# Patient Record
Sex: FEMALE | Race: BLACK OR AFRICAN AMERICAN | NOT HISPANIC OR LATINO | Employment: OTHER | URBAN - METROPOLITAN AREA
[De-identification: names, ages, dates, MRNs, and addresses within clinical notes are randomized per-mention and may not be internally consistent; named-entity substitution may affect disease eponyms.]

---

## 2017-01-03 ENCOUNTER — DOCUMENTATION ONLY (OUTPATIENT)
Dept: REHABILITATION | Facility: HOSPITAL | Age: 82
End: 2017-01-03

## 2017-01-03 ENCOUNTER — TELEPHONE (OUTPATIENT)
Dept: OTOLARYNGOLOGY | Facility: CLINIC | Age: 82
End: 2017-01-03

## 2017-01-03 NOTE — PROGRESS NOTES
Occupational Therapy Discharge Summary    Patient: Peri Johansen  MRN: [id number]    Patient is a referred to Occupational Therapy by  with a diagnosis of CTR and a referral for Eval and Treat.  Patient received initial evaluation on  10/26/16 and returned for 9  treatment sessions. Patient had 2 missed visits.     Patient's treatment included:  - Therapeutic exercise/activities  - Modalities for pain management  - ROM and strengthening      Patient's therapy goals  were met. Pt was not formally reassessed , but was working toward established goals.    Patient is D/C from outpatient occupational therapy secondary to achieving all goals.

## 2017-01-03 NOTE — TELEPHONE ENCOUNTER
----- Message from Juliet Jaquez sent at 1/2/2017  1:05 PM CST -----  Contact: self  Patient states experiencing hearing loss and roaring noise in ear   Patient states need appointment for Friday in after 11:30am this week if possible.    Please call pt at [phone]

## 2017-01-06 ENCOUNTER — TELEPHONE (OUTPATIENT)
Dept: INTERNAL MEDICINE | Facility: CLINIC | Age: 82
End: 2017-01-06

## 2017-01-09 RX ORDER — ZOLPIDEM TARTRATE 10 MG/1
TABLET ORAL
Qty: 30 TABLET | Refills: 0 | Status: SHIPPED | OUTPATIENT
Start: 2017-01-09 | End: 2017-01-23 | Stop reason: SDUPTHER

## 2017-01-09 RX ORDER — ZOLPIDEM TARTRATE 10 MG/1
TABLET ORAL
Qty: 30 TABLET | Refills: 0 | OUTPATIENT
Start: 2017-01-09

## 2017-01-23 ENCOUNTER — OFFICE VISIT (OUTPATIENT)
Dept: INTERNAL MEDICINE | Facility: CLINIC | Age: 82
End: 2017-01-23
Payer: MEDICARE

## 2017-01-23 VITALS
HEIGHT: 59 IN | SYSTOLIC BLOOD PRESSURE: 125 MMHG | DIASTOLIC BLOOD PRESSURE: 80 MMHG | WEIGHT: 134.5 LBS | BODY MASS INDEX: 27.12 KG/M2

## 2017-01-23 DIAGNOSIS — E78.2 MIXED HYPERLIPIDEMIA: ICD-10-CM

## 2017-01-23 DIAGNOSIS — F51.01 PRIMARY INSOMNIA: ICD-10-CM

## 2017-01-23 DIAGNOSIS — D17.1 LIPOMA OF BACK: ICD-10-CM

## 2017-01-23 DIAGNOSIS — G89.29 OTHER CHRONIC PAIN: ICD-10-CM

## 2017-01-23 DIAGNOSIS — K21.9 GASTROESOPHAGEAL REFLUX DISEASE WITHOUT ESOPHAGITIS: ICD-10-CM

## 2017-01-23 DIAGNOSIS — I70.0 AORTIC ATHEROSCLEROSIS: ICD-10-CM

## 2017-01-23 DIAGNOSIS — N28.1 RENAL CYST: ICD-10-CM

## 2017-01-23 DIAGNOSIS — M47.819 SPONDYLOSIS WITHOUT MYELOPATHY: ICD-10-CM

## 2017-01-23 DIAGNOSIS — M54.41 CHRONIC BILATERAL LOW BACK PAIN WITH RIGHT-SIDED SCIATICA: Primary | ICD-10-CM

## 2017-01-23 DIAGNOSIS — J01.00 SUBACUTE MAXILLARY SINUSITIS: ICD-10-CM

## 2017-01-23 DIAGNOSIS — R60.0 LOCALIZED EDEMA: ICD-10-CM

## 2017-01-23 DIAGNOSIS — G89.29 CHRONIC BILATERAL LOW BACK PAIN WITH RIGHT-SIDED SCIATICA: Primary | ICD-10-CM

## 2017-01-23 DIAGNOSIS — C83.35 DIFFUSE LARGE B-CELL LYMPHOMA OF LYMPH NODES OF LOWER EXTREMITY: ICD-10-CM

## 2017-01-23 DIAGNOSIS — K57.30 DIVERTICULOSIS OF LARGE INTESTINE WITHOUT HEMORRHAGE: ICD-10-CM

## 2017-01-23 LAB
CREAT UR-MCNC: 104 MG/DL
MICROALBUMIN UR DL<=1MG/L-MCNC: 12 UG/ML
MICROALBUMIN/CREATININE RATIO: 11.5 UG/MG

## 2017-01-23 PROCEDURE — 99214 OFFICE O/P EST MOD 30 MIN: CPT | Mod: S$PBB,,, | Performed by: INTERNAL MEDICINE

## 2017-01-23 PROCEDURE — 99214 OFFICE O/P EST MOD 30 MIN: CPT | Mod: PBBFAC | Performed by: INTERNAL MEDICINE

## 2017-01-23 PROCEDURE — 99999 PR PBB SHADOW E&M-EST. PATIENT-LVL IV: CPT | Mod: PBBFAC,,, | Performed by: INTERNAL MEDICINE

## 2017-01-23 PROCEDURE — 82570 ASSAY OF URINE CREATININE: CPT

## 2017-01-23 RX ORDER — AMOXICILLIN AND CLAVULANATE POTASSIUM 875; 125 MG/1; MG/1
1 TABLET, FILM COATED ORAL 2 TIMES DAILY
Qty: 14 TABLET | Refills: 0 | Status: SHIPPED | OUTPATIENT
Start: 2017-01-23 | End: 2017-01-30

## 2017-01-23 RX ORDER — TRAMADOL HYDROCHLORIDE 50 MG/1
TABLET ORAL
Qty: 60 TABLET | Refills: 0 | Status: SHIPPED | OUTPATIENT
Start: 2017-01-23 | End: 2017-09-14

## 2017-01-23 RX ORDER — TRAMADOL HYDROCHLORIDE 50 MG/1
50 TABLET ORAL 2 TIMES DAILY PRN
Qty: 60 TABLET | Refills: 0 | Status: SHIPPED | OUTPATIENT
Start: 2017-03-23 | End: 2017-09-14 | Stop reason: SDUPTHER

## 2017-01-23 RX ORDER — TRAMADOL HYDROCHLORIDE 50 MG/1
50 TABLET ORAL 2 TIMES DAILY PRN
Qty: 60 TABLET | Refills: 0 | Status: SHIPPED | OUTPATIENT
Start: 2017-02-23 | End: 2017-07-03 | Stop reason: SDUPTHER

## 2017-01-23 RX ORDER — ESOMEPRAZOLE MAGNESIUM 40 MG/1
40 CAPSULE, DELAYED RELEASE ORAL DAILY
Qty: 90 CAPSULE | Refills: 1 | Status: SHIPPED | OUTPATIENT
Start: 2017-01-23 | End: 2017-09-12 | Stop reason: SDUPTHER

## 2017-01-23 RX ORDER — TRAMADOL HYDROCHLORIDE 50 MG/1
50 TABLET ORAL 2 TIMES DAILY PRN
Qty: 60 TABLET | Refills: 0 | Status: SHIPPED | OUTPATIENT
Start: 2017-04-23 | End: 2017-09-14 | Stop reason: SDUPTHER

## 2017-01-23 RX ORDER — NAPROXEN SODIUM 220 MG/1
81 TABLET, FILM COATED ORAL DAILY
Start: 2017-01-23 | End: 2020-06-29 | Stop reason: SDUPTHER

## 2017-01-23 NOTE — PROGRESS NOTES
Subjective:       Patient ID: Peri Johansen is a 82 y.o. female.    Chief Complaint: Follow-up    HPI Comments: Follow up multiple medical issues    Declines flu vaccine.    L ear congestion and URI sx for several weeks.  No fever, chills or sweats.  No shortness of breath.  No hearing loss.      Due for labs for diabetes.      Due to see Oncology this year.  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   She underwent surgery with joanna placement, followed by CVP chemotherapy concurrent with radiation for 3 cycles. Then she received adjuvant weekly Rituxan x 4. All treatment was completed by December 2007.    Some chronic pain as a result of that and takes tramadol daily.  Pattern of use has been stable.    BP stable.  Due for lipids.    Has a right renal cyst, reviewed.  Diverticulosis with no symptoms.  Aortic atherosclerosis.    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     Vitamin D deficiency disease     Mixed hyperlipidemia     Renal cyst: R side see ultrasound 6/16     Uncontrolled type 2 diabetes mellitus without complication, without long-term current use of insulin     Stromal cell tumor     Lipoma of back     Thyroid nodule: 2014     Gastric nodule: 2013-m per GI no need for further follow up     Spondylosis without myelopathy     Acquired spondylolisthesis     Low vitamin B12 level     Gastroesophageal reflux disease without esophagitis     Osteoporosis: 2014 tx not indicated, repeat in 2018     Colon polyp: hyperplastic 2015- repeat 2020     Primary insomnia     Aortic atherosclerosis: see CT scan 3/15     Diverticulosis of large intestine without hemorrhage: see CT scan 3/15     Thoracic and lumbosacral neuritis     Chronic bilateral low back pain with right-sided sciatica     Left carpal tunnel syndrome     Left wrist pain     Localized edema          Review of Systems   Constitutional: Positive for fatigue.  Negative for chills and fever.   HENT: Positive for congestion, ear pain and postnasal drip.    Eyes: Negative.    Respiratory: Positive for cough. Negative for chest tightness, shortness of breath and wheezing.    Cardiovascular: Negative.    Gastrointestinal: Negative.  Negative for abdominal pain.   Genitourinary: Negative for difficulty urinating and hematuria.   Musculoskeletal: Positive for arthralgias and back pain.        Lipoma on back; does not want surgery   Skin: Negative for rash.   Neurological: Negative for weakness and numbness.   Psychiatric/Behavioral: Positive for sleep disturbance.       Objective:      Physical Exam   Constitutional: She is oriented to person, place, and time. She appears well-developed and well-nourished.   HENT:   Head: Normocephalic and atraumatic.   Right Ear: External ear normal.   Left Ear: External ear normal.   Mouth/Throat: Oropharynx is clear and moist.   Eyes: Conjunctivae are normal.   Neck: Normal range of motion. Neck supple. No thyromegaly present.   Cardiovascular: Normal rate, regular rhythm and normal heart sounds.    Pulmonary/Chest: No respiratory distress. She has no wheezes. She has no rales.   Abdominal: Soft. Bowel sounds are normal.   Musculoskeletal: She exhibits no edema.   Lipoma upper back   Lymphadenopathy:     She has no cervical adenopathy.   Neurological: She is alert and oriented to person, place, and time.   Skin: Skin is warm and dry. No rash noted. No erythema.       Assessment:       1. Chronic bilateral low back pain with right-sided sciatica    2. Diverticulosis of large intestine without hemorrhage: see CT scan 3/15    3. Renal cyst: R side see ultrasound 6/16    4. Aortic atherosclerosis: see CT scan 3/15    5. Gastroesophageal reflux disease without esophagitis    6. Mixed hyperlipidemia    7. Primary insomnia    8. Uncontrolled type 2 diabetes mellitus without complication, without long-term current use of insulin    9. Small B-cell  lymphoma of lymph nodes of lower extremity    10. Spondylosis without myelopathy    11. Other chronic pain    12. Subacute maxillary sinusitis    13. Localized edema    14. Lipoma of back    15. Diffuse large B-cell lymphoma of lymph nodes of lower extremity        Plan:         Chronic bilateral low back pain with right-sided sciatica: Stable without alarm symptoms    Diverticulosis of large intestine without hemorrhage: see CT scan 3/15: No current issues    Renal cyst: R side see ultrasound 6/16; We'll monitor    Aortic atherosclerosis: see CT scan 3/15  -     aspirin 81 MG Chew; Take 1 tablet (81 mg total) by mouth once daily.    Gastroesophageal reflux disease without esophagitis; No alarm symptoms.  Continue Nexium.  Per GI, no need for follow-up    Mixed hyperlipidemia  -     Comprehensive metabolic panel; Future; Expected date: 1/23/17  -     Lipid panel; Future; Expected date: 1/23/17    Primary insomnia    Uncontrolled type 2 diabetes mellitus without complication, without long-term current use of insulin  -     Hemoglobin A1c; Future; Expected date: 1/23/17  -     Microalbumin/creatinine urine ratio    Spondylosis without myelopathy  -     tramadol (ULTRAM) 50 mg tablet; Take 1 tablet (50 mg total) by mouth 2 (two) times daily as needed.  Dispense: 60 tablet; Refill: 0  -     tramadol (ULTRAM) 50 mg tablet; Take 1 tablet (50 mg total) by mouth 2 (two) times daily as needed.  Dispense: 60 tablet; Refill: 0  -     tramadol (ULTRAM) 50 mg tablet; Take 1 tablet (50 mg total) by mouth 2 (two) times daily as needed.  Dispense: 60 tablet; Refill: 0    Other chronic pain  -     tramadol (ULTRAM) 50 mg tablet; Take 1 tablet (50 mg total) by mouth 2 (two) times daily as needed.  Dispense: 60 tablet; Refill: 0  -     tramadol (ULTRAM) 50 mg tablet; Take 1 tablet (50 mg total) by mouth 2 (two) times daily as needed.  Dispense: 60 tablet; Refill: 0    Subacute maxillary sinusitis  -     amoxicillin-clavulanate  875-125mg (AUGMENTIN) 875-125 mg per tablet; Take 1 tablet by mouth 2 (two) times daily.  Dispense: 14 tablet; Refill: 0    Localized edema; No syncope, chest pain, pressure, tightness or shortness of breath    Lipoma of back; Declines assessment    Diffuse large B-cell lymphoma of lymph nodes of lower extremity: Oncology follow-up    Other orders  -     tramadol (ULTRAM) 50 mg tablet; TAKE 1 TABLET(50 MG) BY MOUTH TWICE DAILY  Dispense: 60 tablet; Refill: 0  -     NEXIUM 40 mg capsule; Take 1 capsule (40 mg total) by mouth once daily.  Dispense: 90 capsule; Refill: 1    Rest, fluids, acetaminophen, mucinex and follow up poor results.    I will review all studies and determine further tx depending on findings

## 2017-01-23 NOTE — MR AVS SNAPSHOT
[facility]  [address]  [facility] [address]  Phone: [phone]  Fax: [phone]                  Peri Johansen   2017 2:00 PM   Office Visit    Description:  Female : 1934   Provider:  Annika Garland MD   Department:  [facility]           Reason for Visit     Follow-up           Diagnoses this Visit        Comments    Chronic bilateral low back pain with right-sided sciatica    -  Primary     Diverticulosis of large intestine without hemorrhage         Renal cyst         Aortic atherosclerosis         Gastroesophageal reflux disease without esophagitis         Mixed hyperlipidemia         Primary insomnia         Uncontrolled type 2 diabetes mellitus without complication, without long-term current use of insulin         Spondylosis without myelopathy         Other chronic pain         Subacute maxillary sinusitis         Localized edema         Lipoma of back         Diffuse large B-cell lymphoma of lymph nodes of lower extremity                To Do List           Future Appointments        Provider Department Dept Phone    2017 2:50 PM LAB, APPOINTMENT [place] [facility] [phone]      Goals (5 Years of Data)     None       These Medications        Disp Refills Start End    aspirin 81 MG Chew   2017     Take 1 tablet (81 mg total) by mouth once daily. - Oral    Pharmacy: [facility] [address] AT [facility] Ph #: [phone]       tramadol (ULTRAM) 50 mg tablet 60 tablet 0 2017     TAKE 1 TABLET(50 MG) BY MOUTH TWICE DAILY    Pharmacy: [facility] [address] [place] [address] AT [facility] Ph #: [phone]       tramadol (ULTRAM) 50 mg tablet 60 tablet 0 2017     Take 1 tablet (50 mg total) by mouth 2 (two) times daily as needed. - Oral    Pharmacy: [facility] [address]  [address] AT [facility] Ph #: [phone]       tramadol (ULTRAM) 50 mg tablet 60 tablet 0 3/23/2017     Take 1 tablet (50 mg total) by mouth 2 (two) times daily as needed. - Oral    Pharmacy: [address] AT [facility] Ph #: [phone]       tramadol (ULTRAM) 50 mg tablet 60 tablet 0 4/23/2017     Take 1 tablet (50 mg total) by mouth 2 (two) times daily as needed. - Oral    Pharmacy: [address] AT [facility] Ph #: [phone]       NEXIUM 40 mg capsule 90 capsule 1 1/23/2017     Take 1 capsule (40 mg total) by mouth once daily. - Oral    Pharmacy: [facility] [address] AT [facility] Ph #: [phone]       amoxicillin-clavulanate 875-125mg (AUGMENTIN) 875-125 mg per tablet 14 tablet 0 1/23/2017 1/30/2017    Take 1 tablet by mouth 2 (two) times daily. - Oral    Pharmacy: [address] AT [facility] Ph #: [phone]         [facility]     [facility] - 24/7 Assistance  Registered nurses in the [facility] provide clinical advisement, health education, appointment booking, and other advisory services.  Call for this free service at [phone].             Medications           Message regarding Medications     Verify the changes and/or additions to your medication regime listed below are the same as discussed with your clinician today.  If any of these changes or additions are incorrect, please notify your healthcare provider.        START taking these NEW medications        Refills    aspirin 81 MG Chew     Sig: Take 1 tablet (81 mg total) by mouth once daily.    Class: No Print    Route: Oral    amoxicillin-clavulanate 875-125mg (AUGMENTIN) 875-125 mg per tablet 0    Sig: Take 1 tablet  by mouth 2 (two) times daily.    Class: Normal    Route: Oral      CHANGE how you are taking these medications     Start Taking Instead of    NEXIUM 40 mg capsule NEXIUM 40 mg capsule    Dosage:  Take 1 capsule (40 mg total) by mouth once daily. Dosage:  TAKE ONE CAPSULE BY MOUTH EVERY DAY    Reason for Change:  Reorder       STOP taking these medications     hydrocodone-acetaminophen 5-325mg (NORCO) 5-325 mg per tablet Take 1 tablet by mouth every 8 (eight) hours as needed for Pain (do not take with tramadol. take with food. One time only precription. No Refills).           Verify that the below list of medications is an accurate representation of the medications you are currently taking.  If none reported, the list may be blank. If incorrect, please contact your healthcare provider. Carry this list with you in case of emergency.           Current Medications     ammonium lactate 12 % Crea Apply 1 application topically once daily.    atorvastatin (LIPITOR) 40 MG tablet Take 1 tablet (40 mg total) by mouth once daily.    calcium 500 mg Tab Take 1 tablet by mouth Twice daily.    dextran 70-hypromellose (ARTIFICIAL TEARS) ophthalmic solution Inject 1 drop into the eye 4 (four) times daily as needed.    diclofenac sodium (VOLTAREN) 1 % Gel Apply 2 g topically 4 (four) times daily.    gabapentin (NEURONTIN) 300 MG capsule Take 1 capsule (300 mg total) by mouth every evening.    glimepiride (AMARYL) 2 MG tablet TAKE 1 TABLET BY MOUTH BEFORE BREAKFAST.    lidocaine-prilocaine (EMLA) cream     metformin (GLUCOPHAGE) 1000 MG tablet Take 1 tablet (1,000 mg total) by mouth 2 (two) times daily with meals.    NEXIUM 40 mg capsule Take 1 capsule (40 mg total) by mouth once daily.    ONE TOUCH DELICA LANCETS 30 gauge Misc     ONETOUCH ULTRA TEST Strp TEST TWICE DAILY    tramadol (ULTRAM) 50 mg tablet TAKE 1 TABLET(50 MG) BY MOUTH TWICE DAILY    tramadol (ULTRAM) 50 mg tablet Starting on Feb 23, 2017. Take 1 tablet (50 mg total)  "by mouth 2 (two) times daily as needed.    tramadol (ULTRAM) 50 mg tablet Starting on Mar 23, 2017. Take 1 tablet (50 mg total) by mouth 2 (two) times daily as needed.    tramadol (ULTRAM) 50 mg tablet Starting on Apr 23, 2017. Take 1 tablet (50 mg total) by mouth 2 (two) times daily as needed.    VITAMIN D2 50,000 unit capsule TAKE ONE CAPSULE BY MOUTH EVERY SEVEN DAYS    zolpidem (AMBIEN) 10 mg Tab TAKE 1 TABLET BY MOUTH EVERY NIGHT AT BEDTIME AS NEEDED FOR INSOMNIA DO NOT EXCEED MORE THAN 3 TO 4 TIMES A WEEK    amoxicillin-clavulanate 875-125mg (AUGMENTIN) 875-125 mg per tablet Take 1 tablet by mouth 2 (two) times daily.    aspirin 81 MG Chew Take 1 tablet (81 mg total) by mouth once daily.    econazole nitrate 1 % cream Apply topically 2 (two) times daily.           Clinical Reference Information           Vital Signs - Last Recorded  Most recent update: 1/23/2017  2:14 PM by Abena Lynn MA    BP Ht Wt BMI       125/80 (BP Location: Left arm, Patient Position: Sitting, BP Method: Manual) 4' 11" (1.499 m) 61 kg (134 lb 7.7 oz) 27.16 kg/m2       Blood Pressure          Most Recent Value    BP  125/80      Allergies as of 1/23/2017     Latex, Natural Rubber      Immunizations Administered on Date of Encounter - 1/23/2017     None      Orders Placed During Today's Visit      Normal Orders This Visit    Ambulatory consult to Oncology     Microalbumin/creatinine urine ratio     Future Labs/Procedures Expected by Expires    Comprehensive metabolic panel  1/23/2017 1/23/2018    Hemoglobin A1c  1/23/2017 1/23/2018    Lipid panel  1/23/2017 1/23/2018      [facility] Sign-Up     Activating your [facility] account is as easy as 1-2-3!     1) Visit [email or web address], select Sign Up Now, enter this activation code and your date of birth, then select Next.  [email or web address]  Expires: 3/9/2017  2:50 PM      2) Create a username and password to use when you visit [facility] in the future and select a security question in case you " lose your password and select Next.    3) Enter your e-mail address and click Sign Up!    Additional Information  If you have questions, please e-mail [email or web address] or call [phone] to talk to our [facility] staff. Remember, [facility] is NOT to be used for urgent needs. For medical emergencies, dial 911.

## 2017-02-02 ENCOUNTER — TELEPHONE (OUTPATIENT)
Dept: INTERNAL MEDICINE | Facility: CLINIC | Age: 82
End: 2017-02-02

## 2017-02-02 NOTE — TELEPHONE ENCOUNTER
----- Message from Ian Nic sent at 2/2/2017  8:48 AM CST -----  Contact: Self/[phone] home  Patient is calling to request medication for a yeast infection. She states she is experiencing irritation, frequency and itchiness. She would like the anti-biotics sent to the [facility] [id number]. Please call and advise.    Thank you!

## 2017-02-10 RX ORDER — ZOLPIDEM TARTRATE 5 MG/1
5 TABLET ORAL NIGHTLY PRN
Qty: 20 TABLET | Refills: 0 | Status: SHIPPED | OUTPATIENT
Start: 2017-02-10 | End: 2017-03-17 | Stop reason: SDUPTHER

## 2017-02-12 RX ORDER — ZOLPIDEM TARTRATE 10 MG/1
TABLET ORAL
Qty: 30 TABLET | Refills: 0 | OUTPATIENT
Start: 2017-02-12

## 2017-02-16 RX ORDER — ZOLPIDEM TARTRATE 10 MG/1
TABLET ORAL
Qty: 30 TABLET | Refills: 0 | OUTPATIENT
Start: 2017-02-16

## 2017-02-16 NOTE — TELEPHONE ENCOUNTER
----- Message from Marti Aguilera sent at 2/16/2017 11:50 AM CST -----  Contact: Pt  Pt stated she misplaced her rx and wants to know if the rx, zolpidem (AMBIEN) 5 MG Tab be called into [facility] at [phone].          Please call pt at [phone].        Thanks!

## 2017-03-08 ENCOUNTER — TELEPHONE (OUTPATIENT)
Dept: ADMINISTRATIVE | Facility: OTHER | Age: 82
End: 2017-03-08

## 2017-03-08 NOTE — TELEPHONE ENCOUNTER
----- Message from Soraida Lemos sent at 3/8/2017 11:59 AM CST -----  Contact: self  Pt states she will going out of town the end of March, pt would like to be seen before leaving, pt is not due to see  until May 2017.  Contact number [phone]

## 2017-03-16 ENCOUNTER — HOSPITAL ENCOUNTER (OUTPATIENT)
Dept: RADIOLOGY | Facility: HOSPITAL | Age: 82
Discharge: HOME | End: 2017-03-16
Attending: INTERNAL MEDICINE
Payer: MEDICARE

## 2017-03-16 ENCOUNTER — OFFICE VISIT (OUTPATIENT)
Dept: HEMATOLOGY/ONCOLOGY | Facility: CLINIC | Age: 82
End: 2017-03-16
Payer: MEDICARE

## 2017-03-16 VITALS
HEIGHT: 59 IN | BODY MASS INDEX: 27.2 KG/M2 | TEMPERATURE: 99 F | HEART RATE: 98 BPM | WEIGHT: 134.94 LBS | SYSTOLIC BLOOD PRESSURE: 90 MMHG | DIASTOLIC BLOOD PRESSURE: 49 MMHG

## 2017-03-16 DIAGNOSIS — Z85.72 HISTORY OF LYMPHOMA: ICD-10-CM

## 2017-03-16 DIAGNOSIS — R07.81 RIB PAIN ON LEFT SIDE: ICD-10-CM

## 2017-03-16 PROCEDURE — 71020 XR CHEST PA AND LATERAL: CPT | Mod: TC

## 2017-03-16 PROCEDURE — 99213 OFFICE O/P EST LOW 20 MIN: CPT | Mod: S$PBB,,, | Performed by: PHYSICIAN ASSISTANT

## 2017-03-16 PROCEDURE — 99999 PR PBB SHADOW E&M-EST. PATIENT-LVL III: CPT | Mod: PBBFAC,,, | Performed by: PHYSICIAN ASSISTANT

## 2017-03-16 PROCEDURE — 71020 XR CHEST PA AND LATERAL: CPT | Mod: 26,,, | Performed by: RADIOLOGY

## 2017-03-16 NOTE — PROGRESS NOTES
Subjective:       Patient ID: Peri Johansen is a 82 y.o. female.    Chief Complaint: No chief complaint on file.    HPI Comments: This is a 82-year-old female, who we treated in this clinic for diffuse large B cell lymphoma of the left tibia diagnosed in April 2007, stage I.   She underwent surgery with joanna placement, followed by CVP chemotherapy concurrent with radiation for 3 cycles. Then she received adjuvant weekly Rituxan x 4. All treatment was completed by December 2007.     Reports feeling pretty good. Weight fairly stable.  No fevers, chills, or infections.  Still with occasional night sweats, worse two weeks ago but in the last week those have resolved.  Notes some persistent right sided chest wall pain just below the breast x several months, tender to palpation.  Also notes some stiffness at the base of her neck, L>R for the past day.         Review of Systems   Constitutional: Negative for activity change, appetite change, chills, diaphoresis, fatigue, fever and unexpected weight change.        Rare night sweats reported   HENT: Negative for congestion, dental problem, hearing loss, mouth sores, postnasal drip, rhinorrhea, sneezing and trouble swallowing.    Eyes: Negative for redness and visual disturbance.   Respiratory: Negative for cough, chest tightness, shortness of breath and wheezing.    Cardiovascular: Negative for chest pain, palpitations and leg swelling.   Gastrointestinal: Negative for abdominal distention, abdominal pain, blood in stool, constipation, diarrhea, nausea and vomiting.   Genitourinary: Negative for difficulty urinating, dysuria and hematuria.   Musculoskeletal: Negative for back pain, gait problem, joint swelling, myalgias, neck pain and neck stiffness. Arthralgias: chronic leg.   Skin: Negative for color change, pallor, rash and wound.        Patient feels back lipoma has increased in size, does not desire surgical management   Neurological: Negative for dizziness, weakness,  light-headedness and headaches.   Hematological: Negative for adenopathy. Does not bruise/bleed easily.   Psychiatric/Behavioral: Negative for dysphoric mood. The patient is not nervous/anxious.        Objective:      Physical Exam   Constitutional: She is oriented to person, place, and time. She appears well-developed and well-nourished. No distress.   Presents alone   HENT:   Head: Normocephalic and atraumatic.   Nose: Nose normal.   Mouth/Throat: Oropharynx is clear and moist. No oropharyngeal exudate.   Eyes: EOM are normal. Pupils are equal, round, and reactive to light. No scleral icterus.   Neck: Normal range of motion. Neck supple. No thyromegaly present.   Cardiovascular: Normal rate, regular rhythm, normal heart sounds and intact distal pulses.  Exam reveals no gallop and no friction rub.    No murmur heard.  Pulmonary/Chest: Effort normal and breath sounds normal. No respiratory distress. She has no wheezes. She has no rales. She exhibits no tenderness.   Abdominal: Soft. Bowel sounds are normal. She exhibits no distension and no mass. There is no tenderness. There is no rebound and no guarding.   No hepatosplenomegaly     Musculoskeletal: Normal range of motion. She exhibits no edema or tenderness.   Tenderness to palpation just inferior to left breast at left anterior rib cage, no mass or defect  No spinal or paraspinal tenderness to palpation     Lymphadenopathy:        Head (right side): No submental, no submandibular, no preauricular, no posterior auricular and no occipital adenopathy present.        Head (left side): No submental, no submandibular, no preauricular, no posterior auricular and no occipital adenopathy present.     She has no cervical adenopathy.     She has no axillary adenopathy.        Right: No inguinal and no supraclavicular adenopathy present.        Left: No inguinal and no supraclavicular adenopathy present.   Neurological: She is alert and oriented to person, place, and time.  No cranial nerve deficit. She exhibits normal muscle tone. Coordination normal.   Skin: Skin is warm and dry. No rash noted. No erythema. No pallor.   Lipoma back   Psychiatric: She has a normal mood and affect. Her behavior is normal. Judgment and thought content normal.   Nursing note and vitals reviewed.      Assessment:       1. History of lymphoma    2. Rib pain on left side        Plan:       1)Clinically EVELIO and labs within patient's baseline. She continues to have nigh sweats but no acute change over the last year. Return in one year and patient knows to call in interim if any issues.  2)CXR today    *cxr without acute changes or abnormality

## 2017-03-17 RX ORDER — ZOLPIDEM TARTRATE 5 MG/1
5 TABLET ORAL NIGHTLY PRN
Qty: 16 TABLET | Refills: 0 | Status: SHIPPED | OUTPATIENT
Start: 2017-03-17 | End: 2017-04-17 | Stop reason: SDUPTHER

## 2017-03-19 PROBLEM — Z85.72 HISTORY OF LYMPHOMA: Status: ACTIVE | Noted: 2017-03-19

## 2017-03-27 ENCOUNTER — OFFICE VISIT (OUTPATIENT)
Dept: INTERNAL MEDICINE | Facility: CLINIC | Age: 82
End: 2017-03-27
Payer: MEDICARE

## 2017-03-27 VITALS — DIASTOLIC BLOOD PRESSURE: 65 MMHG | SYSTOLIC BLOOD PRESSURE: 120 MMHG | WEIGHT: 134.5 LBS | BODY MASS INDEX: 27.16 KG/M2

## 2017-03-27 DIAGNOSIS — H61.21 CERUMEN DEBRIS ON TYMPANIC MEMBRANE, RIGHT: ICD-10-CM

## 2017-03-27 DIAGNOSIS — M47.819 SPONDYLOSIS WITHOUT MYELOPATHY: ICD-10-CM

## 2017-03-27 DIAGNOSIS — N28.1 RENAL CYST: ICD-10-CM

## 2017-03-27 DIAGNOSIS — H91.92 HEARING LOSS OF LEFT EAR, UNSPECIFIED HEARING LOSS TYPE: Primary | ICD-10-CM

## 2017-03-27 DIAGNOSIS — M54.17 THORACIC AND LUMBOSACRAL NEURITIS: ICD-10-CM

## 2017-03-27 DIAGNOSIS — M79.605 LEFT LEG PAIN: ICD-10-CM

## 2017-03-27 DIAGNOSIS — M54.14 THORACIC AND LUMBOSACRAL NEURITIS: ICD-10-CM

## 2017-03-27 DIAGNOSIS — C83.35 DIFFUSE LARGE B-CELL LYMPHOMA OF LYMPH NODES OF LOWER EXTREMITY: ICD-10-CM

## 2017-03-27 DIAGNOSIS — M43.10 ACQUIRED SPONDYLOLISTHESIS: ICD-10-CM

## 2017-03-27 DIAGNOSIS — N64.4 BREAST PAIN: ICD-10-CM

## 2017-03-27 PROBLEM — Z85.72 HISTORY OF LYMPHOMA: Status: RESOLVED | Noted: 2017-03-19 | Resolved: 2017-03-27

## 2017-03-27 PROCEDURE — 99999 PR PBB SHADOW E&M-EST. PATIENT-LVL III: CPT | Mod: PBBFAC,,, | Performed by: INTERNAL MEDICINE

## 2017-03-27 PROCEDURE — 99213 OFFICE O/P EST LOW 20 MIN: CPT | Mod: PBBFAC | Performed by: INTERNAL MEDICINE

## 2017-03-27 PROCEDURE — 99214 OFFICE O/P EST MOD 30 MIN: CPT | Mod: S$PBB,,, | Performed by: INTERNAL MEDICINE

## 2017-03-27 RX ORDER — FLUTICASONE PROPIONATE 50 MCG
2 SPRAY, SUSPENSION (ML) NASAL DAILY
Qty: 16 G | Refills: 12 | Status: SHIPPED | OUTPATIENT
Start: 2017-03-27 | End: 2017-04-26

## 2017-03-27 RX ORDER — DICLOFENAC SODIUM 10 MG/G
2 GEL TOPICAL 4 TIMES DAILY
Qty: 5 TUBE | Refills: 2 | Status: SHIPPED | OUTPATIENT
Start: 2017-03-27 | End: 2019-07-05

## 2017-03-27 NOTE — PATIENT INSTRUCTIONS
Earwax Removal    The ear canal makes earwax from the canals lining. The ears make wax to lubricate and protect the ear canal. The ear canal is the tube that connects the middle ear to the outside of the ear. The wax protects the ear from bacteria, infection, and damage from water or trauma.  The wax that forms in the canal naturally moves toward the outside of the ear and falls out. In some cases, the ear may make too much wax. If the wax causes problems or keeps the healthcare provider from seeing into the ear, the extra wax may be removed.  Too much wax can affect your hearing. It can cause itching. In rare cases, it can be painful. Earwax should not be removed unless it is causing a problem. You should not stick objects into your ear to remove wax unless told to do so by your healthcare provider.  Healthcare providers can remove earwax safely. It is important to stay still during the procedure to avoid damage to the ear canal. But removing earwax generally doesnt hurt. You will not usually need anesthesia or pain medicine when the provider removes the earwax.  A number of conditions lead to earwax buildup. These include some skin problems, a narrow ear canal, or ears that make too much earwax. Using cotton swabs in the canal pushes earwax deeper into the ear and contributes to the buildup of earwax.  Home care  · The healthcare provider may recommend mineral oil or an over-the-counter eardrop to use at home to soften the earwax. Use these products only if the provider recommends them. Use these products only if the provider recommends them. Carefully follow the instructions given.  · Dont use mineral oil or OTC eardrops if you might have an ear infection or a ruptured eardrum. Tell your healthcare provider right away if you have diabetes or an immune disorder.  · Dont use cotton swabs in your ears. Cotton swabs may push wax deeper into the ear canal or damage the eardrum. Use cotton gauze or a wet  washcloth  to gently remove wax on the outside of the ear and around the opening to the ear canal.  · Don't use any probing device or object such as cotton-tipped swabs or lilo pins to clean the inside of your ears.  · Dont use ear candles to clean your ears. Candling can be dangerous. It can burn the ear canal. It can also make the condition worse instead of better.  · Dont use cold water to rinse the ear. This will make you dizzy. If your provider tells you to rinse your ear, use only warm water or follow his or her instructions.  · Check the ear for signs of infection or irritation listed below under When to seek medical advice.  Steps for using eardrops  1. Warm the medicine bottle by rubbing it between your hands for a few minutes.  2. Lie down on your side, with the affected ear up.  3. Place the recommended number of drops in the ear. Wet a cotton ball with the medicine. Gently put the cotton ball into the ear opening.  Follow-up care  Follow up with your healthcare provider, or as directed.  When to seek medical advice  Call the provider right away if you have:  · Ear pain that gets worse  · Fever of 100.4F°F (38°C) or higher, or as directed by your healthcare provider  · Worsening wax buildup  · Severe pain, dizziness, or nausea  · Bleeding from the ear  · Hearing problems  · Signs of irritation from the eardrops, such as burning, stinging, or swelling and tenderness  · Foul-smelling fluid draining from the ear  · Swelling, redness, or tenderness of the outer ear  · Headache, neck pain, or stiff neck  Date Last Reviewed: 3/22/2015  © 0323-0404 [employer]. [address] [place] [address], [place] [address]. All rights reserved. This information is not intended as a substitute for professional medical care. Always follow your healthcare professional's instructions.        Earwax (Treated)    Everyone produces earwax from the lining of the ear canal. It lubricates and protects the ear. The wax that  forms in the canal slowly moves toward the outside of the ear and falls out. Sometimes wax can build up in the ear canal. This can cause a blockage and loss of hearing. A buildup of earwax was removed from your ear today.  Home care  If you have a tendency to build up wax in the ear canal, you should clear the wax at home regularly, before it causes discomfort. This should be about once every six months.  · Unless a medicine was prescribed, you may use an over-the-counter product made for clearing earwax. These contain carbamide peroxide and are available over-the-counter in a kit with a small bulb syringe.  · Lie down with the blocked ear facing upward. Apply one dropper full of medicine and wait a few minutes. Grasp the outer ear and wiggle it to help the solution enter the canal.  · Lean over a sink or basin with the blocked ear turned downward. Use a rubber bulb syringe filled with warm (not hot or cold) water to rinse the ear several times. Use gentle pressure only. You may need to repeat the irrigation several times before the wax flows out.  · If you are having trouble draining all the water out of your ear canal, put a few drops of rubbing alcohol into the ear canal. This will help remove the remaining water.  Don'ts  · Dont use cold water to rinse the ear. This will make you dizzy.  · Dont do this procedure if you have an ear infection. Symptoms include ear pain, fever, or fluid draining from the ear.  · Dont do this procedure if you have a punctured eardrum.  · Dont use cotton swabs, matches, hairpins, keys, or other objects to clean the ear canal. This can cause infection of the ear canal or rupture of the eardrum. Because of their size and shape, cotton swabs can push the earwax deeper into the ear canal instead of removing it.  Follow-up care  Follow up with your healthcare provider, or as advised.  When to seek medical advice  Call your healthcare provider right away if any of these  occur:  · Worsening ear pain  · Fever of 100.4°F (38°C) or higher, or as directed by your healthcare provider  · Hearing does not return to normal after three days of treatment  · Fluid drainage or bleeding from the ear canal  · Swelling, redness, or tenderness of the outer ear  · Headache, neck pain, or stiff neck  Date Last Reviewed: 3/22/2015  © 2931-4239 [employer]. [address] [place] [address], [place] [address]. All rights reserved. This information is not intended as a substitute for professional medical care. Always follow your healthcare professional's instructions.        Impacted Earwax  Impacted earwax is a buildup of the natural wax in the ear (cerumen). Impacted earwax is very common. It can cause symptoms such as hearing loss. It can also stop a doctor doing an exam of your ear.  Understanding earwax  Tiny glands in your ear make substances that combine with dead skin cells to form earwax. Earwax helps protect your ear canal from water, dirt, infection, and injury. Over time, earwax travels from the inner part of your ear canal to the entrance of the canal. Then it falls away naturally. But in some cases, it cant travel to the entrance of the canal. This may be because of a health condition or objects put in the ear. With age, earwax tends to become harder and less fluid. Older adults are more likely to have problems with earwax buildup.  What causes impacted earwax?  Earwax can build up because of many health conditions. Some cause a physical blockage. Others cause too much earwax to be made. Health conditions that can cause earwax buildup include:  · Bony blockage in the ear (osteoma or exostoses)  · Infections, such as swimmers ear (external otitis)  · Skin disease, such as eczema  · Autoimmune diseases, such as lupus  · A narrowed ear canal from birth, chronic inflammation, or injury  · Too much earwax because of injury  · Too much earwax because of  water in the ear canal  Objects  repeatedly placed in the ear can also cause impacted earwax. For example, putting cotton swabs in the ear may push the wax deeper into the ear. Over time, this may cause blockage. Hearing aids, swimming plugs, and swim molds can cause the same problem when used again and again.  In some cases, the cause of impacted earwax is not known.  Symptoms of impacted earwax  Excess earwax usually does not cause any symptoms, unless there is a large amount of buildup. Then it may cause symptoms such as:  · Hearing loss  · Earache  · Sense of ear fullness  · Itching in the ear  · Dizziness  · Ringing in the ears  · Cough  Treatment for impacted earwax  If you dont have symptoms, you may not need treatment. Often the earwax goes away on its own with time. If you have symptoms, you may have 1 or more treatments such as:  · Ear drops. These help to soften the earwax. This helps it leave the ear over time.  · Rinsing (irrigation) of the ear canal with water. This is done in a doctors office.  · Removal of the earwax with small tools. This is also done in a doctors office.  In rare cases, some treatments for earwax removal may cause complications such as:  · Swimmers ear (otitis external)  · Earache  · Short-term hearing loss  · Dizziness  · Water trapped in the ear canal  · Hole in the eardrum  · Ringing in the ears  · Bleeding from the ear  Talk with your health care provider about which risks apply most to you.  Dont use these at home  Health care providers do not advise use of ear candles or ear vacuum kits. These methods are not shown to work.   Preventing impacted earwax  You may not be able to prevent impacted earwax if you have a health condition that causes it, such as eczema. In other cases, you may be able to prevent earwax buildup by:  · Using ear drops once a week  · Having routine cleaning of the ear about every 6 months  · Not using cotton swabs in the ear  When to call the health care provider  Call your health  care provider right away if you have severe symptoms after earwax removal. These may include bleeding or severe ear pain.   Date Last Reviewed: 3/19/2015  © 2150-8922 [employer]. [address] [place] [address], [place] [address]. All rights reserved. This information is not intended as a substitute for professional medical care. Always follow your healthcare professional's instructions.    CERUMINEX or DEBROX

## 2017-03-27 NOTE — MR AVS SNAPSHOT
[facility]  [address]  [facility] [address]  Phone: [phone]  Fax: [phone]                  Peri Johansen   3/27/2017 4:30 PM   Office Visit    Description:  Female : 1934   Provider:  Annika Garland MD   Department:  [facility]           Reason for Visit     Otalgia     Headache           Diagnoses this Visit        Comments    Hearing loss of left ear, unspecified hearing loss type    -  Primary     Cerumen debris on tympanic membrane, right         Spondylosis without myelopathy         Acquired spondylolisthesis         Thoracic and lumbosacral neuritis         Left leg pain         Renal cyst         Breast pain         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 - Each Nare    Pharmacy: [facility] [address] [place] [address] [facility] Ph #: [phone]       diclofenac sodium (VOLTAREN) 1 % Gel 5 Tube 2 3/27/2017     Apply 2 g topically 4 (four) times daily. - Topical    Pharmacy: [facility] [employer] [place] [address] [facility] Ph #: [phone]         [facility]     [facility] Care [facility] -  Assistance  Registered nurses in the [facility] provide clinical advisement, health education, appointment booking, and other advisory services.  Call for this free service at [phone].             Medications           Message regarding Medications     Verify the changes and/or additions to your medication regime listed below are the same as discussed with your clinician today.  If  any of these changes or additions are incorrect, please notify your healthcare provider.        START taking these NEW medications        Refills    fluticasone (FLONASE) 50 mcg/actuation nasal spray 12    Si sprays by Each Nare route once daily.    Class: Print    Route: Each Nare           Verify that the below list of medications is an accurate representation of the medications you are currently taking.  If none reported, the list may be blank. If incorrect, please contact your healthcare provider. Carry this list with you in case of emergency.           Current Medications     ammonium lactate 12 % Crea Apply 1 application topically once daily.    aspirin 81 MG Chew Take 1 tablet (81 mg total) by mouth once daily.    atorvastatin (LIPITOR) 40 MG tablet Take 1 tablet (40 mg total) by mouth once daily.    calcium 500 mg Tab Take 1 tablet by mouth Twice daily.    dextran 70-hypromellose (ARTIFICIAL TEARS) ophthalmic solution Inject 1 drop into the eye 4 (four) times daily as needed.    diclofenac sodium (VOLTAREN) 1 % Gel Apply 2 g topically 4 (four) times daily.    gabapentin (NEURONTIN) 300 MG capsule Take 1 capsule (300 mg total) by mouth every evening.    glimepiride (AMARYL) 2 MG tablet TAKE 1 TABLET BY MOUTH BEFORE BREAKFAST.    lidocaine-prilocaine (EMLA) cream     metformin (GLUCOPHAGE) 1000 MG tablet Take 1 tablet (1,000 mg total) by mouth 2 (two) times daily with meals.    NEXIUM 40 mg capsule Take 1 capsule (40 mg total) by mouth once daily.    ONE TOUCH DELICA LANCETS 30 gauge Misc     ONETOUCH ULTRA TEST Strp TEST TWICE DAILY    tramadol (ULTRAM) 50 mg tablet TAKE 1 TABLET(50 MG) BY MOUTH TWICE DAILY    tramadol (ULTRAM) 50 mg tablet Take 1 tablet (50 mg total) by mouth 2 (two) times daily as needed.    tramadol (ULTRAM) 50 mg tablet Take 1 tablet (50 mg total) by mouth 2 (two) times daily as needed.    tramadol (ULTRAM) 50 mg tablet Starting on 2017. Take 1 tablet (50 mg total) by mouth 2  (two) times daily as needed.    VITAMIN D2 50,000 unit capsule TAKE ONE CAPSULE BY MOUTH EVERY SEVEN DAYS    zolpidem (AMBIEN) 5 MG Tab Take 1 tablet (5 mg total) by mouth nightly as needed (3-4 x weekly.  16 pills is  month's supply).    econazole nitrate 1 % cream Apply topically 2 (two) times daily.    fluticasone (FLONASE) 50 mcg/actuation nasal spray 2 sprays by Each Nare route once daily.           Clinical Reference Information           Your Vitals Were     BP Weight BMI          120/65 61 kg (134 lb 7.7 oz) 27.16 kg/m2        Blood Pressure          Most Recent Value    BP  120/65      Allergies as of 3/27/2017     Latex, Natural Rubber      Immunizations Administered on Date of Encounter - 3/27/2017     None      Orders Placed During Today's Visit      Normal Orders This Visit    Ambulatory consult to Audiology     Ambulatory Referral to Breast Surgery       [facility] Sign-Up     Activating your [facility] account is as easy as 1-2-3!     1) Visit [email or web address], select Sign Up Now, enter this activation code and your date of birth, then select Next.  [email or web address]  Expires: 5/11/2017  5:12 PM      2) Create a username and password to use when you visit [facility] in the future and select a security question in case you lose your password and select Next.    3) Enter your e-mail address and click Sign Up!    Additional Information  If you have questions, please e-mail [email or web address] or call [phone] to talk to our [facility] staff. Remember, [facility] is NOT to be used for urgent needs. For medical emergencies, dial 911.         Instructions      Earwax Removal    The ear canal makes earwax from the canals lining. The ears make wax to lubricate and protect the ear canal. The ear canal is the tube that connects the middle ear to the outside of the ear. The wax protects the ear from bacteria, infection, and damage from water or trauma.  The wax that forms in the canal naturally moves toward the  outside of the ear and falls out. In some cases, the ear may make too much wax. If the wax causes problems or keeps the healthcare provider from seeing into the ear, the extra wax may be removed.  Too much wax can affect your hearing. It can cause itching. In rare cases, it can be painful. Earwax should not be removed unless it is causing a problem. You should not stick objects into your ear to remove wax unless told to do so by your healthcare provider.  Healthcare providers can remove earwax safely. It is important to stay still during the procedure to avoid damage to the ear canal. But removing earwax generally doesnt hurt. You will not usually need anesthesia or pain medicine when the provider removes the earwax.  A number of conditions lead to earwax buildup. These include some skin problems, a narrow ear canal, or ears that make too much earwax. Using cotton swabs in the canal pushes earwax deeper into the ear and contributes to the buildup of earwax.  Home care  · The healthcare provider may recommend mineral oil or an over-the-counter eardrop to use at home to soften the earwax. Use these products only if the provider recommends them. Use these products only if the provider recommends them. Carefully follow the instructions given.  · Dont use mineral oil or OTC eardrops if you might have an ear infection or a ruptured eardrum. Tell your healthcare provider right away if you have diabetes or an immune disorder.  · Dont use cotton swabs in your ears. Cotton swabs may push wax deeper into the ear canal or damage the eardrum. Use cotton gauze or a wet washcloth  to gently remove wax on the outside of the ear and around the opening to the ear canal.  · Don't use any probing device or object such as cotton-tipped swabs or lilo pins to clean the inside of your ears.  · Dont use ear candles to clean your ears. Candling can be dangerous. It can burn the ear canal. It can also make the condition worse instead of  better.  · Dont use cold water to rinse the ear. This will make you dizzy. If your provider tells you to rinse your ear, use only warm water or follow his or her instructions.  · Check the ear for signs of infection or irritation listed below under When to seek medical advice.  Steps for using eardrops  1. Warm the medicine bottle by rubbing it between your hands for a few minutes.  2. Lie down on your side, with the affected ear up.  3. Place the recommended number of drops in the ear. Wet a cotton ball with the medicine. Gently put the cotton ball into the ear opening.  Follow-up care  Follow up with your healthcare provider, or as directed.  When to seek medical advice  Call the provider right away if you have:  · Ear pain that gets worse  · Fever of 100.4F°F (38°C) or higher, or as directed by your healthcare provider  · Worsening wax buildup  · Severe pain, dizziness, or nausea  · Bleeding from the ear  · Hearing problems  · Signs of irritation from the eardrops, such as burning, stinging, or swelling and tenderness  · Foul-smelling fluid draining from the ear  · Swelling, redness, or tenderness of the outer ear  · Headache, neck pain, or stiff neck  Date Last Reviewed: 3/22/2015  © 8857-4916 [employer]. [address] [place] [address], [place] [address]. All rights reserved. This information is not intended as a substitute for professional medical care. Always follow your healthcare professional's instructions.        Earwax (Treated)    Everyone produces earwax from the lining of the ear canal. It lubricates and protects the ear. The wax that forms in the canal slowly moves toward the outside of the ear and falls out. Sometimes wax can build up in the ear canal. This can cause a blockage and loss of hearing. A buildup of earwax was removed from your ear today.  Home care  If you have a tendency to build up wax in the ear canal, you should clear the wax at home regularly, before it causes discomfort.  This should be about once every six months.  · Unless a medicine was prescribed, you may use an over-the-counter product made for clearing earwax. These contain carbamide peroxide and are available over-the-counter in a kit with a small bulb syringe.  · Lie down with the blocked ear facing upward. Apply one dropper full of medicine and wait a few minutes. Grasp the outer ear and wiggle it to help the solution enter the canal.  · Lean over a sink or basin with the blocked ear turned downward. Use a rubber bulb syringe filled with warm (not hot or cold) water to rinse the ear several times. Use gentle pressure only. You may need to repeat the irrigation several times before the wax flows out.  · If you are having trouble draining all the water out of your ear canal, put a few drops of rubbing alcohol into the ear canal. This will help remove the remaining water.  Don'ts  · Dont use cold water to rinse the ear. This will make you dizzy.  · Dont do this procedure if you have an ear infection. Symptoms include ear pain, fever, or fluid draining from the ear.  · Dont do this procedure if you have a punctured eardrum.  · Dont use cotton swabs, matches, hairpins, keys, or other objects to clean the ear canal. This can cause infection of the ear canal or rupture of the eardrum. Because of their size and shape, cotton swabs can push the earwax deeper into the ear canal instead of removing it.  Follow-up care  Follow up with your healthcare provider, or as advised.  When to seek medical advice  Call your healthcare provider right away if any of these occur:  · Worsening ear pain  · Fever of 100.4°F (38°C) or higher, or as directed by your healthcare provider  · Hearing does not return to normal after three days of treatment  · Fluid drainage or bleeding from the ear canal  · Swelling, redness, or tenderness of the outer ear  · Headache, neck pain, or stiff neck  Date Last Reviewed: 3/22/2015  © 4290-1705 [employer]. [address] [place] [address], [place] [address]. All rights reserved. This information is not intended as a substitute for professional medical care. Always follow your healthcare professional's instructions.        Impacted Earwax  Impacted earwax is a buildup of the natural wax in the ear (cerumen). Impacted earwax is very common. It can cause symptoms such as hearing loss. It can also stop a doctor doing an exam of your ear.  Understanding earwax  Tiny glands in your ear make substances that combine with dead skin cells to form earwax. Earwax helps protect your ear canal from water, dirt, infection, and injury. Over time, earwax travels from the inner part of your ear canal to the entrance of the canal. Then it falls away naturally. But in some cases, it cant travel to the entrance of the canal. This may be because of a health condition or objects put in the ear. With age, earwax tends to become harder and less fluid. Older adults are more likely to have problems with earwax buildup.  What causes impacted earwax?  Earwax can build up because of many health conditions. Some cause a physical blockage. Others cause too much earwax to be made. Health conditions that can cause earwax buildup include:  · Bony blockage in the ear (osteoma or exostoses)  · Infections, such as swimmers ear (external otitis)  · Skin disease, such as eczema  · Autoimmune diseases, such as lupus  · A narrowed ear canal from birth, chronic inflammation, or injury  · Too much earwax because of injury  · Too much earwax because of  water in the ear canal  Objects repeatedly placed in the ear can also cause impacted earwax. For example, putting cotton swabs in the ear may push the wax deeper into the ear. Over time, this may cause blockage. Hearing aids, swimming plugs, and swim molds can cause the same problem when used again and again.  In some cases, the cause of impacted earwax is not known.  Symptoms of impacted earwax  Excess  earwax usually does not cause any symptoms, unless there is a large amount of buildup. Then it may cause symptoms such as:  · Hearing loss  · Earache  · Sense of ear fullness  · Itching in the ear  · Dizziness  · Ringing in the ears  · Cough  Treatment for impacted earwax  If you dont have symptoms, you may not need treatment. Often the earwax goes away on its own with time. If you have symptoms, you may have 1 or more treatments such as:  · Ear drops. These help to soften the earwax. This helps it leave the ear over time.  · Rinsing (irrigation) of the ear canal with water. This is done in a doctors office.  · Removal of the earwax with small tools. This is also done in a doctors office.  In rare cases, some treatments for earwax removal may cause complications such as:  · Swimmers ear (otitis external)  · Earache  · Short-term hearing loss  · Dizziness  · Water trapped in the ear canal  · Hole in the eardrum  · Ringing in the ears  · Bleeding from the ear  Talk with your health care provider about which risks apply most to you.  Dont use these at home  Health care providers do not advise use of ear candles or ear vacuum kits. These methods are not shown to work.   Preventing impacted earwax  You may not be able to prevent impacted earwax if you have a health condition that causes it, such as eczema. In other cases, you may be able to prevent earwax buildup by:  · Using ear drops once a week  · Having routine cleaning of the ear about every 6 months  · Not using cotton swabs in the ear  When to call the health care provider  Call your health care provider right away if you have severe symptoms after earwax removal. These may include bleeding or severe ear pain.   Date Last Reviewed: 3/19/2015  © 7447-1387 [employer]. [address] [place] [address], [place] [address]. All rights reserved. This information is not intended as a substitute for professional medical care. Always follow your healthcare  professional's instructions.    [employer] or [employer]             Language Assistance Services     ATTENTION: Language assistance services are available, free of charge. Please call [phone].      ATENCIÓN: Si habla yosi, tiene a grigsby disposición servicios gratuitos de asistencia lingüística. Llame al [phone].     CHÚ Ý: N?u b?n nói Ti?ng Vi?t, có các d?ch v? h? tr? ngôn ng? mi?n phí dành cho b?n. G?i s? [phone].         [facility] complies with applicable Federal civil rights laws and does not discriminate on the basis of race, color, national origin, age, disability, or sex.

## 2017-03-27 NOTE — PROGRESS NOTES
Subjective:       Patient ID: Peri Johansen is a 82 y.o. female.    Chief Complaint: Otalgia and Headache    HPI Comments: Multiple issues    L ear hearing loss since January.  Can't hear.  Was told her insurance won't pay for a hearing aid?  Last seen in Audiology and ENT around 2014    Apparently had a phone call scheduling her in ENT in January but she did not go to appointment.      Breast pain x months- recall normal breast exam and mammogram.    She has recently seen her oncologist and is stable with regard to all of those issues.    Diabetes is also stable at present.    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     Vitamin D deficiency disease     Mixed hyperlipidemia     Renal cyst: R side see ultrasound 6/16     Uncontrolled type 2 diabetes mellitus without complication, without long-term current use of insulin     Stromal cell tumor     Lipoma of back     Thyroid nodule: 2014     Gastric nodule: 2013-m per GI no need for further follow up     Spondylosis without myelopathy     Acquired spondylolisthesis     Low vitamin B12 level     Gastroesophageal reflux disease without esophagitis     Osteoporosis: 2014 tx not indicated, repeat in 2018     Colon polyp: hyperplastic 2015- repeat 2020     Primary insomnia     Aortic atherosclerosis: see CT scan 3/15     Diverticulosis of large intestine without hemorrhage: see CT scan 3/15     Thoracic and lumbosacral neuritis     Chronic bilateral low back pain with right-sided sciatica     Left carpal tunnel syndrome     Localized edema     Hearing loss of left ear          Otalgia    Associated symptoms include hearing loss. Pertinent negatives include no coughing or headaches.   Headache    Associated symptoms include hearing loss. Pertinent negatives include no coughing, ear pain or fever.     Review of Systems   Constitutional: Negative for chills, fatigue and fever.   HENT: Positive for hearing loss and  postnasal drip. Negative for congestion and ear pain.    Eyes: Negative.  Negative for visual disturbance.   Respiratory: Negative for cough, chest tightness, shortness of breath and wheezing.    Cardiovascular: Negative.    Gastrointestinal: Negative.    Endocrine:        Breast pain   Neurological: Negative for headaches.       Objective:      Physical Exam   Constitutional: She is oriented to person, place, and time. She appears well-developed and well-nourished.   HENT:   Head: Normocephalic and atraumatic.   Right Ear: External ear normal.   Left Ear: External ear normal.   Mouth/Throat: Oropharynx is clear and moist.   Slight amount of wax R TM, L TM clear   Eyes: Conjunctivae are normal.   Neck: Normal range of motion. Neck supple. No thyromegaly present.   Cardiovascular: Normal rate, regular rhythm and normal heart sounds.    Pulmonary/Chest: No respiratory distress. She has no wheezes. She has no rales.   Abdominal: Soft. Bowel sounds are normal.   Musculoskeletal: She exhibits no edema.   Lymphadenopathy:     She has no cervical adenopathy.   Neurological: She is alert and oriented to person, place, and time.   Skin: Skin is warm and dry. No rash noted. No erythema.       Assessment:       1. Hearing loss of left ear, unspecified hearing loss type    2. Cerumen debris on tympanic membrane, right    3. Spondylosis without myelopathy    4. Acquired spondylolisthesis    5. Thoracic and lumbosacral neuritis    6. Left leg pain    7. Renal cyst: R side see ultrasound 6/16    8. Breast pain    9. 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  Try OTC Cerumenex or deep proximal.  No symptoms on the right.    Spondylosis without myelopathy; Stable    Acquired spondylolisthesis:  Stable    Thoracic and lumbosacral neuritis: Stable    Left leg pain  -     diclofenac sodium (VOLTAREN) 1 % Gel; Apply 2 g topically 4 (four) times daily.  Dispense: 5 Tube; Refill: 2    Renal cyst: R side see ultrasound 6/16: Reviewed, she had questions.  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  Dispense: 16 g; Refill: 12    Keep follow-up as previously scheduled.    Patient counseled for over 50% of her 25 minute appt, all questions answered,  chart reviewed and care coordinated.

## 2017-04-07 ENCOUNTER — TELEPHONE (OUTPATIENT)
Dept: INTERNAL MEDICINE | Facility: CLINIC | Age: 82
End: 2017-04-07

## 2017-04-07 RX ORDER — INSULIN PUMP SYRINGE, 3 ML
EACH MISCELLANEOUS
Qty: 1 EACH | Refills: 0 | Status: SHIPPED | OUTPATIENT
Start: 2017-04-07 | End: 2019-04-08 | Stop reason: SDUPTHER

## 2017-04-07 NOTE — TELEPHONE ENCOUNTER
----- Message from Guillermo Farrell sent at 4/7/2017  9:35 AM CDT -----  Contact: Self [phone]  Type: Orders Request    What orders/ testing are being requested? ONETOUCH GLUCOSE MONITOR    Is there a future appointment scheduled for the patient with PCP? No    Comments:Pt stated that she have not tested in two days, advice    Thanks

## 2017-04-09 RX ORDER — BLOOD SUGAR DIAGNOSTIC
STRIP MISCELLANEOUS
Qty: 200 STRIP | Refills: 0 | Status: SHIPPED | OUTPATIENT
Start: 2017-04-09 | End: 2017-05-02 | Stop reason: SDUPTHER

## 2017-04-10 ENCOUNTER — OFFICE VISIT (OUTPATIENT)
Dept: SURGERY | Facility: CLINIC | Age: 82
End: 2017-04-10
Payer: MEDICARE

## 2017-04-10 VITALS
TEMPERATURE: 98 F | SYSTOLIC BLOOD PRESSURE: 116 MMHG | HEART RATE: 95 BPM | WEIGHT: 132 LBS | HEIGHT: 59 IN | DIASTOLIC BLOOD PRESSURE: 65 MMHG | BODY MASS INDEX: 26.61 KG/M2

## 2017-04-10 DIAGNOSIS — N64.4 BREAST PAIN: Primary | ICD-10-CM

## 2017-04-10 PROCEDURE — 99213 OFFICE O/P EST LOW 20 MIN: CPT | Mod: S$PBB,,, | Performed by: NURSE PRACTITIONER

## 2017-04-10 PROCEDURE — 99215 OFFICE O/P EST HI 40 MIN: CPT | Mod: PBBFAC,PO | Performed by: NURSE PRACTITIONER

## 2017-04-10 PROCEDURE — 99999 PR PBB SHADOW E&M-EST. PATIENT-LVL V: CPT | Mod: PBBFAC,,, | Performed by: NURSE PRACTITIONER

## 2017-04-10 RX ORDER — AMOXICILLIN 500 MG/1
TABLET, FILM COATED ORAL
Refills: 0 | COMMUNITY
Start: 2017-03-29 | End: 2017-09-14

## 2017-04-10 NOTE — LETTER
April 10, 2017      Annika Garland MD  [address]  [facility] [address]           [facility]  [address]  [facility] [address]  Phone: [phone]          Patient: Peri Johansen   MR Number: [id number]   YOB: 1934   Date of Visit: 4/10/2017       Dear Dr. Annika Garland:    Thank you for referring Peri Johansen to me for evaluation. Attached you will find relevant portions of my assessment and plan of care.    If you have questions, please do not hesitate to call me. I look forward to following Peri Johansen along with you.    Sincerely,    Jen Yeung, SHANNONP    Enclosure  CC:  No Recipients    If you would like to receive this communication electronically, please contact [email or web address] or [phone] to request more information on [facility] access.    For providers and/or their staff who would like to refer a patient to [facility], please contact us through our one-stop-shop provider referral line, [facility], at [phone].    If you feel you have received this communication in error or would no longer like to receive these types of communications, please e-mail [email or web address]

## 2017-04-10 NOTE — PROGRESS NOTES
"Subjective:      Patient ID: Peri Johansen is a 82 y.o. female.    Chief Complaint: Consult (Left Breast Pain)      HPI: (PF, EPF - 1-3) (Detailed, Comp, - 4). New patient presents with c/o bilateral breast pain, intermittent, onset "few months ago", no aggravating factors, also reports pain left shoulder. Denies breast mass, nipple discharge, recalled breast trauma    Last mmg 2017 with no abnormality     Menarche at 11   first at 17  Partial hysterectomy in her late 30's, denies HRT use    Review of Systems  Objective:   Physical Exam   Pulmonary/Chest: She exhibits tenderness. She exhibits no mass, no laceration, no edema, no deformity, no swelling and no retraction. Right breast exhibits tenderness. Right breast exhibits no inverted nipple, no mass, no nipple discharge and no skin change. Left breast exhibits tenderness. Left breast exhibits no inverted nipple, no mass, no nipple discharge and no skin change. Breasts are symmetrical. There is no breast swelling.   She reports mild tenderness in several areas of both breast, UOQ and along inframmary fold bilaterally. No breast mass or thickening. No upper extremity lymphedema. Breathing non-labored    Lymphadenopathy:     She has no cervical adenopathy.     She has no axillary adenopathy.        Right: No supraclavicular adenopathy present.        Left: No supraclavicular adenopathy present.     Assessment:       1. Breast pain        Plan:       Bilateral breast tenderness in multiple areas, not suspicious for breast cancer. No mass noted on exam. No axillary adenopathy. Reassurance provided, this could be musculoskeletal or glandular in origin. She can f/u with Dr Garland and return to me for any increase in focal breast pain or other breast concerns/mass/nipple discharge       "

## 2017-04-17 ENCOUNTER — OFFICE VISIT (OUTPATIENT)
Dept: PODIATRY | Facility: CLINIC | Age: 82
End: 2017-04-17
Payer: MEDICARE

## 2017-04-17 VITALS
BODY MASS INDEX: 26.61 KG/M2 | SYSTOLIC BLOOD PRESSURE: 137 MMHG | DIASTOLIC BLOOD PRESSURE: 74 MMHG | WEIGHT: 132 LBS | HEIGHT: 59 IN | HEART RATE: 97 BPM

## 2017-04-17 DIAGNOSIS — L84 CORNS AND CALLOSITIES: ICD-10-CM

## 2017-04-17 DIAGNOSIS — B35.1 ONYCHOMYCOSIS DUE TO DERMATOPHYTE: Primary | ICD-10-CM

## 2017-04-17 PROCEDURE — 99999 PR PBB SHADOW E&M-EST. PATIENT-LVL IV: CPT | Mod: PBBFAC,,, | Performed by: PODIATRIST

## 2017-04-17 PROCEDURE — 99499 UNLISTED E&M SERVICE: CPT | Mod: CSM,,, | Performed by: PODIATRIST

## 2017-04-17 PROCEDURE — 17999 UNLISTD PX SKN MUC MEMB SUBQ: CPT | Mod: CSM,,, | Performed by: PODIATRIST

## 2017-04-17 PROCEDURE — 99214 OFFICE O/P EST MOD 30 MIN: CPT | Mod: PBBFAC | Performed by: PODIATRIST

## 2017-04-17 RX ORDER — ZOLPIDEM TARTRATE 5 MG/1
TABLET ORAL
Qty: 16 TABLET | Refills: 0 | Status: SHIPPED | OUTPATIENT
Start: 2017-04-17 | End: 2017-05-12 | Stop reason: SDUPTHER

## 2017-04-17 NOTE — PROGRESS NOTES
"Vitals:    04/17/17 1410   BP: 137/74   Pulse: 97   Weight: 59.9 kg (132 lb)   Height: 4' 11" (1.499 m)       Onychomycosis due to dermatophyte    Corns and callosities        Patient presents to the clinic for non-covered routine foot care. Patient is a walk-in and requesting toenail and callus trimming today.   Pedal pulses are palpable. No known risk factors requiring routine foot care. Nails are elongated and thickened on feet.  bilateral medial pinch calluse Nails and calluses were reduced and trimmed bilaterally. Patient tolerated well.     "

## 2017-05-02 NOTE — TELEPHONE ENCOUNTER
----- Message from Valentina Garay MA sent at 5/2/2017  8:19 AM CDT -----  Contact: self - [phone]  Type: Rx    Name of medication(s): ONETOUCH ULTRA TEST Strp    Is this a refill? New rx? Refill     Who prescribed medication?    [facility] Phone[facility] & [facility] [address] [place] [address] AT [facility]    Comments: patient is out of this medication. Please call. Thanks!

## 2017-05-03 ENCOUNTER — TELEPHONE (OUTPATIENT)
Dept: INTERNAL MEDICINE | Facility: CLINIC | Age: 82
End: 2017-05-03

## 2017-05-03 NOTE — TELEPHONE ENCOUNTER
----- Message from Ernesto Vuong sent at 5/3/2017 12:38 PM CDT -----  Contact: self/ [phone] home  Pt is calling to inform the office that Dr. Garland has to fill out the medicare part B form faxed over from the pharmacy for the pt's test strips.  She is completely out of strips and would like the form sent in today, if possible.  Please call and advise.    Thank you

## 2017-05-05 ENCOUNTER — TELEPHONE (OUTPATIENT)
Dept: INTERNAL MEDICINE | Facility: CLINIC | Age: 82
End: 2017-05-05

## 2017-05-05 RX ORDER — ZOLPIDEM TARTRATE 10 MG/1
TABLET ORAL
Qty: 30 TABLET | Refills: 0 | OUTPATIENT
Start: 2017-05-05

## 2017-05-05 NOTE — TELEPHONE ENCOUNTER
Please let her know that the medication was sent in on April 17.  That would be a one-month supply.  She will be due for refills after May 16.  Thank you

## 2017-05-05 NOTE — TELEPHONE ENCOUNTER
----- Message from Maryam Nguyen sent at 5/5/2017  2:37 PM CDT -----  Contact: Self/ [phone]   Type: Rx    Name of medication(s):  zolpidem (AMBIEN) 5 MG Tab    Is this a refill? New rx? Refill     Who prescribed medication? Dr. Garland     [facility] [address] [place] [address] AT [facility]    Comments: pt is calling to have a refill on the medication above. Please call and advise     Thank you

## 2017-05-08 NOTE — TELEPHONE ENCOUNTER
Spoke to pt and notified she was early for her refill pt voiced understanding  And she will call back next week

## 2017-05-12 RX ORDER — ZOLPIDEM TARTRATE 5 MG/1
TABLET ORAL
Qty: 16 TABLET | Refills: 0 | Status: SHIPPED | OUTPATIENT
Start: 2017-05-12 | End: 2017-06-19 | Stop reason: SDUPTHER

## 2017-05-12 RX ORDER — ZOLPIDEM TARTRATE 5 MG/1
TABLET ORAL
Qty: 16 TABLET | Refills: 0 | Status: CANCELLED | OUTPATIENT
Start: 2017-05-12

## 2017-05-12 NOTE — TELEPHONE ENCOUNTER
----- Message from Francisco Mosher sent at 5/12/2017  2:40 PM CDT -----  Contact: Self/ [phone]     Type: Rx     Name of medication(s): zolpidem (AMBIEN) 5 MG Tab     Is this a refill? New rx? Refill      Who prescribed medication? Dr. Garland      [facility] [address] [place] [address] AT [facility]     Comments: pt is calling to have a refill on the medication above. Please call and advise      Thank you

## 2017-05-17 ENCOUNTER — INITIAL CONSULT (OUTPATIENT)
Dept: OTOLARYNGOLOGY | Facility: CLINIC | Age: 82
End: 2017-05-17
Payer: MEDICARE

## 2017-05-17 ENCOUNTER — CLINICAL SUPPORT (OUTPATIENT)
Dept: AUDIOLOGY | Facility: CLINIC | Age: 82
End: 2017-05-17
Payer: MEDICARE

## 2017-05-17 VITALS
DIASTOLIC BLOOD PRESSURE: 62 MMHG | TEMPERATURE: 98 F | SYSTOLIC BLOOD PRESSURE: 127 MMHG | HEART RATE: 92 BPM | WEIGHT: 135.81 LBS | BODY MASS INDEX: 27.38 KG/M2 | HEIGHT: 59 IN

## 2017-05-17 DIAGNOSIS — H90.3 SENSORINEURAL HEARING LOSS, BILATERAL: Primary | ICD-10-CM

## 2017-05-17 DIAGNOSIS — H69.91 CHRONIC EUSTACHIAN TUBE DYSFUNCTION, RIGHT: ICD-10-CM

## 2017-05-17 DIAGNOSIS — Z97.2 WEARS DENTURES: ICD-10-CM

## 2017-05-17 DIAGNOSIS — Z86.69 HISTORY OF HEARING LOSS: ICD-10-CM

## 2017-05-17 DIAGNOSIS — H61.23 IMPACTED CERUMEN, BILATERAL: Primary | ICD-10-CM

## 2017-05-17 PROCEDURE — 99203 OFFICE O/P NEW LOW 30 MIN: CPT | Mod: 25,S$PBB,, | Performed by: OTOLARYNGOLOGY

## 2017-05-17 PROCEDURE — 99999 PR PBB SHADOW E&M-EST. PATIENT-LVL III: CPT | Mod: PBBFAC,,, | Performed by: OTOLARYNGOLOGY

## 2017-05-17 PROCEDURE — 69210 REMOVE IMPACTED EAR WAX UNI: CPT | Mod: 50,PBBFAC | Performed by: OTOLARYNGOLOGY

## 2017-05-17 PROCEDURE — 99999 PR PBB SHADOW E&M-EST. PATIENT-LVL I: CPT | Mod: PBBFAC,,,

## 2017-05-17 PROCEDURE — 99213 OFFICE O/P EST LOW 20 MIN: CPT | Mod: PBBFAC,27 | Performed by: OTOLARYNGOLOGY

## 2017-05-17 PROCEDURE — 69210 REMOVE IMPACTED EAR WAX UNI: CPT | Mod: S$PBB,,, | Performed by: OTOLARYNGOLOGY

## 2017-05-17 NOTE — PATIENT INSTRUCTIONS
Skin/wax removed from both eacs  Audiometry reviewed: multi-frequency bilateral HL; negative AD pressure  Pt. is a candidate for hearing amplification for one or both ears  Copy of audiogram/CHARLENE Spangler's card/Rx to obtain hearing aid(s) provided  Politzerization performed; gentle middle ear insufflation encouraged  E.T. Literature provided  Monitor hearing yearly

## 2017-05-17 NOTE — MR AVS SNAPSHOT
[facility] - Otorhinolaryngology  [address]  [facility] [address]  Phone: [phone]  Fax: [phone]                  Peri Johansen   2017 2:15 PM   Initial consult    Description:  Female : 1934   Provider:  Jose Miller III, MD   Department:  [facility] - Otorhinolaryngology           Diagnoses this Visit        Comments    Impacted cerumen, bilateral    -  Primary     History of hearing loss         Chronic eustachian tube dysfunction, right         Wears dentures                To Do List           Future Appointments        Provider Department Dept Phone    2017 1:30 PM Tova Morales DPM [facility] [phone]      Goals (5 Years of Data)     None      [facility]     [facility] On Call Nurse Care Line -  Assistance  Unless otherwise directed by your provider, please contact [facility], our nurse care line that is available for  assistance.     Registered nurses in the [facility] Center provide: appointment scheduling, clinical advisement, health education, and other advisory services.  Call: [phone] (toll free)               Medications           Message regarding Medications     Verify the changes and/or additions to your medication regime listed below are the same as discussed with your clinician today.  If any of these changes or additions are incorrect, please notify your healthcare provider.             Verify that the below list of medications is an accurate representation of the medications you are currently taking.  If none reported, the list may be blank. If incorrect, please contact your healthcare provider. Carry this list with you in case of emergency.           Current Medications     ammonium lactate 12 % Crea Apply 1 application topically once daily.    amoxicillin (AMOXIL) 500 MG Tab TK 2 TS PO Q 12 H FOR 7 DAYS    aspirin 81 MG Chew Take 1 tablet (81 mg total) by mouth once daily.    atorvastatin (LIPITOR) 40 MG  "tablet Take 1 tablet (40 mg total) by mouth once daily.    blood sugar diagnostic (ONETOUCH ULTRA TEST) Strp TEST BLOOD TWICE DAILY    blood-glucose meter kit Use as instructed, test once daily    calcium 500 mg Tab Take 1 tablet by mouth Twice daily.    dextran 70-hypromellose (ARTIFICIAL TEARS) ophthalmic solution Inject 1 drop into the eye 4 (four) times daily as needed.    diclofenac sodium (VOLTAREN) 1 % Gel Apply 2 g topically 4 (four) times daily.    econazole nitrate 1 % cream Apply topically 2 (two) times daily.    gabapentin (NEURONTIN) 300 MG capsule Take 1 capsule (300 mg total) by mouth every evening.    glimepiride (AMARYL) 2 MG tablet TAKE 1 TABLET BY MOUTH BEFORE BREAKFAST.    lidocaine-prilocaine (EMLA) cream     metformin (GLUCOPHAGE) 1000 MG tablet Take 1 tablet (1,000 mg total) by mouth 2 (two) times daily with meals.    NEXIUM 40 mg capsule Take 1 capsule (40 mg total) by mouth once daily.    ONE TOUCH DELICA LANCETS 30 gauge Misc     tramadol (ULTRAM) 50 mg tablet TAKE 1 TABLET(50 MG) BY MOUTH TWICE DAILY    tramadol (ULTRAM) 50 mg tablet Take 1 tablet (50 mg total) by mouth 2 (two) times daily as needed.    tramadol (ULTRAM) 50 mg tablet Take 1 tablet (50 mg total) by mouth 2 (two) times daily as needed.    tramadol (ULTRAM) 50 mg tablet Take 1 tablet (50 mg total) by mouth 2 (two) times daily as needed.    VITAMIN D2 50,000 unit capsule TAKE ONE CAPSULE BY MOUTH EVERY SEVEN DAYS    zolpidem (AMBIEN) 5 MG Tab TAKE ONE TABLET BY MOUTH NIGHTLY IF NEEDED( 3- 4 PER WEEK)           Clinical Reference Information           Your Vitals Were     BP Pulse Temp    127/62 (BP Location: Left arm, Patient Position: Sitting, BP Method: Automatic) 92 98.1 °F (36.7 °C) (Tympanic)    Height Weight BMI    4' 11" (1.499 m) 61.6 kg (135 lb 12.9 oz) 27.43 kg/m2      Blood Pressure          Most Recent Value    BP  127/62      Allergies as of 5/17/2017     Latex, Natural Rubber      Immunizations Administered on " Date of Encounter - 5/17/2017     None      [facility] Sign-Up     Activating your [facility] account is as easy as 1-2-3!     1) Visit [email or web address], select Sign Up Now, enter this activation code and your date of birth, then select Next.  [email or web address]  Expires: 7/1/2017  5:35 PM      2) Create a username and password to use when you visit [facility] in the future and select a security question in case you lose your password and select Next.    3) Enter your e-mail address and click Sign Up!    Additional Information  If you have questions, please e-mail [email or web address] or call [phone] to talk to our [facility] staff. Remember, [facility] is NOT to be used for urgent needs. For medical emergencies, dial 911.         Instructions    Skin/wax removed from both eacs  Audiometry reviewed: multi-frequency bilateral HL; negative AD pressure  Pt. is a candidate for hearing amplification for one or both ears  Copy of audiogram/CHARLENE Crys's card/Rx to obtain hearing aid(s) provided  Politzerization performed; gentle middle ear insufflation encouraged  Monitor hearing yearly           Language Assistance Services     ATTENTION: Language assistance services are available, free of charge. Please call [phone].      ATENCIÓN: Si habla español, tiene a grigsby disposición servicios gratuitos de asistencia lingüística. Llame al [phone].     CHÚ Ý: N?u b?n nói Ti?ng Vi?t, có các d?ch v? h? tr? ngôn ng? mi?n phí dành cho b?n. G?i s? [phone].         [facility] complies with applicable Federal civil rights laws and does not discriminate on the basis of race, color, national origin, age, disability, or sex.

## 2017-05-17 NOTE — PROGRESS NOTES
Subjective:       Patient ID: Peri Johansen is a 82 y.o. female.    Chief Complaint: No chief complaint on file.    HPI: Ms. 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  She underwent surgery with joanna placement followed by CVP chemotherapy concurrent with radiation for 3 cycles.  She had received adjuvant weekly Rituxan ×4.  All treatment was completed December 2007.    She is clinically EVELIO per exam by JUAN Abbasi/CALIN Breen dated 3/16/17  She is here for evaluation of hearing loss.  I last examined her 2/2014 for left ear discomfort and hearting loss.  Audiometry at that time indicated a right ear 30 dB SRT score and negative right ear pressure tympanometry for the right ear.  The left ear SRT score measured 30 dB measured 1/22/14.  There was evidence for a right serous otitis media condition which cleared between January and February at that time.  The patient was encouraged to consider hearing amplification at that time.  She has never been fitted for hearing aids.    She is asking for some help with financing or discounted of the devices if indicated .    PMH: high cholesterol  Family hx:  Review of Systems   Ears: Positive for hearing loss.    Constitutional: Positive for fever, chills and night sweats.    Gastrointestinal:  Positive for acid reflux.   Other:  Positive for arthritis. Negative for rash.   ALL; latex/rubber    Her PMH includes lipoma( back) elevated blood pressure, diabetes, anemia, diverticulosis, osteopenia, hyperlipidemia, arthralgias, GERD, dysphagia, renal cyst, mediastinal lymph node per CT      The patient has completed an audiometric study performed by the [facility] audiology service.  The study is duplicated below and the results are reviewed with the patient in detail.  Objective:            /62 P 92 T 98.1   Gen: Alert and oriented AAF in no acute distress  Both ears were examined  under the microscope and the micro-procedure room.  Skin/ wax impactions are carefully extracted from each ear canal with use of microforceps.    Right eardrum is clear and the right middle ear space appears aerated.  There is no evidence of a serous otitis media condition.  Left middle ear space is well aerated.  Physical Exam   Constitutional: She is oriented to person, place, and time. She appears well-developed and well-nourished.   HENT:   Head: Normocephalic.   Right Ear: Tympanic membrane and external ear normal. No drainage. No foreign bodies. No mastoid tenderness. Tympanic membrane is not perforated. No decreased hearing is noted.   Left Ear: Tympanic membrane and external ear normal. No drainage. No foreign bodies. No mastoid tenderness. Tympanic membrane is not perforated. No decreased hearing is noted.   Ears:    Nose: Nose normal. No nasal deformity, septal deviation or nasal septal hematoma. No epistaxis. Right sinus exhibits no maxillary sinus tenderness and no frontal sinus tenderness. Left sinus exhibits no maxillary sinus tenderness and no frontal sinus tenderness.   Mouth/Throat: Uvula is midline, oropharynx is clear and moist and mucous membranes are normal. No oral lesions. No trismus in the jaw. No uvula swelling. No oropharyngeal exudate or tonsillar abscesses.       Neck: Neck supple. No tracheal deviation present. No thyromegaly present.   Pulmonary/Chest: Effort normal. No stridor.   Lymphadenopathy:     She has no cervical adenopathy.   Neurological: She is alert and oriented to person, place, and time.   Skin: No rash noted.       Assessment:       1. Impacted cerumen, bilateral    2. History of hearing loss    3. Chronic eustachian tube dysfunction, right    4. Wears dentures     5.    Hx AD serous otitis media; no evidence of KIM today   Plan:     Skin/wax removed from both eacs  Audiometry reviewed: multi-frequency bilateral HL; negative AD pressure  Pt. is a candidate for hearing  amplification for one or both ears  Copy of audiogram/CHARLENE Spangler's card/Rx to obtain hearing aid(s) provided  Politzerization performed; gentle middle ear insufflation encouraged; E.T literature provided  Monitor hearing yearly

## 2017-05-17 NOTE — LETTER
May 18, 2017      Annika Garland MD  [address]  [facility] [address]           [facility]  [address]  [facility] [address]  Phone: [phone]  Fax: [phone]          Patient: Peri Johansen   MR Number: [id number]   YOB: 1934   Date of Visit: 5/17/2017       Dear Dr. Annika Garland:    Thank you for referring Peri Johansen to me for evaluation. Attached you will find relevant portions of my assessment and plan of care.    If you have questions, please do not hesitate to call me. I look forward to following Peri Johansen along with you.    Sincerely,    Jose Miller III, MD    Enclosure  CC:  No Recipients    If you would like to receive this communication electronically, please contact [email or web address] or [phone] to request more information on [employer] Link access.    For providers and/or their staff who would like to refer a patient to [facility], please contact us through our one-stop-shop provider referral line, [facility], at [phone].    If you feel you have received this communication in error or would no longer like to receive these types of communications, please e-mail [email or web address]

## 2017-06-01 RX ORDER — TRAMADOL HYDROCHLORIDE 50 MG/1
TABLET ORAL
Qty: 60 TABLET | Refills: 0 | Status: SHIPPED | OUTPATIENT
Start: 2017-06-01 | End: 2017-09-14 | Stop reason: SDUPTHER

## 2017-06-19 RX ORDER — ZOLPIDEM TARTRATE 5 MG/1
TABLET ORAL
Qty: 16 TABLET | Refills: 0 | Status: SHIPPED | OUTPATIENT
Start: 2017-06-19 | End: 2017-07-18 | Stop reason: SDUPTHER

## 2017-07-03 DIAGNOSIS — M47.819 SPONDYLOSIS WITHOUT MYELOPATHY: ICD-10-CM

## 2017-07-03 DIAGNOSIS — G89.29 OTHER CHRONIC PAIN: ICD-10-CM

## 2017-07-03 RX ORDER — TRAMADOL HYDROCHLORIDE 50 MG/1
50 TABLET ORAL 2 TIMES DAILY PRN
Qty: 60 TABLET | Refills: 0 | Status: SHIPPED | OUTPATIENT
Start: 2017-07-03 | End: 2017-08-22 | Stop reason: SDUPTHER

## 2017-07-03 NOTE — TELEPHONE ENCOUNTER
----- Message from Melanie Calabrese sent at 7/3/2017  3:35 PM CDT -----  Contact: Self/[phone]  Prescription Request:     Name of medication: tramadol (ULTRAM) 50 mg tablet    Reason for request: Refill    [address] [facility] [id number] [address] [facility]    Please notify patient when RX has been sent.    Thank You

## 2017-07-17 ENCOUNTER — TELEPHONE (OUTPATIENT)
Dept: INTERNAL MEDICINE | Facility: CLINIC | Age: 82
End: 2017-07-17

## 2017-07-17 DIAGNOSIS — Z87.898 HISTORY OF ABNORMAL MAMMOGRAM: ICD-10-CM

## 2017-07-17 DIAGNOSIS — N64.89 OTHER SPECIFIED DISORDERS OF BREAST: ICD-10-CM

## 2017-07-17 DIAGNOSIS — Z12.31 ENCOUNTER FOR SCREENING MAMMOGRAM FOR HIGH-RISK PATIENT: Primary | ICD-10-CM

## 2017-07-17 NOTE — TELEPHONE ENCOUNTER
----- Message from Francisco Mosher sent at 7/17/2017  3:42 PM CDT -----  Contact: self [phone]  Type: Orders Request    What orders/ testing are being requested? Mammogram     Is there a future appointment scheduled for the patient with PCP? No      When?    Comments: please advise, Thanks  !

## 2017-07-18 RX ORDER — ZOLPIDEM TARTRATE 5 MG/1
5 TABLET ORAL NIGHTLY PRN
Qty: 12 TABLET | Refills: 0 | Status: SHIPPED | OUTPATIENT
Start: 2017-07-18 | End: 2017-08-14 | Stop reason: SDUPTHER

## 2017-07-18 NOTE — TELEPHONE ENCOUNTER
Labs and Mammo schedule for 8/16/17 and EP 9/14/17 (u)  Pt will wait for the rest when she comes for her apt

## 2017-07-18 NOTE — TELEPHONE ENCOUNTER
OK mammo orders signed thanks    SHE NEEDS AN EYE EXAM AND LABS ORDERS IN (SHE CAN DO EITHER DIABETIC EYE PHOTO OR OPTOMETRY )  Thanks    I need to see her also in next 1-2 motnhs for additional med refills    Please let me know when scheduled    Also due for shingles vaccine and Tdap can get at pharmacy

## 2017-07-21 ENCOUNTER — TELEPHONE (OUTPATIENT)
Dept: SURGERY | Facility: CLINIC | Age: 82
End: 2017-07-21

## 2017-07-21 NOTE — TELEPHONE ENCOUNTER
----- Message from Kelly Bailey sent at 7/21/2017  1:11 PM CDT -----  [phone]//pt states that she needs to speak with nurse in ref to her next step since her last visit//please call//thank you

## 2017-07-21 NOTE — TELEPHONE ENCOUNTER
Returned the patient's call regarding the message below.  The patient is scheduled to be seen on Friday 7/28/17 for breast exam with Jen Yueng NP regarding breast & axillary pain.  The patient voiced understanding of the appointment date and time.  Reminder letter mailed to the patient.

## 2017-07-27 NOTE — PROGRESS NOTES
"Subjective:      Patient ID: Peri Johansen is a 83 y.o. female.    Chief Complaint: No chief complaint on file.      HPI: (PF, EPF - 1-3) (Detailed, Comp, - 4) refer to previous clinic note, 4-, presented at that time with bilateral benign appearing mastodynia , she continues to report intermittent soreness , she states "sometimes its worse but sometimes it does not hurt at all". Denies breast mass, skin changes, nipple discharge     Last mmg 2017 with no abnormality      Menarche at 11   first at 17  Partial hysterectomy in her late 30's, denies HRT use    Review of Systems  Objective:   Physical Exam   Pulmonary/Chest: She exhibits no mass, no tenderness, no laceration, no edema, no deformity, no swelling and no retraction. Right breast exhibits tenderness. Right breast exhibits no inverted nipple, no mass, no nipple discharge and no skin change. Left breast exhibits tenderness. Left breast exhibits no mass, no nipple discharge and no skin change. Breasts are symmetrical. There is no breast swelling.   Lymphadenopathy:     She has no cervical adenopathy.     She has no axillary adenopathy.        Right: No supraclavicular adenopathy present.        Left: No supraclavicular adenopathy present.     Assessment:       1. Breast pain        Plan:       Intermittent breast tenderness not suspicious for breast cancer, no mass appreciated on exam, discussed avoidance of caffeine   She will return for mmg another day secondary to her schedule  If no abnormality she can return prn any future breast changes or increase focal breast pain     "

## 2017-07-28 ENCOUNTER — OFFICE VISIT (OUTPATIENT)
Dept: SURGERY | Facility: CLINIC | Age: 82
End: 2017-07-28
Payer: MEDICARE

## 2017-07-28 VITALS
WEIGHT: 132 LBS | TEMPERATURE: 98 F | BODY MASS INDEX: 26.61 KG/M2 | HEART RATE: 77 BPM | DIASTOLIC BLOOD PRESSURE: 66 MMHG | SYSTOLIC BLOOD PRESSURE: 142 MMHG | HEIGHT: 59 IN

## 2017-07-28 DIAGNOSIS — N64.4 BREAST PAIN: Primary | ICD-10-CM

## 2017-07-28 PROCEDURE — 99215 OFFICE O/P EST HI 40 MIN: CPT | Mod: PBBFAC,PO | Performed by: NURSE PRACTITIONER

## 2017-07-28 PROCEDURE — 99999 PR PBB SHADOW E&M-EST. PATIENT-LVL V: CPT | Mod: PBBFAC,,, | Performed by: NURSE PRACTITIONER

## 2017-07-28 PROCEDURE — 1159F MED LIST DOCD IN RCRD: CPT | Mod: ,,, | Performed by: NURSE PRACTITIONER

## 2017-07-28 PROCEDURE — 99212 OFFICE O/P EST SF 10 MIN: CPT | Mod: S$PBB,,, | Performed by: NURSE PRACTITIONER

## 2017-07-28 PROCEDURE — 1125F AMNT PAIN NOTED PAIN PRSNT: CPT | Mod: ,,, | Performed by: NURSE PRACTITIONER

## 2017-08-14 RX ORDER — ZOLPIDEM TARTRATE 5 MG/1
5 TABLET ORAL NIGHTLY PRN
Qty: 12 TABLET | Refills: 0 | Status: SHIPPED | OUTPATIENT
Start: 2017-08-14 | End: 2017-09-03 | Stop reason: SDUPTHER

## 2017-08-14 RX ORDER — ZOLPIDEM TARTRATE 5 MG/1
TABLET ORAL
Qty: 12 TABLET | Refills: 0 | Status: CANCELLED | OUTPATIENT
Start: 2017-08-14

## 2017-08-14 NOTE — TELEPHONE ENCOUNTER
----- Message from Debbi Matamoros sent at 8/14/2017  4:35 PM CDT -----  Contact: self/[phone]  Patient called in regards needing to a courtesy call once prescription is ready. Please call and advise.       Thank you!!!

## 2017-08-15 NOTE — TELEPHONE ENCOUNTER
Called pt to advised that rx was sent in. Pt informed that she does not need this medication and requested that this is cancelled with the pharmacy. Left voicemail message cancelling rx for Ambien with pharmacy.

## 2017-08-16 ENCOUNTER — HOSPITAL ENCOUNTER (OUTPATIENT)
Dept: RADIOLOGY | Facility: HOSPITAL | Age: 82
Discharge: HOME | End: 2017-08-16
Attending: INTERNAL MEDICINE
Payer: MEDICARE

## 2017-08-16 VITALS — BODY MASS INDEX: 26.61 KG/M2 | HEIGHT: 59 IN | WEIGHT: 132 LBS

## 2017-08-16 DIAGNOSIS — N64.4 BREAST PAIN: ICD-10-CM

## 2017-08-16 PROCEDURE — 77066 DX MAMMO INCL CAD BI: CPT | Mod: 26,,, | Performed by: RADIOLOGY

## 2017-08-16 PROCEDURE — 77062 BREAST TOMOSYNTHESIS BI: CPT | Mod: 26,,, | Performed by: RADIOLOGY

## 2017-08-16 PROCEDURE — 77062 BREAST TOMOSYNTHESIS BI: CPT | Mod: TC

## 2017-08-22 DIAGNOSIS — M47.819 SPONDYLOSIS WITHOUT MYELOPATHY: ICD-10-CM

## 2017-08-22 DIAGNOSIS — G89.29 OTHER CHRONIC PAIN: ICD-10-CM

## 2017-08-23 RX ORDER — TRAMADOL HYDROCHLORIDE 50 MG/1
TABLET ORAL
Qty: 60 TABLET | Refills: 0 | Status: SHIPPED | OUTPATIENT
Start: 2017-08-23 | End: 2017-11-08 | Stop reason: SDUPTHER

## 2017-09-03 RX ORDER — ZOLPIDEM TARTRATE 5 MG/1
TABLET ORAL
Qty: 12 TABLET | Refills: 0 | Status: SHIPPED | OUTPATIENT
Start: 2017-09-03 | End: 2017-09-05 | Stop reason: SDUPTHER

## 2017-09-05 RX ORDER — ZOLPIDEM TARTRATE 5 MG/1
TABLET ORAL
Qty: 12 TABLET | Refills: 0 | Status: SHIPPED | OUTPATIENT
Start: 2017-09-05 | End: 2017-09-14

## 2017-09-12 RX ORDER — ESOMEPRAZOLE MAGNESIUM 40 MG/1
CAPSULE, DELAYED RELEASE ORAL
Qty: 90 CAPSULE | Refills: 0 | Status: SHIPPED | OUTPATIENT
Start: 2017-09-12 | End: 2017-09-14 | Stop reason: SDUPTHER

## 2017-09-14 ENCOUNTER — IMMUNIZATION (OUTPATIENT)
Dept: INTERNAL MEDICINE | Facility: CLINIC | Age: 82
End: 2017-09-14
Payer: MEDICARE

## 2017-09-14 ENCOUNTER — OFFICE VISIT (OUTPATIENT)
Dept: INTERNAL MEDICINE | Facility: CLINIC | Age: 82
End: 2017-09-14
Payer: MEDICARE

## 2017-09-14 VITALS
DIASTOLIC BLOOD PRESSURE: 65 MMHG | WEIGHT: 136.69 LBS | BODY MASS INDEX: 27.61 KG/M2 | SYSTOLIC BLOOD PRESSURE: 125 MMHG

## 2017-09-14 DIAGNOSIS — M47.819 SPONDYLOSIS WITHOUT MYELOPATHY: ICD-10-CM

## 2017-09-14 DIAGNOSIS — K21.9 GASTROESOPHAGEAL REFLUX DISEASE WITHOUT ESOPHAGITIS: ICD-10-CM

## 2017-09-14 DIAGNOSIS — E04.1 THYROID NODULE: ICD-10-CM

## 2017-09-14 DIAGNOSIS — R60.9 EDEMA, UNSPECIFIED TYPE: ICD-10-CM

## 2017-09-14 DIAGNOSIS — E55.9 VITAMIN D DEFICIENCY DISEASE: ICD-10-CM

## 2017-09-14 DIAGNOSIS — C83.35 DIFFUSE LARGE B-CELL LYMPHOMA OF LYMPH NODES OF LOWER EXTREMITY: Primary | ICD-10-CM

## 2017-09-14 DIAGNOSIS — G89.29 OTHER CHRONIC PAIN: ICD-10-CM

## 2017-09-14 DIAGNOSIS — E78.2 MIXED HYPERLIPIDEMIA: ICD-10-CM

## 2017-09-14 DIAGNOSIS — R79.89 LOW VITAMIN B12 LEVEL: ICD-10-CM

## 2017-09-14 PROCEDURE — G0008 ADMIN INFLUENZA VIRUS VAC: HCPCS | Mod: PBBFAC

## 2017-09-14 PROCEDURE — 99999 PR PBB SHADOW E&M-EST. PATIENT-LVL V: CPT | Mod: PBBFAC,,, | Performed by: INTERNAL MEDICINE

## 2017-09-14 PROCEDURE — 3078F DIAST BP <80 MM HG: CPT | Mod: ,,, | Performed by: INTERNAL MEDICINE

## 2017-09-14 PROCEDURE — 99211 OFF/OP EST MAY X REQ PHY/QHP: CPT | Mod: PBBFAC

## 2017-09-14 PROCEDURE — 99215 OFFICE O/P EST HI 40 MIN: CPT | Mod: PBBFAC,27,25 | Performed by: INTERNAL MEDICINE

## 2017-09-14 PROCEDURE — 99214 OFFICE O/P EST MOD 30 MIN: CPT | Mod: S$PBB,,, | Performed by: INTERNAL MEDICINE

## 2017-09-14 PROCEDURE — 3074F SYST BP LT 130 MM HG: CPT | Mod: ,,, | Performed by: INTERNAL MEDICINE

## 2017-09-14 PROCEDURE — 96372 THER/PROPH/DIAG INJ SC/IM: CPT | Mod: PBBFAC

## 2017-09-14 PROCEDURE — 1159F MED LIST DOCD IN RCRD: CPT | Mod: ,,, | Performed by: INTERNAL MEDICINE

## 2017-09-14 PROCEDURE — 99999 PR PBB SHADOW E&M-EST. PATIENT-LVL I: CPT | Mod: PBBFAC,,,

## 2017-09-14 PROCEDURE — 1126F AMNT PAIN NOTED NONE PRSNT: CPT | Mod: ,,, | Performed by: INTERNAL MEDICINE

## 2017-09-14 RX ORDER — TRAMADOL HYDROCHLORIDE 50 MG/1
50 TABLET ORAL 2 TIMES DAILY
Qty: 60 TABLET | Refills: 0 | Status: SHIPPED | OUTPATIENT
Start: 2017-11-14 | End: 2018-03-06 | Stop reason: SDUPTHER

## 2017-09-14 RX ORDER — CYANOCOBALAMIN 1000 UG/ML
1000 INJECTION, SOLUTION INTRAMUSCULAR; SUBCUTANEOUS
Status: COMPLETED | OUTPATIENT
Start: 2017-09-14 | End: 2017-09-14

## 2017-09-14 RX ORDER — TRAMADOL HYDROCHLORIDE 50 MG/1
50 TABLET ORAL 2 TIMES DAILY PRN
Qty: 60 TABLET | Refills: 0 | Status: SHIPPED | OUTPATIENT
Start: 2017-10-14 | End: 2017-11-08 | Stop reason: SDUPTHER

## 2017-09-14 RX ORDER — ESOMEPRAZOLE MAGNESIUM 40 MG/1
CAPSULE, DELAYED RELEASE ORAL
Qty: 90 CAPSULE | Refills: 0 | Status: SHIPPED | OUTPATIENT
Start: 2017-09-14 | End: 2018-05-24 | Stop reason: SDUPTHER

## 2017-09-14 RX ORDER — TRAMADOL HYDROCHLORIDE 50 MG/1
50 TABLET ORAL 2 TIMES DAILY PRN
Qty: 60 TABLET | Refills: 0 | Status: SHIPPED | OUTPATIENT
Start: 2017-09-14 | End: 2017-11-08 | Stop reason: SDUPTHER

## 2017-09-14 RX ADMIN — CYANOCOBALAMIN 1000 MCG: 1000 INJECTION, SOLUTION INTRAMUSCULAR at 12:09

## 2017-09-15 ENCOUNTER — TELEPHONE (OUTPATIENT)
Dept: INTERNAL MEDICINE | Facility: CLINIC | Age: 82
End: 2017-09-15

## 2017-09-15 ENCOUNTER — OUTPATIENT CASE MANAGEMENT (OUTPATIENT)
Dept: ADMINISTRATIVE | Facility: OTHER | Age: 82
End: 2017-09-15

## 2017-09-15 NOTE — TELEPHONE ENCOUNTER
----- Message from Fabienne Kellogg sent at 9/15/2017 12:52 PM CDT -----  Thank you for the referral.  Patient has been assigned to BUBBA Borjas  for low risk screening for Outpatient Case Management.      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

## 2017-09-15 NOTE — PROGRESS NOTES
Subjective:       Patient ID: Peri Johansen is a 83 y.o. female.    Chief Complaint: Follow-up    Follow up    Here for follow-up of multiple medical issues.    Continues to struggle with leg pain.  Pain medication seems to work pretty well for her.  She follows closely in oncology.  Was last seen in March 2017.    Had a breast assessment in July due to some ongoing breast discomfort which appears to be stable.    Has had ongoing audiology and ENT follow-up as well.    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     Vitamin D deficiency disease     Mixed hyperlipidemia     Renal cyst: R side see ultrasound 6/16     Uncontrolled type 2 diabetes mellitus without complication, without long-term current use of insulin     Stromal cell tumor     Lipoma of back     Thyroid nodule: 2014     Gastric nodule: 2013-m per GI no need for further follow up     Spondylosis without myelopathy     Low vitamin B12 level     Gastroesophageal reflux disease without esophagitis     Osteoporosis: 2014 tx not indicated, repeat in 2018     Colon polyp: hyperplastic 2015- repeat 2020     Primary insomnia     Aortic atherosclerosis: see CT scan 3/15     Diverticulosis of large intestine without hemorrhage: see CT scan 3/15     Thoracic and lumbosacral neuritis     Chronic bilateral low back pain with right-sided sciatica     Left carpal tunnel syndrome     Localized edema     Hearing loss of left ear        Review of Systems   Constitutional: Negative for fatigue and fever.   HENT: Positive for hearing loss.         L sided hearing loss   Respiratory: Negative for cough and shortness of breath.    Cardiovascular: Negative for chest pain and leg swelling.        She feels legs are swollen  No PND or orthopnea   Gastrointestinal: Negative for abdominal pain.   Genitourinary: Negative for difficulty urinating and hematuria.   Musculoskeletal: Positive for arthralgias and back pain.   Skin:  Negative for rash.   Neurological: Negative for weakness and numbness.       Objective:      Physical Exam   Constitutional: She is oriented to person, place, and time. She appears well-developed and well-nourished.   HENT:   Head: Normocephalic and atraumatic.   Right Ear: External ear normal.   Left Ear: External ear normal.   Nose: Nose normal.   Mouth/Throat: Oropharynx is clear and moist. No oropharyngeal exudate.   Eyes: Conjunctivae and EOM are normal. Pupils are equal, round, and reactive to light. No scleral icterus.   Neck: Normal range of motion. Neck supple. No JVD present. Thyromegaly present.   Cardiovascular: Normal rate, regular rhythm, normal heart sounds and intact distal pulses.  Exam reveals no gallop.    No murmur heard.  Pulses:       Dorsalis pedis pulses are 2+ on the right side, and 2+ on the left side.        Posterior tibial pulses are 2+ on the right side, and 2+ on the left side.   Trace if any edema  No cords or Homans sign   Pulmonary/Chest: Effort normal and breath sounds normal. No respiratory distress. She has no wheezes.   Abdominal: Soft. Bowel sounds are normal. She exhibits no distension and no mass. There is no tenderness. There is no rebound and no guarding.   Musculoskeletal: Normal range of motion. She exhibits no edema or tenderness.        Right foot: There is normal range of motion and no deformity.        Left foot: There is normal range of motion and no deformity.   Feet:   Right Foot:   Protective Sensation: 6 sites tested. 6 sites sensed.   Skin Integrity: Negative for ulcer, blister, skin breakdown or erythema.   Left Foot:   Protective Sensation: 6 sites tested. 6 sites sensed.   Skin Integrity: Negative for ulcer, blister, skin breakdown or erythema.   Lymphadenopathy:     She has no cervical adenopathy.   Neurological: She is alert and oriented to person, place, and time. She displays normal reflexes. No cranial nerve deficit. Coordination normal.   Skin: Skin is  warm. No rash noted. No erythema.   Psychiatric: She has a normal mood and affect. Her behavior is normal. Judgment and thought content normal.   Nursing note and vitals reviewed.      Assessment:       1. 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 Uncontrolled type 2 diabetes mellitus without complication, without long-term current use of insulin    3. Gastroesophageal reflux disease without esophagitis    4. Spondylosis without myelopathy    5. Other chronic pain    6. Edema, unspecified type    7. Vitamin D deficiency disease    8. Low vitamin B12 level    9. Thyroid nodule: 2014    10. 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  -     Ambulatory referral to Outpatient Case Management    Uncontrolled type 2 diabetes mellitus without complication, without long-term current use of insulin: Eye exam recommended; continue regimen.  -     CBC auto differential; Future; Expected date: 01/12/2018  -     Comprehensive metabolic panel; Future; Expected date: 01/12/2018  -     Hemoglobin A1c; Future; Expected date: 01/12/2018  -     Ambulatory referral to Outpatient Case Management    Gastroesophageal reflux disease without esophagitis: Stable on regimen    Spondylosis without myelopathy  -     tramadol (ULTRAM) 50 mg tablet; Take 1 tablet (50 mg total) by mouth 2 (two) times daily as needed.  Dispense: 60 tablet; Refill: 0  -     tramadol (ULTRAM) 50 mg tablet; Take 1 tablet (50 mg total) by mouth 2 (two) times daily as needed.  Dispense: 60 tablet; Refill: 0    Other chronic pain  -     tramadol (ULTRAM) 50 mg tablet; Take 1 tablet (50 mg total) by mouth 2 (two) times daily as needed.  Dispense: 60 tablet; Refill: 0    Edema, unspecified type  -     VAS US Venous Legs Bilateral; Future    Vitamin D deficiency disease  -     Vitamin D; Future; Expected date: 01/12/2018    Low vitamin B12 level  -     Vitamin  B12; Future; Expected date: 09/14/2017    Thyroid nodule: 2014: thyroid u/s  -     TSH; Future; Expected date: 09/14/2017    Mixed hyperlipidemia: Continue current medical regimen  -     Lipid panel; Future; Expected date: 01/12/2018    Other orders  -     NEXIUM 40 mg capsule; TAKE 1 CAPSULE(40 MG) BY MOUTH EVERY DAY  Dispense: 90 capsule; Refill: 0  -     tramadol (ULTRAM) 50 mg tablet; Take 1 tablet (50 mg total) by mouth 2 (two) times daily.  Dispense: 60 tablet; Refill: 0  -     cyanocobalamin injection 1,000 mcg; Inject 1 mL (1,000 mcg total) into the muscle one time.    Flu vaccine today    EP 5 months with labs    I will review all studies and determine further tx depending on findings

## 2017-09-15 NOTE — TELEPHONE ENCOUNTER
She needs a thyroid ultrasound as well as her vascular ultrasound.  She also needs a diabetic eye exam and I have ordered both optometry and diabetic eye photo, whichever she prefers.  Thank you

## 2017-09-15 NOTE — PROGRESS NOTES
Thank you for the referral.  Patient has been assigned to BUBBA Borjas  for low risk screening for Outpatient Case Management.     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

## 2017-09-18 ENCOUNTER — OUTPATIENT CASE MANAGEMENT (OUTPATIENT)
Dept: ADMINISTRATIVE | Facility: OTHER | Age: 82
End: 2017-09-18

## 2017-09-18 NOTE — PROGRESS NOTES
This csw received a referral on the above patient.   Reason for referral:Diffuse large b cell  Name of the community resource that was provided:[facility], [facility]  Resource given to: Patient via US Mail and Telephone     This CSW completed assessment  with patient. Patient reports she lives with daughter. Patient reports she is able to complete all ADL's. Patient reports she receives commodities for additional food supplement. Patient denied needing assistance with shelter, food, medication or medical. Patient reports she had LTC with Medicaid, however didn't meet criteria for program anymore. Patient report she never received  letter from Medicaid indicating why she no longer have service. Patient reports she is in need of this service ,and someone to assist her with obtaining it. CSW conference call  Medicaid, Patient and CSW in. Medicaid advised patient a letter was sent out to her home indicating why services were cancel ed. Patient ask rep to resend letter.. Medicaid advised patient she would have to reapply for services. Medicaid rep advised patient there is no waiting list she just has to reapply. Medicaid rep advised CSW and patient she will have someone to call patient  and complete application. Patient reports she does not know how she will get to appointments since she does not have a PCA anymore. CSW completed an RTA application over the phone with patient. CSW mailed completed RTA application to patient to sign and date. Patient will return back to CSW once signed.

## 2017-09-18 NOTE — TELEPHONE ENCOUNTER
Spoke to pt and advised. Scheduled all appt for 9/25/17    Vas lab 2p  Thyroid u/s 2:45  eyecam 3:30

## 2017-09-25 ENCOUNTER — HOSPITAL ENCOUNTER (OUTPATIENT)
Dept: VASCULAR SURGERY | Facility: CLINIC | Age: 82
Discharge: HOME | End: 2017-09-25
Attending: INTERNAL MEDICINE
Payer: MEDICARE

## 2017-09-25 ENCOUNTER — PROCEDURE VISIT (OUTPATIENT)
Dept: OPTOMETRY | Facility: CLINIC | Age: 82
End: 2017-09-25
Attending: INTERNAL MEDICINE
Payer: MEDICARE

## 2017-09-25 ENCOUNTER — HOSPITAL ENCOUNTER (OUTPATIENT)
Dept: RADIOLOGY | Facility: HOSPITAL | Age: 82
Discharge: HOME | End: 2017-09-25
Attending: INTERNAL MEDICINE
Payer: MEDICARE

## 2017-09-25 ENCOUNTER — TELEPHONE (OUTPATIENT)
Dept: INTERNAL MEDICINE | Facility: CLINIC | Age: 82
End: 2017-09-25

## 2017-09-25 DIAGNOSIS — E04.1 THYROID NODULE: ICD-10-CM

## 2017-09-25 DIAGNOSIS — R60.9 EDEMA, UNSPECIFIED TYPE: ICD-10-CM

## 2017-09-25 DIAGNOSIS — I87.2 VENOUS REFLUX: Primary | ICD-10-CM

## 2017-09-25 PROCEDURE — 93970 EXTREMITY STUDY: CPT | Mod: PBBFAC | Performed by: SURGERY

## 2017-09-25 PROCEDURE — 76536 US EXAM OF HEAD AND NECK: CPT | Mod: 26,GC,, | Performed by: RADIOLOGY

## 2017-09-25 PROCEDURE — 92250 FUNDUS PHOTOGRAPHY W/I&R: CPT | Mod: 50,PBBFAC

## 2017-09-25 PROCEDURE — 76536 US EXAM OF HEAD AND NECK: CPT | Mod: TC

## 2017-09-25 PROCEDURE — 92250 FUNDUS PHOTOGRAPHY W/I&R: CPT | Mod: 26,S$PBB,, | Performed by: OPHTHALMOLOGY

## 2017-09-25 PROCEDURE — 93970 EXTREMITY STUDY: CPT | Mod: 26,S$PBB,, | Performed by: SURGERY

## 2017-09-25 RX ORDER — ZOLPIDEM TARTRATE 5 MG/1
TABLET ORAL
Refills: 0 | COMMUNITY
Start: 2017-09-09 | End: 2017-11-08

## 2017-09-25 NOTE — PROGRESS NOTES
HPI     Diabetic Eye Exam    Additional comments: photos           Comments   83 y.o. y/o here for screening for Diabetic Renopathy with non-dilated   fundus photos per Annika Garland MD    Small pupils        Last edited by Sonia Bowen MA on 9/25/2017  4:15 PM. (History)            Assessment /Plan     For exam results, see Encounter Report.    Uncontrolled type 2 diabetes mellitus without complication, without long-term current use of insulin  -     Diabetic Eye Screening Photo

## 2017-09-25 NOTE — TELEPHONE ENCOUNTER
Please let her know that she has no blood clot of either leg however she does have some swelling which could be from varicose veins.  If she would like to be seen by one of the vascular specialists, we can arrange for her to have an appointment- referral is in.  Thank you

## 2017-09-26 ENCOUNTER — TELEPHONE (OUTPATIENT)
Dept: OPHTHALMOLOGY | Facility: CLINIC | Age: 82
End: 2017-09-26

## 2017-09-26 ENCOUNTER — TELEPHONE (OUTPATIENT)
Dept: INTERNAL MEDICINE | Facility: CLINIC | Age: 82
End: 2017-09-26

## 2017-09-26 DIAGNOSIS — E04.1 THYROID NODULE: Primary | ICD-10-CM

## 2017-09-26 NOTE — TELEPHONE ENCOUNTER
----- Message from Shirley Washington sent at 9/26/2017  9:47 AM CDT -----      ----- Message -----  From: Pj Trevino MD  Sent: 9/26/2017   9:22 AM  To: Yolis Will

## 2017-09-26 NOTE — TELEPHONE ENCOUNTER
Please call her to let her know that she has a thyroid nodule which needs to be biopsied.    This is new.    Please arrange for her to be seen in endocrinology, I have placed a referral please let me know when scheduled.  Thank you

## 2017-09-27 ENCOUNTER — TELEPHONE (OUTPATIENT)
Dept: INTERNAL MEDICINE | Facility: CLINIC | Age: 82
End: 2017-09-27

## 2017-09-27 ENCOUNTER — TELEPHONE (OUTPATIENT)
Dept: OPHTHALMOLOGY | Facility: CLINIC | Age: 82
End: 2017-09-27

## 2017-09-27 ENCOUNTER — OUTPATIENT CASE MANAGEMENT (OUTPATIENT)
Dept: ADMINISTRATIVE | Facility: OTHER | Age: 82
End: 2017-09-27

## 2017-09-27 NOTE — TELEPHONE ENCOUNTER
----- Message from BUBBA Flores sent at 9/27/2017  2:16 PM CDT -----  This CSW received a referral on the above patient. This CSW provided patient with the following resource: [facility], [facility]. The resource can be located within [facility] under the Care, Transit category.    Thank you for the referral,    BUBBA Borjas

## 2017-09-27 NOTE — PROGRESS NOTES
Follow Up  Reason for referral: LTC and RTA Application     CSW contacted patient no answer. CSW left a message for patient daughter Glenda to give this CSW a return call.

## 2017-09-27 NOTE — TELEPHONE ENCOUNTER
Left message for pt to return call. Scheduled endo appt for 10/3/17 need to make sure this is ok with pt.

## 2017-10-03 NOTE — TELEPHONE ENCOUNTER
Unfortunately, she did not make her appointment for endocrine today.  Can you try to please call her to reschedule this for the thyroid nodule? thank you

## 2017-10-04 ENCOUNTER — TELEPHONE (OUTPATIENT)
Dept: VASCULAR SURGERY | Facility: CLINIC | Age: 82
End: 2017-10-04

## 2017-10-05 ENCOUNTER — TELEPHONE (OUTPATIENT)
Dept: ENDOCRINOLOGY | Facility: CLINIC | Age: 82
End: 2017-10-05

## 2017-10-05 NOTE — TELEPHONE ENCOUNTER
----- Message from Eloisa Watkins sent at 10/5/2017 12:12 PM CDT -----  Contact: Pt can be reached at [phone]  Pt is calling to speak with the nurse concerning scheduling her appt.        Thank you!

## 2017-10-05 NOTE — TELEPHONE ENCOUNTER
Called and spoke with patient regarding appointment.  Patient explains that, she could not come today for her appointment because she did not have a ride.  I rescheduled her for next Wednesday at 1 pm.  Patient explains that, she will have a ride for next week's appointment.

## 2017-10-05 NOTE — TELEPHONE ENCOUNTER
Attempted to call patient regarding 11 am appointment scheduled for today.  No answer, left voice mail message.

## 2017-10-10 NOTE — PROGRESS NOTES
Ms. Johansen is a 83 y.o. F with history of  Dm2, thyroid nodules, osteopenia, hx diffuse large B cell lymphoma, Hl, here for initial visit for thyroid nodule.    Reviewed her labs - TSH's intermittently suppressed, but more consistently suppressed since 2013, Ft4 wnl - however no recent TFTs since 2015.  Degree of TSH suppression recently ranging from 0.264 to 0.350.      US done in Sept 2017 shows multiple nodules, personally reviewed, largest is a 2.2cm R inf nodule mixed cystic nodule, there is also a 1.6cm R mid nodule although appears mostly cystic, there is a 1.7cm mid L sided nodule mixed cystic and a 1.3cm L upper nodule, solid with some peripheral vascularity.  No family hx of thyroid disease, no radiation to neck region (but had radiation to leg).  In 2014 she had a Dxa showing normal BMD at L spine and hip, femoral neck BMD showed T -1.8  (no significant change c/w 2011).    No palpitations, no tremors, her weight fluctuates, no hair or skin changes, no diarrhea or constipation.      Pt relays her lymphoma is in remission post surgery, chemo and radiation.     Additionally most recent A1c 7.3 in Aug 2017, relays most FS in low 100s fasting, no hypoglycemia. On glimepiride 2mg daily and metformin 1g BID.       Component      Latest Ref Rng & Units 8/16/2017 1/23/2017 6/3/2016 11/6/2015   Hemoglobin A1C      4.0 - 5.6 % 7.3 (H) 7.2 (H) 7.5 (H) 7.6 (H)     Component      Latest Ref Rng & Units 11/6/2015 3/28/2015 11/5/2014 8/7/2014   TSH      0.400 - 4.000 uIU/mL 0.700 0.281 (L) 0.264 (L) 0.350 (L)   T4 Total      4.5 - 11.5 ug/dl       Free T4      0.71 - 1.51 ng/dL  1.03 1.06 1.14     Component      Latest Ref Rng & Units 5/21/2014 2/11/2014 11/11/2013 2/9/2012   TSH      0.400 - 4.000 uIU/mL 0.466 0.298 (L) 0.264 (L) 0.482   T4 Total      4.5 - 11.5 ug/dl       Free T4      0.71 - 1.51 ng/dL  1.14 1.15      Component      Latest Ref Rng & Units 5/26/2011 9/21/2010 6/3/2010 4/18/2009   TSH      0.400 -  4.000 uIU/mL 0.370 (L) 0.689 0.720 0.929   T4 Total      4.5 - 11.5 ug/dl  7.7 7.8    Free T4      0.71 - 1.51 ng/dL 1.36        Component      Latest Ref Rng & Units 5/9/2008 4/30/2007   TSH      0.400 - 4.000 uIU/mL 1.4 1.2   T4 Total      4.5 - 11.5 ug/dl     Free T4      0.71 - 1.51 ng/dL       Past Medical History:   Diagnosis Date    Aortic atherosclerosis 1/7/2016    Arthritis     Colon polyp: hyperplastic 2015- repeat 2020 8/6/2015    Diabetes mellitus, type II 8/16/2012    Diverticulosis     Gastric nodule 8/7/2014    GERD (gastroesophageal reflux disease) 8/16/2012    Goiter 8/16/2012    Hyperlipidemia 8/16/2012    Hypertension     Joint pain     Leg pain 8/16/2012    Lymphoma 8/16/2012    Osteopenia 8/16/2012    Osteoporosis     Renal cyst 3/28/2013    Rickets, vitamin D deficiency 8/16/2012    Thyroid nodule 2/24/2014    Vitamin D deficiency disease 8/16/2012        reports that she has never smoked. She has never used smokeless tobacco. She reports that she does not drink alcohol or use drugs.    Family History   Problem Relation Age of Onset    Hypertension Mother     No Known Problems Father     No Known Problems Sister     Breast cancer Maternal Aunt     No Known Problems Maternal Uncle     No Known Problems Paternal Aunt     No Known Problems Paternal Uncle     No Known Problems Maternal Grandmother     No Known Problems Maternal Grandfather     No Known Problems Paternal Grandmother     No Known Problems Paternal Grandfather     No Known Problems Brother     Diabetes Neg Hx     Glaucoma Neg Hx     Amblyopia Neg Hx     Blindness Neg Hx     Cancer Neg Hx     Cataracts Neg Hx     Macular degeneration Neg Hx     Retinal detachment Neg Hx     Strabismus Neg Hx     Stroke Neg Hx     Thyroid disease Neg Hx     Ovarian cancer Neg Hx     Liver disease Neg Hx     Liver cancer Neg Hx     Cirrhosis Neg Hx     Colon polyps Neg Hx     Colon cancer Neg Hx      Melanoma Neg Hx        Past Surgical History:   Procedure Laterality Date    CARPAL TUNNEL RELEASE      CATARACT EXTRACTION W/  INTRAOCULAR LENS IMPLANT Bilateral     HYSTERECTOMY      lymphoma surgery      L tibia       Patient's Medications   New Prescriptions    No medications on file   Previous Medications    AMMONIUM LACTATE 12 % CREA    Apply 1 application topically once daily.    ASPIRIN 81 MG CHEW    Take 1 tablet (81 mg total) by mouth once daily.    ATORVASTATIN (LIPITOR) 40 MG TABLET    Take 1 tablet (40 mg total) by mouth once daily.    BLOOD SUGAR DIAGNOSTIC (ONETOUCH ULTRA TEST) STRP    TEST BLOOD TWICE DAILY    BLOOD-GLUCOSE METER KIT    Use as instructed, test once daily    CALCIUM 500 MG TAB    Take 1 tablet by mouth Twice daily.    DEXTRAN 70-HYPROMELLOSE (ARTIFICIAL TEARS) OPHTHALMIC SOLUTION    Inject 1 drop into the eye 4 (four) times daily as needed.    DICLOFENAC SODIUM (VOLTAREN) 1 % GEL    Apply 2 g topically 4 (four) times daily.    ECONAZOLE NITRATE 1 % CREAM    Apply topically 2 (two) times daily.    GABAPENTIN (NEURONTIN) 300 MG CAPSULE    Take 1 capsule (300 mg total) by mouth every evening.    GLIMEPIRIDE (AMARYL) 2 MG TABLET    TAKE 1 TABLET BY MOUTH BEFORE BREAKFAST.    LIDOCAINE-PRILOCAINE (EMLA) CREAM        METFORMIN (GLUCOPHAGE) 1000 MG TABLET    Take 1 tablet (1,000 mg total) by mouth 2 (two) times daily with meals.    NEXIUM 40 MG CAPSULE    TAKE 1 CAPSULE(40 MG) BY MOUTH EVERY DAY    ONE TOUCH DELICA LANCETS 30 GAUGE MISC        TRAMADOL (ULTRAM) 50 MG TABLET    TAKE 1 TABLET(50 MG) BY MOUTH TWICE DAILY AS NEEDED    TRAMADOL (ULTRAM) 50 MG TABLET    Take 1 tablet (50 mg total) by mouth 2 (two) times daily as needed.    TRAMADOL (ULTRAM) 50 MG TABLET    Take 1 tablet (50 mg total) by mouth 2 (two) times daily as needed.    TRAMADOL (ULTRAM) 50 MG TABLET    Take 1 tablet (50 mg total) by mouth 2 (two) times daily.    VITAMIN D2 50,000 UNIT CAPSULE    TAKE ONE CAPSULE BY  "MOUTH EVERY SEVEN DAYS    ZOLPIDEM (AMBIEN) 5 MG TAB    MELA 1 T PO 3 NIGHTS A WEEK  PRN FOR SLEEP. NO MORE THAN 3 TIMES PER WEEK.   Modified Medications    No medications on file   Discontinued Medications    No medications on file         Review of Systems:  General: no fevers  Eyes: no vision changes  ENT: no sore throat  Lung: no sob  CV: no palpitations  GI: no nausea   : no dysuria   Endo: no heat interolance  Heme: no easy bruising   MSK: no joint swelling        /76   Pulse 103   Ht 4' 11" (1.499 m)   Wt 61 kg (134 lb 7.7 oz)   BMI 27.16 kg/m²     Physical Exam:  General: normal body habitus, not in acute distress   Eyes: anicteric, no proptosis   ENT: no facial lesions, no oral lesions   Neck: thyroid 22g, no LAD  Lung; ctabl, no wheezing or stridor   GI: normal bowel sounds, nondistended   CV: RRR, no rubs or murmurs   Skin: exposed skin without bruising or bleeding   Ext: no peripheral edema or erythema  Neuro: AOx3, moving all extremities, normal gait     Labs:    Chemistry        Component Value Date/Time     08/16/2017 0905    K 4.3 08/16/2017 0905     08/16/2017 0905    CO2 26 08/16/2017 0905    BUN 12 08/16/2017 0905    CREATININE 0.7 08/16/2017 0905     (H) 08/16/2017 0905        Component Value Date/Time    CALCIUM 9.3 08/16/2017 0905    ALKPHOS 73 08/16/2017 0905    AST 19 08/16/2017 0905    ALT 19 08/16/2017 0905    BILITOT 1.0 08/16/2017 0905    ESTGFRAFRICA >60 08/16/2017 0905    EGFRNONAA >60 08/16/2017 0905          Lab Results   Component Value Date    WBC 4.43 03/16/2017    HGB 13.6 03/16/2017    HCT 40.6 03/16/2017    MCV 84 03/16/2017     03/16/2017        Lab Results   Component Value Date    HDL 48 01/23/2017    HDL 47 06/03/2016    HDL 51 11/06/2015     Lab Results   Component Value Date    LDLCALC 82.8 01/23/2017    LDLCALC 146.0 06/03/2016    LDLCALC 157.0 11/06/2015     Lab Results   Component Value Date    TRIG 101 01/23/2017    TRIG 160 (H) " 06/03/2016    TRIG 235 (H) 11/06/2015     Lab Results   Component Value Date    CHOLHDL 31.8 01/23/2017    CHOLHDL 20.9 06/03/2016    CHOLHDL 20.0 11/06/2015       Hemoglobin A1C   Date Value Ref Range Status   08/16/2017 7.3 (H) 4.0 - 5.6 % Final     Comment:     According to ADA guidelines, hemoglobin A1c <7.0% represents  optimal control in non-pregnant diabetic patients. Different  metrics may apply to specific patient populations.   Standards of Medical Care in Diabetes-2016.  For the purpose of screening for the presence of diabetes:  <5.7%     Consistent with the absence of diabetes  5.7-6.4%  Consistent with increasing risk for diabetes   (prediabetes)  >or=6.5%  Consistent with diabetes  Currently, no consensus exists for use of hemoglobin A1c  for diagnosis of diabetes for children.  This Hemoglobin A1c assay has significant interference with fetal   hemoglobin   (HbF). The results are invalid for patients with abnormal amounts of   HbF,   including those with known Hereditary Persistence   of Fetal Hemoglobin. Heterozygous hemoglobin variants (HbAS, HbAC,   HbAD, HbAE, HbA2) do not significantly interfere with this assay;   however, presence of multiple variants in a sample may impact the %   interference.     01/23/2017 7.2 (H) 4.5 - 6.2 % Final     Comment:     According to ADA guidelines, hemoglobin A1C <7.0% represents  optimal control in non-pregnant diabetic patients.  Different  metrics may apply to specific populations.   Standards of Medical Care in Diabetes - 2016.  For the purpose of screening for the presence of diabetes:  <5.7%     Consistent with the absence of diabetes  5.7-6.4%  Consistent with increasing risk for diabetes   (prediabetes)  >or=6.5%  Consistent with diabetes  Currently no consensus exists for use of hemoglobin A1C  for diagnosis of diabetes for children.     06/03/2016 7.5 (H) 4.5 - 6.2 % Final       Lab Results   Component Value Date    TSH 0.700 11/06/2015    [id number] 7.7  09/21/2010       Assessment and Plan:  Ms. Johansen is a 83 y.o. F with history of  Dm2, thyroid nodules, osteopenia, hx diffuse large B cell lymphoma, Hl, here for initial visit for thyroid nodule.    1. Subclinical hyperthyroidism / thyroid nodules: mild degree of subclinical hyperthyroidism in the past, pt asymptomatic and euthyroid on exam; recommend TFTs, TSI, TPO, thyroid uptake; depending on results, will consider which nodules qualify for biopsy    2. Dm2: continue current regimen, A1c acceptable for age    3. Osteopenia: will address at next visit, may benefit from repeat DXA now given subclinical hyperthyroid history    RTC 1 month    Lizzy Hall M.D.  Endocrinology

## 2017-10-11 ENCOUNTER — OFFICE VISIT (OUTPATIENT)
Dept: ENDOCRINOLOGY | Facility: CLINIC | Age: 82
End: 2017-10-11
Payer: MEDICARE

## 2017-10-11 VITALS
SYSTOLIC BLOOD PRESSURE: 133 MMHG | BODY MASS INDEX: 27.12 KG/M2 | DIASTOLIC BLOOD PRESSURE: 76 MMHG | HEIGHT: 59 IN | HEART RATE: 103 BPM | WEIGHT: 134.5 LBS

## 2017-10-11 DIAGNOSIS — M85.80 OSTEOPENIA, UNSPECIFIED LOCATION: ICD-10-CM

## 2017-10-11 DIAGNOSIS — E04.2 MULTIPLE THYROID NODULES: ICD-10-CM

## 2017-10-11 DIAGNOSIS — E11.9 TYPE 2 DIABETES MELLITUS WITHOUT COMPLICATION, WITHOUT LONG-TERM CURRENT USE OF INSULIN: ICD-10-CM

## 2017-10-11 DIAGNOSIS — E05.90 SUBCLINICAL HYPERTHYROIDISM: Primary | ICD-10-CM

## 2017-10-11 PROCEDURE — 99999 PR PBB SHADOW E&M-EST. PATIENT-LVL V: CPT | Mod: PBBFAC,,, | Performed by: INTERNAL MEDICINE

## 2017-10-11 PROCEDURE — 99204 OFFICE O/P NEW MOD 45 MIN: CPT | Mod: S$PBB,,, | Performed by: INTERNAL MEDICINE

## 2017-10-11 PROCEDURE — 99215 OFFICE O/P EST HI 40 MIN: CPT | Mod: PBBFAC | Performed by: INTERNAL MEDICINE

## 2017-10-11 RX ORDER — GABAPENTIN 300 MG/1
CAPSULE ORAL
Qty: 90 CAPSULE | Refills: 0 | Status: SHIPPED | OUTPATIENT
Start: 2017-10-11 | End: 2018-04-21 | Stop reason: SDUPTHER

## 2017-10-11 NOTE — LETTER
October 11, 2017      Annika Garland MD  [address]  [facility] [address]           [facility] - Endo/Diab/Metab  [address]  [facility] [address]  Phone: [phone]  Fax: [phone]          Patient: Peri Johansen   MR Number: [id number]   YOB: 1934   Date of Visit: 10/11/2017       Dear Dr. Annika Garland:    Thank you for referring Peri Johansen to me for evaluation. Attached you will find relevant portions of my assessment and plan of care.    If you have questions, please do not hesitate to call me. I look forward to following Peri Johansen along with you.    Sincerely,    Lizzy Hall MD    Enclosure  CC:  No Recipients    If you would like to receive this communication electronically, please contact [email or web address] or [phone] to request more information on [facility] Link access.    For providers and/or their staff who would like to refer a patient to [facility], please contact us through our one-stop-shop provider referral line, [facility], at [phone].    If you feel you have received this communication in error or would no longer like to receive these types of communications, please e-mail [email or web address]

## 2017-10-17 ENCOUNTER — TELEPHONE (OUTPATIENT)
Dept: RADIOLOGY | Facility: HOSPITAL | Age: 82
End: 2017-10-17

## 2017-10-24 ENCOUNTER — TELEPHONE (OUTPATIENT)
Dept: RADIOLOGY | Facility: HOSPITAL | Age: 82
End: 2017-10-24

## 2017-10-25 ENCOUNTER — HOSPITAL ENCOUNTER (OUTPATIENT)
Dept: RADIOLOGY | Facility: HOSPITAL | Age: 82
Discharge: HOME | End: 2017-10-25
Attending: INTERNAL MEDICINE
Payer: MEDICARE

## 2017-10-25 DIAGNOSIS — E05.90 SUBCLINICAL HYPERTHYROIDISM: ICD-10-CM

## 2017-10-25 PROCEDURE — 78014 THYROID IMAGING W/BLOOD FLOW: CPT | Mod: 26,,, | Performed by: RADIOLOGY

## 2017-10-26 ENCOUNTER — HOSPITAL ENCOUNTER (OUTPATIENT)
Dept: RADIOLOGY | Facility: HOSPITAL | Age: 82
Discharge: HOME | End: 2017-10-26
Attending: INTERNAL MEDICINE
Payer: MEDICARE

## 2017-10-26 PROCEDURE — A9512 TC99M PERTECHNETATE: HCPCS

## 2017-10-26 PROCEDURE — 78014 THYROID IMAGING W/BLOOD FLOW: CPT | Mod: TC

## 2017-11-06 ENCOUNTER — OFFICE VISIT (OUTPATIENT)
Dept: PODIATRY | Facility: CLINIC | Age: 82
End: 2017-11-06
Payer: MEDICARE

## 2017-11-06 VITALS
HEART RATE: 92 BPM | BODY MASS INDEX: 27.52 KG/M2 | SYSTOLIC BLOOD PRESSURE: 127 MMHG | DIASTOLIC BLOOD PRESSURE: 73 MMHG | HEIGHT: 59 IN | WEIGHT: 136.5 LBS

## 2017-11-06 DIAGNOSIS — B35.1 DERMATOPHYTOSIS OF NAIL: Primary | ICD-10-CM

## 2017-11-06 PROCEDURE — 17999 UNLISTD PX SKN MUC MEMB SUBQ: CPT | Mod: CSM,,, | Performed by: PODIATRIST

## 2017-11-06 PROCEDURE — 99499 UNLISTED E&M SERVICE: CPT | Mod: S$PBB,,, | Performed by: PODIATRIST

## 2017-11-06 PROCEDURE — 99999 PR PBB SHADOW E&M-EST. PATIENT-LVL III: CPT | Mod: PBBFAC,,, | Performed by: PODIATRIST

## 2017-11-06 PROCEDURE — 99213 OFFICE O/P EST LOW 20 MIN: CPT | Mod: PBBFAC | Performed by: PODIATRIST

## 2017-11-06 NOTE — PROGRESS NOTES
"Vitals:    11/06/17 1341   BP: 127/73   Pulse: 92   Weight: 61.9 kg (136 lb 8 oz)   Height: 4' 11" (1.499 m)       Dermatophytosis of nail        Patient presents to the clinic for non-covered routine foot care. Patient is a walk-in and requesting toenail filing with dremel today   Pedal pulses are palpable. No known risk factors requiring routine foot care. Nails are  thickened on feet.  Nails  were reduced and trimmed bilaterally. Patient tolerated well.     "

## 2017-11-07 NOTE — PROGRESS NOTES
Ms. Johansen is a 83 y.o. F with history of  Dm2, thyroid nodules, osteopenia, subclinical hypothyroidism, osteopenia, hx diffuse large B cell lymphoma, Hl, here for followup.    Regarding thyroid hx: US done in Sept 2017 shows multiple nodules, personally reviewed, largest is a 2.2cm R inf nodule mixed cystic nodule, there is also a 1.6cm R mid nodule although appears mostly cystic, there is a 1.7cm mid L sided nodule mixed cystic and a 1.3cm L upper nodule, solid with some peripheral vascularity.  No family hx of thyroid disease, no radiation to neck region (but had radiation to leg). I reviewed she also had intermittent subclinical hyperthyroidism in the past, but most recently in Oct 2017 levels had normalized again and thyroid uptake at that time showed normal uptake but areas of hyper/hypofunctioning nodules suggestive of MNG (possibly hot nodules possibly in R mid and L lower lobe).  Her TSI is weekly positive.     In 2014 she had a DXA showing normal BMD at L spine and hip, femoral neck BMD showed T -1.8  (no significant change c/w 2011).    No palpitations, no tremors, her weight fluctuates, no hair or skin changes, no diarrhea or constipation.  She has some continued R leg pain from where they did surgery and radiation for her lymphoma (now in remission).    Regarding her Dm: most recent A1c 7.3 in Aug 2017, relays most FS in low 100s fasting, no hypoglycemia. On glimepiride 2mg daily and metformin 1g BID. No glucometer.       Component      Latest Ref Rng & Units 8/16/2017 1/23/2017 6/3/2016 11/6/2015   Hemoglobin A1C      4.0 - 5.6 % 7.3 (H) 7.2 (H) 7.5 (H) 7.6 (H)     Component      Latest Ref Rng & Units 11/6/2015 3/28/2015 11/5/2014 8/7/2014   TSH      0.400 - 4.000 uIU/mL 0.700 0.281 (L) 0.264 (L) 0.350 (L)   T4 Total      4.5 - 11.5 ug/dl       Free T4      0.71 - 1.51 ng/dL  1.03 1.06 1.14     Component      Latest Ref Rng & Units 5/21/2014 2/11/2014 11/11/2013 2/9/2012   TSH      0.400 - 4.000  uIU/mL 0.466 0.298 (L) 0.264 (L) 0.482   T4 Total      4.5 - 11.5 ug/dl       Free T4      0.71 - 1.51 ng/dL  1.14 1.15      Component      Latest Ref Rng & Units 5/26/2011 9/21/2010 6/3/2010 4/18/2009   TSH      0.400 - 4.000 uIU/mL 0.370 (L) 0.689 0.720 0.929   T4 Total      4.5 - 11.5 ug/dl  7.7 7.8    Free T4      0.71 - 1.51 ng/dL 1.36        Component      Latest Ref Rng & Units 5/9/2008 4/30/2007   TSH      0.400 - 4.000 uIU/mL 1.4 1.2   T4 Total      4.5 - 11.5 ug/dl     Free T4      0.71 - 1.51 ng/dL       Past Medical History:   Diagnosis Date    Aortic atherosclerosis 1/7/2016    Arthritis     Colon polyp: hyperplastic 2015- repeat 2020 8/6/2015    Diabetes mellitus, type II 8/16/2012    Diverticulosis     Gastric nodule 8/7/2014    GERD (gastroesophageal reflux disease) 8/16/2012    Goiter 8/16/2012    Hyperlipidemia 8/16/2012    Hypertension     Joint pain     Leg pain 8/16/2012    Lymphoma 8/16/2012    Osteopenia 8/16/2012    Osteoporosis     Renal cyst 3/28/2013    Rickets, vitamin D deficiency 8/16/2012    Thyroid nodule 2/24/2014    Vitamin D deficiency disease 8/16/2012        reports that she has never smoked. She has never used smokeless tobacco. She reports that she does not drink alcohol or use drugs.    Family History   Problem Relation Age of Onset    Hypertension Mother     No Known Problems Father     No Known Problems Sister     Breast cancer Maternal Aunt     No Known Problems Maternal Uncle     No Known Problems Paternal Aunt     No Known Problems Paternal Uncle     No Known Problems Maternal Grandmother     No Known Problems Maternal Grandfather     No Known Problems Paternal Grandmother     No Known Problems Paternal Grandfather     No Known Problems Brother     Diabetes Neg Hx     Glaucoma Neg Hx     Amblyopia Neg Hx     Blindness Neg Hx     Cancer Neg Hx     Cataracts Neg Hx     Macular degeneration Neg Hx     Retinal detachment Neg Hx      Strabismus Neg Hx     Stroke Neg Hx     Thyroid disease Neg Hx     Ovarian cancer Neg Hx     Liver disease Neg Hx     Liver cancer Neg Hx     Cirrhosis Neg Hx     Colon polyps Neg Hx     Colon cancer Neg Hx     Melanoma Neg Hx        Past Surgical History:   Procedure Laterality Date    CARPAL TUNNEL RELEASE      CATARACT EXTRACTION W/  INTRAOCULAR LENS IMPLANT Bilateral     HYSTERECTOMY      lymphoma surgery      L tibia       Patient's Medications   New Prescriptions    No medications on file   Previous Medications    AMMONIUM LACTATE 12 % CREA    Apply 1 application topically once daily.    ASPIRIN 81 MG CHEW    Take 1 tablet (81 mg total) by mouth once daily.    ATORVASTATIN (LIPITOR) 40 MG TABLET    Take 1 tablet (40 mg total) by mouth once daily.    BLOOD SUGAR DIAGNOSTIC (ONETOUCH ULTRA TEST) STRP    TEST BLOOD TWICE DAILY    BLOOD-GLUCOSE METER KIT    Use as instructed, test once daily    CALCIUM 500 MG TAB    Take 1 tablet by mouth Twice daily.    DICLOFENAC SODIUM (VOLTAREN) 1 % GEL    Apply 2 g topically 4 (four) times daily.    GABAPENTIN (NEURONTIN) 300 MG CAPSULE    TAKE ONE CAPSULE BY MOUTH EVERY EVENING    LIDOCAINE-PRILOCAINE (EMLA) CREAM    1 g as needed.     NEXIUM 40 MG CAPSULE    TAKE 1 CAPSULE(40 MG) BY MOUTH EVERY DAY    ONE TOUCH DELICA LANCETS 30 GAUGE MISC        TRAMADOL (ULTRAM) 50 MG TABLET    Take 1 tablet (50 mg total) by mouth 2 (two) times daily.   Modified Medications    Modified Medication Previous Medication    DEXTRAN 70-HYPROMELLOSE (ARTIFICIAL TEARS,[id number],) OPHTHALMIC SOLUTION dextran 70-hypromellose (ARTIFICIAL TEARS) ophthalmic solution       Place 1 drop into both eyes 4 (four) times daily as needed.    Inject 1 drop into the eye 4 (four) times daily as needed.    GLIMEPIRIDE (AMARYL) 2 MG TABLET glimepiride (AMARYL) 2 MG tablet       TAKE 1 TABLET BY MOUTH BEFORE BREAKFAST.    TAKE 1 TABLET BY MOUTH BEFORE BREAKFAST.    METFORMIN (GLUCOPHAGE) 1000 MG  "TABLET metformin (GLUCOPHAGE) 1000 MG tablet       Take 1 tablet (1,000 mg total) by mouth 2 (two) times daily with meals.    Take 1 tablet (1,000 mg total) by mouth 2 (two) times daily with meals.   Discontinued Medications    ECONAZOLE NITRATE 1 % CREAM    Apply topically 2 (two) times daily.    TRAMADOL (ULTRAM) 50 MG TABLET    TAKE 1 TABLET(50 MG) BY MOUTH TWICE DAILY AS NEEDED    TRAMADOL (ULTRAM) 50 MG TABLET    Take 1 tablet (50 mg total) by mouth 2 (two) times daily as needed.    TRAMADOL (ULTRAM) 50 MG TABLET    Take 1 tablet (50 mg total) by mouth 2 (two) times daily as needed.    VITAMIN D2 50,000 UNIT CAPSULE    TAKE ONE CAPSULE BY MOUTH EVERY SEVEN DAYS    ZOLPIDEM (AMBIEN) 5 MG TAB    MELA 1 T PO 3 NIGHTS A WEEK  PRN FOR SLEEP. NO MORE THAN 3 TIMES PER WEEK.         Review of Systems:  General: no fevers  Eyes: no vision changes  ENT: no sore throat  Lung: no sob  CV: no palpitations  GI: no nausea   : no dysuria   Endo: no heat interolance  Heme: no easy bruising   MSK: no joint swelling        /72 (BP Location: Left arm, Patient Position: Sitting, BP Method: Large (Manual))   Pulse 98   Ht 4' 11" (1.499 m)   Wt 61.6 kg (135 lb 12.9 oz)   BMI 27.43 kg/m²     Physical Exam:  General: normal body habitus, not in acute distress   Eyes: anicteric, no proptosis   ENT: no facial lesions, no oral lesions   Skin: exposed skin without bruising or bleeding   Ext: no peripheral edema or erythema  Neuro: AOx3, moving all extremities, slow gait    Labs:    Chemistry        Component Value Date/Time     08/16/2017 0905    K 4.3 08/16/2017 0905     08/16/2017 0905    CO2 26 08/16/2017 0905    BUN 12 08/16/2017 0905    CREATININE 0.7 08/16/2017 0905     (H) 08/16/2017 0905        Component Value Date/Time    CALCIUM 9.3 08/16/2017 0905    ALKPHOS 73 08/16/2017 0905    AST 19 08/16/2017 0905    ALT 19 08/16/2017 0905    BILITOT 1.0 08/16/2017 0905    ESTGFRAFRICA >60 08/16/2017 0905    " EGFRNONAA >60 08/16/2017 0905          Lab Results   Component Value Date    WBC 4.43 03/16/2017    HGB 13.6 03/16/2017    HCT 40.6 03/16/2017    MCV 84 03/16/2017     03/16/2017        Lab Results   Component Value Date    HDL 48 01/23/2017    HDL 47 06/03/2016    HDL 51 11/06/2015     Lab Results   Component Value Date    LDLCALC 82.8 01/23/2017    LDLCALC 146.0 06/03/2016    LDLCALC 157.0 11/06/2015     Lab Results   Component Value Date    TRIG 101 01/23/2017    TRIG 160 (H) 06/03/2016    TRIG 235 (H) 11/06/2015     Lab Results   Component Value Date    CHOLHDL 31.8 01/23/2017    CHOLHDL 20.9 06/03/2016    CHOLHDL 20.0 11/06/2015       Hemoglobin A1C   Date Value Ref Range Status   08/16/2017 7.3 (H) 4.0 - 5.6 % Final     Comment:     According to ADA guidelines, hemoglobin A1c <7.0% represents  optimal control in non-pregnant diabetic patients. Different  metrics may apply to specific patient populations.   Standards of Medical Care in Diabetes-2016.  For the purpose of screening for the presence of diabetes:  <5.7%     Consistent with the absence of diabetes  5.7-6.4%  Consistent with increasing risk for diabetes   (prediabetes)  >or=6.5%  Consistent with diabetes  Currently, no consensus exists for use of hemoglobin A1c  for diagnosis of diabetes for children.  This Hemoglobin A1c assay has significant interference with fetal   hemoglobin   (HbF). The results are invalid for patients with abnormal amounts of   HbF,   including those with known Hereditary Persistence   of Fetal Hemoglobin. Heterozygous hemoglobin variants (HbAS, HbAC,   HbAD, HbAE, HbA2) do not significantly interfere with this assay;   however, presence of multiple variants in a sample may impact the %   interference.     01/23/2017 7.2 (H) 4.5 - 6.2 % Final     Comment:     According to ADA guidelines, hemoglobin A1C <7.0% represents  optimal control in non-pregnant diabetic patients.  Different  metrics may apply to specific  "populations.   Standards of Medical Care in Diabetes - 2016.  For the purpose of screening for the presence of diabetes:  <5.7%     Consistent with the absence of diabetes  5.7-6.4%  Consistent with increasing risk for diabetes   (prediabetes)  >or=6.5%  Consistent with diabetes  Currently no consensus exists for use of hemoglobin A1C  for diagnosis of diabetes for children.     06/03/2016 7.5 (H) 4.5 - 6.2 % Final       Lab Results   Component Value Date    TSH 0.676 10/11/2017    R0ABGJS 7.7 09/21/2010       Assessment and Plan:  Ms. Johansen with history of  Dm2, thyroid nodules, subclinical hyperthyroidism, osteopenia, hx diffuse large B cell lymphoma, Hl, here for followup.     1. 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 thyroid nodules: recommend bx of 2 dominant nodules (1 mid R and 1 mid L); pt has some evidence of "hot" and "cold" nodules on uptake but as overall uptake is normal and pt w multiple nodules in both lobes, I think it would be hard to localize "hot" nodules to ultrasound, thus would still recommend biopsy.  She is agreeable     3. Dm2: continue current regimen, A1c acceptable for age, repeat A1c at next visit and asked pt to bring glucometer    4. Osteopenia: consider DXA in the future, vitD w next visit    RTC 3 month    Lizzy Hall M.D.  Endocrinology  "

## 2017-11-08 ENCOUNTER — OFFICE VISIT (OUTPATIENT)
Dept: ENDOCRINOLOGY | Facility: CLINIC | Age: 82
End: 2017-11-08
Payer: MEDICARE

## 2017-11-08 VITALS
SYSTOLIC BLOOD PRESSURE: 126 MMHG | BODY MASS INDEX: 27.38 KG/M2 | HEART RATE: 98 BPM | HEIGHT: 59 IN | DIASTOLIC BLOOD PRESSURE: 72 MMHG | WEIGHT: 135.81 LBS

## 2017-11-08 DIAGNOSIS — E11.65 UNCONTROLLED TYPE 2 DIABETES MELLITUS WITH HYPERGLYCEMIA, WITHOUT LONG-TERM CURRENT USE OF INSULIN: ICD-10-CM

## 2017-11-08 DIAGNOSIS — E55.9 VITAMIN D DEFICIENCY: ICD-10-CM

## 2017-11-08 DIAGNOSIS — E05.90 SUBCLINICAL HYPERTHYROIDISM: ICD-10-CM

## 2017-11-08 DIAGNOSIS — E04.2 MULTIPLE THYROID NODULES: Primary | ICD-10-CM

## 2017-11-08 PROCEDURE — 99214 OFFICE O/P EST MOD 30 MIN: CPT | Mod: PBBFAC | Performed by: INTERNAL MEDICINE

## 2017-11-08 PROCEDURE — 99999 PR PBB SHADOW E&M-EST. PATIENT-LVL IV: CPT | Mod: PBBFAC,,, | Performed by: INTERNAL MEDICINE

## 2017-11-08 PROCEDURE — 99214 OFFICE O/P EST MOD 30 MIN: CPT | Mod: S$PBB,,, | Performed by: INTERNAL MEDICINE

## 2017-11-08 RX ORDER — METFORMIN HYDROCHLORIDE 1000 MG/1
1000 TABLET ORAL 2 TIMES DAILY WITH MEALS
Qty: 180 TABLET | Refills: 3 | Status: SHIPPED | OUTPATIENT
Start: 2017-11-08 | End: 2018-10-03 | Stop reason: SDUPTHER

## 2017-11-08 RX ORDER — GLIMEPIRIDE 2 MG/1
TABLET ORAL
Qty: 90 TABLET | Refills: 3 | Status: SHIPPED | OUTPATIENT
Start: 2017-11-08 | End: 2018-10-03 | Stop reason: SDUPTHER

## 2017-11-09 ENCOUNTER — TELEPHONE (OUTPATIENT)
Dept: INTERNAL MEDICINE | Facility: CLINIC | Age: 82
End: 2017-11-09

## 2017-11-09 NOTE — TELEPHONE ENCOUNTER
[place] Department of health Level of Care Eligibility Tool completed    Please copy and give to me, OK to let her know it is done, and mail, thanks

## 2017-11-10 ENCOUNTER — OUTPATIENT CASE MANAGEMENT (OUTPATIENT)
Dept: ADMINISTRATIVE | Facility: OTHER | Age: 82
End: 2017-11-10

## 2017-11-10 RX ORDER — ZOLPIDEM TARTRATE 5 MG/1
TABLET ORAL
Qty: 12 TABLET | Refills: 0 | Status: SHIPPED | OUTPATIENT
Start: 2017-11-10 | End: 2017-12-11 | Stop reason: SDUPTHER

## 2017-11-10 NOTE — PROGRESS NOTES
This CSW received a call from patient. Patient reports she is in the process of mailing RTA application to this CSW for completion.

## 2017-11-22 ENCOUNTER — OUTPATIENT CASE MANAGEMENT (OUTPATIENT)
Dept: ADMINISTRATIVE | Facility: OTHER | Age: 82
End: 2017-11-22

## 2017-11-22 ENCOUNTER — TELEPHONE (OUTPATIENT)
Dept: INTERNAL MEDICINE | Facility: CLINIC | Age: 82
End: 2017-11-22

## 2017-11-22 NOTE — TELEPHONE ENCOUNTER
----- Message from BUBBA Flores sent at 11/22/2017 11:28 AM CST -----  This CSW is assisting patient with completing RTA lift application. Please provide an e-mail address that I may use to send second part of application be filled out by PCP.      Thank you,    BUBBA Borjas

## 2017-11-22 NOTE — PROGRESS NOTES
ZEW received RTA application from patient  Via mail. ZEW sent an in basket message to patient PCP DR Blaine Garland to provide an e-mail address to send second part of application to be filled out by Physician.   BUBBA received an e-mail from Dr. Garland staff to fax professional verification to [phone]. ZEW faxed document

## 2017-12-01 ENCOUNTER — TELEPHONE (OUTPATIENT)
Dept: INTERNAL MEDICINE | Facility: CLINIC | Age: 82
End: 2017-12-01

## 2017-12-07 ENCOUNTER — OUTPATIENT CASE MANAGEMENT (OUTPATIENT)
Dept: ADMINISTRATIVE | Facility: OTHER | Age: 82
End: 2017-12-07

## 2017-12-07 NOTE — PROGRESS NOTES
CSW received completed professional verification RTA form from Dr. Garland. CSW mailed completed application to RTA for consideration .

## 2017-12-11 RX ORDER — ZOLPIDEM TARTRATE 5 MG/1
TABLET ORAL
Qty: 12 TABLET | Refills: 0 | Status: SHIPPED | OUTPATIENT
Start: 2017-12-11 | End: 2018-01-10 | Stop reason: SDUPTHER

## 2017-12-28 ENCOUNTER — HOSPITAL ENCOUNTER (OUTPATIENT)
Dept: ENDOCRINOLOGY | Facility: CLINIC | Age: 82
Discharge: HOME | End: 2017-12-28
Attending: INTERNAL MEDICINE
Payer: MEDICARE

## 2017-12-28 DIAGNOSIS — E04.2 MULTIPLE THYROID NODULES: ICD-10-CM

## 2017-12-28 DIAGNOSIS — E04.1 THYROID NODULE: Primary | ICD-10-CM

## 2017-12-28 PROCEDURE — 10022 US FINE NEEDLE ASPIRATION THYROID MULTI SITE (XPD): CPT | Mod: 59,,, | Performed by: INTERNAL MEDICINE

## 2017-12-28 PROCEDURE — 88173 CYTOPATH EVAL FNA REPORT: CPT | Performed by: PATHOLOGY

## 2017-12-28 PROCEDURE — 76942 ECHO GUIDE FOR BIOPSY: CPT | Mod: ,,, | Performed by: INTERNAL MEDICINE

## 2017-12-29 ENCOUNTER — TELEPHONE (OUTPATIENT)
Dept: ENDOCRINOLOGY | Facility: HOSPITAL | Age: 82
End: 2017-12-29

## 2017-12-29 NOTE — TELEPHONE ENCOUNTER
Left VM that thyroid L and R dominant nodules FNA results were benign. Asked pt to call back if questions.      Lizzy Hall MD

## 2018-01-02 ENCOUNTER — TELEPHONE (OUTPATIENT)
Dept: ENDOCRINOLOGY | Facility: CLINIC | Age: 83
End: 2018-01-02

## 2018-01-02 DIAGNOSIS — E11.65 TYPE 2 DIABETES MELLITUS WITH HYPERGLYCEMIA, UNSPECIFIED LONG TERM INSULIN USE STATUS: ICD-10-CM

## 2018-01-02 DIAGNOSIS — E04.1 THYROID NODULE: Primary | ICD-10-CM

## 2018-01-02 DIAGNOSIS — M81.0 OSTEOPOROSIS WITHOUT PATHOLOGICAL FRACTURE: ICD-10-CM

## 2018-01-02 NOTE — TELEPHONE ENCOUNTER
External lab orders are pended please sign and when they print you will need to sign them again and then I will fax them.

## 2018-01-02 NOTE — TELEPHONE ENCOUNTER
----- Message from Martha Santana sent at 1/2/2018  8:13 AM CST -----  Contact: Pt Granddaughter  Kate  [phone]  Gu  /  Calling to get labs orders faxed over to [facility]  [phone]  Fax number  [phone], due to [place] in [place]  , call  back to verify  [phone]

## 2018-01-03 ENCOUNTER — TELEPHONE (OUTPATIENT)
Dept: INTERNAL MEDICINE | Facility: CLINIC | Age: 83
End: 2018-01-03

## 2018-01-03 NOTE — TELEPHONE ENCOUNTER
----- Message from Claritza Car sent at 1/3/2018  9:41 AM CST -----  Contact: Mrs. Isabella Lemos from [facility]  Mrs Lemos says she never received lab orders for pt    Asked if they can be faxed over to [phone]    Isabella Lemos can be reached at [phone]    Thanks

## 2018-01-11 RX ORDER — ZOLPIDEM TARTRATE 5 MG/1
TABLET ORAL
Qty: 12 TABLET | Refills: 0 | Status: SHIPPED | OUTPATIENT
Start: 2018-01-11 | End: 2018-02-12

## 2018-01-19 ENCOUNTER — TELEPHONE (OUTPATIENT)
Dept: ENDOCRINOLOGY | Facility: CLINIC | Age: 83
End: 2018-01-19

## 2018-01-19 NOTE — TELEPHONE ENCOUNTER
"Called number provided.   not able to assist on what the call was about.  I left a message for "Esperanza" to please return call with more specific request.   "

## 2018-01-19 NOTE — TELEPHONE ENCOUNTER
Attempted to return call to Kailee with Dr. Garland.  Unable to reach.  Left message with Debbi .  Unsure of specific request.

## 2018-01-19 NOTE — TELEPHONE ENCOUNTER
----- Message from Queenie Yolis sent at 1/17/2018 12:13 PM CST -----  Contact: [facility] lab/Isabella Lemos/Tel:[phone] desk  Pt. Came to the lab 2 wks ago.   Ref. Blood test pt. Had on 1/4/2018.  Pls call . (caller refused to comment further.

## 2018-02-12 ENCOUNTER — OFFICE VISIT (OUTPATIENT)
Dept: ENDOCRINOLOGY | Facility: CLINIC | Age: 83
End: 2018-02-12
Payer: MEDICARE

## 2018-02-12 ENCOUNTER — TELEPHONE (OUTPATIENT)
Dept: ENDOCRINOLOGY | Facility: CLINIC | Age: 83
End: 2018-02-12

## 2018-02-12 VITALS
WEIGHT: 137.38 LBS | HEIGHT: 59 IN | BODY MASS INDEX: 27.69 KG/M2 | DIASTOLIC BLOOD PRESSURE: 58 MMHG | HEART RATE: 69 BPM | SYSTOLIC BLOOD PRESSURE: 116 MMHG

## 2018-02-12 DIAGNOSIS — E05.90 SUBCLINICAL HYPERTHYROIDISM: ICD-10-CM

## 2018-02-12 DIAGNOSIS — E11.8 CONTROLLED DIABETES MELLITUS TYPE 2 WITH COMPLICATIONS, UNSPECIFIED LONG TERM INSULIN USE STATUS: ICD-10-CM

## 2018-02-12 DIAGNOSIS — E04.2 MULTIPLE THYROID NODULES: Primary | ICD-10-CM

## 2018-02-12 PROCEDURE — 99214 OFFICE O/P EST MOD 30 MIN: CPT | Mod: S$PBB,,, | Performed by: INTERNAL MEDICINE

## 2018-02-12 PROCEDURE — 1126F AMNT PAIN NOTED NONE PRSNT: CPT | Mod: ,,, | Performed by: INTERNAL MEDICINE

## 2018-02-12 PROCEDURE — 99214 OFFICE O/P EST MOD 30 MIN: CPT | Mod: PBBFAC | Performed by: INTERNAL MEDICINE

## 2018-02-12 PROCEDURE — 99999 PR PBB SHADOW E&M-EST. PATIENT-LVL IV: CPT | Mod: PBBFAC,,, | Performed by: INTERNAL MEDICINE

## 2018-02-12 PROCEDURE — 1159F MED LIST DOCD IN RCRD: CPT | Mod: ,,, | Performed by: INTERNAL MEDICINE

## 2018-02-12 RX ORDER — ZOLPIDEM TARTRATE 5 MG/1
TABLET ORAL
Qty: 12 TABLET | Refills: 0 | Status: SHIPPED | OUTPATIENT
Start: 2018-02-12 | End: 2018-03-11 | Stop reason: SDUPTHER

## 2018-02-12 NOTE — PROGRESS NOTES
Ms. Johanesn is a 83 y.o. F with history of  Dm2, thyroid nodules, osteopenia, subclinical hypothyroidism, osteopenia, hx diffuse large B cell lymphoma, Hl, here for followup.    Regarding thyroid hx: US done in Sept 2017 shows multiple nodules, personally reviewed, largest is a 2.2cm R inf nodule mixed cystic nodule, there is also a 1.6cm R mid nodule although appears mostly cystic, there is a 1.7cm mid L sided nodule mixed cystic and a 1.3cm L upper nodule, solid with some peripheral vascularity.  No family hx of thyroid disease, no radiation to neck region (but had radiation to leg). I reviewed she also had intermittent subclinical hyperthyroidism in the past, but most recently in Oct 2017 levels had normalized again and thyroid uptake at that time showed normal uptake but areas of hyper/hypofunctioning nodules suggestive of MNG (possibly hot nodules possibly in R mid and L lower lobe).  Her TSI is weekly positive. In Dec 2017 FNA of thyroid L and R dominant nodules  were benign.    In 2014 she had a DXA showing normal BMD at L spine and hip, femoral neck BMD showed T -1.8  (no significant change c/w 2011).    No palpitations, no tremors, her weight fluctuates, no hair or skin changes, no diarrhea or constipation.  She has some continued R leg pain from where they did surgery and radiation for her lymphoma (now in remission).      She is having some mild intermittent chest tightness over last 2-3 weeks but not getting worse, no current chest pain, seems only to happen at the top of stairs.  She is seeing her PCP soon.  She did recent Dm and thyroid labs for me, but I am still waiting to get faxed the results.     Regarding her Dm: most recent A1c 7.3 in Aug 2017, relays most FS in low 100s fasting, no hypoglycemia. On glimepiride 2mg daily and metformin 1g BID. No glucometer.         Component      Latest Ref Rng & Units 8/16/2017 1/23/2017 6/3/2016 11/6/2015   Hemoglobin A1C      4.0 - 5.6 % 7.3 (H) 7.2 (H) 7.5  (H) 7.6 (H)     Component      Latest Ref Rng & Units 11/6/2015 3/28/2015 11/5/2014 8/7/2014   TSH      0.400 - 4.000 uIU/mL 0.700 0.281 (L) 0.264 (L) 0.350 (L)   T4 Total      4.5 - 11.5 ug/dl       Free T4      0.71 - 1.51 ng/dL  1.03 1.06 1.14     Component      Latest Ref Rng & Units 5/21/2014 2/11/2014 11/11/2013 2/9/2012   TSH      0.400 - 4.000 uIU/mL 0.466 0.298 (L) 0.264 (L) 0.482   T4 Total      4.5 - 11.5 ug/dl       Free T4      0.71 - 1.51 ng/dL  1.14 1.15      Component      Latest Ref Rng & Units 5/26/2011 9/21/2010 6/3/2010 4/18/2009   TSH      0.400 - 4.000 uIU/mL 0.370 (L) 0.689 0.720 0.929   T4 Total      4.5 - 11.5 ug/dl  7.7 7.8    Free T4      0.71 - 1.51 ng/dL 1.36        Component      Latest Ref Rng & Units 5/9/2008 4/30/2007   TSH      0.400 - 4.000 uIU/mL 1.4 1.2   T4 Total      4.5 - 11.5 ug/dl     Free T4      0.71 - 1.51 ng/dL       Past Medical History:   Diagnosis Date    Aortic atherosclerosis 1/7/2016    Arthritis     Colon polyp: hyperplastic 2015- repeat 2020 8/6/2015    Diabetes mellitus, type II 8/16/2012    Diverticulosis     Gastric nodule 8/7/2014    GERD (gastroesophageal reflux disease) 8/16/2012    Goiter 8/16/2012    Hyperlipidemia 8/16/2012    Hypertension     Joint pain     Leg pain 8/16/2012    Lymphoma 8/16/2012    Osteopenia 8/16/2012    Osteoporosis     Renal cyst 3/28/2013    Rickets, vitamin D deficiency 8/16/2012    Thyroid nodule 2/24/2014    Vitamin D deficiency disease 8/16/2012        reports that she has never smoked. She has never used smokeless tobacco. She reports that she does not drink alcohol or use drugs.    Family History   Problem Relation Age of Onset    Hypertension Mother     No Known Problems Father     No Known Problems Sister     Breast cancer Maternal Aunt     No Known Problems Maternal Uncle     No Known Problems Paternal Aunt     No Known Problems Paternal Uncle     No Known Problems Maternal Grandmother     No  Known Problems Maternal Grandfather     No Known Problems Paternal Grandmother     No Known Problems Paternal Grandfather     No Known Problems Brother     Diabetes Neg Hx     Glaucoma Neg Hx     Amblyopia Neg Hx     Blindness Neg Hx     Cancer Neg Hx     Cataracts Neg Hx     Macular degeneration Neg Hx     Retinal detachment Neg Hx     Strabismus Neg Hx     Stroke Neg Hx     Thyroid disease Neg Hx     Ovarian cancer Neg Hx     Liver disease Neg Hx     Liver cancer Neg Hx     Cirrhosis Neg Hx     Colon polyps Neg Hx     Colon cancer Neg Hx     Melanoma Neg Hx        Past Surgical History:   Procedure Laterality Date    CARPAL TUNNEL RELEASE      CATARACT EXTRACTION W/  INTRAOCULAR LENS IMPLANT Bilateral     HYSTERECTOMY      lymphoma surgery      L tibia       Patient's Medications   New Prescriptions    No medications on file   Previous Medications    AMMONIUM LACTATE 12 % CREA    Apply 1 application topically once daily.    ASPIRIN 81 MG CHEW    Take 1 tablet (81 mg total) by mouth once daily.    ATORVASTATIN (LIPITOR) 40 MG TABLET    Take 1 tablet (40 mg total) by mouth once daily.    BLOOD SUGAR DIAGNOSTIC (ONETOUCH ULTRA TEST) STRP    TEST BLOOD TWICE DAILY    BLOOD-GLUCOSE METER KIT    Use as instructed, test once daily    CALCIUM 500 MG TAB    Take 1 tablet by mouth Twice daily.    DEXTRAN 70-HYPROMELLOSE (ARTIFICIAL TEARS,[id number],) OPHTHALMIC SOLUTION    Place 1 drop into both eyes 4 (four) times daily as needed.    DICLOFENAC SODIUM (VOLTAREN) 1 % GEL    Apply 2 g topically 4 (four) times daily.    GABAPENTIN (NEURONTIN) 300 MG CAPSULE    TAKE ONE CAPSULE BY MOUTH EVERY EVENING    GLIMEPIRIDE (AMARYL) 2 MG TABLET    TAKE 1 TABLET BY MOUTH BEFORE BREAKFAST.    METFORMIN (GLUCOPHAGE) 1000 MG TABLET    Take 1 tablet (1,000 mg total) by mouth 2 (two) times daily with meals.    NEXIUM 40 MG CAPSULE    TAKE 1 CAPSULE(40 MG) BY MOUTH EVERY DAY    ONE TOUCH DELICA LANCETS 30 GAUGE MISC  "       TRAMADOL (ULTRAM) 50 MG TABLET    Take 1 tablet (50 mg total) by mouth 2 (two) times daily.   Modified Medications    No medications on file   Discontinued Medications    LIDOCAINE-PRILOCAINE (EMLA) CREAM    1 g as needed.     ZOLPIDEM (AMBIEN) 5 MG TAB    TAKE 1 TABLET BY MOUTH EVERY NIGHT AT BEDTIME AS NEEDED. DO NOT EXCEED 3 TABLETS EVERY WEEK         Review of Systems:  General: no fevers  Eyes: no vision changes  ENT: no sore throat  Lung: no sob  CV: no palpitations  GI: no nausea   : no dysuria   Endo: no heat interolance  Heme: no easy bruising   MSK: no joint swelling        BP (!) 116/58   Pulse 69   Ht 4' 11" (1.499 m)   Wt 62.3 kg (137 lb 5.6 oz)   BMI 27.74 kg/m²     Physical Exam:  General: normal body habitus, not in acute distress   Eyes: anicteric, no proptosis   ENT: no facial lesions, no oral lesions  Neck: no masses, no LAD, thyroid 16g  Lung: ctabl, no resp distress  Cv: RRR, no r/m/g  Psych: normal affect, some difficulty in comprehension   Skin: exposed skin without bruising or bleeding   Ext: no peripheral edema or erythema  Neuro: AOx3, moving all extremities, slow gait    Labs:    Chemistry        Component Value Date/Time     08/16/2017 0905    K 4.3 08/16/2017 0905     08/16/2017 0905    CO2 26 08/16/2017 0905    BUN 12 08/16/2017 0905    CREATININE 0.7 08/16/2017 0905     (H) 08/16/2017 0905        Component Value Date/Time    CALCIUM 9.3 08/16/2017 0905    ALKPHOS 73 08/16/2017 0905    AST 19 08/16/2017 0905    ALT 19 08/16/2017 0905    BILITOT 1.0 08/16/2017 0905    ESTGFRAFRICA >60 08/16/2017 0905    EGFRNONAA >60 08/16/2017 0905          Lab Results   Component Value Date    WBC 4.43 03/16/2017    HGB 13.6 03/16/2017    HCT 40.6 03/16/2017    MCV 84 03/16/2017     03/16/2017        Lab Results   Component Value Date    HDL 48 01/23/2017    HDL 47 06/03/2016    HDL 51 11/06/2015     Lab Results   Component Value Date    LDLCALC 82.8 01/23/2017    " LDLCALC 146.0 06/03/2016    LDLCALC 157.0 11/06/2015     Lab Results   Component Value Date    TRIG 101 01/23/2017    TRIG 160 (H) 06/03/2016    TRIG 235 (H) 11/06/2015     Lab Results   Component Value Date    CHOLHDL 31.8 01/23/2017    CHOLHDL 20.9 06/03/2016    CHOLHDL 20.0 11/06/2015       Hemoglobin A1C   Date Value Ref Range Status   08/16/2017 7.3 (H) 4.0 - 5.6 % Final     Comment:     According to ADA guidelines, hemoglobin A1c <7.0% represents  optimal control in non-pregnant diabetic patients. Different  metrics may apply to specific patient populations.   Standards of Medical Care in Diabetes-2016.  For the purpose of screening for the presence of diabetes:  <5.7%     Consistent with the absence of diabetes  5.7-6.4%  Consistent with increasing risk for diabetes   (prediabetes)  >or=6.5%  Consistent with diabetes  Currently, no consensus exists for use of hemoglobin A1c  for diagnosis of diabetes for children.  This Hemoglobin A1c assay has significant interference with fetal   hemoglobin   (HbF). The results are invalid for patients with abnormal amounts of   HbF,   including those with known Hereditary Persistence   of Fetal Hemoglobin. Heterozygous hemoglobin variants (HbAS, HbAC,   HbAD, HbAE, HbA2) do not significantly interfere with this assay;   however, presence of multiple variants in a sample may impact the %   interference.     01/23/2017 7.2 (H) 4.5 - 6.2 % Final     Comment:     According to ADA guidelines, hemoglobin A1C <7.0% represents  optimal control in non-pregnant diabetic patients.  Different  metrics may apply to specific populations.   Standards of Medical Care in Diabetes - 2016.  For the purpose of screening for the presence of diabetes:  <5.7%     Consistent with the absence of diabetes  5.7-6.4%  Consistent with increasing risk for diabetes   (prediabetes)  >or=6.5%  Consistent with diabetes  Currently no consensus exists for use of hemoglobin A1C  for diagnosis of diabetes for  children.     06/03/2016 7.5 (H) 4.5 - 6.2 % Final       Lab Results   Component Value Date    TSH 0.676 10/11/2017    D6AMGMW 7.7 09/21/2010       Assessment and Plan:  Ms. Johansen with history of  Dm2, thyroid nodules, subclinical hyperthyroidism, osteopenia, hx diffuse large B cell lymphoma, Hl, here for followup.     1. 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 thyroid nodules: s/p benign Dec 2017 FNA of L and R dominant nodules. Repeat US in early 2019.     3. Dm2: continue current regimen, A1c acceptable for age, will followup most recent A1c    4. Osteopenia: consider DXA in the future, will followup vitD level    5. Chest discomfort: unclear if cardiac origin, currently mild and exertional, advised to followup w PCP, but if it gets worse then to seek emergent help    Will have labs faxed    RTC 1 year

## 2018-02-12 NOTE — TELEPHONE ENCOUNTER
Talked to the labs told they our faxed number  and they stated will faxed over lab results       ----- Message from Lizzy Hall MD sent at 2/12/2018  8:08 AM CST -----  Regarding: obtaining records for pt today (Mon 2.12.18)  Hi - this pt got labs done in the last 2-3 months at [facility]  [phone]  (I ordered them) - could we get them faxed over?  She has an appt w me later this morning.    Thanks so much

## 2018-02-15 ENCOUNTER — HOSPITAL ENCOUNTER (OUTPATIENT)
Dept: RADIOLOGY | Facility: HOSPITAL | Age: 83
Discharge: HOME | End: 2018-02-15
Attending: INTERNAL MEDICINE
Payer: MEDICARE

## 2018-02-15 ENCOUNTER — OFFICE VISIT (OUTPATIENT)
Dept: INTERNAL MEDICINE | Facility: CLINIC | Age: 83
End: 2018-02-15
Payer: MEDICARE

## 2018-02-15 VITALS — WEIGHT: 134.5 LBS | SYSTOLIC BLOOD PRESSURE: 120 MMHG | DIASTOLIC BLOOD PRESSURE: 75 MMHG | BODY MASS INDEX: 27.16 KG/M2

## 2018-02-15 DIAGNOSIS — R07.89 ATYPICAL CHEST PAIN: Primary | ICD-10-CM

## 2018-02-15 DIAGNOSIS — E55.9 VITAMIN D DEFICIENCY DISEASE: ICD-10-CM

## 2018-02-15 DIAGNOSIS — R79.89 LOW VITAMIN B12 LEVEL: ICD-10-CM

## 2018-02-15 DIAGNOSIS — C83.35 DIFFUSE LARGE B-CELL LYMPHOMA OF LYMPH NODES OF LOWER EXTREMITY: ICD-10-CM

## 2018-02-15 DIAGNOSIS — I70.0 AORTIC ATHEROSCLEROSIS: ICD-10-CM

## 2018-02-15 DIAGNOSIS — M79.10 MUSCLE PAIN: ICD-10-CM

## 2018-02-15 DIAGNOSIS — E78.2 MIXED HYPERLIPIDEMIA: ICD-10-CM

## 2018-02-15 DIAGNOSIS — R35.0 FREQUENT URINATION: ICD-10-CM

## 2018-02-15 DIAGNOSIS — N28.1 RENAL CYST: ICD-10-CM

## 2018-02-15 DIAGNOSIS — M47.812 CERVICAL SPINE ARTHRITIS: ICD-10-CM

## 2018-02-15 DIAGNOSIS — M81.0 AGE-RELATED OSTEOPOROSIS WITHOUT CURRENT PATHOLOGICAL FRACTURE: ICD-10-CM

## 2018-02-15 LAB
BACTERIA #/AREA URNS AUTO: ABNORMAL /HPF
BILIRUB UR QL STRIP: NEGATIVE
CLARITY UR REFRACT.AUTO: ABNORMAL
COLOR UR AUTO: YELLOW
GLUCOSE UR QL STRIP: ABNORMAL
HGB UR QL STRIP: NEGATIVE
HYALINE CASTS UR QL AUTO: 2 /LPF
KETONES UR QL STRIP: NEGATIVE
LEUKOCYTE ESTERASE UR QL STRIP: ABNORMAL
MICROSCOPIC COMMENT: ABNORMAL
NITRITE UR QL STRIP: NEGATIVE
PH UR STRIP: 5 [PH] (ref 5–8)
PROT UR QL STRIP: NEGATIVE
RBC #/AREA URNS AUTO: 2 /HPF (ref 0–4)
SP GR UR STRIP: 1.02 (ref 1–1.03)
SQUAMOUS #/AREA URNS AUTO: 6 /HPF
URN SPEC COLLECT METH UR: ABNORMAL
UROBILINOGEN UR STRIP-ACNC: NEGATIVE EU/DL
WBC #/AREA URNS AUTO: 36 /HPF (ref 0–5)

## 2018-02-15 PROCEDURE — 1126F AMNT PAIN NOTED NONE PRSNT: CPT | Mod: ,,, | Performed by: INTERNAL MEDICINE

## 2018-02-15 PROCEDURE — 71046 X-RAY EXAM CHEST 2 VIEWS: CPT | Mod: 26,,, | Performed by: RADIOLOGY

## 2018-02-15 PROCEDURE — 1159F MED LIST DOCD IN RCRD: CPT | Mod: ,,, | Performed by: INTERNAL MEDICINE

## 2018-02-15 PROCEDURE — 81001 URINALYSIS AUTO W/SCOPE: CPT

## 2018-02-15 PROCEDURE — 72040 X-RAY EXAM NECK SPINE 2-3 VW: CPT | Mod: TC

## 2018-02-15 PROCEDURE — 99214 OFFICE O/P EST MOD 30 MIN: CPT | Mod: S$PBB,,, | Performed by: INTERNAL MEDICINE

## 2018-02-15 PROCEDURE — 99215 OFFICE O/P EST HI 40 MIN: CPT | Mod: PBBFAC,25 | Performed by: INTERNAL MEDICINE

## 2018-02-15 PROCEDURE — 93005 ELECTROCARDIOGRAM TRACING: CPT | Mod: PBBFAC | Performed by: INTERNAL MEDICINE

## 2018-02-15 PROCEDURE — 72040 X-RAY EXAM NECK SPINE 2-3 VW: CPT | Mod: 26,,, | Performed by: RADIOLOGY

## 2018-02-15 PROCEDURE — 87086 URINE CULTURE/COLONY COUNT: CPT

## 2018-02-15 PROCEDURE — 71100 X-RAY EXAM RIBS UNI 2 VIEWS: CPT | Mod: TC

## 2018-02-15 PROCEDURE — 99999 PR PBB SHADOW E&M-EST. PATIENT-LVL V: CPT | Mod: PBBFAC,,, | Performed by: INTERNAL MEDICINE

## 2018-02-15 PROCEDURE — 71100 X-RAY EXAM RIBS UNI 2 VIEWS: CPT | Mod: 26,,, | Performed by: RADIOLOGY

## 2018-02-15 PROCEDURE — 71046 X-RAY EXAM CHEST 2 VIEWS: CPT | Mod: TC

## 2018-02-15 PROCEDURE — 93010 ELECTROCARDIOGRAM REPORT: CPT | Mod: ,,, | Performed by: INTERNAL MEDICINE

## 2018-02-15 NOTE — PROGRESS NOTES
Subjective:       Patient ID: Peri Johansen is a 83 y.o. female.    Chief Complaint: Follow-up and Headache    Follow up multiple issues     Continues to struggle with pain.   She says she has pain pretty much all over.  She has cervical spine discomfort as well as right arm pain, left arm pain, breast pain, chest pain, lower abdominal pain, frequent urination, and leg pain.  It is difficult for her to tell me what symptom is the most predominant.  She does not have fever, chills, sweats, morning stiffness or weight loss.      She has been seen in the breast clinic and had breast assessment and a diagnostic mammogram 6 months ago and no abnormality was identified.      She denies depression.  Sleep is chronically impaired.  Pain medication seems to work pretty well for her.      She follows annually in Oncology.  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   She underwent surgery with joanna placement, followed by CVP chemotherapy concurrent with radiation for 3 cycles. Then she received adjuvant weekly Rituxan x 4. All treatment was completed by December 2007.    She is following closely in endocrinology with Dr. Hall for thyroid issues.    She is up-to-date with podiatry and eye exams.  However, has not had A1c her lipids done recently.    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     Vitamin D deficiency disease     Mixed hyperlipidemia     Renal cyst: R side see ultrasound 6/16     Uncontrolled type 2 diabetes mellitus without complication, without long-term current use of insulin     Stromal cell tumor     Lipoma of back     Thyroid nodules: several see u/s 2017 needs FNA 9/17     Gastric nodule: 2013-m per GI no need for further follow up     Spondylosis without myelopathy     Low vitamin B12 level     Gastroesophageal reflux disease without esophagitis     Age-related osteoporosis  without current pathological fracture     Colon polyp: hyperplastic 2015- repeat 2020     Primary insomnia     Aortic atherosclerosis: see CT scan 3/15     Diverticulosis of large intestine without hemorrhage: see CT scan 3/15     Thoracic and lumbosacral neuritis     Chronic bilateral low back pain with right-sided sciatica     Left carpal tunnel syndrome     Hearing loss of left ear               Headache    Associated symptoms include back pain. Pertinent negatives include no abdominal pain, coughing, dizziness, fever, hearing loss, numbness or sinus pressure.     Review of Systems   Constitutional: Negative for activity change, appetite change, chills, fatigue and fever.   HENT: Negative for congestion, hearing loss and sinus pressure.    Eyes: Negative for visual disturbance.   Respiratory: Negative for apnea, cough and shortness of breath.    Cardiovascular: Negative for chest pain and leg swelling.        Chest wall pain, not exertional   Gastrointestinal: Negative for abdominal distention, abdominal pain, constipation and diarrhea.   Genitourinary: Negative for difficulty urinating, dysuria, frequency, hematuria and vaginal bleeding.   Musculoskeletal: Positive for arthralgias and back pain. Negative for gait problem, joint swelling and myalgias.   Skin: Negative for rash.   Neurological: Positive for headaches. Negative for dizziness, light-headedness and numbness.   Hematological: Negative for adenopathy. Does not bruise/bleed easily.   Psychiatric/Behavioral: Negative for confusion, hallucinations, sleep disturbance and suicidal ideas.       Objective:      Physical Exam   Constitutional: She is oriented to person, place, and time. She appears well-developed and well-nourished.   HENT:   Head: Normocephalic and atraumatic.   Right Ear: External ear normal.   Left Ear: External ear normal.   Mouth/Throat: Oropharynx is clear and moist.   Eyes: Conjunctivae are normal.   Neck: Normal range of motion. Neck  supple. Thyromegaly present.   Cardiovascular: Normal rate, regular rhythm and normal heart sounds.    Pulmonary/Chest: No respiratory distress. She has no wheezes. She has no rales.   BREASTS: No masses, nipple d/c or LN   Abdominal: Soft. Bowel sounds are normal.   Musculoskeletal: She exhibits no edema.   Lymphadenopathy:     She has no cervical adenopathy.   Neurological: She is alert and oriented to person, place, and time.   Skin: Skin is warm and dry. No rash noted. No erythema.       Assessment:       1. Atypical chest pain    2. Aortic atherosclerosis: see CT scan 3/15    3. Mixed hyperlipidemia    4. Renal cyst: R side see ultrasound 6/16    5. Diffuse large B-cell lymphoma of lymph nodes of lower extremity    6. Low vitamin B12 level    7. Uncontrolled type 2 diabetes mellitus without complication, without long-term current use of insulin    8. Vitamin D deficiency disease    9. Age-related osteoporosis without current pathological fracture    10. Cervical spine arthritis    11. Muscle pain    12. Frequent urination        Plan:         Atypical chest pain  -     EKG 12-lead: NSR, no ischemia  -     Exercise stress echo; Future    Aortic atherosclerosis: see CT scan 3/15  -     Comprehensive metabolic panel; Future; Expected date: 02/15/2018    Mixed hyperlipidemia  -     Lipid panel; Future; Expected date: 02/15/2018    Renal cyst: R side see ultrasound 6/16: will reassess although no specific flank pain    Diffuse large B-cell lymphoma of lymph nodes of lower extremity  -     X-Ray Chest PA And Lateral; Future; Expected date: 02/15/2018  -     X-Ray Ribs 2 View Right  -     Ambulatory consult to Oncology    Low vitamin B12 level  -     CBC auto differential; Future; Expected date: 02/15/2018  -     Vitamin B12; Future; Expected date: 02/15/2018    Uncontrolled type 2 diabetes mellitus without complication, without long-term current use of insulin  -     Hemoglobin A1c; Future; Expected date:  02/15/2018    Vitamin D deficiency disease  -     Vitamin D; Future; Expected date: 02/15/2018    Age-related osteoporosis without current pathological fracture    Cervical spine arthritis  -     X-Ray Cervical Spine AP And Lateral; Future; Expected date: 02/15/2018  -     LUIGI; Future; Expected date: 02/15/2018  -     Cyclic citrul peptide antibody, IgG; Future; Expected date: 02/15/2018  -     C-reactive protein; Future; Expected date: 02/15/2018  -     Rheumatoid factor; Future; Expected date: 02/15/2018  -     Sedimentation rate, manual; Future; Expected date: 02/15/2018    Muscle pain  -     Magnesium; Future; Expected date: 02/15/2018  -     CK; Future; Expected date: 02/15/2018    Frequent urination  -     Urinalysis  -     Urine culture    I will review all studies and determine further tx depending on findings    Continue current medical regimen

## 2018-02-17 LAB — BACTERIA UR CULT: NO GROWTH

## 2018-02-19 ENCOUNTER — TELEPHONE (OUTPATIENT)
Dept: INTERNAL MEDICINE | Facility: CLINIC | Age: 83
End: 2018-02-19

## 2018-02-19 DIAGNOSIS — R70.0 ELEVATED SED RATE: Primary | ICD-10-CM

## 2018-02-19 DIAGNOSIS — E78.2 MIXED HYPERLIPIDEMIA: ICD-10-CM

## 2018-02-19 DIAGNOSIS — C85.90 LYMPHOMA, UNSPECIFIED BODY REGION, UNSPECIFIED LYMPHOMA TYPE: Primary | ICD-10-CM

## 2018-02-20 NOTE — TELEPHONE ENCOUNTER
Please let her know labs OK other than cholesterol- is she definitely taking 40 Lipitor?    Also her sed rate is high and this is not specific but can be seen with arthritis-like conditions such as polymyalgia    This would be treated with steroids, but that can be risky too    Xrays OK    She needs to schedule the kidney ultrasound    Since she is diabetic I'd like her to be seen in rheumatology first- please assist with appt and let me know, thanks

## 2018-02-26 ENCOUNTER — TELEPHONE (OUTPATIENT)
Dept: ENDOCRINOLOGY | Facility: CLINIC | Age: 83
End: 2018-02-26

## 2018-02-26 DIAGNOSIS — E05.90 HYPERTHYROIDISM: Primary | ICD-10-CM

## 2018-02-26 NOTE — TELEPHONE ENCOUNTER
I spoke to her briefly, but she could not tell me the dose of her Lipitor because she was in the car    Please call her back to clarify the dose of Lipitor    She is willing to do the kidney ultrasound as well as the rheumatology appointment, please assist with those.  Thank you

## 2018-02-26 NOTE — TELEPHONE ENCOUNTER
Tried both home and cell numbers.  Left VM on both numbers.    Received results from [facility] from Jan 4th 2018    , TG 91, total 223, vitD 26  Free T4 1.32 (0.76-1.46)  TSH 0.057 (0.36-3.74)  Free T3 5.12 (2.3-4.2)    I am unclear if pt recently had contrast imaging etc, may require antithyroid medications.    Asked pt on both numbers to call back.    Lizzy Hall MD

## 2018-02-27 ENCOUNTER — TELEPHONE (OUTPATIENT)
Dept: ENDOCRINOLOGY | Facility: CLINIC | Age: 83
End: 2018-02-27

## 2018-02-27 RX ORDER — METHIMAZOLE 5 MG/1
TABLET ORAL
Qty: 15 TABLET | Refills: 5 | Status: SHIPPED | OUTPATIENT
Start: 2018-02-27 | End: 2019-03-18 | Stop reason: SDUPTHER

## 2018-02-27 NOTE — TELEPHONE ENCOUNTER
Discussed w Micaela granddaughter - would recommend starting methimazole 2.5mg daily - repeat labs in April 17th 2018. Discussed small inc risk of agranulocytosis (and to seek help immediately if high fever/chills, acute illness) and liver dysfunction.      **Could we also schedule pt to come back on 4/17/18 at 3pm?  Pt is already aware of the appt.     **Could we schedule labs to be done at the same time on 4/17/18 as her labs (already ordered by heme onc)?    Thank you.

## 2018-02-27 NOTE — TELEPHONE ENCOUNTER
----- Message from Queenie Yolis sent at 2/27/2018  9:39 AM CST -----  Contact: Peri  tel:  [phone] cell   Pls call ref. The thyroid test results.    Pls call her Grand-Daughter to discuss this, since she is not understanding medical terms. As per pt. .    /    Grand Daughter  :  Micaela Paul  Tel: [phone] .

## 2018-02-28 DIAGNOSIS — E78.5 HYPERLIPIDEMIA: ICD-10-CM

## 2018-02-28 RX ORDER — ATORVASTATIN CALCIUM 40 MG/1
40 TABLET, FILM COATED ORAL DAILY
Qty: 90 TABLET | Refills: 3 | Status: SHIPPED | OUTPATIENT
Start: 2018-02-28 | End: 2018-03-07

## 2018-03-02 NOTE — TELEPHONE ENCOUNTER
See other notes     Please call her back to clarify the dose of Lipitor     She is willing to do the kidney ultrasound as well as the rheumatology appointment, please assist with those.  Thank you

## 2018-03-06 RX ORDER — TRAMADOL HYDROCHLORIDE 50 MG/1
50 TABLET ORAL 2 TIMES DAILY
Qty: 60 TABLET | Refills: 0 | Status: SHIPPED | OUTPATIENT
Start: 2018-03-06 | End: 2018-03-07 | Stop reason: SDUPTHER

## 2018-03-07 RX ORDER — TRAMADOL HYDROCHLORIDE 50 MG/1
50 TABLET ORAL 2 TIMES DAILY
Qty: 60 TABLET | Refills: 0 | Status: SHIPPED | OUTPATIENT
Start: 2018-03-07 | End: 2018-06-20 | Stop reason: SDUPTHER

## 2018-03-07 RX ORDER — ATORVASTATIN CALCIUM 80 MG/1
80 TABLET, FILM COATED ORAL DAILY
Qty: 90 TABLET | Refills: 3 | Status: SHIPPED | OUTPATIENT
Start: 2018-03-07 | End: 2018-03-08 | Stop reason: SDUPTHER

## 2018-03-07 NOTE — TELEPHONE ENCOUNTER
Pt's niece Blanquita called office to advise that pt has NOT been taking Lipitor 40mg daily. She stated that pt was out of town for 2 months and was w/o this medication.    Blanquita advised that pt has started back on medication and just recently received refill from pharmacy.

## 2018-03-07 NOTE — TELEPHONE ENCOUNTER
Spoke to pt--- She is taking Lipitor 40mg daily. Scheduled u/s for 3/14/18 at 11am and she will see Rheumatology at 1:40p on the same day.     Pt would like to know if 4/11 appt is still needed since she came in February.

## 2018-03-08 RX ORDER — ATORVASTATIN CALCIUM 80 MG/1
40 TABLET, FILM COATED ORAL DAILY
Qty: 45 TABLET | Refills: 3
Start: 2018-03-08 | End: 2018-10-03 | Stop reason: SDUPTHER

## 2018-03-08 NOTE — TELEPHONE ENCOUNTER
OK sorry for the confusion, then let's have her take 1/2 of the 80 mg so it will be a total of 40 mg after all thanks

## 2018-03-12 ENCOUNTER — TELEPHONE (OUTPATIENT)
Dept: INTERNAL MEDICINE | Facility: CLINIC | Age: 83
End: 2018-03-12

## 2018-03-12 RX ORDER — ZOLPIDEM TARTRATE 5 MG/1
TABLET ORAL
Qty: 12 TABLET | Refills: 0 | Status: SHIPPED | OUTPATIENT
Start: 2018-03-12 | End: 2018-03-14

## 2018-03-12 NOTE — TELEPHONE ENCOUNTER
----- Message from Adilia Justin sent at 3/12/2018 10:25 AM CDT -----  Contact: Patient [phone]  Patient is requesting a call concerning her medication.    Please call and advise.    Thank You

## 2018-03-14 ENCOUNTER — INITIAL CONSULT (OUTPATIENT)
Dept: RHEUMATOLOGY | Facility: CLINIC | Age: 83
End: 2018-03-14
Payer: MEDICARE

## 2018-03-14 ENCOUNTER — PATIENT MESSAGE (OUTPATIENT)
Dept: INTERNAL MEDICINE | Facility: CLINIC | Age: 83
End: 2018-03-14

## 2018-03-14 ENCOUNTER — HOSPITAL ENCOUNTER (OUTPATIENT)
Dept: RADIOLOGY | Facility: HOSPITAL | Age: 83
Discharge: HOME | End: 2018-03-14
Attending: INTERNAL MEDICINE
Payer: MEDICARE

## 2018-03-14 VITALS
SYSTOLIC BLOOD PRESSURE: 120 MMHG | DIASTOLIC BLOOD PRESSURE: 65 MMHG | WEIGHT: 134 LBS | BODY MASS INDEX: 27.06 KG/M2 | HEART RATE: 84 BPM

## 2018-03-14 DIAGNOSIS — N28.1 RENAL CYST: ICD-10-CM

## 2018-03-14 DIAGNOSIS — M15.9 PRIMARY OSTEOARTHRITIS INVOLVING MULTIPLE JOINTS: ICD-10-CM

## 2018-03-14 DIAGNOSIS — R70.0 ELEVATED SED RATE: Primary | ICD-10-CM

## 2018-03-14 PROCEDURE — 99205 OFFICE O/P NEW HI 60 MIN: CPT | Mod: S$PBB,,, | Performed by: INTERNAL MEDICINE

## 2018-03-14 PROCEDURE — 99999 PR PBB SHADOW E&M-EST. PATIENT-LVL III: CPT | Mod: PBBFAC,,, | Performed by: INTERNAL MEDICINE

## 2018-03-14 PROCEDURE — 76770 US EXAM ABDO BACK WALL COMP: CPT | Mod: 26,,, | Performed by: RADIOLOGY

## 2018-03-14 PROCEDURE — 76770 US EXAM ABDO BACK WALL COMP: CPT | Mod: TC

## 2018-03-14 PROCEDURE — 99213 OFFICE O/P EST LOW 20 MIN: CPT | Mod: PBBFAC,25 | Performed by: INTERNAL MEDICINE

## 2018-03-14 NOTE — LETTER
March 16, 2018      Annika Garland MD  [address]  [place] [address]           [facility] - Rheumatology  [address]  [facility] [address]  Phone: [phone]  Fax: [phone]          Patient: Peri Johansen   MR Number: [id number]   YOB: 1934   Date of Visit: 3/14/2018       Dear Dr. Annika Garland:    Thank you for referring Peri Johansen to me for evaluation. Attached you will find relevant portions of my assessment and plan of care.    If you have questions, please do not hesitate to call me. I look forward to following Peri Johansen along with you.    Sincerely,    Mendez Brewer MD    Enclosure  CC:  No Recipients    If you would like to receive this communication electronically, please contact [email or web address] or [phone] to request more information on [employer] Link access.    For providers and/or their staff who would like to refer a patient to [facility], please contact us through our one-stop-shop provider referral line, [facility], at [phone].    If you feel you have received this communication in error or would no longer like to receive these types of communications, please e-mail [email or web address]

## 2018-03-15 ENCOUNTER — HOSPITAL ENCOUNTER (OUTPATIENT)
Dept: RADIOLOGY | Facility: HOSPITAL | Age: 83
Discharge: HOME | End: 2018-03-15
Attending: INTERNAL MEDICINE
Payer: MEDICARE

## 2018-03-15 DIAGNOSIS — M15.9 PRIMARY OSTEOARTHRITIS INVOLVING MULTIPLE JOINTS: ICD-10-CM

## 2018-03-15 PROCEDURE — 72170 X-RAY EXAM OF PELVIS: CPT | Mod: TC

## 2018-03-15 PROCEDURE — 73590 X-RAY EXAM OF LOWER LEG: CPT | Mod: TC,LT

## 2018-03-15 PROCEDURE — 73590 X-RAY EXAM OF LOWER LEG: CPT | Mod: 26,LT,, | Performed by: RADIOLOGY

## 2018-03-15 PROCEDURE — 72170 X-RAY EXAM OF PELVIS: CPT | Mod: 26,,, | Performed by: RADIOLOGY

## 2018-03-16 NOTE — PROGRESS NOTES
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  She was treated with surgery and chemotherapy.  Her disease has been in remission.  She complains of some diffuse aching since that time.  She has been on gabapentin off and on.  She is also been taking Aleve.    She comes in at this time because of increased aching in her left leg.  This is been going on for several weeks.  It is been especially bad in the groin.  It is worse with change of position.  It does wake her up at night.  She has no similar pain on the right leg.  She denies any pain in the lower back.  She has had no swelling in the groin but does have some swelling in her feet.  She has had some pain in her neck and hands.  She had no history of antecedent trauma or increased activity.    She was placed on tramadol which has been helping.  Aleve has helped.  Topical medications give her some relief.    She has had no unexplained fevers.  She complains of occipital headaches.  She has had no rash, conjunctivitis, oral ulcers, dry eye or mouth, Raynaud's phenomena, pleurisy, vaginal discharge or ulcers, chronic or bloody diarrhea.  She does have some paresthesias in her hands.    Systems review:  Gen.: Weight has been stable  GI: No abdominal pain or peptic ulcer disease.  No liver problems.  : No kidney or bladder problems    Physical examination:  Skin: She has a lipoma on the back  ENT: Adequate tears and saliva  Chest: Clear to auscultation and percussion  Cardiac: No murmurs, gallops, rubs  Abdomen: No organomegaly or masses.  No tenderness to palpation  Extremities: No pedal edema  Musculoskeletal: She has decreased range of motion of the cervical spine but no pain on range of motion.  She has pain on range of motion the shoulders with slight decreased range of motion.  She is tender across the supraspinatus area.  Elbows are unremarkable.  She has decreased range of  motion of the right wrist.  She has flexion deformities of the hands, more on the right than on the left.  She has no synovitis in the hands.  She has no tenderness to palpation.  She has decreased range of motion of the lumbar spine.  She has some pain on range of motion.  She has no trigger area to palpation.  She has a Trendelenburg gait on the left side.  She has decreased range of motion of the left hip with pain on range of motion.  Right hip is unremarkable.  Knees have no effusion and have good range of motion.  She is tender in the left lower leg.  Ankles and feet are unremarkable.    Assessment:  1.  Left hip pain.  From examination I expect to find evidence of osteoarthritis.  2.  Diffuse pain syndrome compatible with fibromyalgia    Plans: Laboratory studies and x-rays are obtained.  I am making no change in her medications.  I did not set her up her regular return appointment, this depends on the studies.

## 2018-03-21 ENCOUNTER — PATIENT MESSAGE (OUTPATIENT)
Dept: INTERNAL MEDICINE | Facility: CLINIC | Age: 83
End: 2018-03-21

## 2018-03-21 NOTE — TELEPHONE ENCOUNTER
----- Message from Maria Victoria Renner sent at 3/21/2018  9:37 AM CDT -----  Contact: self/[phone]  Pt called in regards to the dr has to do a prior authorization for her strips and she is out.      Please advise

## 2018-03-22 ENCOUNTER — TELEPHONE (OUTPATIENT)
Dept: INTERNAL MEDICINE | Facility: CLINIC | Age: 83
End: 2018-03-22

## 2018-03-22 NOTE — TELEPHONE ENCOUNTER
----- Message from Melanie Calabrese sent at 3/22/2018 10:39 AM CDT -----  Contact: Patient [phone]  Prior Authorization Needed    Medication: blood sugar diagnostic (ONETOUCH ULTRA TEST) [facility]    Pharmacy Info: [facility] [id number] - [facility] [address] AT [facility]    Please advise. Patient states that she needs her strips asap.    Thank You

## 2018-03-23 ENCOUNTER — TELEPHONE (OUTPATIENT)
Dept: INTERNAL MEDICINE | Facility: CLINIC | Age: 83
End: 2018-03-23

## 2018-03-23 NOTE — TELEPHONE ENCOUNTER
----- Message from Blanquita De La O sent at 3/23/2018 10:16 AM CDT -----  Contact: Self/[phone]  Diabetic or Medical Supplies.  What supplies are needed: strips  What is the brand name of the supplies: one touch  Is this a refill or new prescription:    Who prescribed the supplies:    Pharmacy or company name, phone # and location:  [facility] [address] [place] [address] AT [facility] [phone] (Phone)  [phone] (Fax)      Comments:

## 2018-04-10 ENCOUNTER — OFFICE VISIT (OUTPATIENT)
Dept: URGENT CARE | Facility: CLINIC | Age: 83
End: 2018-04-10
Payer: MEDICARE

## 2018-04-10 VITALS
BODY MASS INDEX: 27.01 KG/M2 | HEART RATE: 98 BPM | DIASTOLIC BLOOD PRESSURE: 73 MMHG | RESPIRATION RATE: 16 BRPM | HEIGHT: 59 IN | TEMPERATURE: 98 F | SYSTOLIC BLOOD PRESSURE: 142 MMHG | WEIGHT: 134 LBS | OXYGEN SATURATION: 95 %

## 2018-04-10 DIAGNOSIS — M62.838 MUSCLE SPASMS OF NECK: ICD-10-CM

## 2018-04-10 DIAGNOSIS — M54.12 CERVICAL RADICULOPATHY: ICD-10-CM

## 2018-04-10 DIAGNOSIS — S16.1XXA NECK STRAIN, INITIAL ENCOUNTER: Primary | ICD-10-CM

## 2018-04-10 PROCEDURE — 96372 THER/PROPH/DIAG INJ SC/IM: CPT | Mod: S$GLB,,, | Performed by: EMERGENCY MEDICINE

## 2018-04-10 PROCEDURE — 99214 OFFICE O/P EST MOD 30 MIN: CPT | Mod: 25,S$GLB,, | Performed by: NURSE PRACTITIONER

## 2018-04-10 RX ORDER — ZOLPIDEM TARTRATE 5 MG/1
TABLET ORAL
Qty: 12 TABLET | Refills: 0 | OUTPATIENT
Start: 2018-04-10

## 2018-04-10 RX ORDER — TIZANIDINE 2 MG/1
TABLET ORAL
Qty: 270 TABLET | Refills: 0 | OUTPATIENT
Start: 2018-04-10

## 2018-04-10 RX ORDER — TIZANIDINE 2 MG/1
2 TABLET ORAL EVERY 8 HOURS PRN
Qty: 30 TABLET | Refills: 0 | Status: SHIPPED | OUTPATIENT
Start: 2018-04-10 | End: 2018-04-20

## 2018-04-10 RX ORDER — KETOROLAC TROMETHAMINE 30 MG/ML
30 INJECTION, SOLUTION INTRAMUSCULAR; INTRAVENOUS
Status: COMPLETED | OUTPATIENT
Start: 2018-04-10 | End: 2018-04-10

## 2018-04-10 RX ADMIN — KETOROLAC TROMETHAMINE 30 MG: 30 INJECTION, SOLUTION INTRAMUSCULAR; INTRAVENOUS at 03:04

## 2018-04-10 NOTE — PATIENT INSTRUCTIONS
Use Voltaren gel that you have at home.   Take Ultram for severe pain.   No steroids due to T2DM AND osteoporosis.   Zanaflex for muscle spasms. May cause sedation.     Please drink plenty of fluids.  Please get plenty of rest.  Please return here or go to the Emergency Department for any concerns or worsening of condition.  If you were prescribed a narcotic medication, do not drive or operate heavy equipment or machinery while taking these medications.  If you were not prescribed an anti-inflammatory medication, and if you do not have any history of stomach/intestinal ulcers, or kidney disease, or are not taking a blood thinner such as Coumadin, Plavix, Pradaxa, Eloquis, or Xaralta for example, it is OK to take over the counter Ibuprofen or Advil or Motrin or Aleve as directed.  Do not take these medications on an empty stomach.  If you lose control of your bowel and/or bladder, please go to the nearest Emergency Department immediately.  If you lose sensation in between your legs by your genitalia and/or rectum, please go to the nearest Emergency Department immediately.  If you lose control or sensation of any extremity, please go to the nearest Emergency Department immediately.  Please follow up with your primary care doctor or specialist as needed.    If you  smoke, please stop smoking.      Neck Pain    There are several possible causes of neck pain when there is no injury:  · You can get a minor ligament sprain or muscle strain from a sudden minor neck movement. Sleeping with your neck in an awkward position can also cause this.  · Some people respond to emotional stress by tensing the muscles of their neck, shoulders, and upper back. Chronic spasm in these muscles can cause neck pain and sometimes headaches.  · Gradual wear and tear of the joints in the spine can cause degenerative arthritis. This can be a source of occasional or chronic neck pain.  · The spinal disks may bulge and put pressure on a nearby  spinal nerve. This can happen as a natural result of aging or repeated small injuries to the neck. The spinal disks are the cushions between each spinal bone. This causes tingling, pain, or numbness that spreads from the neck to the shoulder, arm, or hand on one side.  Acute neck pain usually gets better in 1 to 2 weeks. Neck pain related to disk disease, arthritis in the spinal joints, or spinal stenosis can become chronic and last for months or years. Spinal stenosis is narrowing of the spinal canal.  X-rays are usually not ordered for the initial evaluation of neck pain. However, X-rays may be done if you had a forceful physical injury, such as a car accident or fall. If pain continues and doesnt respond to medical treatment, X-rays and other tests may be done at a later time.  Home care  · Rest and relax the muscles. Use a comfortable pillow that supports the head. It should also help keep the spine in a neutral position. The position of the head should not be tilted forward or backward. A rolled up towel may help for a custom fit.  · Some people find relief with heat. Heat can be applied with either a warm shower or bath or a moist towel heated in the microwave and massage. Others prefer cold packs. You can make an ice pack by filling a plastic bag that seals at the top with ice cubes or crushed ice and then wrapping it with a thin towel. Try both and use the method that feels best for 15 to 20 minutes, several times a day.  · Whether using ice or heat, be careful that you do not injure your skin. Never put ice directly on the skin. Always wrap the ice in a towel or other type of cloth.This is very important, especially in people with poor skin sensations.   · Try to reduce your stress level. Emotional stress can lead to neck muscle tension and get in the way of or delay the healing process.  · You may use over-the-counter pain medicine to control pain, unless another medicine was prescribed. If you have  chronic liver or kidney disease or ever had a stomach ulcer or GI bleeding, talk with your healthcare provider before using these medicines.  Follow-up care  Follow up with your healthcare provider if your symptoms do not show signs of improvement after one week. Physical therapy or further tests may be needed.  If X-rays, CT scans, or MRI scans were taken, you will be told of any new findings that may affect your care.  Call 911  Call 911 if you have:  · Sudden weakness or numbness in one or both arms  · Neck swelling, difficulty or painful swallowing  · Difficulty breathing  · Chest pain  When to seek medical advice  Call your healthcare provider right away if any of these occur:  · Pain becomes worse or spreads into one or both arm  · Increasing headache  · Fever of 100.4°F (38°C) or above lasting for 24 to 48 hours  Date Last Reviewed: 7/1/2016  © 7245-3243 [employer]. [address] [place] [address]. All rights reserved. This information is not intended as a substitute for professional medical care. Always follow your healthcare professional's instructions.        Neck Spasm     A spasm of the neck muscles can happen after a sudden awkward neck movement. Sleeping with your neck in a crooked position can also cause spasm. Some people respond to emotional stress by tensing the muscles of their neck, shoulders, and upper back. If neck spasm lasts long enough, it can cause headache.  The treatment described below will usually help the pain to go away in 5 to 7 days. Pain that continues may need further evaluation or other types of treatment such as physical therapy.  Home care  · Rest and relax the muscles. Use a comfortable pillow that supports the head and keeps the spine in a neutral position. The position of the head should not be tilted forward or backward. A rolled up towel may help for a custom fit.  · Some people find relief with heat. Heat can be applied with either a warm shower  or bath or a moist towel heated in the microwave and massage. Others prefer cold packs. You can make an ice pack by filling a plastic bag that seals at the top with ice cubes or crushed ice and then wrapping it with a thin towel. Try both and use the method that feels best for 15 to 20 minutes, several times a day.  · Whether using ice or heat, be careful that you do not injure your skin. Never put ice directly on the skin. Always wrap the ice in a towel or other type of cloth. This is very important, especially in people with poor skin sensations.  · Try to reduce your stress level. Emotional stress can lead to neck muscle tension and get in the way of or delay the healing process.  · You may use over-the-counter pain medicine to control pain, unless another medicine was prescribed.If you have chronic liver or kidney disease or ever had a stomach ulcer or GI bleeding, talk with your healthcare provider before using these medicines.  Follow-up care  Follow up with your healthcare provider if your symptoms do not show signs of improvement after one week. Physical therapy or further tests may be needed.  If X-rays, CT scans, or MRI scans were taken, you will be told of any new findings that may affect your care.  Call 911  Call 911 if you have:  · Sudden weakness or numbness in one or both arms  · Neck swelling, difficulty or painful swallowing  · Difficulty breathing  · Chest pain  When to seek medical advice  Call your healthcare provider right away if any of these occur:  · Pain becomes worse or spreads into one or both arms  · Increasing headache with nausea or vomiting  · Fever of 100.4°F (38°C) or above lasting for 24 to 48 hours  Date Last Reviewed: 11/21/2015  © 9444-2649 [employer]. [address] [place] [address], [place] [address]. All rights reserved. This information is not intended as a substitute for professional medical care. Always follow your healthcare professional's instructions.

## 2018-04-10 NOTE — PROGRESS NOTES
"Subjective:       Patient ID: Peri Johansen is a 83 y.o. female.    Vitals:    04/10/18 1428   BP: (!) 142/73   Pulse: 98   Resp: 16   Temp: 97.8 °F (36.6 °C)   SpO2: 95%   Weight: 60.8 kg (134 lb)   Height: 4' 11" (1.499 m)       Chief Complaint: Neck Pain and Shoulder Pain    Pt states left sided neck and shoulder pain x 2-3 days radiating down left arm that occurred after she slept wrong in the bed. Denies numbness or tingling. Pt states no known injury. + tenderness to touch. Pain with movement. Decreased ROM due to pain.   Denies CP or SOB. Denies fever or chills.   Reviewed her recent renal panel from 2/2018 shows creatinine of 0.7.     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  She was treated with surgery and chemotherapy.  Her disease has been in remission.  She was seen recently by Rheumatology for an elevated Sed rate. She is on tramadol for leg pain.       Neck Pain    This is a new problem. The current episode started in the past 7 days. The problem occurs constantly. The problem has been unchanged. The pain is associated with an unknown factor. The pain is present in the left side. The pain is at a severity of 9/10. The symptoms are aggravated by twisting. Pertinent negatives include no chest pain, fever or headaches. Treatments tried: tramadol, aleve. The treatment provided no relief.   Shoulder Pain    Associated symptoms include stiffness. Pertinent negatives include no fever or headaches.     Review of Systems   Constitution: Negative for chills and fever.   HENT: Negative for sore throat.    Eyes: Negative for blurred vision.   Cardiovascular: Negative for chest pain.   Respiratory: Negative for shortness of breath.    Skin: Negative for rash.   Musculoskeletal: Positive for neck pain and stiffness. Negative for back pain and joint pain.   Gastrointestinal: Negative for abdominal pain, diarrhea, nausea and vomiting.   Neurological: Negative for " headaches.   Psychiatric/Behavioral: The patient is not nervous/anxious.        Objective:      Physical Exam   Constitutional: She appears well-developed and well-nourished.  Non-toxic appearance. She does not have a sickly appearance. She does not appear ill. No distress.   HENT:   Head: Normocephalic and atraumatic.   Right Ear: External ear normal.   Left Ear: External ear normal.   Mouth/Throat: Oropharynx is clear and moist.   Eyes: Conjunctivae and EOM are normal. Pupils are equal, round, and reactive to light.   Neck: Trachea normal. Neck supple. Muscular tenderness present. No spinous process tenderness present. No neck rigidity. Decreased range of motion present. No edema and no erythema present.       Musculoskeletal:        Cervical back: She exhibits tenderness, pain and spasm. She exhibits no bony tenderness, no swelling, no edema, no deformity and no laceration.        Back:    Skin: She is not diaphoretic.       Assessment:       1. Neck strain, initial encounter    2. Cervical radiculopathy    3. Muscle spasms of neck        Plan:       Peri was seen today for neck pain and shoulder pain.    Diagnoses and all orders for this visit:    Neck strain, initial encounter  -     ketorolac injection 30 mg; Inject 1 mL (30 mg total) into the muscle one time.    Cervical radiculopathy  -     tiZANidine (ZANAFLEX) 2 MG tablet; Take 1 tablet (2 mg total) by mouth every 8 (eight) hours as needed (spasms).    Muscle spasms of neck  -     tiZANidine (ZANAFLEX) 2 MG tablet; Take 1 tablet (2 mg total) by mouth every 8 (eight) hours as needed (spasms).             Discussed that this MS pain as it is reproducible to touch and movement. No rash. No bony tenderness.   Discussed follow up if pain changes in any characteristics- moves, intensity, or severity or if she develops CP, SOB, fever, chills- go to the ER.   Deferred steroids- DM and osteoporosis.   Encouraged to follow up with rheumatologist if s/s continue.        Patient Instructions   Use Voltaren gel that you have at home.   Take Ultram for severe pain.   No steroids due to T2DM AND osteoporosis.   Zanaflex for muscle spasms. May cause sedation.     Please drink plenty of fluids.  Please get plenty of rest.  Please return here or go to the Emergency Department for any concerns or worsening of condition.  If you were prescribed a narcotic medication, do not drive or operate heavy equipment or machinery while taking these medications.  If you were not prescribed an anti-inflammatory medication, and if you do not have any history of stomach/intestinal ulcers, or kidney disease, or are not taking a blood thinner such as Coumadin, Plavix, Pradaxa, Eloquis, or Xaralta for example, it is OK to take over the counter Ibuprofen or Advil or Motrin or Aleve as directed.  Do not take these medications on an empty stomach.  If you lose control of your bowel and/or bladder, please go to the nearest Emergency Department immediately.  If you lose sensation in between your legs by your genitalia and/or rectum, please go to the nearest Emergency Department immediately.  If you lose control or sensation of any extremity, please go to the nearest Emergency Department immediately.  Please follow up with your primary care doctor or specialist as needed.    If you  smoke, please stop smoking.      Neck Pain    There are several possible causes of neck pain when there is no injury:  · You can get a minor ligament sprain or muscle strain from a sudden minor neck movement. Sleeping with your neck in an awkward position can also cause this.  · Some people respond to emotional stress by tensing the muscles of their neck, shoulders, and upper back. Chronic spasm in these muscles can cause neck pain and sometimes headaches.  · Gradual wear and tear of the joints in the spine can cause degenerative arthritis. This can be a source of occasional or chronic neck pain.  · The spinal disks may bulge and  put pressure on a nearby spinal nerve. This can happen as a natural result of aging or repeated small injuries to the neck. The spinal disks are the cushions between each spinal bone. This causes tingling, pain, or numbness that spreads from the neck to the shoulder, arm, or hand on one side.  Acute neck pain usually gets better in 1 to 2 weeks. Neck pain related to disk disease, arthritis in the spinal joints, or spinal stenosis can become chronic and last for months or years. Spinal stenosis is narrowing of the spinal canal.  X-rays are usually not ordered for the initial evaluation of neck pain. However, X-rays may be done if you had a forceful physical injury, such as a car accident or fall. If pain continues and doesnt respond to medical treatment, X-rays and other tests may be done at a later time.  Home care  · Rest and relax the muscles. Use a comfortable pillow that supports the head. It should also help keep the spine in a neutral position. The position of the head should not be tilted forward or backward. A rolled up towel may help for a custom fit.  · Some people find relief with heat. Heat can be applied with either a warm shower or bath or a moist towel heated in the microwave and massage. Others prefer cold packs. You can make an ice pack by filling a plastic bag that seals at the top with ice cubes or crushed ice and then wrapping it with a thin towel. Try both and use the method that feels best for 15 to 20 minutes, several times a day.  · Whether using ice or heat, be careful that you do not injure your skin. Never put ice directly on the skin. Always wrap the ice in a towel or other type of cloth.This is very important, especially in people with poor skin sensations.   · Try to reduce your stress level. Emotional stress can lead to neck muscle tension and get in the way of or delay the healing process.  · You may use over-the-counter pain medicine to control pain, unless another medicine was  prescribed. If you have chronic liver or kidney disease or ever had a stomach ulcer or GI bleeding, talk with your healthcare provider before using these medicines.  Follow-up care  Follow up with your healthcare provider if your symptoms do not show signs of improvement after one week. Physical therapy or further tests may be needed.  If X-rays, CT scans, or MRI scans were taken, you will be told of any new findings that may affect your care.  Call 911  Call 911 if you have:  · Sudden weakness or numbness in one or both arms  · Neck swelling, difficulty or painful swallowing  · Difficulty breathing  · Chest pain  When to seek medical advice  Call your healthcare provider right away if any of these occur:  · Pain becomes worse or spreads into one or both arm  · Increasing headache  · Fever of 100.4°F (38°C) or above lasting for 24 to 48 hours  Date Last Reviewed: 7/1/2016  © 9405-5644 [employer]. [address] [place] [address]. All rights reserved. This information is not intended as a substitute for professional medical care. Always follow your healthcare professional's instructions.        Neck Spasm     A spasm of the neck muscles can happen after a sudden awkward neck movement. Sleeping with your neck in a crooked position can also cause spasm. Some people respond to emotional stress by tensing the muscles of their neck, shoulders, and upper back. If neck spasm lasts long enough, it can cause headache.  The treatment described below will usually help the pain to go away in 5 to 7 days. Pain that continues may need further evaluation or other types of treatment such as physical therapy.  Home care  · Rest and relax the muscles. Use a comfortable pillow that supports the head and keeps the spine in a neutral position. The position of the head should not be tilted forward or backward. A rolled up towel may help for a custom fit.  · Some people find relief with heat. Heat can be applied  with either a warm shower or bath or a moist towel heated in the microwave and massage. Others prefer cold packs. You can make an ice pack by filling a plastic bag that seals at the top with ice cubes or crushed ice and then wrapping it with a thin towel. Try both and use the method that feels best for 15 to 20 minutes, several times a day.  · Whether using ice or heat, be careful that you do not injure your skin. Never put ice directly on the skin. Always wrap the ice in a towel or other type of cloth. This is very important, especially in people with poor skin sensations.  · Try to reduce your stress level. Emotional stress can lead to neck muscle tension and get in the way of or delay the healing process.  · You may use over-the-counter pain medicine to control pain, unless another medicine was prescribed.If you have chronic liver or kidney disease or ever had a stomach ulcer or GI bleeding, talk with your healthcare provider before using these medicines.  Follow-up care  Follow up with your healthcare provider if your symptoms do not show signs of improvement after one week. Physical therapy or further tests may be needed.  If X-rays, CT scans, or MRI scans were taken, you will be told of any new findings that may affect your care.  Call 911  Call 911 if you have:  · Sudden weakness or numbness in one or both arms  · Neck swelling, difficulty or painful swallowing  · Difficulty breathing  · Chest pain  When to seek medical advice  Call your healthcare provider right away if any of these occur:  · Pain becomes worse or spreads into one or both arms  · Increasing headache with nausea or vomiting  · Fever of 100.4°F (38°C) or above lasting for 24 to 48 hours  Date Last Reviewed: 11/21/2015  © 6238-2911 [employer]. [address] [place] [address], [place] [address]. All rights reserved. This information is not intended as a substitute for professional medical care. Always follow your healthcare professional's  instructions.

## 2018-04-11 ENCOUNTER — TELEPHONE (OUTPATIENT)
Dept: INTERNAL MEDICINE | Facility: CLINIC | Age: 83
End: 2018-04-11

## 2018-04-11 DIAGNOSIS — M25.552 PAIN OF BOTH HIP JOINTS: ICD-10-CM

## 2018-04-11 DIAGNOSIS — E78.2 MIXED HYPERLIPIDEMIA: ICD-10-CM

## 2018-04-11 DIAGNOSIS — M25.551 PAIN OF BOTH HIP JOINTS: ICD-10-CM

## 2018-04-11 RX ORDER — ZOLPIDEM TARTRATE 5 MG/1
TABLET ORAL
Qty: 12 TABLET | Refills: 0 | Status: SHIPPED | OUTPATIENT
Start: 2018-04-11 | End: 2018-05-10 | Stop reason: SDUPTHER

## 2018-04-11 RX ORDER — ZOLPIDEM TARTRATE 5 MG/1
5 TABLET ORAL NIGHTLY
Qty: 30 TABLET | Refills: 0 | Status: CANCELLED | OUTPATIENT
Start: 2018-04-11

## 2018-04-11 RX ORDER — ZOLPIDEM TARTRATE 5 MG/1
TABLET ORAL
Qty: 12 TABLET | Refills: 0 | OUTPATIENT
Start: 2018-04-11

## 2018-04-11 RX ORDER — ZOLPIDEM TARTRATE 5 MG/1
TABLET ORAL
COMMUNITY
Start: 2018-03-12 | End: 2018-04-17 | Stop reason: SDUPTHER

## 2018-04-11 NOTE — TELEPHONE ENCOUNTER
----- Message from Mendez Breewr MD sent at 4/11/2018  1:42 PM CDT -----  Her ESR has been elevated since 2008.  I do not think it has anything to do with her aches and pains.  She does have severe osteoarthritis in the hips.  She may need a referral to orthopedics for possible hip replacement surgery.  I would otherwise just treat her symptomatically.  I did not find any evidence to suggest an underlying connective tissue disease.  ----- Message -----  From: Annika Garland MD  Sent: 4/11/2018   1:33 PM  To: Mendez Brewer MD    Hi Dr Brewer:    She has an elevated ESR- long standing.  I know it is not specific.  You saw her in March.  Any recommendations for her care?  Thanks    Annika Garland

## 2018-04-11 NOTE — TELEPHONE ENCOUNTER
----- Message from Adilia Justin sent at 4/11/2018 12:56 PM CDT -----  Contact: Patient [phone]  Patient is requesting a call about her medication.     Please call and advise.    Thank You

## 2018-04-11 NOTE — TELEPHONE ENCOUNTER
Spoke to pt granddaughter and she would like a call to discuss pt care , they not able to come to apt today please advise

## 2018-04-11 NOTE — TELEPHONE ENCOUNTER
----- Message from Sanya Gomez sent at 4/11/2018  3:39 PM CDT -----  Contact: Patient [phone]  Patient calling to check to see if her Rx Zolpidem 5mg  was sent to     Pharmacy: [email or web address] [address] [place] [address] [address] AT [facility] [employer] [place] [employer] [phone] (Phone)  [phone] (Fax    Please call and advise  Thank you

## 2018-04-11 NOTE — TELEPHONE ENCOUNTER
----- Message from Blanquita De La O sent at 4/11/2018  9:25 AM CDT -----  Contact: Blanquita/Mick/[phone]  The patient granddaughter would like to know if the patient should keep the appointment she has today at 10pm, since she was seen on 02/15. Please advise.

## 2018-04-12 NOTE — TELEPHONE ENCOUNTER
----- Message from Cande Patel sent at 4/12/2018 11:18 AM CDT -----  Contact: Self [phone]  Patient is requesting a call back to find out if she needs to come in to  the following:    zolpidem (AMBIEN) 5 MG Tab 12 tablet 0 4/11/2018  No     The pharmacy stated that the prescription was denied and it was going to be sent by other means so she wants to know what that means.    Patient may be reached at [phone].    Thank you.  RENUKA

## 2018-04-12 NOTE — TELEPHONE ENCOUNTER
She cancelled her appt with me today    I see she has a lot of appointments coming up    The rheumatologist Dr Brewer did not think she needed specific medication for her joint pain and recommended Ortho for her hip- can schedule if she likes (referral is in)    Needs repeat labs for lipids in May orders in    Also needs to schedule stress test orders in    I can see her if she likes but she cancelled the appt so not sure if she wants to come back in May/June after labs?    thanks

## 2018-04-17 ENCOUNTER — OFFICE VISIT (OUTPATIENT)
Dept: ENDOCRINOLOGY | Facility: CLINIC | Age: 83
End: 2018-04-17
Payer: MEDICARE

## 2018-04-17 ENCOUNTER — LAB VISIT (OUTPATIENT)
Dept: LAB | Facility: HOSPITAL | Age: 83
End: 2018-04-17
Payer: MEDICARE

## 2018-04-17 ENCOUNTER — OFFICE VISIT (OUTPATIENT)
Dept: HEMATOLOGY/ONCOLOGY | Facility: CLINIC | Age: 83
End: 2018-04-17
Payer: MEDICARE

## 2018-04-17 VITALS
TEMPERATURE: 98 F | WEIGHT: 131.81 LBS | BODY MASS INDEX: 26.57 KG/M2 | HEIGHT: 59 IN | DIASTOLIC BLOOD PRESSURE: 67 MMHG | OXYGEN SATURATION: 97 % | RESPIRATION RATE: 16 BRPM | SYSTOLIC BLOOD PRESSURE: 145 MMHG | HEART RATE: 97 BPM

## 2018-04-17 VITALS
BODY MASS INDEX: 26.98 KG/M2 | HEIGHT: 59 IN | HEART RATE: 72 BPM | DIASTOLIC BLOOD PRESSURE: 60 MMHG | WEIGHT: 133.81 LBS | SYSTOLIC BLOOD PRESSURE: 132 MMHG

## 2018-04-17 DIAGNOSIS — M85.88 OSTEOPENIA OF OTHER SITE: ICD-10-CM

## 2018-04-17 DIAGNOSIS — E05.90 HYPERTHYROIDISM: ICD-10-CM

## 2018-04-17 DIAGNOSIS — M85.80 OSTEOPENIA, UNSPECIFIED LOCATION: Primary | ICD-10-CM

## 2018-04-17 DIAGNOSIS — M79.605 LEFT LEG PAIN: ICD-10-CM

## 2018-04-17 DIAGNOSIS — E11.65 TYPE 2 DIABETES MELLITUS WITH HYPERGLYCEMIA, UNSPECIFIED LONG TERM INSULIN USE STATUS: ICD-10-CM

## 2018-04-17 DIAGNOSIS — C85.90 LYMPHOMA, UNSPECIFIED BODY REGION, UNSPECIFIED LYMPHOMA TYPE: ICD-10-CM

## 2018-04-17 DIAGNOSIS — C83.35 DIFFUSE LARGE B-CELL LYMPHOMA OF LYMPH NODES OF LOWER EXTREMITY: ICD-10-CM

## 2018-04-17 LAB
ALBUMIN SERPL BCP-MCNC: 3.4 G/DL
ALP SERPL-CCNC: 77 U/L
ALT SERPL W/O P-5'-P-CCNC: 12 U/L
ANION GAP SERPL CALC-SCNC: 9 MMOL/L
AST SERPL-CCNC: 18 U/L
BILIRUB SERPL-MCNC: 1.3 MG/DL
BUN SERPL-MCNC: 13 MG/DL
CALCIUM SERPL-MCNC: 10.3 MG/DL
CHLORIDE SERPL-SCNC: 104 MMOL/L
CO2 SERPL-SCNC: 27 MMOL/L
CREAT SERPL-MCNC: 0.6 MG/DL
ERYTHROCYTE [DISTWIDTH] IN BLOOD BY AUTOMATED COUNT: 12.8 %
EST. GFR  (AFRICAN AMERICAN): >60 ML/MIN/1.73 M^2
EST. GFR  (NON AFRICAN AMERICAN): >60 ML/MIN/1.73 M^2
GLUCOSE SERPL-MCNC: 77 MG/DL
HCT VFR BLD AUTO: 42.8 %
HGB BLD-MCNC: 13.7 G/DL
IMM GRANULOCYTES # BLD AUTO: 0.01 K/UL
LDH SERPL L TO P-CCNC: 207 U/L
MCH RBC QN AUTO: 27.6 PG
MCHC RBC AUTO-ENTMCNC: 32 G/DL
MCV RBC AUTO: 86 FL
NEUTROPHILS # BLD AUTO: 1.8 K/UL
PLATELET # BLD AUTO: 238 K/UL
PMV BLD AUTO: 11.7 FL
POTASSIUM SERPL-SCNC: 4.1 MMOL/L
PROT SERPL-MCNC: 7.8 G/DL
RBC # BLD AUTO: 4.97 M/UL
SODIUM SERPL-SCNC: 140 MMOL/L
T3FREE SERPL-MCNC: 3.2 PG/ML
T4 FREE SERPL-MCNC: 1.04 NG/DL
TSH SERPL DL<=0.005 MIU/L-ACNC: 1.13 UIU/ML
WBC # BLD AUTO: 4.11 K/UL

## 2018-04-17 PROCEDURE — 80053 COMPREHEN METABOLIC PANEL: CPT

## 2018-04-17 PROCEDURE — 99999 PR PBB SHADOW E&M-EST. PATIENT-LVL V: CPT | Mod: PBBFAC,,, | Performed by: INTERNAL MEDICINE

## 2018-04-17 PROCEDURE — 36415 COLL VENOUS BLD VENIPUNCTURE: CPT

## 2018-04-17 PROCEDURE — 84481 FREE ASSAY (FT-3): CPT

## 2018-04-17 PROCEDURE — 99999 PR PBB SHADOW E&M-EST. PATIENT-LVL V: CPT | Mod: PBBFAC,,, | Performed by: PHYSICIAN ASSISTANT

## 2018-04-17 PROCEDURE — 85027 COMPLETE CBC AUTOMATED: CPT

## 2018-04-17 PROCEDURE — 84439 ASSAY OF FREE THYROXINE: CPT

## 2018-04-17 PROCEDURE — 83615 LACTATE (LD) (LDH) ENZYME: CPT

## 2018-04-17 PROCEDURE — 99213 OFFICE O/P EST LOW 20 MIN: CPT | Mod: S$PBB,,, | Performed by: PHYSICIAN ASSISTANT

## 2018-04-17 PROCEDURE — 99215 OFFICE O/P EST HI 40 MIN: CPT | Mod: PBBFAC,27 | Performed by: INTERNAL MEDICINE

## 2018-04-17 PROCEDURE — 84443 ASSAY THYROID STIM HORMONE: CPT

## 2018-04-17 PROCEDURE — 99214 OFFICE O/P EST MOD 30 MIN: CPT | Mod: S$PBB,,, | Performed by: INTERNAL MEDICINE

## 2018-04-17 PROCEDURE — 99215 OFFICE O/P EST HI 40 MIN: CPT | Mod: PBBFAC | Performed by: PHYSICIAN ASSISTANT

## 2018-04-17 NOTE — PROGRESS NOTES
Ms. Johansen is a 83 y.o. F with history of  Dm2, thyroid nodules, osteopenia, subclinical hypothyroidism, osteopenia, hx diffuse large B cell lymphoma, Hl, here for followup.    Regarding thyroid hx: US done in Sept 2017 shows multiple nodules, personally reviewed, largest is a 2.2cm R inf nodule mixed cystic nodule, there is also a 1.6cm R mid nodule although appears mostly cystic, there is a 1.7cm mid L sided nodule mixed cystic and a 1.3cm L upper nodule, solid with some peripheral vascularity.  No family hx of thyroid disease, no radiation to neck region (but had radiation to leg). I reviewed she also had intermittent subclinical hyperthyroidism in the past, but most recently in Oct 2017 levels had normalized again and thyroid uptake at that time showed normal uptake but areas of hyper/hypofunctioning nodules suggestive of MNG (possibly hot nodules possibly in R mid and L lower lobe).  Her TSI is weekly positive. In Dec 2017 FNA of thyroid L and R dominant nodules  were benign.  Received results from [facility] from Jan 4th 2018  , TG 91, total 223, vitD 26  Free T4 1.32 (0.76-1.46)  TSH 0.057 (0.36-3.74)  Free T3 5.12 (2.3-4.2)  After this I added 2.5mg methimazole daily.  Repeat labs today is normalized.   Component      Latest Ref Rng & Units 4/17/2018   TSH      0.400 - 4.000 uIU/mL 1.126   T3, Free      2.3 - 4.2 pg/mL 3.2   Free T4      0.71 - 1.51 ng/dL 1.04       In 2014 she had a DXA showing normal BMD at L spine and hip, femoral neck BMD showed T -1.8  (no significant change c/w 2011).    Regarding her Dm: most recent A1c 7.3 in Aug 2017, relays most FS in low 100s fasting, no hypoglycemia. On glimepiride 2mg daily and metformin 1g BID. No glucometer.   FS fasting low 100s at home, did not bring log, but check FS 3 times daily.  Her glc on cmp today was in 70s - check in clinic was 60s - gave pt juice and glc increase to >100mg/dL.  Pt stated she took glimepiride and metformin without eating  today.   A1c last slightly inc to 8.0 - pt felt it was due to some increased dietary indiscretion which she recently resolved.       Component      Latest Ref Rng & Units 8/16/2017 1/23/2017 6/3/2016 11/6/2015   Hemoglobin A1C      4.0 - 5.6 % 7.3 (H) 7.2 (H) 7.5 (H) 7.6 (H)     Component      Latest Ref Rng & Units 11/6/2015 3/28/2015 11/5/2014 8/7/2014   TSH      0.400 - 4.000 uIU/mL 0.700 0.281 (L) 0.264 (L) 0.350 (L)   T4 Total      4.5 - 11.5 ug/dl       Free T4      0.71 - 1.51 ng/dL  1.03 1.06 1.14     Component      Latest Ref Rng & Units 5/21/2014 2/11/2014 11/11/2013 2/9/2012   TSH      0.400 - 4.000 uIU/mL 0.466 0.298 (L) 0.264 (L) 0.482   T4 Total      4.5 - 11.5 ug/dl       Free T4      0.71 - 1.51 ng/dL  1.14 1.15      Component      Latest Ref Rng & Units 5/26/2011 9/21/2010 6/3/2010 4/18/2009   TSH      0.400 - 4.000 uIU/mL 0.370 (L) 0.689 0.720 0.929   T4 Total      4.5 - 11.5 ug/dl  7.7 7.8    Free T4      0.71 - 1.51 ng/dL 1.36        Component      Latest Ref Rng & Units 5/9/2008 4/30/2007   TSH      0.400 - 4.000 uIU/mL 1.4 1.2   T4 Total      4.5 - 11.5 ug/dl     Free T4      0.71 - 1.51 ng/dL       Past Medical History:   Diagnosis Date    Aortic atherosclerosis 1/7/2016    Arthritis     Colon polyp: hyperplastic 2015- repeat 2020 8/6/2015    Diabetes mellitus, type II 8/16/2012    Diverticulosis     Gastric nodule 8/7/2014    GERD (gastroesophageal reflux disease) 8/16/2012    Goiter 8/16/2012    Hyperlipidemia 8/16/2012    Hypertension     Joint pain     Leg pain 8/16/2012    Lymphoma 8/16/2012    Osteopenia 8/16/2012    Osteoporosis     Renal cyst 3/28/2013    Rickets, vitamin D deficiency 8/16/2012    Thyroid nodule 2/24/2014    Vitamin D deficiency disease 8/16/2012        reports that she has never smoked. She has never used smokeless tobacco. She reports that she does not drink alcohol or use drugs.    Family History   Problem Relation Age of Onset    Hypertension  Mother     No Known Problems Father     No Known Problems Sister     Breast cancer Maternal Aunt     No Known Problems Maternal Uncle     No Known Problems Paternal Aunt     No Known Problems Paternal Uncle     No Known Problems Maternal Grandmother     No Known Problems Maternal Grandfather     No Known Problems Paternal Grandmother     No Known Problems Paternal Grandfather     No Known Problems Brother     Diabetes Neg Hx     Glaucoma Neg Hx     Amblyopia Neg Hx     Blindness Neg Hx     Cancer Neg Hx     Cataracts Neg Hx     Macular degeneration Neg Hx     Retinal detachment Neg Hx     Strabismus Neg Hx     Stroke Neg Hx     Thyroid disease Neg Hx     Ovarian cancer Neg Hx     Liver disease Neg Hx     Liver cancer Neg Hx     Cirrhosis Neg Hx     Colon polyps Neg Hx     Colon cancer Neg Hx     Melanoma Neg Hx        Past Surgical History:   Procedure Laterality Date    CARPAL TUNNEL RELEASE      CATARACT EXTRACTION W/  INTRAOCULAR LENS IMPLANT Bilateral     HYSTERECTOMY      lymphoma surgery      L tibia       Patient's Medications   New Prescriptions    No medications on file   Previous Medications    AMMONIUM LACTATE 12 % CREA    Apply 1 application topically once daily.    ASPIRIN 81 MG CHEW    Take 1 tablet (81 mg total) by mouth once daily.    ATORVASTATIN (LIPITOR) 80 MG TABLET    Take 0.5 tablets (40 mg total) by mouth once daily.    BLOOD SUGAR DIAGNOSTIC (ONETOUCH ULTRA TEST) STRP    TEST BLOOD TREE TIMES  DAILY    BLOOD-GLUCOSE METER KIT    Use as instructed, test once daily    CALCIUM 500 MG TAB    Take 1 tablet by mouth Twice daily.    DEXTRAN 70-HYPROMELLOSE (ARTIFICIAL TEARS,[id number],) OPHTHALMIC SOLUTION    Place 1 drop into both eyes 4 (four) times daily as needed.    DICLOFENAC SODIUM (VOLTAREN) 1 % GEL    Apply 2 g topically 4 (four) times daily.    GABAPENTIN (NEURONTIN) 300 MG CAPSULE    TAKE ONE CAPSULE BY MOUTH EVERY EVENING    GLIMEPIRIDE (AMARYL) 2 MG  "TABLET    TAKE 1 TABLET BY MOUTH BEFORE BREAKFAST.    METFORMIN (GLUCOPHAGE) 1000 MG TABLET    Take 1 tablet (1,000 mg total) by mouth 2 (two) times daily with meals.    METHIMAZOLE (TAPAZOLE) 5 MG TAB    1/2 tablet (2.5mg) daily    NEXIUM 40 MG CAPSULE    TAKE 1 CAPSULE(40 MG) BY MOUTH EVERY DAY    ONE TOUCH DELICA LANCETS 30 GAUGE MISC        TIZANIDINE (ZANAFLEX) 2 MG TABLET    Take 1 tablet (2 mg total) by mouth every 8 (eight) hours as needed (spasms).    TRAMADOL (ULTRAM) 50 MG TABLET    Take 1 tablet (50 mg total) by mouth 2 (two) times daily.    ZOLPIDEM (AMBIEN) 5 MG TAB    TAKE 1 TABLET BY MOUTH EVERY NIGHT AT BEDTIME AS NEEDED. DO NOT EXCEED 3 TABLETS EVERY WEEK   Modified Medications    No medications on file   Discontinued Medications    ZOLPIDEM (AMBIEN) 5 MG TAB             Review of Systems:  General: no fevers  Eyes: no vision changes  ENT: no sore throat  Lung: no sob  CV: no palpitations  GI: no nausea   : no dysuria   Endo: no heat interolance  Heme: no easy bruising   MSK: no joint swelling        /60   Pulse 72   Ht 4' 11" (1.499 m)   Wt 60.7 kg (133 lb 13.1 oz)   BMI 27.03 kg/m²     Physical Exam:  General: normal body habitus, not in acute distress   Eyes: anicteric, no proptosis   ENT: no facial lesions, no oral lesions  Neck: no masses, no LAD, thyroid 16g  Lung: ctabl, no resp distress  Cv: RRR, no r/m/g  Psych: normal affect, some difficulty in comprehension   Skin: exposed skin without bruising or bleeding   Ext: no peripheral edema or erythema  Neuro: AOx3, moving all extremities, slow gait    Labs:    Chemistry        Component Value Date/Time     04/17/2018 1326    K 4.1 04/17/2018 1326     04/17/2018 1326    CO2 27 04/17/2018 1326    BUN 13 04/17/2018 1326    CREATININE 0.6 04/17/2018 1326    GLU 77 04/17/2018 1326        Component Value Date/Time    CALCIUM 10.3 04/17/2018 1326    ALKPHOS 77 04/17/2018 1326    AST 18 04/17/2018 1326    ALT 12 04/17/2018 1326 "    BILITOT 1.3 (H) 04/17/2018 1326    ESTGFRAFRICA >60.0 04/17/2018 1326    EGFRNONAA >60.0 04/17/2018 1326          Lab Results   Component Value Date    WBC 4.11 04/17/2018    HGB 13.7 04/17/2018    HCT 42.8 04/17/2018    MCV 86 04/17/2018     04/17/2018        Lab Results   Component Value Date    HDL 48 02/15/2018    HDL 48 01/23/2017    HDL 47 06/03/2016     Lab Results   Component Value Date    LDLCALC 158.8 02/15/2018    LDLCALC 82.8 01/23/2017    LDLCALC 146.0 06/03/2016     Lab Results   Component Value Date    TRIG 216 (H) 02/15/2018    TRIG 101 01/23/2017    TRIG 160 (H) 06/03/2016     Lab Results   Component Value Date    CHOLHDL 19.2 (L) 02/15/2018    CHOLHDL 31.8 01/23/2017    CHOLHDL 20.9 06/03/2016       Hemoglobin A1C   Date Value Ref Range Status   02/15/2018 8.0 (H) 4.0 - 5.6 % Final     Comment:     According to ADA guidelines, hemoglobin A1c <7.0% represents  optimal control in non-pregnant diabetic patients. Different  metrics may apply to specific patient populations.   Standards of Medical Care in Diabetes-2016.  For the purpose of screening for the presence of diabetes:  <5.7%     Consistent with the absence of diabetes  5.7-6.4%  Consistent with increasing risk for diabetes   (prediabetes)  >or=6.5%  Consistent with diabetes  Currently, no consensus exists for use of hemoglobin A1c  for diagnosis of diabetes for children.  This Hemoglobin A1c assay has significant interference with fetal   hemoglobin   (HbF). The results are invalid for patients with abnormal amounts of   HbF,   including those with known Hereditary Persistence   of Fetal Hemoglobin. Heterozygous hemoglobin variants (HbAS, HbAC,   HbAD, HbAE, HbA2) do not significantly interfere with this assay;   however, presence of multiple variants in a sample may impact the %   interference.     08/16/2017 7.3 (H) 4.0 - 5.6 % Final     Comment:     According to ADA guidelines, hemoglobin A1c <7.0% represents  optimal control in  non-pregnant diabetic patients. Different  metrics may apply to specific patient populations.   Standards of Medical Care in Diabetes-2016.  For the purpose of screening for the presence of diabetes:  <5.7%     Consistent with the absence of diabetes  5.7-6.4%  Consistent with increasing risk for diabetes   (prediabetes)  >or=6.5%  Consistent with diabetes  Currently, no consensus exists for use of hemoglobin A1c  for diagnosis of diabetes for children.  This Hemoglobin A1c assay has significant interference with fetal   hemoglobin   (HbF). The results are invalid for patients with abnormal amounts of   HbF,   including those with known Hereditary Persistence   of Fetal Hemoglobin. Heterozygous hemoglobin variants (HbAS, HbAC,   HbAD, HbAE, HbA2) do not significantly interfere with this assay;   however, presence of multiple variants in a sample may impact the %   interference.     01/23/2017 7.2 (H) 4.5 - 6.2 % Final     Comment:     According to ADA guidelines, hemoglobin A1C <7.0% represents  optimal control in non-pregnant diabetic patients.  Different  metrics may apply to specific populations.   Standards of Medical Care in Diabetes - 2016.  For the purpose of screening for the presence of diabetes:  <5.7%     Consistent with the absence of diabetes  5.7-6.4%  Consistent with increasing risk for diabetes   (prediabetes)  >or=6.5%  Consistent with diabetes  Currently no consensus exists for use of hemoglobin A1C  for diagnosis of diabetes for children.         Lab Results   Component Value Date    TSH 1.126 04/17/2018    F4THOZX 7.7 09/21/2010       Assessment and Plan:  Ms. Johansen with history of  Dm2, thyroid nodules, subclinical hyperthyroidism, osteopenia, hx diffuse large B cell lymphoma, Hl, here for followup.     1. 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 thyroid  nodules: s/p benign Dec 2017 FNA of L and R dominant nodules. Repeat US in early 2019.     3. Dm2: continue metformin and glimepiride, A1c recently higher, but pt has since practiced dietary modification; lower FS in clinic today due to not eating - asked pt to hold glimepiride if she eats less or skips meals in the future - I also offered to switch to DPP4 inhibitors for less hypoglycemic potential however pt declined.     4. Osteopenia: consider DXA at next visit, vitD w next set of labs    RTC 6 months

## 2018-04-17 NOTE — PATIENT INSTRUCTIONS
Bring machine to next visit    Always eat if you are taking glimepiride; if you skip meals or eat less, hold glimepiride    Check sugars 2-3 times daily and send me the glucose log

## 2018-04-17 NOTE — Clinical Note
kp or me in one year with cbc/cmp/ldh US of left lower extremity sometime early next week (per patient request on timing)

## 2018-04-17 NOTE — PROGRESS NOTES
Subjective:       Patient ID: Peri Johansen is a 83 y.o. female.    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   She underwent surgery with joanna placement, followed by CVP chemotherapy concurrent with radiation for 3 cycles. Then she received adjuvant weekly Rituxan x 4. All treatment was completed by December 2007.    Up to date on mammogram from 8/2017.     Reports feeling pretty good. Weight fairly stable.  No fevers, chills, or infections.  Still with rare sweats.  She has had some left lower extremity dicomfort the past few weeks, specifically some swelling and tenderness behind her left knee.  This tends to get better as the day goes on and she gets moving.          Review of Systems   Constitutional: Negative for activity change, appetite change, chills, diaphoresis, fatigue, fever and unexpected weight change.        Rare night sweats reported   HENT: Negative for congestion, dental problem, hearing loss, mouth sores, postnasal drip, rhinorrhea, sneezing and trouble swallowing.    Eyes: Negative for visual disturbance.   Respiratory: Negative for cough, chest tightness and shortness of breath.    Cardiovascular: Negative for chest pain, palpitations and leg swelling.   Gastrointestinal: Negative for abdominal distention, abdominal pain, blood in stool, constipation, diarrhea, nausea and vomiting.   Genitourinary: Negative for difficulty urinating, dysuria and hematuria.   Musculoskeletal: Positive for arthralgias (chronic leg). Negative for back pain, gait problem, joint swelling, myalgias, neck pain and neck stiffness.   Skin: Negative for color change, pallor, rash and wound.        Patient feels back lipoma has increased in size, does not desire surgical management   Neurological: Negative for dizziness, weakness, light-headedness and headaches.   Hematological: Negative for  adenopathy. Does not bruise/bleed easily.   Psychiatric/Behavioral: Negative for dysphoric mood. The patient is not nervous/anxious.        Objective:      Physical Exam   Constitutional: She is oriented to person, place, and time. She appears well-developed and well-nourished. No distress.   Presents alone   HENT:   Head: Normocephalic and atraumatic.   Nose: Nose normal.   Mouth/Throat: Oropharynx is clear and moist. No oropharyngeal exudate.   Eyes: EOM are normal. Pupils are equal, round, and reactive to light. No scleral icterus.   Neck: Normal range of motion. Neck supple. No thyromegaly present.   Cardiovascular: Normal rate, regular rhythm, normal heart sounds and intact distal pulses.  Exam reveals no gallop and no friction rub.    No murmur heard.  Pulmonary/Chest: Effort normal and breath sounds normal. No respiratory distress. She has no wheezes. She has no rales. She exhibits no tenderness.   Abdominal: Soft. Bowel sounds are normal. She exhibits no distension and no mass. There is no tenderness. There is no rebound and no guarding.   No hepatosplenomegaly     Musculoskeletal: Normal range of motion. She exhibits no edema or tenderness.     No spinal or paraspinal tenderness to palpation   lipoma at mid back    Left lower extremity with some mild edema and tenderness to palpation at popliteal fossa     Lymphadenopathy:        Head (right side): No submental, no submandibular, no preauricular, no posterior auricular and no occipital adenopathy present.        Head (left side): No submental, no submandibular, no preauricular, no posterior auricular and no occipital adenopathy present.     She has no cervical adenopathy.     She has no axillary adenopathy.        Right: No inguinal and no supraclavicular adenopathy present.        Left: No inguinal and no supraclavicular adenopathy present.   Neurological: She is alert and oriented to person, place, and time. No cranial nerve deficit. She exhibits normal  muscle tone. Coordination normal.   Skin: Skin is warm and dry. No rash noted. No erythema. No pallor.   Lipoma back   Psychiatric: She has a normal mood and affect. Her behavior is normal. Judgment and thought content normal.   Nursing note and vitals reviewed.      Assessment:       1. 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 Left leg pain        Plan:       1)clinically doing well without evidence of disease. Return to clinic in one year with cbc/cmp/ldh. Knows to call in interim if any issues.  2)US of left leg for evaluation of edema and tenderness at popliteal fossa    Distress Screening Results: Psychosocial Distress screening score of Distress Score: 8 noted and reviewed. No intervention indicated.

## 2018-04-22 RX ORDER — GABAPENTIN 300 MG/1
CAPSULE ORAL
Qty: 90 CAPSULE | Refills: 0 | Status: SHIPPED | OUTPATIENT
Start: 2018-04-22 | End: 2018-08-29 | Stop reason: SDUPTHER

## 2018-04-25 ENCOUNTER — HOSPITAL ENCOUNTER (OUTPATIENT)
Dept: RADIOLOGY | Facility: HOSPITAL | Age: 83
Discharge: HOME | End: 2018-04-25
Attending: PHYSICIAN ASSISTANT
Payer: MEDICARE

## 2018-04-25 DIAGNOSIS — M79.605 LEFT LEG PAIN: ICD-10-CM

## 2018-04-25 PROCEDURE — 93971 EXTREMITY STUDY: CPT | Mod: TC

## 2018-04-25 PROCEDURE — 93971 EXTREMITY STUDY: CPT | Mod: 26,,, | Performed by: RADIOLOGY

## 2018-05-10 RX ORDER — ZOLPIDEM TARTRATE 5 MG/1
TABLET ORAL
Qty: 12 TABLET | Refills: 0 | Status: SHIPPED | OUTPATIENT
Start: 2018-05-10 | End: 2018-06-10 | Stop reason: SDUPTHER

## 2018-05-10 NOTE — TELEPHONE ENCOUNTER
----- Message from Rachelle Joe sent at 5/10/2018  2:26 PM CDT -----  Contact: Pt   Pt was calling to get a refill on zolpidem (AMBIEN) 5 MG Tab.    Thank You

## 2018-05-24 RX ORDER — OMEPRAZOLE 40 MG/1
CAPSULE, DELAYED RELEASE ORAL
Qty: 90 CAPSULE | Refills: 1 | Status: SHIPPED | OUTPATIENT
Start: 2018-05-24 | End: 2020-06-29 | Stop reason: SDUPTHER

## 2018-05-24 RX ORDER — OMEPRAZOLE 40 MG/1
40 CAPSULE, DELAYED RELEASE ORAL DAILY PRN
Qty: 30 CAPSULE | Refills: 1 | Status: SHIPPED | OUTPATIENT
Start: 2018-05-24 | End: 2018-05-24 | Stop reason: SDUPTHER

## 2018-05-24 NOTE — TELEPHONE ENCOUNTER
----- Message from Melanie Calabrese sent at 5/24/2018 12:35 PM CDT -----  Contact: [facility] [phone]  Prescription Alternative Needed:     The pharmacy needs alternative on the following RX:    NEXIUM 40 mg capsule    Reason: Rx not covered. Preferred alternative Omeprazole, Esomepramag    Pharmacy: [facility] [address] [place] [address] [address] [facility] AT [facility]    Please advise.    Thank You

## 2018-05-31 ENCOUNTER — PATIENT MESSAGE (OUTPATIENT)
Dept: OPTOMETRY | Facility: CLINIC | Age: 83
End: 2018-05-31

## 2018-06-05 ENCOUNTER — TELEPHONE (OUTPATIENT)
Dept: INTERNAL MEDICINE | Facility: CLINIC | Age: 83
End: 2018-06-05

## 2018-06-05 NOTE — TELEPHONE ENCOUNTER
----- Message from Melanie Calabrese sent at 6/5/2018  7:58 AM CDT -----  Contact: Jonnathan [phone]  Prior Authorization Needed    Medication: NEXIUM 40 mg capsule    Pharmacy Info: [id number] - [facility] [address] AT [facility] does not cover this medication. Please call plan at [phone] to initiate prior authorization or call/fax pharmacy to change medication. Patient ID#[id number]    Please notify pharmacy when prior authorization has been approved.    Thank You

## 2018-06-11 RX ORDER — ZOLPIDEM TARTRATE 5 MG/1
TABLET ORAL
Qty: 12 TABLET | Refills: 0 | Status: SHIPPED | OUTPATIENT
Start: 2018-06-11 | End: 2018-10-11

## 2018-06-20 RX ORDER — TRAMADOL HYDROCHLORIDE 50 MG/1
TABLET ORAL
Qty: 60 TABLET | Refills: 0 | Status: SHIPPED | OUTPATIENT
Start: 2018-06-20 | End: 2018-10-03 | Stop reason: SDUPTHER

## 2018-07-09 RX ORDER — ZOLPIDEM TARTRATE 5 MG/1
TABLET ORAL
Qty: 12 TABLET | Refills: 0 | Status: SHIPPED | OUTPATIENT
Start: 2018-07-09 | End: 2018-08-09 | Stop reason: SDUPTHER

## 2018-07-11 ENCOUNTER — PATIENT MESSAGE (OUTPATIENT)
Dept: OPTOMETRY | Facility: CLINIC | Age: 83
End: 2018-07-11

## 2018-08-09 ENCOUNTER — TELEPHONE (OUTPATIENT)
Dept: INTERNAL MEDICINE | Facility: CLINIC | Age: 83
End: 2018-08-09

## 2018-08-09 RX ORDER — ZOLPIDEM TARTRATE 5 MG/1
TABLET ORAL
Qty: 12 TABLET | Refills: 1 | Status: SHIPPED | OUTPATIENT
Start: 2018-08-09 | End: 2018-08-16 | Stop reason: SDUPTHER

## 2018-08-10 ENCOUNTER — TELEPHONE (OUTPATIENT)
Dept: INTERNAL MEDICINE | Facility: CLINIC | Age: 83
End: 2018-08-10

## 2018-08-10 NOTE — TELEPHONE ENCOUNTER
----- Message from Rohan Miller sent at 8/10/2018  8:48 AM CDT -----  Contact: hwtd/[phone]  Pt is requesting a call back in regards to medication zolpidem (AMBIEN) 5 MG Tab. She states that she has some questions. Please advise.        Thanks

## 2018-08-16 ENCOUNTER — OFFICE VISIT (OUTPATIENT)
Dept: OPTOMETRY | Facility: CLINIC | Age: 83
End: 2018-08-16
Payer: MEDICARE

## 2018-08-16 DIAGNOSIS — H02.88A MEIBOMIAN GLAND DYSFUNCTION (MGD), BILATERAL, BOTH UPPER AND LOWER LIDS: ICD-10-CM

## 2018-08-16 DIAGNOSIS — H10.13 ALLERGIC CONJUNCTIVITIS, BILATERAL: Primary | ICD-10-CM

## 2018-08-16 DIAGNOSIS — H02.88B MEIBOMIAN GLAND DYSFUNCTION (MGD), BILATERAL, BOTH UPPER AND LOWER LIDS: ICD-10-CM

## 2018-08-16 PROCEDURE — 99999 PR PBB SHADOW E&M-EST. PATIENT-LVL III: CPT | Mod: PBBFAC,,, | Performed by: OPTOMETRIST

## 2018-08-16 PROCEDURE — 99213 OFFICE O/P EST LOW 20 MIN: CPT | Mod: PBBFAC | Performed by: OPTOMETRIST

## 2018-08-16 PROCEDURE — 92014 COMPRE OPH EXAM EST PT 1/>: CPT | Mod: S$PBB,,, | Performed by: OPTOMETRIST

## 2018-08-16 RX ORDER — ATORVASTATIN CALCIUM 40 MG/1
TABLET, FILM COATED ORAL
Refills: 2 | COMMUNITY
Start: 2018-06-20 | End: 2018-10-03 | Stop reason: SDUPTHER

## 2018-08-16 NOTE — PATIENT INSTRUCTIONS
For occasional ocular allergies (itchy eyes) either  Alaway over the counter drops can be used as needed up to twice a daily.

## 2018-08-16 NOTE — PROGRESS NOTES
HPI     Systane Ultra BID OU     Patient complaints of red,itching and tearing in the morning time. In the   morning OU are hard to open. Pt denies any pain.     Last edited by Ozzie Fair MA on 8/16/2018  1:33 PM. (History)            Assessment /Plan     For exam results, see Encounter Report.    Allergic conjunctivitis, bilateral  -Alaway BID    Meibomian gland dysfunction (MGD), bilateral, both upper and lower lids  -Nightly lid scrubs using Sterilid foam or Occusoft foam. Systane balance as needed.      RTC DFE annual

## 2018-08-27 ENCOUNTER — OFFICE VISIT (OUTPATIENT)
Dept: PODIATRY | Facility: CLINIC | Age: 83
End: 2018-08-27
Payer: MEDICARE

## 2018-08-27 VITALS
BODY MASS INDEX: 26.81 KG/M2 | DIASTOLIC BLOOD PRESSURE: 71 MMHG | HEART RATE: 95 BPM | HEIGHT: 59 IN | WEIGHT: 133 LBS | SYSTOLIC BLOOD PRESSURE: 117 MMHG

## 2018-08-27 DIAGNOSIS — B35.1 ONYCHOMYCOSIS DUE TO DERMATOPHYTE: ICD-10-CM

## 2018-08-27 DIAGNOSIS — R20.0 NUMBNESS OF TOES: ICD-10-CM

## 2018-08-27 DIAGNOSIS — M79.674 PAIN DUE TO ONYCHOMYCOSIS OF TOENAILS OF BOTH FEET: ICD-10-CM

## 2018-08-27 DIAGNOSIS — E11.49 TYPE II DIABETES MELLITUS WITH NEUROLOGICAL MANIFESTATIONS: Primary | ICD-10-CM

## 2018-08-27 DIAGNOSIS — R60.0 BILATERAL LOWER EXTREMITY EDEMA: ICD-10-CM

## 2018-08-27 DIAGNOSIS — B35.1 PAIN DUE TO ONYCHOMYCOSIS OF TOENAILS OF BOTH FEET: ICD-10-CM

## 2018-08-27 DIAGNOSIS — M79.675 PAIN DUE TO ONYCHOMYCOSIS OF TOENAILS OF BOTH FEET: ICD-10-CM

## 2018-08-27 PROCEDURE — 11721 DEBRIDE NAIL 6 OR MORE: CPT | Mod: Q9,PBBFAC | Performed by: PODIATRIST

## 2018-08-27 PROCEDURE — 99213 OFFICE O/P EST LOW 20 MIN: CPT | Mod: PBBFAC,25 | Performed by: PODIATRIST

## 2018-08-27 PROCEDURE — 99499 UNLISTED E&M SERVICE: CPT | Mod: S$PBB,,, | Performed by: PODIATRIST

## 2018-08-27 PROCEDURE — 99999 PR PBB SHADOW E&M-EST. PATIENT-LVL III: CPT | Mod: PBBFAC,,, | Performed by: PODIATRIST

## 2018-08-27 PROCEDURE — 11721 DEBRIDE NAIL 6 OR MORE: CPT | Mod: Q9,S$PBB,, | Performed by: PODIATRIST

## 2018-08-27 NOTE — PROGRESS NOTES
Subjective:      Patient ID: Peri Johansen is a 84 y.o. female.    Chief Complaint: Nail Care (nail pain need trim) and Nail Problem    Peri is a 84 y.o. female who presents to the clinic for evaluation and treatment of high risk feet. ePri has a past medical history of Aortic atherosclerosis (1/7/2016), Arthritis, Colon polyp: hyperplastic 2015- repeat 2020 (8/6/2015), Diabetes mellitus, type II (8/16/2012), Diverticulosis, Gastric nodule (8/7/2014), GERD (gastroesophageal reflux disease) (8/16/2012), Goiter (8/16/2012), Hyperlipidemia (8/16/2012), Hypertension, Joint pain, Leg pain (8/16/2012), Lymphoma (8/16/2012), Osteopenia (8/16/2012), Osteoporosis, Renal cyst (3/28/2013), Rickets, vitamin D deficiency (8/16/2012), Thyroid nodule (2/24/2014), and Vitamin D deficiency disease (8/16/2012). The patient's chief complaint is long, thick toenails. Toenails starting to hurt. Has not had them trimmed in over 8 months. Toes are usually numb due to neuropathy but they are hurting currently due to long nails, especially in shoes. No other pedal complaints today.  This patient has documented high risk feet requiring routine maintenance secondary to diabetes mellitis and those secondary complications of diabetes, as mentioned..    PCP: Annika Garland MD    Date Last Seen by PCP:   Chief Complaint   Patient presents with    Nail Care     nail pain need trim    Nail Problem         Current shoe gear:    Casual shoes          Hemoglobin A1C   Date Value Ref Range Status   02/15/2018 8.0 (H) 4.0 - 5.6 % Final     Comment:     According to ADA guidelines, hemoglobin A1c <7.0% represents  optimal control in non-pregnant diabetic patients. Different  metrics may apply to specific patient populations.   Standards of Medical Care in Diabetes-2016.  For the purpose of screening for the presence of diabetes:  <5.7%     Consistent with the absence of diabetes  5.7-6.4%  Consistent with increasing risk for diabetes    (prediabetes)  >or=6.5%  Consistent with diabetes  Currently, no consensus exists for use of hemoglobin A1c  for diagnosis of diabetes for children.  This Hemoglobin A1c assay has significant interference with fetal   hemoglobin   (HbF). The results are invalid for patients with abnormal amounts of   HbF,   including those with known Hereditary Persistence   of Fetal Hemoglobin. Heterozygous hemoglobin variants (HbAS, HbAC,   HbAD, HbAE, HbA2) do not significantly interfere with this assay;   however, presence of multiple variants in a sample may impact the %   interference.     08/16/2017 7.3 (H) 4.0 - 5.6 % Final     Comment:     According to ADA guidelines, hemoglobin A1c <7.0% represents  optimal control in non-pregnant diabetic patients. Different  metrics may apply to specific patient populations.   Standards of Medical Care in Diabetes-2016.  For the purpose of screening for the presence of diabetes:  <5.7%     Consistent with the absence of diabetes  5.7-6.4%  Consistent with increasing risk for diabetes   (prediabetes)  >or=6.5%  Consistent with diabetes  Currently, no consensus exists for use of hemoglobin A1c  for diagnosis of diabetes for children.  This Hemoglobin A1c assay has significant interference with fetal   hemoglobin   (HbF). The results are invalid for patients with abnormal amounts of   HbF,   including those with known Hereditary Persistence   of Fetal Hemoglobin. Heterozygous hemoglobin variants (HbAS, HbAC,   HbAD, HbAE, HbA2) do not significantly interfere with this assay;   however, presence of multiple variants in a sample may impact the %   interference.     01/23/2017 7.2 (H) 4.5 - 6.2 % Final     Comment:     According to ADA guidelines, hemoglobin A1C <7.0% represents  optimal control in non-pregnant diabetic patients.  Different  metrics may apply to specific populations.   Standards of Medical Care in Diabetes - 2016.  For the purpose of screening for the presence of  "diabetes:  <5.7%     Consistent with the absence of diabetes  5.7-6.4%  Consistent with increasing risk for diabetes   (prediabetes)  >or=6.5%  Consistent with diabetes  Currently no consensus exists for use of hemoglobin A1C  for diagnosis of diabetes for children.       Vitals:    08/27/18 1155   BP: 117/71   Pulse: 95   Weight: 60.3 kg (133 lb)   Height: 4' 11" (1.499 m)   PainSc:   8     Chief Complaint   Patient presents with    Nail Care     nail pain need trim    Nail Problem     Past Medical History:   Diagnosis Date    Aortic atherosclerosis 1/7/2016    Arthritis     Colon polyp: hyperplastic 2015- repeat 2020 8/6/2015    Diabetes mellitus, type II 8/16/2012    Diverticulosis     Gastric nodule 8/7/2014    GERD (gastroesophageal reflux disease) 8/16/2012    Goiter 8/16/2012    Hyperlipidemia 8/16/2012    Hypertension     Joint pain     Leg pain 8/16/2012    Lymphoma 8/16/2012    Osteopenia 8/16/2012    Osteoporosis     Renal cyst 3/28/2013    Rickets, vitamin D deficiency 8/16/2012    Thyroid nodule 2/24/2014    Vitamin D deficiency disease 8/16/2012       Past Surgical History:   Procedure Laterality Date    CARPAL TUNNEL RELEASE      CATARACT EXTRACTION W/  INTRAOCULAR LENS IMPLANT Bilateral     HYSTERECTOMY      lymphoma surgery      L tibia       Family History   Problem Relation Age of Onset    Hypertension Mother     No Known Problems Father     No Known Problems Sister     Breast cancer Maternal Aunt     No Known Problems Maternal Uncle     No Known Problems Paternal Aunt     No Known Problems Paternal Uncle     No Known Problems Maternal Grandmother     No Known Problems Maternal Grandfather     No Known Problems Paternal Grandmother     No Known Problems Paternal Grandfather     No Known Problems Brother     Diabetes Neg Hx     Glaucoma Neg Hx     Amblyopia Neg Hx     Blindness Neg Hx     Cancer Neg Hx     Cataracts Neg Hx     Macular degeneration Neg Hx  "    Retinal detachment Neg Hx     Strabismus Neg Hx     Stroke Neg Hx     Thyroid disease Neg Hx     Ovarian cancer Neg Hx     Liver disease Neg Hx     Liver cancer Neg Hx     Cirrhosis Neg Hx     Colon polyps Neg Hx     Colon cancer Neg Hx     Melanoma Neg Hx        Social History     Socioeconomic History    Marital status: Single     Spouse name: None    Number of children: None    Years of education: None    Highest education level: None   Social Needs    Financial resource strain: None    Food insecurity - worry: None    Food insecurity - inability: None    Transportation needs - medical: None    Transportation needs - non-medical: None   Occupational History    None   Tobacco Use    Smoking status: Never Smoker    Smokeless tobacco: Never Used   Substance and Sexual Activity    Alcohol use: No     Alcohol/week: 0.0 oz    Drug use: No    Sexual activity: Not Currently   Other Topics Concern    Are you pregnant or think you may be? No    Breast-feeding No   Social History Narrative    None       Current Outpatient Medications   Medication Sig Dispense Refill    ammonium lactate 12 % Crea Apply 1 application topically once daily. 280 g 3    aspirin 81 MG Chew Take 1 tablet (81 mg total) by mouth once daily.      atorvastatin (LIPITOR) 40 MG tablet TK 1 T PO ONCE D  2    atorvastatin (LIPITOR) 80 MG tablet Take 0.5 tablets (40 mg total) by mouth once daily. 45 tablet 3    blood sugar diagnostic (ONETOUCH ULTRA TEST) Strp TEST BLOOD TREE TIMES  DAILY 200 strip 11    blood-glucose meter kit Use as instructed, test once daily 1 each 0    calcium 500 mg Tab Take 1 tablet by mouth Twice daily.      dextran 70-hypromellose (ARTIFICIAL TEARS,[id number],) ophthalmic solution Place 1 drop into both eyes 4 (four) times daily as needed. 1 Bottle 5    diclofenac sodium (VOLTAREN) 1 % Gel Apply 2 g topically 4 (four) times daily. 5 Tube 2    glimepiride (AMARYL) 2 MG tablet TAKE 1 TABLET  BY MOUTH BEFORE BREAKFAST. 90 tablet 3    metFORMIN (GLUCOPHAGE) 1000 MG tablet Take 1 tablet (1,000 mg total) by mouth 2 (two) times daily with meals. 180 tablet 3    methIMAzole (TAPAZOLE) 5 MG Tab 1/2 tablet (2.5mg) daily 15 tablet 5    omeprazole (PRILOSEC) 40 MG capsule TAKE 1 CAPSULE(40 MG) BY MOUTH DAILY AS NEEDED 90 capsule 1    ONE TOUCH DELICA LANCETS 30 gauge Misc   11    traMADol (ULTRAM) 50 mg tablet TAKE 1 TABLET(50 MG) BY MOUTH TWICE DAILY 60 tablet 0    zolpidem (AMBIEN) 5 MG Tab TAKE 1 TABLET BY MOUTH EVERY NIGHT AT BEDTIME AS NEEDED. DO NOT EXCEED 3 TABLETS EVERY WEEK 12 tablet 0    gabapentin (NEURONTIN) 300 MG capsule TAKE ONE CAPSULE BY MOUTH EVERY EVENING 90 capsule 0     No current facility-administered medications for this visit.        Review of patient's allergies indicates:   Allergen Reactions    Latex, natural rubber Itching         Review of Systems   Constitution: Negative for chills and fever.   Cardiovascular: Positive for leg swelling. Negative for chest pain and claudication.   Respiratory: Negative for cough and shortness of breath.    Skin: Positive for dry skin and nail changes.   Musculoskeletal: Positive for arthritis, joint pain and myalgias.        Toenail pain   Gastrointestinal: Negative for nausea and vomiting.   Neurological: Positive for numbness. Negative for paresthesias.   Psychiatric/Behavioral: Negative for altered mental status.           Objective:      Physical Exam   Constitutional: She is oriented to person, place, and time. She appears well-developed and well-nourished.   HENT:   Head: Normocephalic.   Cardiovascular: Intact distal pulses.   Pulses:       Dorsalis pedis pulses are 1+ on the right side, and 1+ on the left side.        Posterior tibial pulses are 1+ on the right side, and 1+ on the left side.   CRT < 3 sec to tips of toes. Trace edema noted to b/l LE. No vericosities noted to b/l LEs.      Pulmonary/Chest: No respiratory distress.    Musculoskeletal:   Gastrocnemius equinus noted to b/l ankles with decreased DF noted on exam. MMT 5/5 in DF/PF/Inv/Ev resistance with no reproduction of pain in any direction. Passive range of motion of ankle and pedal joints is painless. Adequate pedal joint ROM.     Toenail pain with palpation both feet.    Neurological: She is alert and oriented to person, place, and time. She has normal strength. A sensory deficit is present.   Light touch, proprioception, and sharp/dull sensation are all intact bilaterally. Protective threshold with the [place]ienstein monofilament is intact bilaterally.     Skin: Skin is warm, dry and intact. No bruising, no ecchymosis and no lesion noted. No erythema.   No open lesions, lacerations or wounds noted. Nails are thickened, elongated, discolored yellow/brown with subungual debris and brittleness to R 1-5 and L 1-5. Interdigital spaces clean, dry and intact b/l. No erythema noted to b/l foot. Skin texture thin, atrophic, dry. Pedal hair diminished. Toes cool to touch b/l. Mild hyperpigmentation to skin of both ankles consistent with hemosiderin deposits.      Psychiatric: She has a normal mood and affect. Her behavior is normal. Judgment and thought content normal.   Vitals reviewed.            Assessment:       Encounter Diagnoses   Name Primary?    Type II diabetes mellitus with neurological manifestations Yes    Bilateral lower extremity edema     Numbness of toes     Onychomycosis due to dermatophyte     Pain due to onychomycosis of toenails of both feet          Plan:       Peri was seen today for nail care and nail problem.    Diagnoses and all orders for this visit:    Type II diabetes mellitus with neurological manifestations    Bilateral lower extremity edema    Numbness of toes    Onychomycosis due to dermatophyte    Pain due to onychomycosis of toenails of both feet      I counseled the patient on her conditions, their implications and medical management.    -  Shoe inspection. Diabetic Foot Education. Patient reminded of the importance of good nutrition and blood sugar control to help prevent podiatric complications of diabetes. Patient instructed on proper foot hygeine. We discussed wearing proper shoe gear, daily foot inspections, never walking without protective shoe gear, caution putting sharp instruments to feet     - Discussed DM foot care:  Wear comfortable, proper fitting shoes. Wash feet daily. Dry well. After drying, apply moisturizer to feet (no lotion to webspaces). Inspect feet daily for skin breaks, blisters, swelling, or redness. Wear cotton socks (preferably white)  Change socks every day. Do NOT walk barefoot. Do NOT use heating pads or warm/hot water soaks     With patient's permission, nails were aggressively reduced and debrided 1,2,3,4, 5 R and 1,2, 3,4,5 L and filed to their soft tissue attachment mechanically and with electric , removing all offending nail and debris. Patient tolerated this well and no blood was drawn. Patient reports relief following the procedure.     Long discussion with patient regarding appropriate, supportive and comfortable shoes. Recommended [employer] style shoe brands with adequate arch supports to alleviate abnormal pressure and improve stability of foot while walking. Avoid flat shoes and barefoot walking as these will exacerbate or worsen symptoms.     RTC 4-6 months, sooner PRN

## 2018-08-30 RX ORDER — GABAPENTIN 300 MG/1
CAPSULE ORAL
Qty: 90 CAPSULE | Refills: 0 | Status: SHIPPED | OUTPATIENT
Start: 2018-08-30 | End: 2019-04-15 | Stop reason: SDUPTHER

## 2018-09-09 RX ORDER — TRAMADOL HYDROCHLORIDE 50 MG/1
TABLET ORAL
Qty: 60 TABLET | Refills: 0 | Status: SHIPPED | OUTPATIENT
Start: 2018-09-09 | End: 2018-10-03 | Stop reason: SDUPTHER

## 2018-09-11 ENCOUNTER — TELEPHONE (OUTPATIENT)
Dept: INTERNAL MEDICINE | Facility: CLINIC | Age: 83
End: 2018-09-11

## 2018-09-11 ENCOUNTER — OFFICE VISIT (OUTPATIENT)
Dept: INTERNAL MEDICINE | Facility: CLINIC | Age: 83
End: 2018-09-11
Payer: MEDICARE

## 2018-09-11 VITALS
DIASTOLIC BLOOD PRESSURE: 78 MMHG | WEIGHT: 129.19 LBS | BODY MASS INDEX: 26.04 KG/M2 | SYSTOLIC BLOOD PRESSURE: 116 MMHG | OXYGEN SATURATION: 99 % | HEIGHT: 59 IN | HEART RATE: 92 BPM

## 2018-09-11 DIAGNOSIS — N39.0 URINARY TRACT INFECTION WITHOUT HEMATURIA, SITE UNSPECIFIED: Primary | ICD-10-CM

## 2018-09-11 DIAGNOSIS — R30.0 DYSURIA: ICD-10-CM

## 2018-09-11 DIAGNOSIS — R35.0 URINARY FREQUENCY: ICD-10-CM

## 2018-09-11 DIAGNOSIS — R10.84 GENERALIZED ABDOMINAL PAIN: ICD-10-CM

## 2018-09-11 DIAGNOSIS — H92.02 LEFT EAR PAIN: ICD-10-CM

## 2018-09-11 DIAGNOSIS — R09.82 PND (POST-NASAL DRIP): ICD-10-CM

## 2018-09-11 LAB
BILIRUB SERPL-MCNC: NORMAL MG/DL
BILIRUB UR QL STRIP: NEGATIVE
BLOOD URINE, POC: NORMAL
CLARITY UR REFRACT.AUTO: ABNORMAL
COLOR UR AUTO: YELLOW
COLOR, POC UA: NORMAL
GLUCOSE UR QL STRIP: NEGATIVE
GLUCOSE UR QL STRIP: NORMAL
HGB UR QL STRIP: NEGATIVE
KETONES UR QL STRIP: NEGATIVE
KETONES UR QL STRIP: NORMAL
LEUKOCYTE ESTERASE UR QL STRIP: ABNORMAL
LEUKOCYTE ESTERASE URINE, POC: NORMAL
MICROSCOPIC COMMENT: ABNORMAL
NITRITE UR QL STRIP: NEGATIVE
NITRITE, POC UA: NORMAL
PH UR STRIP: 5 [PH] (ref 5–8)
PH, POC UA: 5
PROT UR QL STRIP: NEGATIVE
PROTEIN, POC: NORMAL
RBC #/AREA URNS AUTO: 13 /HPF (ref 0–4)
SP GR UR STRIP: 1.02 (ref 1–1.03)
SPECIFIC GRAVITY, POC UA: 1.01
SQUAMOUS #/AREA URNS AUTO: 4 /HPF
URN SPEC COLLECT METH UR: ABNORMAL
UROBILINOGEN UR STRIP-ACNC: NEGATIVE EU/DL
UROBILINOGEN, POC UA: NORMAL
WBC #/AREA URNS AUTO: 9 /HPF (ref 0–5)

## 2018-09-11 PROCEDURE — 99999 PR PBB SHADOW E&M-EST. PATIENT-LVL V: CPT | Mod: PBBFAC,,, | Performed by: NURSE PRACTITIONER

## 2018-09-11 PROCEDURE — 99215 OFFICE O/P EST HI 40 MIN: CPT | Mod: PBBFAC | Performed by: NURSE PRACTITIONER

## 2018-09-11 PROCEDURE — 81002 URINALYSIS NONAUTO W/O SCOPE: CPT | Mod: PBBFAC | Performed by: NURSE PRACTITIONER

## 2018-09-11 PROCEDURE — 81001 URINALYSIS AUTO W/SCOPE: CPT

## 2018-09-11 PROCEDURE — 87086 URINE CULTURE/COLONY COUNT: CPT

## 2018-09-11 PROCEDURE — 99213 OFFICE O/P EST LOW 20 MIN: CPT | Mod: S$PBB,,, | Performed by: NURSE PRACTITIONER

## 2018-09-11 RX ORDER — SULFAMETHOXAZOLE AND TRIMETHOPRIM 800; 160 MG/1; MG/1
1 TABLET ORAL 2 TIMES DAILY
Qty: 10 TABLET | Refills: 0 | Status: SHIPPED | OUTPATIENT
Start: 2018-09-11 | End: 2018-10-03

## 2018-09-11 RX ORDER — FLUTICASONE PROPIONATE 50 MCG
1 SPRAY, SUSPENSION (ML) NASAL DAILY
Qty: 16 G | Refills: 0 | Status: SHIPPED | OUTPATIENT
Start: 2018-09-11 | End: 2019-08-29 | Stop reason: ALTCHOICE

## 2018-09-11 NOTE — PROGRESS NOTES
"INTERNAL MEDICINE URGENT CARE NOTE    CHIEF COMPLAINT     Chief Complaint   Patient presents with    Nocturia     x1 week     Headache    Abdominal Cramping       HPI     Peri Johansen is a 84 y.o. female with CTS (left), hearing loss left ear, HLD, aortic atherosclerosis, renal cyst, diffuse large B-cell lymphoma, DM, thyroid nodules, GERD, diverticulosis, OP, sciatica and insomnia who presents for an urgent visit today.    She is an established pt of Dr Garland.     Here with c/o urinary frequency x 1 week. With abd cramping to the left lower quad. Mild burning with urination. +urinary frequency. +flank pain. NO fever or chills. No blood in urine. No nausea or vomiting. Describes abd pain as "little sticking pain". Normal BM.     Also with headache behind the left ear.   +nasal congestion   +PND + TTP   No sore throat   No fever or chills.       Past Medical History:  Past Medical History:   Diagnosis Date    Aortic atherosclerosis 1/7/2016    Arthritis     Colon polyp: hyperplastic 2015- repeat 2020 8/6/2015    Diabetes mellitus, type II 8/16/2012    Diverticulosis     Gastric nodule 8/7/2014    GERD (gastroesophageal reflux disease) 8/16/2012    Goiter 8/16/2012    Hyperlipidemia 8/16/2012    Hypertension     Joint pain     Leg pain 8/16/2012    Lymphoma 8/16/2012    Osteopenia 8/16/2012    Osteoporosis     Renal cyst 3/28/2013    Rickets, vitamin D deficiency 8/16/2012    Thyroid nodule 2/24/2014    Vitamin D deficiency disease 8/16/2012       Home Medications:  Prior to Admission medications    Medication Sig Start Date End Date Taking? Authorizing Provider   ammonium lactate 12 % Crea Apply 1 application topically once daily. 3/18/14  Yes Mechelle Oconnor DPM   aspirin 81 MG Chew Take 1 tablet (81 mg total) by mouth once daily. 1/23/17  Yes Annika Garland MD   atorvastatin (LIPITOR) 80 MG tablet Take 0.5 tablets (40 mg total) by mouth once daily. 3/8/18 3/8/19 Yes Annika MO" MD Dada   blood sugar diagnostic (ONETOUCH ULTRA TEST) Strp TEST BLOOD TREE TIMES  DAILY 3/23/18  Yes Annika Garland MD   blood-glucose meter kit Use as instructed, test once daily 4/7/17  Yes Annika Garland MD   calcium 500 mg Tab Take 1 tablet by mouth Twice daily.   Yes Historical Provider, MD   dextran 70-hypromellose (ARTIFICIAL TEARS,[id number],) ophthalmic solution Place 1 drop into both eyes 4 (four) times daily as needed. 11/8/17  Yes Lizzy Hall MD   diclofenac sodium (VOLTAREN) 1 % Gel Apply 2 g topically 4 (four) times daily. 3/27/17  Yes Annika Garland MD   gabapentin (NEURONTIN) 300 MG capsule TAKE ONE CAPSULE BY MOUTH EVERY EVENING 8/30/18  Yes Annika Garland MD   glimepiride (AMARYL) 2 MG tablet TAKE 1 TABLET BY MOUTH BEFORE BREAKFAST. 11/8/17  Yes Lizzy Hall MD   metFORMIN (GLUCOPHAGE) 1000 MG tablet Take 1 tablet (1,000 mg total) by mouth 2 (two) times daily with meals. 11/8/17  Yes Lizzy Hall MD   methIMAzole (TAPAZOLE) 5 MG Tab 1/2 tablet (2.5mg) daily 2/27/18  Yes Lizzy Hall MD   omeprazole (PRILOSEC) 40 MG capsule TAKE 1 CAPSULE(40 MG) BY MOUTH DAILY AS NEEDED 5/24/18  Yes Annika Garland MD   ONE TOUCH DELICA LANCETS 30 gauge Misc  6/10/14  Yes Historical Provider, MD   traMADol (ULTRAM) 50 mg tablet TAKE 1 TABLET(50 MG) BY MOUTH TWICE DAILY 6/20/18  Yes Annika Garland MD   traMADol (ULTRAM) 50 mg tablet TAKE 1 TABLET(50 MG) BY MOUTH TWICE DAILY 9/9/18  Yes Annika Garland MD   zolpidem (AMBIEN) 5 MG Tab TAKE 1 TABLET BY MOUTH EVERY NIGHT AT BEDTIME AS NEEDED. DO NOT EXCEED 3 TABLETS EVERY WEEK 6/11/18  Yes Annika Garland MD   atorvastatin (LIPITOR) 40 MG tablet TK 1 T PO ONCE D 6/20/18   Historical Provider, MD       Review of Systems:  Review of Systems   Constitutional: Negative for chills, fatigue, fever and unexpected weight change.   HENT: Positive for ear pain (left ) and postnasal drip. Negative for congestion, hearing loss, rhinorrhea, sinus pressure, sinus pain, sneezing  "and sore throat.    Eyes: Negative for pain, redness and visual disturbance.   Respiratory: Negative for cough and shortness of breath.    Cardiovascular: Negative for chest pain and palpitations.   Gastrointestinal: Positive for abdominal pain. Negative for abdominal distention, constipation, diarrhea, nausea and vomiting.   Endocrine: Negative for polydipsia, polyphagia and polyuria.   Genitourinary: Positive for dysuria, frequency and urgency. Negative for pelvic pain and vaginal discharge.   Musculoskeletal: Negative for arthralgias, gait problem and myalgias.   Skin: Negative for color change and rash.   Allergic/Immunologic: Negative for environmental allergies and immunocompromised state.   Neurological: Negative for dizziness, weakness, light-headedness and headaches.   Hematological: Negative for adenopathy. Does not bruise/bleed easily.   Psychiatric/Behavioral: Negative for confusion and sleep disturbance. The patient is not nervous/anxious.        Health Maintainence:   Immunizations:  Health Maintenance       Date Due Completion Date    TETANUS VACCINE 05/27/1952 ---    Zoster Vaccine 05/27/1994 ---    Urine Microalbumin 05/29/2018 5/29/2017    Influenza Vaccine 08/01/2018 9/14/2017    Override on 1/23/2017: Declined    Override on 12/15/2015: Done    Override on 3/10/2015: Not Clinically Appropriate    Override on 2/24/2014: Done    Override on 10/9/2012: Done    Hemoglobin A1c 08/15/2018 2/15/2018    Foot Exam 09/14/2018 9/14/2017    DEXA SCAN 11/05/2018 11/5/2014    Override on 6/17/2011: Done    Lipid Panel 02/15/2019 2/15/2018    Eye Exam 08/16/2019 8/16/2018    Override on 8/6/2015: Done           PHYSICAL EXAM     /78 (BP Location: Left arm, Patient Position: Sitting, BP Method: Large (Manual))   Pulse 92   Ht 4' 11" (1.499 m)   Wt 58.6 kg (129 lb 3 oz)   SpO2 99%   BMI 26.09 kg/m²     Physical Exam   Constitutional: She is oriented to person, place, and time. She appears " well-developed and well-nourished.   HENT:   Head: Normocephalic.   Right Ear: Tympanic membrane and external ear normal. No mastoid tenderness.   Left Ear: Tympanic membrane and external ear normal. No mastoid tenderness.   Nose: Mucosal edema and rhinorrhea present. Right sinus exhibits no maxillary sinus tenderness and no frontal sinus tenderness. Left sinus exhibits no maxillary sinus tenderness and no frontal sinus tenderness.   Mouth/Throat: Oropharynx is clear and moist and mucous membranes are normal. No oropharyngeal exudate.   +PND - cobblestoning    Eyes: Pupils are equal, round, and reactive to light.   Neck: Neck supple. No JVD present. No tracheal deviation present. No thyromegaly present.   Cardiovascular: Normal rate, regular rhythm, normal heart sounds and intact distal pulses. Exam reveals no gallop and no friction rub.   No murmur heard.  Pulmonary/Chest: Effort normal and breath sounds normal. No respiratory distress. She has no wheezes. She has no rales.   Abdominal: Soft. Bowel sounds are normal. She exhibits no distension. There is no hepatosplenomegaly. There is generalized tenderness. There is no rigidity, no rebound, no guarding, no CVA tenderness, no tenderness at McBurney's point and negative Harp's sign.   Musculoskeletal: Normal range of motion. She exhibits no edema or tenderness.   Lymphadenopathy:        Head (right side): No submental, no submandibular, no tonsillar, no preauricular, no posterior auricular and no occipital adenopathy present.        Head (left side): No submental, no submandibular, no tonsillar, no preauricular, no posterior auricular and no occipital adenopathy present.     She has no cervical adenopathy.   Neurological: She is alert and oriented to person, place, and time.   Skin: Skin is warm and dry. No rash noted.   Psychiatric: She has a normal mood and affect. Her behavior is normal.   Vitals reviewed.      LABS     Lab Results   Component Value Date     HGBA1C 8.0 (H) 02/15/2018     CMP  Sodium   Date Value Ref Range Status   04/17/2018 140 136 - 145 mmol/L Final     Potassium   Date Value Ref Range Status   04/17/2018 4.1 3.5 - 5.1 mmol/L Final     Chloride   Date Value Ref Range Status   04/17/2018 104 95 - 110 mmol/L Final     CO2   Date Value Ref Range Status   04/17/2018 27 23 - 29 mmol/L Final     Glucose   Date Value Ref Range Status   04/17/2018 77 70 - 110 mg/dL Final     BUN, Bld   Date Value Ref Range Status   04/17/2018 13 8 - 23 mg/dL Final     Creatinine   Date Value Ref Range Status   04/17/2018 0.6 0.5 - 1.4 mg/dL Final     Calcium   Date Value Ref Range Status   04/17/2018 10.3 8.7 - 10.5 mg/dL Final     Total Protein   Date Value Ref Range Status   04/17/2018 7.8 6.0 - 8.4 g/dL Final     Albumin   Date Value Ref Range Status   04/17/2018 3.4 (L) 3.5 - 5.2 g/dL Final     Total Bilirubin   Date Value Ref Range Status   04/17/2018 1.3 (H) 0.1 - 1.0 mg/dL Final     Comment:     For infants and newborns, interpretation of results should be based  on gestational age, weight and in agreement with clinical  observations.  Premature Infant recommended reference ranges:  Up to 24 hours.............<8.0 mg/dL  Up to 48 hours............<12.0 mg/dL  3-5 days..................<15.0 mg/dL  6-29 days.................<15.0 mg/dL       Alkaline Phosphatase   Date Value Ref Range Status   04/17/2018 77 55 - 135 U/L Final     AST   Date Value Ref Range Status   04/17/2018 18 10 - 40 U/L Final     ALT   Date Value Ref Range Status   04/17/2018 12 10 - 44 U/L Final     Anion Gap   Date Value Ref Range Status   04/17/2018 9 8 - 16 mmol/L Final     eGFR if    Date Value Ref Range Status   04/17/2018 >60.0 >60 mL/min/1.73 m^2 Final     eGFR if non    Date Value Ref Range Status   04/17/2018 >60.0 >60 mL/min/1.73 m^2 Final     Comment:     Calculation used to obtain the estimated glomerular filtration  rate (eGFR) is the CKD-EPI equation.         Lab Results   Component Value Date    WBC 4.11 04/17/2018    HGB 13.7 04/17/2018    HCT 42.8 04/17/2018    MCV 86 04/17/2018     04/17/2018     Lab Results   Component Value Date    CHOL 250 (H) 02/15/2018    CHOL 151 01/23/2017    CHOL 225 (H) 06/03/2016     Lab Results   Component Value Date    HDL 48 02/15/2018    HDL 48 01/23/2017    HDL 47 06/03/2016     Lab Results   Component Value Date    LDLCALC 158.8 02/15/2018    LDLCALC 82.8 01/23/2017    LDLCALC 146.0 06/03/2016     Lab Results   Component Value Date    TRIG 216 (H) 02/15/2018    TRIG 101 01/23/2017    TRIG 160 (H) 06/03/2016     Lab Results   Component Value Date    CHOLHDL 19.2 (L) 02/15/2018    CHOLHDL 31.8 01/23/2017    CHOLHDL 20.9 06/03/2016     Lab Results   Component Value Date    TSH 1.126 04/17/2018    [id number] 7.7 09/21/2010       ASSESSMENT/PLAN     Peri Johansen is a 84 y.o. female with  Past Medical History:   Diagnosis Date    Aortic atherosclerosis 1/7/2016    Arthritis     Colon polyp: hyperplastic 2015- repeat 2020 8/6/2015    Diabetes mellitus, type II 8/16/2012    Diverticulosis     Gastric nodule 8/7/2014    GERD (gastroesophageal reflux disease) 8/16/2012    Goiter 8/16/2012    Hyperlipidemia 8/16/2012    Hypertension     Joint pain     Leg pain 8/16/2012    Lymphoma 8/16/2012    Osteopenia 8/16/2012    Osteoporosis     Renal cyst 3/28/2013    Rickets, vitamin D deficiency 8/16/2012    Thyroid nodule 2/24/2014    Vitamin D deficiency disease 8/16/2012     Urinary tract infection without hematuria, site unspecified- will start bactrim (Pt over 65 yoa). Will send urine culture and sensitivity. Increase fluids.   -     Urine culture  -     Cancel: Urinalysis; Future; Expected date: 09/11/2018  -     sulfamethoxazole-trimethoprim 800-160mg (BACTRIM DS) 800-160 mg Tab; Take 1 tablet by mouth 2 (two) times daily.  Dispense: 10 tablet; Refill: 0  -     Urinalysis    Urinary frequency  -     POCT urine  dipstick without microscope  -     Urine culture  -     Cancel: Urinalysis; Future; Expected date: 09/11/2018  -     Urinalysis    Dysuria  -     Urine culture  -     Cancel: Urinalysis; Future; Expected date: 09/11/2018  -     Urinalysis    Generalized abdominal pain- likely referred from UTI     PND (post-nasal drip)- Will start flonase 2 squirts BID.   -     fluticasone (FLONASE) 50 mcg/actuation nasal spray; 1 spray (50 mcg total) by Each Nare route once daily.  Dispense: 16 g; Refill: 0    Left ear pain-  Like referred from PND. Will start Flonase   -     fluticasone (FLONASE) 50 mcg/actuation nasal spray; 1 spray (50 mcg total) by Each Nare route once daily.  Dispense: 16 g; Refill: 0    Pt requesting B12 injection, last B12 level 2/18 was elevated. Advised pt to discuss with Dr Garland at follow up next week.       Follow up with PCP next week as scheduled for routine follow up     Patient education provided from [facility]. Patient was counseled on when and how to seek emergent care.       Lyn LEAVITT, APRN, FNP-c   Department of Internal Medicine - [facility]  11:34 AM

## 2018-09-11 NOTE — TELEPHONE ENCOUNTER
----- Message from Melanie Calabrese sent at 9/11/2018  7:04 AM CDT -----  Contact: Patient [phone]  Patient is experiencing diarrhea and headaches and would like a phone call.    Please call and advise.    Thank You

## 2018-09-12 LAB
BACTERIA UR CULT: NORMAL
BACTERIA UR CULT: NORMAL

## 2018-09-18 DIAGNOSIS — Z12.31 VISIT FOR SCREENING MAMMOGRAM: Primary | ICD-10-CM

## 2018-09-18 DIAGNOSIS — N39.0 URINARY TRACT INFECTION WITHOUT HEMATURIA, SITE UNSPECIFIED: ICD-10-CM

## 2018-09-18 RX ORDER — SULFAMETHOXAZOLE AND TRIMETHOPRIM 800; 160 MG/1; MG/1
TABLET ORAL
Qty: 10 TABLET | Refills: 0 | OUTPATIENT
Start: 2018-09-18

## 2018-09-19 NOTE — TELEPHONE ENCOUNTER
----- Message from Debbi Matamoros sent at 9/19/2018  3:31 PM CDT -----  Contact: self/[phone]  What orders is pt asking for?: Mammogram    Is there a future appointment with the provider?: no    When?:    Comments?: patient would like to have mammogram tomorrow,  after she have her appointment with PCP. Thank you

## 2018-09-20 ENCOUNTER — TELEPHONE (OUTPATIENT)
Dept: INTERNAL MEDICINE | Facility: CLINIC | Age: 83
End: 2018-09-20

## 2018-09-20 NOTE — TELEPHONE ENCOUNTER
----- Message from Debbi Matamoros sent at 9/20/2018  2:06 PM CDT -----  Contact: self/[phone]  Patient called in regards needing to scheduled her appointment to another day early this month. Patient does not have transportation.  (early appointment that I was able to find is for 10/29/18). Please call and advise. Thank you

## 2018-10-03 ENCOUNTER — OFFICE VISIT (OUTPATIENT)
Dept: INTERNAL MEDICINE | Facility: CLINIC | Age: 83
End: 2018-10-03
Payer: MEDICARE

## 2018-10-03 ENCOUNTER — IMMUNIZATION (OUTPATIENT)
Dept: INTERNAL MEDICINE | Facility: CLINIC | Age: 83
End: 2018-10-03
Payer: MEDICARE

## 2018-10-03 VITALS
DIASTOLIC BLOOD PRESSURE: 80 MMHG | WEIGHT: 125.69 LBS | BODY MASS INDEX: 25.34 KG/M2 | SYSTOLIC BLOOD PRESSURE: 125 MMHG | HEIGHT: 59 IN

## 2018-10-03 DIAGNOSIS — D53.9 NUTRITIONAL ANEMIA: ICD-10-CM

## 2018-10-03 DIAGNOSIS — G44.89 CHRONIC MIXED HEADACHE SYNDROME: ICD-10-CM

## 2018-10-03 DIAGNOSIS — E11.65 UNCONTROLLED TYPE 2 DIABETES MELLITUS WITH HYPERGLYCEMIA, WITHOUT LONG-TERM CURRENT USE OF INSULIN: ICD-10-CM

## 2018-10-03 DIAGNOSIS — G44.209 TENSION-TYPE HEADACHE, NOT INTRACTABLE, UNSPECIFIED CHRONICITY PATTERN: ICD-10-CM

## 2018-10-03 DIAGNOSIS — E55.9 VITAMIN D DEFICIENCY DISEASE: ICD-10-CM

## 2018-10-03 DIAGNOSIS — E04.1 THYROID NODULE: ICD-10-CM

## 2018-10-03 DIAGNOSIS — C83.35 DIFFUSE LARGE B-CELL LYMPHOMA OF LYMPH NODES OF LOWER EXTREMITY: Primary | ICD-10-CM

## 2018-10-03 LAB
ALBUMIN/CREAT UR: 8 UG/MG
CREAT UR-MCNC: 175 MG/DL
MICROALBUMIN UR DL<=1MG/L-MCNC: 14 UG/ML

## 2018-10-03 PROCEDURE — 99999 PR PBB SHADOW E&M-EST. PATIENT-LVL V: CPT | Mod: PBBFAC,,, | Performed by: INTERNAL MEDICINE

## 2018-10-03 PROCEDURE — 82043 UR ALBUMIN QUANTITATIVE: CPT

## 2018-10-03 PROCEDURE — 90662 IIV NO PRSV INCREASED AG IM: CPT | Mod: PBBFAC

## 2018-10-03 PROCEDURE — 99214 OFFICE O/P EST MOD 30 MIN: CPT | Mod: S$PBB,,, | Performed by: INTERNAL MEDICINE

## 2018-10-03 PROCEDURE — 99999 PR PBB SHADOW E&M-EST. PATIENT-LVL I: CPT | Mod: PBBFAC,,,

## 2018-10-03 PROCEDURE — 99215 OFFICE O/P EST HI 40 MIN: CPT | Mod: PBBFAC,27,25 | Performed by: INTERNAL MEDICINE

## 2018-10-03 PROCEDURE — 99211 OFF/OP EST MAY X REQ PHY/QHP: CPT | Mod: PBBFAC

## 2018-10-03 RX ORDER — GLIMEPIRIDE 2 MG/1
TABLET ORAL
Qty: 90 TABLET | Refills: 3 | Status: SHIPPED | OUTPATIENT
Start: 2018-10-03 | End: 2019-08-15

## 2018-10-03 RX ORDER — METFORMIN HYDROCHLORIDE 1000 MG/1
1000 TABLET ORAL 2 TIMES DAILY WITH MEALS
Qty: 180 TABLET | Refills: 3 | Status: SHIPPED | OUTPATIENT
Start: 2018-10-03 | End: 2019-07-05

## 2018-10-03 RX ORDER — ATORVASTATIN CALCIUM 40 MG/1
TABLET, FILM COATED ORAL
Qty: 90 TABLET | Refills: 2 | Status: SHIPPED | OUTPATIENT
Start: 2018-10-03 | End: 2019-11-30 | Stop reason: SDUPTHER

## 2018-10-03 RX ORDER — TRAMADOL HYDROCHLORIDE 50 MG/1
TABLET ORAL
Qty: 60 TABLET | Refills: 0 | Status: SHIPPED | OUTPATIENT
Start: 2018-11-03 | End: 2019-04-15

## 2018-10-03 RX ORDER — TRAMADOL HYDROCHLORIDE 50 MG/1
TABLET ORAL
Qty: 60 TABLET | Refills: 0 | Status: SHIPPED | OUTPATIENT
Start: 2018-10-03 | End: 2019-04-15

## 2018-10-03 NOTE — PROGRESS NOTES
Subjective:       Patient ID: Peri Johansen is a 84 y.o. female.    Chief Complaint: Follow-up    Multiple issues    Recent UTI tx with antibiotics a couple of weeks ago.  That resolved.    Some slight R sided neck pain and also goes up to head.   No headaches or blurred vision.  No fever, chills, sweats or weight changes.     She denies depression.  Sleep is chronically impaired.  Pain medication seems to work pretty well for her.       She follows annually in Oncology.  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   She underwent surgery with joanna placement, followed by CVP chemotherapy concurrent with radiation for 3 cycles. Then she received adjuvant weekly Rituxan x 4. All treatment was completed by December 2007.  One year follow up recommended.     She is following closely in endocrinology with Dr. Hall for thyroid issues.  Also due for DEXA scan.     She is up-to-date with podiatry and eye exams.  However, has not had A1c or lipids done recently.    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     Vitamin D deficiency disease     Mixed hyperlipidemia     Renal cyst: R side see ultrasound 6/16     Uncontrolled type 2 diabetes mellitus without complication, without long-term current use of insulin     Stromal cell tumor     Lipoma of back     Thyroid nodules: several see u/s 2017 needs FNA 9/17     Gastric nodule: 2013-m per GI no need for further follow up     Spondylosis without myelopathy     Low vitamin B12 level     Gastroesophageal reflux disease without esophagitis     Age-related osteoporosis without current pathological fracture     Colon polyp: hyperplastic 2015- repeat 2020     Primary insomnia     Aortic atherosclerosis: see CT scan 3/15     Diverticulosis of large intestine without hemorrhage: see CT scan 3/15     Thoracic and lumbosacral neuritis     Chronic bilateral  low back pain with right-sided sciatica     Left carpal tunnel syndrome     Hearing loss of left ear        Review of Systems   Constitutional: Negative for activity change, appetite change, chills, fatigue and fever.   HENT: Negative for congestion, hearing loss, sinus pressure and sore throat.    Eyes: Negative for visual disturbance.   Respiratory: Negative for apnea, cough, shortness of breath and wheezing.    Cardiovascular: Negative for chest pain, palpitations and leg swelling.   Gastrointestinal: Negative for abdominal distention, abdominal pain, constipation, diarrhea, nausea and vomiting.   Genitourinary: Negative for difficulty urinating, dysuria, frequency, hematuria and vaginal bleeding.   Musculoskeletal: Positive for back pain and neck pain. Negative for gait problem, joint swelling and myalgias.   Skin: Negative for rash.   Neurological: Positive for headaches. Negative for dizziness, weakness, light-headedness and numbness.   Hematological: Negative for adenopathy. Does not bruise/bleed easily.   Psychiatric/Behavioral: Negative for confusion, hallucinations, sleep disturbance and suicidal ideas.       Objective:      Physical Exam   Constitutional: She is oriented to person, place, and time. She appears well-developed and well-nourished.   HENT:   Head: Normocephalic and atraumatic.   Right Ear: External ear normal.   Left Ear: External ear normal.   Nose: Nose normal.   Mouth/Throat: Oropharynx is clear and moist. No oropharyngeal exudate.   Eyes: Conjunctivae and EOM are normal. No scleral icterus.   Neck: Normal range of motion. Neck supple. No JVD present. Thyromegaly present.   Cardiovascular: Normal rate, regular rhythm, normal heart sounds and intact distal pulses. Exam reveals no gallop.   No murmur heard.  Pulmonary/Chest: Effort normal and breath sounds normal. No respiratory distress. She has no wheezes.   Abdominal: Soft. Bowel sounds are normal. She exhibits no distension and no mass.  There is no tenderness. There is no rebound and no guarding.   Musculoskeletal: Normal range of motion. She exhibits no edema or tenderness.   Lymphadenopathy:     She has no cervical adenopathy.   Neurological: She is alert and oriented to person, place, and time. She displays normal reflexes. No cranial nerve deficit. Coordination normal.   Skin: Skin is warm. No rash noted. No erythema.   Psychiatric: She has a normal mood and affect. Her behavior is normal. Judgment and thought content normal.   Nursing note and vitals reviewed.      Assessment:       1. 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 Uncontrolled type 2 diabetes mellitus with hyperglycemia, without long-term current use of insulin    3. Uncontrolled type 2 diabetes mellitus without complication, without long-term current use of insulin    4. Vitamin D deficiency disease    5. Thyroid nodules:  several see u/s 2017 FNA 2017 benign   6. Nutritional anemia    7. Tension-type headache, not intractable, unspecified chronicity pattern    8. Chronic mixed headache syndrome        Plan:         Diffuse large B-cell lymphoma of lymph nodes of lower extremity    Uncontrolled type 2 diabetes mellitus with hyperglycemia, without long-term current use of insulin  -     metFORMIN (GLUCOPHAGE) 1000 MG tablet; Take 1 tablet (1,000 mg total) by mouth 2 (two) times daily with meals.  Dispense: 180 tablet; Refill: 3  -     Microalbumin/creatinine urine ratio    Uncontrolled type 2 diabetes mellitus without complication, without long-term current use of insulin    Vitamin D deficiency disease    Thyroid nodules: several see u/s 2017 FNA 2017 benign: keep ENDO follow up    Nutritional anemia  -     Vitamin B12; Future    Tension-type headache, not intractable, unspecified chronicity pattern  -     Sedimentation rate; Future    Chronic mixed headache syndrome  -     CT Head Without Contrast; Future    Other orders  -     atorvastatin (LIPITOR)  40 MG tablet; TK 1 T PO ONCE D  Dispense: 90 tablet; Refill: 2  -     glimepiride (AMARYL) 2 MG tablet; TAKE 1 TABLET BY MOUTH BEFORE BREAKFAST.  Dispense: 90 tablet; Refill: 3  -     traMADol (ULTRAM) 50 mg tablet; TAKE 1 TABLET(50 MG) BY MOUTH TWICE DAILY  Dispense: 60 tablet; Refill: 0  -     traMADol (ULTRAM) 50 mg tablet; TAKE 1 TABLET(50 MG) BY MOUTH TWICE DAILY  Dispense: 60 tablet; Refill: 0    I will review all studies and determine further tx depending on findings  Flu shot today  Other immunization recommendations reviewed

## 2018-10-03 NOTE — PATIENT INSTRUCTIONS
"  Self-Care for Headaches  Most headaches aren't serious and can be relieved with self-care. But some headaches may be a sign of another health problem like eye trouble or high blood pressure. To find the best treatment, learn what kind of headaches you get. For tension headaches, self-care will usually help. To treat migraines, ask your healthcare provider for advice. It is also possible to get both tension and migraine headaches. Self-care involves relieving the pain and avoiding headache triggers if you can.    Ways to reduce pain and tension  Try these steps:  · Apply a cold compress or ice pack to the pain site.  · Drink fluids. If nausea makes it hard to drink, try sucking on ice.  · Rest. Protect yourself from bright light and loud noises.  · Calm your emotions by imagining a peaceful scene.  · Massage tight neck, shoulder, and head muscles.  · To relax muscles, soak in a hot bath or use a hot shower.  Use medicines  Aspirin or aspirin substitutes, such as ibuprofen and acetaminophen, can relieve headache. Remember: Never give aspirin to anyone 18 years old or younger because of the risk of developing Reye syndrome. Use pain medicines only when necessary.  Track your headaches  Keeping a headache diary can help you and your healthcare provider identify what's causing your headaches:  · Note when each headache happens.  · Identify your activities and the foods you've eaten 6 to 8 hours before the headache began.  · Look for any trends or "triggers."  Signs of tension headache  Any of the following can be signs:  · Dull pain or feeling of pressure in a tight band around your head  · Pain in your neck or shoulders  · Headache without a definite beginning or end  · Headache after an activity such as driving or working on a computer  Signs of migraine  Any of the following can be signs:  · Throbbing pain on one or both sides of your head  · Nausea or vomiting  · Extreme sensitivity to light, sound, and " "smells  · Bright spots, flashes, or other visual changes  · Pain or nausea so severe that you can't continue your daily activities  Call your healthcare provider   If you have any of the following symptoms, contact your healthcare provider:  · A headache that lingers after a recent injury or bump to the head.  · A fever with a stiff neck or pain when you bend your head toward your chest.  · A headache along with slurred speech, changes in your vision, or numbness or weakness in your arms or legs.  · A headache for longer than 3 days.  · Frequent headaches, especially in the morning.  · Headaches with seizures   · Seek immediate medical attention if you have a headache that you would call "the worst headache you have ever had."   Date Last Reviewed: 10/4/2015  © 7413-8958 [employer]. [address] [place] [address], [place] [address]. All rights reserved. This information is not intended as a substitute for professional medical care. Always follow your healthcare professional's instructions.        "

## 2018-10-05 RX ORDER — ZOLPIDEM TARTRATE 5 MG/1
TABLET ORAL
Qty: 12 TABLET | Refills: 0 | Status: SHIPPED | OUTPATIENT
Start: 2018-10-05 | End: 2018-11-15 | Stop reason: SDUPTHER

## 2018-10-09 ENCOUNTER — TELEPHONE (OUTPATIENT)
Dept: INTERNAL MEDICINE | Facility: CLINIC | Age: 83
End: 2018-10-09

## 2018-10-09 ENCOUNTER — HOSPITAL ENCOUNTER (OUTPATIENT)
Dept: RADIOLOGY | Facility: HOSPITAL | Age: 83
Discharge: HOME | End: 2018-10-09
Attending: INTERNAL MEDICINE
Payer: MEDICARE

## 2018-10-09 DIAGNOSIS — G44.89 CHRONIC MIXED HEADACHE SYNDROME: ICD-10-CM

## 2018-10-09 DIAGNOSIS — J32.9 SINUSITIS, UNSPECIFIED CHRONICITY, UNSPECIFIED LOCATION: Primary | ICD-10-CM

## 2018-10-09 PROCEDURE — 70450 CT HEAD/BRAIN W/O DYE: CPT | Mod: TC

## 2018-10-09 PROCEDURE — 70450 CT HEAD/BRAIN W/O DYE: CPT | Mod: 26,,, | Performed by: RADIOLOGY

## 2018-10-09 RX ORDER — CIPROFLOXACIN 0.5 MG/.25ML
SOLUTION/ DROPS AURICULAR (OTIC)
Qty: 2 VIAL | Refills: 0 | Status: SHIPPED | OUTPATIENT
Start: 2018-10-09 | End: 2018-10-12

## 2018-10-09 RX ORDER — AMOXICILLIN AND CLAVULANATE POTASSIUM 875; 125 MG/1; MG/1
1 TABLET, FILM COATED ORAL 2 TIMES DAILY
Qty: 20 TABLET | Refills: 0 | Status: SHIPPED | OUTPATIENT
Start: 2018-10-09 | End: 2018-10-11

## 2018-10-09 NOTE — TELEPHONE ENCOUNTER
Please let her know that her head CT shows that she has what looks like a sinus infection on the right side and she needs ear drops as well as oral antibiotics.    I have sent both into her pharmacy.  I would also like for her to be seen in ENT, referral is in, please assist with appointment.  Sinus CT prior- orders in thanks

## 2018-10-09 NOTE — TELEPHONE ENCOUNTER
Spoke with pt's granddaughter Blanquita, notified. She verbalized understanding. Appts made, mailed.

## 2018-10-10 ENCOUNTER — TELEPHONE (OUTPATIENT)
Dept: INTERNAL MEDICINE | Facility: CLINIC | Age: 83
End: 2018-10-10

## 2018-10-10 NOTE — TELEPHONE ENCOUNTER
----- Message from Debbi Matamoros sent at 10/10/2018 11:54 AM CDT -----  Contact: self/[phone]  Patient called in regards needing to talk with Dr Garland medical assistant about the rx that she was prescribed is too expensive, if Dr can call in something else, lower price. Please call and advise. Thank you

## 2018-10-10 NOTE — TELEPHONE ENCOUNTER
Pt stated that Augenting is really expensive she was calling to see if another antibiotic can be send to her pharmacy   Angelito inman

## 2018-10-11 RX ORDER — AMOXICILLIN 500 MG/1
500 TABLET, FILM COATED ORAL EVERY 8 HOURS
Qty: 30 TABLET | Refills: 0 | Status: SHIPPED | OUTPATIENT
Start: 2018-10-11 | End: 2019-04-15

## 2018-10-12 ENCOUNTER — TELEPHONE (OUTPATIENT)
Dept: INTERNAL MEDICINE | Facility: CLINIC | Age: 83
End: 2018-10-12

## 2018-10-12 NOTE — TELEPHONE ENCOUNTER
Done. Spoke with pt and she just wanted to know the price of meds. Pt informed to call her pharmacy

## 2018-10-12 NOTE — TELEPHONE ENCOUNTER
Okay, I sent another antibiotic in.  If for some reason that when is also too expensive, please start with the pharmacy about a more cost effective solution.  Thank you

## 2018-10-12 NOTE — TELEPHONE ENCOUNTER
----- Message from Leda Smith sent at 10/12/2018 12:16 PM CDT -----  Contact: Patient [phone]  The patient stated that she was talking about the ciprofloxacin HCl 0.2 % otic solution being too expensive not the antibiotic.    She requested a less expensive rx for the ear drops.    Pharmacy: [facility] [address] [place] [address] [address] AT [facility] [phone] (Phone) [phone] (Fax)    Comments: She also said that you sent it to the wrong pharmacy. Send it to this one in this message

## 2018-10-12 NOTE — TELEPHONE ENCOUNTER
----- Message from Francisco Mosher sent at 10/12/2018  1:46 PM CDT -----  Contact: [phone]  Patient  requesting a call from the office , stated she have a question to ask . Please call and advise, Thanks

## 2018-10-15 ENCOUNTER — TELEPHONE (OUTPATIENT)
Dept: INTERNAL MEDICINE | Facility: CLINIC | Age: 83
End: 2018-10-15

## 2018-10-15 NOTE — TELEPHONE ENCOUNTER
Pt is requesting another type of Ear Drops because it it very costly with her insurance or something OTC

## 2018-10-15 NOTE — TELEPHONE ENCOUNTER
Spoke with pharmacist at [facility], Cortisporin ear drops are covered by pt's insurance, per dr Garland, ok to fill it.

## 2018-10-15 NOTE — TELEPHONE ENCOUNTER
This is the 2nd call, can you please check with the pharmacy what would be the most cost effective equivalent to  Either    Cipro ear drops   Or   Cortisporin ear drops?  thanks

## 2018-10-15 NOTE — TELEPHONE ENCOUNTER
----- Message from Niki Sanchez sent at 10/15/2018 10:21 AM CDT -----  Contact: [phone]  Patient is checking the status of requested a less expensive rx for the ear drops.  Please send to :     Pharmacy: [facility] [address] [place] [address] [address] AT [facility] [phone] (Phone) [phone]     Please advise, thank you

## 2018-10-16 ENCOUNTER — HOSPITAL ENCOUNTER (OUTPATIENT)
Dept: RADIOLOGY | Facility: CLINIC | Age: 83
Discharge: HOME | End: 2018-10-16
Attending: INTERNAL MEDICINE
Payer: MEDICARE

## 2018-10-16 DIAGNOSIS — M85.80 OSTEOPENIA, UNSPECIFIED LOCATION: ICD-10-CM

## 2018-10-16 DIAGNOSIS — M85.88 OSTEOPENIA OF OTHER SITE: ICD-10-CM

## 2018-10-16 PROCEDURE — 77080 DXA BONE DENSITY AXIAL: CPT | Mod: TC

## 2018-10-16 PROCEDURE — 77080 DXA BONE DENSITY AXIAL: CPT | Mod: 26,,, | Performed by: INTERNAL MEDICINE

## 2018-10-23 ENCOUNTER — TELEPHONE (OUTPATIENT)
Dept: INTERNAL MEDICINE | Facility: CLINIC | Age: 83
End: 2018-10-23

## 2018-10-23 NOTE — TELEPHONE ENCOUNTER
Please call pharmacy to see if we need to send a different type of testing supply (glucometer, strips, lancets) if that one is not covered?  thanks

## 2018-10-23 NOTE — TELEPHONE ENCOUNTER
----- Message from Rohan Miller sent at 10/23/2018  9:15 AM CDT -----  Contact: self/[phone]  Pt is calling to speak with someone in the office in regards to getting a call back about blood sugar diagnostic (ONETOUCH ULTRA TEST) Strp. Pt states that she needs test strips sent into the pharmacy. She states that her insurance will not pay for this so she needs to see what else she can do to test her sugar because her sugars are high then back to normal and then high again. Please advise.          Thanks

## 2018-10-24 ENCOUNTER — TELEPHONE (OUTPATIENT)
Dept: INTERNAL MEDICINE | Facility: CLINIC | Age: 83
End: 2018-10-24

## 2018-10-24 ENCOUNTER — OFFICE VISIT (OUTPATIENT)
Dept: OTOLARYNGOLOGY | Facility: CLINIC | Age: 83
End: 2018-10-24
Payer: MEDICARE

## 2018-10-24 VITALS — WEIGHT: 131.38 LBS | BODY MASS INDEX: 26.54 KG/M2

## 2018-10-24 DIAGNOSIS — H69.90 DYSFUNCTION OF EUSTACHIAN TUBE, UNSPECIFIED LATERALITY: Primary | ICD-10-CM

## 2018-10-24 DIAGNOSIS — R93.89 ABNORMAL FINDING ON CT SCAN: ICD-10-CM

## 2018-10-24 PROCEDURE — 99999 PR PBB SHADOW E&M-EST. PATIENT-LVL III: CPT | Mod: PBBFAC,,, | Performed by: OTOLARYNGOLOGY

## 2018-10-24 PROCEDURE — 99213 OFFICE O/P EST LOW 20 MIN: CPT | Mod: PBBFAC | Performed by: OTOLARYNGOLOGY

## 2018-10-24 PROCEDURE — 99214 OFFICE O/P EST MOD 30 MIN: CPT | Mod: S$PBB,,, | Performed by: OTOLARYNGOLOGY

## 2018-10-24 RX ORDER — AMOXICILLIN AND CLAVULANATE POTASSIUM 875; 125 MG/1; MG/1
1 TABLET, FILM COATED ORAL 2 TIMES DAILY
COMMUNITY
End: 2019-04-15

## 2018-10-24 RX ORDER — NEOMYCIN SULFATE, POLYMYXIN B SULFATE AND HYDROCORTISONE 10; 3.5; 1 MG/ML; MG/ML; [USP'U]/ML
3.5 SUSPENSION/ DROPS AURICULAR (OTIC)
Refills: 1 | COMMUNITY
Start: 2018-10-15 | End: 2019-08-21

## 2018-10-24 NOTE — LETTER
October 30, 2018      Annika Garland MD  [address]  [facility] [address]           [facility]  [address]  [facility] [address]  Phone: [phone]  Fax: [phone]          Patient: Peri Johansen   MR Number: [id number]   YOB: 1934   Date of Visit: 10/24/2018       Dear Dr. Annika Garland:    Thank you for referring Peri Johansen to me for evaluation. Attached you will find relevant portions of my assessment and plan of care.    If you have questions, please do not hesitate to call me. I look forward to following Peri Johansen along with you.    Sincerely,    Veronica Almonte MD    Enclosure  CC:  No Recipients    If you would like to receive this communication electronically, please contact [email or web address] or [phone] to request more information on [employer] Link access.    For providers and/or their staff who would like to refer a patient to [facility], please contact us through our one-stop-shop provider referral line, [facility], at [phone].    If you feel you have received this communication in error or would no longer like to receive these types of communications, please e-mail [email or web address]

## 2018-10-24 NOTE — TELEPHONE ENCOUNTER
----- Message from Maria Victoria Interiano sent at 10/24/2018 10:51 AM CDT -----  Contact: patient [phone]  Pt uses one touch test strips and pharmacy told her that she needs a new rx because her insurance won't pay for them. Please call in a new rx.    [email or web address] [address] [place] [address] [address] AT [facility] [phone]

## 2018-10-24 NOTE — TELEPHONE ENCOUNTER
Spoke to the pharmacy and they stated that it a form from Medicare that needs to sign in orders for them to keep covering her test strips , I try calling pt to notified but didn't answer   [facility] will fax the form

## 2018-10-25 ENCOUNTER — TELEPHONE (OUTPATIENT)
Dept: INTERNAL MEDICINE | Facility: CLINIC | Age: 83
End: 2018-10-25

## 2018-10-25 NOTE — TELEPHONE ENCOUNTER
----- Message from Niki Sanchez sent at 10/25/2018  9:29 AM CDT -----  Contact: [phone]  Type: Returning a call    Who left a message?  Abena    When did the practice call? yesterday    Comments:  Please advise, thank you

## 2018-10-25 NOTE — TELEPHONE ENCOUNTER
Medicare form received filled out and faxed back   Also spoke to pt and Notified, pt voiced understanding

## 2018-10-26 ENCOUNTER — TELEPHONE (OUTPATIENT)
Dept: INTERNAL MEDICINE | Facility: CLINIC | Age: 83
End: 2018-10-26

## 2018-10-26 NOTE — TELEPHONE ENCOUNTER
----- Message from Leda Smith sent at 10/26/2018 10:02 AM CDT -----  Contact: Jonnathan/ Diana [phone]  Medicaid is sending another CMN form because you didn't to fill in questions 3 and 8.     Thank you

## 2018-10-30 ENCOUNTER — HOSPITAL ENCOUNTER (OUTPATIENT)
Dept: RADIOLOGY | Facility: HOSPITAL | Age: 83
Discharge: HOME | End: 2018-10-30
Attending: NURSE PRACTITIONER
Payer: MEDICARE

## 2018-10-30 ENCOUNTER — PATIENT MESSAGE (OUTPATIENT)
Dept: INTERNAL MEDICINE | Facility: CLINIC | Age: 83
End: 2018-10-30

## 2018-10-30 ENCOUNTER — HOSPITAL ENCOUNTER (OUTPATIENT)
Dept: RADIOLOGY | Facility: HOSPITAL | Age: 83
Discharge: HOME | End: 2018-10-30
Attending: INTERNAL MEDICINE
Payer: MEDICARE

## 2018-10-30 DIAGNOSIS — Z12.31 VISIT FOR SCREENING MAMMOGRAM: ICD-10-CM

## 2018-10-30 DIAGNOSIS — J32.9 SINUSITIS, UNSPECIFIED CHRONICITY, UNSPECIFIED LOCATION: ICD-10-CM

## 2018-10-30 PROCEDURE — 77063 BREAST TOMOSYNTHESIS BI: CPT | Mod: TC

## 2018-10-30 PROCEDURE — 77063 BREAST TOMOSYNTHESIS BI: CPT | Mod: 26,,, | Performed by: RADIOLOGY

## 2018-10-30 PROCEDURE — 70486 CT MAXILLOFACIAL W/O DYE: CPT | Mod: 26,,, | Performed by: RADIOLOGY

## 2018-10-30 PROCEDURE — 77067 SCR MAMMO BI INCL CAD: CPT | Mod: 26,,, | Performed by: RADIOLOGY

## 2018-10-30 PROCEDURE — 70486 CT MAXILLOFACIAL W/O DYE: CPT | Mod: TC

## 2018-10-30 NOTE — PROGRESS NOTES
Chief Complaint   Patient presents with    consult/ sinusits         84 y.o. female presents with history of headaches. She recently had a head CT which did not demonstrate sinus disease, but did have some mild right mastoid fluid. She has been previously seen for eustachian tube dysfunction. She was started on Augmentin and Ciprodex drops. No current ear or nasal pain. No facial pressure. Denies fevers.      Review of Systems     Constitutional: Negative for fatigue and unexpected weight change.   HENT: per HPI.  Eyes: Negative for visual disturbance.   Respiratory: Negative for shortness of breath, hemoptysis  Cardiovascular: Negative for chest pain and palpitations.   Genitourinary: Negative for dysuria and difficulty urinating.   Musculoskeletal: Negative for decreased ROM, back pain.   Skin: Negative for rash, sunburn, itching.   Neurological: Negative for dizziness and seizures.   Hematological: Negative for adenopathy. Does not bruise/bleed easily.   Psychiatric/Behavioral: Negative for agitation. The patient is not nervous/anxious.   Endocrine: Negative for rapid weight loss/weight gain, heat/cold intolerance.     Past Medical History:   Diagnosis Date    Aortic atherosclerosis 1/7/2016    Arthritis     Colon polyp: hyperplastic 2015- repeat 2020 8/6/2015    Diabetes mellitus, type II 8/16/2012    Diverticulosis     Gastric nodule 8/7/2014    GERD (gastroesophageal reflux disease) 8/16/2012    Goiter 8/16/2012    Hyperlipidemia 8/16/2012    Hypertension     Joint pain     Leg pain 8/16/2012    Lymphoma 8/16/2012    Osteopenia 8/16/2012    Osteoporosis     Renal cyst 3/28/2013    Rickets, vitamin D deficiency 8/16/2012    Thyroid nodule 2/24/2014    Vitamin D deficiency disease 8/16/2012       Past Surgical History:   Procedure Laterality Date    CARPAL TUNNEL RELEASE      CATARACT EXTRACTION W/  INTRAOCULAR LENS IMPLANT Bilateral     COLONOSCOPY N/A 6/29/2015    Performed by Osmin ABBOTT  MD Cl at [facility] (4TH FLR)    COLONOSCOPY N/A 6/12/2015    Performed by Miko Santana MD at [facility] (4TH FLR)    EGD (ESOPHAGOGASTRODUODENOSCOPY) N/A 4/19/2013    Performed by Jose Miguel Rodriguez MD at [facility] END[facility] (4TH FLR)    HYSTERECTOMY      lymphoma surgery      L tibia    RELEASE-CARPAL TUNNEL left Left 9/7/2016    Performed by Annika Kolb MD at [facility] OR 1ST FLR    ULTRASOUND, UPPER GI TRACT, ENDOSCOPIC N/A 7/9/2013    Performed by Hong Huerta MD at [facility] (2ND FLR)       family history includes Breast cancer in her maternal aunt; Hypertension in her mother; No Known Problems in her brother, father, maternal grandfather, maternal grandmother, maternal uncle, paternal aunt, paternal grandfather, paternal grandmother, paternal uncle, and sister.    Pt  reports that  has never smoked. she has never used smokeless tobacco. She reports that she does not drink alcohol or use drugs.    Review of patient's allergies indicates:   Allergen Reactions    Latex, natural rubber Itching        Physical Exam    There were no vitals filed for this visit.  Body mass index is 26.54 kg/m².    Physical Exam   Constitutional: She is oriented to person, place, and time. She appears well-developed and well-nourished. No distress.   HENT:   Head: Normocephalic and atraumatic.   Right Ear: Tympanic membrane, external ear and ear canal normal. Tympanic membrane mobility is normal. No middle ear effusion. Decreased hearing is noted.   Left Ear: Tympanic membrane, external ear and ear canal normal. Tympanic membrane mobility is normal.  No middle ear effusion. Decreased hearing is noted.   Nose: Nose normal.   Mouth/Throat: Oropharynx is clear and moist.   Eyes: Conjunctivae, EOM and lids are normal. Pupils are equal, round, and reactive to light. Right eye exhibits no discharge. Left eye exhibits no discharge.   Neck: Trachea normal, normal range of motion and phonation normal. Neck supple. No tracheal  tenderness present. No tracheal deviation, no edema and no erythema present. No thyroid mass and no thyromegaly present.   Cardiovascular: Normal heart sounds.   Pulmonary/Chest: Breath sounds normal. No stridor.   Abdominal: Soft.   Lymphadenopathy:     She has no cervical adenopathy.   Neurological: She is alert and oriented to person, place, and time.   Skin: Skin is warm and dry. No rash noted. She is not diaphoretic. No erythema. No pallor.   Psychiatric: She has a normal mood and affect.   Nursing note and vitals reviewed.    Studies reviewed:  CT Head 10/9/18:  No sinus disease, mild right mastoid fluid, no MOLLY    Assessment     1. Dysfunction of Eustachian tube, unspecified laterality    2. Abnormal finding on CT scan          Plan  In summary, Ms. Johansen is an 84 year old female with incidental finding of right mastoid fluid. No abnormality on exam today. No evidence of infectious process. Reassurance given. RTC prn.

## 2018-10-31 ENCOUNTER — TELEPHONE (OUTPATIENT)
Dept: INTERNAL MEDICINE | Facility: CLINIC | Age: 83
End: 2018-10-31

## 2018-10-31 DIAGNOSIS — Z87.898 HISTORY OF ABNORMAL MAMMOGRAM: Primary | ICD-10-CM

## 2018-10-31 DIAGNOSIS — R92.2 INCONCLUSIVE MAMMOGRAM: ICD-10-CM

## 2018-10-31 NOTE — TELEPHONE ENCOUNTER
----- Message from Lyn Almazan-LUCAS Hernandez sent at 10/31/2018  9:46 AM CDT -----  Hi Dr. Garland,   Please see the results of Ms Johansen' Mammogram. I do not recall ordering this and cannot find documentation in Epic.   -Lyn

## 2018-10-31 NOTE — TELEPHONE ENCOUNTER
Please call her.  She needs a diagnostic mammogram and ultrasound of the right breast.  Orders in.  Thank you

## 2018-11-02 ENCOUNTER — HOSPITAL ENCOUNTER (OUTPATIENT)
Dept: RADIOLOGY | Facility: HOSPITAL | Age: 83
Discharge: HOME | End: 2018-11-02
Attending: INTERNAL MEDICINE
Payer: MEDICARE

## 2018-11-02 ENCOUNTER — PATIENT MESSAGE (OUTPATIENT)
Dept: INTERNAL MEDICINE | Facility: CLINIC | Age: 83
End: 2018-11-02

## 2018-11-02 DIAGNOSIS — R92.2 INCONCLUSIVE MAMMOGRAM: ICD-10-CM

## 2018-11-02 DIAGNOSIS — Z87.898 HISTORY OF ABNORMAL MAMMOGRAM: ICD-10-CM

## 2018-11-02 PROCEDURE — 77061 BREAST TOMOSYNTHESIS UNI: CPT | Mod: 26,,, | Performed by: RADIOLOGY

## 2018-11-02 PROCEDURE — 77065 DX MAMMO INCL CAD UNI: CPT | Mod: TC,PO

## 2018-11-02 PROCEDURE — 77065 DX MAMMO INCL CAD UNI: CPT | Mod: 26,,, | Performed by: RADIOLOGY

## 2018-11-02 PROCEDURE — 77061 BREAST TOMOSYNTHESIS UNI: CPT | Mod: TC,PO

## 2018-11-16 RX ORDER — ZOLPIDEM TARTRATE 5 MG/1
TABLET ORAL
Qty: 12 TABLET | Refills: 0 | Status: SHIPPED | OUTPATIENT
Start: 2018-11-16 | End: 2018-12-10 | Stop reason: SDUPTHER

## 2018-11-16 RX ORDER — ZOLPIDEM TARTRATE 5 MG/1
TABLET ORAL
Qty: 12 TABLET | Refills: 0 | OUTPATIENT
Start: 2018-11-16

## 2018-12-10 RX ORDER — ZOLPIDEM TARTRATE 5 MG/1
TABLET ORAL
Qty: 12 TABLET | Refills: 0 | Status: SHIPPED | OUTPATIENT
Start: 2018-12-10 | End: 2019-01-29 | Stop reason: SDUPTHER

## 2019-01-14 RX ORDER — TRAMADOL HYDROCHLORIDE 50 MG/1
TABLET ORAL
Qty: 60 TABLET | Refills: 0 | Status: SHIPPED | OUTPATIENT
Start: 2019-01-14 | End: 2019-04-15

## 2019-01-29 RX ORDER — ZOLPIDEM TARTRATE 5 MG/1
TABLET ORAL
Qty: 12 TABLET | Refills: 0 | Status: SHIPPED | OUTPATIENT
Start: 2019-01-29 | End: 2019-02-26 | Stop reason: SDUPTHER

## 2019-01-31 ENCOUNTER — EXTERNAL CHRONIC CARE MANAGEMENT (OUTPATIENT)
Dept: PRIMARY CARE CLINIC | Facility: CLINIC | Age: 84
End: 2019-01-31
Payer: MEDICARE

## 2019-01-31 PROCEDURE — 99490 CHRNC CARE MGMT STAFF 1ST 20: CPT | Mod: S$PBB,,, | Performed by: INTERNAL MEDICINE

## 2019-01-31 PROCEDURE — 99490 PR CHRONIC CARE MGMT, 1ST 20 MIN: ICD-10-PCS | Mod: S$PBB,,, | Performed by: INTERNAL MEDICINE

## 2019-01-31 PROCEDURE — 99490 CHRNC CARE MGMT STAFF 1ST 20: CPT | Mod: PBBFAC | Performed by: INTERNAL MEDICINE

## 2019-02-20 RX ORDER — ZOLPIDEM TARTRATE 5 MG/1
TABLET ORAL
Qty: 12 TABLET | Refills: 0 | OUTPATIENT
Start: 2019-02-20

## 2019-02-26 RX ORDER — ZOLPIDEM TARTRATE 5 MG/1
TABLET ORAL
Qty: 12 TABLET | Refills: 0 | Status: SHIPPED | OUTPATIENT
Start: 2019-02-26 | End: 2019-03-28 | Stop reason: SDUPTHER

## 2019-02-26 RX ORDER — ZOLPIDEM TARTRATE 5 MG/1
TABLET ORAL
Qty: 12 TABLET | Refills: 0 | OUTPATIENT
Start: 2019-02-26

## 2019-02-26 NOTE — TELEPHONE ENCOUNTER
----- Message from Karine Paredes sent at 2/26/2019 10:02 AM CST -----  Contact: Pt Mobile [phone]  or Home [phone]  RX request - refill or new RX.  Is this a refill or new RX:  Refill  RX name and strength: zolpidem (AMBIEN) 5 MG Tab  Directions:   Is this a 30 day or 90 day RX:    Local pharmacy or mail order pharmacy:  [facility] [address] [place] [address] [address] AT [address]  Pharmacy name and phone #[facility] Phone# [phone],Fax# [phone]  Comments:  Patient said she usually get fifteen or sixteen tablets.

## 2019-03-15 ENCOUNTER — PES CALL (OUTPATIENT)
Dept: ADMINISTRATIVE | Facility: CLINIC | Age: 84
End: 2019-03-15

## 2019-03-17 ENCOUNTER — TELEPHONE (OUTPATIENT)
Dept: INTERNAL MEDICINE | Facility: CLINIC | Age: 84
End: 2019-03-17

## 2019-03-18 RX ORDER — METHIMAZOLE 5 MG/1
TABLET ORAL
Qty: 15 TABLET | Refills: 5 | Status: SHIPPED | OUTPATIENT
Start: 2019-03-18 | End: 2019-12-05 | Stop reason: SDUPTHER

## 2019-03-18 RX ORDER — TRAMADOL HYDROCHLORIDE 50 MG/1
50 TABLET ORAL EVERY 12 HOURS PRN
Qty: 60 TABLET | Refills: 0 | Status: SHIPPED | OUTPATIENT
Start: 2019-03-18 | End: 2019-08-28 | Stop reason: SDUPTHER

## 2019-03-18 NOTE — TELEPHONE ENCOUNTER
reviewed    Please contact her to schedule an office visit with me in April for any additional medication refills.  Thank you

## 2019-03-28 RX ORDER — ZOLPIDEM TARTRATE 5 MG/1
TABLET ORAL
Qty: 12 TABLET | Refills: 0 | Status: SHIPPED | OUTPATIENT
Start: 2019-03-28 | End: 2019-04-15

## 2019-03-31 ENCOUNTER — EXTERNAL CHRONIC CARE MANAGEMENT (OUTPATIENT)
Dept: PRIMARY CARE CLINIC | Facility: CLINIC | Age: 84
End: 2019-03-31
Payer: MEDICARE

## 2019-03-31 PROCEDURE — 99490 PR CHRONIC CARE MGMT, 1ST 20 MIN: ICD-10-PCS | Mod: S$GLB,,, | Performed by: INTERNAL MEDICINE

## 2019-03-31 PROCEDURE — 99490 CHRNC CARE MGMT STAFF 1ST 20: CPT | Mod: S$GLB,,, | Performed by: INTERNAL MEDICINE

## 2019-04-08 RX ORDER — CALCIUM CITRATE/VITAMIN D3 200MG-6.25
TABLET ORAL
Qty: 150 STRIP | Refills: 12 | Status: SHIPPED | OUTPATIENT
Start: 2019-04-08 | End: 2020-08-04 | Stop reason: SDUPTHER

## 2019-04-08 RX ORDER — INSULIN PUMP SYRINGE, 3 ML
EACH MISCELLANEOUS
Qty: 1 EACH | Refills: 0 | Status: SHIPPED | OUTPATIENT
Start: 2019-04-08 | End: 2020-01-13

## 2019-04-08 NOTE — TELEPHONE ENCOUNTER
----- Message from Melanie Calabrese sent at 4/8/2019  9:25 AM CDT -----  Contact: Jon[facility]athan [phone]  Prior Authorization Needed    Medication: blood sugar diagnostic (ONETOUCH ULTRA TEST) [facility]    Pharmacy Info: [id number] [facility] [place] [address] [address] AT [facility] [address]    Plan does not cover this medication. Please call plan at [phone] to initiate prior authorization or call/fax pharmacy to change medication. Patient ID#[id number]    Note chart when prior authorization has been submitted.    Please notify pharmacy when prior authorization has been approved.    Thank You

## 2019-04-15 ENCOUNTER — TELEPHONE (OUTPATIENT)
Dept: INTERNAL MEDICINE | Facility: CLINIC | Age: 84
End: 2019-04-15

## 2019-04-15 ENCOUNTER — LAB VISIT (OUTPATIENT)
Dept: LAB | Facility: HOSPITAL | Age: 84
End: 2019-04-15
Attending: INTERNAL MEDICINE
Payer: MEDICARE

## 2019-04-15 ENCOUNTER — OFFICE VISIT (OUTPATIENT)
Dept: INTERNAL MEDICINE | Facility: CLINIC | Age: 84
End: 2019-04-15
Payer: MEDICARE

## 2019-04-15 ENCOUNTER — IMMUNIZATION (OUTPATIENT)
Dept: PHARMACY | Facility: CLINIC | Age: 84
End: 2019-04-15
Payer: MEDICARE

## 2019-04-15 VITALS
WEIGHT: 130.06 LBS | DIASTOLIC BLOOD PRESSURE: 65 MMHG | HEIGHT: 59 IN | BODY MASS INDEX: 26.22 KG/M2 | SYSTOLIC BLOOD PRESSURE: 120 MMHG

## 2019-04-15 DIAGNOSIS — E04.1 THYROID NODULE: ICD-10-CM

## 2019-04-15 DIAGNOSIS — K63.5 POLYP OF COLON, UNSPECIFIED PART OF COLON, UNSPECIFIED TYPE: ICD-10-CM

## 2019-04-15 DIAGNOSIS — D53.9 NUTRITIONAL ANEMIA: ICD-10-CM

## 2019-04-15 DIAGNOSIS — E55.9 VITAMIN D DEFICIENCY DISEASE: ICD-10-CM

## 2019-04-15 DIAGNOSIS — M85.80 OSTEOPENIA, UNSPECIFIED LOCATION: ICD-10-CM

## 2019-04-15 DIAGNOSIS — R35.0 FREQUENT URINATION: ICD-10-CM

## 2019-04-15 DIAGNOSIS — M79.606 PAIN OF LOWER EXTREMITY, UNSPECIFIED LATERALITY: ICD-10-CM

## 2019-04-15 DIAGNOSIS — C83.35 DIFFUSE LARGE B-CELL LYMPHOMA OF LYMPH NODES OF LOWER EXTREMITY: Primary | ICD-10-CM

## 2019-04-15 DIAGNOSIS — M47.812 CERVICAL SPINE ARTHRITIS: ICD-10-CM

## 2019-04-15 DIAGNOSIS — M54.41 CHRONIC BILATERAL LOW BACK PAIN WITH RIGHT-SIDED SCIATICA: ICD-10-CM

## 2019-04-15 DIAGNOSIS — R79.89 LOW VITAMIN B12 LEVEL: ICD-10-CM

## 2019-04-15 DIAGNOSIS — I70.0 AORTIC ATHEROSCLEROSIS: ICD-10-CM

## 2019-04-15 DIAGNOSIS — N28.1 RENAL CYST: ICD-10-CM

## 2019-04-15 DIAGNOSIS — G89.29 CHRONIC BILATERAL LOW BACK PAIN WITH RIGHT-SIDED SCIATICA: ICD-10-CM

## 2019-04-15 DIAGNOSIS — K31.89 GASTRIC NODULE: ICD-10-CM

## 2019-04-15 DIAGNOSIS — E78.2 MIXED HYPERLIPIDEMIA: ICD-10-CM

## 2019-04-15 DIAGNOSIS — E05.90 HYPERTHYROIDISM: ICD-10-CM

## 2019-04-15 LAB
25(OH)D3+25(OH)D2 SERPL-MCNC: 33 NG/ML (ref 30–96)
ALBUMIN SERPL BCP-MCNC: 3.2 G/DL (ref 3.5–5.2)
ALP SERPL-CCNC: 98 U/L (ref 55–135)
ALT SERPL W/O P-5'-P-CCNC: 49 U/L (ref 10–44)
ANION GAP SERPL CALC-SCNC: 8 MMOL/L (ref 8–16)
AST SERPL-CCNC: 39 U/L (ref 10–40)
BACTERIA #/AREA URNS AUTO: ABNORMAL /HPF
BASOPHILS # BLD AUTO: 0.01 K/UL (ref 0–0.2)
BASOPHILS NFR BLD: 0.2 % (ref 0–1.9)
BILIRUB SERPL-MCNC: 0.9 MG/DL (ref 0.1–1)
BILIRUB UR QL STRIP: NEGATIVE
BUN SERPL-MCNC: 10 MG/DL (ref 8–23)
CALCIUM SERPL-MCNC: 9.5 MG/DL (ref 8.7–10.5)
CHLORIDE SERPL-SCNC: 104 MMOL/L (ref 95–110)
CHOLEST SERPL-MCNC: 170 MG/DL (ref 120–199)
CHOLEST/HDLC SERPL: 3.4 {RATIO} (ref 2–5)
CLARITY UR REFRACT.AUTO: CLEAR
CO2 SERPL-SCNC: 29 MMOL/L (ref 23–29)
COLOR UR AUTO: YELLOW
CREAT SERPL-MCNC: 0.7 MG/DL (ref 0.5–1.4)
DIFFERENTIAL METHOD: ABNORMAL
EOSINOPHIL # BLD AUTO: 0.1 K/UL (ref 0–0.5)
EOSINOPHIL NFR BLD: 2 % (ref 0–8)
ERYTHROCYTE [DISTWIDTH] IN BLOOD BY AUTOMATED COUNT: 13.6 % (ref 11.5–14.5)
EST. GFR  (AFRICAN AMERICAN): >60 ML/MIN/1.73 M^2
EST. GFR  (NON AFRICAN AMERICAN): >60 ML/MIN/1.73 M^2
ESTIMATED AVG GLUCOSE: 160 MG/DL (ref 68–131)
GLUCOSE SERPL-MCNC: 94 MG/DL (ref 70–110)
GLUCOSE UR QL STRIP: NEGATIVE
HBA1C MFR BLD HPLC: 7.2 % (ref 4–5.6)
HCT VFR BLD AUTO: 40.5 % (ref 37–48.5)
HDLC SERPL-MCNC: 50 MG/DL (ref 40–75)
HDLC SERPL: 29.4 % (ref 20–50)
HGB BLD-MCNC: 12.9 G/DL (ref 12–16)
HGB UR QL STRIP: ABNORMAL
KETONES UR QL STRIP: NEGATIVE
LDLC SERPL CALC-MCNC: 104.2 MG/DL (ref 63–159)
LEUKOCYTE ESTERASE UR QL STRIP: ABNORMAL
LYMPHOCYTES # BLD AUTO: 2.4 K/UL (ref 1–4.8)
LYMPHOCYTES NFR BLD: 46.2 % (ref 18–48)
MCH RBC QN AUTO: 27.8 PG (ref 27–31)
MCHC RBC AUTO-ENTMCNC: 31.9 G/DL (ref 32–36)
MCV RBC AUTO: 87 FL (ref 82–98)
MICROSCOPIC COMMENT: ABNORMAL
MONOCYTES # BLD AUTO: 0.6 K/UL (ref 0.3–1)
MONOCYTES NFR BLD: 11.5 % (ref 4–15)
NEUTROPHILS # BLD AUTO: 2.1 K/UL (ref 1.8–7.7)
NEUTROPHILS NFR BLD: 40.1 % (ref 38–73)
NITRITE UR QL STRIP: NEGATIVE
NONHDLC SERPL-MCNC: 120 MG/DL
PH UR STRIP: 5 [PH] (ref 5–8)
PLATELET # BLD AUTO: 314 K/UL (ref 150–350)
PMV BLD AUTO: 10.8 FL (ref 9.2–12.9)
POTASSIUM SERPL-SCNC: 3.8 MMOL/L (ref 3.5–5.1)
PROT SERPL-MCNC: 7.5 G/DL (ref 6–8.4)
PROT UR QL STRIP: NEGATIVE
RBC # BLD AUTO: 4.64 M/UL (ref 4–5.4)
RBC #/AREA URNS AUTO: 3 /HPF (ref 0–4)
SODIUM SERPL-SCNC: 141 MMOL/L (ref 136–145)
SP GR UR STRIP: 1.01 (ref 1–1.03)
SQUAMOUS #/AREA URNS AUTO: 2 /HPF
TRIGL SERPL-MCNC: 79 MG/DL (ref 30–150)
TSH SERPL DL<=0.005 MIU/L-ACNC: 0.45 UIU/ML (ref 0.4–4)
URN SPEC COLLECT METH UR: ABNORMAL
VIT B12 SERPL-MCNC: 1292 PG/ML (ref 210–950)
WBC # BLD AUTO: 5.11 K/UL (ref 3.9–12.7)
WBC #/AREA URNS AUTO: 8 /HPF (ref 0–5)

## 2019-04-15 PROCEDURE — 99214 OFFICE O/P EST MOD 30 MIN: CPT | Mod: S$GLB,,, | Performed by: INTERNAL MEDICINE

## 2019-04-15 PROCEDURE — 80053 COMPREHEN METABOLIC PANEL: CPT

## 2019-04-15 PROCEDURE — 99214 PR OFFICE/OUTPT VISIT, EST, LEVL IV, 30-39 MIN: ICD-10-PCS | Mod: S$GLB,,, | Performed by: INTERNAL MEDICINE

## 2019-04-15 PROCEDURE — 84443 ASSAY THYROID STIM HORMONE: CPT

## 2019-04-15 PROCEDURE — 82607 VITAMIN B-12: CPT

## 2019-04-15 PROCEDURE — 99999 PR PBB SHADOW E&M-EST. PATIENT-LVL V: ICD-10-PCS | Mod: PBBFAC,,, | Performed by: INTERNAL MEDICINE

## 2019-04-15 PROCEDURE — 80061 LIPID PANEL: CPT

## 2019-04-15 PROCEDURE — 87086 URINE CULTURE/COLONY COUNT: CPT

## 2019-04-15 PROCEDURE — 99999 PR PBB SHADOW E&M-EST. PATIENT-LVL V: CPT | Mod: PBBFAC,,, | Performed by: INTERNAL MEDICINE

## 2019-04-15 PROCEDURE — 82306 VITAMIN D 25 HYDROXY: CPT

## 2019-04-15 PROCEDURE — 36415 COLL VENOUS BLD VENIPUNCTURE: CPT

## 2019-04-15 PROCEDURE — 99215 OFFICE O/P EST HI 40 MIN: CPT | Mod: PBBFAC | Performed by: INTERNAL MEDICINE

## 2019-04-15 PROCEDURE — 83036 HEMOGLOBIN GLYCOSYLATED A1C: CPT

## 2019-04-15 PROCEDURE — 85025 COMPLETE CBC W/AUTO DIFF WBC: CPT

## 2019-04-15 PROCEDURE — 81001 URINALYSIS AUTO W/SCOPE: CPT

## 2019-04-15 RX ORDER — GABAPENTIN 300 MG/1
300 CAPSULE ORAL 2 TIMES DAILY
Qty: 180 CAPSULE | Refills: 1 | Status: SHIPPED | OUTPATIENT
Start: 2019-04-15 | End: 2020-06-29 | Stop reason: SDUPTHER

## 2019-04-15 RX ORDER — CALCIUM CARB/VITAMIN D3/VIT K1 500-500-40
TABLET,CHEWABLE ORAL
Refills: 0 | COMMUNITY
Start: 2019-04-08

## 2019-04-15 NOTE — TELEPHONE ENCOUNTER
----- Message from Dolores Conklin sent at 4/15/2019 12:09 PM CDT -----  There is nothing avaiable in ENDO.  Could not schedule

## 2019-04-15 NOTE — PROGRESS NOTES
Subjective:       Patient ID: Peri Johansen is a 84 y.o. female.    Chief Complaint:  Multiple issues       She denies depression.  Sleep is chronically impaired.  Pain medication seems to work pretty well for her, although harder to get medication from her insurance.  She feels that the Ambien is not working so we discussed that were going to have her stop it.     She follows annually in Oncology.  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   She underwent surgery with joanna placement, followed by CVP chemotherapy concurrent with radiation for 3 cycles. Then she received adjuvant weekly Rituxan x 4. All treatment was completed by December 2007.  One year follow up recommended.  This has been scheduled.     She was following closely in endocrinology with Dr. Hall for thyroid issues, but this provider has left [facility] and she has no Endocrine follow-up.  She is due for labs today which will be obtained.  Bone density exam last year was acceptable.  She is due for a thyroid ultrasound.      She is up-to-date with podiatry and eye exams.  However, has not had A1c or lipids done recently.    Renal ultrasound last year was stable/acceptable.    Mammogram was recently acceptable.    Atypical headaches which have abated.      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    Vitamin D deficiency disease    Mixed hyperlipidemia    Renal cyst: R side see ultrasound 6/16; stable 2018    Uncontrolled type 2 diabetes mellitus without complication, without long-term current use of insulin    Stromal cell tumor    Lipoma of back    Thyroid nodules: several see u/s 2017 FNA 2017 benign    Gastric nodule: 2013-m per GI no need for further follow up    Spondylosis without myelopathy    Low vitamin B12 level    Gastroesophageal reflux disease without esophagitis    Colon polyp:  hyperplastic 2015- repeat 2020    Primary insomnia    Aortic atherosclerosis: see CT scan 3/15    Diverticulosis of large intestine without hemorrhage: see CT scan 3/15    Thoracic and lumbosacral neuritis    Chronic bilateral low back pain with right-sided sciatica    Left carpal tunnel syndrome    Hearing loss of left ear    Hyperthyroidism    Cervical spine arthritis           HPI  Review of Systems   Constitutional: Negative for fatigue and fever.   Eyes: Negative for visual disturbance.   Respiratory: Negative for cough and shortness of breath.    Cardiovascular: Negative for chest pain and leg swelling.   Gastrointestinal: Negative for abdominal pain.   Endocrine: Negative for polydipsia, polyphagia and polyuria.   Genitourinary: Positive for frequency. Negative for difficulty urinating and hematuria.        No dysuria.  Some occasional frequency.  No obvious hematuria or flank pain   Musculoskeletal: Positive for back pain.        Ongoing back pain/arthralgias, unchanged times years   Skin: Negative for rash.   Neurological: Negative for weakness and numbness.   Psychiatric/Behavioral: Negative for dysphoric mood.        Occasional sleep issues, not severe       Objective:      Physical Exam   Constitutional: She is oriented to person, place, and time. She appears well-developed and well-nourished.   HENT:   Head: Normocephalic and atraumatic.   Eyes: Conjunctivae and EOM are normal.   Neck: Normal range of motion. Neck supple.   Cardiovascular: Normal rate and regular rhythm.   No murmur heard.  Pulmonary/Chest: Effort normal and breath sounds normal. No respiratory distress. She has no wheezes.   Abdominal: Soft. She exhibits no distension.   Musculoskeletal:   No CVA pain.   Strength UE and LE wnl.  No pain over spine   Neurological: She is oriented to person, place, and time. She displays normal reflexes. No cranial nerve deficit.   Skin: Skin is warm and dry.   Psychiatric: She has a normal mood  and affect. Her behavior is normal.       Assessment:       1. 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 Thyroid nodules: several see u/s 2017 FNA 2017 benign    3. Uncontrolled type 2 diabetes mellitus without complication, without long-term current use of insulin    4. Gastric nodule: 2013-m per GI no need for further follow up    5. Age-related osteoporosis without current pathological fracture    6. Chronic bilateral low back pain with right-sided sciatica    7. Aortic atherosclerosis: see CT scan 3/15    8. Renal cyst: R side see ultrasound 6/16; stable 2018    9. Low vitamin B12 level    10. Vitamin D deficiency disease    11. Nutritional anemia    12. Mixed hyperlipidemia    13. Hyperthyroidism    14. Cervical spine arthritis    15. Pain of lower extremity, unspecified laterality    16. Polyp of colon, unspecified part of colon, unspecified type    17. Osteopenia, unspecified location: see DEXA 2018    18. 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; keep Oncology follow-up  -     Ambulatory referral to Pain Clinic  -     Protein electrophoresis, serum; Future    Thyroid nodules: several see u/s 2017 FNA 2017 benign  -     TSH; Future  -     US Soft Tissue Head Neck Thyroid; Future    Uncontrolled type 2 diabetes mellitus without complication, without long-term current use of insulin:  Hydration, diet and exercise reviewed  -     CBC auto differential; Future  -     Comprehensive metabolic panel; Future  -     Hemoglobin A1c; Future    Gastric nodule: 2013-m per GI no need for further follow up:  No alarm symptoms    Chronic bilateral low back pain with right-sided sciatica:  Reviewed.  No change or alarm symptoms.  She declines physical therapy.  She declines physical medicine and rehab follow-up.  Conservative treatment discussed, she will continue her current regimen.   Follow-up poor results    Aortic atherosclerosis: see CT scan 3/15:  Continue regimen    Renal cyst: R side see ultrasound 6/16; stable 2018    Low vitamin B12 level:  Labs and review    Vitamin D deficiency disease; labs and review  -     Vitamin D; Future    Nutritional anemia  -     Vitamin B12; Future    Mixed hyperlipidemia  -     Lipid panel; Future    Hyperthyroidism  -     Ambulatory referral to Endocrinology    Cervical spine arthritis  -     Ambulatory Consult to Back [facility] Spine Clinic    Pain of lower extremity, unspecified laterality  -     Ambulatory referral to Pain Clinic    Polyp of colon, unspecified part of colon, unspecified type:  Evaluated 2015, no alarm symptoms    Osteopenia, unspecified location: see DEXA 2018:  Fall prevention reviewed, exercise.  Anticipate recheck in 2 years    Frequent urination  -     Urinalysis  -     Urine culture    Other orders  -     gabapentin (NEURONTIN) 300 MG capsule; Take 1 capsule (300 mg total) by mouth 2 (two) times daily.  -     Urinalysis Microscopic    I will review all studies and determine further tx depending on findings  Discontinue Ambien since it is not working   reviewed regarding other meds  Shingles vaccine recommended, she will consider

## 2019-04-15 NOTE — PATIENT INSTRUCTIONS
General Neck and Back Pain    Both neck and back pain are usually caused by injury to the muscles or ligaments of the spine. Sometimes the disks that separate each bone of the spine may cause pain by pressing on a nearby nerve. Back and neck pain may appear after a sudden twisting or bending force (such as in a car accident), or sometimes after a simple awkward movement. In either case, muscle spasm is often present and adds to the pain.  Acute neck and back pain usually gets better in 1 to 2 weeks. Pain related to disk disease, arthritis in the spinal joints or spinal stenosis (narrowing of the spinal canal) can become chronic and last for months or years.  Back and neck pain are common problems. Most people feel better in 1 or 2 weeks, and most of the rest in 1 to 2 months. Most people can remain active.  People experience and describe pain differently.  · Pain can be sharp, stabbing, shooting, aching, cramping, or burning  · Movement, standing, bending, lifting, sitting, or walking may worsen the pain  · Pain can be localized to one spot or area, or it can be more generalized  · Pain can spread or radiate upwards, downwards, to the front, or go down your arms  · Muscle spasm may occur.  Most of the time mechanical problems with the muscles or spine cause the pain. it is usually caused by an injury, whether known or not, to the muscles or ligaments. While illnesses can cause back pain, it is usually not caused by a serious illness. Pain is usually related to physical activity, whether sports, exercise, work, or normal activity. Sometimes it can occur without an identifiable cause. This can happen simply by stretching or moving wrong, without noting pain at the time. Other causes include:  · Overexertion, lifting, pushing, pulling incorrectly or too aggressively.  · Sudden twisting, bending or stretching from an accident (car or fall), or accidental movement.  · Poor posture  · Poor conditioning, lack of regular  exercise  · Spinal disc disease or arthritis  · Stress  · Pregnancy, or illness like appendicitis, bladder or kidney infection, pelvic infections   Home care  · For neck pain: Use a comfortable pillow that supports the head and keeps the spine in a neutral position. The position of the head should not be tilted forward or backward.  · When in bed, try to find a position of comfort. A firm mattress is best. Try lying flat on your back with pillows under your knees. You can also try lying on your side with your knees bent up towards your chest and a pillow between your knees.  · At first, do not try to stretch out the sore spots. If there is a strain, it is not like the good soreness you get after exercising without an injury. In this case, stretching may make it worse.  · Avoid prolonged sitting, long car rides or travel. This puts more stress on the lower back than standing or walking.  · During the first 24 to 72 hours after an injury, apply an ice pack to the painful area for 20 minutes and then remove it for 20 minutes over a period of 60 to 90 minutes or several times a day.   · You can alternate ice and heat therapies. Talk with your healthcare provider about the best treatment for your back or neck pain. As a safety precaution, do not use a heating pad at bedtime. Sleeping with a heating pad can lead to skin burns or tissue damage.  · Therapeutic massage can help relax the back and neck muscles without stretching them.  · Be aware of safe lifting methods and do not lift anything over 15 pounds until all the pain is gone.  Medications  Talk to your healthcare provider before using medicine, especially if you have other medical problems or are taking other medicines.  · You may use over-the-counter medicine to control pain, unless another pain medicine was prescribed. If you have chronic conditions like diabetes, liver or kidney disease, stomach ulcers,  gastrointestinal bleeding, or are taking blood thinner  medicines.  · Be careful if you are given pain medicines, narcotics, or medicine for muscle spasm. They can cause drowsiness, and can affect your coordination, reflexes, and judgment. Do not drive or operate heavy machinery.  Follow-up care  Follow up with your healthcare provider, or as advised. Physical therapy or further tests may be needed.  If X-rays were taken, you will be notified of any new findings that may affect your care.  Call 911  Seek emergency medical care if any of the following occur:  · Trouble breathing  · Confusion  · Very drowsy or trouble awakening  · Fainting or loss of consciousness  · Rapid or very slow heart rate  · Loss of bowel or bladder control  When to seek medical advice  Call your healthcare provider right away if any of these occur:  · Pain becomes worse or spreads into your arms or legs  · Weakness, numbness or pain in one or both arms or legs  · Numbness in the groin area  · Difficulty walking  · Fever of 100.4ºF (38ºC) or higher, or as directed by your healthcare provider  Date Last Reviewed: 7/1/2016 © 2000-2017 [employer]. [address] [place] [address]. All rights reserved. This information is not intended as a substitute for professional medical care. Always follow your healthcare professional's instructions.        Understanding Neck Problems       If you suffer from neck pain, youre not alone. Many people have neck pain at some point in their lives. Problems such as poor posture, injury, and wear and tear can lead to neck pain. Your healthcare provider will work with you to find the treatment thats best for your neck.  Types of neck problems    The following problems can cause pain or injury in your neck:  · Strains and sprains: Strains (stretched or torn muscles) and sprains (stretched or torn ligaments) can cause neck pain. Strains and sprains can occur during an accident, or when you overuse your neck through repetitive motion. They can also  cause your muscles and ligaments to become inflamed (swollen and painful).  · Whiplash and other injuries: Whiplash can result when an impact throws your head, forcing your neck too far forward, then too far backward. When combined, the two motions can cause a painful injury to different parts of your neck, such as muscles, ligaments, or joints. The most common cause of whiplash is a car accident. But it can also happen during a fall or sports injury.  · Weakened disks: A simple action, such as a sneeze or a cough, can cause one of your disks to bulge or rupture (herniate). A herniated disk can put pressure on your nerve and cause pain. Over time, disks can also thin out (degenerate). Flattened disks dont cushion vertebrae well and can cause vertebrae to rub together. Also, there is less space for the nerves. This can pinch nerves and cause pain.  · Weakened joints: Aging and injury can cause joints to slowly degenerate. Thinned joints can also cause vertebrae to rub together. This can cause abnormal growths of bone (bone spurs) to form on vertebrae. Bone spurs put pressure on nerves, causing pain.  Common symptoms  If you have a neck problem, you may have one or more of the following symptoms:  · Muscle tension and spasm: You may not be able to move your neck, arms, or shoulders comfortably if you have muscle tension or stiffness in your neck. If your symptoms arent relieved, you may experience muscle spasms, or knots of contracted tissue (trigger points) in areas of your neck and shoulders.  · Aches and pains: Dull aches in your head or neck, sharp pains, and swelling of the soft tissue of your neck and shoulders are common symptoms. If theres pressure on the nerves in your neck, you may feel pain in your arms or hands.  · Numbness or weakness: If you injure the nerves in your neck, you may have numbness, tingling, or weakness in your shoulders, arms, or hands. These symptoms arise when disks or bone spurs  press on the nerves in your neck. Severe disease can also affect your legs.  Date Last Reviewed: 8/23/2015  © 1610-6002 [employer]. [address] [place] [address], [place] [address]. All rights reserved. This information is not intended as a substitute for professional medical care. Always follow your healthcare professional's instructions.        Neck Problems: Relieving Your Symptoms  The first goal of treatment is to relieve your symptoms. Your healthcare provider may recommend self-care treatments. These include resting, applying ice and heat, taking medicine, and doing exercises. Your healthcare provider may also recommend that you see a physical therapist who can teach you ways to care for and strengthen your neck.     Heat relaxes sore muscles and helps relieve spasms.   Self-care treatments  Pain can end quickly or last awhile. Either way, youll want relief as soon as possible. Your healthcare provider can tell you which treatments to do at home to help relieve your pain.  · Lying down for a short time takes pressure from the head off the neck.  · Ice and heat can help reduce pain. To bring down swelling, rest an ice pack wrapped in a thin towel on your neck for 10 to 15 minutes. To relax sore muscles, apply a warm, wet towel to the area. Or you can take a warm bath or shower.  · Over-the-counter medicines, such as ibuprofen, naproxen, and aspirin, can help reduce pain and swelling. Acetaminophen can help relieve pain. Use these only as directed.  · Exercises can relax muscles and ease stiffness. To prepare, drape a warm, wet towel around your neck and shoulders for 5 minutes. Remove the towel. Then do any exercises recommended to you by your healthcare provider.  Physical therapy  If self-care treatments arent helping relieve neck pain, your healthcare provider may suggest physical therapy. Physical therapy is done by a specialist trained to treat injuries. Your physical therapist (PT) will teach  you how to strengthen muscles, improve the spines alignment, and help you move properly. Treatment methods used in physical therapy may include:  · Heat. A special heating pad called a neck pack may be applied to your neck.  · Exercises. Your PT will teach you exercises to help strengthen your neck and improve its range of motion.  · Joint mobilization. The PT gently moves your vertebrae to help restore motion in your neck joints and reduce neck pain.  · Soft tissue mobilization. The PT massages and stretches the muscles in your neck and shoulders.  · Electrical stimulation. Electrical impulses are sent into your neck. This helps reduce soreness and inflammation.  · Education in body mechanics. The PT shows you ways to position and move your body that protect the neck.  Other treatments  If physical therapy doesnt relieve your neck pain, your healthcare provider may suggest other treatments. For example, medicines or injections can help relieve pain and swelling. In some cases, surgery may be needed to treat neck problems.  Date Last Reviewed: 8/23/2015  © 1385-9607 [employer]. [address] [place] [address]. All rights reserved. This information is not intended as a substitute for professional medical care. Always follow your healthcare professional's instructions.        Protecting Your Neck: Posture and Body Mechanics  Protecting your neck from injuries and pain involves practicing good posture and body mechanics. This may mean correcting bad habits you have related to the way you hold and move your body. The tips below can help you improve your posture and body mechanics.  What is posture and why does it matter?     Using good posture while at your workstation can help prevent pain or injury.     Posture is the way you hold your body. For many of us, this means hunching over, thrusting the chin forward, and slouching the shoulders. But this kind of poor posture keeps muscles from  properly supporting the neck and puts stress on muscles, disks, ligaments, and joints in your neck. As a result, injury and pain can occur.  How is your posture?  Use a full-length mirror to check your posture. To begin, stand normally. Then slowly back up against a wall. Is there space between your head and the wall? Do you slouch your shoulders? Is your chin pointing up or down? All these can cause neck pain and injury.  Improving your posture  Follow these steps to improve your posture:  · Pull your shoulders back.  · Think of the ears, shoulders, and hips as a series of dots. Now, adjust your body to connect the dots in a straight line.  · Keep your chin level.  What are body mechanics and why do they matter?  The way you move and position your body during daily activities is called body mechanics. Good body mechanics help protect the neck. This means learning the right ways to stand, sit, and even sleep. So do whats best for your neck and practice good body mechanics.  Standing   To protect your neck while standing:  · Carry objects close to your body.  · Keep your ears and shoulders in a line while standing or walking.  · To lower yourself, bend at the knees with a straight back. Do this instead of looking down and reaching for objects.  · Work at eye level. Dont reach above your head or tilt your head back.  Sitting   To protect your neck while sitting:  · Set up your workstation so your monitor is at eye level. Also, use a document whatley when viewing papers or books.  · Keep your knees at or slightly below the level of your hips.  · Sit up straight, with feet flat on the floor. If your feet dont touch the floor, use a footrest.  · Avoid sitting or driving for long periods. Take frequent breaks.  Sleeping   To protect your neck while sleeping:  · Sleep on your back with a pillow under your knees or on your side with a pillow between bent knees. This helps align the spine.  · Avoid using pillows that are  too high or too low. Instead, use a neck roll or pillow under your neck while you sleep to keep the neck straight.  · Sleep on a mattress that supports you.  Date Last Reviewed: 8/23/2015  © 5943-5632 [employer]. [address] [place] [address], [place] [address]. All rights reserved. This information is not intended as a substitute for professional medical care. Always follow your healthcare professional's instructions.

## 2019-04-16 LAB
BACTERIA UR CULT: NORMAL
BACTERIA UR CULT: NORMAL

## 2019-04-18 ENCOUNTER — OFFICE VISIT (OUTPATIENT)
Dept: HEMATOLOGY/ONCOLOGY | Facility: CLINIC | Age: 84
End: 2019-04-18
Payer: MEDICARE

## 2019-04-18 ENCOUNTER — PATIENT MESSAGE (OUTPATIENT)
Dept: INTERNAL MEDICINE | Facility: CLINIC | Age: 84
End: 2019-04-18

## 2019-04-18 VITALS
SYSTOLIC BLOOD PRESSURE: 143 MMHG | TEMPERATURE: 98 F | HEART RATE: 98 BPM | HEIGHT: 59 IN | OXYGEN SATURATION: 95 % | BODY MASS INDEX: 26.4 KG/M2 | WEIGHT: 130.94 LBS | DIASTOLIC BLOOD PRESSURE: 65 MMHG | RESPIRATION RATE: 16 BRPM

## 2019-04-18 DIAGNOSIS — C83.35 DIFFUSE LARGE B-CELL LYMPHOMA OF LYMPH NODES OF LOWER EXTREMITY: Primary | ICD-10-CM

## 2019-04-18 PROCEDURE — 99215 OFFICE O/P EST HI 40 MIN: CPT | Mod: PBBFAC | Performed by: PHYSICIAN ASSISTANT

## 2019-04-18 PROCEDURE — 99213 OFFICE O/P EST LOW 20 MIN: CPT | Mod: HCNC,S$GLB,, | Performed by: PHYSICIAN ASSISTANT

## 2019-04-18 PROCEDURE — 99213 PR OFFICE/OUTPT VISIT, EST, LEVL III, 20-29 MIN: ICD-10-PCS | Mod: HCNC,S$GLB,, | Performed by: PHYSICIAN ASSISTANT

## 2019-04-18 PROCEDURE — 99999 PR PBB SHADOW E&M-EST. PATIENT-LVL V: CPT | Mod: PBBFAC,HCNC,, | Performed by: PHYSICIAN ASSISTANT

## 2019-04-18 PROCEDURE — 99999 PR PBB SHADOW E&M-EST. PATIENT-LVL V: ICD-10-PCS | Mod: PBBFAC,HCNC,, | Performed by: PHYSICIAN ASSISTANT

## 2019-04-18 NOTE — PROGRESS NOTES
Subjective:       Patient ID: Peri Johansen is a 84 y.o. female.    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   She underwent surgery with joanna placement, followed by CVP chemotherapy concurrent with radiation for 3 cycles. Then she received adjuvant weekly Rituxan x 4. All treatment was completed by December 2007.    Up to date on mammogram from 11/2018.     Reports feeling pretty good. Weight fairly stable.  No fevers, chills, or infections.  Still with occasional night sweats.  She notes some intermittent LUQ abdominal pain. She also has constipation, not taking anything OTC for relief.       Review of Systems   Constitutional: Negative for activity change, appetite change, chills, diaphoresis, fatigue, fever and unexpected weight change.        Rare night sweats reported   HENT: Negative for congestion, dental problem, hearing loss, mouth sores, postnasal drip, rhinorrhea, sneezing and trouble swallowing.    Eyes: Negative for visual disturbance.   Respiratory: Negative for cough, chest tightness and shortness of breath.    Cardiovascular: Negative for chest pain, palpitations and leg swelling.   Gastrointestinal: Negative for abdominal distention, abdominal pain, blood in stool, constipation, diarrhea, nausea and vomiting.        Abdominal discomfort, see HPI  +constipation     Genitourinary: Negative for difficulty urinating, dysuria and hematuria.   Musculoskeletal: Positive for arthralgias (chronic leg). Negative for back pain, gait problem, joint swelling, myalgias, neck pain and neck stiffness.   Skin: Negative for color change, pallor, rash and wound.        Patient feels back lipoma has increased in size, does not desire surgical management   Neurological: Negative for dizziness, weakness, light-headedness and headaches.    Hematological: Negative for adenopathy. Does not bruise/bleed easily.   Psychiatric/Behavioral: Negative for dysphoric mood. The patient is not nervous/anxious.        Objective:      Physical Exam   Constitutional: She is oriented to person, place, and time. She appears well-developed and well-nourished. No distress.   Presents with daughter and grandaughter     HENT:   Head: Normocephalic and atraumatic.   Nose: Nose normal.   Mouth/Throat: Oropharynx is clear and moist. No oropharyngeal exudate.   Eyes: Pupils are equal, round, and reactive to light. EOM are normal. No scleral icterus.   Neck: Normal range of motion. Neck supple. No thyromegaly present.   Cardiovascular: Normal rate, regular rhythm, normal heart sounds and intact distal pulses. Exam reveals no gallop and no friction rub.   No murmur heard.  Pulmonary/Chest: Effort normal and breath sounds normal. No respiratory distress. She has no wheezes. She has no rales. She exhibits no tenderness.   Lungs clear     Abdominal: Soft. Bowel sounds are normal. She exhibits no distension and no mass. There is no tenderness. There is no rebound and no guarding.   No hepatosplenomegaly  No reproducible tenderness at abdomen   Musculoskeletal: Normal range of motion. She exhibits no edema or tenderness.     No spinal or paraspinal tenderness to palpation   lipoma at mid back    Left lower extremity with some mild edema and tenderness to palpation at popliteal fossa     Lymphadenopathy:        Head (right side): No submental, no submandibular, no preauricular, no posterior auricular and no occipital adenopathy present.        Head (left side): No submental, no submandibular, no preauricular, no posterior auricular and no occipital adenopathy present.     She has no cervical adenopathy.     She has no axillary adenopathy.        Right: No inguinal and no supraclavicular adenopathy present.        Left: No inguinal and no supraclavicular adenopathy present.    Neurological: She is alert and oriented to person, place, and time. No cranial nerve deficit. She exhibits normal muscle tone. Coordination normal.   Skin: Skin is warm and dry. No rash noted. No erythema. No pallor.   Lipoma back   Psychiatric: She has a normal mood and affect. Her behavior is normal. Judgment and thought content normal.   Nursing note and vitals reviewed.      Assessment:       1. 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 Return to clinic in one year with cbc/cmp/ldh. Knows to call in interim if any issues.       Distress Screening Results: Psychosocial Distress screening score of Distress Score: 0 noted and reviewed. No intervention indicated.

## 2019-04-22 RX ORDER — ZOLPIDEM TARTRATE 5 MG/1
TABLET ORAL
Qty: 12 TABLET | Refills: 0 | OUTPATIENT
Start: 2019-04-22

## 2019-04-22 NOTE — TELEPHONE ENCOUNTER
----- Message from Radha Trivedi sent at 4/22/2019 11:03 AM CDT -----  Contact: [phone]  Patient is calling for an RX refill or new RX.  Is this a refill or new RX:  Refill   RX name and strength: zolpidem (AMBIEN) 5 mg Tab    Local pharmacy or mail order pharmacy:  [facility] [place] [address]   [phone] (Phone)  [phone] (Fax    Comments:  Please advise, thanks

## 2019-04-29 ENCOUNTER — TELEPHONE (OUTPATIENT)
Dept: SPINE | Facility: CLINIC | Age: 84
End: 2019-04-29

## 2019-04-29 DIAGNOSIS — M54.2 CERVICALGIA: Primary | ICD-10-CM

## 2019-04-30 ENCOUNTER — OFFICE VISIT (OUTPATIENT)
Dept: PAIN MEDICINE | Facility: CLINIC | Age: 84
End: 2019-04-30
Payer: MEDICARE

## 2019-04-30 ENCOUNTER — EXTERNAL CHRONIC CARE MANAGEMENT (OUTPATIENT)
Dept: PRIMARY CARE CLINIC | Facility: CLINIC | Age: 84
End: 2019-04-30
Payer: MEDICARE

## 2019-04-30 VITALS
HEART RATE: 85 BPM | WEIGHT: 125.69 LBS | DIASTOLIC BLOOD PRESSURE: 60 MMHG | TEMPERATURE: 98 F | RESPIRATION RATE: 18 BRPM | SYSTOLIC BLOOD PRESSURE: 110 MMHG | BODY MASS INDEX: 25.34 KG/M2 | HEIGHT: 59 IN

## 2019-04-30 DIAGNOSIS — M47.819 SPONDYLOSIS WITHOUT MYELOPATHY: ICD-10-CM

## 2019-04-30 DIAGNOSIS — M51.36 LUMBAR DEGENERATIVE DISC DISEASE: Primary | ICD-10-CM

## 2019-04-30 DIAGNOSIS — M46.1 SACROILIITIS: ICD-10-CM

## 2019-04-30 PROCEDURE — 99490 PR CHRONIC CARE MGMT, 1ST 20 MIN: ICD-10-PCS | Mod: S$GLB,,, | Performed by: INTERNAL MEDICINE

## 2019-04-30 PROCEDURE — 99204 OFFICE O/P NEW MOD 45 MIN: CPT | Mod: HCNC,S$GLB,, | Performed by: ANESTHESIOLOGY

## 2019-04-30 PROCEDURE — 99204 PR OFFICE/OUTPT VISIT, NEW, LEVL IV, 45-59 MIN: ICD-10-PCS | Mod: HCNC,S$GLB,, | Performed by: ANESTHESIOLOGY

## 2019-04-30 PROCEDURE — 99999 PR PBB SHADOW E&M-EST. PATIENT-LVL V: ICD-10-PCS | Mod: PBBFAC,HCNC,, | Performed by: ANESTHESIOLOGY

## 2019-04-30 PROCEDURE — 99490 CHRNC CARE MGMT STAFF 1ST 20: CPT | Mod: S$GLB,,, | Performed by: INTERNAL MEDICINE

## 2019-04-30 PROCEDURE — 99215 OFFICE O/P EST HI 40 MIN: CPT | Mod: PBBFAC | Performed by: ANESTHESIOLOGY

## 2019-04-30 PROCEDURE — 99999 PR PBB SHADOW E&M-EST. PATIENT-LVL V: CPT | Mod: PBBFAC,HCNC,, | Performed by: ANESTHESIOLOGY

## 2019-04-30 RX ORDER — DICLOFENAC SODIUM 10 MG/G
2 GEL TOPICAL 4 TIMES DAILY
Qty: 1 TUBE | Refills: 2 | Status: SHIPPED | OUTPATIENT
Start: 2019-04-30 | End: 2020-03-19 | Stop reason: SDUPTHER

## 2019-04-30 NOTE — LETTER
May 1, 2019      Annika Garland MD  [address]  [facility] [address]           [facility] [place] [address] [facility] [address]  [address]  [place] [address]  Phone: [phone]  Fax: [phone]          Patient: Peri Johansen   MR Number: [id number]   YOB: 1934   Date of Visit: 4/30/2019       Dear Dr. Annika Garland:    Thank you for referring Peri Johansen to me for evaluation. Attached you will find relevant portions of my assessment and plan of care.    If you have questions, please do not hesitate to call me. I look forward to following Peri Johansen along with you.    Sincerely,    Julian Posada MD    Enclosure  CC:  No Recipients    If you would like to receive this communication electronically, please contact [email or web address] or [phone] to request more information on [facility] access.    For providers and/or their staff who would like to refer a patient to [facility], please contact us through our one-stop-shop provider referral line, [facility] [place], at [phone].    If you feel you have received this communication in error or would no longer like to receive these types of communications, please e-mail [email or web address]

## 2019-04-30 NOTE — PROGRESS NOTES
"Chronic Pain - New Consult    Referring Physician: Annika Garland MD    Chief Complaint:   Chief Complaint   Patient presents with    Low-back Pain        SUBJECTIVE:    *Patient is a very difficult historian and declines to answer most questions initially including if she is having pain on parts of her exam. At times will answer briefly when asked repeatedly but often declines to answer questions despite several attempts to elicit a response.     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 The back pain started 1 year ago symptoms are sometimes worse than other times. She is unsure if groin pain started at the same time as back pain or not and does not know if they occur at the same time. Pain is not constant but is daily. The low back pain travels down the left lateral leg and into the foot. The pain is described as throbbing and is rated as 9/10. The pain is rated with a score of  9/10 on the BEST day and a score of 10/10 on the WORST day.  Symptoms interfere with daily activity. Cannot answer what makes pain worse. The pain is mitigated by walking and tramadol. She is getting Tramadol from Dr. Garland. She takes Tramadol 50mg daily. She has never tried any other medication. She has never tried injections to her back.  She reports spending 2 hours per day reclining. The patient reports 6-7 hours of uninterrupted sleep per night.     Patient reports she has been losing weight (6lbs) over the past few weeks. Appetite is "alright". Denies fever or chills. Has chronic night sweats.     Patient denies urinary incontinence, bowel incontinence, significant motor weakness and loss of sensations. Denies weakness or clumsiness.     Physical Therapy/Home Exercise: yes, but it didn't help    Pain Disability Index Review:  Last 3 PDI Scores 4/30/2019   Pain Disability " Index (PDI) 53       Pain Medications:    - Opioids: Ultram (Tramadol HCL)     report:  Not applicable    Pain Procedures:  none    Imagin14 Xray Lumbar Spine  Narrative     Lumbar spine lateral with flexion-extension.    Findings: There is grade 1 anterolisthesis of L4 relation to L5.  The vertebral body heights are satisfactorily maintained.  There is mild degenerative endplate change with facet hypertrophy throughout the lumbar spine along with loss of disk space   height at L5-S1.  There is no fracture, dislocation, or bony erosion.  There is no instability on flexion-extension.  There is calcification of the aorta.  Five views.      Impression      As above.     14 Xray Left Tibia  Narrative     Internal fixation can be seen in the tibia.  Alignment appears to be excellent.  The bones are mildly demineralized .  calcaneal spurs are noted and degenerative changes are seen at the knee.      Impression      See above         Past Medical History:   Diagnosis Date    Aortic atherosclerosis 2016    Arthritis     Colon polyp: hyperplastic - repeat 2020 2015    Diabetes mellitus, type II 2012    Diverticulosis     Gastric nodule 2014    GERD (gastroesophageal reflux disease) 2012    Goiter 2012    Hyperlipidemia 2012    Hypertension     Joint pain     Leg pain 2012    Lymphoma 2012    Osteopenia 2012    Osteoporosis     Renal cyst 3/28/2013    Rickets, vitamin D deficiency 2012    Thyroid nodule 2014    Vitamin D deficiency disease 2012     Past Surgical History:   Procedure Laterality Date    CARPAL TUNNEL RELEASE      CATARACT EXTRACTION W/  INTRAOCULAR LENS IMPLANT Bilateral     COLONOSCOPY N/A 2015    [facility]min [facility]wood [facility] ([facility] F[facility])    COLONOSCOPY N/A 2015    Per[facility] by Miko Santana [facility] at [facility] (4TH FLR)    EGD (ESOPHAGOGASTRODUODENOSCOPY) N/A 2013     Performed by Jose Miguel Rodriguez [facility] at [facility] (4TH FLR)    HYSTERECTOMY      partial    lymphoma surgery      L tibia    RELEASE-CARPAL TUNNEL left Left 9/7/2016    Performed by Annika Kolb MD at [facility] OR 1ST FLR    ULTRASOUND, UPPER GI TRACT, ENDOSCOPIC N/A 7/9/2013    Performed by Hong Huerta MD at [facility] ENDO (2ND FLR)     Social History     Socioeconomic History    Marital status: Single     Spouse name: Not on file    Number of children: Not on file    Years of education: Not on file    Highest education level: Not on file   Occupational History    Not on file   Social Needs    Financial resource strain: Not on file    Food insecurity:     Worry: Not on file     Inability: Not on file    Transportation needs:     Medical: Not on file     Non-medical: Not on file   Tobacco Use    Smoking status: Never Smoker    Smokeless tobacco: Never Used   Substance and Sexual Activity    Alcohol use: No     Alcohol/week: 0.0 oz    Drug use: No    Sexual activity: Not Currently   Lifestyle    Physical activity:     Days per week: Not on file     Minutes per session: Not on file    Stress: Not on file   Relationships    Social connections:     Talks on phone: Not on file     Gets together: Not on file     Attends Restorationism service: Not on file     Active member of club or organization: Not on file     Attends meetings of clubs or organizations: Not on file     Relationship status: Not on file   Other Topics Concern    Are you pregnant or think you may be? No    Breast-feeding No   Social History Narrative    Not on file     Family History   Problem Relation Age of Onset    Hypertension Mother     Hypertension Father     Heart disease Father     Breast cancer Maternal Aunt     No Known Problems Maternal Uncle     No Known Problems Paternal Aunt     No Known Problems Paternal Uncle     No Known Problems Maternal Grandmother     No Known Problems Maternal Grandfather     No Known  Problems Paternal Grandmother     No Known Problems Paternal Grandfather     Cancer Brother         colon    No Known Problems Daughter     No Known Problems Son     No Known Problems Son     No Known Problems Daughter     No Known Problems Daughter     Diabetes Neg Hx     Glaucoma Neg Hx     Amblyopia Neg Hx     Blindness Neg Hx     Cataracts Neg Hx     Macular degeneration Neg Hx     Retinal detachment Neg Hx     Strabismus Neg Hx     Stroke Neg Hx     Thyroid disease Neg Hx     Ovarian cancer Neg Hx     Liver disease Neg Hx     Liver cancer Neg Hx     Cirrhosis Neg Hx     Colon polyps Neg Hx     Colon cancer Neg Hx     Melanoma Neg Hx        Review of patient's allergies indicates:   Allergen Reactions    Latex, natural rubber Itching       Current Outpatient Medications   Medication Sig    aspirin 81 MG Chew Take 1 tablet (81 mg total) by mouth once daily.    atorvastatin (LIPITOR) 40 MG tablet TK 1 T PO ONCE D    blood-glucose meter kit Please dispense meter, lancets, and testing strips covered by insurance    calcium 500 mg Tab Take 1 tablet by mouth Twice daily.    dextran 70-hypromellose (ARTIFICIAL TEARS,[id number],) ophthalmic solution Place 1 drop into both eyes 4 (four) times daily as needed.    diphth,pertus,acell,,tetanus (BOOSTRIX TDAP) 2.5-8-5 Lf-mcg-Lf/0.5mL Syrg injection Inject 0.5 mLs into the muscle.    fluticasone (FLONASE) 50 mcg/actuation nasal spray 1 spray (50 mcg total) by Each Nare route once daily.    gabapentin (NEURONTIN) 300 MG capsule Take 1 capsule (300 mg total) by mouth 2 (two) times daily.    metFORMIN (GLUCOPHAGE) 1000 MG tablet Take 1 tablet (1,000 mg total) by mouth 2 (two) times daily with meals.    methIMAzole (TAPAZOLE) 5 MG Tab 1/2 tablet (2.5mg) daily    MICRO THIN LANCETS 33 gauge Misc USE AS DIRECTED    neomycin-polymyxin-hydrocortisone (CORTISPORIN) 3.5-10,000-1 mg/mL-unit/mL-% otic suspension Place 3.5 drops into both ears daily  "2 hours after breakfast.    ONE TOUCH DELICA LANCETS 30 gauge Misc     ONETOUCH ULTRA BLUE TEST STRIP Strp TEST BLOOD TWICE DAILY    traMADol (ULTRAM) 50 mg tablet Take 1 tablet (50 mg total) by mouth every 12 (twelve) hours as needed for Pain.    TRUE METRIX GLUCOSE TEST STRIP Strp USE AS DIRECTED    ammonium lactate 12 % Crea Apply 1 application topically once daily.    diclofenac sodium (VOLTAREN) 1 % Gel Apply 2 g topically 4 (four) times daily.    diclofenac sodium (VOLTAREN) 1 % Gel Apply 2 g topically 4 (four) times daily.    glimepiride (AMARYL) 2 MG tablet TAKE 1 TABLET BY MOUTH BEFORE BREAKFAST.    omeprazole (PRILOSEC) 40 MG capsule TAKE 1 CAPSULE(40 MG) BY MOUTH DAILY AS NEEDED     No current facility-administered medications for this visit.        REVIEW OF SYSTEMS:  *Patient declines to answer most ROS questions but does endorse constipation and left foot/ankle swelling as exam proceeds.     OBJECTIVE:    /60   Pulse 85   Temp 97.9 °F (36.6 °C) (Oral)   Resp 18   Ht 4' 11" (1.499 m)   Wt 57 kg (125 lb 11.2 oz)   BMI 25.39 kg/m²     PHYSICAL EXAMINATION:    General appearance: Well appearing, in no acute distress, alert and oriented x3.  Psych:  Mood and affect negative and frustrated  Skin: Skin color, texture, turgor normal, no rashes or lesions, in both upper and lower body. Large lipoma right of midline/thoracic spine  Cor: RRR  Pulm: CTA  GI:  Soft and non-tender.  Back: TTP b/l SIJs. No TTP b/l GTBs. Decreased ROM of left knee 2/2 to pain. Pain to light touch along distal LLE.   Extremities: Good capillary refill.  Musculoskeletal: Right lower extremity strength is normal. Left hip flexor strength is normal. Patient refuses to give effort distally 2/2 to pain.  No atrophy or tone abnormalities are noted.  Neuro: Bilateral lower extremity coordination and muscle stretch reflexes are physiologic and symmetric.  Plantar response are downgoing. No loss of sensation is noted. No " clonus.  Gait: Slow/antalgic     ASSESSMENT: 84 y.o. year old female with low back pain, consistent with:    1. Lumbar degenerative disc disease  Ambulatory consult to Physical Therapy    MRI Lumbar Spine Without Contrast    diclofenac sodium (VOLTAREN) 1 % Gel   2. Spondylosis without myelopathy     3. Sacroiliitis         PLAN:   --MRI lumbar spine ordered for patient with history of cancer, night sweats, and back+LLE radicular type pain  -Will send patient to PT. Counseled her on the importance of staying active.   -Rx for Voltaren gel to be applied to painful area of back and LLE.   -Will plan for bilateral SIJ injections under fluoroscopy. Patient informed and consented.   - RTC 2 weeks after procedure.       The above plan and management options were discussed at length with patient. Patient is in agreement with the above and verbalized understanding. It will be communicated with the referring physician via electronic record, fax, or mail.    Palak Bradford MD  PGY-2 [facility] PM[facility]    I have reviewed and concur with the resident's history, physical, assessment, and plan.  I have personally interviewed and examined the patient at bedside.  See below addendum for my evaluation and additional findings.  84-year-old female with chronic lower back pain consistent with bilateral sacroiliac joint dysfunction.  On exam she has some interventional spine and tenderness to palpation over bilateral sacroiliac joints.  We will schedule her for bilateral SI joint injection under fluoroscopy. Will refer to physical therapy in order lumbar spine MRI to further evaluate the etiology her lower back pain. Also ordered Voltaren gel to apply over the painful area as needed.    Julian Posada MD

## 2019-05-01 ENCOUNTER — TELEPHONE (OUTPATIENT)
Dept: PAIN MEDICINE | Facility: CLINIC | Age: 84
End: 2019-05-01

## 2019-05-01 PROBLEM — M46.1 SACROILIITIS: Status: ACTIVE | Noted: 2019-05-01

## 2019-05-01 PROBLEM — M53.3 SACROILIAC JOINT DYSFUNCTION OF BOTH SIDES: Status: ACTIVE | Noted: 2019-05-01

## 2019-05-01 NOTE — TELEPHONE ENCOUNTER
----- Message from Allyn Trejo sent at 5/1/2019  3:12 PM CDT -----  Left message with female asking pt. To call me to schedule procedure with Dr. Posada.

## 2019-05-06 RX ORDER — ZOLPIDEM TARTRATE 5 MG/1
TABLET ORAL
Qty: 12 TABLET | Refills: 0 | Status: SHIPPED | OUTPATIENT
Start: 2019-05-06 | End: 2019-06-07 | Stop reason: SDUPTHER

## 2019-05-08 ENCOUNTER — TELEPHONE (OUTPATIENT)
Dept: INTERNAL MEDICINE | Facility: CLINIC | Age: 84
End: 2019-05-08

## 2019-05-08 NOTE — TELEPHONE ENCOUNTER
"----- Message from Sri Terry sent at 5/8/2019  9:49 AM CDT -----  Prior Authorization Needed    Rx:     To submit the PA:    1: Go to " [email or web address] " and click "Enter a Key"    2. Enter the patient's last name and date of birth and the key.      KEY: [id number]    3. Complete the forms and click "send to Plan"    Note chart when prior authorization has been submitted.    Please notify pharmacy when prior authorization has been approved.    Thank You    "

## 2019-05-10 ENCOUNTER — TELEPHONE (OUTPATIENT)
Dept: INTERNAL MEDICINE | Facility: CLINIC | Age: 84
End: 2019-05-10

## 2019-05-10 NOTE — TELEPHONE ENCOUNTER
Spoke to pt----pt c/o burning and urinary frequency x 2 days. There are no available appts for tomorrow. Advised pt to go to urgent care near her home. Pt agreed.

## 2019-05-10 NOTE — TELEPHONE ENCOUNTER
----- Message from Sanya Gomez sent at 5/10/2019  3:56 PM CDT -----  Contact: Patient [phone]  Urgent     Patient would like to get medical advice.  Symptoms (please be specific):  Burning when urinating     Pharmacy name and phone #:  [id number] - [facility] [address] [facility] AT [facility] [phone] (Phone)  [phone] (Fax)     Comments: patient stating constantly going to bathroom and when does burns when  Urinating, would like a  Rx sent to local pharmacy.    Please call an advise  Thank you

## 2019-05-15 ENCOUNTER — TELEPHONE (OUTPATIENT)
Dept: PAIN MEDICINE | Facility: CLINIC | Age: 84
End: 2019-05-15

## 2019-05-15 NOTE — TELEPHONE ENCOUNTER
----- Message from Allyn Trejo sent at 5/15/2019  3:21 PM CDT -----  Pt. Will call to schedule procedure with Dr. Posada when she find a  , paperwork on file.

## 2019-05-20 ENCOUNTER — HOSPITAL ENCOUNTER (OUTPATIENT)
Dept: RADIOLOGY | Facility: HOSPITAL | Age: 84
Discharge: HOME | End: 2019-05-20
Attending: INTERNAL MEDICINE
Payer: MEDICARE

## 2019-05-20 ENCOUNTER — HOSPITAL ENCOUNTER (OUTPATIENT)
Dept: RADIOLOGY | Facility: HOSPITAL | Age: 84
Discharge: HOME | End: 2019-05-20
Attending: PHYSICIAN ASSISTANT
Payer: MEDICARE

## 2019-05-20 DIAGNOSIS — M54.2 CERVICALGIA: ICD-10-CM

## 2019-05-20 DIAGNOSIS — E04.1 THYROID NODULE: ICD-10-CM

## 2019-05-20 PROCEDURE — 72050 X-RAY EXAM NECK SPINE 4/5VWS: CPT | Mod: TC,HCNC

## 2019-05-20 PROCEDURE — 76536 US EXAM OF HEAD AND NECK: CPT | Mod: 26,HCNC,, | Performed by: INTERNAL MEDICINE

## 2019-05-20 PROCEDURE — 76536 US EXAM OF HEAD AND NECK: CPT | Mod: TC,HCNC

## 2019-05-20 PROCEDURE — 72050 XR CERVICAL SPINE AP LAT WITH FLEX EXTEN: ICD-10-PCS | Mod: 26,HCNC,, | Performed by: RADIOLOGY

## 2019-05-20 PROCEDURE — 72050 X-RAY EXAM NECK SPINE 4/5VWS: CPT | Mod: 26,HCNC,, | Performed by: RADIOLOGY

## 2019-05-20 PROCEDURE — 76536 US SOFT TISSUE HEAD NECK THYROID: ICD-10-PCS | Mod: 26,HCNC,, | Performed by: INTERNAL MEDICINE

## 2019-05-24 ENCOUNTER — PATIENT MESSAGE (OUTPATIENT)
Dept: INTERNAL MEDICINE | Facility: CLINIC | Age: 84
End: 2019-05-24

## 2019-05-31 ENCOUNTER — EXTERNAL CHRONIC CARE MANAGEMENT (OUTPATIENT)
Dept: PRIMARY CARE CLINIC | Facility: CLINIC | Age: 84
End: 2019-05-31
Payer: MEDICARE

## 2019-05-31 PROCEDURE — 99490 PR CHRONIC CARE MGMT, 1ST 20 MIN: ICD-10-PCS | Mod: S$GLB,,, | Performed by: INTERNAL MEDICINE

## 2019-05-31 PROCEDURE — 99490 CHRNC CARE MGMT STAFF 1ST 20: CPT | Mod: S$GLB,,, | Performed by: INTERNAL MEDICINE

## 2019-06-07 RX ORDER — ZOLPIDEM TARTRATE 5 MG/1
TABLET ORAL
Qty: 12 TABLET | Refills: 0 | Status: SHIPPED | OUTPATIENT
Start: 2019-06-07 | End: 2019-06-08

## 2019-06-07 NOTE — TELEPHONE ENCOUNTER
----- Message from Mechelle Pacheco sent at 6/7/2019  4:22 PM CDT -----  Contact: Self [phone]  Pt is calling to follow up on refill request for zolpidem (AMBIEN) 5 MG Tab

## 2019-06-08 RX ORDER — ZOLPIDEM TARTRATE 5 MG/1
TABLET ORAL
Qty: 12 TABLET | Refills: 0 | OUTPATIENT
Start: 2019-06-08

## 2019-06-08 NOTE — TELEPHONE ENCOUNTER
Spoke with pt, she states that she doesn't take Ambien, she would like the Rx removed from her chart.

## 2019-06-10 ENCOUNTER — TELEPHONE (OUTPATIENT)
Dept: INTERNAL MEDICINE | Facility: CLINIC | Age: 84
End: 2019-06-10

## 2019-06-10 NOTE — TELEPHONE ENCOUNTER
----- Message from Sri Terry sent at 6/10/2019  9:15 AM CDT -----  Pt is requesting a refill on the following medication: Zolpidem 5 mg tablets     Pharmacy: [facility] [address] [place] [id number]      Thank you

## 2019-06-10 NOTE — TELEPHONE ENCOUNTER
"Please call her back- this is very confusing.    Davy just spoke to her last week and the note said:    "Spoke with pt, she states that she doesn't take Ambien, she would like the Rx removed from her chart."  "

## 2019-06-12 ENCOUNTER — HOSPITAL ENCOUNTER (OUTPATIENT)
Facility: HOSPITAL | Age: 84
Discharge: HOME | End: 2019-06-14
Attending: EMERGENCY MEDICINE | Admitting: EMERGENCY MEDICINE
Payer: MEDICARE

## 2019-06-12 ENCOUNTER — OFFICE VISIT (OUTPATIENT)
Dept: URGENT CARE | Facility: CLINIC | Age: 84
End: 2019-06-12
Payer: MEDICARE

## 2019-06-12 VITALS
RESPIRATION RATE: 16 BRPM | BODY MASS INDEX: 25.2 KG/M2 | TEMPERATURE: 99 F | HEIGHT: 59 IN | SYSTOLIC BLOOD PRESSURE: 132 MMHG | HEART RATE: 108 BPM | WEIGHT: 125 LBS | DIASTOLIC BLOOD PRESSURE: 77 MMHG | OXYGEN SATURATION: 94 %

## 2019-06-12 DIAGNOSIS — R07.9 CHEST PAIN, UNSPECIFIED TYPE: Primary | ICD-10-CM

## 2019-06-12 DIAGNOSIS — R07.9 CHEST PAIN: ICD-10-CM

## 2019-06-12 DIAGNOSIS — I50.9 ACUTE CONGESTIVE HEART FAILURE, UNSPECIFIED HEART FAILURE TYPE: Primary | ICD-10-CM

## 2019-06-12 DIAGNOSIS — I50.9 CHF (CONGESTIVE HEART FAILURE): ICD-10-CM

## 2019-06-12 PROBLEM — R74.8 ABNORMAL LIVER ENZYMES: Status: ACTIVE | Noted: 2019-06-12

## 2019-06-12 PROBLEM — R79.89 ELEVATED TROPONIN: Status: ACTIVE | Noted: 2019-06-12

## 2019-06-12 PROBLEM — E11.65 TYPE 2 DIABETES MELLITUS WITH HYPERGLYCEMIA, WITHOUT LONG-TERM CURRENT USE OF INSULIN: Status: ACTIVE | Noted: 2019-06-12

## 2019-06-12 LAB
ALBUMIN SERPL BCP-MCNC: 3.1 G/DL (ref 3.5–5.2)
ALP SERPL-CCNC: 99 U/L (ref 55–135)
ALT SERPL W/O P-5'-P-CCNC: 96 U/L (ref 10–44)
ANION GAP SERPL CALC-SCNC: 10 MMOL/L (ref 8–16)
AST SERPL-CCNC: 101 U/L (ref 10–40)
BASOPHILS # BLD AUTO: 0.03 K/UL (ref 0–0.2)
BASOPHILS NFR BLD: 0.5 % (ref 0–1.9)
BILIRUB SERPL-MCNC: 1 MG/DL (ref 0.1–1)
BNP SERPL-MCNC: 298 PG/ML (ref 0–99)
BUN SERPL-MCNC: 13 MG/DL (ref 8–23)
CALCIUM SERPL-MCNC: 9.6 MG/DL (ref 8.7–10.5)
CHLORIDE SERPL-SCNC: 108 MMOL/L (ref 95–110)
CO2 SERPL-SCNC: 23 MMOL/L (ref 23–29)
CREAT SERPL-MCNC: 0.7 MG/DL (ref 0.5–1.4)
DIFFERENTIAL METHOD: NORMAL
EOSINOPHIL # BLD AUTO: 0.1 K/UL (ref 0–0.5)
EOSINOPHIL NFR BLD: 1.3 % (ref 0–8)
ERYTHROCYTE [DISTWIDTH] IN BLOOD BY AUTOMATED COUNT: 14 % (ref 11.5–14.5)
EST. GFR  (AFRICAN AMERICAN): >60 ML/MIN/1.73 M^2
EST. GFR  (NON AFRICAN AMERICAN): >60 ML/MIN/1.73 M^2
GLUCOSE SERPL-MCNC: 116 MG/DL (ref 70–110)
HCT VFR BLD AUTO: 40.5 % (ref 37–48.5)
HGB BLD-MCNC: 13.7 G/DL (ref 12–16)
IMM GRANULOCYTES # BLD AUTO: 0.01 K/UL (ref 0–0.04)
IMM GRANULOCYTES NFR BLD AUTO: 0.2 % (ref 0–0.5)
INR PPP: 1.1 (ref 0.8–1.2)
LYMPHOCYTES # BLD AUTO: 2.1 K/UL (ref 1–4.8)
LYMPHOCYTES NFR BLD: 33.7 % (ref 18–48)
MCH RBC QN AUTO: 29 PG (ref 27–31)
MCHC RBC AUTO-ENTMCNC: 33.8 G/DL (ref 32–36)
MCV RBC AUTO: 86 FL (ref 82–98)
MONOCYTES # BLD AUTO: 0.6 K/UL (ref 0.3–1)
MONOCYTES NFR BLD: 10.1 % (ref 4–15)
NEUTROPHILS # BLD AUTO: 3.3 K/UL (ref 1.8–7.7)
NEUTROPHILS NFR BLD: 54.2 % (ref 38–73)
NRBC BLD-RTO: 0 /100 WBC
PLATELET # BLD AUTO: 226 K/UL (ref 150–350)
PMV BLD AUTO: 11.3 FL (ref 9.2–12.9)
POCT GLUCOSE: 115 MG/DL (ref 70–110)
POTASSIUM SERPL-SCNC: 4 MMOL/L (ref 3.5–5.1)
PROT SERPL-MCNC: 7.4 G/DL (ref 6–8.4)
PROTHROMBIN TIME: 10.9 SEC (ref 9–12.5)
RBC # BLD AUTO: 4.72 M/UL (ref 4–5.4)
SODIUM SERPL-SCNC: 141 MMOL/L (ref 136–145)
TROPONIN I SERPL DL<=0.01 NG/ML-MCNC: 0.07 NG/ML (ref 0–0.03)
WBC # BLD AUTO: 6.11 K/UL (ref 3.9–12.7)

## 2019-06-12 PROCEDURE — G0378 HOSPITAL OBSERVATION PER HR: HCPCS

## 2019-06-12 PROCEDURE — 93010 EKG 12-LEAD: ICD-10-PCS | Mod: ,,, | Performed by: INTERNAL MEDICINE

## 2019-06-12 PROCEDURE — 99291 CRITICAL CARE FIRST HOUR: CPT | Mod: 25

## 2019-06-12 PROCEDURE — 80053 COMPREHEN METABOLIC PANEL: CPT

## 2019-06-12 PROCEDURE — 85610 PROTHROMBIN TIME: CPT

## 2019-06-12 PROCEDURE — 93010 ELECTROCARDIOGRAM REPORT: CPT | Mod: 76,S$GLB,, | Performed by: INTERNAL MEDICINE

## 2019-06-12 PROCEDURE — 99220 PR INITIAL OBSERVATION CARE,LEVL III: CPT | Mod: ,,, | Performed by: PHYSICIAN ASSISTANT

## 2019-06-12 PROCEDURE — 93005 ELECTROCARDIOGRAM TRACING: CPT

## 2019-06-12 PROCEDURE — 85025 COMPLETE CBC W/AUTO DIFF WBC: CPT

## 2019-06-12 PROCEDURE — 63600175 PHARM REV CODE 636 W HCPCS: Performed by: PHYSICIAN ASSISTANT

## 2019-06-12 PROCEDURE — 99291 PR CRITICAL CARE, E/M 30-74 MINUTES: ICD-10-PCS | Mod: HCNC,,, | Performed by: EMERGENCY MEDICINE

## 2019-06-12 PROCEDURE — 96374 THER/PROPH/DIAG INJ IV PUSH: CPT

## 2019-06-12 PROCEDURE — 83880 ASSAY OF NATRIURETIC PEPTIDE: CPT

## 2019-06-12 PROCEDURE — 99220 PR INITIAL OBSERVATION CARE,LEVL III: ICD-10-PCS | Mod: ,,, | Performed by: PHYSICIAN ASSISTANT

## 2019-06-12 PROCEDURE — 99215 OFFICE O/P EST HI 40 MIN: CPT | Mod: S$GLB,,, | Performed by: EMERGENCY MEDICINE

## 2019-06-12 PROCEDURE — 99291 CRITICAL CARE FIRST HOUR: CPT | Mod: HCNC,,, | Performed by: EMERGENCY MEDICINE

## 2019-06-12 PROCEDURE — 63600175 PHARM REV CODE 636 W HCPCS: Performed by: EMERGENCY MEDICINE

## 2019-06-12 PROCEDURE — 93010 ELECTROCARDIOGRAM REPORT: CPT | Mod: ,,, | Performed by: INTERNAL MEDICINE

## 2019-06-12 PROCEDURE — 25000003 PHARM REV CODE 250: Performed by: PHYSICIAN ASSISTANT

## 2019-06-12 PROCEDURE — 93010 EKG 12-LEAD: ICD-10-PCS | Mod: 76,S$GLB,, | Performed by: INTERNAL MEDICINE

## 2019-06-12 PROCEDURE — 84484 ASSAY OF TROPONIN QUANT: CPT

## 2019-06-12 PROCEDURE — 84484 ASSAY OF TROPONIN QUANT: CPT | Mod: 91

## 2019-06-12 PROCEDURE — 99215 PR OFFICE/OUTPT VISIT, EST, LEVL V, 40-54 MIN: ICD-10-PCS | Mod: S$GLB,,, | Performed by: EMERGENCY MEDICINE

## 2019-06-12 PROCEDURE — 25000003 PHARM REV CODE 250: Performed by: EMERGENCY MEDICINE

## 2019-06-12 RX ORDER — ACETAMINOPHEN 325 MG/1
650 TABLET ORAL EVERY 4 HOURS PRN
Status: DISCONTINUED | OUTPATIENT
Start: 2019-06-12 | End: 2019-06-14 | Stop reason: HOSPADM

## 2019-06-12 RX ORDER — ATORVASTATIN CALCIUM 20 MG/1
40 TABLET, FILM COATED ORAL DAILY
Status: DISCONTINUED | OUTPATIENT
Start: 2019-06-13 | End: 2019-06-14 | Stop reason: HOSPADM

## 2019-06-12 RX ORDER — NAPROXEN SODIUM 220 MG/1
81 TABLET, FILM COATED ORAL DAILY
Status: DISCONTINUED | OUTPATIENT
Start: 2019-06-13 | End: 2019-06-14 | Stop reason: HOSPADM

## 2019-06-12 RX ORDER — GLUCAGON 1 MG
1 KIT INJECTION
Status: DISCONTINUED | OUTPATIENT
Start: 2019-06-12 | End: 2019-06-14 | Stop reason: HOSPADM

## 2019-06-12 RX ORDER — GABAPENTIN 300 MG/1
300 CAPSULE ORAL 2 TIMES DAILY
Status: DISCONTINUED | OUTPATIENT
Start: 2019-06-12 | End: 2019-06-14 | Stop reason: HOSPADM

## 2019-06-12 RX ORDER — ASPIRIN 325 MG
325 TABLET ORAL
Status: COMPLETED | OUTPATIENT
Start: 2019-06-12 | End: 2019-06-12

## 2019-06-12 RX ORDER — RAMELTEON 8 MG/1
8 TABLET ORAL NIGHTLY PRN
Status: DISCONTINUED | OUTPATIENT
Start: 2019-06-12 | End: 2019-06-14 | Stop reason: HOSPADM

## 2019-06-12 RX ORDER — FUROSEMIDE 10 MG/ML
40 INJECTION INTRAMUSCULAR; INTRAVENOUS 2 TIMES DAILY
Status: DISCONTINUED | OUTPATIENT
Start: 2019-06-13 | End: 2019-06-14

## 2019-06-12 RX ORDER — ONDANSETRON 8 MG/1
8 TABLET, ORALLY DISINTEGRATING ORAL EVERY 8 HOURS PRN
Status: DISCONTINUED | OUTPATIENT
Start: 2019-06-12 | End: 2019-06-14 | Stop reason: HOSPADM

## 2019-06-12 RX ORDER — ASPIRIN 325 MG
325 TABLET ORAL
Status: DISCONTINUED | OUTPATIENT
Start: 2019-06-12 | End: 2019-06-12 | Stop reason: HOSPADM

## 2019-06-12 RX ORDER — FUROSEMIDE 10 MG/ML
40 INJECTION INTRAMUSCULAR; INTRAVENOUS
Status: COMPLETED | OUTPATIENT
Start: 2019-06-12 | End: 2019-06-12

## 2019-06-12 RX ORDER — SODIUM CHLORIDE 0.9 % (FLUSH) 0.9 %
5 SYRINGE (ML) INJECTION
Status: DISCONTINUED | OUTPATIENT
Start: 2019-06-12 | End: 2019-06-14 | Stop reason: HOSPADM

## 2019-06-12 RX ORDER — PANTOPRAZOLE SODIUM 40 MG/1
40 TABLET, DELAYED RELEASE ORAL DAILY
Status: DISCONTINUED | OUTPATIENT
Start: 2019-06-13 | End: 2019-06-14 | Stop reason: HOSPADM

## 2019-06-12 RX ORDER — ACETAMINOPHEN 500 MG
1000 TABLET ORAL EVERY 8 HOURS PRN
Status: DISCONTINUED | OUTPATIENT
Start: 2019-06-12 | End: 2019-06-14 | Stop reason: HOSPADM

## 2019-06-12 RX ORDER — SODIUM CHLORIDE 0.9 % (FLUSH) 0.9 %
10 SYRINGE (ML) INJECTION
Status: CANCELLED | OUTPATIENT
Start: 2019-06-12

## 2019-06-12 RX ORDER — IPRATROPIUM BROMIDE AND ALBUTEROL SULFATE 2.5; .5 MG/3ML; MG/3ML
3 SOLUTION RESPIRATORY (INHALATION) EVERY 4 HOURS PRN
Status: DISCONTINUED | OUTPATIENT
Start: 2019-06-12 | End: 2019-06-14 | Stop reason: HOSPADM

## 2019-06-12 RX ORDER — IBUPROFEN 200 MG
24 TABLET ORAL
Status: DISCONTINUED | OUTPATIENT
Start: 2019-06-12 | End: 2019-06-14 | Stop reason: HOSPADM

## 2019-06-12 RX ORDER — INSULIN ASPART 100 [IU]/ML
0-5 INJECTION, SOLUTION INTRAVENOUS; SUBCUTANEOUS
Status: DISCONTINUED | OUTPATIENT
Start: 2019-06-12 | End: 2019-06-14 | Stop reason: HOSPADM

## 2019-06-12 RX ORDER — POLYETHYLENE GLYCOL 3350 17 G/17G
17 POWDER, FOR SOLUTION ORAL DAILY
Status: DISCONTINUED | OUTPATIENT
Start: 2019-06-13 | End: 2019-06-14 | Stop reason: HOSPADM

## 2019-06-12 RX ORDER — BISACODYL 10 MG
10 SUPPOSITORY, RECTAL RECTAL DAILY PRN
Status: DISCONTINUED | OUTPATIENT
Start: 2019-06-12 | End: 2019-06-14 | Stop reason: HOSPADM

## 2019-06-12 RX ORDER — ENOXAPARIN SODIUM 100 MG/ML
40 INJECTION SUBCUTANEOUS EVERY 24 HOURS
Status: DISCONTINUED | OUTPATIENT
Start: 2019-06-12 | End: 2019-06-14 | Stop reason: HOSPADM

## 2019-06-12 RX ORDER — IBUPROFEN 200 MG
16 TABLET ORAL
Status: DISCONTINUED | OUTPATIENT
Start: 2019-06-12 | End: 2019-06-14 | Stop reason: HOSPADM

## 2019-06-12 RX ADMIN — ENOXAPARIN SODIUM 40 MG: 100 INJECTION SUBCUTANEOUS at 10:06

## 2019-06-12 RX ADMIN — FUROSEMIDE 40 MG: 10 INJECTION, SOLUTION INTRAMUSCULAR; INTRAVENOUS at 04:06

## 2019-06-12 RX ADMIN — ASPIRIN 325 MG ORAL TABLET 325 MG: 325 PILL ORAL at 04:06

## 2019-06-12 RX ADMIN — Medication 325 MG: at 12:06

## 2019-06-12 RX ADMIN — GABAPENTIN 300 MG: 300 CAPSULE ORAL at 10:06

## 2019-06-12 NOTE — PATIENT INSTRUCTIONS
Go to ER NOW       Uncertain Causes of Chest Pain    Chest pain can happen for a number of reasons. Sometimes the cause can't be determined. If your condition does not seem serious, and your pain does not appear to be coming from your heart, your healthcare provider may recommend watching it closely. Sometimes the signs of a serious problem take more time to appear. Many problems not related to your heart can cause chest pain.These include:  · Musculoskeletal. Costochondritis, an inflammation of the tissues around the ribs that can occur from trauma or overuse injuries  · Respiratory. Pneumonia, pneumothorax, or pneumonitis (inflammation of the lining of the chest and lungs)  · Gastrointestinal. Esophageal reflux, heartburn, or gallbladder disease  · Anxiety and panic disorders  · Nerve compression and neuritis  · Miscellaneous problems such as aortic aneurysm or pulmonary embolism (a blood clot in the lungs)  Home care  After your visit, follow these recommendations:  · Rest today and avoid strenuous activity.  · Take any prescribed medicine as directed.  · Be aware of any recurrent chest pain and notice any changes  Follow-up care  Follow up with your healthcare provider if you do not start to feel better within 24 hours, or as advised.  Call 911  Call 911 if any of these occur:  · A change in the type of pain: if it feels different, becomes more severe, lasts longer, or begins to spread into your shoulder, arm, neck, jaw or back  · Shortness of breath or increased pain with breathing  · Weakness, dizziness, or fainting  · Rapid heart beat  · Crushing sensation in your chest  When to seek medical advice  Call your healthcare provider right away if any of the following occur:  · Cough with dark colored sputum (phlegm) or blood  · Fever of 100.4ºF (38ºC) or higher, or as directed by your healthcare provider  · Swelling, pain or redness in one leg  · Shortness of breath  Date Last Reviewed: 12/30/2015  © 9119-2623  [employer]. [address] [place] [address], [place] [address]. All rights reserved. This information is not intended as a substitute for professional medical care. Always follow your healthcare professional's instructions.

## 2019-06-12 NOTE — PROGRESS NOTES
"Subjective:       Patient ID: Peri Johansen is a 85 y.o. female.    Vitals:    06/12/19 1211   BP: 132/77   Pulse: 108   Resp: 16   Temp: 98.6 °F (37 °C)   SpO2: (!) 94%   Weight: 56.7 kg (125 lb)   Height: 4' 11" (1.499 m)       Chief Complaint: Shortness of Breath    Pt states S.O.B., with chest tightness and pain since yesterday. Pt states her "words are blurring". Pt states her glucose was 289 this am.    Shortness of Breath   This is a new problem. The current episode started yesterday. The problem occurs constantly. The problem has been gradually worsening. Associated symptoms include chest pain and leg swelling. Pertinent negatives include no abdominal pain, fever, headaches, rash, sore throat or vomiting. Nothing aggravates the symptoms. She has tried nothing for the symptoms.     Review of Systems   Constitution: Positive for malaise/fatigue. Negative for chills and fever.   HENT: Negative for sore throat.    Eyes: Negative for blurred vision.   Cardiovascular: Positive for chest pain, dyspnea on exertion and leg swelling.   Respiratory: Positive for shortness of breath.    Skin: Negative for rash.   Musculoskeletal: Negative for back pain and joint pain.        Lower extremity edema   Gastrointestinal: Negative for abdominal pain, diarrhea, nausea and vomiting.   Neurological: Positive for weakness. Negative for headaches.   Psychiatric/Behavioral: The patient is not nervous/anxious.    All other systems reviewed and are negative.      Objective:      Physical Exam   Constitutional: She is oriented to person, place, and time. She appears well-developed and well-nourished. She is cooperative.  Non-toxic appearance. She does not appear ill. No distress.   HENT:   Head: Normocephalic and atraumatic.   Right Ear: Hearing, tympanic membrane, external ear and ear canal normal.   Left Ear: Hearing, tympanic membrane, external ear and ear canal normal.   Nose: Nose normal. No mucosal edema, rhinorrhea or nasal " deformity. No epistaxis. Right sinus exhibits no maxillary sinus tenderness and no frontal sinus tenderness. Left sinus exhibits no maxillary sinus tenderness and no frontal sinus tenderness.   Mouth/Throat: Uvula is midline, oropharynx is clear and moist and mucous membranes are normal. No trismus in the jaw. Normal dentition. No uvula swelling. No posterior oropharyngeal erythema.   Eyes: Conjunctivae and lids are normal. Right eye exhibits no discharge. Left eye exhibits no discharge. No scleral icterus.   Sclera clear bilat   Neck: Trachea normal, normal range of motion, full passive range of motion without pain and phonation normal. Neck supple.   Cardiovascular: Regular rhythm, normal heart sounds, intact distal pulses and normal pulses. Tachycardia present.   Pulmonary/Chest: Effort normal. No respiratory distress. She has rales in the right lower field.   Abdominal: Soft. Normal appearance and bowel sounds are normal. She exhibits no distension, no pulsatile midline mass and no mass. There is no tenderness.   Musculoskeletal: Normal range of motion. She exhibits no edema or deformity.   Neurological: She is alert and oriented to person, place, and time. She exhibits normal muscle tone. Coordination normal.   Skin: Skin is warm, dry and intact. She is not diaphoretic. No pallor.   Psychiatric: She has a normal mood and affect. Her speech is normal and behavior is normal. Judgment and thought content normal. Cognition and memory are normal.   Nursing note and vitals reviewed.      Assessment:       1. Chest pain, unspecified type        Plan:       Peri was seen today for shortness of breath.    Diagnoses and all orders for this visit:    Chest pain, unspecified type  -     EKG 12-lead  -     Refer to Emergency Dept.    Other orders  -     aspirin tablet 325 mg        EKG interpretation:  Sinus tachycardia heart rate is 107 beats per minute there is T-wave flattening in leads 1 and aVL to 3 in AVF there is  poor R-wave progression there is low-voltage present time is 12:30 p.m.    Medical decision making:  This 85-year-old female who presented to clinic today with greater than 12 hr of chest tightness shortness of breath generalized weakness.  Patient has cardiovascular risk factors of age hypertension diabetes high cholesterol.  Patient found to be tachycardic on examination today.  EKG demonstrates T-wave flattening in poor R-wave progression.  No evidence of acute ST segment elevation myocardial infarction.  Patient was given aspirin by mouth and at this time was recommended she go immediately to the hospital for further cardiac evaluation.  Patient declines ambulance transport at this time.  Patient will be taken to [facility] Emergency room for further evaluation treatment.        Patient Instructions   Go to ER NOW       Uncertain Causes of Chest Pain    Chest pain can happen for a number of reasons. Sometimes the cause can't be determined. If your condition does not seem serious, and your pain does not appear to be coming from your heart, your healthcare provider may recommend watching it closely. Sometimes the signs of a serious problem take more time to appear. Many problems not related to your heart can cause chest pain.These include:  · Musculoskeletal. Costochondritis, an inflammation of the tissues around the ribs that can occur from trauma or overuse injuries  · Respiratory. Pneumonia, pneumothorax, or pneumonitis (inflammation of the lining of the chest and lungs)  · Gastrointestinal. Esophageal reflux, heartburn, or gallbladder disease  · Anxiety and panic disorders  · Nerve compression and neuritis  · Miscellaneous problems such as aortic aneurysm or pulmonary embolism (a blood clot in the lungs)  Home care  After your visit, follow these recommendations:  · Rest today and avoid strenuous activity.  · Take any prescribed medicine as directed.  · Be aware of any recurrent chest pain and notice any  changes  Follow-up care  Follow up with your healthcare provider if you do not start to feel better within 24 hours, or as advised.  Call 911  Call 911 if any of these occur:  · A change in the type of pain: if it feels different, becomes more severe, lasts longer, or begins to spread into your shoulder, arm, neck, jaw or back  · Shortness of breath or increased pain with breathing  · Weakness, dizziness, or fainting  · Rapid heart beat  · Crushing sensation in your chest  When to seek medical advice  Call your healthcare provider right away if any of the following occur:  · Cough with dark colored sputum (phlegm) or blood  · Fever of 100.4ºF (38ºC) or higher, or as directed by your healthcare provider  · Swelling, pain or redness in one leg  · Shortness of breath  Date Last Reviewed: 12/30/2015  © 0060-9803 [employer]. [address] [place] [address], [place] [address]. All rights reserved. This information is not intended as a substitute for professional medical care. Always follow your healthcare professional's instructions.

## 2019-06-12 NOTE — ED PROVIDER NOTES
Encounter Date: 6/12/2019    SCRIBE #1 NOTE: I, Mahad Cartwright, am scribing for, and in the presence of,  Kailee Simeon MD. I have scribed the following portions of the note - Other sections scribed: HPI, ROS, PE.       History     Chief Complaint   Patient presents with    Chest Pain     Pt presents from the [facility] with c/o chest pain and SOB that began yesterday. Pt also reports increased swelling in the lower extremities.      86 y/o F with history including diverticulosis, NIDDM, lymphoma, HTN, HLD, and aortic atherosclerosis presents upon advisement from [facility] for evaluation of multiple complaints: SOB and associated CP for 2x days, bilateral leg swelling for approximately 3x days, and fluid build up in lungs. She describes her chest pain as a constant pressure. Patient reports dyspnea exacerbated by minimal-moderate exertion. Patient denies any other symptoms.     The history is provided by the patient and medical records.     Review of patient's allergies indicates:   Allergen Reactions    Latex, natural rubber Itching     Past Medical History:   Diagnosis Date    Aortic atherosclerosis 1/7/2016    Arthritis     Colon polyp: hyperplastic 2015- repeat 2020 8/6/2015    Diabetes mellitus, type II 8/16/2012    Diverticulosis     Gastric nodule 8/7/2014    GERD (gastroesophageal reflux disease) 8/16/2012    Goiter 8/16/2012    Hyperlipidemia 8/16/2012    Hypertension     Joint pain     Leg pain 8/16/2012    Lymphoma 8/16/2012    Osteopenia 8/16/2012    Osteoporosis     Renal cyst 3/28/2013    Rickets, vitamin D deficiency 8/16/2012    Thyroid nodule 2/24/2014    Vitamin D deficiency disease 8/16/2012     Past Surgical History:   Procedure Laterality Date    CARPAL TUNNEL RELEASE      CATARACT EXTRACTION W/  INTRAOCULAR LENS IMPLANT Bilateral     COLONOSCOPY N/A 6/29/2015    Performed by Osmin Smallwood [facility] at [facility] ([facility])    COLONOSCOPY N/A 6/12/2015     Performed by Miko Santana MD at [facility] (4TH [facility])    EGD (ESOPHAGOGASTRODUODENOSCOPY) N/A 4/19/2013    Performed by Jose Miguel Rodriguez MD at [facility] (4TH [facility])    HYSTERECTOMY      partial    lymphoma surgery      L tibia    RELEASE-CARPAL TUNNEL left Left 9/7/2016    Performed by Annika Kolb MD at [facility]    ULTRASOUND, UPPER GI TRACT, ENDOSCOPIC N/A 7/9/2013    Performed by Hong Huerta MD at [facility] (2ND [facility])     Family History   Problem Relation Age of Onset    Hypertension Mother     Hypertension Father     Heart disease Father     Breast cancer Maternal Aunt     No Known Problems Maternal Uncle     No Known Problems Paternal Aunt     No Known Problems Paternal Uncle     No Known Problems Maternal Grandmother     No Known Problems Maternal Grandfather     No Known Problems Paternal Grandmother     No Known Problems Paternal Grandfather     Cancer Brother         colon    No Known Problems Daughter     No Known Problems Son     No Known Problems Son     No Known Problems Daughter     No Known Problems Daughter     Diabetes Neg Hx     Glaucoma Neg Hx     Amblyopia Neg Hx     Blindness Neg Hx     Cataracts Neg Hx     Macular degeneration Neg Hx     Retinal detachment Neg Hx     Strabismus Neg Hx     Stroke Neg Hx     Thyroid disease Neg Hx     Ovarian cancer Neg Hx     Liver disease Neg Hx     Liver cancer Neg Hx     Cirrhosis Neg Hx     Colon polyps Neg Hx     Colon cancer Neg Hx     Melanoma Neg Hx      Social History     Tobacco Use    Smoking status: Never Smoker    Smokeless tobacco: Never Used   Substance Use Topics    Alcohol use: No     Alcohol/week: 0.0 oz    Drug use: No     Review of Systems   Constitutional: Negative for chills and fever.   HENT: Negative for congestion.    Eyes: Negative for visual disturbance.   Respiratory: Positive for shortness of breath.    Cardiovascular: Positive for chest pain and leg swelling.    Gastrointestinal: Negative for nausea.   Genitourinary: Negative for difficulty urinating.   Musculoskeletal: Negative for myalgias.   Neurological: Negative for dizziness and headaches.   Psychiatric/Behavioral: Negative for confusion and decreased concentration.   All other systems reviewed and are negative.      Physical Exam     Initial Vitals [06/12/19 1339]   BP Pulse Resp Temp SpO2   (!) 140/73 106 16 98.8 °F (37.1 °C) 96 %      MAP       --         Physical Exam    Nursing note and vitals reviewed.  Constitutional: She appears well-developed and well-nourished. No distress.   Patient is comfortable.   HENT:   Head: Normocephalic and atraumatic.   Mouth/Throat: Oropharynx is clear and moist.   Eyes: EOM are normal. Pupils are equal, round, and reactive to light.   Neck: Normal range of motion. Neck supple.   Cardiovascular: Regular rhythm and normal heart sounds.   Tachycardiac.    Pulmonary/Chest: No respiratory distress.   Crackles bilaterally. Bottom 3rd lung field.    Abdominal: Soft. Bowel sounds are normal. There is no tenderness.   Musculoskeletal: Normal range of motion. She exhibits edema (1-2+ edema bilateral lower extremities.).   Neurological: She is alert and oriented to person, place, and time.   Skin: Skin is warm and dry.   Psychiatric: She has a normal mood and affect. Her behavior is normal.         ED Course   Procedures  Labs Reviewed   COMPREHENSIVE METABOLIC PANEL - Abnormal; Notable for the following components:       Result Value    Glucose 116 (*)     Albumin 3.1 (*)      (*)     ALT 96 (*)     All other components within normal limits   TROPONIN I - Abnormal; Notable for the following components:    Troponin I 0.075 (*)     All other components within normal limits   B-TYPE NATRIURETIC PEPTIDE - Abnormal; Notable for the following components:     (*)     All other components within normal limits   POCT GLUCOSE - Abnormal; Notable for the following components:    POCT  Glucose 115 (*)     All other components within normal limits   CBC W/ AUTO DIFFERENTIAL   PROTIME-INR   POCT GLUCOSE, HAND-HELD DEVICE        ECG Results          EKG 12-lead (In process)  Result time 06/13/19 08:24:07    In process by Interface, Lab In [facility] (06/13/19 08:24:07)                 Narrative:    Test Reason : R07.9,    Vent. Rate : 104 BPM     Atrial Rate : 104 BPM     P-R Int : 140 ms          QRS Dur : 070 ms      QT Int : 362 ms       P-R-T Axes : 061 022 060 degrees     QTc Int : 476 ms    Sinus tachycardia  Possible Left atrial enlargement  Low voltage QRS  Septal infarct ,age undetermined  Abnormal ECG  When compared with ECG of 12-JUN-2019 12:24,  No significant change was found    Referred By: [email or web address]   SELF           Confirmed By:                             Imaging Results          US Abdomen Limited (Final result)  Result time 06/12/19 23:37:25    Final result by Elio Romo MD (06/12/19 23:37:25)                 Impression:      No acute sonographic abnormality to explain this patient's elevated LFTs.    Right pleural effusion.    Electronically signed by resident: Jean Mustafa  Date:    06/12/2019  Time:    23:15    Electronically signed by: Elio Romo MD  Date:    06/12/2019  Time:    23:37             Narrative:    EXAMINATION:  US ABDOMEN LIMITED    CLINICAL HISTORY:  elevated LFTs.    TECHNIQUE:  Limited ultrasound of the right upper quadrant of the abdomen including pancreas, liver, gallbladder, common bile duct was performed.    COMPARISON:  Ultrasound abdomen complete 06/24/2016.    FINDINGS:  Liver: Normal in size, measuring 11.9 cm. Homogeneous echotexture. No focal hepatic lesions.    Gallbladder: No calculi, wall thickening, or pericholecystic fluid.  No sonographic Harp's sign.    Biliary system: The common duct is not dilated, measuring 3 mm.  No intrahepatic ductal dilatation.    Spleen: Normal in size with a homogeneous echotexture, measuring 8.0 x 2.8  cm.    Pancreas: The visualized portions of pancreas appear normal.    Miscellaneous: No ascites.  Right pleural effusion.                               US Lower Extremity Veins Bilateral (Final result)  Result time 06/12/19 23:35:31    Final result by Elio Romo MD (06/12/19 23:35:31)                 Impression:      No evidence of deep venous thrombosis in either lower extremity.    Electronically signed by resident: Jean Mustafa  Date:    06/12/2019  Time:    23:14    Electronically signed by: Elio Romo MD  Date:    06/12/2019  Time:    23:35             Narrative:    EXAMINATION:  US LOWER EXTREMITY VEINS BILATERAL    CLINICAL HISTORY:  DVT r/o    TECHNIQUE:  Duplex and color flow Doppler and dynamic compression was performed of the bilateral lower extremity veins was performed.    COMPARISON:  Ultrasound lower extremity veins left 04/25/2018.    FINDINGS:  Right thigh veins: The common femoral, femoral, popliteal, upper greater saphenous, and deep femoral veins are patent and free of thrombus. The veins are normally compressible and have normal phasic flow and augmentation response.    Right calf veins: The visualized calf veins are patent.    Left thigh veins: The common femoral, femoral, popliteal, upper greater saphenous, and deep femoral veins are patent and free of thrombus. The veins are normally compressible and have normal phasic flow and augmentation response.    Left calf veins: The visualized calf veins are patent.    Miscellaneous: Mild subcutaneous edema.                               X-Ray Chest AP Portable (Final result)  Result time 06/12/19 15:42:01    Final result by Marino Boyle MD (06/12/19 15:42:01)                 Impression:      Bilateral parahilar consolidation suggestive of mild pulmonary edema with layering bilateral pleural effusions, greater on the right than on the left.      Electronically signed by: Marino Boyle MD  Date:    06/12/2019  Time:    15:42              Narrative:    EXAMINATION:  XR CHEST AP PORTABLE    CLINICAL HISTORY:  CHF;    TECHNIQUE:  Single frontal view of the chest was performed.    COMPARISON:  02/15/2018.    FINDINGS:  The heart does not appear enlarged.  Superior mediastinal structures are unremarkable.  There is pulmonary vascular congestion present with mild increased density in a parahilar distribution suggestive of mild pulmonary edema.  A small to moderate layering right-sided pleural effusion is suspected and a small layering left-sided pleural effusion is also suspected.  There is no evidence for pneumothorax.  Bony structures are grossly intact.                                 Medical Decision Making:   History:   Old Medical Records: I decided to obtain old medical records.  Initial Assessment:   84 yo f, h/o HTN, DM, lymphoma, here with CP, SOB, MONSON x 1 day, constant.  Seen in UC and referred here    EKG without ST changes    O2 sat 92% RA  Lungs with significant crackles 1/3 up B  Differential Diagnosis:   Strongly suspect new onset CHF/pulmonary edema  Lower suspicion for pneumonia, PE, bronchitis  Clinical Tests:   Lab Tests: Ordered and Reviewed  Radiological Study: Ordered and Reviewed  Medical Tests: Ordered and Reviewed  ED Management:  Labs  Lasix 40 mg IV  Anticipate admission    4:57 PM  CXR shows severe pulmonary edema, BNP and troponin elevated  Will admit to medicine            Scribe Attestation:   Scribe #1: I performed the above scribed service and the documentation accurately describes the services I performed. I attest to the accuracy of the note.    Attending Attestation:         Attending Critical Care:   Critical Care Times:   ==============================================================  · Total Critical Care Time - exclusive of procedural time: 35 minutes.  ==============================================================  Critical care was necessary to treat or prevent imminent or life-threatening deterioration of  the following conditions: congestive heart failure.   Critical care was time spent personally by me on the following activities: obtaining history from patient or relative, examination of patient, review of x-rays / CT sent with the patient, review of old charts, ordering lab, x-rays, and/or EKG, development of treatment plan with patient or relative, ordering and performing treatments and interventions, evaluation of patient's response to treatment, discussion with consultants and re-evaluation of patient's conition.     Physician Attestation for Scribe:  Physician Attestation Statement for Scribe #1: I, Kailee Simeon MD, reviewed documentation, as scribed by Mahad Cartwright in my presence, and it is both accurate and complete.         I, Dr. Kailee Simeon, personally performed the services described in this documentation. All medical record entries made by the scribe were at my direction and in my presence.  I have reviewed the chart and agree that the record reflects my personal performance and is accurate and complete. Kailee Simeon MD.  4:59 PM 06/13/2019           Clinical Impression:       ICD-10-CM ICD-9-CM   1. Acute congestive heart failure, unspecified heart failure type I50.9 428.0   2. Chest pain R07.9 786.50   3. CHF (congestive heart failure) I50.9 428.0         Disposition:   Disposition: Admitted  Condition: Stable                        Kailee Simeon MD  06/13/19 [id number]

## 2019-06-12 NOTE — ED TRIAGE NOTES
Pt sent from the clinic for chest pain, SOB that started yesterday  and leg/ankle swelling.    LOC: The patient is awake, alert, and oriented to place, time, situation. Affect is appropriate.  Speech is appropriate and clear.     APPEARANCE: Patient resting comfortably in no acute distress.  Patient is clean and well groomed.    SKIN: The skin is warm and dry; color consistent with ethnicity.  Patient has normal skin turgor and moist mucus membranes.  Skin intact; no breakdown or bruising noted.     MUSCULOSKELETAL: Patient moving upper and lower extremities without difficulty.  Denies weakness.     RESPIRATORY: Airway is open and patent. Respirations spontaneous, even, easy, and non-labored.  Does have some SOB and on 2L NC.   No accessory muscle use noted. Denies cough.     CARDIAC:  Normal rhythm and rate noted.  No peripheral edema noted. Complains of chest pain     ABDOMEN: Soft and non tender to palpation.  No distention noted.     NEUROLOGIC: Eyes open spontaneously.  Behavior appropriate to situation.  Follows commands; facial expression symmetrical.  Purposeful motor response noted; normal sensation in all extremities.

## 2019-06-13 LAB
ALBUMIN SERPL BCP-MCNC: 2.7 G/DL (ref 3.5–5.2)
ALP SERPL-CCNC: 84 U/L (ref 55–135)
ALT SERPL W/O P-5'-P-CCNC: 71 U/L (ref 10–44)
ANION GAP SERPL CALC-SCNC: 10 MMOL/L (ref 8–16)
AST SERPL-CCNC: 49 U/L (ref 10–40)
AV INDEX (PROSTH): 0.71
AV MEAN GRADIENT: 3.28 MMHG
AV PEAK GRADIENT: 5.38 MMHG
AV VALVE AREA: 2.4 CM2
AV VELOCITY RATIO: 0.77
BASOPHILS # BLD AUTO: 0.03 K/UL (ref 0–0.2)
BASOPHILS NFR BLD: 0.5 % (ref 0–1.9)
BILIRUB DIRECT SERPL-MCNC: 0.6 MG/DL (ref 0.1–0.3)
BILIRUB SERPL-MCNC: 1.2 MG/DL (ref 0.1–1)
BSA FOR ECHO PROCEDURE: 1.57 M2
BUN SERPL-MCNC: 13 MG/DL (ref 8–23)
CALCIUM SERPL-MCNC: 9 MG/DL (ref 8.7–10.5)
CHLORIDE SERPL-SCNC: 107 MMOL/L (ref 95–110)
CHOLEST SERPL-MCNC: 143 MG/DL (ref 120–199)
CHOLEST/HDLC SERPL: 3 {RATIO} (ref 2–5)
CO2 SERPL-SCNC: 25 MMOL/L (ref 23–29)
CREAT SERPL-MCNC: 0.7 MG/DL (ref 0.5–1.4)
CV ECHO LV RWT: 0.43 CM
DIFFERENTIAL METHOD: NORMAL
DOP CALC AO PEAK VEL: 1.16 M/S
DOP CALC AO VTI: 20.71 CM
DOP CALC LVOT AREA: 3.36 CM2
DOP CALC LVOT DIAMETER: 2.07 CM
DOP CALC LVOT PEAK VEL: 0.89 M/S
DOP CALC LVOT STROKE VOLUME: 49.78 CM3
DOP CALCLVOT PEAK VEL VTI: 14.8 CM
E WAVE DECELERATION TIME: 191.35 MSEC
E/A RATIO: 1.44
E/E' RATIO: 13.8
ECHO LV POSTERIOR WALL: 0.83 CM (ref 0.6–1.1)
EOSINOPHIL # BLD AUTO: 0.1 K/UL (ref 0–0.5)
EOSINOPHIL NFR BLD: 1.7 % (ref 0–8)
ERYTHROCYTE [DISTWIDTH] IN BLOOD BY AUTOMATED COUNT: 13.9 % (ref 11.5–14.5)
EST. GFR  (AFRICAN AMERICAN): >60 ML/MIN/1.73 M^2
EST. GFR  (NON AFRICAN AMERICAN): >60 ML/MIN/1.73 M^2
ESTIMATED AVG GLUCOSE: 146 MG/DL (ref 68–131)
FRACTIONAL SHORTENING: 28 % (ref 28–44)
GLUCOSE SERPL-MCNC: 196 MG/DL (ref 70–110)
HBA1C MFR BLD HPLC: 6.7 % (ref 4–5.6)
HCT VFR BLD AUTO: 37.5 % (ref 37–48.5)
HDLC SERPL-MCNC: 47 MG/DL (ref 40–75)
HDLC SERPL: 32.9 % (ref 20–50)
HGB BLD-MCNC: 12.2 G/DL (ref 12–16)
IMM GRANULOCYTES # BLD AUTO: 0.01 K/UL (ref 0–0.04)
IMM GRANULOCYTES NFR BLD AUTO: 0.2 % (ref 0–0.5)
INTERVENTRICULAR SEPTUM: 0.83 CM (ref 0.6–1.1)
LA MAJOR: 4.93 CM
LA MINOR: 4.71 CM
LA WIDTH: 3.25 CM
LDLC SERPL CALC-MCNC: 83.2 MG/DL (ref 63–159)
LEFT ATRIUM SIZE: 2.6 CM
LEFT ATRIUM VOLUME INDEX: 22.5 ML/M2
LEFT ATRIUM VOLUME: 34.6 CM3
LEFT INTERNAL DIMENSION IN SYSTOLE: 2.77 CM (ref 2.1–4)
LEFT VENTRICLE DIASTOLIC VOLUME INDEX: 41.02 ML/M2
LEFT VENTRICLE DIASTOLIC VOLUME: 62.98 ML
LEFT VENTRICLE MASS INDEX: 59.6 G/M2
LEFT VENTRICLE SYSTOLIC VOLUME INDEX: 18.7 ML/M2
LEFT VENTRICLE SYSTOLIC VOLUME: 28.79 ML
LEFT VENTRICULAR INTERNAL DIMENSION IN DIASTOLE: 3.83 CM (ref 3.5–6)
LEFT VENTRICULAR MASS: 91.53 G
LV LATERAL E/E' RATIO: 11.5
LV SEPTAL E/E' RATIO: 17.25
LYMPHOCYTES # BLD AUTO: 1.7 K/UL (ref 1–4.8)
LYMPHOCYTES NFR BLD: 27.8 % (ref 18–48)
MAGNESIUM SERPL-MCNC: 1.7 MG/DL (ref 1.6–2.6)
MCH RBC QN AUTO: 28.2 PG (ref 27–31)
MCHC RBC AUTO-ENTMCNC: 32.5 G/DL (ref 32–36)
MCV RBC AUTO: 87 FL (ref 82–98)
MONOCYTES # BLD AUTO: 0.7 K/UL (ref 0.3–1)
MONOCYTES NFR BLD: 11.4 % (ref 4–15)
MV PEAK A VEL: 0.48 M/S
MV PEAK E VEL: 0.69 M/S
NEUTROPHILS # BLD AUTO: 3.5 K/UL (ref 1.8–7.7)
NEUTROPHILS NFR BLD: 58.4 % (ref 38–73)
NONHDLC SERPL-MCNC: 96 MG/DL
NRBC BLD-RTO: 0 /100 WBC
PHOSPHATE SERPL-MCNC: 3.4 MG/DL (ref 2.7–4.5)
PISA TR MAX VEL: 2.72 M/S
PLATELET # BLD AUTO: 225 K/UL (ref 150–350)
PMV BLD AUTO: 12.1 FL (ref 9.2–12.9)
POCT GLUCOSE: 107 MG/DL (ref 70–110)
POCT GLUCOSE: 112 MG/DL (ref 70–110)
POCT GLUCOSE: 117 MG/DL (ref 70–110)
POCT GLUCOSE: 175 MG/DL (ref 70–110)
POCT GLUCOSE: 275 MG/DL (ref 70–110)
POTASSIUM SERPL-SCNC: 3.5 MMOL/L (ref 3.5–5.1)
PROT SERPL-MCNC: 6.4 G/DL (ref 6–8.4)
RA MAJOR: 4.25 CM
RA PRESSURE: 3 MMHG
RA WIDTH: 2.67 CM
RBC # BLD AUTO: 4.33 M/UL (ref 4–5.4)
RIGHT VENTRICULAR END-DIASTOLIC DIMENSION: 2.72 CM
SINUS: 2.61 CM
SODIUM SERPL-SCNC: 142 MMOL/L (ref 136–145)
TDI LATERAL: 0.06
TDI SEPTAL: 0.04
TDI: 0.05
TR MAX PG: 29.59 MMHG
TRICUSPID ANNULAR PLANE SYSTOLIC EXCURSION: 1.86 CM
TRIGL SERPL-MCNC: 64 MG/DL (ref 30–150)
TROPONIN I SERPL DL<=0.01 NG/ML-MCNC: 0.05 NG/ML (ref 0–0.03)
TROPONIN I SERPL DL<=0.01 NG/ML-MCNC: 0.07 NG/ML (ref 0–0.03)
TROPONIN I SERPL DL<=0.01 NG/ML-MCNC: 0.08 NG/ML (ref 0–0.03)
TSH SERPL DL<=0.005 MIU/L-ACNC: 1.01 UIU/ML (ref 0.4–4)
TV REST PULMONARY ARTERY PRESSURE: 33 MMHG
WBC # BLD AUTO: 5.98 K/UL (ref 3.9–12.7)

## 2019-06-13 PROCEDURE — 80076 HEPATIC FUNCTION PANEL: CPT

## 2019-06-13 PROCEDURE — 84443 ASSAY THYROID STIM HORMONE: CPT

## 2019-06-13 PROCEDURE — 83036 HEMOGLOBIN GLYCOSYLATED A1C: CPT

## 2019-06-13 PROCEDURE — 99226 PR SUBSEQUENT OBSERVATION CARE,LEVEL III: ICD-10-PCS | Mod: ,,, | Performed by: PHYSICIAN ASSISTANT

## 2019-06-13 PROCEDURE — 80061 LIPID PANEL: CPT

## 2019-06-13 PROCEDURE — 80048 BASIC METABOLIC PNL TOTAL CA: CPT

## 2019-06-13 PROCEDURE — 84484 ASSAY OF TROPONIN QUANT: CPT

## 2019-06-13 PROCEDURE — 63600175 PHARM REV CODE 636 W HCPCS: Performed by: PHYSICIAN ASSISTANT

## 2019-06-13 PROCEDURE — 84100 ASSAY OF PHOSPHORUS: CPT

## 2019-06-13 PROCEDURE — G0378 HOSPITAL OBSERVATION PER HR: HCPCS

## 2019-06-13 PROCEDURE — 25000003 PHARM REV CODE 250: Performed by: PHYSICIAN ASSISTANT

## 2019-06-13 PROCEDURE — 36415 COLL VENOUS BLD VENIPUNCTURE: CPT

## 2019-06-13 PROCEDURE — 84484 ASSAY OF TROPONIN QUANT: CPT | Mod: 91

## 2019-06-13 PROCEDURE — 85025 COMPLETE CBC W/AUTO DIFF WBC: CPT

## 2019-06-13 PROCEDURE — 99226 PR SUBSEQUENT OBSERVATION CARE,LEVEL III: CPT | Mod: ,,, | Performed by: PHYSICIAN ASSISTANT

## 2019-06-13 PROCEDURE — 83735 ASSAY OF MAGNESIUM: CPT

## 2019-06-13 RX ORDER — METHIMAZOLE 5 MG/1
2.5 TABLET ORAL DAILY
Status: DISCONTINUED | OUTPATIENT
Start: 2019-06-14 | End: 2019-06-13

## 2019-06-13 RX ADMIN — PANTOPRAZOLE SODIUM 40 MG: 40 TABLET, DELAYED RELEASE ORAL at 08:06

## 2019-06-13 RX ADMIN — FUROSEMIDE 40 MG: 10 INJECTION, SOLUTION INTRAMUSCULAR; INTRAVENOUS at 08:06

## 2019-06-13 RX ADMIN — ASPIRIN 81 MG CHEWABLE TABLET 81 MG: 81 TABLET CHEWABLE at 09:06

## 2019-06-13 RX ADMIN — POLYETHYLENE GLYCOL 3350 17 G: 17 POWDER, FOR SOLUTION ORAL at 08:06

## 2019-06-13 RX ADMIN — INSULIN ASPART 1 UNITS: 100 INJECTION, SOLUTION INTRAVENOUS; SUBCUTANEOUS at 09:06

## 2019-06-13 RX ADMIN — GABAPENTIN 300 MG: 300 CAPSULE ORAL at 08:06

## 2019-06-13 RX ADMIN — FUROSEMIDE 40 MG: 10 INJECTION, SOLUTION INTRAMUSCULAR; INTRAVENOUS at 06:06

## 2019-06-13 RX ADMIN — ENOXAPARIN SODIUM 40 MG: 100 INJECTION SUBCUTANEOUS at 06:06

## 2019-06-13 RX ADMIN — ATORVASTATIN CALCIUM 40 MG: 20 TABLET, FILM COATED ORAL at 08:06

## 2019-06-13 NOTE — HPI
Peri Johansen is a 85F with HTN, HLD, T2DM, h/o lymphoma, L carpal tunnel, who presents from  clinic for evaluation of non-radiating substernal chest tightness and dyspnea with minimal exertion. The patient is a poor historian and her history changes slightly while she tells it. She reports chest pain/tightness and heart burn symptoms that started earlier in the week with associated LE edema (L>R). She slept poorly the night before admission 2/2 orthnopnea and decided to present to clinic for evaluation. She denies any changes to her CP with eating, breathing, movement, or exertion. She denies chest trauma. She reports some palipations that also occur with her MONSON. She denies HA, lightheadness, falls, syncope, NVD, diaphoresis, fever, chills, dysuria, abdominal pain. She does report dry cough. She denies cardiac history. She lives with her daughter and watches her sugar intake but not her salt. Her chest tightness has resolved on admission.    ED: no leukocytosis, afebrile, SCr 0.7, Tn 0.075, , CXR w/ edema, EKG non-ischemic, LE US negative, RUQ US negative but showing R pleural effusion

## 2019-06-13 NOTE — ASSESSMENT & PLAN NOTE
- Patient decompensated on admit, presentation clinically consistent symptoms with SOB, LE edema, CXR with pulmonary edema and effusion, elevated BNP  - , troponin 0.075>0.071>0.083>0.049  - Patient on NC on admit with pulse oximetry of 98-99%   - At baseline patient on RA --- wean oxygen as tolerated    - Echo 6/13/19, EF 45-50%, (+)DD, PAP 33mmHg  - not on BB, ACE/ARB, or diuretic   - hospital diuresis: IV lasix 40mg BID  - hold on starting BB until euvolemic  - lower ext doppler negative for DVT  - monitor response with daily weights, strict I/Os, oxygen requirements    Intake/Output Summary (Last 24 hours) at 6/13/2019 1550  Last data filed at 6/13/2019 0450  Gross per 24 hour   Intake --   Output 1500 ml   Net -1500 ml

## 2019-06-13 NOTE — ASSESSMENT & PLAN NOTE
- denies abdominal pain  - possibly congestive hepatopathy from CHF  - RUQ US ordered  - hepatitis panel ordered  - cancer history noted  - trend daily

## 2019-06-13 NOTE — PLAN OF CARE
06/13/19 1359   Post-Acute Status   Post-Acute Authorization Home Health/Hospice   Home Health/Hospice Status Referrals Sent

## 2019-06-13 NOTE — PLAN OF CARE
06/13/19 1406   Discharge Assessment   Assessment Type Discharge Planning Assessment   Confirmed/corrected address and phone number on facesheet? Yes   Assessment information obtained from? Patient   Communicated expected length of stay with patient/caregiver yes   Prior to hospitilization cognitive status: Alert/Oriented   Prior to hospitalization functional status: Assistive Equipment;Independent   Current cognitive status: Alert/Oriented   Current Functional Status: Needs Assistance;Assistive Equipment   Facility Arrived From: Home   Lives With child(raine), adult   Able to Return to Prior Arrangements yes   Is patient able to care for self after discharge? Yes   Who are your caregiver(s) and their phone number(s)?   (Glenda Majano (Daughter) [phone])   Patient's perception of discharge disposition home health   Readmission Within the Last 30 Days no previous admission in last 30 days   Patient currently being followed by outpatient case management? Yes   If yes, name of outpatient case management following: [facility] outpatient case management   Patient currently receives any other outside agency services? No   Equipment Currently Used at Home cane, straight   Do you have any problems affording any of your prescribed medications? No   Is the patient taking medications as prescribed? yes   Does the patient have transportation home? Yes   Transportation Anticipated family or friend will provide   Does the patient receive services at the [facility]? No   Discharge Plan A Home with family;Home Health   Discharge Plan B Home with family   DME Needed Upon Discharge  none   Patient/Family in Agreement with Plan yes   Does the patient have transportation to healthcare appointments? Yes

## 2019-06-13 NOTE — HOSPITAL COURSE
Peri Johansen was admitted to hospital medicine for acute on chronic congestive heart failure, likely a new diagnosis. TTE consistent with diastolic heart failure with an EF of 45-50%. Diuresis started on admission. BB therapy initiated prior to discharge, patient tolerated well. Cardiology referral placed.     Differ ACE therapy to PCP/cards due to low-normal blood pressure.

## 2019-06-13 NOTE — ASSESSMENT & PLAN NOTE
- hold home metformin and amaryl  - start low dose SSI for now  - can add detemir and meal time insulin with glucose trend  Lab Results   Component Value Date    HGBA1C 7.2 (H) 04/15/2019

## 2019-06-13 NOTE — SUBJECTIVE & OBJECTIVE
Interval History: Pt seen resting in bed, reports improvement of her lower extremity swelling.     Review of Systems   Constitutional: Negative for activity change, appetite change, chills, diaphoresis, fatigue and fever.   Respiratory: Positive for shortness of breath. Negative for cough, chest tightness and wheezing.    Cardiovascular: Positive for chest pain. Negative for palpitations and leg swelling.   Gastrointestinal: Negative for abdominal distention, abdominal pain, constipation, nausea and vomiting.   Genitourinary: Negative for difficulty urinating, dysuria and enuresis.   Musculoskeletal: Negative for arthralgias, back pain and gait problem.   Skin: Negative for rash and wound.   Neurological: Negative for dizziness, tremors, syncope, weakness, light-headedness and headaches.   Psychiatric/Behavioral: Negative for agitation and confusion.     Objective:     Vital Signs (Most Recent):  Temp: 98.2 °F (36.8 °C) (06/13/19 1120)  Pulse: 96 (06/13/19 1526)  Resp: 16 (06/13/19 1120)  BP: (!) 122/56 (06/13/19 1136)  SpO2: 99 % (06/13/19 1120) Vital Signs (24h Range):  Temp:  [97.6 °F (36.4 °C)-98.2 °F (36.8 °C)] 98.2 °F (36.8 °C)  Pulse:  [] 96  Resp:  [14-18] 16  SpO2:  [94 %-100 %] 99 %  BP: (118-149)/(55-87) 122/56     Weight: 59 kg (130 lb)  Body mass index is 26.26 kg/m².    Intake/Output Summary (Last 24 hours) at 6/13/2019 1545  Last data filed at 6/13/2019 0450  Gross per 24 hour   Intake --   Output 1500 ml   Net -1500 ml      Physical Exam   Constitutional: She is oriented to person, place, and time. She appears well-developed and well-nourished. No distress.   HENT:   Head: Normocephalic and atraumatic.   Eyes: EOM are normal. No scleral icterus.   Neck: Normal range of motion. Neck supple. JVD present.   Cardiovascular: Normal rate and regular rhythm.   No murmur heard.  Reproducible chest pain   Pulmonary/Chest: Effort normal. No stridor. No respiratory distress. She has no wheezes. She has  rales (at bases).   Abdominal: Soft. Bowel sounds are normal. She exhibits no distension.   Musculoskeletal: Normal range of motion. She exhibits edema (2+ BLE edema).   Neurological: She is alert and oriented to person, place, and time. No cranial nerve deficit.   Skin: Skin is warm and dry. She is not diaphoretic.   Psychiatric: She has a normal mood and affect. Her behavior is normal. Judgment and thought content normal.   Nursing note and vitals reviewed.      Significant Labs: All pertinent labs within the past 24 hours have been reviewed.    Significant Imaging: I have reviewed all pertinent imaging results/findings within the past 24 hours.

## 2019-06-13 NOTE — SUBJECTIVE & OBJECTIVE
Past Medical History:   Diagnosis Date    Aortic atherosclerosis 1/7/2016    Arthritis     Colon polyp: hyperplastic 2015- repeat 2020 8/6/2015    Diabetes mellitus, type II 8/16/2012    Diverticulosis     Gastric nodule 8/7/2014    GERD (gastroesophageal reflux disease) 8/16/2012    Goiter 8/16/2012    Hyperlipidemia 8/16/2012    Hypertension     Joint pain     Leg pain 8/16/2012    Lymphoma 8/16/2012    Osteopenia 8/16/2012    Osteoporosis     Renal cyst 3/28/2013    Rickets, vitamin D deficiency 8/16/2012    Thyroid nodule 2/24/2014    Vitamin D deficiency disease 8/16/2012       Past Surgical History:   Procedure Laterality Date    CARPAL TUNNEL RELEASE      CATARACT EXTRACTION W/  INTRAOCULAR LENS IMPLANT Bilateral     COLONOSCOPY N/A 6/29/2015    Performed by Osmin Smallwood MD at [facility] (4TH FLR)    COLONOSCOPY N/A 6/12/2015    Performed by Miko Santana MD at [facility] (4TH FLR)    EGD (ESOPHAGOGASTRODUODENOSCOPY) N/A 4/19/2013    Performed by Jose Miguel Rodriguez MD at [facility] (4TH FLR)    HYSTERECTOMY      partial    lymphoma surgery      L tibia    RELEASE-CARPAL TUNNEL left Left 9/7/2016    Performed by [facility] at [facility] OR [facility]ST FLR    ULTRASOUND, UPPER GI TRACT, ENDOSCOPIC N/A 7/9/2013    Performed by Hong Huerta MD at [facility] (2ND FLR)       Review of patient's allergies indicates:   Allergen Reactions    Latex, natural rubber Itching       No current facility-administered medications on file prior to encounter.      Current Outpatient Medications on File Prior to Encounter   Medication Sig    ammonium lactate 12 % Crea Apply 1 application topically once daily.    aspirin 81 MG Chew Take 1 tablet (81 mg total) by mouth once daily.    atorvastatin (LIPITOR) 40 MG tablet TK 1 T PO ONCE D    blood-glucose meter kit Please dispense meter, lancets, and testing strips covered by insurance    calcium 500 mg Tab Take 1 tablet by mouth Twice daily.     dextran 70-hypromellose (ARTIFICIAL TEARS,[id number],) ophthalmic solution Place 1 drop into both eyes 4 (four) times daily as needed.    diclofenac sodium (VOLTAREN) 1 % Gel Apply 2 g topically 4 (four) times daily.    diclofenac sodium (VOLTAREN) 1 % Gel Apply 2 g topically 4 (four) times daily.    diphth,pertus,acell,,tetanus (BOOSTRIX TDAP) 2.5-8-5 Lf-mcg-Lf/0.5mL Syrg injection Inject 0.5 mLs into the muscle.    fluticasone (FLONASE) 50 mcg/actuation nasal spray 1 spray (50 mcg total) by Each Nare route once daily.    gabapentin (NEURONTIN) 300 MG capsule Take 1 capsule (300 mg total) by mouth 2 (two) times daily.    glimepiride (AMARYL) 2 MG tablet TAKE 1 TABLET BY MOUTH BEFORE BREAKFAST.    metFORMIN (GLUCOPHAGE) 1000 MG tablet Take 1 tablet (1,000 mg total) by mouth 2 (two) times daily with meals.    methIMAzole (TAPAZOLE) 5 MG Tab 1/2 tablet (2.5mg) daily    MICRO THIN LANCETS 33 gauge Misc USE AS DIRECTED    neomycin-polymyxin-hydrocortisone (CORTISPORIN) 3.5-10,000-1 mg/mL-unit/mL-% otic suspension Place 3.5 drops into both ears daily 2 hours after breakfast.    omeprazole (PRILOSEC) 40 MG capsule TAKE 1 CAPSULE(40 MG) BY MOUTH DAILY AS NEEDED    ONE TOUCH DELICA LANCETS 30 gauge Misc     ONETOUCH ULTRA BLUE TEST STRIP Strp TEST BLOOD TWICE DAILY    traMADol (ULTRAM) 50 mg tablet Take 1 tablet (50 mg total) by mouth every 12 (twelve) hours as needed for Pain.    TRUE METRIX GLUCOSE TEST STRIP Strp USE AS DIRECTED     Family History     Problem Relation (Age of Onset)    Breast cancer Maternal Aunt    Cancer Brother    Heart disease Father    Hypertension Mother, Father    No Known Problems Maternal Uncle, Paternal Aunt, Paternal Uncle, Maternal Grandmother, Maternal Grandfather, Paternal Grandmother, Paternal Grandfather, Daughter, Son, Son, Daughter, Daughter        Tobacco Use    Smoking status: Never Smoker    Smokeless tobacco: Never Used   Substance and Sexual Activity     Alcohol use: No     Alcohol/week: 0.0 oz    Drug use: No    Sexual activity: Not Currently     Review of Systems   Constitutional: Negative for activity change, appetite change, chills, diaphoresis, fatigue and fever.   Respiratory: Positive for cough, chest tightness and shortness of breath. Negative for wheezing.    Cardiovascular: Positive for chest pain, palpitations and leg swelling.   Gastrointestinal: Negative for abdominal distention, abdominal pain, constipation, nausea and vomiting.   Genitourinary: Negative for difficulty urinating, dysuria and enuresis.   Musculoskeletal: Negative for arthralgias, back pain and gait problem.   Skin: Negative for rash and wound.   Neurological: Negative for dizziness, tremors, syncope, weakness, light-headedness and headaches.   Psychiatric/Behavioral: Negative for agitation and confusion.     Objective:     Vital Signs (Most Recent):  Temp: 97.7 °F (36.5 °C) (06/12/19 2352)  Pulse: 94 (06/12/19 2352)  Resp: 16 (06/12/19 2352)  BP: 139/87 (06/12/19 2352)  SpO2: 95 % (06/12/19 2352) Vital Signs (24h Range):  Temp:  [97.7 °F (36.5 °C)-98.8 °F (37.1 °C)] 97.7 °F (36.5 °C)  Pulse:  [] 94  Resp:  [16] 16  SpO2:  [94 %-100 %] 95 %  BP: (128-149)/(55-87) 139/87     Weight: 56.7 kg (125 lb)  Body mass index is 25.25 kg/m².    Physical Exam   Constitutional: She is oriented to person, place, and time. She appears well-developed and well-nourished. No distress.   HENT:   Head: Normocephalic and atraumatic.   Eyes: EOM are normal. No scleral icterus.   Neck: Normal range of motion. Neck supple. JVD present.   Cardiovascular: Normal rate and regular rhythm.   No murmur heard.  Pulmonary/Chest: Effort normal. No stridor. No respiratory distress. She has no wheezes. She has rales (at bases).   Abdominal: Soft. Bowel sounds are normal. She exhibits no distension.   Musculoskeletal: Normal range of motion. She exhibits edema (2+ BLE edema).   Neurological: She is alert and  oriented to person, place, and time. No cranial nerve deficit.   Skin: Skin is warm and dry. She is not diaphoretic.   Psychiatric: She has a normal mood and affect. Her behavior is normal. Judgment and thought content normal.   Nursing note and vitals reviewed.        CRANIAL NERVES     CN III, IV, VI   Extraocular motions are normal.        Significant Labs:   BMP:   Recent Labs   Lab 06/12/19  1539   *      K 4.0      CO2 23   BUN 13   CREATININE 0.7   CALCIUM 9.6     CBC:   Recent Labs   Lab 06/12/19  1539   WBC 6.11   HGB 13.7   HCT 40.5        CMP:   Recent Labs   Lab 06/12/19  1539      K 4.0      CO2 23   *   BUN 13   CREATININE 0.7   CALCIUM 9.6   PROT 7.4   ALBUMIN 3.1*   BILITOT 1.0   ALKPHOS 99   *   ALT 96*   ANIONGAP 10   EGFRNONAA >60.0     Cardiac Markers:   Recent Labs   Lab 06/12/19  1539   *     TSH:   Recent Labs   Lab 04/15/19  1216   TSH 0.446     Urine Culture: No results for input(s): LABURIN in the last 48 hours.  Urine Studies: No results for input(s): COLORU, APPEARANCEUA, PHUR, SPECGRAV, PROTEINUA, GLUCUA, KETONESU, BILIRUBINUA, OCCULTUA, NITRITE, UROBILINOGEN, LEUKOCYTESUR, RBCUA, WBCUA, BACTERIA, SQUAMEPITHEL, HYALINECASTS in the last 48 hours.    Invalid input(s): WRIGHTJANICE    Significant Imaging: I have reviewed all pertinent imaging results/findings within the past 24 hours.

## 2019-06-13 NOTE — NURSING
Notified JUAN Chun of patient's increased troponin from 0.071 to 0.083 via secure chat. No new orders given at this time.

## 2019-06-13 NOTE — ASSESSMENT & PLAN NOTE
- clinically consistent symptoms with SOB, LE edema, CXR with pulmonary edema and effusion, elevated BNP  - no echo on file, however. Echo ordered  - not on BB, ACE/ARB, or diuretic  - hold on starting BB until euvolemic  - diuresis with lasix 40 mg IVP BID, lasix naive  - self-reported admission weight 125 lbs, standing weight pending  - check daily weights, I/Os, low salt diet, fluid restriction   - maintain on tele  - check LE US for DVT r/o

## 2019-06-13 NOTE — ASSESSMENT & PLAN NOTE
- check TSH  - on methimazole 2.5 mg daily  - dosing is not available on formulary, closest is 5 mg  - hold on giving 5 mg dose until TSH evaluated

## 2019-06-13 NOTE — PLAN OF CARE
Problem: Adult Inpatient Plan of Care  Goal: Plan of Care Review  Outcome: Ongoing (interventions implemented as appropriate)  I have reviewed the plan of care with the patient, and I have answered all questions to the best of my ability and within my scope of practice. Patient has been AAOx4 since her arrival on the unit. She also has not complained of any chest pain since her arrival. Vital signs have been within normal limits. Patient has also been educated on her risk for falls. I have asked that she not get up without assistance and thus far she has complied with this. At the time of this writing, no fall with injury has occurred. Bed down in lowest position, call light within reach, side rails up x2. Bed alarm on, room near nurse's desk.

## 2019-06-13 NOTE — H&P
[facility] Hospital Medicine  History & Physical    Patient Name: Peri Johansen  MRN: [id number]  Admission Date: 6/12/2019  Attending Physician: Angie Reyes MD   Primary Care Provider: Annika Garland MD    [facility] Medicine Team: [facility] HOSP MED E Dwight Pacheco PA-C     Patient information was obtained from patient, past medical records and ER records.     Subjective:     Principal Problem:Acute congestive heart failure    Chief Complaint:   Chief Complaint   Patient presents with    Chest Pain     Pt presents from the [facility] with c/o chest pain and SOB that began yesterday. Pt also reports increased swelling in the lower extremities.         HPI: Peri Johansen is a 85F with HTN, HLD, T2DM, h/o lymphoma, L carpal tunnel, who presents from  clinic for evaluation of non-radiating substernal chest tightness and dyspnea with minimal exertion. The patient is a poor historian and her history changes slightly while she tells it. She reports chest pain/tightness and heart burn symptoms that started earlier in the week with associated LE edema (L>R). She slept poorly the night before admission 2/2 orthnopnea and decided to present to clinic for evaluation. She denies any changes to her CP with eating, breathing, movement, or exertion. She denies chest trauma. She reports some palipations that also occur with her MONSON. She denies HA, lightheadness, falls, syncope, NVD, diaphoresis, fever, chills, dysuria, abdominal pain. She does report dry cough. She denies cardiac history. She lives with her daughter and watches her sugar intake but not her salt. Her chest tightness has resolved on admission.    ED: no leukocytosis, afebrile, SCr 0.7, Tn 0.075, , CXR w/ edema, EKG non-ischemic, LE US negative, RUQ US negative but showing R pleural effusion    Past Medical History:   Diagnosis Date    Aortic atherosclerosis 1/7/2016    Arthritis     Colon polyp: hyperplastic 2015- repeat  2020 8/6/2015    Diabetes mellitus, type II 8/16/2012    Diverticulosis     Gastric nodule 8/7/2014    GERD (gastroesophageal reflux disease) 8/16/2012    Goiter 8/16/2012    Hyperlipidemia 8/16/2012    Hypertension     Joint pain     Leg pain 8/16/2012    Lymphoma 8/16/2012    Osteopenia 8/16/2012    Osteoporosis     Renal cyst 3/28/2013    Rickets, vitamin D deficiency 8/16/2012    Thyroid nodule 2/24/2014    Vitamin D deficiency disease 8/16/2012       Past Surgical History:   Procedure Laterality Date    CARPAL TUNNEL RELEASE      CATARACT EXTRACTION W/  INTRAOCULAR LENS IMPLANT Bilateral     COLONOSCOPY N/A 6/29/2015    Performed by Osmin Smallwood MD at [facility] (4TH FLR)    COLONOSCOPY N/A 6/12/2015    Performed by Miko Santana MD at [facility] (4TH FLR)    EGD (ESOPHAGOGASTRODUODENOSCOPY) N/A 4/19/2013    Performed by Jose Miguel Rodriguez MD at [facility] (4TH FLR)    HYSTERECTOMY      partial    lymphoma surgery      L tibia    RELEASE-CARPAL TUNNEL left Left 9/7/2016    Performed by Annika Kolb MD at [facility] OR 1ST FLR    ULTRASOUND, UPPER GI TRACT, ENDOSCOPIC N/A 7/9/2013    Performed by Hong Huerta MD at [facility] (2ND FLR)       Review of patient's allergies indicates:   Allergen Reactions    Latex, natural rubber Itching       No current facility-administered medications on file prior to encounter.      Current Outpatient Medications on File Prior to Encounter   Medication Sig    ammonium lactate 12 % Crea Apply 1 application topically once daily.    aspirin 81 MG Chew Take 1 tablet (81 mg total) by mouth once daily.    atorvastatin (LIPITOR) 40 MG tablet TK 1 T PO ONCE D    blood-glucose meter kit Please dispense meter, lancets, and testing strips covered by insurance    calcium 500 mg Tab Take 1 tablet by mouth Twice daily.    dextran 70-hypromellose (ARTIFICIAL TEARS,[id number],) ophthalmic solution Place 1 drop into both eyes 4 (four) times daily as  needed.    diclofenac sodium (VOLTAREN) 1 % Gel Apply 2 g topically 4 (four) times daily.    diclofenac sodium (VOLTAREN) 1 % Gel Apply 2 g topically 4 (four) times daily.    diphth,pertus,acell,,tetanus (BOOSTRIX TDAP) 2.5-8-5 Lf-mcg-Lf/0.5mL Syrg injection Inject 0.5 mLs into the muscle.    fluticasone (FLONASE) 50 mcg/actuation nasal spray 1 spray (50 mcg total) by Each Nare route once daily.    gabapentin (NEURONTIN) 300 MG capsule Take 1 capsule (300 mg total) by mouth 2 (two) times daily.    glimepiride (AMARYL) 2 MG tablet TAKE 1 TABLET BY MOUTH BEFORE BREAKFAST.    metFORMIN (GLUCOPHAGE) 1000 MG tablet Take 1 tablet (1,000 mg total) by mouth 2 (two) times daily with meals.    methIMAzole (TAPAZOLE) 5 MG Tab 1/2 tablet (2.5mg) daily    MICRO THIN LANCETS [facility] USE AS DIRECTED    neomycin-polymyxin-hydrocortisone (CORTISPORIN) 3.5-10,000-1 mg/mL-unit/mL-% otic suspension Place 3.5 drops into both ears daily 2 hours after breakfast.    omeprazole (PRILOSEC) 40 MG capsule TAKE 1 CAPSULE(40 MG) BY MOUTH DAILY AS NEEDED    ONE TOUCH DELICA LANCETS 30 gauge Misc     ONETOUCH ULTRA BLUE TEST STRIP Strp TEST BLOOD TWICE DAILY    traMADol (ULTRAM) 50 mg tablet Take 1 tablet (50 mg total) by mouth every 12 (twelve) hours as needed for Pain.    TRUE METRIX GLUCOSE TEST STRIP Strp USE AS DIRECTED     Family History     Problem Relation (Age of Onset)    Breast cancer Maternal Aunt    Cancer Brother    Heart disease Father    Hypertension Mother, Father    No Known Problems Maternal Uncle, Paternal Aunt, Paternal Uncle, Maternal Grandmother, Maternal Grandfather, Paternal Grandmother, Paternal Grandfather, Daughter, Son, Son, Daughter, Daughter        Tobacco Use    Smoking status: Never Smoker    Smokeless tobacco: Never Used   Substance and Sexual Activity    Alcohol use: No     Alcohol/week: 0.0 oz    Drug use: No    Sexual activity: Not Currently     Review of Systems   Constitutional:  Negative for activity change, appetite change, chills, diaphoresis, fatigue and fever.   Respiratory: Positive for cough, chest tightness and shortness of breath. Negative for wheezing.    Cardiovascular: Positive for chest pain, palpitations and leg swelling.   Gastrointestinal: Negative for abdominal distention, abdominal pain, constipation, nausea and vomiting.   Genitourinary: Negative for difficulty urinating, dysuria and enuresis.   Musculoskeletal: Negative for arthralgias, back pain and gait problem.   Skin: Negative for rash and wound.   Neurological: Negative for dizziness, tremors, syncope, weakness, light-headedness and headaches.   Psychiatric/Behavioral: Negative for agitation and confusion.     Objective:     Vital Signs (Most Recent):  Temp: 97.7 °F (36.5 °C) (06/12/19 2352)  Pulse: 94 (06/12/19 2352)  Resp: 16 (06/12/19 2352)  BP: 139/87 (06/12/19 2352)  SpO2: 95 % (06/12/19 2352) Vital Signs (24h Range):  Temp:  [97.7 °F (36.5 °C)-98.8 °F (37.1 °C)] 97.7 °F (36.5 °C)  Pulse:  [] 94  Resp:  [16] 16  SpO2:  [94 %-100 %] 95 %  BP: (128-149)/(55-87) 139/87     Weight: 56.7 kg (125 lb)  Body mass index is 25.25 kg/m².    Physical Exam   Constitutional: She is oriented to person, place, and time. She appears well-developed and well-nourished. No distress.   HENT:   Head: Normocephalic and atraumatic.   Eyes: EOM are normal. No scleral icterus.   Neck: Normal range of motion. Neck supple. JVD present.   Cardiovascular: Normal rate and regular rhythm.   No murmur heard.  Pulmonary/Chest: Effort normal. No stridor. No respiratory distress. She has no wheezes. She has rales (at bases).   Abdominal: Soft. Bowel sounds are normal. She exhibits no distension.   Musculoskeletal: Normal range of motion. She exhibits edema (2+ BLE edema).   Neurological: She is alert and oriented to person, place, and time. No cranial nerve deficit.   Skin: Skin is warm and dry. She is not diaphoretic.   Psychiatric: She has  a normal mood and affect. Her behavior is normal. Judgment and thought content normal.   Nursing note and vitals reviewed.        CRANIAL NERVES     CN III, IV, VI   Extraocular motions are normal.        Significant Labs:   BMP:   Recent Labs   Lab 06/12/19  1539   *      K 4.0      CO2 23   BUN 13   CREATININE 0.7   CALCIUM 9.6     CBC:   Recent Labs   Lab 06/12/19  1539   WBC 6.11   HGB 13.7   HCT 40.5        CMP:   Recent Labs   Lab 06/12/19  1539      K 4.0      CO2 23   *   BUN 13   CREATININE 0.7   CALCIUM 9.6   PROT 7.4   ALBUMIN 3.1*   BILITOT 1.0   ALKPHOS 99   *   ALT 96*   ANIONGAP 10   EGFRNONAA >60.0     Cardiac Markers:   Recent Labs   Lab 06/12/19  1539   *     TSH:   Recent Labs   Lab 04/15/19  1216   TSH 0.446     Urine Culture: No results for input(s): LABURIN in the last 48 hours.  Urine Studies: No results for input(s): COLORU, APPEARANCEUA, PHUR, SPECGRAV, PROTEINUA, GLUCUA, KETONESU, BILIRUBINUA, OCCULTUA, NITRITE, UROBILINOGEN, LEUKOCYTESUR, RBCUA, WBCUA, BACTERIA, SQUAMEPITHEL, HYALINECASTS in the last 48 hours.    Invalid input(s): WRIGHTSUR    Significant Imaging: I have reviewed all pertinent imaging results/findings within the past 24 hours.    Assessment/Plan:     * Acute congestive heart failure  - clinically consistent symptoms with SOB, LE edema, CXR with pulmonary edema and effusion, elevated BNP  - no echo on file, however. Echo ordered  - not on BB, ACE/ARB, or diuretic  - hold on starting BB until euvolemic  - diuresis with lasix 40 mg IVP BID, lasix naive  - self-reported admission weight 125 lbs, standing weight pending  - check daily weights, I/Os, low salt diet, fluid restriction   - maintain on tele  - check LE US for DVT r/o    Elevated troponin  - non-exertional chest tightness reported on admission, now resolved  - no acute ST/T wave changes on EKG when compared to prior  - likely 2/2 volume overload  - echo to  evaluate WMA  - trend to ensure flat or decreased    Abnormal liver enzymes  - denies abdominal pain  - possibly congestive hepatopathy from CHF  - RUQ US ordered  - hepatitis panel ordered  - cancer history noted  - trend daily    Type 2 diabetes mellitus with hyperglycemia, without long-term current use of insulin  - hold home metformin and amaryl  - start low dose SSI for now  - can add detemir and meal time insulin with glucose trend  Lab Results   Component Value Date    HGBA1C 7.2 (H) 04/15/2019       Hyperthyroidism  - check TSH  - on methimazole 2.5 mg daily  - dosing is not available on formulary, closest is 5 mg  - hold on giving 5 mg dose until TSH evaluated    Gastroesophageal reflux disease without esophagitis  - PPI    Mixed hyperlipidemia  - continue statin, ASA    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

## 2019-06-13 NOTE — ASSESSMENT & PLAN NOTE
Down-trending, chest pain reproducible on exam   - non-exertional chest tightness reported on admission, now resolved  - no acute ST/T wave changes on EKG when compared to prior  - likely 2/2 volume overload

## 2019-06-13 NOTE — ASSESSMENT & PLAN NOTE
- non-exertional chest tightness reported on admission, now resolved  - no acute ST/T wave changes on EKG when compared to prior  - likely 2/2 volume overload  - echo to evaluate WMA  - trend to ensure flat or decreased

## 2019-06-13 NOTE — PROGRESS NOTES
[facility] Hospital Medicine  Progress Note    Patient Name: Peri Johansen  MRN: [id number]  Patient Class: OP- Observation   Admission Date: 6/12/2019  Length of Stay: 0 days  Attending Physician: Angie Reyes MD  Primary Care Provider: Annika Garland MD    Hospital Medicine Team: [facility] Cydney Moran PA-C    Subjective:     Principal Problem:Acute congestive heart failure    Overview/Hospital Course:Peri Johansen was admitted to hospital medicine for acute on chronic congestive heart failure, likely a new diagnosis. TTE consistent with diastolic heart failure with an EF of 45-50%. Diuresis started on admission.     Interval History: Pt seen resting in bed, reports improvement of her lower extremity swelling.     Review of Systems   Constitutional: Negative for activity change, appetite change, chills, diaphoresis, fatigue and fever.   Respiratory: Positive for shortness of breath. Negative for cough, chest tightness and wheezing.    Cardiovascular: Positive for chest pain. Negative for palpitations and leg swelling.   Gastrointestinal: Negative for abdominal distention, abdominal pain, constipation, nausea and vomiting.   Genitourinary: Negative for difficulty urinating, dysuria and enuresis.   Musculoskeletal: Negative for arthralgias, back pain and gait problem.   Skin: Negative for rash and wound.   Neurological: Negative for dizziness, tremors, syncope, weakness, light-headedness and headaches.   Psychiatric/Behavioral: Negative for agitation and confusion.     Objective:     Vital Signs (Most Recent):  Temp: 98.2 °F (36.8 °C) (06/13/19 1120)  Pulse: 96 (06/13/19 1526)  Resp: 16 (06/13/19 1120)  BP: (!) 122/56 (06/13/19 1136)  SpO2: 99 % (06/13/19 1120) Vital Signs (24h Range):  Temp:  [97.6 °F (36.4 °C)-98.2 °F (36.8 °C)] 98.2 °F (36.8 °C)  Pulse:  [] 96  Resp:  [14-18] 16  SpO2:  [94 %-100 %] 99 %  BP: (118-149)/(55-87) 122/56     Weight: 59 kg (130 lb)  Body  mass index is 26.26 kg/m².    Intake/Output Summary (Last 24 hours) at 6/13/2019 1545  Last data filed at 6/13/2019 0450  Gross per 24 hour   Intake --   Output 1500 ml   Net -1500 ml      Physical Exam   Constitutional: She is oriented to person, place, and time. She appears well-developed and well-nourished. No distress.   HENT:   Head: Normocephalic and atraumatic.   Eyes: EOM are normal. No scleral icterus.   Neck: Normal range of motion. Neck supple. JVD present.   Cardiovascular: Normal rate and regular rhythm.   No murmur heard.  Reproducible chest pain   Pulmonary/Chest: Effort normal. No stridor. No respiratory distress. She has no wheezes. She has rales (at bases).   Abdominal: Soft. Bowel sounds are normal. She exhibits no distension.   Musculoskeletal: Normal range of motion. She exhibits edema (2+ BLE edema).   Neurological: She is alert and oriented to person, place, and time. No cranial nerve deficit.   Skin: Skin is warm and dry. She is not diaphoretic.   Psychiatric: She has a normal mood and affect. Her behavior is normal. Judgment and thought content normal.   Nursing note and vitals reviewed.      Significant Labs: All pertinent labs within the past 24 hours have been reviewed.    Significant Imaging: I have reviewed all pertinent imaging results/findings within the past 24 hours.        Assessment/Plan:      * Acute congestive heart failure  - Patient decompensated on admit, presentation clinically consistent symptoms with SOB, LE edema, CXR with pulmonary edema and effusion, elevated BNP  - , troponin 0.075>0.071>0.083>0.049  - Patient on NC on admit with pulse oximetry of 98-99%   - At baseline patient on RA --- wean oxygen as tolerated    - Echo 6/13/19, EF 45-50%, (+)DD, PAP 33mmHg  - not on BB, ACE/ARB, or diuretic   - hospital diuresis: IV lasix 40mg BID  - hold on starting BB until euvolemic  - lower ext doppler negative for DVT  - monitor response with daily weights, strict I/Os,  oxygen requirements    Intake/Output Summary (Last 24 hours) at 6/13/2019 1550  Last data filed at 6/13/2019 0450  Gross per 24 hour   Intake --   Output 1500 ml   Net -1500 ml       Elevated troponin  Down-trending, chest pain reproducible on exam   - non-exertional chest tightness reported on admission, now resolved  - no acute ST/T wave changes on EKG when compared to prior  - likely 2/2 volume overload    Abnormal liver enzymes  - denies abdominal pain  - possibly congestive hepatopathy from CHF  - RUQ US ordered  - hepatitis panel ordered  - cancer history noted  - trend daily    Type 2 diabetes mellitus with hyperglycemia, without long-term current use of insulin  - hold home metformin and amaryl  - start low dose SSI for now  - can add detemir and meal time insulin with glucose trend  Lab Results   Component Value Date    HGBA1C 7.2 (H) 04/15/2019       Hyperthyroidism  - TSH wnl  - on methimazole 2.5 mg daily  - dosing is not available on formulary, closest is 5 mg    Gastroesophageal reflux disease without esophagitis  - PPI    Mixed hyperlipidemia  - continue statin, ASA    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

## 2019-06-14 VITALS
HEART RATE: 91 BPM | DIASTOLIC BLOOD PRESSURE: 68 MMHG | RESPIRATION RATE: 15 BRPM | WEIGHT: 127 LBS | HEIGHT: 59 IN | SYSTOLIC BLOOD PRESSURE: 119 MMHG | TEMPERATURE: 98 F | BODY MASS INDEX: 25.6 KG/M2 | OXYGEN SATURATION: 96 %

## 2019-06-14 LAB
ANION GAP SERPL CALC-SCNC: 10 MMOL/L (ref 8–16)
BASOPHILS # BLD AUTO: 0.02 K/UL (ref 0–0.2)
BASOPHILS NFR BLD: 0.4 % (ref 0–1.9)
BUN SERPL-MCNC: 14 MG/DL (ref 8–23)
CALCIUM SERPL-MCNC: 9.2 MG/DL (ref 8.7–10.5)
CHLORIDE SERPL-SCNC: 101 MMOL/L (ref 95–110)
CO2 SERPL-SCNC: 30 MMOL/L (ref 23–29)
CREAT SERPL-MCNC: 0.6 MG/DL (ref 0.5–1.4)
DIFFERENTIAL METHOD: NORMAL
EOSINOPHIL # BLD AUTO: 0.2 K/UL (ref 0–0.5)
EOSINOPHIL NFR BLD: 3 % (ref 0–8)
ERYTHROCYTE [DISTWIDTH] IN BLOOD BY AUTOMATED COUNT: 13.9 % (ref 11.5–14.5)
EST. GFR  (AFRICAN AMERICAN): >60 ML/MIN/1.73 M^2
EST. GFR  (NON AFRICAN AMERICAN): >60 ML/MIN/1.73 M^2
GLUCOSE SERPL-MCNC: 110 MG/DL (ref 70–110)
HCT VFR BLD AUTO: 41.3 % (ref 37–48.5)
HGB BLD-MCNC: 13.7 G/DL (ref 12–16)
IMM GRANULOCYTES # BLD AUTO: 0.01 K/UL (ref 0–0.04)
IMM GRANULOCYTES NFR BLD AUTO: 0.2 % (ref 0–0.5)
LYMPHOCYTES # BLD AUTO: 1.7 K/UL (ref 1–4.8)
LYMPHOCYTES NFR BLD: 34 % (ref 18–48)
MAGNESIUM SERPL-MCNC: 1.6 MG/DL (ref 1.6–2.6)
MCH RBC QN AUTO: 28.7 PG (ref 27–31)
MCHC RBC AUTO-ENTMCNC: 33.2 G/DL (ref 32–36)
MCV RBC AUTO: 86 FL (ref 82–98)
MONOCYTES # BLD AUTO: 0.7 K/UL (ref 0.3–1)
MONOCYTES NFR BLD: 14.7 % (ref 4–15)
NEUTROPHILS # BLD AUTO: 2.4 K/UL (ref 1.8–7.7)
NEUTROPHILS NFR BLD: 47.7 % (ref 38–73)
NRBC BLD-RTO: 0 /100 WBC
PHOSPHATE SERPL-MCNC: 3.7 MG/DL (ref 2.7–4.5)
PLATELET # BLD AUTO: 218 K/UL (ref 150–350)
PMV BLD AUTO: 12.2 FL (ref 9.2–12.9)
POCT GLUCOSE: 120 MG/DL (ref 70–110)
POCT GLUCOSE: 139 MG/DL (ref 70–110)
POCT GLUCOSE: 159 MG/DL (ref 70–110)
POTASSIUM SERPL-SCNC: 3.6 MMOL/L (ref 3.5–5.1)
RBC # BLD AUTO: 4.78 M/UL (ref 4–5.4)
SODIUM SERPL-SCNC: 141 MMOL/L (ref 136–145)
WBC # BLD AUTO: 5.03 K/UL (ref 3.9–12.7)

## 2019-06-14 PROCEDURE — 83735 ASSAY OF MAGNESIUM: CPT

## 2019-06-14 PROCEDURE — 85025 COMPLETE CBC W/AUTO DIFF WBC: CPT

## 2019-06-14 PROCEDURE — G0378 HOSPITAL OBSERVATION PER HR: HCPCS

## 2019-06-14 PROCEDURE — 99217 PR OBSERVATION CARE DISCHARGE: ICD-10-PCS | Mod: ,,, | Performed by: PHYSICIAN ASSISTANT

## 2019-06-14 PROCEDURE — 84100 ASSAY OF PHOSPHORUS: CPT

## 2019-06-14 PROCEDURE — 80048 BASIC METABOLIC PNL TOTAL CA: CPT

## 2019-06-14 PROCEDURE — 94761 N-INVAS EAR/PLS OXIMETRY MLT: CPT

## 2019-06-14 PROCEDURE — 27000221 HC OXYGEN, UP TO 24 HOURS

## 2019-06-14 PROCEDURE — 25000003 PHARM REV CODE 250: Performed by: PHYSICIAN ASSISTANT

## 2019-06-14 PROCEDURE — 36415 COLL VENOUS BLD VENIPUNCTURE: CPT

## 2019-06-14 PROCEDURE — 99217 PR OBSERVATION CARE DISCHARGE: CPT | Mod: ,,, | Performed by: PHYSICIAN ASSISTANT

## 2019-06-14 RX ORDER — METOPROLOL SUCCINATE 25 MG/1
25 TABLET, EXTENDED RELEASE ORAL DAILY
Status: DISCONTINUED | OUTPATIENT
Start: 2019-06-14 | End: 2019-06-14 | Stop reason: HOSPADM

## 2019-06-14 RX ORDER — FUROSEMIDE 20 MG/1
20 TABLET ORAL 2 TIMES DAILY
Qty: 60 TABLET | Refills: 11 | Status: SHIPPED | OUTPATIENT
Start: 2019-06-14 | End: 2019-06-14 | Stop reason: SDUPTHER

## 2019-06-14 RX ORDER — METOPROLOL SUCCINATE 25 MG/1
25 TABLET, EXTENDED RELEASE ORAL DAILY
Qty: 30 TABLET | Refills: 11 | Status: SHIPPED | OUTPATIENT
Start: 2019-06-14 | End: 2021-02-03 | Stop reason: SDUPTHER

## 2019-06-14 RX ORDER — POTASSIUM CHLORIDE 750 MG/1
10 TABLET, EXTENDED RELEASE ORAL ONCE
Qty: 1 TABLET | Refills: 0 | Status: SHIPPED | OUTPATIENT
Start: 2019-06-14 | End: 2019-06-14 | Stop reason: SDUPTHER

## 2019-06-14 RX ORDER — FUROSEMIDE 20 MG/1
20 TABLET ORAL DAILY
Qty: 30 TABLET | Refills: 11 | Status: SHIPPED | OUTPATIENT
Start: 2019-06-14 | End: 2021-02-03 | Stop reason: SDUPTHER

## 2019-06-14 RX ORDER — POTASSIUM CHLORIDE 750 MG/1
10 TABLET, EXTENDED RELEASE ORAL DAILY
Qty: 30 TABLET | Refills: 11 | Status: SHIPPED | OUTPATIENT
Start: 2019-06-14 | End: 2021-04-06 | Stop reason: SDUPTHER

## 2019-06-14 RX ADMIN — PANTOPRAZOLE SODIUM 40 MG: 40 TABLET, DELAYED RELEASE ORAL at 08:06

## 2019-06-14 RX ADMIN — ATORVASTATIN CALCIUM 40 MG: 20 TABLET, FILM COATED ORAL at 08:06

## 2019-06-14 RX ADMIN — ASPIRIN 81 MG CHEWABLE TABLET 81 MG: 81 TABLET CHEWABLE at 08:06

## 2019-06-14 RX ADMIN — GABAPENTIN 300 MG: 300 CAPSULE ORAL at 08:06

## 2019-06-14 RX ADMIN — POLYETHYLENE GLYCOL 3350 17 G: 17 POWDER, FOR SOLUTION ORAL at 08:06

## 2019-06-14 RX ADMIN — METOPROLOL SUCCINATE 25 MG: 25 TABLET, EXTENDED RELEASE ORAL at 02:06

## 2019-06-14 NOTE — NURSING
Patient up in chair eating lunch Blood sugars am 120 mg/ and lunch 15 mg/dl requires no coverage. Pt patient c/o being lightheaded and ringing in the ears specifically the left ear for a couple of months. Stated has been [place] lately and wanted hearing aid. Can't hear the TV and people talking to her very well. Has a good appetite , had a large BM and is voiding well no c/o pain. Continuing to assess.

## 2019-06-14 NOTE — PLAN OF CARE
06/14/19 0932   Post-Acute Status   Post-Acute Authorization Home Health/Hospice   Home Health/Hospice Status Referrals Sent

## 2019-06-14 NOTE — PLAN OF CARE
[facility]    HOME HEALTH ORDERS  FACE TO FACE ENCOUNTER    Patient Name: Peri Johansen  YOB: 1934    PCP: Annika Garland MD   PCP Address: [address] / [place] [address]  PCP Phone Number: [phone]  PCP Fax: [phone]    Encounter Date: 06/14/2019    Admit to Home Health    Diagnoses:  Active Hospital Problems    Diagnosis  POA    *Acute congestive heart failure [I50.9]  Yes    Type 2 diabetes mellitus with hyperglycemia, without long-term current use of insulin [E11.65]  Yes    Abnormal liver enzymes [R74.8]  Yes    Elevated troponin [R74.8]  Yes    Hyperthyroidism [E05.90]  Yes    Cervical spine arthritis [M47.812]  Yes    Gastroesophageal reflux disease without esophagitis [K21.9]  Yes    Mixed hyperlipidemia [E78.2]  Yes    Diffuse large B-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       Future Appointments   Date Time Provider Department Center   7/9/2019 10:40 AM Pool Dumont, OD [facility]           I have seen and examined this patient face to face today. My clinical findings that support the need for the home health skilled services and home bound status are the following:  Weakness/numbness causing balance and gait disturbance due to Heart Failure and Weakness/Debility making it taxing to leave home.    Allergies:  Review of patient's allergies indicates:   Allergen Reactions    Latex, natural rubber Itching       Diet: cardiac diet    Activities: activity as tolerated    Nursing:   SN to complete comprehensive assessment including routine vital signs. Instruct on disease process and s/s of complications to report to MD. Review/verify medication list sent home with the patient at time of discharge  and instruct patient/caregiver as needed. Frequency may be adjusted depending on start of care  date.    Notify MD if SBP > 160 or < 90; DBP > 90 or < 50; HR > 120 or < 50; Temp > 101      CONSULTS:    Physical Therapy to evaluate and treat. Evaluate for home safety and equipment needs; Establish/upgrade home exercise program. Perform / instruct on therapeutic exercises, gait training, transfer training, and Range of Motion.  Occupational Therapy to evaluate and treat. Evaluate home environment for safety and equipment needs. Perform/Instruct on transfers, ADL training, ROM, and therapeutic exercises.   to evaluate for community resources/long-range planning.  Aide to provide assistance with personal care, ADLs, and vital signs.    Heart Failure Home Health Instructions:     SN to instruct on the following:    Instruct on the definition of CHF.   Instruct on the signs/sympoms of CHF to be reported.   Instruct on and monitor daily weights.   Instruct on factors that cause exacerbation.   Instruct on action, dose, schedule, and side effects of medications.   Instruct on diet as prescribed.   Instruct on activity allowed.   Instruct on life-style modifications for life long management of CHF   SN to assess compliance with daily weights, diet, medications, fluid retention,    safety precautions, activities permitted and life-style modifications.   Additional 1-2 SN visits per week as needed for signs and symptoms     of CHF exacerbation.    For Weight Gain > 2-3 lbs in 1 day or 4-6 lbs over 1 week:       Increase furosemide 20 MG tablet (oral diuretic) dose to furosemide 40 MG tablet for 5 days temporarily     Increase potassium chloride 10 MEQ (oral potassium supplement) to potassium chloride 20 MEQ for 5 days temporarily     Obtain BMP lab test in 3 days      If weight does not decrease by 3 lbs after 5 days of increased diuretic usage:   Notify PCP      WOUND CARE ORDERS  n/a      Medications: Review discharge medications with patient and family and provide education.      Current Discharge  Medication List      START taking these medications    Details   metoprolol succinate (TOPROL-XL) 25 MG 24 hr tablet Take 1 tablet (25 mg total) by mouth once daily.  Qty: 30 tablet, Refills: 11         CONTINUE these medications which have NOT CHANGED    Details   ammonium lactate 12 % Crea Apply 1 application topically once daily.  Qty: 280 g, Refills: 3    Associated Diagnoses: Type II or unspecified type diabetes mellitus with neurological manifestations, not stated as uncontrolled(250.60)      aspirin 81 MG Chew Take 1 tablet (81 mg total) by mouth once daily.    Associated Diagnoses: Aortic atherosclerosis      atorvastatin (LIPITOR) 40 MG tablet TK 1 T PO ONCE D  Qty: 90 tablet, Refills: 2      blood-glucose meter kit Please dispense meter, lancets, and testing strips covered by insurance  Qty: 1 each, Refills: 0      calcium 500 mg Tab Take 1 tablet by mouth Twice daily.      dextran 70-hypromellose (ARTIFICIAL TEARS,[id number],) ophthalmic solution Place 1 drop into both eyes 4 (four) times daily as needed.  Qty: 1 Bottle, Refills: 5      !! diclofenac sodium (VOLTAREN) 1 % Gel Apply 2 g topically 4 (four) times daily.  Qty: 5 Tube, Refills: 2    Associated Diagnoses: Left leg pain      !! diclofenac sodium (VOLTAREN) 1 % Gel Apply 2 g topically 4 (four) times daily.  Qty: 1 Tube, Refills: 2    Associated Diagnoses: Lumbar degenerative disc disease      diphth,pertus,acell,,tetanus (BOOSTRIX TDAP) 2.5-8-5 Lf-mcg-Lf/0.5mL Syrg injection Inject 0.5 mLs into the muscle.  Qty: 0.5 mL, Refills: 0      fluticasone (FLONASE) 50 mcg/actuation nasal spray 1 spray (50 mcg total) by Each Nare route once daily.  Qty: 16 g, Refills: 0    Associated Diagnoses: PND (post-nasal drip); Left ear pain      gabapentin (NEURONTIN) 300 MG capsule Take 1 capsule (300 mg total) by mouth 2 (two) times daily.  Qty: 180 capsule, Refills: 1      glimepiride (AMARYL) 2 MG tablet TAKE 1 TABLET BY MOUTH BEFORE BREAKFAST.  Qty: 90  tablet, Refills: 3    Comments: **Patient requests 90 days supply**      metFORMIN (GLUCOPHAGE) 1000 MG tablet Take 1 tablet (1,000 mg total) by mouth 2 (two) times daily with meals.  Qty: 180 tablet, Refills: 3    Associated Diagnoses: Uncontrolled type 2 diabetes mellitus with hyperglycemia, without long-term current use of insulin      methIMAzole (TAPAZOLE) 5 MG Tab 1/2 tablet (2.5mg) daily  Qty: 15 tablet, Refills: 5      !! MICRO THIN LANCETS 33 gauge Misc USE AS DIRECTED  Refills: 0      neomycin-polymyxin-hydrocortisone (CORTISPORIN) 3.5-10,000-1 mg/mL-unit/mL-% otic suspension Place 3.5 drops into both ears daily 2 hours after breakfast.  Refills: 1      omeprazole (PRILOSEC) 40 MG capsule TAKE 1 CAPSULE(40 MG) BY MOUTH DAILY AS NEEDED  Qty: 90 capsule, Refills: 1    Comments: **Patient requests 90 days supply**      !! ONE TOUCH DELICA LANCETS 30 gauge Misc Refills: 11      !! ONETOUCH ULTRA BLUE TEST STRIP Strp TEST BLOOD TWICE DAILY  Qty: 200 strip, Refills: 0      traMADol (ULTRAM) 50 mg tablet Take 1 tablet (50 mg total) by mouth every 12 (twelve) hours as needed for Pain.  Qty: 60 tablet, Refills: 0      !! TRUE METRIX GLUCOSE TEST STRIP Strp USE AS DIRECTED  Qty: 150 strip, Refills: 12    Comments: **Patient requests 90 days supply**       !! - Potential duplicate medications found. Please discuss with provider.          I certify that this patient is confined to her home and needs intermittent skilled nursing care, physical therapy and occupational therapy.

## 2019-06-14 NOTE — PROGRESS NOTES
Pt notified of discharge. She states that her granddaughter will pick her up after work, which she says is around 7 pm. Up in chair currently. NAD noted.

## 2019-06-14 NOTE — NURSING
Home Oxygen Evaluation    Date Performed: 2019     1) Patient's Home O2 Sat on room air, while at rest: 97%        If O2 sats on room air at rest are 88% or below, patient qualifies. No additional testing needed. Document N/A in steps 2 and 3. If 89% or above, complete steps 2.      2) Patient's O2 Sat on room air while exercisin%        If O2 sats on room air while exercising remain 89% or above patient does not qualify, no further testing needed Document N/A in step 3. If O2 sats on room air while exercising are 88% or below, continue to step 3.      3) Patient's O2 Sat while exercising on O2:  N/A         (Must show improvement from #2 for patients to qualify)    If O2 sats improve on oxygen, patient qualifies for portable oxygen. If not, the patient does not qualify.

## 2019-06-14 NOTE — NURSING
Pt discharged IV HL D'cd discharge instructions given. Pt accompanied by relative verbalized understanding. Telemetry taken off. Awaiting transportation.

## 2019-06-15 NOTE — ASSESSMENT & PLAN NOTE
Asymptomatic prior to discharge   - Patient decompensated on admit, presentation clinically consistent symptoms with SOB, LE edema, CXR with pulmonary edema and effusion, elevated BNP  - , troponin 0.075>0.071>0.083>0.049  - At baseline patient on RA at discharge -- 6 min test good   - Echo 6/13/19, EF 45-50%, (+)DD, PAP 33mmHg  - started BB & diuretic   - hospital diuresis: IV lasix 40mg BID  - differ ACE/ARB to cards in setting of normo-low blood pressure   - lower ext doppler negative for DVT  - monitor response with daily weights, strict I/Os, oxygen requirements

## 2019-06-15 NOTE — DISCHARGE SUMMARY
[facility] Hospital Medicine  Discharge Summary      Patient Name: Peri Johansen  MRN: [id number]  Admission Date: 6/12/2019  Hospital Length of Stay: 0 days  Discharge Date and Time: 6/14/2019  7:12 PM  Attending Physician: JERMAINE DAVALOS  Discharging Provider: Cydney Moran PA-C  Primary Care Provider: Annika Garland MD  [facility] Medicine Team: [facility] HOSP MED E Cydney Moran PA-C    HPI:   Peri Johansen is a 85F with HTN, HLD, T2DM, h/o lymphoma, L carpal tunnel, who presents from  clinic for evaluation of non-radiating substernal chest tightness and dyspnea with minimal exertion. The patient is a poor historian and her history changes slightly while she tells it. She reports chest pain/tightness and heart burn symptoms that started earlier in the week with associated LE edema (L>R). She slept poorly the night before admission 2/2 orthnopnea and decided to present to clinic for evaluation. She denies any changes to her CP with eating, breathing, movement, or exertion. She denies chest trauma. She reports some palipations that also occur with her MONSON. She denies HA, lightheadness, falls, syncope, NVD, diaphoresis, fever, chills, dysuria, abdominal pain. She does report dry cough. She denies cardiac history. She lives with her daughter and watches her sugar intake but not her salt. Her chest tightness has resolved on admission.    ED: no leukocytosis, afebrile, SCr 0.7, Tn 0.075, , CXR w/ edema, EKG non-ischemic, LE US negative, RUQ US negative but showing R pleural effusion    * No surgery found *      Hospital Course:   Peri Johansen was admitted to hospital medicine for acute on chronic congestive heart failure, likely a new diagnosis. TTE consistent with diastolic heart failure with an EF of 45-50%. Diuresis started on admission. BB therapy initiated prior to discharge, patient tolerated well. Cardiology referral placed.     Differ ACE therapy to PCP/cards due to  low-normal blood pressure.     Consults:     * Acute congestive heart failure  Asymptomatic prior to discharge   - Patient decompensated on admit, presentation clinically consistent symptoms with SOB, LE edema, CXR with pulmonary edema and effusion, elevated BNP  - , troponin 0.075>0.071>0.083>0.049  - At baseline patient on RA at discharge -- 6 min test good   - Echo 6/13/19, EF 45-50%, (+)DD, PAP 33mmHg  - started BB & diuretic   - hospital diuresis: IV lasix 40mg BID  - differ ACE/ARB to cards in setting of normo-low blood pressure   - lower ext doppler negative for DVT  - monitor response with daily weights, strict I/Os, oxygen requirements    Elevated troponin  Down-trending, chest pain reproducible on exam   - non-exertional chest tightness reported on admission, now resolved  - no acute ST/T wave changes on EKG when compared to prior  - likely 2/2 volume overload      Final Active Diagnoses:    Diagnosis Date Noted POA    PRINCIPAL PROBLEM:  Acute congestive heart failure [I50.9] 06/12/2019 Yes    Type 2 diabetes mellitus with hyperglycemia, without long-term current use of insulin [E11.65] 06/12/2019 Yes    Abnormal liver enzymes [R74.8] 06/12/2019 Yes    Elevated troponin [R74.8] 06/12/2019 Yes    Hyperthyroidism [E05.90] 04/15/2019 Yes    Cervical spine arthritis [M47.812] 04/15/2019 Yes    Gastroesophageal reflux disease without esophagitis [K21.9] 08/06/2015 Yes    Mixed hyperlipidemia [E78.2] 08/16/2012 Yes    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    Specialties:  Family Medicine, Sports Medicine  Why:  Appt scheduled for 6/28/19 940AM. No available appointment with regular PCP  Contact information:  [address]  [facility] [address] [phone]                  Patient Instructions:      Ambulatory Referral to Cardiology   Referral Priority: Routine Referral Type: Consultation   Referral Reason: Specialty Services Required   Requested Specialty: Cardiology   Number of Visits Requested: 1     Diet Cardiac     Diet diabetic     Activity as tolerated       Significant Diagnostic Studies: Labs: All labs within the past 24 hours have been reviewed    Pending Diagnostic Studies:     None         Medications:  Reconciled Home Medications:      Medication List      START taking these medications    furosemide 20 MG tablet  Commonly known as:  LASIX  Take 1 tablet (20 mg total) by mouth once daily.     metoprolol succinate 25 MG 24 hr tablet  Commonly known as:  TOPROL-XL  Take 1 tablet (25 mg total) by mouth once daily.     potassium chloride 10 MEQ Tbsr  Commonly known as:  KLOR-CON  Take 1 tablet (10 mEq total) by mouth once daily.        CONTINUE taking these medications    ammonium lactate 12 % Crea  Apply 1 application topically once daily.     aspirin 81 MG Chew  Take 1 tablet (81 mg total) by mouth once daily.     atorvastatin 40 MG tablet  Commonly known as:  LIPITOR  TK 1 T PO ONCE D     blood-glucose meter kit  Please dispense meter, lancets, and testing strips covered by insurance     BOOSTRIX TDAP 2.5-8-5 Lf-mcg-Lf/0.5mL Syrg injection  Generic drug:  diphth,pertus(acell),tetanus  Inject 0.5 mLs into the muscle.     calcium 500 mg Tab  Take 1 tablet by mouth Twice daily.     dextran 70-hypromellose ophthalmic solution  Commonly known as:  ARTIFICIAL TEARS([id number])  Place 1 drop into both eyes 4 (four) times daily as needed.     * diclofenac sodium 1 % Gel  Commonly known as:  VOLTAREN  Apply 2 g topically 4 (four) times daily.     * diclofenac sodium 1 % Gel  Commonly known as:  VOLTAREN  Apply 2 g topically 4 (four) times daily.     fluticasone propionate 50 mcg/actuation nasal spray  Commonly known as:  FLONASE  1 spray (50 mcg total) by Each Nare route  once daily.     gabapentin 300 MG capsule  Commonly known as:  NEURONTIN  Take 1 capsule (300 mg total) by mouth 2 (two) times daily.     glimepiride 2 MG tablet  Commonly known as:  AMARYL  TAKE 1 TABLET BY MOUTH BEFORE BREAKFAST.     metFORMIN 1000 MG tablet  Commonly known as:  GLUCOPHAGE  Take 1 tablet (1,000 mg total) by mouth 2 (two) times daily with meals.     methIMAzole 5 MG Tab  Commonly known as:  TAPAZOLE  1/2 tablet (2.5mg) daily     neomycin-polymyxin-hydrocortisone 3.5-10,000-1 mg/mL-unit/mL-% otic suspension  Commonly known as:  CORTISPORIN  Place 3.5 drops into both ears daily 2 hours after breakfast.     omeprazole 40 MG capsule  Commonly known as:  PRILOSEC  TAKE 1 CAPSULE(40 MG) BY MOUTH DAILY AS NEEDED     * ONETOUCH DELICA LANCETS 30 gauge Misc  Generic drug:  lancets     * MICRO THIN LANCETS 33 gauge Misc  Generic drug:  lancets  USE AS DIRECTED     traMADol 50 mg tablet  Commonly known as:  ULTRAM  Take 1 tablet (50 mg total) by mouth every 12 (twelve) hours as needed for Pain.     * TRUE METRIX GLUCOSE TEST STRIP Strp  Generic drug:  blood sugar diagnostic  USE AS DIRECTED     * ONETOUCH ULTRA BLUE TEST STRIP Strp  Generic drug:  blood sugar diagnostic  TEST BLOOD TWICE DAILY         * This list has 6 medication(s) that are the same as other medications prescribed for you. Read the directions carefully, and ask your doctor or other care provider to review them with you.                Indwelling Lines/Drains at time of discharge:   Lines/Drains/Airways          None          Time spent on the discharge of patient: 30 minutes  Patient was seen and examined on the date of discharge and determined to be suitable for discharge.         Cydney Moran PA-C  Department of Hospital Medicine  [facility]

## 2019-06-17 NOTE — PLAN OF CARE
Patient discharged home to care of family and [facility] on 6/14/19 06/17/19 1143   Final Note   Assessment Type Final Discharge Note   Anticipated Discharge Disposition Home-Health   Hospital Follow Up  Appt(s) scheduled? Yes   Right Care Referral Info   Post Acute Recommendation Home-care   Referral Type Home Health   Facility Name [facility]

## 2019-06-23 ENCOUNTER — TELEPHONE (OUTPATIENT)
Dept: INTERNAL MEDICINE | Facility: CLINIC | Age: 84
End: 2019-06-23

## 2019-06-23 DIAGNOSIS — I50.9 ACUTE CONGESTIVE HEART FAILURE, UNSPECIFIED HEART FAILURE TYPE: Primary | ICD-10-CM

## 2019-06-23 NOTE — TELEPHONE ENCOUNTER
Please schedule with me for hospital follow up some time in next 1-2 weeks  (She is scheduled with Dr Moseley)    She also needs an appt in Cardiology for follow up CHF  Referral is in thanks

## 2019-06-24 ENCOUNTER — TELEPHONE (OUTPATIENT)
Dept: INTERNAL MEDICINE | Facility: CLINIC | Age: 84
End: 2019-06-24

## 2019-06-24 NOTE — TELEPHONE ENCOUNTER
----- Message from Aliyah Grimaldo sent at 6/24/2019 11:43 AM CDT -----  Contact: Patient [phone]  Patient is returning a phone call.  Who left a message for the patient: BRUCE Kraft  Does patient know what this is regarding:    Comments:       Patient is unable to retreive voicemail  Please call and advise  Thank you

## 2019-06-26 ENCOUNTER — TELEPHONE (OUTPATIENT)
Dept: ADMINISTRATIVE | Facility: OTHER | Age: 84
End: 2019-06-26

## 2019-06-27 ENCOUNTER — TELEPHONE (OUTPATIENT)
Dept: INTERNAL MEDICINE | Facility: CLINIC | Age: 84
End: 2019-06-27

## 2019-06-27 NOTE — TELEPHONE ENCOUNTER
She has a 9 am appointment with you but you do have an open slot at 10:45. Your 10:30 am appointment that she is requesting is booked. Is it okay for her to change to the 10:45am slot?

## 2019-06-27 NOTE — TELEPHONE ENCOUNTER
----- Message from Jen Verde sent at 6/27/2019  8:09 AM CDT -----  Contact: pt [phone]  Pt calling requesting to change appt time to 10:30 on July 5th.  Please advise

## 2019-07-01 ENCOUNTER — PATIENT OUTREACH (OUTPATIENT)
Dept: ADMINISTRATIVE | Facility: HOSPITAL | Age: 84
End: 2019-07-01

## 2019-07-01 NOTE — PROGRESS NOTES
Chart review completed. Immunizations reconciled, HM modifiers updated, HM duplicate entries deleted, care team updated, old orders deleted. Pt due for foot exam, note placed on schedule for PCP.

## 2019-07-05 ENCOUNTER — TELEPHONE (OUTPATIENT)
Dept: ENDOCRINOLOGY | Facility: CLINIC | Age: 84
End: 2019-07-05

## 2019-07-05 ENCOUNTER — TELEPHONE (OUTPATIENT)
Dept: INTERNAL MEDICINE | Facility: CLINIC | Age: 84
End: 2019-07-05

## 2019-07-05 ENCOUNTER — OFFICE VISIT (OUTPATIENT)
Dept: INTERNAL MEDICINE | Facility: CLINIC | Age: 84
End: 2019-07-05
Attending: FAMILY MEDICINE
Payer: MEDICARE

## 2019-07-05 VITALS
HEIGHT: 59 IN | DIASTOLIC BLOOD PRESSURE: 50 MMHG | BODY MASS INDEX: 25.55 KG/M2 | HEART RATE: 75 BPM | OXYGEN SATURATION: 99 % | SYSTOLIC BLOOD PRESSURE: 112 MMHG | WEIGHT: 126.75 LBS

## 2019-07-05 DIAGNOSIS — E78.2 MIXED HYPERLIPIDEMIA: ICD-10-CM

## 2019-07-05 DIAGNOSIS — I50.9 ACUTE CONGESTIVE HEART FAILURE, UNSPECIFIED HEART FAILURE TYPE: Primary | ICD-10-CM

## 2019-07-05 DIAGNOSIS — H91.90 HEARING LOSS, UNSPECIFIED HEARING LOSS TYPE, UNSPECIFIED LATERALITY: ICD-10-CM

## 2019-07-05 DIAGNOSIS — R35.0 FREQUENT URINATION: ICD-10-CM

## 2019-07-05 DIAGNOSIS — E04.1 THYROID NODULE: ICD-10-CM

## 2019-07-05 DIAGNOSIS — N28.1 RENAL CYST: ICD-10-CM

## 2019-07-05 DIAGNOSIS — E11.65 TYPE 2 DIABETES MELLITUS WITH HYPERGLYCEMIA, WITHOUT LONG-TERM CURRENT USE OF INSULIN: ICD-10-CM

## 2019-07-05 DIAGNOSIS — C83.35 DIFFUSE LARGE B-CELL LYMPHOMA OF LYMPH NODES OF LOWER EXTREMITY: ICD-10-CM

## 2019-07-05 DIAGNOSIS — I70.0 AORTIC ATHEROSCLEROSIS: ICD-10-CM

## 2019-07-05 PROBLEM — R79.89 ELEVATED TROPONIN: Status: RESOLVED | Noted: 2019-06-12 | Resolved: 2019-07-05

## 2019-07-05 PROBLEM — R74.8 ABNORMAL LIVER ENZYMES: Status: RESOLVED | Noted: 2019-06-12 | Resolved: 2019-07-05

## 2019-07-05 LAB
BACTERIA #/AREA URNS AUTO: ABNORMAL /HPF
BILIRUB UR QL STRIP: NEGATIVE
CLARITY UR REFRACT.AUTO: CLEAR
COLOR UR AUTO: YELLOW
GLUCOSE UR QL STRIP: NEGATIVE
HGB UR QL STRIP: ABNORMAL
HYALINE CASTS UR QL AUTO: 1 /LPF
KETONES UR QL STRIP: NEGATIVE
LEUKOCYTE ESTERASE UR QL STRIP: ABNORMAL
MICROSCOPIC COMMENT: ABNORMAL
NITRITE UR QL STRIP: NEGATIVE
PH UR STRIP: 5 [PH] (ref 5–8)
PROT UR QL STRIP: NEGATIVE
RBC #/AREA URNS AUTO: 2 /HPF (ref 0–4)
SP GR UR STRIP: 1.02 (ref 1–1.03)
SQUAMOUS #/AREA URNS AUTO: 2 /HPF
URN SPEC COLLECT METH UR: ABNORMAL
WBC #/AREA URNS AUTO: 9 /HPF (ref 0–5)

## 2019-07-05 PROCEDURE — 3074F PR MOST RECENT SYSTOLIC BLOOD PRESSURE < 130 MM HG: ICD-10-PCS | Mod: HCNC,CPTII,S$GLB, | Performed by: INTERNAL MEDICINE

## 2019-07-05 PROCEDURE — 81001 URINALYSIS AUTO W/SCOPE: CPT | Mod: HCNC

## 2019-07-05 PROCEDURE — 99215 OFFICE O/P EST HI 40 MIN: CPT | Mod: HCNC,S$GLB,, | Performed by: INTERNAL MEDICINE

## 2019-07-05 PROCEDURE — 99999 PR PBB SHADOW E&M-EST. PATIENT-LVL V: ICD-10-PCS | Mod: PBBFAC,HCNC,, | Performed by: INTERNAL MEDICINE

## 2019-07-05 PROCEDURE — 99215 PR OFFICE/OUTPT VISIT, EST, LEVL V, 40-54 MIN: ICD-10-PCS | Mod: HCNC,S$GLB,, | Performed by: INTERNAL MEDICINE

## 2019-07-05 PROCEDURE — 3078F DIAST BP <80 MM HG: CPT | Mod: HCNC,CPTII,S$GLB, | Performed by: INTERNAL MEDICINE

## 2019-07-05 PROCEDURE — 3078F PR MOST RECENT DIASTOLIC BLOOD PRESSURE < 80 MM HG: ICD-10-PCS | Mod: HCNC,CPTII,S$GLB, | Performed by: INTERNAL MEDICINE

## 2019-07-05 PROCEDURE — 3074F SYST BP LT 130 MM HG: CPT | Mod: HCNC,CPTII,S$GLB, | Performed by: INTERNAL MEDICINE

## 2019-07-05 PROCEDURE — 1101F PT FALLS ASSESS-DOCD LE1/YR: CPT | Mod: HCNC,CPTII,S$GLB, | Performed by: INTERNAL MEDICINE

## 2019-07-05 PROCEDURE — 87086 URINE CULTURE/COLONY COUNT: CPT | Mod: HCNC

## 2019-07-05 PROCEDURE — 1101F PR PT FALLS ASSESS DOC 0-1 FALLS W/OUT INJ PAST YR: ICD-10-PCS | Mod: HCNC,CPTII,S$GLB, | Performed by: INTERNAL MEDICINE

## 2019-07-05 PROCEDURE — 99999 PR PBB SHADOW E&M-EST. PATIENT-LVL V: CPT | Mod: PBBFAC,HCNC,, | Performed by: INTERNAL MEDICINE

## 2019-07-05 NOTE — TELEPHONE ENCOUNTER
Can you please call   [facility] in [facility]?  I had ordered home health today but apparently she is already getting it.  Can you qualify exactly what she is getting?  She has a new diagnosis of CHF.  Thank you   Previous Messages

## 2019-07-05 NOTE — PROGRESS NOTES
Transitional Care Note  Subjective:       Patient ID: Peri Johansen is a 85 y.o. female.  Chief Complaint: Follow-up    Family and/or Caretaker present at visit?  Yes.  Granddaughter Blanquita  Diagnostic tests reviewed/disposition: I have reviewed all completed as well as pending diagnostic tests at the time of discharge.  Disease/illness education: yes  Home health/community services discussion/referrals: Patient has home health established at home- she does not remember the name of the home health agency..   Establishment or re-establishment of referral orders for community resources: No other necessary community resources.   She also has case management involved.  Discussion with other health care providers: No discussion with other health care providers necessary.     Follow up from hospital stay, admitted with CHF.    Echocardiogram showed diminished EF between 45 and 50%.  No cardiology follow-up has been scheduled despite recommendations and my orders..    She follows annually in Oncology.  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   She underwent surgery with joanna placement, followed by CVP chemotherapy concurrent with radiation for 3 cycles. Then she received adjuvant weekly Rituxan x 4. All treatment was completed by December 2007.  One year follow up recommended.       She was following closely in endocrinology with Dr. Hall for thyroid issues, but this provider has left [facility] and she has no Endocrine follow-up.  Bone density exam last year was acceptable.  She had a thyroid ultrasound which was largely unchanged.  It is unclear whether further follow-up is necessary.      Recent labs acceptable.      Renal ultrasound last year was stable/acceptable.  Abdominal ultrasound was also acceptable in May of 2019, but kidneys were not visualized her commented on.     Mammogram was recently acceptable.     Atypical  headaches which have abated.      Diabetes doing well.  No syncope, chest pain, pressure, tightness or shortness of breath.    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    Vitamin D deficiency disease    Mixed hyperlipidemia    Renal cyst: R side see ultrasound 6/16; stable 2018    Stromal cell tumor    Lipoma of back    Thyroid nodules: several see u/s 2017 FNA 2017 benign, stable 2019    Gastric nodule: 2013-m per GI no need for further follow up    Spondylosis without myelopathy    Low vitamin B12 level    Gastroesophageal reflux disease without esophagitis    Colon polyp: hyperplastic 2015- repeat 2020    Primary insomnia    Aortic atherosclerosis: see CT scan 3/15    Diverticulosis of large intestine without hemorrhage: see CT scan 3/15    Thoracic and lumbosacral neuritis    Chronic bilateral low back pain with right-sided sciatica    Left carpal tunnel syndrome    Hearing loss of left ear    Hyperthyroidism    Cervical spine arthritis    Sacroiliac joint dysfunction of both sides    Acute congestive heart failure    Type 2 diabetes mellitus with hyperglycemia, without long-term current use of insulin     HPI  Review of Systems   Constitutional: Negative for activity change, appetite change, chills, fatigue and fever.   HENT: Positive for hearing loss. Negative for congestion, sinus pressure and sore throat.         Bilateral hearing loss   Eyes: Negative for visual disturbance.   Respiratory: Negative for apnea, cough, shortness of breath and wheezing.         Denies PND or orthopnea  Denies edema    She lies on a pillow mainly for orthopedic symptom relief   Cardiovascular: Negative for chest pain, palpitations and leg swelling.   Gastrointestinal: Negative for abdominal distention, abdominal pain, constipation, diarrhea, nausea and vomiting.   Genitourinary: Negative for dysuria, frequency, hematuria and vaginal  bleeding.        Slight frequent urination with some burning.  No fever, flank pain or hematuria   Musculoskeletal: Positive for arthralgias. Negative for gait problem, joint swelling and myalgias.        Chronic arthralgias   Skin: Negative for rash.   Neurological: Negative for dizziness, weakness, light-headedness and headaches.   Hematological: Negative for adenopathy. Does not bruise/bleed easily.   Psychiatric/Behavioral: Negative for confusion, hallucinations, sleep disturbance and suicidal ideas.       Objective:      Physical Exam   Constitutional: She is oriented to person, place, and time. She appears well-developed and well-nourished.   HENT:   Head: Normocephalic and atraumatic.   Right Ear: External ear normal.   Left Ear: External ear normal.   Nose: Nose normal.   Mouth/Throat: Oropharynx is clear and moist. No oropharyngeal exudate.   Eyes: Conjunctivae and EOM are normal. No scleral icterus.   Neck: Normal range of motion. Neck supple. No JVD present. Thyromegaly present.   Cardiovascular: Normal rate, regular rhythm, normal heart sounds and intact distal pulses. Exam reveals no gallop.   No murmur heard.  Pulses:       Dorsalis pedis pulses are 2+ on the right side, and 2+ on the left side.        Posterior tibial pulses are 2+ on the right side, and 2+ on the left side.   Pulmonary/Chest: Effort normal and breath sounds normal. No respiratory distress. She has no wheezes.   Abdominal: Soft. Bowel sounds are normal. She exhibits no distension and no mass. There is no tenderness. There is no rebound and no guarding.   Musculoskeletal: Normal range of motion. She exhibits no edema or tenderness.        Right foot: There is normal range of motion and no deformity.        Left foot: There is normal range of motion and no deformity.   Feet:   Right Foot:   Protective Sensation: 6 sites tested. 6 sites sensed.   Skin Integrity: Negative for ulcer, blister, skin breakdown, erythema or warmth.   Left  Foot:   Protective Sensation: 6 sites tested. 6 sites sensed.   Skin Integrity: Negative for ulcer, blister, skin breakdown, erythema or warmth.   Lymphadenopathy:     She has no cervical adenopathy.   Neurological: She is alert and oriented to person, place, and time. She displays normal reflexes. No cranial nerve deficit. Coordination normal.   Skin: Skin is warm. No rash noted. No erythema.   Psychiatric: She has a normal mood and affect. Her behavior is normal. Judgment and thought content normal.   Nursing note and vitals reviewed.      Assessment:       1. Acute congestive heart failure, unspecified heart failure type    2. Aortic atherosclerosis: see CT scan 3/15    3. Mixed hyperlipidemia    4. 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 Thyroid nodules: several see u/s 2017 FNA 2017 benign, stable 2019    6. Type 2 diabetes mellitus with hyperglycemia, without long-term current use of insulin    7. Frequent urination    8. Hearing loss, unspecified hearing loss type, unspecified laterality    9. Renal cyst: R side see ultrasound 6/16; stable 2018        Plan:         Peri was seen today for follow-up.    Diagnoses and all orders for this visit:    Acute congestive heart failure, unspecified heart failure type  -     Ambulatory referral to Cardiology  -     Ambulatory referral to Outpatient Case Management  -     Ambulatory referral to Home Health    Aortic atherosclerosis: see CT scan 3/15; currently without symptoms, keep Cardiology follow-up.  Continue regimen    Mixed hyperlipidemia:  Continue regimen    Diffuse large B-cell lymphoma of lymph nodes of lower extremity:  Keep Oncology follow-up  -     Ambulatory referral to Outpatient Case Management  -     Ambulatory referral to Home Health    Thyroid nodules: several see u/s 2017 FNA 2017 benign, stable 2019  -     Ambulatory referral to Endocrinology    Type 2 diabetes mellitus with hyperglycemia, without long-term current  use of insulin  -     Hemoglobin A1c; Future  -     Renal function panel; Future  -     Ambulatory referral to Endocrinology    Frequent urination  -     Urinalysis  -     Urine culture    Hearing loss, unspecified hearing loss type, unspecified laterality  -     Ambulatory consult to Audiology    Renal cyst: R side see ultrasound 6/16; stable 2018  -     US Retroperitoneal Complete (Kidney and; Future    I will review all studies and determine further tx depending on findings  CHF diet and alarm symptoms reviewed.

## 2019-07-05 NOTE — TELEPHONE ENCOUNTER
Okay, that the weirdest thing.  The intake person from [facility] told me that they can't send anybody to see her because she is already getting home health from somebody else.  Please call [facility] to clarify what they know.  It is not urgent.  [facility] can start next week.  The order was placed today.    If they can figure out who the outside home health team is, we can appeal to them.

## 2019-07-05 NOTE — TELEPHONE ENCOUNTER
Chencho Garland MD             Hi ,     Someone from you office spoke with my supervisor regarding this patient and we will be out to visit on 7/9 tuesday.    Previous Messages      ----- Message -----   From: Annika Garland MD   Sent: 7/5/2019   4:15 PM   To: Chencho Mosher   Subject: RE: Home Health                                   Chencho  We were told she is with [facility] in [facility].  However, when we called them, they said they had never heard of her and have never gone out to see her.     Who is taking care of her currently in terms of home health?  She does not seem to know and does not seem to think that she is getting any home health currently.     Thanks     Annika Garland MD, [facility]   ----- Message -----   From: Davy Bowen MA   Sent: 7/5/2019   4:06 PM   To: Annika Garland MD   Subject: FW: Home Health                                       ----- Message -----   From: Chencho Mosher   Sent: 7/5/2019  11:28 AM   To: Annika Garland MD, [facility] Staff   Subject: Home Health                                       Patient: Peri Johansen     Thank you for your referral to [facility]. We are unable to accept this patient.They are already with another Home Health Agency.     Chencho Mosher,    [phone]

## 2019-07-05 NOTE — TELEPHONE ENCOUNTER
----- Message from Denisse Seymour sent at 7/5/2019 11:34 AM CDT -----  Contact: pt  Former pt of Dr Hall    -   Dr Garland  Would like for pt to be seen    Diabetes,  Hyperthyroid,   And  Osteopenic    Please call pt with a work in    Thanks

## 2019-07-05 NOTE — TELEPHONE ENCOUNTER
Ngoc stated that hospital discharge note stated that but it does not appear that this was started. She stated that they can start on Tuesday.

## 2019-07-05 NOTE — PROGRESS NOTES
Subjective:       Patient ID: Peri Johansen is a 85 y.o. female.    Chief Complaint: Follow-up    HPI   Review of Systems    Objective:      Physical Exam    Assessment:       No diagnosis found.    Plan:

## 2019-07-07 ENCOUNTER — PATIENT MESSAGE (OUTPATIENT)
Dept: INTERNAL MEDICINE | Facility: CLINIC | Age: 84
End: 2019-07-07

## 2019-07-07 LAB — BACTERIA UR CULT: NORMAL

## 2019-07-09 ENCOUNTER — TELEPHONE (OUTPATIENT)
Dept: INTERNAL MEDICINE | Facility: CLINIC | Age: 84
End: 2019-07-09

## 2019-07-09 ENCOUNTER — HOSPITAL ENCOUNTER (OUTPATIENT)
Dept: RADIOLOGY | Facility: HOSPITAL | Age: 84
Discharge: HOME | End: 2019-07-09
Attending: INTERNAL MEDICINE
Payer: MEDICARE

## 2019-07-09 ENCOUNTER — OUTPATIENT CASE MANAGEMENT (OUTPATIENT)
Dept: ADMINISTRATIVE | Facility: OTHER | Age: 84
End: 2019-07-09

## 2019-07-09 ENCOUNTER — TELEPHONE (OUTPATIENT)
Dept: OPTOMETRY | Facility: CLINIC | Age: 84
End: 2019-07-09

## 2019-07-09 ENCOUNTER — OFFICE VISIT (OUTPATIENT)
Dept: OTOLARYNGOLOGY | Facility: CLINIC | Age: 84
End: 2019-07-09
Payer: MEDICARE

## 2019-07-09 ENCOUNTER — CLINICAL SUPPORT (OUTPATIENT)
Dept: AUDIOLOGY | Facility: CLINIC | Age: 84
End: 2019-07-09
Payer: MEDICARE

## 2019-07-09 VITALS
HEART RATE: 82 BPM | SYSTOLIC BLOOD PRESSURE: 121 MMHG | WEIGHT: 125.69 LBS | BODY MASS INDEX: 25.38 KG/M2 | DIASTOLIC BLOOD PRESSURE: 70 MMHG

## 2019-07-09 DIAGNOSIS — H90.3 SENSORINEURAL HEARING LOSS, BILATERAL: Primary | ICD-10-CM

## 2019-07-09 DIAGNOSIS — N28.1 RENAL CYST: ICD-10-CM

## 2019-07-09 DIAGNOSIS — H93.8X3 EAR FULLNESS, BILATERAL: Primary | ICD-10-CM

## 2019-07-09 PROCEDURE — 92557 PR COMPREHENSIVE HEARING TEST: ICD-10-PCS | Mod: HCNC,S$GLB,, | Performed by: AUDIOLOGIST-HEARING AID FITTER

## 2019-07-09 PROCEDURE — 92557 COMPREHENSIVE HEARING TEST: CPT | Mod: HCNC,S$GLB,, | Performed by: AUDIOLOGIST-HEARING AID FITTER

## 2019-07-09 PROCEDURE — 3074F SYST BP LT 130 MM HG: CPT | Mod: HCNC,CPTII,S$GLB, | Performed by: OTOLARYNGOLOGY

## 2019-07-09 PROCEDURE — 92567 PR TYMPA2METRY: ICD-10-PCS | Mod: HCNC,S$GLB,, | Performed by: AUDIOLOGIST-HEARING AID FITTER

## 2019-07-09 PROCEDURE — 69210 PR REMOVAL IMPACTED CERUMEN REQUIRING INSTRUMENTATION, UNILATERAL: ICD-10-PCS | Mod: HCNC,S$GLB,, | Performed by: OTOLARYNGOLOGY

## 2019-07-09 PROCEDURE — 1101F PR PT FALLS ASSESS DOC 0-1 FALLS W/OUT INJ PAST YR: ICD-10-PCS | Mod: HCNC,CPTII,S$GLB, | Performed by: OTOLARYNGOLOGY

## 2019-07-09 PROCEDURE — 3078F DIAST BP <80 MM HG: CPT | Mod: HCNC,CPTII,S$GLB, | Performed by: OTOLARYNGOLOGY

## 2019-07-09 PROCEDURE — 99999 PR PBB SHADOW E&M-EST. PATIENT-LVL I: ICD-10-PCS | Mod: PBBFAC,HCNC,,

## 2019-07-09 PROCEDURE — 76770 US EXAM ABDO BACK WALL COMP: CPT | Mod: TC,HCNC

## 2019-07-09 PROCEDURE — 69210 REMOVE IMPACTED EAR WAX UNI: CPT | Mod: HCNC,S$GLB,, | Performed by: OTOLARYNGOLOGY

## 2019-07-09 PROCEDURE — 99999 PR PBB SHADOW E&M-EST. PATIENT-LVL I: CPT | Mod: PBBFAC,HCNC,,

## 2019-07-09 PROCEDURE — 92567 TYMPANOMETRY: CPT | Mod: HCNC,S$GLB,, | Performed by: AUDIOLOGIST-HEARING AID FITTER

## 2019-07-09 PROCEDURE — 1101F PT FALLS ASSESS-DOCD LE1/YR: CPT | Mod: HCNC,CPTII,S$GLB, | Performed by: OTOLARYNGOLOGY

## 2019-07-09 PROCEDURE — 76770 US EXAM ABDO BACK WALL COMP: CPT | Mod: 26,HCNC,, | Performed by: RADIOLOGY

## 2019-07-09 PROCEDURE — 99999 PR PBB SHADOW E&M-EST. PATIENT-LVL IV: CPT | Mod: PBBFAC,HCNC,, | Performed by: OTOLARYNGOLOGY

## 2019-07-09 PROCEDURE — 3078F PR MOST RECENT DIASTOLIC BLOOD PRESSURE < 80 MM HG: ICD-10-PCS | Mod: HCNC,CPTII,S$GLB, | Performed by: OTOLARYNGOLOGY

## 2019-07-09 PROCEDURE — 3074F PR MOST RECENT SYSTOLIC BLOOD PRESSURE < 130 MM HG: ICD-10-PCS | Mod: HCNC,CPTII,S$GLB, | Performed by: OTOLARYNGOLOGY

## 2019-07-09 PROCEDURE — 99999 PR PBB SHADOW E&M-EST. PATIENT-LVL IV: ICD-10-PCS | Mod: PBBFAC,HCNC,, | Performed by: OTOLARYNGOLOGY

## 2019-07-09 PROCEDURE — 99213 OFFICE O/P EST LOW 20 MIN: CPT | Mod: 25,HCNC,S$GLB, | Performed by: OTOLARYNGOLOGY

## 2019-07-09 PROCEDURE — 99213 PR OFFICE/OUTPT VISIT, EST, LEVL III, 20-29 MIN: ICD-10-PCS | Mod: 25,HCNC,S$GLB, | Performed by: OTOLARYNGOLOGY

## 2019-07-09 PROCEDURE — 76770 US RETROPERITONEAL COMPLETE: ICD-10-PCS | Mod: 26,HCNC,, | Performed by: RADIOLOGY

## 2019-07-09 NOTE — LETTER
July 10, 2019    Peri Johansen  [address] [id number]             [facility] [address] [place] [address] Dear:Peri Johansen    I am writing from the Outpatient Complex Care Management Department at [facility].  I received a referral from Dr. Garland to contact you or your caregiver regarding any needs you may have. I have attempted to contact you or your cargeiver by phone two times unsuccessfully.  Please contact the Outpatient Complex Care Management Department at [phone] if you would like to discuss your needs.      Sincerely,         Alison Kemp LMSW

## 2019-07-09 NOTE — TELEPHONE ENCOUNTER
Ultrasound is acceptable    She is due for cardiology assessment for heart failure, the referral was placed a couple of weeks ago.  Please assist with appointment and let me know when scheduled.  Thank you

## 2019-07-09 NOTE — PROGRESS NOTES
Subjective:       Patient ID: Peri Johansen is a 85 y.o. female.    Chief Complaint: Ear Fullness    Ear Fullness    There is pain in both ears. This is a recurrent problem. The current episode started 1 to 4 weeks ago. The problem occurs constantly. The problem has been waxing and waning. There has been no fever. The patient is experiencing no pain. Associated symptoms include hearing loss. Pertinent negatives include no coughing, diarrhea, ear discharge, headaches, neck pain, rash, rhinorrhea or vomiting. Associated symptoms comments: Presbycusis. She has tried nothing for the symptoms. Her past medical history is significant for hearing loss.     Review of Systems   Constitutional: Negative for activity change, appetite change and fever.   HENT: Positive for hearing loss. Negative for congestion, ear discharge, ear pain, nosebleeds, postnasal drip, rhinorrhea and sneezing.    Eyes: Negative for redness and visual disturbance.   Respiratory: Negative for apnea, cough, shortness of breath and wheezing.    Cardiovascular: Negative for chest pain and palpitations.   Gastrointestinal: Negative for diarrhea and vomiting.   Genitourinary: Negative for difficulty urinating and frequency.   Musculoskeletal: Negative for arthralgias, back pain, gait problem and neck pain.   Skin: Negative for color change and rash.   Neurological: Negative for dizziness, speech difficulty, weakness and headaches.   Hematological: Negative for adenopathy. Does not bruise/bleed easily.   Psychiatric/Behavioral: Negative for agitation and behavioral problems.       Objective:        Constitutional:   Vital signs are normal. She appears well-developed and well-nourished. She is active. Normal speech.      Head:  Normocephalic and atraumatic. Salivary glands normal.  Facial strength is normal.      Nose:  Nose normal including turbinates, nasal mucosa, sinuses and nasal septum.     Mouth/Throat  Oropharynx clear and moist without lesions or  asymmetry and normal uvula midline.     Neck:  Neck normal without thyromegaly masses, asymmetry, normal tracheal structure, crepitus, and tenderness, thyroid normal, trachea normal, phonation normal and full range of motion with neck supple.     Psychiatric:   She has a normal mood and affect. Her speech is normal and behavior is normal.     Neurological:   She has neurological normal, alert and oriented.     Skin:   No abrasions, lacerations, lesions, or rashes.         PROCEDURE NOTE:  Ceruminosis is noted in both EACs.  Wax was removed by manual debridement and suctioning utilizing the assistance of binocular microscopy revealing EACs and TMs WNL. The patient tolerated this procedure without difficulty. The subjective decrease noted in hearing pre-cleaning was resolved post-cleaning.       Assessment:       1. Ear fullness, bilateral        Plan:       1. Hearing conservation strongly recommended.  2. Trial of amplification bilaterally also recommended after patient has updated audiogram.  3. Re-check of hearing in 18-24 months or sooner if subjective change noted.  4. F/U with PCP as per schedule.

## 2019-07-09 NOTE — PROGRESS NOTES
Peri Johansen was seen in the clinic today for a hearing evaluation. Ms. Johansen reported bilateral hearing loss.      Otoscopy was unremarkable. Audiological testing revealed a moderate to severe sensorineural hearing loss in both ears. A speech reception threshold was obtained at 45 dBHL, bilaterally. Speech recognition was 96% in the right ear and 92% in the left ear.    Tympanometry revealed a shallow Type A tympanogram with slight negative pressure in the right ear and a normal Type A tympanogram in the left ear.    Recommendations:  1. Otologic evaluation  2. Annual hearing evaluation  3. Hearing aid consult

## 2019-07-09 NOTE — TELEPHONE ENCOUNTER
Pt called back to inquire about [place] benefits. Pt has recently been seen somewhere else. No need to come in . ----- Message from Idalia Wesley sent at 7/9/2019 11:00 AM CDT -----  Contact: Zoltan (granddaughter)   Needs Advice    Reason for call: Pt granddaughter needed to speak with someone in the office.         Communication Preference: [phone]     Additional Information:

## 2019-07-10 ENCOUNTER — TELEPHONE (OUTPATIENT)
Dept: INTERNAL MEDICINE | Facility: CLINIC | Age: 84
End: 2019-07-10

## 2019-07-10 NOTE — PROGRESS NOTES
This LMSW attempted to reach patient/caregiver to provide resource and left msg requesting a return call.  Letter with contact information was sent via Patient Portal to patient/caregiver.  Referral source notified.

## 2019-07-10 NOTE — TELEPHONE ENCOUNTER
----- Message from Alison Kemp LMSW sent at 7/10/2019  2:40 PM CDT -----  This SW received a referral on the above patient. I have attempted to contact the patient or caregiver by phone two times unsuccessfully and mailed a letter with our contact information.    Thank you for the referral,    Alison Kemp LMSW

## 2019-07-15 ENCOUNTER — TELEPHONE (OUTPATIENT)
Dept: INTERNAL MEDICINE | Facility: CLINIC | Age: 84
End: 2019-07-15

## 2019-07-15 NOTE — TELEPHONE ENCOUNTER
----- Message from Karine Paredes sent at 7/15/2019  4:28 PM CDT -----  Contact: Pt self Home [phone] or Mobile [phone]  Patient is returning a phone call.  Who left a message for the patient: Annika Garland MD   Internal Medicine     Does patient know what this is regarding:  A message from Annika Garland MD   Internal Medicine

## 2019-07-15 NOTE — TELEPHONE ENCOUNTER
Per PCP request notified pt ...Ultrasound is acceptable     She is due for cardiology assessment for heart failure, the referral was placed a couple of weeks ago.  Please assist with appointment and let me know when scheduled.  Thank you    Scheduled appt for 7/18 @ 1pm and mailed an appt reminder. Pt told me to call her grand daughter and lm for her as well. Done as pt requested.

## 2019-07-16 RX ORDER — ZOLPIDEM TARTRATE 5 MG/1
TABLET ORAL
Qty: 12 TABLET | Refills: 0 | Status: SHIPPED | OUTPATIENT
Start: 2019-07-16 | End: 2019-08-21

## 2019-07-19 ENCOUNTER — TELEPHONE (OUTPATIENT)
Dept: INTERNAL MEDICINE | Facility: CLINIC | Age: 84
End: 2019-07-19

## 2019-07-19 NOTE — TELEPHONE ENCOUNTER
"----- Message from Mya Simpson MA sent at 7/19/2019  9:59 AM CDT -----  Prior Authorization Needed    Rx: does not state name of medication    To submit the PA:    1: Go to " [email or web address] " and click "Enter a Key"    2. Enter the patient's last name and date of birth and the key.      KEY: [id number]    3. Complete the forms and click "send to Plan"    Note chart when prior authorization has been submitted.    Please notify pharmacy when prior authorization has been approved.    Thank You    "

## 2019-08-13 NOTE — PROGRESS NOTES
Subjective:      Patient ID: Peri Johansen is a 85 y.o. female.    Chief Complaint:  Osteopenia    History of Present Illness  Peri Johansen is here for follow up of T2DM, thyroid nodule, hyperthyroidism, Vit D def, and osteopenia.  Previously seen by Dr. Hall.  Last seen 18.  This is their first visit with me.      With regards to diabetes:    Diagnosed: ~2000s  Known complications:  DKA -  RN -  PN -  Nephropathy -    Current regimen:  MFM 1000mg BID  Glimepiride 2mg in the AM (not taking with food)    Does not miss any meds.     Other medications tried:  None    Glucose Monitor:   2 times a day testing  Log reviewed: yes oral recall  Fastin-100  Midday before lunch: 114    Diet/Exercise:  Eats 3 meals a day.   Snacks : rarely  Drinks : water   Exercise - tries to stay active     Hypoglycemia:  None   Knows how to correct with 15 grams of carbs- juice, coke, or a peppermint.      Education - last visit: None  Eye Exam: 18  Podiatry: 18    Diabetes Management Status  Statin: Taking  ACE/ARB: Not taking  Screening or Prevention Patient's value Goal Complete/Controlled?   HgA1C Testing and Control   Lab Results   Component Value Date    HGBA1C 6.7 (H) 2019      Annually/Less than 8% Yes   Lipid profile : 2019 Annually Yes   LDL control Lab Results   Component Value Date    LDLCALC 83.2 2019    Annually/Less than 100 mg/dl  Yes   Nephropathy screening Lab Results   Component Value Date    LABMICR 14.0 10/03/2018     Lab Results   Component Value Date    PROTEINUA Negative 2019    Annually Yes   Blood pressure BP Readings from Last 1 Encounters:   08/15/19 114/62    Less than 140/90 Yes   Dilated retinal exam : 2018 Annually Yes   Foot exam   : 2019 Annually Yes     With regards to thyroid nodule:    Thyroid US: 19  The thyroid is normal in size.  Right lobe of the thyroid measures 4.7 x 1.5 x 2.2 cm.  Left lobe of the thyroid measures 5.0 x 12.1 x 2.6 cm.   Normal thyroid parenchyma.  Multinodular thyroid gland with grossly stable appearance of the previously identified nodules.  Three nodules identified in the right lobe of thyroid gland, the largest nodule in the midpole is solid, isoechoic and measures 2.3 x 1.3 x 1.6 cm (benign).  This has been previously biopsied.  Two additional subcentimeter nodules in the right thyroid lobe do not meet criteria for fine-needle aspiration or follow-up.  Three grossly stable nodules in the left lobe of thyroid gland.  The solid, hypoechoic nodule at the midpole appears similar to prior study of 2017, however meets TI-RADS 4 criteria for fine-needle aspiration.  It measures 1.8 x 1.4 x 1.4 cm, unchanged allowing for difference in measurement technique.  The mixed cystic and solid nodule at the lower pole measures 1.2 x 1.5 x 1.3 cm (benign), similar to the prior study and has been previously biopsied.  There is a single solid, isoechoic nodule at the isthmus measuring 1.0 x 0.5 x 1.2 cm which does not meet criteria for follow-up or fine-needle aspiration.  Cervical lymph nodes demonstrate normal morphology and size.  IMPRESSION: Multinodular thyroid gland, overall stable compared to September 2017.  While the solid, hypoechoic nodule in the left midpole meets ACR TI-RADS criteria for fine-needle aspiration, it has been stable.  Given the patient's age and overall nodule stability, clinical consideration will determine the need for FNA or continued surveillance.    FNA: 12/28/17  1. Thyroid, right, fine-needle aspiration:  The [place] System for Reporting Thyroid Cytopathology category: II, Benign.  Scantly cellular specimen with abundant colloid, macrophages, and few benign follicular epithelial cells consistent  with benign colloid nodule.  2. Thyroid, left, fine-needle aspiration:  The [place] System for Reporting Thyroid Cytopathology category: II, Benign.  Scantly cellular specimen with abundant colloid, macrophages, and  few benign follicular epithelial cells consistent with benign colloid nodule.    Signs or Symptoms:   Difficulty breathing: -  Difficulty swallowing: -  Voice Changes: -  FH of thyroid cancer: -  Personal history of radiation treatment or exposure: -     With regards to hyperthyroidism:    NM Thyroid Uptake and Scan: 10/26/17  Findings: Patient was administered 5.2 mCi of technetium 99m labeled pertechnetate intravenously and 0.249 mCi of I-123 orally. There are cold nodules. There could be a hyperfunctioning nodule on the right. Uptake is 20.1%. Recommend treatment dose is 32.9 mCi of iodine-131 orally.  Ultrasound correlation recommended for nodules.     Ref. Range 6/13/2019 02:45   TSH Latest Ref Range: 0.400 - 4.000 uIU/mL 1.014     Current symptoms:   - Palpations  + Weight loss  - Diarrhea  - Hair loss  - Brittle nails  - Skin changes  - Tremor   - Anxiety    Current medication:  MMI 2.5mg daily (started 2018)     With regards to Vitamin D Deficiency:  Vit D, 25-Hydroxy   Date Value Ref Range Status   04/15/2019 33 30 - 96 ng/mL Final     Comment:     Vitamin D deficiency.........<10 ng/mL                              Vitamin D insufficiency......10-29 ng/mL       Vitamin D sufficiency........> or equal to 30 ng/mL  Vitamin D toxicity............>100 ng/mL       Current Meds: ergo 50,000 weekly     With regards to osteopenia:    BMD 10/16/18  LOW BONE MASS WITH SIGNIFICANT DECREASES OF 11.1% IN THE HIP AND 2.2% IN THE LUMBAR BMD RESPECTIVELY COMPARED WITH THE PRIOR STUDY.  THE ESTIMATED 10 YEAR PROBABILITY OF HIP FRACTURE IS 1.8% AND OF A MAJOR OSTEOPOROTIC FRACTURE 6.7% RESPECTIVELY USING FRAX.  THE TBS T-SCORE L1-L4 IS -0.6.  RECOMMENDATIONS of [facility]:  1.  Calcium 1200 mg daily and vitamin D 800 units daily, adequate exercise.  2.  Repeat BMD in 2 years    Denies recent falls or fractures.     Review of Systems   Constitutional: Positive for unexpected weight  "change (weight loss). Negative for fatigue.   Eyes: Negative for visual disturbance.   Respiratory: Negative for shortness of breath.    Cardiovascular: Negative for chest pain.   Gastrointestinal: Negative for abdominal pain.   Musculoskeletal: Positive for gait problem (cane). Negative for arthralgias.   Skin: Negative for wound.   Neurological: Negative for headaches.   Hematological: Does not bruise/bleed easily.   Psychiatric/Behavioral: Negative for sleep disturbance.     Objective:   Physical Exam   Neck: Thyromegaly present.   Cardiovascular: Normal rate.   No edema present   Pulmonary/Chest: Effort normal.   Abdominal: Soft.   Vitals reviewed.    Appropriate footwear, foot exam deferred, done 7/5/19.    Visit Vitals  /62 (BP Location: Right arm, Patient Position: Sitting, BP Method: Medium (Manual))   Pulse 72   Resp 16   Ht 4' 11" (1.499 m)   Wt 57.9 kg (127 lb 10.3 oz)   BMI 25.78 kg/m²     Body mass index is 25.78 kg/m².    Lab Review:   Lab Results   Component Value Date    HGBA1C 6.7 (H) 06/13/2019     Lab Results   Component Value Date    CHOL 143 06/13/2019    HDL 47 06/13/2019    LDLCALC 83.2 06/13/2019    TRIG 64 06/13/2019    CHOLHDL 32.9 06/13/2019     Lab Results   Component Value Date     06/14/2019    K 3.6 06/14/2019     06/14/2019    CO2 30 (H) 06/14/2019     06/14/2019    BUN 14 06/14/2019    CREATININE 0.6 06/14/2019    CALCIUM 9.2 06/14/2019    PROT 6.4 06/13/2019    ALBUMIN 2.7 (L) 06/13/2019    BILITOT 1.2 (H) 06/13/2019    ALKPHOS 84 06/13/2019    AST 49 (H) 06/13/2019    ALT 71 (H) 06/13/2019    ANIONGAP 10 06/14/2019    ESTGFRAFRICA >60.0 06/14/2019    EGFRNONAA >60.0 06/14/2019    TSH 1.014 06/13/2019     Vit D, 25-Hydroxy   Date Value Ref Range Status   04/15/2019 33 30 - 96 ng/mL Final     Comment:     Vitamin D deficiency.........<10 ng/mL                              Vitamin D insufficiency......10-29 ng/mL       Vitamin D sufficiency........> or equal " to 30 ng/mL  Vitamin D toxicity............>100 ng/mL       Assessment and Plan     1. Type 2 diabetes mellitus with hyperglycemia, without long-term current use of insulin  Hemoglobin A1c    Comprehensive metabolic panel   2. Thyroid nodules: several see u/s 2017 FNA 2017 benign, stable 2019  TSH   3. Hyperthyroidism  TSH    Thyroid stimulating immunoglobulin   4. Vitamin D deficiency disease  Vitamin D   5. 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 Acute congestive heart failure, unspecified heart failure type     7. Mixed hyperlipidemia       Type 2 diabetes mellitus with hyperglycemia, without long-term current use of insulin  -- Labs prior  -- Medication Changes:   STOP:   Glimepiride (patient not taking with food, A1c is 6.7%. Concerned with hypoglycemic episodes).  CONTINUE:  MFM 1000mg BID  Instructed patient to call me if she begins to have consistent glucose >180 after discontinuing the Amaryl.   -- Reviewed goals of therapy are to get the best control we can without hypoglycemia.  -- Advised frequent self blood glucose monitoring.  Patient encouraged to document glucose results and bring them to every clinic visit.  -- Hypoglycemia precautions discussed. Instructed on precautions before driving.    -- Call for Bg repeatedly < 90 or > 180.   -- Close adherence to lifestyle changes recommended.   -- Periodic follow ups for eye evaluations, foot care and dental care suggested.    Thyroid nodules: several see u/s 2017 FNA 2017 benign, stable 2019  -- Denies compressive symptoms  -- Thyroid US 5/20/19 stable.  Recommended FNA of left lobe 1.8 x 1.4 x 1.4 cm nodule.  Due to stability in size and patient not wanting repeat biopsy, will continue to monitor with US in 1 year.  -- FNA 12/2017 of left and right lobe nodules benign.    Hyperthyroidism  -- TFTs unremarkable  -- Labs prior  -- CONTINUE: MMI 2.5mg daily    Vitamin D deficiency disease  -- Stable  -- Labs prior    Diffuse  large B-cell lymphoma of lymph nodes of lower extremity  -- Continue following with hem/onc.    Acute congestive heart failure  -- Following with PCP; per chart review cardiology referral placed.     Mixed hyperlipidemia  -- Controlled.  -- On statin per ADA recommendations.    Follow up in about 6 months (around 2/15/2020).

## 2019-08-15 ENCOUNTER — OFFICE VISIT (OUTPATIENT)
Dept: ENDOCRINOLOGY | Facility: CLINIC | Age: 84
End: 2019-08-15
Payer: MEDICARE

## 2019-08-15 VITALS
BODY MASS INDEX: 25.73 KG/M2 | RESPIRATION RATE: 16 BRPM | HEART RATE: 72 BPM | DIASTOLIC BLOOD PRESSURE: 62 MMHG | SYSTOLIC BLOOD PRESSURE: 114 MMHG | WEIGHT: 127.63 LBS | HEIGHT: 59 IN

## 2019-08-15 DIAGNOSIS — E05.90 HYPERTHYROIDISM: ICD-10-CM

## 2019-08-15 DIAGNOSIS — E04.1 THYROID NODULE: ICD-10-CM

## 2019-08-15 DIAGNOSIS — E55.9 VITAMIN D DEFICIENCY DISEASE: ICD-10-CM

## 2019-08-15 DIAGNOSIS — C83.35 DIFFUSE LARGE B-CELL LYMPHOMA OF LYMPH NODES OF LOWER EXTREMITY: ICD-10-CM

## 2019-08-15 DIAGNOSIS — I50.9 ACUTE CONGESTIVE HEART FAILURE, UNSPECIFIED HEART FAILURE TYPE: ICD-10-CM

## 2019-08-15 DIAGNOSIS — E11.65 TYPE 2 DIABETES MELLITUS WITH HYPERGLYCEMIA, WITHOUT LONG-TERM CURRENT USE OF INSULIN: Primary | ICD-10-CM

## 2019-08-15 DIAGNOSIS — E78.2 MIXED HYPERLIPIDEMIA: ICD-10-CM

## 2019-08-15 PROCEDURE — 1101F PR PT FALLS ASSESS DOC 0-1 FALLS W/OUT INJ PAST YR: ICD-10-PCS | Mod: HCNC,CPTII,S$GLB, | Performed by: NURSE PRACTITIONER

## 2019-08-15 PROCEDURE — 99999 PR PBB SHADOW E&M-EST. PATIENT-LVL V: CPT | Mod: PBBFAC,HCNC,, | Performed by: NURSE PRACTITIONER

## 2019-08-15 PROCEDURE — 1101F PT FALLS ASSESS-DOCD LE1/YR: CPT | Mod: HCNC,CPTII,S$GLB, | Performed by: NURSE PRACTITIONER

## 2019-08-15 PROCEDURE — 99214 OFFICE O/P EST MOD 30 MIN: CPT | Mod: HCNC,S$GLB,, | Performed by: NURSE PRACTITIONER

## 2019-08-15 PROCEDURE — 3074F SYST BP LT 130 MM HG: CPT | Mod: HCNC,CPTII,S$GLB, | Performed by: NURSE PRACTITIONER

## 2019-08-15 PROCEDURE — 3078F DIAST BP <80 MM HG: CPT | Mod: HCNC,CPTII,S$GLB, | Performed by: NURSE PRACTITIONER

## 2019-08-15 PROCEDURE — 99214 PR OFFICE/OUTPT VISIT, EST, LEVL IV, 30-39 MIN: ICD-10-PCS | Mod: HCNC,S$GLB,, | Performed by: NURSE PRACTITIONER

## 2019-08-15 PROCEDURE — 3074F PR MOST RECENT SYSTOLIC BLOOD PRESSURE < 130 MM HG: ICD-10-PCS | Mod: HCNC,CPTII,S$GLB, | Performed by: NURSE PRACTITIONER

## 2019-08-15 PROCEDURE — 3078F PR MOST RECENT DIASTOLIC BLOOD PRESSURE < 80 MM HG: ICD-10-PCS | Mod: HCNC,CPTII,S$GLB, | Performed by: NURSE PRACTITIONER

## 2019-08-15 PROCEDURE — 99999 PR PBB SHADOW E&M-EST. PATIENT-LVL V: ICD-10-PCS | Mod: PBBFAC,HCNC,, | Performed by: NURSE PRACTITIONER

## 2019-08-15 NOTE — LETTER
August 15, 2019      Annika Garland MD  [address]  [facility] [address]           [facility] - Endocrinology [address]th [place]  [address]  [facility] [address]  Phone: [phone]          Patient: Peri Johansen   MR Number: [id number]   YOB: 1934   Date of Visit: 8/15/2019       Dear Dr. Annika Garland:    Thank you for referring Peri Johansen to me for evaluation. Attached you will find relevant portions of my assessment and plan of care.    If you have questions, please do not hesitate to call me. I look forward to following Peri Johansen along with you.    Sincerely,    Rosy Brannon, NP    Enclosure  CC:  No Recipients    If you would like to receive this communication electronically, please contact [email or web address] or [phone] to request more information on [employer] access.    For providers and/or their staff who would like to refer a patient to [facility], please contact us through our one-stop-shop provider referral line, [facility], at [phone].    If you feel you have received this communication in error or would no longer like to receive these types of communications, please e-mail [email or web address]

## 2019-08-15 NOTE — ASSESSMENT & PLAN NOTE
-- Labs prior  -- Medication Changes:   STOP:   Glimepiride (patient not taking with food, A1c is 6.7%. Concerned with hypoglycemic episodes).  CONTINUE:  MFM 1000mg BID  Instructed patient to call me if she begins to have consistent glucose >180 after discontinuing the Amaryl.   -- Reviewed goals of therapy are to get the best control we can without hypoglycemia.  -- Advised frequent self blood glucose monitoring.  Patient encouraged to document glucose results and bring them to every clinic visit.  -- Hypoglycemia precautions discussed. Instructed on precautions before driving.    -- Call for Bg repeatedly < 90 or > 180.   -- Close adherence to lifestyle changes recommended.   -- Periodic follow ups for eye evaluations, foot care and dental care suggested.

## 2019-08-15 NOTE — ASSESSMENT & PLAN NOTE
-- Denies compressive symptoms  -- Thyroid US 5/20/19 stable.  Recommended FNA of left lobe 1.8 x 1.4 x 1.4 cm nodule.  Due to stability in size and patient not wanting repeat biopsy, will continue to monitor with US in 1 year.  -- FNA 12/2017 of left and right lobe nodules benign.

## 2019-08-16 ENCOUNTER — TELEPHONE (OUTPATIENT)
Dept: INTERNAL MEDICINE | Facility: CLINIC | Age: 84
End: 2019-08-16

## 2019-08-16 NOTE — TELEPHONE ENCOUNTER
"----- Message from Mya Simpson MA sent at 8/16/2019 10:43 AM CDT -----  Prior Authorization Needed    Rx: zolpidem (AMBIEN) 5 MG Tab    To submit the PA:    1: Go to " [email or web address] " and click "Enter a Key"    2. Enter the patient's last name and date of birth and the key.      KEY: [id number]    3. Complete the forms and click "send to Plan"    Note chart when prior authorization has been submitted.    Please notify pharmacy when prior authorization has been approved.    Thank You    "

## 2019-08-21 ENCOUNTER — OFFICE VISIT (OUTPATIENT)
Dept: CARDIOLOGY | Facility: CLINIC | Age: 84
End: 2019-08-21
Payer: MEDICARE

## 2019-08-21 VITALS
WEIGHT: 129.19 LBS | DIASTOLIC BLOOD PRESSURE: 56 MMHG | HEART RATE: 97 BPM | SYSTOLIC BLOOD PRESSURE: 117 MMHG | BODY MASS INDEX: 26.04 KG/M2 | HEIGHT: 59 IN

## 2019-08-21 DIAGNOSIS — E78.2 MIXED HYPERLIPIDEMIA: Primary | ICD-10-CM

## 2019-08-21 DIAGNOSIS — I70.0 AORTIC ATHEROSCLEROSIS: ICD-10-CM

## 2019-08-21 DIAGNOSIS — M53.3 SACROILIAC JOINT DYSFUNCTION OF BOTH SIDES: ICD-10-CM

## 2019-08-21 DIAGNOSIS — I50.22 CHRONIC SYSTOLIC CONGESTIVE HEART FAILURE: ICD-10-CM

## 2019-08-21 DIAGNOSIS — E11.65 TYPE 2 DIABETES MELLITUS WITH HYPERGLYCEMIA, WITHOUT LONG-TERM CURRENT USE OF INSULIN: ICD-10-CM

## 2019-08-21 DIAGNOSIS — C83.35 DIFFUSE LARGE B-CELL LYMPHOMA OF LYMPH NODES OF LOWER EXTREMITY: ICD-10-CM

## 2019-08-21 PROCEDURE — 3074F PR MOST RECENT SYSTOLIC BLOOD PRESSURE < 130 MM HG: ICD-10-PCS | Mod: HCNC,CPTII,S$GLB, | Performed by: INTERNAL MEDICINE

## 2019-08-21 PROCEDURE — 99204 PR OFFICE/OUTPT VISIT, NEW, LEVL IV, 45-59 MIN: ICD-10-PCS | Mod: HCNC,S$GLB,, | Performed by: INTERNAL MEDICINE

## 2019-08-21 PROCEDURE — 99999 PR PBB SHADOW E&M-EST. PATIENT-LVL V: CPT | Mod: PBBFAC,HCNC,, | Performed by: INTERNAL MEDICINE

## 2019-08-21 PROCEDURE — 99204 OFFICE O/P NEW MOD 45 MIN: CPT | Mod: HCNC,S$GLB,, | Performed by: INTERNAL MEDICINE

## 2019-08-21 PROCEDURE — 3074F SYST BP LT 130 MM HG: CPT | Mod: HCNC,CPTII,S$GLB, | Performed by: INTERNAL MEDICINE

## 2019-08-21 PROCEDURE — 99999 PR PBB SHADOW E&M-EST. PATIENT-LVL V: ICD-10-PCS | Mod: PBBFAC,HCNC,, | Performed by: INTERNAL MEDICINE

## 2019-08-21 PROCEDURE — 3078F DIAST BP <80 MM HG: CPT | Mod: HCNC,CPTII,S$GLB, | Performed by: INTERNAL MEDICINE

## 2019-08-21 PROCEDURE — 1101F PR PT FALLS ASSESS DOC 0-1 FALLS W/OUT INJ PAST YR: ICD-10-PCS | Mod: HCNC,CPTII,S$GLB, | Performed by: INTERNAL MEDICINE

## 2019-08-21 PROCEDURE — 3078F PR MOST RECENT DIASTOLIC BLOOD PRESSURE < 80 MM HG: ICD-10-PCS | Mod: HCNC,CPTII,S$GLB, | Performed by: INTERNAL MEDICINE

## 2019-08-21 PROCEDURE — 1101F PT FALLS ASSESS-DOCD LE1/YR: CPT | Mod: HCNC,CPTII,S$GLB, | Performed by: INTERNAL MEDICINE

## 2019-08-21 NOTE — PROGRESS NOTES
Subjective:   Patient ID:  Peri Johansen is a 85 y.o. female who presents for follow-up of Acute congestive heart failure, unspecified heart failure ty    Acute congestive heart failure, unspecified heart failure type     2. Aortic atherosclerosis: see CT scan 3/15    3. Mixed hyperlipidemia    4. 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 Thyroid nodules: several see u/s 2017 FNA 2017 benign, stable 2019    6. Type 2 diabetes mellitus with hyperglycemia, without long-term current use of insulin    7. Frequent urination    8. Hearing loss, unspecified hearing loss type, unspecified laterality    9. Renal cyst: R side see ultrasound 6/16; stable 2018      Echo 6/19  · Mldly decreased left ventricular systolic function. The estimated ejection fraction is 45-50%  · Grade I (mild) left ventricular diastolic dysfunction consistent with impaired relaxation. Normal left atrial pressure.  · Concentric left ventricular remodeling.  · Mild mitral regurgitation.  · Normal right ventricular systolic function.  · The estimated PA systolic pressure is 33 mm Hg  · Normal central venous pressure (3 mm Hg).       HPI:   Recent admission for heart failure  No chest pain, Orthopnea, PND of heart failure symptoms.   Denies palpitations or fluttering in the chest  Patient stays with daughter who takes care of her but overall can clean and take a shower herself does not palpitations.   Non smoker   Father had heart disease at the age of 70.   Patient c/o back pain that radiates to the groin.   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    Low vitamin B12 level    Gastroesophageal reflux disease without esophagitis    Colon polyp: hyperplastic 2015- repeat 2020    Primary insomnia    Aortic atherosclerosis: see CT scan 3/15    Diverticulosis of large intestine without hemorrhage: see CT scan 3/15    Thoracic and lumbosacral neuritis    Chronic bilateral low back pain with right-sided sciatica    Left carpal tunnel syndrome    Hearing loss of left ear    Hyperthyroidism    Cervical spine arthritis    Sacroiliac joint dysfunction of both sides    Acute congestive heart failure    Type 2 diabetes mellitus with hyperglycemia, without long-term current use of insulin     BP (!) 117/56 (BP Location: Left arm, Patient Position: Sitting, BP Method: Large (Automatic))   Pulse 97   Ht 4' 11" (1.499 m)   Wt 58.6 kg (129 lb 3 oz)   BMI 26.09 kg/m²   Body mass index is 26.09 kg/m².  CrCl cannot be calculated (Patient's most recent lab result is older than the maximum 7 days allowed.).    Lab Results   Component Value Date     06/14/2019    K 3.6 06/14/2019     06/14/2019    CO2 30 (H) 06/14/2019    BUN 14 06/14/2019    CREATININE 0.6 06/14/2019     06/14/2019    HGBA1C 6.7 (H) 06/13/2019    MG 1.6 06/14/2019    AST 49 (H) 06/13/2019    ALT 71 (H) 06/13/2019    ALBUMIN 2.7 (L) 06/13/2019    PROT 6.4 06/13/2019    BILITOT 1.2 (H) 06/13/2019    WBC 5.03 06/14/2019    HGB 13.7 06/14/2019    HCT 41.3 06/14/2019    MCV 86 06/14/2019     06/14/2019    INR 1.1 06/12/2019    TSH 1.014 06/13/2019    CHOL 143 06/13/2019    HDL 47 06/13/2019    LDLCALC 83.2 06/13/2019    TRIG 64 06/13/2019       Current Outpatient Medications   Medication Sig    ammonium lactate 12 % Crea Apply 1 application topically once daily.    aspirin 81 MG Chew Take 1 tablet (81 mg total) by mouth once daily.    atorvastatin (LIPITOR) 40 MG tablet TK 1 T PO ONCE D    blood-glucose meter kit Please dispense meter, lancets, and testing strips " covered by insurance    calcium 500 mg Tab Take 1 tablet by mouth Twice daily.    diclofenac sodium (VOLTAREN) 1 % Gel Apply 2 g topically 4 (four) times daily.    fluticasone (FLONASE) 50 mcg/actuation nasal spray 1 spray (50 mcg total) by Each Nare route once daily.    furosemide (LASIX) 20 MG tablet Take 1 tablet (20 mg total) by mouth once daily.    gabapentin (NEURONTIN) 300 MG capsule Take 1 capsule (300 mg total) by mouth 2 (two) times daily.    methIMAzole (TAPAZOLE) 5 MG Tab 1/2 tablet (2.5mg) daily    metoprolol succinate (TOPROL-XL) 25 MG 24 hr tablet Take 1 tablet (25 mg total) by mouth once daily.    MICRO THIN LANCETS 33 gauge Misc USE AS DIRECTED    omeprazole (PRILOSEC) 40 MG capsule TAKE 1 CAPSULE(40 MG) BY MOUTH DAILY AS NEEDED    ONE TOUCH DELICA LANCETS 30 gauge Misc     ONETOUCH ULTRA BLUE TEST STRIP Strp TEST BLOOD TWICE DAILY    potassium chloride (KLOR-CON) 10 MEQ TbSR Take 1 tablet (10 mEq total) by mouth once daily.    traMADol (ULTRAM) 50 mg tablet Take 1 tablet (50 mg total) by mouth every 12 (twelve) hours as needed for Pain.    TRUE METRIX GLUCOSE TEST STRIP Strp USE AS DIRECTED    dextran 70-hypromellose (ARTIFICIAL TEARS,[id number],) ophthalmic solution Place 1 drop into both eyes 4 (four) times daily as needed.     No current facility-administered medications for this visit.        Review of Systems   Constitution: Negative for chills, decreased appetite, malaise/fatigue, night sweats, weight gain and weight loss.   Eyes: Negative for blurred vision, double vision, visual disturbance and visual halos.   Cardiovascular: Negative for chest pain, claudication, cyanosis, dyspnea on exertion, irregular heartbeat, leg swelling, near-syncope, orthopnea, palpitations, paroxysmal nocturnal dyspnea and syncope.   Respiratory: Negative for cough, hemoptysis, snoring, sputum production and wheezing.    Endocrine: Negative for cold intolerance, heat intolerance, polydipsia and  polyphagia.   Hematologic/Lymphatic: Negative for adenopathy and bleeding problem. Does not bruise/bleed easily.   Skin: Negative for flushing, itching, poor wound healing and rash.   Musculoskeletal: Negative for arthritis, back pain, falls, gout, joint pain, joint swelling, muscle cramps, muscle weakness, myalgias, neck pain and stiffness.   Gastrointestinal: Negative for bloating, abdominal pain, anorexia, diarrhea, dysphagia, excessive appetite, flatus, hematemesis, jaundice, melena and nausea.   Genitourinary: Negative for hesitancy and incomplete emptying.   Neurological: Negative for aphonia, brief paralysis, difficulty with concentration, disturbances in coordination, excessive daytime sleepiness, dizziness, focal weakness, light-headedness, loss of balance and weakness.   Psychiatric/Behavioral: Negative for altered mental status, depression, hallucinations, hypervigilance, memory loss, substance abuse and suicidal ideas. The patient does not have insomnia and is not nervous/anxious.        Objective:   Physical Exam   Constitutional: She is oriented to person, place, and time. She appears well-developed and well-nourished. No distress.   HENT:   Head: Normocephalic and atraumatic.   Nose: Nose normal.   Mouth/Throat: Oropharynx is clear and moist. No oropharyngeal exudate.   Eyes: Pupils are equal, round, and reactive to light. Conjunctivae and EOM are normal. Right eye exhibits no discharge. Left eye exhibits no discharge. No scleral icterus.   Neck: Normal range of motion. Neck supple. No JVD present. No tracheal deviation present. No thyromegaly present.   Cardiovascular: Normal rate, regular rhythm, normal heart sounds and intact distal pulses. Exam reveals no gallop and no friction rub.   No murmur heard.  Pulmonary/Chest: Effort normal and breath sounds normal. No stridor. No respiratory distress. She has no wheezes. She has no rales. She exhibits no tenderness.   Abdominal: Soft. Bowel sounds are  normal. She exhibits no distension and no mass. There is no tenderness. There is no rebound and no guarding.   Musculoskeletal: Normal range of motion. She exhibits no edema or tenderness.   Lymphadenopathy:     She has no cervical adenopathy.   Neurological: She is alert and oriented to person, place, and time. She has normal reflexes. No cranial nerve deficit. She exhibits normal muscle tone. Coordination normal.   Skin: Skin is warm. No rash noted. She is not diaphoretic. No erythema. No pallor.   Psychiatric: She has a normal mood and affect. Her behavior is normal. Judgment and thought content normal.       Assessment:     1. Mixed hyperlipidemia    2. Aortic atherosclerosis: see CT scan 3/15    3. Type 2 diabetes mellitus with hyperglycemia, without long-term current use of insulin    4. Chronic systolic congestive heart failure        Plan:   Patient asymptomatic and will low normal EF and no wall motion abnormality seen on Echo. Recommend continue diuretic and beta blocker with no change in medication for now,   Patient c/o low back pain recommend discuss with PCP she is know to have sacroiliac joint arthritis.    Limit sodium intake to less then 2 gram sodium and 1500cc fluid restriction.  Graded exercise program as tolerated.  Call if  more than 3 lbs in 1 day or 5 lbs in 1 week.  RTC 8 mo

## 2019-08-28 RX ORDER — TRAMADOL HYDROCHLORIDE 50 MG/1
50 TABLET ORAL EVERY 12 HOURS PRN
Qty: 14 TABLET | Refills: 0 | Status: SHIPPED | OUTPATIENT
Start: 2019-08-28 | End: 2019-10-18 | Stop reason: SDUPTHER

## 2019-08-28 NOTE — TELEPHONE ENCOUNTER
----- Message from Melanie Calabrese sent at 8/28/2019 12:28 PM CDT -----  Contact: Patient [phone]  Prescription Request:     Name of medication: traMADol (ULTRAM) 50 mg tablet    Reason for request: Refill    Pharmacy: [id number] - [facility] [address] AT [facility]    Please advise.    Thank You

## 2019-08-29 ENCOUNTER — OFFICE VISIT (OUTPATIENT)
Dept: HOME HEALTH SERVICES | Facility: CLINIC | Age: 84
End: 2019-08-29
Payer: MEDICARE

## 2019-08-29 VITALS
SYSTOLIC BLOOD PRESSURE: 98 MMHG | HEIGHT: 59 IN | BODY MASS INDEX: 26 KG/M2 | WEIGHT: 129 LBS | DIASTOLIC BLOOD PRESSURE: 58 MMHG

## 2019-08-29 DIAGNOSIS — I70.0 AORTIC ATHEROSCLEROSIS: ICD-10-CM

## 2019-08-29 DIAGNOSIS — I50.9 ACUTE CONGESTIVE HEART FAILURE, UNSPECIFIED HEART FAILURE TYPE: ICD-10-CM

## 2019-08-29 DIAGNOSIS — G89.29 CHRONIC BILATERAL LOW BACK PAIN WITH RIGHT-SIDED SCIATICA: ICD-10-CM

## 2019-08-29 DIAGNOSIS — K21.9 GASTROESOPHAGEAL REFLUX DISEASE WITHOUT ESOPHAGITIS: ICD-10-CM

## 2019-08-29 DIAGNOSIS — M47.812 CERVICAL SPINE ARTHRITIS: ICD-10-CM

## 2019-08-29 DIAGNOSIS — C83.35 DIFFUSE LARGE B-CELL LYMPHOMA OF LYMPH NODES OF LOWER EXTREMITY: ICD-10-CM

## 2019-08-29 DIAGNOSIS — E11.65 TYPE 2 DIABETES MELLITUS WITH HYPERGLYCEMIA, WITHOUT LONG-TERM CURRENT USE OF INSULIN: ICD-10-CM

## 2019-08-29 DIAGNOSIS — Z00.00 ENCOUNTER FOR PREVENTIVE HEALTH EXAMINATION: Primary | ICD-10-CM

## 2019-08-29 DIAGNOSIS — M47.819 SPONDYLOSIS WITHOUT MYELOPATHY: ICD-10-CM

## 2019-08-29 DIAGNOSIS — E78.2 MIXED HYPERLIPIDEMIA: ICD-10-CM

## 2019-08-29 DIAGNOSIS — M54.41 CHRONIC BILATERAL LOW BACK PAIN WITH RIGHT-SIDED SCIATICA: ICD-10-CM

## 2019-08-29 PROBLEM — E05.90 HYPERTHYROIDISM: Status: RESOLVED | Noted: 2019-04-15 | Resolved: 2019-08-29

## 2019-08-29 PROCEDURE — 3078F PR MOST RECENT DIASTOLIC BLOOD PRESSURE < 80 MM HG: ICD-10-PCS | Mod: CPTII,S$GLB,, | Performed by: NURSE PRACTITIONER

## 2019-08-29 PROCEDURE — 3074F PR MOST RECENT SYSTOLIC BLOOD PRESSURE < 130 MM HG: ICD-10-PCS | Mod: CPTII,S$GLB,, | Performed by: NURSE PRACTITIONER

## 2019-08-29 PROCEDURE — 3078F DIAST BP <80 MM HG: CPT | Mod: CPTII,S$GLB,, | Performed by: NURSE PRACTITIONER

## 2019-08-29 PROCEDURE — 3074F SYST BP LT 130 MM HG: CPT | Mod: CPTII,S$GLB,, | Performed by: NURSE PRACTITIONER

## 2019-08-29 PROCEDURE — G0439 PR MEDICARE ANNUAL WELLNESS SUBSEQUENT VISIT: ICD-10-PCS | Mod: S$GLB,,, | Performed by: NURSE PRACTITIONER

## 2019-08-29 PROCEDURE — G0439 PPPS, SUBSEQ VISIT: HCPCS | Mod: S$GLB,,, | Performed by: NURSE PRACTITIONER

## 2019-08-30 NOTE — PROGRESS NOTES
"Peri Johansen presented for a  Medicare AWV and comprehensive Health Risk Assessment today. The following components were reviewed and updated:    · Medical history  · Family History  · Social history  · Allergies and Current Medications  · Health Risk Assessment  · Health Maintenance  · Care Team     ** See Completed Assessments for Annual Wellness Visit within the encounter summary.**       The following assessments were completed:  · Living Situation  · CAGE  · Depression Screening  · Timed Get Up and Go  · Whisper Test  · Cognitive Function Screening  ·   ·   ·   · Nutrition Screening  · ADL Screening  · PAQ Screening    Vitals:    08/29/19 1058   BP: 98/60   Weight: 58.5 kg (129 lb)   Height: 4' 11" (1.499 m)     Body mass index is 26.05 kg/m².  Physical Exam   Constitutional: She is oriented to person, place, and time. She appears well-developed.   HENT:   Head: Normocephalic and atraumatic.   Eyes: Pupils are equal, round, and reactive to light. EOM are normal.   Neck: Normal range of motion.   Pulmonary/Chest: Effort normal and breath sounds normal. No respiratory distress.   Abdominal: Soft. Bowel sounds are normal. She exhibits no distension.   Musculoskeletal: She exhibits no edema.   Unsteady gait   Neurological: She is alert and oriented to person, place, and time.   Skin: Skin is warm and dry.   Psychiatric: She has a normal mood and affect. Her behavior is normal.         Diagnoses and health risks identified today and associated recommendations/orders:    1. Encounter for Preventive health exam  Assessment completed. Preventive measures reviewed.    2. Mixed hyperlipidemia  Stable, followed by PCP.    3. Aortic atherosclerosis: see CT scan 3/15  Stable, followed by Cardiology.    4. Acute congestive heart failure, unspecified heart failure type  Stable, followed by Cardiology.    5. 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    6. Type 2 " diabetes mellitus with hyperglycemia, without long-term current use of insulin  Stable, followed by PCP.    7. Gastroesophageal reflux disease without esophagitis  Stable, followed by PCP.    8. Chronic bilateral low back pain with right-sided sciatica  Stable, followed by Pain Management.    9. Spondylosis without myelopathy  Stable, followed by PCP.    10. Cervical spine arthritis  Stable, followed by PCP.    Provided Peri with a 5-10 year written screening schedule and personal prevention plan. Recommendations were developed using the USPSTF age appropriate recommendations. Education, counseling, and referrals were provided as needed. After Visit Summary printed and given to patient which includes a list of additional screenings\tests needed.    No follow-ups on file.    Bea Roberts NP  I offered to discuss end of life issues, including information on how to make advance directives that the patient could use to name someone who would make medical decisions on their behalf if they became too ill to make themselves.    ___Patient declined  _X_Patient is interested, I provided paper work and offered to discuss.

## 2019-08-30 NOTE — PATIENT INSTRUCTIONS
Counseling and Referral of Other Preventative  (Italic type indicates deductible and co-insurance are waived)    Patient Name: Peri Johansen  Today's Date: 8/29/2019    Health Maintenance       Date Due Completion Date    Urine Microalbumin 10/03/2019 10/3/2018    Eye Exam 09/12/2019 (Originally 8/16/2019) 8/16/2018    Shingles Vaccine (1 of 2) 09/30/2019 (Originally 5/27/1984) ---    Influenza Vaccine (1) 09/01/2019 10/3/2018    Override on 1/23/2017: Declined    Override on 3/10/2015: Not Clinically Appropriate    Override on 2/24/2014: Done    Override on 10/9/2012: Done    Hemoglobin A1c 12/13/2019 6/13/2019    Lipid Panel 06/13/2020 6/13/2019    Foot Exam 07/05/2020 7/5/2019    Override on 8/27/2018: Done    Colonoscopy 07/10/2020 7/10/2015    DEXA SCAN 10/16/2020 10/16/2018    Override on 6/17/2011: Done    TETANUS VACCINE 04/15/2029 4/15/2019        No orders of the defined types were placed in this encounter.    The following information is provided to all patients.  This information is to help you find resources for any of the problems found today that may be affecting your health:                Living healthy guide: www.[facility].gov      Understanding Diabetes: www.diabetes.org      Eating healthy: www.cdc.gov/healthyweight      CDC home safety checklist: www.cdc.gov/steadi/patient.html      Agency on Aging: www.goea.[place].[facility]      Alcoholics anonymous (AA): www.aa.org      Physical Activity: www.saúl.nih.gov/bz8vyls      Tobacco use: www.quitwithusla.org

## 2019-09-20 ENCOUNTER — PATIENT OUTREACH (OUTPATIENT)
Dept: ADMINISTRATIVE | Facility: OTHER | Age: 84
End: 2019-09-20

## 2019-09-30 ENCOUNTER — EXTERNAL CHRONIC CARE MANAGEMENT (OUTPATIENT)
Dept: PRIMARY CARE CLINIC | Facility: CLINIC | Age: 84
End: 2019-09-30
Payer: MEDICARE

## 2019-09-30 PROCEDURE — 99490 CHRNC CARE MGMT STAFF 1ST 20: CPT | Mod: S$GLB,,, | Performed by: INTERNAL MEDICINE

## 2019-09-30 PROCEDURE — 99490 PR CHRONIC CARE MGMT, 1ST 20 MIN: ICD-10-PCS | Mod: S$GLB,,, | Performed by: INTERNAL MEDICINE

## 2019-10-02 ENCOUNTER — LAB VISIT (OUTPATIENT)
Dept: LAB | Facility: HOSPITAL | Age: 84
End: 2019-10-02
Attending: INTERNAL MEDICINE
Payer: MEDICARE

## 2019-10-02 DIAGNOSIS — E11.65 TYPE 2 DIABETES MELLITUS WITH HYPERGLYCEMIA, WITHOUT LONG-TERM CURRENT USE OF INSULIN: ICD-10-CM

## 2019-10-02 LAB
ALBUMIN SERPL BCP-MCNC: 3.5 G/DL (ref 3.5–5.2)
ANION GAP SERPL CALC-SCNC: 9 MMOL/L (ref 8–16)
BUN SERPL-MCNC: 15 MG/DL (ref 8–23)
CALCIUM SERPL-MCNC: 9.4 MG/DL (ref 8.7–10.5)
CHLORIDE SERPL-SCNC: 104 MMOL/L (ref 95–110)
CO2 SERPL-SCNC: 27 MMOL/L (ref 23–29)
CREAT SERPL-MCNC: 0.7 MG/DL (ref 0.5–1.4)
EST. GFR  (AFRICAN AMERICAN): >60 ML/MIN/1.73 M^2
EST. GFR  (NON AFRICAN AMERICAN): >60 ML/MIN/1.73 M^2
ESTIMATED AVG GLUCOSE: 148 MG/DL (ref 68–131)
GLUCOSE SERPL-MCNC: 106 MG/DL (ref 70–110)
HBA1C MFR BLD HPLC: 6.8 % (ref 4–5.6)
PHOSPHATE SERPL-MCNC: 3.1 MG/DL (ref 2.7–4.5)
POTASSIUM SERPL-SCNC: 4 MMOL/L (ref 3.5–5.1)
SODIUM SERPL-SCNC: 140 MMOL/L (ref 136–145)

## 2019-10-02 PROCEDURE — 83036 HEMOGLOBIN GLYCOSYLATED A1C: CPT | Mod: HCNC

## 2019-10-02 PROCEDURE — 36415 COLL VENOUS BLD VENIPUNCTURE: CPT | Mod: HCNC

## 2019-10-02 PROCEDURE — 80069 RENAL FUNCTION PANEL: CPT | Mod: HCNC

## 2019-10-03 ENCOUNTER — TELEPHONE (OUTPATIENT)
Dept: INTERNAL MEDICINE | Facility: CLINIC | Age: 84
End: 2019-10-03

## 2019-10-03 DIAGNOSIS — E11.65 TYPE 2 DIABETES MELLITUS WITH HYPERGLYCEMIA, WITHOUT LONG-TERM CURRENT USE OF INSULIN: Primary | ICD-10-CM

## 2019-10-03 NOTE — TELEPHONE ENCOUNTER
She is coming in next week, please schedule for eye exam, she needs to see Optometry, not eye camera, orders in, thank you    Also needs to get flu shot and shingles vaccine

## 2019-10-08 ENCOUNTER — OFFICE VISIT (OUTPATIENT)
Dept: INTERNAL MEDICINE | Facility: CLINIC | Age: 84
End: 2019-10-08
Payer: MEDICARE

## 2019-10-08 VITALS
OXYGEN SATURATION: 98 % | SYSTOLIC BLOOD PRESSURE: 118 MMHG | DIASTOLIC BLOOD PRESSURE: 58 MMHG | BODY MASS INDEX: 25.34 KG/M2 | WEIGHT: 125.69 LBS | HEIGHT: 59 IN | HEART RATE: 93 BPM

## 2019-10-08 DIAGNOSIS — E04.1 THYROID NODULE: ICD-10-CM

## 2019-10-08 DIAGNOSIS — C83.35 DIFFUSE LARGE B-CELL LYMPHOMA OF LYMPH NODES OF LOWER EXTREMITY: ICD-10-CM

## 2019-10-08 DIAGNOSIS — M54.41 CHRONIC BILATERAL LOW BACK PAIN WITH RIGHT-SIDED SCIATICA: ICD-10-CM

## 2019-10-08 DIAGNOSIS — I50.9 CHRONIC CONGESTIVE HEART FAILURE, UNSPECIFIED HEART FAILURE TYPE: ICD-10-CM

## 2019-10-08 DIAGNOSIS — N28.1 RENAL CYST: ICD-10-CM

## 2019-10-08 DIAGNOSIS — E11.65 TYPE 2 DIABETES MELLITUS WITH HYPERGLYCEMIA, WITHOUT LONG-TERM CURRENT USE OF INSULIN: Primary | ICD-10-CM

## 2019-10-08 DIAGNOSIS — K31.89 GASTRIC NODULE: ICD-10-CM

## 2019-10-08 DIAGNOSIS — G89.29 CHRONIC BILATERAL LOW BACK PAIN WITH RIGHT-SIDED SCIATICA: ICD-10-CM

## 2019-10-08 LAB
ALBUMIN/CREAT UR: 6.8 UG/MG (ref 0–30)
CREAT UR-MCNC: 148 MG/DL (ref 15–325)
MICROALBUMIN UR DL<=1MG/L-MCNC: 10 UG/ML

## 2019-10-08 PROCEDURE — 1101F PR PT FALLS ASSESS DOC 0-1 FALLS W/OUT INJ PAST YR: ICD-10-PCS | Mod: HCNC,CPTII,S$GLB, | Performed by: INTERNAL MEDICINE

## 2019-10-08 PROCEDURE — 99214 OFFICE O/P EST MOD 30 MIN: CPT | Mod: HCNC,S$GLB,, | Performed by: INTERNAL MEDICINE

## 2019-10-08 PROCEDURE — 82043 UR ALBUMIN QUANTITATIVE: CPT | Mod: HCNC

## 2019-10-08 PROCEDURE — 99999 PR PBB SHADOW E&M-EST. PATIENT-LVL III: CPT | Mod: PBBFAC,HCNC,, | Performed by: INTERNAL MEDICINE

## 2019-10-08 PROCEDURE — 3074F SYST BP LT 130 MM HG: CPT | Mod: HCNC,CPTII,S$GLB, | Performed by: INTERNAL MEDICINE

## 2019-10-08 PROCEDURE — 3078F PR MOST RECENT DIASTOLIC BLOOD PRESSURE < 80 MM HG: ICD-10-PCS | Mod: HCNC,CPTII,S$GLB, | Performed by: INTERNAL MEDICINE

## 2019-10-08 PROCEDURE — 3074F PR MOST RECENT SYSTOLIC BLOOD PRESSURE < 130 MM HG: ICD-10-PCS | Mod: HCNC,CPTII,S$GLB, | Performed by: INTERNAL MEDICINE

## 2019-10-08 PROCEDURE — 99499 UNLISTED E&M SERVICE: CPT | Mod: HCNC,S$GLB,, | Performed by: INTERNAL MEDICINE

## 2019-10-08 PROCEDURE — 99999 PR PBB SHADOW E&M-EST. PATIENT-LVL III: ICD-10-PCS | Mod: PBBFAC,HCNC,, | Performed by: INTERNAL MEDICINE

## 2019-10-08 PROCEDURE — 1101F PT FALLS ASSESS-DOCD LE1/YR: CPT | Mod: HCNC,CPTII,S$GLB, | Performed by: INTERNAL MEDICINE

## 2019-10-08 PROCEDURE — 3078F DIAST BP <80 MM HG: CPT | Mod: HCNC,CPTII,S$GLB, | Performed by: INTERNAL MEDICINE

## 2019-10-08 PROCEDURE — 99214 PR OFFICE/OUTPT VISIT, EST, LEVL IV, 30-39 MIN: ICD-10-PCS | Mod: HCNC,S$GLB,, | Performed by: INTERNAL MEDICINE

## 2019-10-08 PROCEDURE — 99499 RISK ADDL DX/OHS AUDIT: ICD-10-PCS | Mod: HCNC,S$GLB,, | Performed by: INTERNAL MEDICINE

## 2019-10-08 NOTE — PROGRESS NOTES
"Subjective:       Patient ID: Peri Johansen is a 85 y.o. female.    Chief Complaint: Follow-up (3 month follow up)    Follow-up of multiple medical issues    Recent labs stable    Has an eye exam scheduled for next week, she is not a candidate for the eye camera.    Flu shot and shingles vaccines recommended; she will consider for today.    Cardiology 8/19: "Limit sodium intake to less then 2 gram sodium and 1500cc fluid restriction.  Graded exercise program as tolerated.  Call if  more than 3 lbs in 1 day or 5 lbs in 1 week."  Stable CHF, reviewed alarm symptoms, she is having none.    Oncology April 2019; annual follow-up recommended.    Endocrinology August 2019; periodic follow-up recommended.    Thyroid ultrasound 2019 per ENDO will repeat 1 year (declines FNA).  On methimazole.    Bone density exam 10/18.    Pattern of tramadol use stable;  reviewed.  Ongoing back pain.  Seen in pain clinic April; MRI ordered but she has not scheduled.  She does not wish to schedule MRI or return to clinic.    Discussed possibility of home PT, she is ambivalent.  Outpatient case management again reviewed and recommended.  She lives with her daughter and has support at home.  No falls.  However, she could use some help with transportation.    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     Mixed hyperlipidemia     Renal cyst: R side see ultrasound 6/16; stable 2018 and 2019     Stromal cell tumor     Lipoma of back     Thyroid nodules: several see u/s 2017 FNA 2017 benign, stable 2019     Gastric nodule: 2013-m per GI no need for further follow up     Spondylosis without myelopathy     Gastroesophageal reflux disease without esophagitis     Colon polyp: hyperplastic 2015- repeat 2020     Primary insomnia     Aortic atherosclerosis: see CT scan 3/15     Diverticulosis of large intestine without hemorrhage: see CT scan 3/15     Thoracic and lumbosacral neuritis     Chronic " bilateral low back pain with right-sided sciatica     Left carpal tunnel syndrome     Hearing loss of left ear     Cervical spine arthritis     Sacroiliac joint dysfunction of both sides     Chronic congestive heart failure     Type 2 diabetes mellitus with hyperglycemia, without long-term current use of insulin                      Review of Systems   Constitutional: Negative for fatigue and fever.   Eyes: Negative for visual disturbance.   Respiratory: Negative for cough and shortness of breath.    Cardiovascular: Negative for chest pain and leg swelling.        No PND or orthopnea   Gastrointestinal: Negative for abdominal pain.   Genitourinary: Negative for difficulty urinating and hematuria.   Musculoskeletal: Positive for back pain.        Chronic stable symptoms   Skin: Negative for rash.   Neurological: Negative for weakness and numbness.   Psychiatric/Behavioral: Negative for dysphoric mood. The patient is not nervous/anxious.        Objective:      Physical Exam   Constitutional: She is oriented to person, place, and time. She appears well-developed and well-nourished.   HENT:   Head: Normocephalic and atraumatic.   Neck: Normal range of motion. Neck supple. Thyromegaly present.   Cardiovascular: Normal rate and regular rhythm.   No murmur heard.  Pulmonary/Chest: Effort normal and breath sounds normal. No respiratory distress. She has no wheezes.   Abdominal: Soft. She exhibits no distension.   Musculoskeletal:   No CVA pain.  Negative SLR.  Strength UE and LE wnl.  No pain over spine   Neurological: She is oriented to person, place, and time. She displays normal reflexes. No cranial nerve deficit.   Skin: Skin is warm and dry.   Lipoma of back   Psychiatric: She has a normal mood and affect. Her behavior is normal.       Assessment:       1. Type 2 diabetes mellitus with hyperglycemia, without long-term current use of insulin    2. Renal cyst: R side see ultrasound 6/16; stable 2018    3. Diffuse large  B-cell lymphoma of lymph nodes of lower extremity    4. Thyroid nodules: several see u/s 2017 FNA 2017 benign, stable 2019    5. Gastric nodule: 2013-m per GI no need for further follow up    6. Chronic congestive heart failure, unspecified heart failure type    7. Chronic bilateral low back pain with right-sided sciatica        Plan:         Peri was seen today for follow-up.    Diagnoses and all orders for this visit:    Type 2 diabetes mellitus with hyperglycemia, without long-term current use of insulin  -     Microalbumin/creatinine urine ratio  -     Cancel: Ambulatory referral to Optometry  -     Ambulatory Referral to Outpatient Case Management    Renal cyst: R side see ultrasound 6/16; stable 2018; will continue to monitor.  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  Keep Cardiology follow-up  -     Ambulatory Referral to Outpatient Case Management    Back pain:  Issues reviewed at length.  She does not desire physical therapy or follow-up in the pain clinic.  Alarm symptoms reviewed.     Flu shot and shingles vaccines today   Urine today   Keep Opto appointment for next week   EP with me in 4 months with labs prior, sooner with problems in the interim

## 2019-10-16 ENCOUNTER — OUTPATIENT CASE MANAGEMENT (OUTPATIENT)
Dept: ADMINISTRATIVE | Facility: OTHER | Age: 84
End: 2019-10-16

## 2019-10-16 NOTE — LETTER
October 17, 2019    Peri Johansen  [address] [facility] [address]             [facility] [address] [place] [address] Dear Ms. Johansen,    I work with [facility] Outpatient Case Management Department. We received a referral to call you to discuss your medical history.These services are free of charge and are offered to [facility] patients who have recently been discharged from any of our facilities or who have complex medical conditions that may require the skill of a nurse to assist with management.             I am a Registered Nurse who specializes in connecting patients with available medical and financial resources as well as addressing any educational needs that may be indicated.      I attempted to reach you by telephone, but I was unsuccessful. Please call our department so that we can go over some questions with you regarding your health.    The Outpatient Case Management Department can be reached at [phone] from 8:00AM to 4:30 PM on Monday thru Friday. [facility] also has a program where a nurse is available 24/7 to answer questions or provide medical advice, their number is [phone].    Thanks,      Jaylin Ferguson, RN Case Manager  Outpatient Complex Case Management/Disease Management   [phone]

## 2019-10-17 NOTE — PROGRESS NOTES
Summary:  1st Attempt to complete initial assessment for Outpatient Care Management    Interventions:  Left message requesting a return call, letter mailed (and sent via pt portal if pt is active portal) requesting a return call.      Plan:  Attempt to contact as scheduled  Jaylin Ferguson RN  Outpatient Care Management

## 2019-10-18 NOTE — PROGRESS NOTES
Summary:  2nd Attempt to complete initial assessment for OPCM    Interventions:  Left message requesting a return call, letter requesting a return call was mailed and sent via pt portal at time of 1st attempt.     Plan:  Scheduled pt for call back. Jaylin Ferguson RN

## 2019-10-20 ENCOUNTER — PATIENT OUTREACH (OUTPATIENT)
Dept: ADMINISTRATIVE | Facility: OTHER | Age: 84
End: 2019-10-20

## 2019-10-21 RX ORDER — TRAMADOL HYDROCHLORIDE 50 MG/1
TABLET ORAL
Qty: 14 TABLET | Refills: 0 | Status: SHIPPED | OUTPATIENT
Start: 2019-10-21 | End: 2020-02-24

## 2019-10-21 NOTE — PROGRESS NOTES
10/21/19    Summary:  3rd Attempt to complete initial assessment for OPCM    Interventions:  Left message requesting a return call, a letter was mailed (and sent via pt portal if pt is active on portal)at the time of the 1st attempt.     Plan:  Close case. Jaylin Ferguson RN

## 2019-10-28 ENCOUNTER — PATIENT MESSAGE (OUTPATIENT)
Dept: INTERNAL MEDICINE | Facility: CLINIC | Age: 84
End: 2019-10-28

## 2019-10-28 RX ORDER — GLIMEPIRIDE 2 MG/1
TABLET ORAL
COMMUNITY
End: 2019-10-28 | Stop reason: SDUPTHER

## 2019-10-28 RX ORDER — GLIMEPIRIDE 2 MG/1
2 TABLET ORAL
Qty: 90 TABLET | Refills: 1 | Status: SHIPPED | OUTPATIENT
Start: 2019-10-28 | End: 2020-06-15

## 2019-10-31 ENCOUNTER — EXTERNAL CHRONIC CARE MANAGEMENT (OUTPATIENT)
Dept: PRIMARY CARE CLINIC | Facility: CLINIC | Age: 84
End: 2019-10-31
Payer: MEDICARE

## 2019-10-31 PROCEDURE — 99490 PR CHRONIC CARE MGMT, 1ST 20 MIN: ICD-10-PCS | Mod: S$GLB,,, | Performed by: INTERNAL MEDICINE

## 2019-10-31 PROCEDURE — 99490 CHRNC CARE MGMT STAFF 1ST 20: CPT | Mod: S$GLB,,, | Performed by: INTERNAL MEDICINE

## 2019-11-30 ENCOUNTER — EXTERNAL CHRONIC CARE MANAGEMENT (OUTPATIENT)
Dept: PRIMARY CARE CLINIC | Facility: CLINIC | Age: 84
End: 2019-11-30
Payer: MEDICARE

## 2019-11-30 PROCEDURE — 99490 CHRNC CARE MGMT STAFF 1ST 20: CPT | Mod: S$GLB,,, | Performed by: INTERNAL MEDICINE

## 2019-11-30 PROCEDURE — 99490 PR CHRONIC CARE MGMT, 1ST 20 MIN: ICD-10-PCS | Mod: S$GLB,,, | Performed by: INTERNAL MEDICINE

## 2019-12-01 RX ORDER — ATORVASTATIN CALCIUM 40 MG/1
TABLET, FILM COATED ORAL
Qty: 90 TABLET | Refills: 0 | Status: SHIPPED | OUTPATIENT
Start: 2019-12-01 | End: 2020-06-14 | Stop reason: SDUPTHER

## 2019-12-05 RX ORDER — METHIMAZOLE 5 MG/1
TABLET ORAL
Qty: 45 TABLET | Refills: 4 | Status: SHIPPED | OUTPATIENT
Start: 2019-12-05 | End: 2021-01-07 | Stop reason: SDUPTHER

## 2019-12-31 ENCOUNTER — EXTERNAL CHRONIC CARE MANAGEMENT (OUTPATIENT)
Dept: PRIMARY CARE CLINIC | Facility: CLINIC | Age: 84
End: 2019-12-31
Payer: MEDICARE

## 2019-12-31 PROCEDURE — 99490 PR CHRONIC CARE MGMT, 1ST 20 MIN: ICD-10-PCS | Mod: S$GLB,,, | Performed by: INTERNAL MEDICINE

## 2019-12-31 PROCEDURE — 99490 CHRNC CARE MGMT STAFF 1ST 20: CPT | Mod: S$GLB,,, | Performed by: INTERNAL MEDICINE

## 2020-01-13 ENCOUNTER — TELEPHONE (OUTPATIENT)
Dept: INTERNAL MEDICINE | Facility: CLINIC | Age: 85
End: 2020-01-13

## 2020-01-13 RX ORDER — INSULIN PUMP SYRINGE, 3 ML
EACH MISCELLANEOUS
Qty: 1 EACH | Refills: 0 | Status: SHIPPED | OUTPATIENT
Start: 2020-01-13 | End: 2021-06-01

## 2020-01-13 NOTE — TELEPHONE ENCOUNTER
----- Message from Sri Terry sent at 1/13/2020  7:32 AM CST -----  Prior Authorization Needed    Medication: ONETOUCH ULTRA BLUE TEST STRIP [facility]    Pharmacy Info: [id number] - [facility] [address] AT [facility] does not cover this medication. Please call plan at   [phone]    to initiate prior authorization or call/fax pharmacy to change medication. Patient ID# [id number]    Note chart when prior authorization has been submitted.    Please notify pharmacy when prior authorization has been approved.    Thank You

## 2020-01-23 DIAGNOSIS — C83.35 DIFFUSE LARGE B-CELL LYMPHOMA OF LYMPH NODES OF LOWER EXTREMITY: Primary | ICD-10-CM

## 2020-01-31 ENCOUNTER — EXTERNAL CHRONIC CARE MANAGEMENT (OUTPATIENT)
Dept: PRIMARY CARE CLINIC | Facility: CLINIC | Age: 85
End: 2020-01-31
Payer: MEDICARE

## 2020-01-31 PROCEDURE — 99490 CHRNC CARE MGMT STAFF 1ST 20: CPT | Mod: S$GLB,,, | Performed by: INTERNAL MEDICINE

## 2020-01-31 PROCEDURE — 99490 PR CHRONIC CARE MGMT, 1ST 20 MIN: ICD-10-PCS | Mod: S$GLB,,, | Performed by: INTERNAL MEDICINE

## 2020-02-24 RX ORDER — TRAMADOL HYDROCHLORIDE 50 MG/1
TABLET ORAL
Qty: 14 TABLET | Refills: 0 | Status: SHIPPED | OUTPATIENT
Start: 2020-02-24 | End: 2020-03-19 | Stop reason: SDUPTHER

## 2020-03-17 ENCOUNTER — PATIENT OUTREACH (OUTPATIENT)
Dept: ADMINISTRATIVE | Facility: OTHER | Age: 85
End: 2020-03-17

## 2020-03-18 ENCOUNTER — TELEPHONE (OUTPATIENT)
Dept: INTERNAL MEDICINE | Facility: CLINIC | Age: 85
End: 2020-03-18

## 2020-03-18 DIAGNOSIS — M51.36 LUMBAR DEGENERATIVE DISC DISEASE: ICD-10-CM

## 2020-03-18 NOTE — TELEPHONE ENCOUNTER
Pt would like to know if she can get more than 14 tabs of tramadol. Also, would like diclofenac gel. Please advise

## 2020-03-18 NOTE — TELEPHONE ENCOUNTER
----- Message from Love Chaidez MA sent at 3/18/2020  2:09 PM CDT -----  Contact: Patient [phone]      ----- Message -----  From: Sanya Gomez  Sent: 3/18/2020   2:00 PM CDT  To: Dada ROTHMAN Staff    RX request - refill or new RX.  Is this a refill or new RX:  Refill  RX name and strength: traMADol (ULTRAM) 50 mg tablet  Pharmacy name and phone # [id number] - [place], [place] - [address] AT [facility] [employer] [place] [phone] (Phone)  [phone] (Fax)    Comments:  Patient stating is having a lot of pain in the legs, and would like to have more then 14 tables sent to pharmacy above call to discuss, also requesting the cream that was used to rub on leg.    Please call an advise  Thank you

## 2020-03-19 RX ORDER — DICLOFENAC SODIUM 10 MG/G
2 GEL TOPICAL 4 TIMES DAILY
Qty: 1 TUBE | Refills: 2 | Status: SHIPPED | OUTPATIENT
Start: 2020-03-19 | End: 2020-06-11

## 2020-03-19 RX ORDER — TRAMADOL HYDROCHLORIDE 50 MG/1
TABLET ORAL
Qty: 30 TABLET | Refills: 0 | Status: SHIPPED | OUTPATIENT
Start: 2020-03-19 | End: 2020-04-27

## 2020-03-30 ENCOUNTER — TELEPHONE (OUTPATIENT)
Dept: ENDOSCOPY | Facility: HOSPITAL | Age: 85
End: 2020-03-30

## 2020-03-30 DIAGNOSIS — K63.5 POLYP OF COLON, UNSPECIFIED PART OF COLON, UNSPECIFIED TYPE: Primary | ICD-10-CM

## 2020-04-27 RX ORDER — TRAMADOL HYDROCHLORIDE 50 MG/1
TABLET ORAL
Qty: 30 TABLET | Refills: 0 | Status: SHIPPED | OUTPATIENT
Start: 2020-04-27 | End: 2020-06-14 | Stop reason: SDUPTHER

## 2020-05-31 ENCOUNTER — EXTERNAL CHRONIC CARE MANAGEMENT (OUTPATIENT)
Dept: PRIMARY CARE CLINIC | Facility: CLINIC | Age: 85
End: 2020-05-31
Payer: MEDICARE

## 2020-05-31 PROCEDURE — 99490 CHRNC CARE MGMT STAFF 1ST 20: CPT | Mod: S$GLB,,, | Performed by: INTERNAL MEDICINE

## 2020-05-31 PROCEDURE — 99490 PR CHRONIC CARE MGMT, 1ST 20 MIN: ICD-10-PCS | Mod: S$GLB,,, | Performed by: INTERNAL MEDICINE

## 2020-06-15 ENCOUNTER — TELEPHONE (OUTPATIENT)
Dept: INTERNAL MEDICINE | Facility: CLINIC | Age: 85
End: 2020-06-15

## 2020-06-15 DIAGNOSIS — E04.1 THYROID NODULE: ICD-10-CM

## 2020-06-15 DIAGNOSIS — E55.9 VITAMIN D DEFICIENCY DISEASE: ICD-10-CM

## 2020-06-15 DIAGNOSIS — E11.65 TYPE 2 DIABETES MELLITUS WITH HYPERGLYCEMIA, WITHOUT LONG-TERM CURRENT USE OF INSULIN: Primary | ICD-10-CM

## 2020-06-15 RX ORDER — TRAMADOL HYDROCHLORIDE 50 MG/1
TABLET ORAL
Qty: 30 TABLET | OUTPATIENT
Start: 2020-06-15

## 2020-06-15 RX ORDER — TRAMADOL HYDROCHLORIDE 50 MG/1
TABLET ORAL
Qty: 30 TABLET | Refills: 0 | Status: SHIPPED | OUTPATIENT
Start: 2020-06-15 | End: 2020-07-20 | Stop reason: SDUPTHER

## 2020-06-15 RX ORDER — ATORVASTATIN CALCIUM 40 MG/1
TABLET, FILM COATED ORAL
Qty: 90 TABLET | Refills: 0 | Status: SHIPPED | OUTPATIENT
Start: 2020-06-15 | End: 2020-09-21 | Stop reason: SDUPTHER

## 2020-06-15 RX ORDER — GLIMEPIRIDE 2 MG/1
TABLET ORAL
Qty: 90 TABLET | Refills: 1 | Status: SHIPPED | OUTPATIENT
Start: 2020-06-15 | End: 2021-01-13

## 2020-06-15 NOTE — TELEPHONE ENCOUNTER
Can she do labs day of appt with me?  Orders in thanks    Needs appt    Also needs eye exam all ordered thanks    Please let me know when scheduled thanks

## 2020-06-30 ENCOUNTER — EXTERNAL CHRONIC CARE MANAGEMENT (OUTPATIENT)
Dept: PRIMARY CARE CLINIC | Facility: CLINIC | Age: 85
End: 2020-06-30
Payer: MEDICARE

## 2020-06-30 PROCEDURE — 99490 CHRNC CARE MGMT STAFF 1ST 20: CPT | Mod: S$GLB,,, | Performed by: INTERNAL MEDICINE

## 2020-06-30 PROCEDURE — 99490 PR CHRONIC CARE MGMT, 1ST 20 MIN: ICD-10-PCS | Mod: S$GLB,,, | Performed by: INTERNAL MEDICINE

## 2020-07-16 ENCOUNTER — LAB VISIT (OUTPATIENT)
Dept: LAB | Facility: HOSPITAL | Age: 85
End: 2020-07-16
Attending: INTERNAL MEDICINE
Payer: MEDICARE

## 2020-07-16 DIAGNOSIS — C83.35 DIFFUSE LARGE B-CELL LYMPHOMA OF LYMPH NODES OF LOWER EXTREMITY: ICD-10-CM

## 2020-07-16 LAB
ALBUMIN SERPL BCP-MCNC: 3.3 G/DL (ref 3.5–5.2)
ALP SERPL-CCNC: 83 U/L (ref 55–135)
ALT SERPL W/O P-5'-P-CCNC: 13 U/L (ref 10–44)
ANION GAP SERPL CALC-SCNC: 6 MMOL/L (ref 8–16)
AST SERPL-CCNC: 20 U/L (ref 10–40)
BILIRUB SERPL-MCNC: 1.2 MG/DL (ref 0.1–1)
BUN SERPL-MCNC: 12 MG/DL (ref 8–23)
CALCIUM SERPL-MCNC: 9.7 MG/DL (ref 8.7–10.5)
CHLORIDE SERPL-SCNC: 104 MMOL/L (ref 95–110)
CO2 SERPL-SCNC: 30 MMOL/L (ref 23–29)
CREAT SERPL-MCNC: 0.7 MG/DL (ref 0.5–1.4)
ERYTHROCYTE [DISTWIDTH] IN BLOOD BY AUTOMATED COUNT: 12.8 % (ref 11.5–14.5)
EST. GFR  (AFRICAN AMERICAN): >60 ML/MIN/1.73 M^2
EST. GFR  (NON AFRICAN AMERICAN): >60 ML/MIN/1.73 M^2
GLUCOSE SERPL-MCNC: 156 MG/DL (ref 70–110)
HCT VFR BLD AUTO: 45.2 % (ref 37–48.5)
HGB BLD-MCNC: 14.3 G/DL (ref 12–16)
IMM GRANULOCYTES # BLD AUTO: 0 K/UL (ref 0–0.04)
MCH RBC QN AUTO: 28.1 PG (ref 27–31)
MCHC RBC AUTO-ENTMCNC: 31.6 G/DL (ref 32–36)
MCV RBC AUTO: 89 FL (ref 82–98)
NEUTROPHILS # BLD AUTO: 2.5 K/UL (ref 1.8–7.7)
PLATELET # BLD AUTO: 229 K/UL (ref 150–350)
PMV BLD AUTO: 12 FL (ref 9.2–12.9)
POTASSIUM SERPL-SCNC: 3.9 MMOL/L (ref 3.5–5.1)
PROT SERPL-MCNC: 7.8 G/DL (ref 6–8.4)
RBC # BLD AUTO: 5.09 M/UL (ref 4–5.4)
SODIUM SERPL-SCNC: 140 MMOL/L (ref 136–145)
WBC # BLD AUTO: 5.15 K/UL (ref 3.9–12.7)

## 2020-07-16 PROCEDURE — 36415 COLL VENOUS BLD VENIPUNCTURE: CPT | Mod: HCNC

## 2020-07-16 PROCEDURE — 85027 COMPLETE CBC AUTOMATED: CPT | Mod: HCNC

## 2020-07-16 PROCEDURE — 80053 COMPREHEN METABOLIC PANEL: CPT | Mod: HCNC

## 2020-07-17 ENCOUNTER — TELEPHONE (OUTPATIENT)
Dept: HEMATOLOGY/ONCOLOGY | Facility: CLINIC | Age: 85
End: 2020-07-17

## 2020-07-20 ENCOUNTER — OFFICE VISIT (OUTPATIENT)
Dept: HEMATOLOGY/ONCOLOGY | Facility: CLINIC | Age: 85
End: 2020-07-20
Payer: MEDICARE

## 2020-07-20 VITALS
OXYGEN SATURATION: 97 % | BODY MASS INDEX: 25.55 KG/M2 | TEMPERATURE: 99 F | DIASTOLIC BLOOD PRESSURE: 58 MMHG | RESPIRATION RATE: 16 BRPM | SYSTOLIC BLOOD PRESSURE: 120 MMHG | HEIGHT: 59 IN | WEIGHT: 126.75 LBS | HEART RATE: 92 BPM

## 2020-07-20 DIAGNOSIS — D17.1 LIPOMA OF BACK: ICD-10-CM

## 2020-07-20 DIAGNOSIS — C83.35 DIFFUSE LARGE B-CELL LYMPHOMA OF LYMPH NODES OF LOWER EXTREMITY: Primary | ICD-10-CM

## 2020-07-20 DIAGNOSIS — E78.2 MIXED HYPERLIPIDEMIA: ICD-10-CM

## 2020-07-20 PROCEDURE — 99499 UNLISTED E&M SERVICE: CPT | Mod: S$GLB,,, | Performed by: NURSE PRACTITIONER

## 2020-07-20 PROCEDURE — 99999 PR PBB SHADOW E&M-EST. PATIENT-LVL V: ICD-10-PCS | Mod: PBBFAC,HCNC,, | Performed by: NURSE PRACTITIONER

## 2020-07-20 PROCEDURE — 99214 PR OFFICE/OUTPT VISIT, EST, LEVL IV, 30-39 MIN: ICD-10-PCS | Mod: HCNC,S$GLB,, | Performed by: NURSE PRACTITIONER

## 2020-07-20 PROCEDURE — 99214 OFFICE O/P EST MOD 30 MIN: CPT | Mod: HCNC,S$GLB,, | Performed by: NURSE PRACTITIONER

## 2020-07-20 PROCEDURE — 99499 RISK ADDL DX/OHS AUDIT: ICD-10-PCS | Mod: S$GLB,,, | Performed by: NURSE PRACTITIONER

## 2020-07-20 PROCEDURE — 99999 PR PBB SHADOW E&M-EST. PATIENT-LVL V: CPT | Mod: PBBFAC,HCNC,, | Performed by: NURSE PRACTITIONER

## 2020-07-20 RX ORDER — TRAMADOL HYDROCHLORIDE 50 MG/1
TABLET ORAL
Qty: 60 TABLET | Refills: 0 | Status: SHIPPED | OUTPATIENT
Start: 2020-07-20 | End: 2020-09-14

## 2020-07-20 NOTE — PROGRESS NOTES
Subjective:       Patient ID: Peri Johansen is a 86 y.o. female.    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   She underwent surgery with joanna placement, followed by CVP chemotherapy concurrent with radiation for 3 cycles. Then she received adjuvant weekly Rituxan x 4. All treatment was completed by December 2007.    Up to date on mammogram from 11/2018.     Reports feeling pretty good. Weight stable.  No fevers, chills, or infections. Still with occasional night sweats.  Bowel movements normal, urinating well. Denies fevers, chills, coughing and shortness of breath.   No acute issues. Occasional left leg pain drom previous joanna placement.       Follow-up  Associated symptoms include arthralgias (chronic leg). Pertinent negatives include no abdominal pain, chest pain, chills, congestion, coughing, diaphoresis, fatigue, fever, headaches, joint swelling, myalgias, nausea, neck pain, rash, vomiting or weakness.     Review of Systems   Constitutional: Negative for activity change, appetite change, chills, diaphoresis, fatigue, fever and unexpected weight change.        Rare night sweats reported   HENT: Negative for congestion, dental problem, hearing loss, mouth sores, postnasal drip, rhinorrhea, sneezing and trouble swallowing.    Eyes: Negative for visual disturbance.   Respiratory: Negative for cough, chest tightness and shortness of breath.    Cardiovascular: Negative for chest pain, palpitations and leg swelling.   Gastrointestinal: Negative for abdominal distention, abdominal pain, blood in stool, constipation, diarrhea, nausea and vomiting.        Abdominal discomfort, see HPI  +constipation     Genitourinary: Negative for difficulty urinating, dysuria and hematuria.   Musculoskeletal: Positive for arthralgias (chronic leg). Negative for back pain, gait problem, joint swelling,  myalgias, neck pain and neck stiffness.   Skin: Negative for color change, pallor, rash and wound.        Patient feels back lipoma has increased in size, does not desire surgical management   Neurological: Negative for dizziness, weakness, light-headedness and headaches.   Hematological: Negative for adenopathy. Does not bruise/bleed easily.   Psychiatric/Behavioral: Negative for dysphoric mood. The patient is not nervous/anxious.        Objective:      Physical Exam  Vitals signs and nursing note reviewed.   Constitutional:       General: She is not in acute distress.     Appearance: She is well-developed.      Comments:      HENT:      Head: Normocephalic and atraumatic.      Nose: Nose normal.      Mouth/Throat:      Pharynx: No oropharyngeal exudate.   Eyes:      General: No scleral icterus.     Pupils: Pupils are equal, round, and reactive to light.   Neck:      Musculoskeletal: Normal range of motion and neck supple.      Thyroid: No thyromegaly.   Cardiovascular:      Rate and Rhythm: Normal rate and regular rhythm.      Heart sounds: Normal heart sounds. No murmur. No friction rub. No gallop.    Pulmonary:      Effort: Pulmonary effort is normal. No respiratory distress.      Breath sounds: Normal breath sounds. No wheezing or rales.   Chest:      Chest wall: No tenderness.   Abdominal:      General: Bowel sounds are normal. There is no distension.      Palpations: Abdomen is soft. There is no mass.      Tenderness: There is no abdominal tenderness. There is no guarding or rebound.      Comments: No hepatosplenomegaly  No reproducible tenderness at abdomen   Musculoskeletal: Normal range of motion.         General: No tenderness.      Comments: No spinal or paraspinal tenderness to palpation       Lymphadenopathy:      Head:      Right side of head: No submental, submandibular, preauricular, posterior auricular or occipital adenopathy.      Left side of head: No submental, submandibular, preauricular,  posterior auricular or occipital adenopathy.      Cervical: No cervical adenopathy.      Upper Body:      Right upper body: No supraclavicular or axillary adenopathy.      Left upper body: No supraclavicular or axillary adenopathy.   Skin:     General: Skin is warm and dry.      Coloration: Skin is not pale.      Findings: No erythema or rash.      Comments: Lipoma back   Neurological:      Mental Status: She is alert and oriented to person, place, and time.      Cranial Nerves: No cranial nerve deficit.      Motor: No abnormal muscle tone.      Coordination: Coordination normal.   Psychiatric:         Behavior: Behavior normal.         Thought Content: Thought content normal.         Judgment: Judgment normal.         Assessment:       1. 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 Mixed hyperlipidemia    3. Lipoma of back        Plan:       1)clinically doing well without evidence of disease. Return to clinic in one year with cbc/cmp/ldh. Knows to call in interim if any issues.  2. Continue current medication and follow up with PCP  3. Stable        Distress Screening Results: Psychosocial Distress screening score of Distress Score: 0 noted and reviewed. No intervention indicated.     Return to clinic in 1 year with MARILIA appointment and labs.     Patient is in agreement with the proposed treatment plan. All questions were answered to the patient's satisfaction. Patient knows to call clinic for any new or worsening symptoms and if anything is needed before the next clinic visit.          ADDIE Alfaro-PRESLEY  Hematology & Medical Oncology   [facility] [place] [address]  ph. [phone]  Fax. [phone]    Patient dicussed with collaborating physician, Dr. Breen.

## 2020-07-20 NOTE — Clinical Note
Understanding Achilles Tendonitis    Achilles tendonitis is an overuse injury. It results in inflammation of the Achilles tendon. This tendon is found on the back of the ankle. It links the calf muscle to the heel bone. It helps you do pushing-off movements like running or standing on your toes.     How to say it  [id number]-[id number]-[id number]-I-[id number]   What causes Achilles tendonitis?  Achilles tendonitis can happen if you do an activity like running, walking, or jumping too much. This overuse can strain, or pull, the tendon. It may lead to minor tearing of the tendon. An injury to the lower leg or foot can also cause it.  If you dont warm up before taking part in sports such as basketball, you are more likely to suffer from this condition. You are also more prone to it if you do too much of such an activity too quickly. Proper training and rest can help prevent it.  Symptoms of Achilles tendonitis  The main symptom of Achilles tendonitis is pain. This pain mostly happens when you move the ankle. The tendon may also feel stiff after a period of no activity, such as sleeping. It may also become swollen. You may hear a crackling sound when you move your ankle.  Treatment for Achilles tendonitis  Symptoms often get better after starting treatment. A full recovery may take several months. Treatments include:  · Rest. You should stop or change the activity that caused the injury. The tendon will then have time to heal.  · Cold or heat pack. These help reduce pain and swelling.  · Prescription or over-the-counter pain medicines. These help reduce pain and swelling.  · Shoe inserts. These devices can reduce strain on the Achilles tendon when you move. You may then feel less pain.  · Stretching and strengthening exercises. Certain exercises can help you regain flexibility and strength in your Achilles tendon.  · Surgery. This option can fix the injured tendon. But you dont often need it unless other treatments dont work.     When  Follow up in 1 year with CBC, CMP, LDH and MARILIA visit to call your healthcare provider   Call your healthcare provider right away if you have any of these:  · Fever of 100.4°F (38°C) or higher, or as directed  · Pain that gets worse  · Symptoms that dont get better, or get worse  · New symptoms    Date Last Reviewed: 3/10/2016  © 7364-0829 [employer]. [address] [place] [address], [place] [address]. All rights reserved. This information is not intended as a substitute for professional medical care. Always follow your healthcare professional's instructions.

## 2020-07-31 ENCOUNTER — EXTERNAL CHRONIC CARE MANAGEMENT (OUTPATIENT)
Dept: PRIMARY CARE CLINIC | Facility: CLINIC | Age: 85
End: 2020-07-31
Payer: MEDICARE

## 2020-07-31 PROCEDURE — 99490 CHRNC CARE MGMT STAFF 1ST 20: CPT | Mod: S$GLB,,, | Performed by: INTERNAL MEDICINE

## 2020-07-31 PROCEDURE — 99490 PR CHRONIC CARE MGMT, 1ST 20 MIN: ICD-10-PCS | Mod: S$GLB,,, | Performed by: INTERNAL MEDICINE

## 2020-08-04 RX ORDER — CALCIUM CITRATE/VITAMIN D3 200MG-6.25
TABLET ORAL
Qty: 150 STRIP | Refills: 12 | Status: SHIPPED | OUTPATIENT
Start: 2020-08-04

## 2020-08-05 NOTE — TELEPHONE ENCOUNTER
----- Message from Juliet Jaquez sent at 8/5/2020  3:47 PM CDT -----  Regarding: Appointment and medication refill  Contact: Ms Segura [facility] Coodinator [phone]  Type Appointment and Medication refill Request    Name of Caller:Ms Segura/ Markel Bapchule [phone].  Patient need refill medication on Testing strips for sugar   Ms Segura states patient out of testing strips patient having trouble getting testing strip.Ms Segura states  patient blood sugar has been high and experiencing trouble controlling sugar  Please need testing strips called into [facility] pharmacy    When is the first available appointment?  Symptoms:  Best Call Back Number:[phone]  Additional Information:

## 2020-08-05 NOTE — TELEPHONE ENCOUNTER
----- Message from Oliver Kee sent at 8/5/2020 10:43 AM CDT -----  Contact: Pt  Pt called and needs a prescription for One Touch Strips    Pt has been out for a couple of days    Pt can be reached at [phone]

## 2020-08-10 ENCOUNTER — PATIENT MESSAGE (OUTPATIENT)
Dept: INTERNAL MEDICINE | Facility: CLINIC | Age: 85
End: 2020-08-10

## 2020-08-10 RX ORDER — INSULIN PUMP SYRINGE, 3 ML
EACH MISCELLANEOUS
Qty: 1 EACH | Refills: 0 | Status: SHIPPED | OUTPATIENT
Start: 2020-08-10 | End: 2021-08-10

## 2020-08-10 RX ORDER — LANCETS
EACH MISCELLANEOUS
Qty: 200 EACH | Refills: 12 | Status: SHIPPED | OUTPATIENT
Start: 2020-08-10 | End: 2022-01-01 | Stop reason: SDUPTHER

## 2020-08-10 NOTE — TELEPHONE ENCOUNTER
Please send rx for AccuChek meter and testing supplies--I called pharmacy and this is what's preferred with insurance.

## 2020-08-21 ENCOUNTER — TELEPHONE (OUTPATIENT)
Dept: INTERNAL MEDICINE | Facility: CLINIC | Age: 85
End: 2020-08-21

## 2020-08-21 ENCOUNTER — PES CALL (OUTPATIENT)
Dept: ADMINISTRATIVE | Facility: CLINIC | Age: 85
End: 2020-08-21

## 2020-08-21 NOTE — TELEPHONE ENCOUNTER
Spoke to pt---I advised that a new meter was sent on 8/10/20 to her pharmacy. Pt was unaware. I advised her that her insurance no longer cover One Touch.

## 2020-08-21 NOTE — TELEPHONE ENCOUNTER
----- Message from Donal Whitmore sent at 8/21/2020 11:20 AM CDT -----  Regarding: One Touch Test Strips  Good Morning, Pt requested a call about her one touch test strips. Pt stated she is out of the test strips. Please call pt back at [phone]. Thanks.

## 2020-08-31 ENCOUNTER — EXTERNAL CHRONIC CARE MANAGEMENT (OUTPATIENT)
Dept: PRIMARY CARE CLINIC | Facility: CLINIC | Age: 85
End: 2020-08-31
Payer: MEDICARE

## 2020-08-31 PROCEDURE — G2058 CCM ADD 20MIN: HCPCS | Mod: S$GLB,,, | Performed by: INTERNAL MEDICINE

## 2020-08-31 PROCEDURE — 99490 PR CHRONIC CARE MGMT, 1ST 20 MIN: ICD-10-PCS | Mod: S$GLB,,, | Performed by: INTERNAL MEDICINE

## 2020-08-31 PROCEDURE — 99490 CHRNC CARE MGMT STAFF 1ST 20: CPT | Mod: S$GLB,,, | Performed by: INTERNAL MEDICINE

## 2020-08-31 PROCEDURE — G2058 PR CHRON CARE MGMT, EA ADDTL 20 MINS: ICD-10-PCS | Mod: S$GLB,,, | Performed by: INTERNAL MEDICINE

## 2020-09-14 ENCOUNTER — TELEPHONE (OUTPATIENT)
Dept: HEMATOLOGY/ONCOLOGY | Facility: CLINIC | Age: 85
End: 2020-09-14

## 2020-09-14 DIAGNOSIS — C83.35 DIFFUSE LARGE B-CELL LYMPHOMA OF LYMPH NODES OF LOWER EXTREMITY: ICD-10-CM

## 2020-09-14 RX ORDER — TRAMADOL HYDROCHLORIDE 50 MG/1
50 TABLET ORAL EVERY 8 HOURS PRN
Qty: 90 TABLET | Refills: 0 | Status: SHIPPED | OUTPATIENT
Start: 2020-09-14 | End: 2020-12-06

## 2020-09-14 NOTE — TELEPHONE ENCOUNTER
Message fwd to NP.     ----- Message from Mary Jane Suh sent at 9/14/2020  8:51 AM CDT -----  Contact: [facility]  Patient Advice/Staff Message     Caller name/title: Stephen    Provider: Donaldo    Reason for call: Pharmacy calling to get a verbal for the more than 7 day medically necessary for the Rx traMADoL (ULTRAM) 50 mg tablet    Do you feel you need to be seen today:: N/a        Communication Preference: [phone]    Additional Information:

## 2020-09-14 NOTE — TELEPHONE ENCOUNTER
Returned call to pharmacy and provided verbal for >7 day quantity.   Pharmacy verbalized understanding.    ----- Message from Jane Fulton NP sent at 9/14/2020  9:15 AM CDT -----  Contact: [facility]  Will do this time. She will need to follow up with PCP for future prescriptions.  ----- Message -----  From: Kailee Benitez  Sent: 9/14/2020   8:58 AM CDT  To: Jane Fulton NP    Hasn't been seen since July and looked like stable.   Not sure if can give verbal for >7 day medically necessary?  ----- Message -----  From: Mary Jane Suh  Sent: 9/14/2020   8:51 AM CDT  To: Donaldo Lara Staff    Patient Advice/Staff Message     Caller name/title: Stephen    Provider: Donaldo    Reason for call: Pharmacy calling to get a verbal for the more than 7 day medically necessary for the Rx traMADoL (ULTRAM) 50 mg tablet    Do you feel you need to be seen today:: N/a        Communication Preference: [phone]    Additional Information:

## 2020-09-14 NOTE — TELEPHONE ENCOUNTER
----- Message from Kailee Benitez sent at 9/14/2020  8:58 AM CDT -----  Contact: [facility]  Hasn't been seen since July and looked like stable.   Not sure if can give verbal for >7 day medically necessary?  ----- Message -----  From: Mary Jane Suh  Sent: 9/14/2020   8:51 AM CDT  To: Donaldo Lara Staff    Patient Advice/Staff Message     Caller name/title: Stephen    Provider: Donaldo    Reason for call: Pharmacy calling to get a verbal for the more than 7 day medically necessary for the Rx traMADoL (ULTRAM) 50 mg tablet    Do you feel you need to be seen today:: N/a        Communication Preference: [phone]    Additional Information:

## 2020-09-21 RX ORDER — ATORVASTATIN CALCIUM 40 MG/1
TABLET, FILM COATED ORAL
Qty: 90 TABLET | Refills: 0 | Status: SHIPPED | OUTPATIENT
Start: 2020-09-21 | End: 2020-11-02 | Stop reason: SDUPTHER

## 2020-09-29 ENCOUNTER — PATIENT MESSAGE (OUTPATIENT)
Dept: OTHER | Facility: OTHER | Age: 85
End: 2020-09-29

## 2020-09-30 ENCOUNTER — EXTERNAL CHRONIC CARE MANAGEMENT (OUTPATIENT)
Dept: PRIMARY CARE CLINIC | Facility: CLINIC | Age: 85
End: 2020-09-30
Payer: MEDICARE

## 2020-09-30 PROCEDURE — 99490 CHRNC CARE MGMT STAFF 1ST 20: CPT | Mod: S$GLB,,, | Performed by: INTERNAL MEDICINE

## 2020-09-30 PROCEDURE — 99490 PR CHRONIC CARE MGMT, 1ST 20 MIN: ICD-10-PCS | Mod: S$GLB,,, | Performed by: INTERNAL MEDICINE

## 2020-10-31 ENCOUNTER — EXTERNAL CHRONIC CARE MANAGEMENT (OUTPATIENT)
Dept: PRIMARY CARE CLINIC | Facility: CLINIC | Age: 85
End: 2020-10-31
Payer: MEDICARE

## 2020-10-31 PROCEDURE — 99490 PR CHRONIC CARE MGMT, 1ST 20 MIN: ICD-10-PCS | Mod: S$GLB,,, | Performed by: INTERNAL MEDICINE

## 2020-10-31 PROCEDURE — 99490 CHRNC CARE MGMT STAFF 1ST 20: CPT | Mod: S$GLB,,, | Performed by: INTERNAL MEDICINE

## 2020-11-02 DIAGNOSIS — M51.36 LUMBAR DEGENERATIVE DISC DISEASE: ICD-10-CM

## 2020-11-02 DIAGNOSIS — I70.0 AORTIC ATHEROSCLEROSIS: ICD-10-CM

## 2020-11-03 RX ORDER — ATORVASTATIN CALCIUM 40 MG/1
TABLET, FILM COATED ORAL
Qty: 90 TABLET | Refills: 0 | Status: SHIPPED | OUTPATIENT
Start: 2020-11-03 | End: 2021-09-04 | Stop reason: SDUPTHER

## 2020-11-03 RX ORDER — GABAPENTIN 300 MG/1
300 CAPSULE ORAL 2 TIMES DAILY
Qty: 180 CAPSULE | Refills: 1 | Status: SHIPPED | OUTPATIENT
Start: 2020-11-03 | End: 2021-11-23 | Stop reason: SDUPTHER

## 2020-11-03 RX ORDER — NAPROXEN SODIUM 220 MG/1
81 TABLET, FILM COATED ORAL DAILY
Start: 2020-11-03

## 2020-11-03 RX ORDER — DICLOFENAC SODIUM 10 MG/G
GEL TOPICAL
Qty: 100 G | Refills: 1 | Status: SHIPPED | OUTPATIENT
Start: 2020-11-03

## 2020-11-30 ENCOUNTER — EXTERNAL CHRONIC CARE MANAGEMENT (OUTPATIENT)
Dept: PRIMARY CARE CLINIC | Facility: CLINIC | Age: 85
End: 2020-11-30
Payer: MEDICARE

## 2020-11-30 PROCEDURE — 99490 PR CHRONIC CARE MGMT, 1ST 20 MIN: ICD-10-PCS | Mod: S$GLB,,, | Performed by: INTERNAL MEDICINE

## 2020-11-30 PROCEDURE — 99490 CHRNC CARE MGMT STAFF 1ST 20: CPT | Mod: S$GLB,,, | Performed by: INTERNAL MEDICINE

## 2020-12-07 ENCOUNTER — TELEPHONE (OUTPATIENT)
Dept: HEMATOLOGY/ONCOLOGY | Facility: CLINIC | Age: 85
End: 2020-12-07

## 2020-12-07 DIAGNOSIS — C83.35 DIFFUSE LARGE B-CELL LYMPHOMA OF LYMPH NODES OF LOWER EXTREMITY: ICD-10-CM

## 2020-12-07 RX ORDER — TRAMADOL HYDROCHLORIDE 50 MG/1
50 TABLET ORAL EVERY 8 HOURS PRN
Qty: 90 TABLET | Refills: 1 | Status: SHIPPED | OUTPATIENT
Start: 2020-12-07 | End: 2020-12-07 | Stop reason: SDUPTHER

## 2020-12-07 RX ORDER — TRAMADOL HYDROCHLORIDE 50 MG/1
50 TABLET ORAL EVERY 8 HOURS PRN
Qty: 90 TABLET | Refills: 1 | Status: SHIPPED | OUTPATIENT
Start: 2020-12-07 | End: 2021-04-28 | Stop reason: SDUPTHER

## 2020-12-07 NOTE — TELEPHONE ENCOUNTER
----- Message from Kailee Benitez sent at 12/7/2020  8:08 AM CST -----  Can we resend please?  ----- Message -----  From: Santostanagabriella Chong  Sent: 12/7/2020   8:03 AM CST  To: Donaldo Lara Staff    Reason for Call:  Pharmacy calling to clarify a prescription    Name of caller: [facility]     Pharmacy name and phone number   [id number] [address] AT [facility] [phone] (Phone) [phone] (Fax)    What do they need to clarify:  Pharmacy needs script for traMADoL (ULTRAM) 50 mg tablet to state greater than 7 days medically necessary or they will only be able to dispense 7 day prescription.  Please resend script or call to provide verbal authorization       Request for caller to be placed on hold . IM nurse for provider .... Attempt to soft transfer call  If nurse unavailable - send message to provider box urgent

## 2020-12-07 NOTE — TELEPHONE ENCOUNTER
Message fwd to NP.    ----- Message from Osvaldo Schwarz sent at 12/7/2020  8:03 AM CST -----  Reason for Call:  Pharmacy calling to clarify a prescription    Name of caller: [facility]     Pharmacy name and phone number   [id number] - [facility] [address] AT [facility] [employer] [phone] (Phone) [phone] (Fax)    What do they need to clarify:  Pharmacy needs script for traMADoL (ULTRAM) 50 mg tablet to state greater than 7 days medically necessary or they will only be able to dispense 7 day prescription.  Please resend script or call to provide verbal authorization       Request for caller to be placed on hold . IM nurse for provider .... Attempt to soft transfer call  If nurse unavailable - send message to provider box urgent

## 2020-12-22 ENCOUNTER — PATIENT OUTREACH (OUTPATIENT)
Dept: ADMINISTRATIVE | Facility: OTHER | Age: 85
End: 2020-12-22

## 2020-12-22 ENCOUNTER — PES CALL (OUTPATIENT)
Dept: ADMINISTRATIVE | Facility: CLINIC | Age: 85
End: 2020-12-22

## 2020-12-29 ENCOUNTER — TELEPHONE (OUTPATIENT)
Dept: INTERNAL MEDICINE | Facility: CLINIC | Age: 85
End: 2020-12-29

## 2020-12-29 ENCOUNTER — IMMUNIZATION (OUTPATIENT)
Dept: INTERNAL MEDICINE | Facility: CLINIC | Age: 85
End: 2020-12-29
Payer: MEDICARE

## 2020-12-29 ENCOUNTER — OFFICE VISIT (OUTPATIENT)
Dept: INTERNAL MEDICINE | Facility: CLINIC | Age: 85
End: 2020-12-29
Payer: MEDICARE

## 2020-12-29 ENCOUNTER — LAB VISIT (OUTPATIENT)
Dept: LAB | Facility: HOSPITAL | Age: 85
End: 2020-12-29
Attending: INTERNAL MEDICINE
Payer: MEDICARE

## 2020-12-29 VITALS
DIASTOLIC BLOOD PRESSURE: 60 MMHG | OXYGEN SATURATION: 97 % | WEIGHT: 123.69 LBS | HEIGHT: 59 IN | BODY MASS INDEX: 24.93 KG/M2 | SYSTOLIC BLOOD PRESSURE: 110 MMHG | HEART RATE: 102 BPM

## 2020-12-29 DIAGNOSIS — E11.65 TYPE 2 DIABETES MELLITUS WITH HYPERGLYCEMIA, WITHOUT LONG-TERM CURRENT USE OF INSULIN: ICD-10-CM

## 2020-12-29 DIAGNOSIS — I70.0 AORTIC ATHEROSCLEROSIS: ICD-10-CM

## 2020-12-29 DIAGNOSIS — E78.2 MIXED HYPERLIPIDEMIA: ICD-10-CM

## 2020-12-29 DIAGNOSIS — E04.1 THYROID NODULE: ICD-10-CM

## 2020-12-29 DIAGNOSIS — M53.3 SACROILIAC JOINT DYSFUNCTION OF BOTH SIDES: ICD-10-CM

## 2020-12-29 DIAGNOSIS — I50.9 CHRONIC CONGESTIVE HEART FAILURE, UNSPECIFIED HEART FAILURE TYPE: ICD-10-CM

## 2020-12-29 DIAGNOSIS — K31.89 GASTRIC NODULE: ICD-10-CM

## 2020-12-29 DIAGNOSIS — E55.9 VITAMIN D DEFICIENCY DISEASE: ICD-10-CM

## 2020-12-29 DIAGNOSIS — C83.35 DIFFUSE LARGE B-CELL LYMPHOMA OF LYMPH NODES OF LOWER EXTREMITY: ICD-10-CM

## 2020-12-29 DIAGNOSIS — Z00.00 ANNUAL PHYSICAL EXAM: Primary | ICD-10-CM

## 2020-12-29 DIAGNOSIS — M81.0 OSTEOPOROSIS WITHOUT CURRENT PATHOLOGICAL FRACTURE, UNSPECIFIED OSTEOPOROSIS TYPE: ICD-10-CM

## 2020-12-29 LAB
25(OH)D3+25(OH)D2 SERPL-MCNC: 28 NG/ML (ref 30–96)
ALBUMIN/CREAT UR: 7 UG/MG (ref 0–30)
CHOLEST SERPL-MCNC: 167 MG/DL (ref 120–199)
CHOLEST/HDLC SERPL: 3.2 {RATIO} (ref 2–5)
CREAT UR-MCNC: 100 MG/DL (ref 15–325)
ESTIMATED AVG GLUCOSE: 169 MG/DL (ref 68–131)
HBA1C MFR BLD HPLC: 7.5 % (ref 4–5.6)
HDLC SERPL-MCNC: 53 MG/DL (ref 40–75)
HDLC SERPL: 31.7 % (ref 20–50)
LDLC SERPL CALC-MCNC: 97.6 MG/DL (ref 63–159)
MICROALBUMIN UR DL<=1MG/L-MCNC: 7 UG/ML
NONHDLC SERPL-MCNC: 114 MG/DL
T4 FREE SERPL-MCNC: 1.09 NG/DL (ref 0.71–1.51)
TRIGL SERPL-MCNC: 82 MG/DL (ref 30–150)
TSH SERPL DL<=0.005 MIU/L-ACNC: 0.24 UIU/ML (ref 0.4–4)

## 2020-12-29 PROCEDURE — 84443 ASSAY THYROID STIM HORMONE: CPT | Mod: HCNC

## 2020-12-29 PROCEDURE — 99499 UNLISTED E&M SERVICE: CPT | Mod: S$GLB,,, | Performed by: INTERNAL MEDICINE

## 2020-12-29 PROCEDURE — 36415 COLL VENOUS BLD VENIPUNCTURE: CPT | Mod: HCNC

## 2020-12-29 PROCEDURE — 99397 PER PM REEVAL EST PAT 65+ YR: CPT | Mod: 25,HCNC,S$GLB, | Performed by: INTERNAL MEDICINE

## 2020-12-29 PROCEDURE — 3288F PR FALLS RISK ASSESSMENT DOCUMENTED: ICD-10-PCS | Mod: HCNC,CPTII,S$GLB, | Performed by: INTERNAL MEDICINE

## 2020-12-29 PROCEDURE — 1125F PR PAIN SEVERITY QUANTIFIED, PAIN PRESENT: ICD-10-PCS | Mod: HCNC,S$GLB,, | Performed by: INTERNAL MEDICINE

## 2020-12-29 PROCEDURE — 99499 RISK ADDL DX/OHS AUDIT: ICD-10-PCS | Mod: S$GLB,,, | Performed by: INTERNAL MEDICINE

## 2020-12-29 PROCEDURE — G0008 ADMIN INFLUENZA VIRUS VAC: HCPCS | Mod: HCNC,S$GLB,, | Performed by: INTERNAL MEDICINE

## 2020-12-29 PROCEDURE — 99397 PR PREVENTIVE VISIT,EST,65 & OVER: ICD-10-PCS | Mod: 25,HCNC,S$GLB, | Performed by: INTERNAL MEDICINE

## 2020-12-29 PROCEDURE — 80061 LIPID PANEL: CPT | Mod: HCNC

## 2020-12-29 PROCEDURE — 1101F PR PT FALLS ASSESS DOC 0-1 FALLS W/OUT INJ PAST YR: ICD-10-PCS | Mod: HCNC,CPTII,S$GLB, | Performed by: INTERNAL MEDICINE

## 2020-12-29 PROCEDURE — 90694 FLU VACCINE - QUADRIVALENT - ADJUVANTED: ICD-10-PCS | Mod: HCNC,S$GLB,, | Performed by: INTERNAL MEDICINE

## 2020-12-29 PROCEDURE — 3288F FALL RISK ASSESSMENT DOCD: CPT | Mod: HCNC,CPTII,S$GLB, | Performed by: INTERNAL MEDICINE

## 2020-12-29 PROCEDURE — 90694 VACC AIIV4 NO PRSRV 0.5ML IM: CPT | Mod: HCNC,S$GLB,, | Performed by: INTERNAL MEDICINE

## 2020-12-29 PROCEDURE — 84439 ASSAY OF FREE THYROXINE: CPT | Mod: HCNC

## 2020-12-29 PROCEDURE — 1125F AMNT PAIN NOTED PAIN PRSNT: CPT | Mod: HCNC,S$GLB,, | Performed by: INTERNAL MEDICINE

## 2020-12-29 PROCEDURE — 99999 PR PBB SHADOW E&M-EST. PATIENT-LVL V: ICD-10-PCS | Mod: PBBFAC,HCNC,, | Performed by: INTERNAL MEDICINE

## 2020-12-29 PROCEDURE — G0008 FLU VACCINE - QUADRIVALENT - ADJUVANTED: ICD-10-PCS | Mod: HCNC,S$GLB,, | Performed by: INTERNAL MEDICINE

## 2020-12-29 PROCEDURE — 99999 PR PBB SHADOW E&M-EST. PATIENT-LVL V: CPT | Mod: PBBFAC,HCNC,, | Performed by: INTERNAL MEDICINE

## 2020-12-29 PROCEDURE — 1101F PT FALLS ASSESS-DOCD LE1/YR: CPT | Mod: HCNC,CPTII,S$GLB, | Performed by: INTERNAL MEDICINE

## 2020-12-29 PROCEDURE — 82043 UR ALBUMIN QUANTITATIVE: CPT | Mod: HCNC

## 2020-12-29 PROCEDURE — 82306 VITAMIN D 25 HYDROXY: CPT | Mod: HCNC

## 2020-12-29 PROCEDURE — 83036 HEMOGLOBIN GLYCOSYLATED A1C: CPT | Mod: HCNC

## 2020-12-29 NOTE — TELEPHONE ENCOUNTER
----- Message from Lucy Dixon sent at 12/29/2020 10:25 AM CST -----  Regarding: Scheduling  Patient stated that she has to find out when her daughter can bring he, provided # for central scheduling for them to call and schedule her Dexa, US, and Optometry appts.

## 2020-12-29 NOTE — PATIENT INSTRUCTIONS
Prevention Guidelines, Women Ages 65 and Older  Screening tests and vaccines are an important part of managing your health. Health counseling is essential, too. Below are guidelines for these, for women ages 65 and older. Talk with your healthcare provider to make sure youre up to date on what you need.  Screening Who needs it How often   Type 2 diabetes or prediabetes All adults beginning at age 45 and adults without symptoms at any age who are overweight or obese and have 1 or more additional risk factors for diabetes At least every 3 years   Alcohol misuse All women in this age group At routine exams   Blood pressure All women in this age group Every 2 years if your blood pressure is less than 120/80 mm Hg; yearly if your systolic blood pressure is 120 to 139 mm Hg, or your diastolic blood pressure reading is 80 to 89 mm Hg   Breast cancer All women in this age group Yearly mammogram and clinical breast exam1   Cervical cancer Only women who had abnormal screening results before age 65 Talk with your healthcare provider   Chlamydia Women at increased risk for infection At routine exams   Colorectal cancer All women in this age group1 Flexible sigmoidoscopy every 5 years, or colonoscopy every 10 years, or double-contrast barium enema every 5 years; yearly fecal occult blood test or fecal immunochemical test; or a stool DNA test as often as your healthcare provider advises; talk with your healthcare provider about which tests are best for you   Depression All women in this age group At routine exams   Gonorrhea Sexually active women at increased risk for infection At routine exams   Hepatitis C Anyone at increased risk; 1 time for those born between 1945 and 1965 At routine exams   High cholesterol or triglycerides All women in this age group who are at risk for coronary artery disease At least every 5 years   HIV Women at increased risk for infection - talk with your healthcare provider At routine exams   Lung  cancer Adults age 55 to 80 who have smoked Yearly screening in smokers with 30 pack-year history of smoking or who quit within 15 years   Obesity All women in this age group At routine exams   Osteoporosis All women in this age group Bone density test at age 65, then follow-up as advised by your healthcare provider   Syphilis Women at increased risk for infection - talk with your healthcare provider At routine exams   Thyroid-Stimulating Hormone (TSH) All women in this age group Every 5 years   Tuberculosis Women at increased risk for infection - talk with your healthcare provider Ask your healthcare provider   Vision All women in this age group Every 1 to 2 years; if you have a chronic health condition, ask your healthcare provider if you need exams more often   Vaccine Who needs it How often   Chickenpox (varicella) All women in this age group who have no record of this infection or vaccine 2 doses; second dose should be given at least 4 weeks after the first dose   Hepatitis A Women at increased risk for infection - talk with your healthcare provider 2 doses given 6 months apart   Hepatitis B Women at increased risk for infection - talk with your healthcare provider 3 doses over 6 months; second dose should be given 1 month after the first dose; the third dose should be given at least 2 months after the second dose and at least 4 months after the first dose   Haemophilus influenza Type B (HIB) Women at increased risk for infection - talk with your healthcare provider 1 to 3 doses   Influenza (flu) All women in this age group Once a year   Pneumococcal conjugate vaccine (PCV13) and pneumococcal polysaccharide vaccine (PPSV23) All women in this age group 1 dose of each vaccine   Tetanus/diphtheria/pertussis (Td/Tdap) booster All women in this age group Td every 10 years, or a one-time dose of Tdap instead of a Td booster after age 18, then Td every 10 years   Zoster All women in this age group 1 dose   Counseling  Who needs it How often   Diet and exercise Women who are overweight or obese When diagnosed, and then at routine exams   Fall prevention (exercise and vitamin D supplements) All women in this age group At routine exams   Sexually transmitted infection prevention Women at increased risk for infection - talk with your healthcare provider At routine exams   Use of daily aspirin Women ages 55 and up in this age group who are at risk for cardiovascular health problems such as stroke When your risk is known   Use of tobacco and the health effects it can cause All women in this age group Every exam   1[employer]  Date Last Reviewed: 8/9/2015  © 3678-1817 [employer]. [address] [place] [address], [place] [address]. All rights reserved. This information is not intended as a substitute for professional medical care. Always follow your healthcare professional's instructions.

## 2020-12-29 NOTE — PROGRESS NOTES
"Subjective:       Patient ID: Peri Johansen is a 86 y.o. female.    Chief Complaint: Annual Exam    Annual exam    Due for labs and urine.    Due for eye exam.      Flu shot recommended; she will consider for today.     Cardiology 8/19: "Limit sodium intake to less then 2 gram sodium and 1500cc fluid restriction.  Graded exercise program as tolerated.  Call if  more than 3 lbs in 1 day or 5 lbs in 1 week."  Stable CHF, reviewed alarm symptoms, she is having none.     Oncology July 2020; annual follow-up recommended.     Endocrinology August 2019; periodic follow-up recommended.     Thyroid ultrasound 2019 per ENDO will repeat 1 year (declined FNA).  This will need to be scheduled.  On methimazole.     Bone density exam 10/18.  This also will be scheduled.     Pattern of tramadol use stable;  reviewed.  Ongoing back pain.  Seen in pain clinic April; MRI ordered but she has not scheduled.  She does not wish to schedule MRI or return to clinic.     She lives with her daughter and has support at home.  No falls.      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     Mixed hyperlipidemia     Renal cyst: R side see ultrasound 6/16; stable 2018 and 2019     Stromal cell tumor     Lipoma of back     Thyroid nodules: several see u/s 2017 FNA 2017 benign, stable 2019     Gastric nodule: 2013-m per GI no need for further follow up     Spondylosis without myelopathy     Gastroesophageal reflux disease without esophagitis     Colon polyp: hyperplastic 2015- repeat 2020     Primary insomnia     Aortic atherosclerosis: see CT scan 3/15     Diverticulosis of large intestine without hemorrhage: see CT scan 3/15     Thoracic and lumbosacral neuritis     Chronic bilateral low back pain with right-sided sciatica     Left carpal tunnel syndrome     Hearing loss of left ear     Cervical spine arthritis     Sacroiliac joint dysfunction of both sides     Chronic congestive heart " failure     Type 2 diabetes mellitus with hyperglycemia, without long-term current use of insulin      Review of Systems   Constitutional: Negative for activity change and unexpected weight change.   HENT: Negative for hearing loss, rhinorrhea and trouble swallowing.    Eyes: Negative for discharge and visual disturbance.   Respiratory: Negative for chest tightness and wheezing.    Cardiovascular: Negative for chest pain and palpitations.   Gastrointestinal: Negative for blood in stool, constipation, diarrhea and vomiting.   Endocrine: Negative for polydipsia and polyuria.   Genitourinary: Negative for difficulty urinating, dysuria, hematuria and menstrual problem.   Musculoskeletal: Negative for arthralgias, joint swelling and neck pain.   Neurological: Negative for weakness and headaches.   Psychiatric/Behavioral: Negative for confusion and dysphoric mood.       Objective:      Physical Exam  Vitals signs and nursing note reviewed.   Constitutional:       Appearance: She is well-developed.   HENT:      Head: Normocephalic and atraumatic.      Right Ear: External ear normal.      Left Ear: External ear normal.      Nose: Nose normal.      Mouth/Throat:      Pharynx: No oropharyngeal exudate.   Eyes:      General: No scleral icterus.     Extraocular Movements: Extraocular movements intact.      Conjunctiva/sclera: Conjunctivae normal.   Neck:      Musculoskeletal: Normal range of motion and neck supple.      Thyroid: Thyromegaly present.      Vascular: No JVD.   Cardiovascular:      Rate and Rhythm: Normal rate and regular rhythm.      Heart sounds: Normal heart sounds. No murmur. No gallop.    Pulmonary:      Effort: Pulmonary effort is normal. No respiratory distress.      Breath sounds: Normal breath sounds. No wheezing.   Abdominal:      General: Bowel sounds are normal. There is no distension.      Palpations: Abdomen is soft. There is no mass.      Tenderness: There is no abdominal tenderness. There is no  guarding or rebound.   Musculoskeletal: Normal range of motion.         General: No tenderness.   Lymphadenopathy:      Cervical: No cervical adenopathy.   Skin:     General: Skin is warm.      Findings: No erythema or rash.   Neurological:      General: No focal deficit present.      Mental Status: She is alert and oriented to person, place, and time.      Cranial Nerves: No cranial nerve deficit.      Coordination: Coordination normal.   Psychiatric:         Behavior: Behavior normal.         Thought Content: Thought content normal.         Judgment: Judgment normal.         Assessment:       1. Annual physical exam    2. Type 2 diabetes mellitus with hyperglycemia, without long-term current use of insulin    3. Osteoporosis without current pathological fracture, unspecified osteoporosis type    4. Thyroid nodules: several see u/s 2017 FNA 2017 benign, stable 2019    5. Gastric nodule: 2013-m per GI no need for further follow up    6. Chronic congestive heart failure, unspecified heart failure type    7. Mixed hyperlipidemia    8. Aortic atherosclerosis: see CT scan 3/15    9. Sacroiliac joint dysfunction of both sides    10. 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    Diagnoses and all orders for this visit:    Annual physical exam    Type 2 diabetes mellitus with hyperglycemia, without long-term current use of insulin  -     Microalbumin/Creatinine Ratio, Urine  -     Ambulatory referral/consult to Optometry; Future    Osteoporosis without current pathological fracture, unspecified osteoporosis type  -     DXA Bone Density Spine And Hip; Future    Thyroid nodules: several see u/s 2017 FNA 2017 benign, stable 2019  -      Soft Tissue Head Neck Thyroid; Future    Gastric nodule: 2013-m per GI no need for further follow up    Chronic congestive heart failure, unspecified heart failure type; stable without alarm symptoms    Mixed  hyperlipidemia; labs today    Aortic atherosclerosis: see CT scan 3/15; stable without alarm symptoms, labs today    Sacroiliac joint dysfunction of both sides; stable without alarm symptoms    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

## 2020-12-31 ENCOUNTER — TELEPHONE (OUTPATIENT)
Dept: INTERNAL MEDICINE | Facility: CLINIC | Age: 85
End: 2020-12-31

## 2020-12-31 ENCOUNTER — EXTERNAL CHRONIC CARE MANAGEMENT (OUTPATIENT)
Dept: PRIMARY CARE CLINIC | Facility: CLINIC | Age: 85
End: 2020-12-31
Payer: MEDICARE

## 2020-12-31 DIAGNOSIS — E05.90 HYPERTHYROIDISM: Primary | ICD-10-CM

## 2020-12-31 PROCEDURE — 99490 CHRNC CARE MGMT STAFF 1ST 20: CPT | Mod: S$GLB,,, | Performed by: INTERNAL MEDICINE

## 2020-12-31 PROCEDURE — 99490 PR CHRONIC CARE MGMT, 1ST 20 MIN: ICD-10-PCS | Mod: S$GLB,,, | Performed by: INTERNAL MEDICINE

## 2021-01-04 ENCOUNTER — PATIENT MESSAGE (OUTPATIENT)
Dept: INTERNAL MEDICINE | Facility: CLINIC | Age: 86
End: 2021-01-04

## 2021-01-04 ENCOUNTER — HOSPITAL ENCOUNTER (OUTPATIENT)
Dept: RADIOLOGY | Facility: OTHER | Age: 86
Discharge: HOME | End: 2021-01-04
Attending: INTERNAL MEDICINE
Payer: MEDICARE

## 2021-01-04 DIAGNOSIS — M81.0 OSTEOPOROSIS WITHOUT CURRENT PATHOLOGICAL FRACTURE, UNSPECIFIED OSTEOPOROSIS TYPE: ICD-10-CM

## 2021-01-04 DIAGNOSIS — E04.1 THYROID NODULE: ICD-10-CM

## 2021-01-04 PROCEDURE — 76536 US EXAM OF HEAD AND NECK: CPT | Mod: 26,HCNC,, | Performed by: RADIOLOGY

## 2021-01-04 PROCEDURE — 77080 DXA BONE DENSITY AXIAL: CPT | Mod: 26,HCNC,, | Performed by: RADIOLOGY

## 2021-01-04 PROCEDURE — 77080 DXA BONE DENSITY AXIAL: CPT | Mod: TC,HCNC

## 2021-01-04 PROCEDURE — 77080 DEXA BONE DENSITY SPINE HIP: ICD-10-PCS | Mod: 26,HCNC,, | Performed by: RADIOLOGY

## 2021-01-04 PROCEDURE — 76536 US EXAM OF HEAD AND NECK: CPT | Mod: TC,HCNC

## 2021-01-04 PROCEDURE — 76536 US SOFT TISSUE HEAD NECK THYROID: ICD-10-PCS | Mod: 26,HCNC,, | Performed by: RADIOLOGY

## 2021-01-07 RX ORDER — METHIMAZOLE 5 MG/1
TABLET ORAL
Qty: 45 TABLET | Refills: 0 | Status: SHIPPED | OUTPATIENT
Start: 2021-01-07 | End: 2021-09-04 | Stop reason: SDUPTHER

## 2021-01-13 ENCOUNTER — OFFICE VISIT (OUTPATIENT)
Dept: OPTOMETRY | Facility: CLINIC | Age: 86
End: 2021-01-13
Payer: MEDICARE

## 2021-01-13 ENCOUNTER — OFFICE VISIT (OUTPATIENT)
Dept: ENDOCRINOLOGY | Facility: CLINIC | Age: 86
End: 2021-01-13
Payer: MEDICARE

## 2021-01-13 ENCOUNTER — PATIENT MESSAGE (OUTPATIENT)
Dept: ENDOCRINOLOGY | Facility: CLINIC | Age: 86
End: 2021-01-13

## 2021-01-13 DIAGNOSIS — E78.2 MIXED HYPERLIPIDEMIA: ICD-10-CM

## 2021-01-13 DIAGNOSIS — H52.4 MYOPIA OF BOTH EYES WITH REGULAR ASTIGMATISM AND PRESBYOPIA: ICD-10-CM

## 2021-01-13 DIAGNOSIS — H52.223 MYOPIA OF BOTH EYES WITH REGULAR ASTIGMATISM AND PRESBYOPIA: ICD-10-CM

## 2021-01-13 DIAGNOSIS — Z13.5 GLAUCOMA SCREENING: ICD-10-CM

## 2021-01-13 DIAGNOSIS — E55.9 VITAMIN D DEFICIENCY: ICD-10-CM

## 2021-01-13 DIAGNOSIS — H52.13 MYOPIA OF BOTH EYES WITH REGULAR ASTIGMATISM AND PRESBYOPIA: ICD-10-CM

## 2021-01-13 DIAGNOSIS — M85.80 OSTEOPENIA, UNSPECIFIED LOCATION: ICD-10-CM

## 2021-01-13 DIAGNOSIS — H02.88A MEIBOMIAN GLAND DYSFUNCTION (MGD), BILATERAL, BOTH UPPER AND LOWER LIDS: ICD-10-CM

## 2021-01-13 DIAGNOSIS — E11.65 TYPE 2 DIABETES MELLITUS WITH HYPERGLYCEMIA, WITHOUT LONG-TERM CURRENT USE OF INSULIN: Primary | ICD-10-CM

## 2021-01-13 DIAGNOSIS — H02.88B MEIBOMIAN GLAND DYSFUNCTION (MGD), BILATERAL, BOTH UPPER AND LOWER LIDS: ICD-10-CM

## 2021-01-13 DIAGNOSIS — E05.90 HYPERTHYROIDISM: ICD-10-CM

## 2021-01-13 DIAGNOSIS — E04.1 THYROID NODULE: ICD-10-CM

## 2021-01-13 DIAGNOSIS — E11.9 DIABETES MELLITUS TYPE 2 WITHOUT RETINOPATHY: Primary | ICD-10-CM

## 2021-01-13 PROCEDURE — 1126F PR PAIN SEVERITY QUANTIFIED, NO PAIN PRESENT: ICD-10-PCS | Mod: HCNC,S$GLB,, | Performed by: OPTOMETRIST

## 2021-01-13 PROCEDURE — 1159F PR MEDICATION LIST DOCUMENTED IN MEDICAL RECORD: ICD-10-PCS | Mod: HCNC,95,, | Performed by: NURSE PRACTITIONER

## 2021-01-13 PROCEDURE — 1101F PT FALLS ASSESS-DOCD LE1/YR: CPT | Mod: HCNC,CPTII,S$GLB, | Performed by: OPTOMETRIST

## 2021-01-13 PROCEDURE — 3051F PR MOST RECENT HEMOGLOBIN A1C LEVEL 7.0 - < 8.0%: ICD-10-PCS | Mod: HCNC,CPTII,95, | Performed by: NURSE PRACTITIONER

## 2021-01-13 PROCEDURE — 99999 PR PBB SHADOW E&M-EST. PATIENT-LVL III: ICD-10-PCS | Mod: PBBFAC,HCNC,, | Performed by: OPTOMETRIST

## 2021-01-13 PROCEDURE — 92014 PR EYE EXAM, EST PATIENT,COMPREHESV: ICD-10-PCS | Mod: HCNC,S$GLB,, | Performed by: OPTOMETRIST

## 2021-01-13 PROCEDURE — 99499 RISK ADDL DX/OHS AUDIT: ICD-10-PCS | Mod: 95,,, | Performed by: NURSE PRACTITIONER

## 2021-01-13 PROCEDURE — 99499 UNLISTED E&M SERVICE: CPT | Mod: 95,,, | Performed by: NURSE PRACTITIONER

## 2021-01-13 PROCEDURE — 99999 PR PBB SHADOW E&M-EST. PATIENT-LVL III: CPT | Mod: PBBFAC,HCNC,, | Performed by: OPTOMETRIST

## 2021-01-13 PROCEDURE — 92014 COMPRE OPH EXAM EST PT 1/>: CPT | Mod: HCNC,S$GLB,, | Performed by: OPTOMETRIST

## 2021-01-13 PROCEDURE — 3051F HG A1C>EQUAL 7.0%<8.0%: CPT | Mod: HCNC,CPTII,95, | Performed by: NURSE PRACTITIONER

## 2021-01-13 PROCEDURE — 2023F DILAT RTA XM W/O RTNOPTHY: CPT | Mod: HCNC,S$GLB,, | Performed by: OPTOMETRIST

## 2021-01-13 PROCEDURE — 3288F PR FALLS RISK ASSESSMENT DOCUMENTED: ICD-10-PCS | Mod: HCNC,CPTII,S$GLB, | Performed by: OPTOMETRIST

## 2021-01-13 PROCEDURE — 99214 OFFICE O/P EST MOD 30 MIN: CPT | Mod: HCNC,95,, | Performed by: NURSE PRACTITIONER

## 2021-01-13 PROCEDURE — 2023F PR DILATED RETINAL EXAM W/O EVID OF RETINOPATHY: ICD-10-PCS | Mod: HCNC,S$GLB,, | Performed by: OPTOMETRIST

## 2021-01-13 PROCEDURE — 1126F AMNT PAIN NOTED NONE PRSNT: CPT | Mod: HCNC,S$GLB,, | Performed by: OPTOMETRIST

## 2021-01-13 PROCEDURE — 3288F FALL RISK ASSESSMENT DOCD: CPT | Mod: HCNC,CPTII,S$GLB, | Performed by: OPTOMETRIST

## 2021-01-13 PROCEDURE — 92015 DETERMINE REFRACTIVE STATE: CPT | Mod: HCNC,S$GLB,, | Performed by: OPTOMETRIST

## 2021-01-13 PROCEDURE — 1159F MED LIST DOCD IN RCRD: CPT | Mod: HCNC,95,, | Performed by: NURSE PRACTITIONER

## 2021-01-13 PROCEDURE — 99214 PR OFFICE/OUTPT VISIT, EST, LEVL IV, 30-39 MIN: ICD-10-PCS | Mod: HCNC,95,, | Performed by: NURSE PRACTITIONER

## 2021-01-13 PROCEDURE — 1101F PR PT FALLS ASSESS DOC 0-1 FALLS W/OUT INJ PAST YR: ICD-10-PCS | Mod: HCNC,CPTII,S$GLB, | Performed by: OPTOMETRIST

## 2021-01-13 PROCEDURE — 92015 PR REFRACTION: ICD-10-PCS | Mod: HCNC,S$GLB,, | Performed by: OPTOMETRIST

## 2021-01-13 RX ORDER — ERGOCALCIFEROL 1.25 MG/1
50000 CAPSULE ORAL
Qty: 12 CAPSULE | Refills: 3 | Status: SHIPPED | OUTPATIENT
Start: 2021-01-13 | End: 2022-01-01

## 2021-01-19 ENCOUNTER — TELEPHONE (OUTPATIENT)
Dept: INTERNAL MEDICINE | Facility: CLINIC | Age: 86
End: 2021-01-19

## 2021-02-03 ENCOUNTER — PATIENT MESSAGE (OUTPATIENT)
Dept: INTERNAL MEDICINE | Facility: CLINIC | Age: 86
End: 2021-02-03

## 2021-02-04 RX ORDER — FUROSEMIDE 20 MG/1
20 TABLET ORAL 2 TIMES DAILY
Qty: 60 TABLET | Refills: 6 | Status: SHIPPED | OUTPATIENT
Start: 2021-02-04 | End: 2021-09-17 | Stop reason: SDUPTHER

## 2021-02-04 RX ORDER — METOPROLOL SUCCINATE 25 MG/1
25 TABLET, EXTENDED RELEASE ORAL DAILY
Qty: 30 TABLET | Refills: 6 | Status: SHIPPED | OUTPATIENT
Start: 2021-02-04 | End: 2021-06-30 | Stop reason: SDUPTHER

## 2021-02-11 ENCOUNTER — OFFICE VISIT (OUTPATIENT)
Dept: INTERNAL MEDICINE | Facility: CLINIC | Age: 86
End: 2021-02-11
Payer: MEDICARE

## 2021-02-11 ENCOUNTER — LAB VISIT (OUTPATIENT)
Dept: LAB | Facility: HOSPITAL | Age: 86
End: 2021-02-11
Attending: INTERNAL MEDICINE
Payer: MEDICARE

## 2021-02-11 VITALS
HEIGHT: 64 IN | BODY MASS INDEX: 20.67 KG/M2 | HEART RATE: 90 BPM | DIASTOLIC BLOOD PRESSURE: 52 MMHG | SYSTOLIC BLOOD PRESSURE: 110 MMHG | WEIGHT: 121.06 LBS | TEMPERATURE: 97 F

## 2021-02-11 VITALS
HEIGHT: 64 IN | DIASTOLIC BLOOD PRESSURE: 54 MMHG | BODY MASS INDEX: 20.68 KG/M2 | HEART RATE: 72 BPM | WEIGHT: 121.13 LBS | SYSTOLIC BLOOD PRESSURE: 104 MMHG

## 2021-02-11 DIAGNOSIS — C83.35 DIFFUSE LARGE B-CELL LYMPHOMA OF LYMPH NODES OF LOWER EXTREMITY: ICD-10-CM

## 2021-02-11 DIAGNOSIS — Z00.00 ENCOUNTER FOR PREVENTIVE HEALTH EXAMINATION: Primary | ICD-10-CM

## 2021-02-11 DIAGNOSIS — E05.90 HYPERTHYROIDISM: ICD-10-CM

## 2021-02-11 DIAGNOSIS — E11.65 TYPE 2 DIABETES MELLITUS WITH HYPERGLYCEMIA, WITHOUT LONG-TERM CURRENT USE OF INSULIN: ICD-10-CM

## 2021-02-11 DIAGNOSIS — M54.41 CHRONIC BILATERAL LOW BACK PAIN WITH RIGHT-SIDED SCIATICA: ICD-10-CM

## 2021-02-11 DIAGNOSIS — M53.3 SACROILIAC JOINT DYSFUNCTION OF BOTH SIDES: ICD-10-CM

## 2021-02-11 DIAGNOSIS — I70.0 AORTIC ATHEROSCLEROSIS: ICD-10-CM

## 2021-02-11 DIAGNOSIS — I50.9 CHRONIC CONGESTIVE HEART FAILURE, UNSPECIFIED HEART FAILURE TYPE: ICD-10-CM

## 2021-02-11 DIAGNOSIS — K31.89 GASTRIC NODULE: ICD-10-CM

## 2021-02-11 DIAGNOSIS — E78.2 MIXED HYPERLIPIDEMIA: ICD-10-CM

## 2021-02-11 DIAGNOSIS — E04.1 THYROID NODULE: ICD-10-CM

## 2021-02-11 DIAGNOSIS — E55.9 VITAMIN D DEFICIENCY: ICD-10-CM

## 2021-02-11 DIAGNOSIS — Z99.89 DEPENDENCE ON OTHER ENABLING MACHINES AND DEVICES: ICD-10-CM

## 2021-02-11 DIAGNOSIS — K21.9 GASTROESOPHAGEAL REFLUX DISEASE WITHOUT ESOPHAGITIS: ICD-10-CM

## 2021-02-11 DIAGNOSIS — G89.29 CHRONIC BILATERAL LOW BACK PAIN WITH RIGHT-SIDED SCIATICA: ICD-10-CM

## 2021-02-11 DIAGNOSIS — R06.02 SHORTNESS OF BREATH: ICD-10-CM

## 2021-02-11 DIAGNOSIS — I50.42 CHRONIC COMBINED SYSTOLIC AND DIASTOLIC CONGESTIVE HEART FAILURE: Primary | ICD-10-CM

## 2021-02-11 DIAGNOSIS — M85.80 OSTEOPENIA, UNSPECIFIED LOCATION: ICD-10-CM

## 2021-02-11 LAB — TSH SERPL DL<=0.005 MIU/L-ACNC: 0.77 UIU/ML (ref 0.4–4)

## 2021-02-11 PROCEDURE — G0439 PPPS, SUBSEQ VISIT: HCPCS | Mod: S$GLB,,, | Performed by: NURSE PRACTITIONER

## 2021-02-11 PROCEDURE — 93005 ELECTROCARDIOGRAM TRACING: CPT | Mod: S$GLB,,, | Performed by: STUDENT IN AN ORGANIZED HEALTH CARE EDUCATION/TRAINING PROGRAM

## 2021-02-11 PROCEDURE — 99999 PR PBB SHADOW E&M-EST. PATIENT-LVL V: ICD-10-PCS | Mod: PBBFAC,,, | Performed by: NURSE PRACTITIONER

## 2021-02-11 PROCEDURE — 99499 RISK ADDL DX/OHS AUDIT: ICD-10-PCS | Mod: S$GLB,,, | Performed by: NURSE PRACTITIONER

## 2021-02-11 PROCEDURE — 3051F HG A1C>EQUAL 7.0%<8.0%: CPT | Mod: CPTII,S$GLB,, | Performed by: NURSE PRACTITIONER

## 2021-02-11 PROCEDURE — G9919 PR SCREENING AND POSITIVE: ICD-10-PCS | Mod: CPTII,S$GLB,, | Performed by: NURSE PRACTITIONER

## 2021-02-11 PROCEDURE — 1126F PR PAIN SEVERITY QUANTIFIED, NO PAIN PRESENT: ICD-10-PCS | Mod: S$GLB,,, | Performed by: NURSE PRACTITIONER

## 2021-02-11 PROCEDURE — 1159F PR MEDICATION LIST DOCUMENTED IN MEDICAL RECORD: ICD-10-PCS | Mod: GC,S$GLB,, | Performed by: STUDENT IN AN ORGANIZED HEALTH CARE EDUCATION/TRAINING PROGRAM

## 2021-02-11 PROCEDURE — G9919 SCRN ND POS ND PROV OF REC: HCPCS | Mod: CPTII,S$GLB,, | Performed by: NURSE PRACTITIONER

## 2021-02-11 PROCEDURE — 93010 ELECTROCARDIOGRAM REPORT: CPT | Mod: S$GLB,,, | Performed by: INTERNAL MEDICINE

## 2021-02-11 PROCEDURE — 99214 PR OFFICE/OUTPT VISIT, EST, LEVL IV, 30-39 MIN: ICD-10-PCS | Mod: GC,S$GLB,, | Performed by: STUDENT IN AN ORGANIZED HEALTH CARE EDUCATION/TRAINING PROGRAM

## 2021-02-11 PROCEDURE — 99999 PR PBB SHADOW E&M-EST. PATIENT-LVL III: ICD-10-PCS | Mod: PBBFAC,GC,, | Performed by: STUDENT IN AN ORGANIZED HEALTH CARE EDUCATION/TRAINING PROGRAM

## 2021-02-11 PROCEDURE — 99214 OFFICE O/P EST MOD 30 MIN: CPT | Mod: GC,S$GLB,, | Performed by: STUDENT IN AN ORGANIZED HEALTH CARE EDUCATION/TRAINING PROGRAM

## 2021-02-11 PROCEDURE — 36415 COLL VENOUS BLD VENIPUNCTURE: CPT

## 2021-02-11 PROCEDURE — 1159F MED LIST DOCD IN RCRD: CPT | Mod: GC,S$GLB,, | Performed by: STUDENT IN AN ORGANIZED HEALTH CARE EDUCATION/TRAINING PROGRAM

## 2021-02-11 PROCEDURE — 99499 UNLISTED E&M SERVICE: CPT | Mod: S$GLB,,, | Performed by: NURSE PRACTITIONER

## 2021-02-11 PROCEDURE — 3288F PR FALLS RISK ASSESSMENT DOCUMENTED: ICD-10-PCS | Mod: CPTII,S$GLB,, | Performed by: NURSE PRACTITIONER

## 2021-02-11 PROCEDURE — 3288F FALL RISK ASSESSMENT DOCD: CPT | Mod: CPTII,S$GLB,, | Performed by: NURSE PRACTITIONER

## 2021-02-11 PROCEDURE — 1101F PT FALLS ASSESS-DOCD LE1/YR: CPT | Mod: CPTII,S$GLB,, | Performed by: NURSE PRACTITIONER

## 2021-02-11 PROCEDURE — G0439 PR MEDICARE ANNUAL WELLNESS SUBSEQUENT VISIT: ICD-10-PCS | Mod: S$GLB,,, | Performed by: NURSE PRACTITIONER

## 2021-02-11 PROCEDURE — 1126F AMNT PAIN NOTED NONE PRSNT: CPT | Mod: S$GLB,,, | Performed by: NURSE PRACTITIONER

## 2021-02-11 PROCEDURE — 1101F PR PT FALLS ASSESS DOC 0-1 FALLS W/OUT INJ PAST YR: ICD-10-PCS | Mod: CPTII,S$GLB,, | Performed by: NURSE PRACTITIONER

## 2021-02-11 PROCEDURE — 93005 EKG 12-LEAD: ICD-10-PCS | Mod: S$GLB,,, | Performed by: STUDENT IN AN ORGANIZED HEALTH CARE EDUCATION/TRAINING PROGRAM

## 2021-02-11 PROCEDURE — 84443 ASSAY THYROID STIM HORMONE: CPT

## 2021-02-11 PROCEDURE — 93010 EKG 12-LEAD: ICD-10-PCS | Mod: S$GLB,,, | Performed by: INTERNAL MEDICINE

## 2021-02-11 PROCEDURE — 99999 PR PBB SHADOW E&M-EST. PATIENT-LVL III: CPT | Mod: PBBFAC,GC,, | Performed by: STUDENT IN AN ORGANIZED HEALTH CARE EDUCATION/TRAINING PROGRAM

## 2021-02-11 PROCEDURE — 99999 PR PBB SHADOW E&M-EST. PATIENT-LVL V: CPT | Mod: PBBFAC,,, | Performed by: NURSE PRACTITIONER

## 2021-02-11 PROCEDURE — 3051F PR MOST RECENT HEMOGLOBIN A1C LEVEL 7.0 - < 8.0%: ICD-10-PCS | Mod: CPTII,S$GLB,, | Performed by: NURSE PRACTITIONER

## 2021-02-12 ENCOUNTER — PATIENT MESSAGE (OUTPATIENT)
Dept: ENDOCRINOLOGY | Facility: CLINIC | Age: 86
End: 2021-02-12

## 2021-02-13 ENCOUNTER — PATIENT MESSAGE (OUTPATIENT)
Dept: ENDOCRINOLOGY | Facility: CLINIC | Age: 86
End: 2021-02-13

## 2021-03-05 NOTE — TELEPHONE ENCOUNTER
Normal pap repeat 2 years Thank you for the update.    Cholesterol is very high considering she is on 40 mg of Lipitor.    I guess I would recommend that she increase it to 80 mg and I can send that in to pharmacy.  We can change the appointment from April to June with labs prior.  Thank you

## 2021-03-12 ENCOUNTER — TELEPHONE (OUTPATIENT)
Dept: INTERNAL MEDICINE | Facility: CLINIC | Age: 86
End: 2021-03-12

## 2021-03-12 DIAGNOSIS — E11.65 TYPE 2 DIABETES MELLITUS WITH HYPERGLYCEMIA, WITHOUT LONG-TERM CURRENT USE OF INSULIN: Primary | ICD-10-CM

## 2021-03-19 ENCOUNTER — TELEPHONE (OUTPATIENT)
Dept: INTERNAL MEDICINE | Facility: CLINIC | Age: 86
End: 2021-03-19

## 2021-03-19 ENCOUNTER — PATIENT MESSAGE (OUTPATIENT)
Dept: INTERNAL MEDICINE | Facility: CLINIC | Age: 86
End: 2021-03-19

## 2021-03-19 RX ORDER — FLUTICASONE PROPIONATE 50 MCG
1 SPRAY, SUSPENSION (ML) NASAL DAILY
Qty: 16 G | Refills: 1 | Status: SHIPPED | OUTPATIENT
Start: 2021-03-19 | End: 2022-01-01

## 2021-03-22 ENCOUNTER — TELEPHONE (OUTPATIENT)
Dept: INTERNAL MEDICINE | Facility: CLINIC | Age: 86
End: 2021-03-22

## 2021-03-26 ENCOUNTER — TELEPHONE (OUTPATIENT)
Dept: ADMINISTRATIVE | Facility: CLINIC | Age: 86
End: 2021-03-26

## 2021-03-29 ENCOUNTER — OFFICE VISIT (OUTPATIENT)
Dept: URGENT CARE | Facility: CLINIC | Age: 86
End: 2021-03-29
Payer: MEDICARE

## 2021-03-29 ENCOUNTER — NURSE TRIAGE (OUTPATIENT)
Dept: ADMINISTRATIVE | Facility: CLINIC | Age: 86
End: 2021-03-29

## 2021-03-29 VITALS
HEART RATE: 103 BPM | DIASTOLIC BLOOD PRESSURE: 84 MMHG | SYSTOLIC BLOOD PRESSURE: 131 MMHG | HEIGHT: 64 IN | BODY MASS INDEX: 20.49 KG/M2 | WEIGHT: 120 LBS | TEMPERATURE: 98 F | RESPIRATION RATE: 18 BRPM | OXYGEN SATURATION: 98 %

## 2021-03-29 DIAGNOSIS — M54.41 ACUTE RIGHT-SIDED LOW BACK PAIN WITH RIGHT-SIDED SCIATICA: Primary | ICD-10-CM

## 2021-03-29 PROCEDURE — 99214 OFFICE O/P EST MOD 30 MIN: CPT | Mod: S$GLB,,, | Performed by: FAMILY MEDICINE

## 2021-03-29 PROCEDURE — 99214 PR OFFICE/OUTPT VISIT, EST, LEVL IV, 30-39 MIN: ICD-10-PCS | Mod: S$GLB,,, | Performed by: FAMILY MEDICINE

## 2021-03-29 RX ORDER — DICLOFENAC SODIUM 10 MG/G
2 GEL TOPICAL 4 TIMES DAILY PRN
Qty: 100 G | Refills: 0 | Status: SHIPPED | OUTPATIENT
Start: 2021-03-29 | End: 2021-04-05

## 2021-04-05 ENCOUNTER — PATIENT MESSAGE (OUTPATIENT)
Dept: INTERNAL MEDICINE | Facility: CLINIC | Age: 86
End: 2021-04-05

## 2021-04-06 RX ORDER — POTASSIUM CHLORIDE 750 MG/1
10 TABLET, EXTENDED RELEASE ORAL DAILY
Qty: 30 TABLET | Refills: 11 | Status: SHIPPED | OUTPATIENT
Start: 2021-04-06 | End: 2022-01-01

## 2021-04-08 ENCOUNTER — LAB VISIT (OUTPATIENT)
Dept: LAB | Facility: HOSPITAL | Age: 86
End: 2021-04-08
Attending: INTERNAL MEDICINE
Payer: MEDICARE

## 2021-04-08 DIAGNOSIS — C83.35 DIFFUSE LARGE B-CELL LYMPHOMA OF LYMPH NODES OF LOWER EXTREMITY: ICD-10-CM

## 2021-04-08 DIAGNOSIS — E11.65 TYPE 2 DIABETES MELLITUS WITH HYPERGLYCEMIA, WITHOUT LONG-TERM CURRENT USE OF INSULIN: ICD-10-CM

## 2021-04-08 DIAGNOSIS — I50.42 CHRONIC COMBINED SYSTOLIC AND DIASTOLIC CONGESTIVE HEART FAILURE: ICD-10-CM

## 2021-04-08 DIAGNOSIS — R06.02 SHORTNESS OF BREATH: ICD-10-CM

## 2021-04-08 LAB
ALBUMIN SERPL BCP-MCNC: 3 G/DL (ref 3.5–5.2)
ALBUMIN SERPL BCP-MCNC: 3 G/DL (ref 3.5–5.2)
ALP SERPL-CCNC: 90 U/L (ref 55–135)
ALP SERPL-CCNC: 90 U/L (ref 55–135)
ALT SERPL W/O P-5'-P-CCNC: 25 U/L (ref 10–44)
ALT SERPL W/O P-5'-P-CCNC: 25 U/L (ref 10–44)
ANION GAP SERPL CALC-SCNC: 9 MMOL/L (ref 8–16)
ANION GAP SERPL CALC-SCNC: 9 MMOL/L (ref 8–16)
AST SERPL-CCNC: 34 U/L (ref 10–40)
AST SERPL-CCNC: 34 U/L (ref 10–40)
BASOPHILS # BLD AUTO: 0.02 K/UL (ref 0–0.2)
BASOPHILS NFR BLD: 0.4 % (ref 0–1.9)
BILIRUB SERPL-MCNC: 1.1 MG/DL (ref 0.1–1)
BILIRUB SERPL-MCNC: 1.1 MG/DL (ref 0.1–1)
BNP SERPL-MCNC: 359 PG/ML (ref 0–99)
BUN SERPL-MCNC: 14 MG/DL (ref 8–23)
BUN SERPL-MCNC: 14 MG/DL (ref 8–23)
CALCIUM SERPL-MCNC: 9.8 MG/DL (ref 8.7–10.5)
CALCIUM SERPL-MCNC: 9.8 MG/DL (ref 8.7–10.5)
CHLORIDE SERPL-SCNC: 102 MMOL/L (ref 95–110)
CHLORIDE SERPL-SCNC: 102 MMOL/L (ref 95–110)
CHOLEST SERPL-MCNC: 121 MG/DL (ref 120–199)
CHOLEST/HDLC SERPL: 2.3 {RATIO} (ref 2–5)
CO2 SERPL-SCNC: 26 MMOL/L (ref 23–29)
CO2 SERPL-SCNC: 26 MMOL/L (ref 23–29)
CREAT SERPL-MCNC: 0.7 MG/DL (ref 0.5–1.4)
CREAT SERPL-MCNC: 0.7 MG/DL (ref 0.5–1.4)
DIFFERENTIAL METHOD: ABNORMAL
EOSINOPHIL # BLD AUTO: 0.1 K/UL (ref 0–0.5)
EOSINOPHIL NFR BLD: 1.3 % (ref 0–8)
ERYTHROCYTE [DISTWIDTH] IN BLOOD BY AUTOMATED COUNT: 14.7 % (ref 11.5–14.5)
ERYTHROCYTE [DISTWIDTH] IN BLOOD BY AUTOMATED COUNT: 14.7 % (ref 11.5–14.5)
EST. GFR  (AFRICAN AMERICAN): >60 ML/MIN/1.73 M^2
EST. GFR  (AFRICAN AMERICAN): >60 ML/MIN/1.73 M^2
EST. GFR  (NON AFRICAN AMERICAN): >60 ML/MIN/1.73 M^2
EST. GFR  (NON AFRICAN AMERICAN): >60 ML/MIN/1.73 M^2
ESTIMATED AVG GLUCOSE: 163 MG/DL (ref 68–131)
GLUCOSE SERPL-MCNC: 119 MG/DL (ref 70–110)
GLUCOSE SERPL-MCNC: 119 MG/DL (ref 70–110)
HBA1C MFR BLD: 7.3 % (ref 4–5.6)
HCT VFR BLD AUTO: 41.1 % (ref 37–48.5)
HCT VFR BLD AUTO: 41.1 % (ref 37–48.5)
HDLC SERPL-MCNC: 52 MG/DL (ref 40–75)
HDLC SERPL: 43 % (ref 20–50)
HGB BLD-MCNC: 13.1 G/DL (ref 12–16)
HGB BLD-MCNC: 13.1 G/DL (ref 12–16)
IMM GRANULOCYTES # BLD AUTO: 0.01 K/UL (ref 0–0.04)
IMM GRANULOCYTES # BLD AUTO: 0.01 K/UL (ref 0–0.04)
IMM GRANULOCYTES NFR BLD AUTO: 0.2 % (ref 0–0.5)
LDH SERPL L TO P-CCNC: 235 U/L (ref 110–260)
LDLC SERPL CALC-MCNC: 60.6 MG/DL (ref 63–159)
LYMPHOCYTES # BLD AUTO: 1.8 K/UL (ref 1–4.8)
LYMPHOCYTES NFR BLD: 32.6 % (ref 18–48)
MCH RBC QN AUTO: 28.3 PG (ref 27–31)
MCH RBC QN AUTO: 28.3 PG (ref 27–31)
MCHC RBC AUTO-ENTMCNC: 31.9 G/DL (ref 32–36)
MCHC RBC AUTO-ENTMCNC: 31.9 G/DL (ref 32–36)
MCV RBC AUTO: 89 FL (ref 82–98)
MCV RBC AUTO: 89 FL (ref 82–98)
MONOCYTES # BLD AUTO: 0.6 K/UL (ref 0.3–1)
MONOCYTES NFR BLD: 11.4 % (ref 4–15)
NEUTROPHILS # BLD AUTO: 2.9 K/UL (ref 1.8–7.7)
NEUTROPHILS # BLD AUTO: 2.9 K/UL (ref 1.8–7.7)
NEUTROPHILS NFR BLD: 54.1 % (ref 38–73)
NONHDLC SERPL-MCNC: 69 MG/DL
NRBC BLD-RTO: 0 /100 WBC
PLATELET # BLD AUTO: 256 K/UL (ref 150–450)
PLATELET # BLD AUTO: 256 K/UL (ref 150–450)
PMV BLD AUTO: 12.2 FL (ref 9.2–12.9)
PMV BLD AUTO: 12.2 FL (ref 9.2–12.9)
POTASSIUM SERPL-SCNC: 4.6 MMOL/L (ref 3.5–5.1)
POTASSIUM SERPL-SCNC: 4.6 MMOL/L (ref 3.5–5.1)
PROT SERPL-MCNC: 7.7 G/DL (ref 6–8.4)
PROT SERPL-MCNC: 7.7 G/DL (ref 6–8.4)
RBC # BLD AUTO: 4.63 M/UL (ref 4–5.4)
RBC # BLD AUTO: 4.63 M/UL (ref 4–5.4)
SODIUM SERPL-SCNC: 137 MMOL/L (ref 136–145)
SODIUM SERPL-SCNC: 137 MMOL/L (ref 136–145)
TRIGL SERPL-MCNC: 42 MG/DL (ref 30–150)
WBC # BLD AUTO: 5.37 K/UL (ref 3.9–12.7)
WBC # BLD AUTO: 5.37 K/UL (ref 3.9–12.7)

## 2021-04-08 PROCEDURE — 83615 LACTATE (LD) (LDH) ENZYME: CPT | Performed by: NURSE PRACTITIONER

## 2021-04-08 PROCEDURE — 36415 COLL VENOUS BLD VENIPUNCTURE: CPT | Performed by: NURSE PRACTITIONER

## 2021-04-08 PROCEDURE — 83880 ASSAY OF NATRIURETIC PEPTIDE: CPT | Performed by: STUDENT IN AN ORGANIZED HEALTH CARE EDUCATION/TRAINING PROGRAM

## 2021-04-08 PROCEDURE — 80053 COMPREHEN METABOLIC PANEL: CPT | Performed by: NURSE PRACTITIONER

## 2021-04-08 PROCEDURE — 85025 COMPLETE CBC W/AUTO DIFF WBC: CPT | Performed by: STUDENT IN AN ORGANIZED HEALTH CARE EDUCATION/TRAINING PROGRAM

## 2021-04-08 PROCEDURE — 80061 LIPID PANEL: CPT | Performed by: INTERNAL MEDICINE

## 2021-04-08 PROCEDURE — 83036 HEMOGLOBIN GLYCOSYLATED A1C: CPT | Performed by: INTERNAL MEDICINE

## 2021-04-12 ENCOUNTER — TELEPHONE (OUTPATIENT)
Dept: INTERNAL MEDICINE | Facility: CLINIC | Age: 86
End: 2021-04-12

## 2021-04-28 ENCOUNTER — TELEPHONE (OUTPATIENT)
Dept: INTERNAL MEDICINE | Facility: CLINIC | Age: 86
End: 2021-04-28

## 2021-04-28 DIAGNOSIS — C83.35 DIFFUSE LARGE B-CELL LYMPHOMA OF LYMPH NODES OF LOWER EXTREMITY: ICD-10-CM

## 2021-04-28 RX ORDER — TRAMADOL HYDROCHLORIDE 50 MG/1
50 TABLET ORAL EVERY 8 HOURS PRN
Qty: 90 TABLET | Refills: 0 | Status: SHIPPED | OUTPATIENT
Start: 2021-04-28 | End: 2021-06-29

## 2021-06-01 ENCOUNTER — TELEPHONE (OUTPATIENT)
Dept: INTERNAL MEDICINE | Facility: CLINIC | Age: 86
End: 2021-06-01

## 2021-06-01 ENCOUNTER — OFFICE VISIT (OUTPATIENT)
Dept: INTERNAL MEDICINE | Facility: CLINIC | Age: 86
End: 2021-06-01
Payer: MEDICARE

## 2021-06-01 DIAGNOSIS — I50.42 CHRONIC COMBINED SYSTOLIC AND DIASTOLIC CONGESTIVE HEART FAILURE: ICD-10-CM

## 2021-06-01 DIAGNOSIS — C83.35 DIFFUSE LARGE B-CELL LYMPHOMA OF LYMPH NODES OF LOWER EXTREMITY: ICD-10-CM

## 2021-06-01 DIAGNOSIS — E04.1 THYROID NODULE: ICD-10-CM

## 2021-06-01 DIAGNOSIS — M54.6 PAIN IN THORACIC SPINE: ICD-10-CM

## 2021-06-01 DIAGNOSIS — M89.9 LYTIC LESION OF BONE ON X-RAY: Primary | ICD-10-CM

## 2021-06-01 PROCEDURE — 99443 PR PHYSICIAN TELEPHONE EVALUATION 21-30 MIN: CPT | Mod: 95,,, | Performed by: INTERNAL MEDICINE

## 2021-06-01 PROCEDURE — 99443 PR PHYSICIAN TELEPHONE EVALUATION 21-30 MIN: ICD-10-PCS | Mod: 95,,, | Performed by: INTERNAL MEDICINE

## 2021-06-01 RX ORDER — A/SINGAPORE/GP1908/2015 IVR-180 (AN A/MICHIGAN/45/2015 (H1N1)PDM09-LIKE VIRUS, A/HONG KONG/4801/2014, NYMC X-263B (H3N2) (AN A/HONG KONG/4801/2014-LIKE VIRUS), AND B/BRISBANE/60/2008, WILD TYPE (A B/BRISBANE/60/2008-LIKE VIRUS) 15; 15; 15 UG/.5ML; UG/.5ML; UG/.5ML
INJECTION, SUSPENSION INTRAMUSCULAR
COMMUNITY
Start: 2020-12-29 | End: 2022-01-20

## 2021-06-02 ENCOUNTER — HOSPITAL ENCOUNTER (OUTPATIENT)
Dept: RADIOLOGY | Facility: HOSPITAL | Age: 86
Discharge: HOME | End: 2021-06-02
Attending: INTERNAL MEDICINE
Payer: MEDICARE

## 2021-06-02 DIAGNOSIS — M54.6 PAIN IN THORACIC SPINE: ICD-10-CM

## 2021-06-02 PROCEDURE — 72146 MRI THORACIC SPINE WITHOUT CONTRAST: ICD-10-PCS | Mod: 26,,, | Performed by: RADIOLOGY

## 2021-06-02 PROCEDURE — 72146 MRI CHEST SPINE W/O DYE: CPT | Mod: 26,,, | Performed by: RADIOLOGY

## 2021-06-02 PROCEDURE — 72146 MRI CHEST SPINE W/O DYE: CPT | Mod: TC

## 2021-06-03 ENCOUNTER — TELEPHONE (OUTPATIENT)
Dept: INTERNAL MEDICINE | Facility: CLINIC | Age: 86
End: 2021-06-03

## 2021-06-03 DIAGNOSIS — M89.8X8 OTHER SPECIFIED DISORDERS OF BONE, OTHER SITE: ICD-10-CM

## 2021-06-03 DIAGNOSIS — C41.2 MALIGNANT NEOPLASM OF VERTEBRAL COLUMN: ICD-10-CM

## 2021-06-03 DIAGNOSIS — M54.9 BILATERAL BACK PAIN, UNSPECIFIED BACK LOCATION, UNSPECIFIED CHRONICITY: Primary | ICD-10-CM

## 2021-06-09 ENCOUNTER — TELEPHONE (OUTPATIENT)
Dept: INTERNAL MEDICINE | Facility: CLINIC | Age: 86
End: 2021-06-09

## 2021-06-14 ENCOUNTER — TELEPHONE (OUTPATIENT)
Dept: INTERNAL MEDICINE | Facility: CLINIC | Age: 86
End: 2021-06-14

## 2021-06-17 ENCOUNTER — TELEPHONE (OUTPATIENT)
Dept: PAIN MEDICINE | Facility: CLINIC | Age: 86
End: 2021-06-17

## 2021-06-17 ENCOUNTER — HOSPITAL ENCOUNTER (OUTPATIENT)
Dept: RADIOLOGY | Facility: HOSPITAL | Age: 86
Discharge: HOME | End: 2021-06-17
Attending: INTERNAL MEDICINE
Payer: MEDICARE

## 2021-06-17 DIAGNOSIS — C41.2 MALIGNANT NEOPLASM OF VERTEBRAL COLUMN: ICD-10-CM

## 2021-06-17 DIAGNOSIS — M89.8X8 OTHER SPECIFIED DISORDERS OF BONE, OTHER SITE: ICD-10-CM

## 2021-06-17 LAB — POCT GLUCOSE: 108 MG/DL (ref 70–110)

## 2021-06-17 PROCEDURE — A9698 NON-RAD CONTRAST MATERIALNOC: HCPCS | Performed by: INTERNAL MEDICINE

## 2021-06-17 PROCEDURE — 78816 NM PET CT WHOLE BODY: ICD-10-PCS | Mod: 26,PS,, | Performed by: RADIOLOGY

## 2021-06-17 PROCEDURE — 78816 PET IMAGE W/CT FULL BODY: CPT | Mod: 26,PS,, | Performed by: RADIOLOGY

## 2021-06-17 PROCEDURE — 78816 PET IMAGE W/CT FULL BODY: CPT | Mod: TC,PI

## 2021-06-17 PROCEDURE — 25500020 PHARM REV CODE 255: Performed by: INTERNAL MEDICINE

## 2021-06-17 RX ADMIN — IOHEXOL 500 ML: 9 SOLUTION ORAL at 10:06

## 2021-06-20 ENCOUNTER — PATIENT MESSAGE (OUTPATIENT)
Dept: INTERNAL MEDICINE | Facility: CLINIC | Age: 86
End: 2021-06-20

## 2021-06-22 ENCOUNTER — TELEPHONE (OUTPATIENT)
Dept: INTERNAL MEDICINE | Facility: CLINIC | Age: 86
End: 2021-06-22

## 2021-06-29 DIAGNOSIS — C83.35 DIFFUSE LARGE B-CELL LYMPHOMA OF LYMPH NODES OF LOWER EXTREMITY: ICD-10-CM

## 2021-06-29 RX ORDER — TRAMADOL HYDROCHLORIDE 50 MG/1
TABLET ORAL
Qty: 90 TABLET | Refills: 0 | Status: SHIPPED | OUTPATIENT
Start: 2021-06-29 | End: 2021-07-02 | Stop reason: SDUPTHER

## 2021-06-30 RX ORDER — METOPROLOL SUCCINATE 25 MG/1
25 TABLET, EXTENDED RELEASE ORAL DAILY
Qty: 30 TABLET | Refills: 6 | Status: SHIPPED | OUTPATIENT
Start: 2021-06-30 | End: 2022-01-20 | Stop reason: SDUPTHER

## 2021-07-02 DIAGNOSIS — C83.35 DIFFUSE LARGE B-CELL LYMPHOMA OF LYMPH NODES OF LOWER EXTREMITY: ICD-10-CM

## 2021-07-02 RX ORDER — TRAMADOL HYDROCHLORIDE 50 MG/1
50 TABLET ORAL EVERY 8 HOURS PRN
Qty: 90 TABLET | Refills: 0 | Status: SHIPPED | OUTPATIENT
Start: 2021-07-02 | End: 2021-09-21

## 2021-07-19 ENCOUNTER — LAB VISIT (OUTPATIENT)
Dept: LAB | Facility: HOSPITAL | Age: 86
End: 2021-07-19
Attending: INTERNAL MEDICINE
Payer: MEDICARE

## 2021-07-19 ENCOUNTER — OFFICE VISIT (OUTPATIENT)
Dept: RHEUMATOLOGY | Facility: CLINIC | Age: 86
End: 2021-07-19
Payer: MEDICARE

## 2021-07-19 VITALS
SYSTOLIC BLOOD PRESSURE: 102 MMHG | WEIGHT: 120 LBS | BODY MASS INDEX: 20.49 KG/M2 | HEART RATE: 88 BPM | DIASTOLIC BLOOD PRESSURE: 59 MMHG | HEIGHT: 64 IN

## 2021-07-19 DIAGNOSIS — M16.0 PRIMARY OSTEOARTHRITIS OF BOTH HIPS: ICD-10-CM

## 2021-07-19 DIAGNOSIS — M54.9 BILATERAL BACK PAIN, UNSPECIFIED BACK LOCATION, UNSPECIFIED CHRONICITY: ICD-10-CM

## 2021-07-19 DIAGNOSIS — R07.89 CHEST WALL PAIN: ICD-10-CM

## 2021-07-19 DIAGNOSIS — R79.82 ELEVATED C-REACTIVE PROTEIN (CRP): ICD-10-CM

## 2021-07-19 DIAGNOSIS — R79.82 ELEVATED C-REACTIVE PROTEIN (CRP): Primary | ICD-10-CM

## 2021-07-19 LAB
CRP SERPL-MCNC: 14.6 MG/L (ref 0–8.2)
ERYTHROCYTE [SEDIMENTATION RATE] IN BLOOD BY WESTERGREN METHOD: 23 MM/HR (ref 0–36)

## 2021-07-19 PROCEDURE — 3288F FALL RISK ASSESSMENT DOCD: CPT | Mod: CPTII,S$GLB,, | Performed by: INTERNAL MEDICINE

## 2021-07-19 PROCEDURE — 3288F PR FALLS RISK ASSESSMENT DOCUMENTED: ICD-10-PCS | Mod: CPTII,S$GLB,, | Performed by: INTERNAL MEDICINE

## 2021-07-19 PROCEDURE — 1125F AMNT PAIN NOTED PAIN PRSNT: CPT | Mod: CPTII,S$GLB,, | Performed by: INTERNAL MEDICINE

## 2021-07-19 PROCEDURE — 1101F PR PT FALLS ASSESS DOC 0-1 FALLS W/OUT INJ PAST YR: ICD-10-PCS | Mod: CPTII,S$GLB,, | Performed by: INTERNAL MEDICINE

## 2021-07-19 PROCEDURE — 1125F PR PAIN SEVERITY QUANTIFIED, PAIN PRESENT: ICD-10-PCS | Mod: CPTII,S$GLB,, | Performed by: INTERNAL MEDICINE

## 2021-07-19 PROCEDURE — 99204 OFFICE O/P NEW MOD 45 MIN: CPT | Mod: S$GLB,,, | Performed by: INTERNAL MEDICINE

## 2021-07-19 PROCEDURE — 1101F PT FALLS ASSESS-DOCD LE1/YR: CPT | Mod: CPTII,S$GLB,, | Performed by: INTERNAL MEDICINE

## 2021-07-19 PROCEDURE — 1159F MED LIST DOCD IN RCRD: CPT | Mod: CPTII,S$GLB,, | Performed by: INTERNAL MEDICINE

## 2021-07-19 PROCEDURE — 99204 PR OFFICE/OUTPT VISIT, NEW, LEVL IV, 45-59 MIN: ICD-10-PCS | Mod: S$GLB,,, | Performed by: INTERNAL MEDICINE

## 2021-07-19 PROCEDURE — 1159F PR MEDICATION LIST DOCUMENTED IN MEDICAL RECORD: ICD-10-PCS | Mod: CPTII,S$GLB,, | Performed by: INTERNAL MEDICINE

## 2021-07-19 PROCEDURE — 85652 RBC SED RATE AUTOMATED: CPT | Performed by: INTERNAL MEDICINE

## 2021-07-19 PROCEDURE — 36415 COLL VENOUS BLD VENIPUNCTURE: CPT | Performed by: INTERNAL MEDICINE

## 2021-07-19 PROCEDURE — 86140 C-REACTIVE PROTEIN: CPT | Performed by: INTERNAL MEDICINE

## 2021-07-19 PROCEDURE — 99999 PR PBB SHADOW E&M-EST. PATIENT-LVL V: CPT | Mod: PBBFAC,,, | Performed by: INTERNAL MEDICINE

## 2021-07-19 PROCEDURE — 99999 PR PBB SHADOW E&M-EST. PATIENT-LVL V: ICD-10-PCS | Mod: PBBFAC,,, | Performed by: INTERNAL MEDICINE

## 2021-08-25 ENCOUNTER — TELEPHONE (OUTPATIENT)
Dept: INTERNAL MEDICINE | Facility: CLINIC | Age: 86
End: 2021-08-25

## 2021-08-25 DIAGNOSIS — E11.65 TYPE 2 DIABETES MELLITUS WITH HYPERGLYCEMIA, WITHOUT LONG-TERM CURRENT USE OF INSULIN: ICD-10-CM

## 2021-08-25 DIAGNOSIS — E78.2 MIXED HYPERLIPIDEMIA: Primary | ICD-10-CM

## 2021-08-25 DIAGNOSIS — C83.35 DIFFUSE LARGE B-CELL LYMPHOMA OF LYMPH NODES OF LOWER EXTREMITY: ICD-10-CM

## 2021-08-25 DIAGNOSIS — E04.1 THYROID NODULE: ICD-10-CM

## 2021-09-17 ENCOUNTER — OFFICE VISIT (OUTPATIENT)
Dept: CARDIOLOGY | Facility: CLINIC | Age: 86
End: 2021-09-17
Payer: MEDICARE

## 2021-09-17 VITALS
WEIGHT: 121.5 LBS | HEIGHT: 61 IN | DIASTOLIC BLOOD PRESSURE: 58 MMHG | HEART RATE: 74 BPM | BODY MASS INDEX: 22.94 KG/M2 | SYSTOLIC BLOOD PRESSURE: 107 MMHG | OXYGEN SATURATION: 99 %

## 2021-09-17 DIAGNOSIS — I50.22 CHRONIC SYSTOLIC CONGESTIVE HEART FAILURE: Primary | ICD-10-CM

## 2021-09-17 DIAGNOSIS — I70.0 AORTIC ATHEROSCLEROSIS: ICD-10-CM

## 2021-09-17 DIAGNOSIS — E11.65 TYPE 2 DIABETES MELLITUS WITH HYPERGLYCEMIA, WITHOUT LONG-TERM CURRENT USE OF INSULIN: ICD-10-CM

## 2021-09-17 PROCEDURE — 1101F PR PT FALLS ASSESS DOC 0-1 FALLS W/OUT INJ PAST YR: ICD-10-PCS | Mod: CPTII,S$GLB,, | Performed by: INTERNAL MEDICINE

## 2021-09-17 PROCEDURE — 3288F FALL RISK ASSESSMENT DOCD: CPT | Mod: CPTII,S$GLB,, | Performed by: INTERNAL MEDICINE

## 2021-09-17 PROCEDURE — 99999 PR PBB SHADOW E&M-EST. PATIENT-LVL V: CPT | Mod: PBBFAC,,, | Performed by: INTERNAL MEDICINE

## 2021-09-17 PROCEDURE — 3051F PR MOST RECENT HEMOGLOBIN A1C LEVEL 7.0 - < 8.0%: ICD-10-PCS | Mod: CPTII,S$GLB,, | Performed by: INTERNAL MEDICINE

## 2021-09-17 PROCEDURE — 1126F PR PAIN SEVERITY QUANTIFIED, NO PAIN PRESENT: ICD-10-PCS | Mod: CPTII,S$GLB,, | Performed by: INTERNAL MEDICINE

## 2021-09-17 PROCEDURE — 1159F PR MEDICATION LIST DOCUMENTED IN MEDICAL RECORD: ICD-10-PCS | Mod: CPTII,S$GLB,, | Performed by: INTERNAL MEDICINE

## 2021-09-17 PROCEDURE — 1101F PT FALLS ASSESS-DOCD LE1/YR: CPT | Mod: CPTII,S$GLB,, | Performed by: INTERNAL MEDICINE

## 2021-09-17 PROCEDURE — 3288F PR FALLS RISK ASSESSMENT DOCUMENTED: ICD-10-PCS | Mod: CPTII,S$GLB,, | Performed by: INTERNAL MEDICINE

## 2021-09-17 PROCEDURE — 99214 PR OFFICE/OUTPT VISIT, EST, LEVL IV, 30-39 MIN: ICD-10-PCS | Mod: S$GLB,,, | Performed by: INTERNAL MEDICINE

## 2021-09-17 PROCEDURE — 99999 PR PBB SHADOW E&M-EST. PATIENT-LVL V: ICD-10-PCS | Mod: PBBFAC,,, | Performed by: INTERNAL MEDICINE

## 2021-09-17 PROCEDURE — 1126F AMNT PAIN NOTED NONE PRSNT: CPT | Mod: CPTII,S$GLB,, | Performed by: INTERNAL MEDICINE

## 2021-09-17 PROCEDURE — 1159F MED LIST DOCD IN RCRD: CPT | Mod: CPTII,S$GLB,, | Performed by: INTERNAL MEDICINE

## 2021-09-17 PROCEDURE — 99214 OFFICE O/P EST MOD 30 MIN: CPT | Mod: S$GLB,,, | Performed by: INTERNAL MEDICINE

## 2021-09-17 PROCEDURE — 3051F HG A1C>EQUAL 7.0%<8.0%: CPT | Mod: CPTII,S$GLB,, | Performed by: INTERNAL MEDICINE

## 2021-09-17 RX ORDER — FUROSEMIDE 40 MG/1
40 TABLET ORAL 2 TIMES DAILY
Qty: 120 TABLET | Refills: 3 | Status: SHIPPED | OUTPATIENT
Start: 2021-09-17 | End: 2021-09-24

## 2021-09-21 DIAGNOSIS — C83.35 DIFFUSE LARGE B-CELL LYMPHOMA OF LYMPH NODES OF LOWER EXTREMITY: ICD-10-CM

## 2021-09-21 RX ORDER — TRAMADOL HYDROCHLORIDE 50 MG/1
TABLET ORAL
Qty: 90 TABLET | Refills: 0 | Status: SHIPPED | OUTPATIENT
Start: 2021-09-21 | End: 2021-12-20

## 2021-09-24 ENCOUNTER — OFFICE VISIT (OUTPATIENT)
Dept: CARDIOLOGY | Facility: CLINIC | Age: 86
End: 2021-09-24
Payer: MEDICARE

## 2021-09-24 ENCOUNTER — LAB VISIT (OUTPATIENT)
Dept: LAB | Facility: HOSPITAL | Age: 86
End: 2021-09-24
Attending: INTERNAL MEDICINE
Payer: MEDICARE

## 2021-09-24 VITALS
SYSTOLIC BLOOD PRESSURE: 117 MMHG | BODY MASS INDEX: 22.62 KG/M2 | WEIGHT: 112.19 LBS | HEIGHT: 59 IN | HEART RATE: 97 BPM | DIASTOLIC BLOOD PRESSURE: 56 MMHG

## 2021-09-24 DIAGNOSIS — E78.2 MIXED HYPERLIPIDEMIA: ICD-10-CM

## 2021-09-24 DIAGNOSIS — I70.0 AORTIC ATHEROSCLEROSIS: ICD-10-CM

## 2021-09-24 DIAGNOSIS — C83.35 DIFFUSE LARGE B-CELL LYMPHOMA OF LYMPH NODES OF LOWER EXTREMITY: ICD-10-CM

## 2021-09-24 DIAGNOSIS — E05.90 HYPERTHYROIDISM: ICD-10-CM

## 2021-09-24 DIAGNOSIS — E11.65 TYPE 2 DIABETES MELLITUS WITH HYPERGLYCEMIA, WITHOUT LONG-TERM CURRENT USE OF INSULIN: ICD-10-CM

## 2021-09-24 DIAGNOSIS — I50.22 CHRONIC SYSTOLIC CONGESTIVE HEART FAILURE: Primary | ICD-10-CM

## 2021-09-24 DIAGNOSIS — I50.22 CHRONIC SYSTOLIC CONGESTIVE HEART FAILURE: ICD-10-CM

## 2021-09-24 LAB
ALBUMIN SERPL BCP-MCNC: 3.2 G/DL (ref 3.5–5.2)
ALP SERPL-CCNC: 94 U/L (ref 55–135)
ALT SERPL W/O P-5'-P-CCNC: 18 U/L (ref 10–44)
ANION GAP SERPL CALC-SCNC: 10 MMOL/L (ref 8–16)
AST SERPL-CCNC: 25 U/L (ref 10–40)
BILIRUB SERPL-MCNC: 1.8 MG/DL (ref 0.1–1)
BNP SERPL-MCNC: 335 PG/ML (ref 0–99)
BUN SERPL-MCNC: 9 MG/DL (ref 8–23)
CALCIUM SERPL-MCNC: 10.1 MG/DL (ref 8.7–10.5)
CHLORIDE SERPL-SCNC: 98 MMOL/L (ref 95–110)
CO2 SERPL-SCNC: 25 MMOL/L (ref 23–29)
CREAT SERPL-MCNC: 0.6 MG/DL (ref 0.5–1.4)
EST. GFR  (AFRICAN AMERICAN): >60 ML/MIN/1.73 M^2
EST. GFR  (NON AFRICAN AMERICAN): >60 ML/MIN/1.73 M^2
GLUCOSE SERPL-MCNC: 133 MG/DL (ref 70–110)
POTASSIUM SERPL-SCNC: 4.1 MMOL/L (ref 3.5–5.1)
PROT SERPL-MCNC: 8.1 G/DL (ref 6–8.4)
SODIUM SERPL-SCNC: 133 MMOL/L (ref 136–145)

## 2021-09-24 PROCEDURE — 99999 PR PBB SHADOW E&M-EST. PATIENT-LVL V: CPT | Mod: PBBFAC,,, | Performed by: PHYSICIAN ASSISTANT

## 2021-09-24 PROCEDURE — 99999 PR PBB SHADOW E&M-EST. PATIENT-LVL V: ICD-10-PCS | Mod: PBBFAC,,, | Performed by: PHYSICIAN ASSISTANT

## 2021-09-24 PROCEDURE — 3051F PR MOST RECENT HEMOGLOBIN A1C LEVEL 7.0 - < 8.0%: ICD-10-PCS | Mod: CPTII,S$GLB,, | Performed by: PHYSICIAN ASSISTANT

## 2021-09-24 PROCEDURE — 1159F PR MEDICATION LIST DOCUMENTED IN MEDICAL RECORD: ICD-10-PCS | Mod: CPTII,S$GLB,, | Performed by: PHYSICIAN ASSISTANT

## 2021-09-24 PROCEDURE — 3051F HG A1C>EQUAL 7.0%<8.0%: CPT | Mod: CPTII,S$GLB,, | Performed by: PHYSICIAN ASSISTANT

## 2021-09-24 PROCEDURE — 99214 OFFICE O/P EST MOD 30 MIN: CPT | Mod: S$GLB,,, | Performed by: PHYSICIAN ASSISTANT

## 2021-09-24 PROCEDURE — 1160F PR REVIEW ALL MEDS BY PRESCRIBER/CLIN PHARMACIST DOCUMENTED: ICD-10-PCS | Mod: CPTII,S$GLB,, | Performed by: PHYSICIAN ASSISTANT

## 2021-09-24 PROCEDURE — 1159F MED LIST DOCD IN RCRD: CPT | Mod: CPTII,S$GLB,, | Performed by: PHYSICIAN ASSISTANT

## 2021-09-24 PROCEDURE — 99214 PR OFFICE/OUTPT VISIT, EST, LEVL IV, 30-39 MIN: ICD-10-PCS | Mod: S$GLB,,, | Performed by: PHYSICIAN ASSISTANT

## 2021-09-24 PROCEDURE — 36415 COLL VENOUS BLD VENIPUNCTURE: CPT | Performed by: INTERNAL MEDICINE

## 2021-09-24 PROCEDURE — 83880 ASSAY OF NATRIURETIC PEPTIDE: CPT | Performed by: INTERNAL MEDICINE

## 2021-09-24 PROCEDURE — 1125F AMNT PAIN NOTED PAIN PRSNT: CPT | Mod: CPTII,S$GLB,, | Performed by: PHYSICIAN ASSISTANT

## 2021-09-24 PROCEDURE — 80053 COMPREHEN METABOLIC PANEL: CPT | Performed by: INTERNAL MEDICINE

## 2021-09-24 PROCEDURE — 1125F PR PAIN SEVERITY QUANTIFIED, PAIN PRESENT: ICD-10-PCS | Mod: CPTII,S$GLB,, | Performed by: PHYSICIAN ASSISTANT

## 2021-09-24 PROCEDURE — 1160F RVW MEDS BY RX/DR IN RCRD: CPT | Mod: CPTII,S$GLB,, | Performed by: PHYSICIAN ASSISTANT

## 2021-09-24 RX ORDER — FUROSEMIDE 20 MG/1
20 TABLET ORAL 2 TIMES DAILY
Qty: 180 TABLET | Refills: 3 | Status: SHIPPED | OUTPATIENT
Start: 2021-09-24 | End: 2022-01-21 | Stop reason: SDUPTHER

## 2021-10-21 ENCOUNTER — LAB VISIT (OUTPATIENT)
Dept: LAB | Facility: HOSPITAL | Age: 86
End: 2021-10-21
Attending: INTERNAL MEDICINE
Payer: MEDICARE

## 2021-10-21 DIAGNOSIS — E04.1 THYROID NODULE: ICD-10-CM

## 2021-10-21 DIAGNOSIS — E11.65 TYPE 2 DIABETES MELLITUS WITH HYPERGLYCEMIA, WITHOUT LONG-TERM CURRENT USE OF INSULIN: ICD-10-CM

## 2021-10-21 DIAGNOSIS — E78.2 MIXED HYPERLIPIDEMIA: ICD-10-CM

## 2021-10-21 DIAGNOSIS — C83.35 DIFFUSE LARGE B-CELL LYMPHOMA OF LYMPH NODES OF LOWER EXTREMITY: ICD-10-CM

## 2021-10-21 LAB
ALBUMIN SERPL BCP-MCNC: 3.2 G/DL (ref 3.5–5.2)
ALP SERPL-CCNC: 82 U/L (ref 55–135)
ALT SERPL W/O P-5'-P-CCNC: 16 U/L (ref 10–44)
ANION GAP SERPL CALC-SCNC: 10 MMOL/L (ref 8–16)
AST SERPL-CCNC: 30 U/L (ref 10–40)
BASOPHILS # BLD AUTO: 0.02 K/UL (ref 0–0.2)
BASOPHILS NFR BLD: 0.5 % (ref 0–1.9)
BILIRUB SERPL-MCNC: 1.4 MG/DL (ref 0.1–1)
BUN SERPL-MCNC: 10 MG/DL (ref 8–23)
CALCIUM SERPL-MCNC: 10 MG/DL (ref 8.7–10.5)
CHLORIDE SERPL-SCNC: 104 MMOL/L (ref 95–110)
CHOLEST SERPL-MCNC: 134 MG/DL (ref 120–199)
CHOLEST/HDLC SERPL: 2.4 {RATIO} (ref 2–5)
CO2 SERPL-SCNC: 25 MMOL/L (ref 23–29)
CREAT SERPL-MCNC: 0.6 MG/DL (ref 0.5–1.4)
CRP SERPL-MCNC: 3.5 MG/L (ref 0–8.2)
DIFFERENTIAL METHOD: ABNORMAL
EOSINOPHIL # BLD AUTO: 0.1 K/UL (ref 0–0.5)
EOSINOPHIL NFR BLD: 2.1 % (ref 0–8)
ERYTHROCYTE [DISTWIDTH] IN BLOOD BY AUTOMATED COUNT: 13.7 % (ref 11.5–14.5)
ERYTHROCYTE [SEDIMENTATION RATE] IN BLOOD BY WESTERGREN METHOD: 75 MM/HR (ref 0–36)
EST. GFR  (AFRICAN AMERICAN): >60 ML/MIN/1.73 M^2
EST. GFR  (NON AFRICAN AMERICAN): >60 ML/MIN/1.73 M^2
ESTIMATED AVG GLUCOSE: 169 MG/DL (ref 68–131)
GLUCOSE SERPL-MCNC: 108 MG/DL (ref 70–110)
HBA1C MFR BLD: 7.5 % (ref 4–5.6)
HCT VFR BLD AUTO: 44.1 % (ref 37–48.5)
HDLC SERPL-MCNC: 55 MG/DL (ref 40–75)
HDLC SERPL: 41 % (ref 20–50)
HGB BLD-MCNC: 13.9 G/DL (ref 12–16)
IMM GRANULOCYTES # BLD AUTO: 0 K/UL (ref 0–0.04)
IMM GRANULOCYTES NFR BLD AUTO: 0 % (ref 0–0.5)
LDLC SERPL CALC-MCNC: 61.6 MG/DL (ref 63–159)
LYMPHOCYTES # BLD AUTO: 1.6 K/UL (ref 1–4.8)
LYMPHOCYTES NFR BLD: 42.5 % (ref 18–48)
MCH RBC QN AUTO: 29.1 PG (ref 27–31)
MCHC RBC AUTO-ENTMCNC: 31.5 G/DL (ref 32–36)
MCV RBC AUTO: 92 FL (ref 82–98)
MONOCYTES # BLD AUTO: 0.4 K/UL (ref 0.3–1)
MONOCYTES NFR BLD: 11.3 % (ref 4–15)
NEUTROPHILS # BLD AUTO: 1.7 K/UL (ref 1.8–7.7)
NEUTROPHILS NFR BLD: 43.6 % (ref 38–73)
NONHDLC SERPL-MCNC: 79 MG/DL
NRBC BLD-RTO: 0 /100 WBC
PLATELET # BLD AUTO: 235 K/UL (ref 150–450)
PMV BLD AUTO: 12.5 FL (ref 9.2–12.9)
POTASSIUM SERPL-SCNC: 4.2 MMOL/L (ref 3.5–5.1)
PROT SERPL-MCNC: 8 G/DL (ref 6–8.4)
RBC # BLD AUTO: 4.78 M/UL (ref 4–5.4)
SODIUM SERPL-SCNC: 139 MMOL/L (ref 136–145)
T4 FREE SERPL-MCNC: 0.94 NG/DL (ref 0.71–1.51)
TRIGL SERPL-MCNC: 87 MG/DL (ref 30–150)
TSH SERPL DL<=0.005 MIU/L-ACNC: 4.01 UIU/ML (ref 0.4–4)
WBC # BLD AUTO: 3.79 K/UL (ref 3.9–12.7)

## 2021-10-21 PROCEDURE — 86334 IMMUNOFIX E-PHORESIS SERUM: CPT | Mod: 26,HCNC,, | Performed by: PATHOLOGY

## 2021-10-21 PROCEDURE — 84165 PROTEIN E-PHORESIS SERUM: CPT | Mod: 26,HCNC,, | Performed by: PATHOLOGY

## 2021-10-21 PROCEDURE — 85652 RBC SED RATE AUTOMATED: CPT | Mod: HCNC | Performed by: INTERNAL MEDICINE

## 2021-10-21 PROCEDURE — 80053 COMPREHEN METABOLIC PANEL: CPT | Mod: HCNC | Performed by: INTERNAL MEDICINE

## 2021-10-21 PROCEDURE — 84165 PROTEIN E-PHORESIS SERUM: CPT | Mod: HCNC | Performed by: INTERNAL MEDICINE

## 2021-10-21 PROCEDURE — 85025 COMPLETE CBC W/AUTO DIFF WBC: CPT | Mod: HCNC | Performed by: INTERNAL MEDICINE

## 2021-10-21 PROCEDURE — 84443 ASSAY THYROID STIM HORMONE: CPT | Mod: HCNC | Performed by: INTERNAL MEDICINE

## 2021-10-21 PROCEDURE — 84439 ASSAY OF FREE THYROXINE: CPT | Mod: HCNC | Performed by: INTERNAL MEDICINE

## 2021-10-21 PROCEDURE — 83036 HEMOGLOBIN GLYCOSYLATED A1C: CPT | Mod: HCNC | Performed by: INTERNAL MEDICINE

## 2021-10-21 PROCEDURE — 86334 PATHOLOGIST INTERPRETATION IFE: ICD-10-PCS | Mod: 26,HCNC,, | Performed by: PATHOLOGY

## 2021-10-21 PROCEDURE — 84165 PATHOLOGIST INTERPRETATION SPE: ICD-10-PCS | Mod: 26,HCNC,, | Performed by: PATHOLOGY

## 2021-10-21 PROCEDURE — 86140 C-REACTIVE PROTEIN: CPT | Mod: HCNC | Performed by: INTERNAL MEDICINE

## 2021-10-21 PROCEDURE — 86334 IMMUNOFIX E-PHORESIS SERUM: CPT | Mod: HCNC | Performed by: INTERNAL MEDICINE

## 2021-10-21 PROCEDURE — 36415 COLL VENOUS BLD VENIPUNCTURE: CPT | Mod: HCNC | Performed by: INTERNAL MEDICINE

## 2021-10-21 PROCEDURE — 80061 LIPID PANEL: CPT | Mod: HCNC | Performed by: INTERNAL MEDICINE

## 2021-10-22 LAB
ALBUMIN SERPL ELPH-MCNC: 3.12 G/DL (ref 3.35–5.55)
ALPHA1 GLOB SERPL ELPH-MCNC: 0.35 G/DL (ref 0.17–0.41)
ALPHA2 GLOB SERPL ELPH-MCNC: 0.88 G/DL (ref 0.43–0.99)
B-GLOBULIN SERPL ELPH-MCNC: 0.77 G/DL (ref 0.5–1.1)
GAMMA GLOB SERPL ELPH-MCNC: 1.98 G/DL (ref 0.67–1.58)
INTERPRETATION SERPL IFE-IMP: NORMAL
PROT SERPL-MCNC: 7.1 G/DL (ref 6–8.4)

## 2021-10-24 LAB
PATHOLOGIST INTERPRETATION IFE: NORMAL
PATHOLOGIST INTERPRETATION SPE: NORMAL

## 2021-10-28 ENCOUNTER — TELEPHONE (OUTPATIENT)
Dept: INTERNAL MEDICINE | Facility: CLINIC | Age: 86
End: 2021-10-28
Payer: MEDICARE

## 2021-10-28 DIAGNOSIS — L60.9 NAIL ABNORMALITIES: Primary | ICD-10-CM

## 2021-11-01 ENCOUNTER — TELEPHONE (OUTPATIENT)
Dept: INTERNAL MEDICINE | Facility: CLINIC | Age: 86
End: 2021-11-01
Payer: MEDICARE

## 2021-11-23 RX ORDER — METHIMAZOLE 5 MG/1
TABLET ORAL
Qty: 45 TABLET | Refills: 0 | Status: SHIPPED | OUTPATIENT
Start: 2021-11-23 | End: 2022-01-21 | Stop reason: SDUPTHER

## 2021-11-23 RX ORDER — GABAPENTIN 300 MG/1
300 CAPSULE ORAL 2 TIMES DAILY
Qty: 180 CAPSULE | Refills: 1 | Status: SHIPPED | OUTPATIENT
Start: 2021-11-23 | End: 2022-01-01

## 2021-12-03 ENCOUNTER — OFFICE VISIT (OUTPATIENT)
Dept: URGENT CARE | Facility: CLINIC | Age: 86
End: 2021-12-03
Payer: MEDICARE

## 2021-12-03 ENCOUNTER — TELEPHONE (OUTPATIENT)
Dept: INTERNAL MEDICINE | Facility: CLINIC | Age: 86
End: 2021-12-03
Payer: MEDICARE

## 2021-12-03 ENCOUNTER — PATIENT MESSAGE (OUTPATIENT)
Dept: INTERNAL MEDICINE | Facility: CLINIC | Age: 86
End: 2021-12-03
Payer: MEDICARE

## 2021-12-03 VITALS
HEART RATE: 74 BPM | WEIGHT: 112 LBS | DIASTOLIC BLOOD PRESSURE: 72 MMHG | HEIGHT: 59 IN | SYSTOLIC BLOOD PRESSURE: 119 MMHG | RESPIRATION RATE: 18 BRPM | OXYGEN SATURATION: 98 % | BODY MASS INDEX: 22.58 KG/M2 | TEMPERATURE: 98 F

## 2021-12-03 DIAGNOSIS — R30.0 DYSURIA: ICD-10-CM

## 2021-12-03 DIAGNOSIS — N30.01 ACUTE CYSTITIS WITH HEMATURIA: Primary | ICD-10-CM

## 2021-12-03 LAB
BILIRUB UR QL STRIP: NEGATIVE
GLUCOSE UR QL STRIP: NEGATIVE
KETONES UR QL STRIP: POSITIVE
LEUKOCYTE ESTERASE UR QL STRIP: POSITIVE
PH, POC UA: 5 (ref 5–8)
POC BLOOD, URINE: POSITIVE
POC NITRATES, URINE: NEGATIVE
PROT UR QL STRIP: NEGATIVE
SP GR UR STRIP: 1.02 (ref 1–1.03)
UROBILINOGEN UR STRIP-ACNC: NORMAL (ref 0.1–1.1)

## 2021-12-03 PROCEDURE — 87086 URINE CULTURE/COLONY COUNT: CPT | Mod: HCNC | Performed by: EMERGENCY MEDICINE

## 2021-12-03 PROCEDURE — 81003 URINALYSIS AUTO W/O SCOPE: CPT | Mod: QW,S$GLB,, | Performed by: EMERGENCY MEDICINE

## 2021-12-03 PROCEDURE — 99214 PR OFFICE/OUTPT VISIT, EST, LEVL IV, 30-39 MIN: ICD-10-PCS | Mod: 25,S$GLB,, | Performed by: EMERGENCY MEDICINE

## 2021-12-03 PROCEDURE — 81003 POCT URINALYSIS, DIPSTICK, AUTOMATED, W/O SCOPE: ICD-10-PCS | Mod: QW,S$GLB,, | Performed by: EMERGENCY MEDICINE

## 2021-12-03 PROCEDURE — 87186 SC STD MICRODIL/AGAR DIL: CPT | Mod: HCNC | Performed by: EMERGENCY MEDICINE

## 2021-12-03 PROCEDURE — 99214 OFFICE O/P EST MOD 30 MIN: CPT | Mod: 25,S$GLB,, | Performed by: EMERGENCY MEDICINE

## 2021-12-03 PROCEDURE — 87077 CULTURE AEROBIC IDENTIFY: CPT | Mod: HCNC | Performed by: EMERGENCY MEDICINE

## 2021-12-03 PROCEDURE — 87088 URINE BACTERIA CULTURE: CPT | Mod: HCNC | Performed by: EMERGENCY MEDICINE

## 2021-12-03 RX ORDER — NITROFURANTOIN 25; 75 MG/1; MG/1
100 CAPSULE ORAL 2 TIMES DAILY
Qty: 14 CAPSULE | Refills: 0 | Status: SHIPPED | OUTPATIENT
Start: 2021-12-03 | End: 2021-12-10

## 2021-12-07 LAB — BACTERIA UR CULT: ABNORMAL

## 2021-12-18 ENCOUNTER — TELEPHONE (OUTPATIENT)
Dept: INTERNAL MEDICINE | Facility: CLINIC | Age: 86
End: 2021-12-18
Payer: MEDICARE

## 2021-12-18 DIAGNOSIS — C83.35 DIFFUSE LARGE B-CELL LYMPHOMA OF LYMPH NODES OF LOWER EXTREMITY: ICD-10-CM

## 2021-12-20 RX ORDER — TRAMADOL HYDROCHLORIDE 50 MG/1
TABLET ORAL
Qty: 90 TABLET | Refills: 0 | Status: SHIPPED | OUTPATIENT
Start: 2021-12-20 | End: 2021-12-21 | Stop reason: SDUPTHER

## 2021-12-21 DIAGNOSIS — C83.35 DIFFUSE LARGE B-CELL LYMPHOMA OF LYMPH NODES OF LOWER EXTREMITY: ICD-10-CM

## 2021-12-21 RX ORDER — TRAMADOL HYDROCHLORIDE 50 MG/1
50 TABLET ORAL EVERY 8 HOURS PRN
Qty: 90 TABLET | Refills: 0 | Status: SHIPPED | OUTPATIENT
Start: 2021-12-21 | End: 2022-01-01

## 2022-01-01 ENCOUNTER — HOSPITAL ENCOUNTER (EMERGENCY)
Facility: HOSPITAL | Age: 87
Discharge: HOME | End: 2022-12-24
Attending: EMERGENCY MEDICINE
Payer: MEDICARE

## 2022-01-01 ENCOUNTER — OUTPATIENT CASE MANAGEMENT (OUTPATIENT)
Dept: ADMINISTRATIVE | Facility: OTHER | Age: 87
End: 2022-01-01
Payer: MEDICARE

## 2022-01-01 ENCOUNTER — LAB VISIT (OUTPATIENT)
Dept: LAB | Facility: HOSPITAL | Age: 87
End: 2022-01-01
Payer: MEDICARE

## 2022-01-01 ENCOUNTER — PATIENT MESSAGE (OUTPATIENT)
Dept: INTERNAL MEDICINE | Facility: CLINIC | Age: 87
End: 2022-01-01
Payer: MEDICARE

## 2022-01-01 ENCOUNTER — TELEPHONE (OUTPATIENT)
Dept: SURGERY | Facility: CLINIC | Age: 87
End: 2022-01-01
Payer: MEDICARE

## 2022-01-01 ENCOUNTER — TELEPHONE (OUTPATIENT)
Dept: INTERNAL MEDICINE | Facility: CLINIC | Age: 87
End: 2022-01-01
Payer: MEDICARE

## 2022-01-01 ENCOUNTER — OFFICE VISIT (OUTPATIENT)
Dept: INTERNAL MEDICINE | Facility: CLINIC | Age: 87
End: 2022-01-01
Payer: MEDICARE

## 2022-01-01 ENCOUNTER — PATIENT MESSAGE (OUTPATIENT)
Dept: ADMINISTRATIVE | Facility: OTHER | Age: 87
End: 2022-01-01
Payer: MEDICARE

## 2022-01-01 ENCOUNTER — IMMUNIZATION (OUTPATIENT)
Dept: INTERNAL MEDICINE | Facility: CLINIC | Age: 87
End: 2022-01-01
Payer: MEDICARE

## 2022-01-01 ENCOUNTER — HOSPITAL ENCOUNTER (INPATIENT)
Facility: HOSPITAL | Age: 87
LOS: 24 days | Discharge: HOME HEALTH | DRG: 291 | End: 2022-12-14
Attending: EMERGENCY MEDICINE | Admitting: INTERNAL MEDICINE
Payer: MEDICARE

## 2022-01-01 ENCOUNTER — HOSPITAL ENCOUNTER (OUTPATIENT)
Dept: RADIOLOGY | Facility: HOSPITAL | Age: 87
Discharge: HOME | End: 2022-11-11
Attending: NURSE PRACTITIONER
Payer: MEDICARE

## 2022-01-01 ENCOUNTER — TELEPHONE (OUTPATIENT)
Dept: CARDIOLOGY | Facility: HOSPITAL | Age: 87
End: 2022-01-01
Payer: MEDICARE

## 2022-01-01 ENCOUNTER — TELEPHONE (OUTPATIENT)
Dept: CARDIOLOGY | Facility: CLINIC | Age: 87
End: 2022-01-01
Payer: MEDICARE

## 2022-01-01 ENCOUNTER — TELEPHONE (OUTPATIENT)
Dept: INTERNAL MEDICINE | Facility: CLINIC | Age: 87
End: 2022-01-01

## 2022-01-01 ENCOUNTER — HOSPITAL ENCOUNTER (OUTPATIENT)
Dept: RADIOLOGY | Facility: HOSPITAL | Age: 87
Discharge: HOME | End: 2022-08-25
Attending: NURSE PRACTITIONER
Payer: MEDICARE

## 2022-01-01 ENCOUNTER — LAB VISIT (OUTPATIENT)
Dept: LAB | Facility: HOSPITAL | Age: 87
End: 2022-01-01
Attending: INTERNAL MEDICINE
Payer: MEDICARE

## 2022-01-01 ENCOUNTER — HOSPITAL ENCOUNTER (OUTPATIENT)
Facility: HOSPITAL | Age: 87
Discharge: HOME HEALTH | End: 2022-09-16
Attending: STUDENT IN AN ORGANIZED HEALTH CARE EDUCATION/TRAINING PROGRAM | Admitting: STUDENT IN AN ORGANIZED HEALTH CARE EDUCATION/TRAINING PROGRAM
Payer: MEDICARE

## 2022-01-01 ENCOUNTER — PES CALL (OUTPATIENT)
Dept: ADMINISTRATIVE | Facility: CLINIC | Age: 87
End: 2022-01-01
Payer: MEDICARE

## 2022-01-01 ENCOUNTER — TELEPHONE (OUTPATIENT)
Dept: HEMATOLOGY/ONCOLOGY | Facility: CLINIC | Age: 87
End: 2022-01-01
Payer: MEDICARE

## 2022-01-01 ENCOUNTER — HOSPITAL ENCOUNTER (EMERGENCY)
Facility: HOSPITAL | Age: 87
Discharge: HOME | End: 2022-04-19
Attending: EMERGENCY MEDICINE
Payer: MEDICARE

## 2022-01-01 ENCOUNTER — SPECIALTY PHARMACY (OUTPATIENT)
Dept: PHARMACY | Facility: CLINIC | Age: 87
End: 2022-01-01
Payer: MEDICARE

## 2022-01-01 ENCOUNTER — HOSPITAL ENCOUNTER (OUTPATIENT)
Dept: CARDIOLOGY | Facility: HOSPITAL | Age: 87
Discharge: HOME | End: 2022-02-18
Attending: INTERNAL MEDICINE
Payer: MEDICARE

## 2022-01-01 ENCOUNTER — EXTERNAL HOME HEALTH (OUTPATIENT)
Dept: HOME HEALTH SERVICES | Facility: HOSPITAL | Age: 87
End: 2022-01-01
Payer: MEDICARE

## 2022-01-01 ENCOUNTER — HOSPITAL ENCOUNTER (OUTPATIENT)
Facility: HOSPITAL | Age: 87
Discharge: HOME | End: 2022-09-07
Attending: EMERGENCY MEDICINE | Admitting: EMERGENCY MEDICINE
Payer: MEDICARE

## 2022-01-01 ENCOUNTER — OFFICE VISIT (OUTPATIENT)
Dept: PODIATRY | Facility: CLINIC | Age: 87
End: 2022-01-01
Payer: MEDICARE

## 2022-01-01 ENCOUNTER — OFFICE VISIT (OUTPATIENT)
Dept: CARDIOLOGY | Facility: CLINIC | Age: 87
End: 2022-01-01
Payer: MEDICARE

## 2022-01-01 ENCOUNTER — HOSPITAL ENCOUNTER (OUTPATIENT)
Facility: HOSPITAL | Age: 87
Discharge: HOME | End: 2022-07-01
Attending: EMERGENCY MEDICINE | Admitting: INTERNAL MEDICINE
Payer: MEDICARE

## 2022-01-01 ENCOUNTER — PATIENT OUTREACH (OUTPATIENT)
Dept: ADMINISTRATIVE | Facility: CLINIC | Age: 87
End: 2022-01-01
Payer: MEDICARE

## 2022-01-01 ENCOUNTER — HOSPITAL ENCOUNTER (EMERGENCY)
Facility: HOSPITAL | Age: 87
Discharge: HOME | End: 2022-03-08
Attending: EMERGENCY MEDICINE
Payer: MEDICARE

## 2022-01-01 ENCOUNTER — HOSPITAL ENCOUNTER (EMERGENCY)
Facility: HOSPITAL | Age: 87
Discharge: HOME | End: 2022-06-22
Attending: EMERGENCY MEDICINE
Payer: MEDICARE

## 2022-01-01 VITALS
WEIGHT: 113 LBS | TEMPERATURE: 98 F | DIASTOLIC BLOOD PRESSURE: 70 MMHG | HEIGHT: 59 IN | BODY MASS INDEX: 22.78 KG/M2 | SYSTOLIC BLOOD PRESSURE: 134 MMHG | RESPIRATION RATE: 20 BRPM | OXYGEN SATURATION: 96 % | HEART RATE: 89 BPM

## 2022-01-01 VITALS
WEIGHT: 113 LBS | SYSTOLIC BLOOD PRESSURE: 117 MMHG | HEIGHT: 59 IN | OXYGEN SATURATION: 100 % | DIASTOLIC BLOOD PRESSURE: 59 MMHG | HEART RATE: 72 BPM | RESPIRATION RATE: 20 BRPM | BODY MASS INDEX: 22.78 KG/M2 | TEMPERATURE: 98 F

## 2022-01-01 VITALS
WEIGHT: 115 LBS | TEMPERATURE: 98 F | RESPIRATION RATE: 17 BRPM | DIASTOLIC BLOOD PRESSURE: 59 MMHG | BODY MASS INDEX: 23.18 KG/M2 | HEART RATE: 87 BPM | SYSTOLIC BLOOD PRESSURE: 102 MMHG | OXYGEN SATURATION: 90 % | HEIGHT: 59 IN

## 2022-01-01 VITALS
WEIGHT: 113 LBS | HEIGHT: 59 IN | SYSTOLIC BLOOD PRESSURE: 117 MMHG | DIASTOLIC BLOOD PRESSURE: 65 MMHG | BODY MASS INDEX: 22.78 KG/M2 | HEART RATE: 94 BPM

## 2022-01-01 VITALS
DIASTOLIC BLOOD PRESSURE: 53 MMHG | OXYGEN SATURATION: 99 % | RESPIRATION RATE: 18 BRPM | SYSTOLIC BLOOD PRESSURE: 100 MMHG | HEART RATE: 72 BPM | WEIGHT: 120 LBS | TEMPERATURE: 98 F | BODY MASS INDEX: 24.24 KG/M2

## 2022-01-01 VITALS
DIASTOLIC BLOOD PRESSURE: 60 MMHG | RESPIRATION RATE: 18 BRPM | HEART RATE: 72 BPM | SYSTOLIC BLOOD PRESSURE: 118 MMHG | TEMPERATURE: 98 F | OXYGEN SATURATION: 99 %

## 2022-01-01 VITALS
WEIGHT: 115.06 LBS | TEMPERATURE: 98 F | HEART RATE: 75 BPM | OXYGEN SATURATION: 99 % | DIASTOLIC BLOOD PRESSURE: 60 MMHG | RESPIRATION RATE: 17 BRPM | BODY MASS INDEX: 23.24 KG/M2 | SYSTOLIC BLOOD PRESSURE: 100 MMHG

## 2022-01-01 VITALS
OXYGEN SATURATION: 96 % | SYSTOLIC BLOOD PRESSURE: 108 MMHG | DIASTOLIC BLOOD PRESSURE: 50 MMHG | WEIGHT: 115.5 LBS | HEIGHT: 59 IN | HEART RATE: 109 BPM | BODY MASS INDEX: 23.28 KG/M2

## 2022-01-01 VITALS
WEIGHT: 108 LBS | OXYGEN SATURATION: 97 % | SYSTOLIC BLOOD PRESSURE: 108 MMHG | TEMPERATURE: 98 F | DIASTOLIC BLOOD PRESSURE: 57 MMHG | HEIGHT: 59 IN | BODY MASS INDEX: 21.77 KG/M2 | HEART RATE: 74 BPM | RESPIRATION RATE: 18 BRPM

## 2022-01-01 VITALS
BODY MASS INDEX: 19.96 KG/M2 | HEIGHT: 59 IN | SYSTOLIC BLOOD PRESSURE: 110 MMHG | WEIGHT: 99 LBS | DIASTOLIC BLOOD PRESSURE: 60 MMHG

## 2022-01-01 VITALS
DIASTOLIC BLOOD PRESSURE: 54 MMHG | BODY MASS INDEX: 22.6 KG/M2 | HEART RATE: 67 BPM | WEIGHT: 112.13 LBS | SYSTOLIC BLOOD PRESSURE: 102 MMHG | HEIGHT: 59 IN

## 2022-01-01 VITALS
OXYGEN SATURATION: 96 % | WEIGHT: 103.81 LBS | SYSTOLIC BLOOD PRESSURE: 121 MMHG | HEART RATE: 77 BPM | DIASTOLIC BLOOD PRESSURE: 59 MMHG | HEIGHT: 59 IN | TEMPERATURE: 98 F | BODY MASS INDEX: 20.93 KG/M2 | RESPIRATION RATE: 18 BRPM

## 2022-01-01 DIAGNOSIS — I50.42 CHRONIC COMBINED SYSTOLIC AND DIASTOLIC CONGESTIVE HEART FAILURE: ICD-10-CM

## 2022-01-01 DIAGNOSIS — R10.9 ABDOMINAL PAIN, UNSPECIFIED ABDOMINAL LOCATION: ICD-10-CM

## 2022-01-01 DIAGNOSIS — R07.9 CHEST PAIN: Primary | ICD-10-CM

## 2022-01-01 DIAGNOSIS — E11.65 TYPE 2 DIABETES MELLITUS WITH HYPERGLYCEMIA, WITHOUT LONG-TERM CURRENT USE OF INSULIN: Primary | ICD-10-CM

## 2022-01-01 DIAGNOSIS — Z79.4 TYPE 2 DIABETES MELLITUS WITHOUT COMPLICATION, WITH LONG-TERM CURRENT USE OF INSULIN: Primary | Chronic | ICD-10-CM

## 2022-01-01 DIAGNOSIS — B35.1 ONYCHOMYCOSIS DUE TO DERMATOPHYTE: ICD-10-CM

## 2022-01-01 DIAGNOSIS — E87.1 HYPONATREMIA: Primary | ICD-10-CM

## 2022-01-01 DIAGNOSIS — R19.7 DIARRHEA, UNSPECIFIED TYPE: Primary | ICD-10-CM

## 2022-01-01 DIAGNOSIS — R06.02 SOB (SHORTNESS OF BREATH): ICD-10-CM

## 2022-01-01 DIAGNOSIS — R07.9 CHEST PAIN, UNSPECIFIED TYPE: ICD-10-CM

## 2022-01-01 DIAGNOSIS — R57.9 SHOCK, UNSPECIFIED: ICD-10-CM

## 2022-01-01 DIAGNOSIS — E11.9 TYPE 2 DIABETES MELLITUS WITHOUT COMPLICATION, WITHOUT LONG-TERM CURRENT USE OF INSULIN: Chronic | ICD-10-CM

## 2022-01-01 DIAGNOSIS — K80.10 CALCULUS OF GALLBLADDER WITH CHRONIC CHOLECYSTITIS WITHOUT OBSTRUCTION: ICD-10-CM

## 2022-01-01 DIAGNOSIS — C83.35 DIFFUSE LARGE B-CELL LYMPHOMA OF LYMPH NODES OF LOWER EXTREMITY: ICD-10-CM

## 2022-01-01 DIAGNOSIS — R93.5 ABNORMAL FINDINGS ON DIAGNOSTIC IMAGING OF ABDOMEN: ICD-10-CM

## 2022-01-01 DIAGNOSIS — I50.42 CHRONIC COMBINED SYSTOLIC AND DIASTOLIC HEART FAILURE: Primary | ICD-10-CM

## 2022-01-01 DIAGNOSIS — C83.35 DIFFUSE LARGE B-CELL LYMPHOMA OF LYMPH NODES OF LOWER EXTREMITY: Primary | ICD-10-CM

## 2022-01-01 DIAGNOSIS — I50.32 CHRONIC DIASTOLIC CONGESTIVE HEART FAILURE: Primary | ICD-10-CM

## 2022-01-01 DIAGNOSIS — I50.33 ACUTE ON CHRONIC DIASTOLIC HEART FAILURE: ICD-10-CM

## 2022-01-01 DIAGNOSIS — I50.9 ACUTE ON CHRONIC CONGESTIVE HEART FAILURE, UNSPECIFIED HEART FAILURE TYPE: Primary | ICD-10-CM

## 2022-01-01 DIAGNOSIS — R63.4 WEIGHT LOSS: ICD-10-CM

## 2022-01-01 DIAGNOSIS — N30.00 ACUTE CYSTITIS WITHOUT HEMATURIA: ICD-10-CM

## 2022-01-01 DIAGNOSIS — R11.2 NON-INTRACTABLE VOMITING WITH NAUSEA, UNSPECIFIED VOMITING TYPE: ICD-10-CM

## 2022-01-01 DIAGNOSIS — I50.32 CHRONIC DIASTOLIC CONGESTIVE HEART FAILURE: ICD-10-CM

## 2022-01-01 DIAGNOSIS — B34.9 VIRAL SYNDROME: Primary | ICD-10-CM

## 2022-01-01 DIAGNOSIS — L24.A2 IRRITANT CONTACT DERMATITIS DUE TO FECAL, URINARY OR DUAL INCONTINENCE: Primary | ICD-10-CM

## 2022-01-01 DIAGNOSIS — N18.4 CKD (CHRONIC KIDNEY DISEASE), STAGE IV: ICD-10-CM

## 2022-01-01 DIAGNOSIS — R06.02 SHORTNESS OF BREATH: ICD-10-CM

## 2022-01-01 DIAGNOSIS — N30.00 ACUTE CYSTITIS WITHOUT HEMATURIA: Primary | ICD-10-CM

## 2022-01-01 DIAGNOSIS — R57.9 SHOCK: ICD-10-CM

## 2022-01-01 DIAGNOSIS — N12 PYELONEPHRITIS: Primary | ICD-10-CM

## 2022-01-01 DIAGNOSIS — I50.22 CHRONIC SYSTOLIC CONGESTIVE HEART FAILURE: Primary | ICD-10-CM

## 2022-01-01 DIAGNOSIS — C83.35 DIFFUSE LARGE B-CELL LYMPHOMA OF LYMPH NODES OF LOWER EXTREMITY: Chronic | ICD-10-CM

## 2022-01-01 DIAGNOSIS — R06.02 SOB (SHORTNESS OF BREATH): Primary | ICD-10-CM

## 2022-01-01 DIAGNOSIS — E11.65 TYPE 2 DIABETES MELLITUS WITH HYPERGLYCEMIA, WITHOUT LONG-TERM CURRENT USE OF INSULIN: ICD-10-CM

## 2022-01-01 DIAGNOSIS — I50.43 ACUTE ON CHRONIC COMBINED SYSTOLIC AND DIASTOLIC HEART FAILURE: ICD-10-CM

## 2022-01-01 DIAGNOSIS — I50.42 CHRONIC COMBINED SYSTOLIC AND DIASTOLIC HEART FAILURE: ICD-10-CM

## 2022-01-01 DIAGNOSIS — R07.9 CHEST PAIN AT REST: ICD-10-CM

## 2022-01-01 DIAGNOSIS — R10.13 EPIGASTRIC ABDOMINAL PAIN: ICD-10-CM

## 2022-01-01 DIAGNOSIS — N39.0 URINARY TRACT INFECTION WITHOUT HEMATURIA, SITE UNSPECIFIED: ICD-10-CM

## 2022-01-01 DIAGNOSIS — I50.9 CHRONIC CONGESTIVE HEART FAILURE: ICD-10-CM

## 2022-01-01 DIAGNOSIS — E87.1 HYPONATREMIA: ICD-10-CM

## 2022-01-01 DIAGNOSIS — R53.81 PHYSICAL DECONDITIONING: ICD-10-CM

## 2022-01-01 DIAGNOSIS — E05.90 HYPERTHYROIDISM: ICD-10-CM

## 2022-01-01 DIAGNOSIS — R07.9 CHEST PAIN: ICD-10-CM

## 2022-01-01 DIAGNOSIS — I70.0 AORTIC ATHEROSCLEROSIS: ICD-10-CM

## 2022-01-01 DIAGNOSIS — E11.51 DIABETES MELLITUS WITH PERIPHERAL VASCULAR DISEASE: Primary | ICD-10-CM

## 2022-01-01 DIAGNOSIS — E78.2 MIXED HYPERLIPIDEMIA: ICD-10-CM

## 2022-01-01 DIAGNOSIS — N28.1 RENAL CYST: ICD-10-CM

## 2022-01-01 DIAGNOSIS — M47.812 CERVICAL SPINE ARTHRITIS: ICD-10-CM

## 2022-01-01 DIAGNOSIS — E04.1 THYROID NODULE: ICD-10-CM

## 2022-01-01 DIAGNOSIS — R09.02 HYPOXEMIA: ICD-10-CM

## 2022-01-01 DIAGNOSIS — I50.33 ACUTE ON CHRONIC DIASTOLIC HEART FAILURE: Primary | ICD-10-CM

## 2022-01-01 DIAGNOSIS — R79.89 ELEVATED BRAIN NATRIURETIC PEPTIDE (BNP) LEVEL: ICD-10-CM

## 2022-01-01 DIAGNOSIS — R53.81 DEBILITY: ICD-10-CM

## 2022-01-01 DIAGNOSIS — N28.1 RENAL CYST: Primary | ICD-10-CM

## 2022-01-01 DIAGNOSIS — I50.9 CHF (CONGESTIVE HEART FAILURE): ICD-10-CM

## 2022-01-01 DIAGNOSIS — N28.9 RENAL INSUFFICIENCY: ICD-10-CM

## 2022-01-01 DIAGNOSIS — G89.4 CHRONIC PAIN SYNDROME: ICD-10-CM

## 2022-01-01 DIAGNOSIS — D84.9 IMMUNOSUPPRESSED STATUS: ICD-10-CM

## 2022-01-01 DIAGNOSIS — E11.9 TYPE 2 DIABETES MELLITUS WITHOUT COMPLICATION, WITH LONG-TERM CURRENT USE OF INSULIN: Primary | Chronic | ICD-10-CM

## 2022-01-01 DIAGNOSIS — I50.22 CHRONIC SYSTOLIC CONGESTIVE HEART FAILURE: ICD-10-CM

## 2022-01-01 DIAGNOSIS — M89.9 LYTIC LESION OF BONE ON X-RAY: ICD-10-CM

## 2022-01-01 DIAGNOSIS — R89.9 ABNORMAL LABORATORY TEST: Primary | ICD-10-CM

## 2022-01-01 LAB
ALBUMIN SERPL BCP-MCNC: 1.9 G/DL (ref 3.5–5.2)
ALBUMIN SERPL BCP-MCNC: 2 G/DL (ref 3.5–5.2)
ALBUMIN SERPL BCP-MCNC: 2.2 G/DL (ref 3.5–5.2)
ALBUMIN SERPL BCP-MCNC: 2.2 G/DL (ref 3.5–5.2)
ALBUMIN SERPL BCP-MCNC: 2.3 G/DL (ref 3.5–5.2)
ALBUMIN SERPL BCP-MCNC: 2.3 G/DL (ref 3.5–5.2)
ALBUMIN SERPL BCP-MCNC: 2.4 G/DL (ref 3.5–5.2)
ALBUMIN SERPL BCP-MCNC: 2.5 G/DL (ref 3.5–5.2)
ALBUMIN SERPL BCP-MCNC: 2.6 G/DL (ref 3.5–5.2)
ALBUMIN SERPL BCP-MCNC: 2.6 G/DL (ref 3.5–5.2)
ALBUMIN SERPL BCP-MCNC: 2.8 G/DL (ref 3.5–5.2)
ALBUMIN SERPL BCP-MCNC: 2.9 G/DL (ref 3.5–5.2)
ALBUMIN SERPL BCP-MCNC: 2.9 G/DL (ref 3.5–5.2)
ALBUMIN SERPL BCP-MCNC: 3 G/DL (ref 3.5–5.2)
ALBUMIN SERPL BCP-MCNC: 3 G/DL (ref 3.5–5.2)
ALBUMIN SERPL BCP-MCNC: 3.1 G/DL (ref 3.5–5.2)
ALBUMIN SERPL BCP-MCNC: 3.2 G/DL (ref 3.5–5.2)
ALBUMIN SERPL BCP-MCNC: 3.2 G/DL (ref 3.5–5.2)
ALBUMIN SERPL ELPH-MCNC: 2.82 G/DL (ref 3.35–5.55)
ALLENS TEST: ABNORMAL
ALP SERPL-CCNC: 106 U/L (ref 55–135)
ALP SERPL-CCNC: 109 U/L (ref 55–135)
ALP SERPL-CCNC: 112 U/L (ref 55–135)
ALP SERPL-CCNC: 116 U/L (ref 55–135)
ALP SERPL-CCNC: 118 U/L (ref 55–135)
ALP SERPL-CCNC: 121 U/L (ref 55–135)
ALP SERPL-CCNC: 129 U/L (ref 55–135)
ALP SERPL-CCNC: 131 U/L (ref 55–135)
ALP SERPL-CCNC: 132 U/L (ref 55–135)
ALP SERPL-CCNC: 135 U/L (ref 55–135)
ALP SERPL-CCNC: 139 U/L (ref 55–135)
ALP SERPL-CCNC: 140 U/L (ref 55–135)
ALP SERPL-CCNC: 143 U/L (ref 55–135)
ALP SERPL-CCNC: 149 U/L (ref 55–135)
ALP SERPL-CCNC: 152 U/L (ref 55–135)
ALP SERPL-CCNC: 161 U/L (ref 55–135)
ALP SERPL-CCNC: 177 U/L (ref 55–135)
ALP SERPL-CCNC: 231 U/L (ref 55–135)
ALP SERPL-CCNC: 97 U/L (ref 55–135)
ALPHA1 GLOB SERPL ELPH-MCNC: 0.39 G/DL (ref 0.17–0.41)
ALPHA2 GLOB SERPL ELPH-MCNC: 0.73 G/DL (ref 0.43–0.99)
ALT SERPL W/O P-5'-P-CCNC: 16 U/L (ref 10–44)
ALT SERPL W/O P-5'-P-CCNC: 21 U/L (ref 10–44)
ALT SERPL W/O P-5'-P-CCNC: 21 U/L (ref 10–44)
ALT SERPL W/O P-5'-P-CCNC: 22 U/L (ref 10–44)
ALT SERPL W/O P-5'-P-CCNC: 24 U/L (ref 10–44)
ALT SERPL W/O P-5'-P-CCNC: 25 U/L (ref 10–44)
ALT SERPL W/O P-5'-P-CCNC: 26 U/L (ref 10–44)
ALT SERPL W/O P-5'-P-CCNC: 27 U/L (ref 10–44)
ALT SERPL W/O P-5'-P-CCNC: 28 U/L (ref 10–44)
ALT SERPL W/O P-5'-P-CCNC: 29 U/L (ref 10–44)
ALT SERPL W/O P-5'-P-CCNC: 30 U/L (ref 10–44)
ALT SERPL W/O P-5'-P-CCNC: 31 U/L (ref 10–44)
ALT SERPL W/O P-5'-P-CCNC: 31 U/L (ref 10–44)
ALT SERPL W/O P-5'-P-CCNC: 32 U/L (ref 10–44)
ALT SERPL W/O P-5'-P-CCNC: 38 U/L (ref 10–44)
ALT SERPL W/O P-5'-P-CCNC: 40 U/L (ref 10–44)
ALT SERPL W/O P-5'-P-CCNC: 42 U/L (ref 10–44)
ALT SERPL W/O P-5'-P-CCNC: 46 U/L (ref 10–44)
ALT SERPL W/O P-5'-P-CCNC: 57 U/L (ref 10–44)
ANION GAP SERPL CALC-SCNC: 1 MMOL/L (ref 8–16)
ANION GAP SERPL CALC-SCNC: 10 MMOL/L (ref 8–16)
ANION GAP SERPL CALC-SCNC: 11 MMOL/L (ref 8–16)
ANION GAP SERPL CALC-SCNC: 12 MMOL/L (ref 8–16)
ANION GAP SERPL CALC-SCNC: 14 MMOL/L (ref 8–16)
ANION GAP SERPL CALC-SCNC: 15 MMOL/L (ref 8–16)
ANION GAP SERPL CALC-SCNC: 16 MMOL/L (ref 8–16)
ANION GAP SERPL CALC-SCNC: 5 MMOL/L (ref 8–16)
ANION GAP SERPL CALC-SCNC: 6 MMOL/L (ref 8–16)
ANION GAP SERPL CALC-SCNC: 6 MMOL/L (ref 8–16)
ANION GAP SERPL CALC-SCNC: 7 MMOL/L (ref 8–16)
ANION GAP SERPL CALC-SCNC: 8 MMOL/L (ref 8–16)
ANION GAP SERPL CALC-SCNC: 9 MMOL/L (ref 8–16)
ANISOCYTOSIS BLD QL SMEAR: SLIGHT
ANISOCYTOSIS BLD QL SMEAR: SLIGHT
ASCENDING AORTA: 2.15 CM
ASCENDING AORTA: 2.18 CM
ASCENDING AORTA: 2.37 CM
ASCENDING AORTA: 2.88 CM
AST SERPL-CCNC: 101 U/L (ref 10–40)
AST SERPL-CCNC: 31 U/L (ref 10–40)
AST SERPL-CCNC: 32 U/L (ref 10–40)
AST SERPL-CCNC: 33 U/L (ref 10–40)
AST SERPL-CCNC: 33 U/L (ref 10–40)
AST SERPL-CCNC: 36 U/L (ref 10–40)
AST SERPL-CCNC: 37 U/L (ref 10–40)
AST SERPL-CCNC: 37 U/L (ref 10–40)
AST SERPL-CCNC: 38 U/L (ref 10–40)
AST SERPL-CCNC: 40 U/L (ref 10–40)
AST SERPL-CCNC: 44 U/L (ref 10–40)
AST SERPL-CCNC: 45 U/L (ref 10–40)
AST SERPL-CCNC: 46 U/L (ref 10–40)
AST SERPL-CCNC: 48 U/L (ref 10–40)
AST SERPL-CCNC: 48 U/L (ref 10–40)
AST SERPL-CCNC: 50 U/L (ref 10–40)
AST SERPL-CCNC: 54 U/L (ref 10–40)
AST SERPL-CCNC: 57 U/L (ref 10–40)
AST SERPL-CCNC: 69 U/L (ref 10–40)
AV INDEX (PROSTH): 0.45
AV INDEX (PROSTH): 0.46
AV INDEX (PROSTH): 0.63
AV MEAN GRADIENT: 2 MMHG
AV MEAN GRADIENT: 3 MMHG
AV MEAN GRADIENT: 3 MMHG
AV MEAN GRADIENT: 4 MMHG
AV PEAK GRADIENT: 4 MMHG
AV PEAK GRADIENT: 4 MMHG
AV PEAK GRADIENT: 6 MMHG
AV VALVE AREA: 1.26 CM2
AV VALVE AREA: 1.32 CM2
AV VALVE AREA: 1.6 CM2
AV VELOCITY RATIO: 0.48
AV VELOCITY RATIO: 0.52
AV VELOCITY RATIO: 0.71
B-GLOBULIN SERPL ELPH-MCNC: 0.75 G/DL (ref 0.5–1.1)
BACTERIA #/AREA URNS AUTO: ABNORMAL /HPF
BACTERIA #/AREA URNS AUTO: NORMAL /HPF
BACTERIA BLD CULT: NORMAL
BACTERIA UR CULT: NORMAL
BACTERIA UR CULT: NORMAL
BASOPHILS # BLD AUTO: 0.01 K/UL (ref 0–0.2)
BASOPHILS # BLD AUTO: 0.02 K/UL (ref 0–0.2)
BASOPHILS # BLD AUTO: 0.03 K/UL (ref 0–0.2)
BASOPHILS # BLD AUTO: 0.06 K/UL (ref 0–0.2)
BASOPHILS NFR BLD: 0.1 % (ref 0–1.9)
BASOPHILS NFR BLD: 0.2 % (ref 0–1.9)
BASOPHILS NFR BLD: 0.3 % (ref 0–1.9)
BASOPHILS NFR BLD: 0.4 % (ref 0–1.9)
BASOPHILS NFR BLD: 0.5 % (ref 0–1.9)
BASOPHILS NFR BLD: 0.6 % (ref 0–1.9)
BASOPHILS NFR BLD: 0.7 % (ref 0–1.9)
BASOPHILS NFR BLD: 0.7 % (ref 0–1.9)
BASOPHILS NFR BLD: 0.8 % (ref 0–1.9)
BASOPHILS NFR BLD: 1.4 % (ref 0–1.9)
BILIRUB SERPL-MCNC: 1.1 MG/DL (ref 0.1–1)
BILIRUB SERPL-MCNC: 1.1 MG/DL (ref 0.1–1)
BILIRUB SERPL-MCNC: 1.2 MG/DL (ref 0.1–1)
BILIRUB SERPL-MCNC: 1.3 MG/DL (ref 0.1–1)
BILIRUB SERPL-MCNC: 1.4 MG/DL (ref 0.1–1)
BILIRUB SERPL-MCNC: 1.4 MG/DL (ref 0.1–1)
BILIRUB SERPL-MCNC: 1.5 MG/DL (ref 0.1–1)
BILIRUB SERPL-MCNC: 1.7 MG/DL (ref 0.1–1)
BILIRUB SERPL-MCNC: 2.1 MG/DL (ref 0.1–1)
BILIRUB SERPL-MCNC: 2.3 MG/DL (ref 0.1–1)
BILIRUB SERPL-MCNC: 2.5 MG/DL (ref 0.1–1)
BILIRUB SERPL-MCNC: 2.6 MG/DL (ref 0.1–1)
BILIRUB SERPL-MCNC: 2.7 MG/DL (ref 0.1–1)
BILIRUB SERPL-MCNC: 2.7 MG/DL (ref 0.1–1)
BILIRUB UR QL STRIP: NEGATIVE
BNP SERPL-MCNC: 1026 PG/ML (ref 0–99)
BNP SERPL-MCNC: 1101 PG/ML (ref 0–99)
BNP SERPL-MCNC: 1156 PG/ML (ref 0–99)
BNP SERPL-MCNC: 1371 PG/ML (ref 0–99)
BNP SERPL-MCNC: 1488 PG/ML (ref 0–99)
BNP SERPL-MCNC: 2351 PG/ML (ref 0–99)
BNP SERPL-MCNC: 621 PG/ML (ref 0–99)
BNP SERPL-MCNC: 689 PG/ML (ref 0–99)
BNP SERPL-MCNC: 813 PG/ML (ref 0–99)
BNP SERPL-MCNC: 847 PG/ML (ref 0–99)
BNP SERPL-MCNC: 925 PG/ML (ref 0–99)
BNP SERPL-MCNC: 932 PG/ML (ref 0–99)
BNP SERPL-MCNC: 943 PG/ML (ref 0–99)
BNP SERPL-MCNC: 995 PG/ML (ref 0–99)
BSA FOR ECHO PROCEDURE: 1.3 M2
BSA FOR ECHO PROCEDURE: 1.37 M2
BSA FOR ECHO PROCEDURE: 1.43 M2
BSA FOR ECHO PROCEDURE: 1.47 M2
BUN SERPL-MCNC: 10 MG/DL (ref 8–23)
BUN SERPL-MCNC: 11 MG/DL (ref 8–23)
BUN SERPL-MCNC: 12 MG/DL (ref 8–23)
BUN SERPL-MCNC: 12 MG/DL (ref 8–23)
BUN SERPL-MCNC: 13 MG/DL (ref 8–23)
BUN SERPL-MCNC: 14 MG/DL (ref 8–23)
BUN SERPL-MCNC: 14 MG/DL (ref 8–23)
BUN SERPL-MCNC: 15 MG/DL (ref 8–23)
BUN SERPL-MCNC: 16 MG/DL (ref 8–23)
BUN SERPL-MCNC: 16 MG/DL (ref 8–23)
BUN SERPL-MCNC: 17 MG/DL (ref 8–23)
BUN SERPL-MCNC: 18 MG/DL (ref 8–23)
BUN SERPL-MCNC: 18 MG/DL (ref 8–23)
BUN SERPL-MCNC: 19 MG/DL (ref 8–23)
BUN SERPL-MCNC: 20 MG/DL (ref 8–23)
BUN SERPL-MCNC: 21 MG/DL (ref 8–23)
BUN SERPL-MCNC: 22 MG/DL (ref 6–30)
BUN SERPL-MCNC: 22 MG/DL (ref 8–23)
BUN SERPL-MCNC: 22 MG/DL (ref 8–23)
BUN SERPL-MCNC: 23 MG/DL (ref 8–23)
BUN SERPL-MCNC: 24 MG/DL (ref 8–23)
BUN SERPL-MCNC: 25 MG/DL (ref 8–23)
BUN SERPL-MCNC: 26 MG/DL (ref 8–23)
BUN SERPL-MCNC: 29 MG/DL (ref 8–23)
BUN SERPL-MCNC: 32 MG/DL (ref 8–23)
BUN SERPL-MCNC: 39 MG/DL (ref 8–23)
BURR CELLS BLD QL SMEAR: ABNORMAL
CALCIUM SERPL-MCNC: 10 MG/DL (ref 8.7–10.5)
CALCIUM SERPL-MCNC: 10 MG/DL (ref 8.7–10.5)
CALCIUM SERPL-MCNC: 10.1 MG/DL (ref 8.7–10.5)
CALCIUM SERPL-MCNC: 10.3 MG/DL (ref 8.7–10.5)
CALCIUM SERPL-MCNC: 10.3 MG/DL (ref 8.7–10.5)
CALCIUM SERPL-MCNC: 10.5 MG/DL (ref 8.7–10.5)
CALCIUM SERPL-MCNC: 7.9 MG/DL (ref 8.7–10.5)
CALCIUM SERPL-MCNC: 8.2 MG/DL (ref 8.7–10.5)
CALCIUM SERPL-MCNC: 8.4 MG/DL (ref 8.7–10.5)
CALCIUM SERPL-MCNC: 8.5 MG/DL (ref 8.7–10.5)
CALCIUM SERPL-MCNC: 8.5 MG/DL (ref 8.7–10.5)
CALCIUM SERPL-MCNC: 8.6 MG/DL (ref 8.7–10.5)
CALCIUM SERPL-MCNC: 8.7 MG/DL (ref 8.7–10.5)
CALCIUM SERPL-MCNC: 9 MG/DL (ref 8.7–10.5)
CALCIUM SERPL-MCNC: 9 MG/DL (ref 8.7–10.5)
CALCIUM SERPL-MCNC: 9.1 MG/DL (ref 8.7–10.5)
CALCIUM SERPL-MCNC: 9.1 MG/DL (ref 8.7–10.5)
CALCIUM SERPL-MCNC: 9.2 MG/DL (ref 8.7–10.5)
CALCIUM SERPL-MCNC: 9.3 MG/DL (ref 8.7–10.5)
CALCIUM SERPL-MCNC: 9.5 MG/DL (ref 8.7–10.5)
CALCIUM SERPL-MCNC: 9.6 MG/DL (ref 8.7–10.5)
CALCIUM SERPL-MCNC: 9.7 MG/DL (ref 8.7–10.5)
CALCIUM SERPL-MCNC: 9.8 MG/DL (ref 8.7–10.5)
CALCIUM SERPL-MCNC: 9.8 MG/DL (ref 8.7–10.5)
CALCIUM SERPL-MCNC: 9.9 MG/DL (ref 8.7–10.5)
CALCIUM SERPL-MCNC: 9.9 MG/DL (ref 8.7–10.5)
CFR FLOW - ANTERIOR: 1.65
CFR FLOW - INFERIOR: 1.64
CFR FLOW - LATERAL: 1.78
CFR FLOW - MAX: 2.65
CFR FLOW - MIN: 1.1
CFR FLOW - SEPTAL: 1.73
CFR FLOW - WHOLE HEART: 1.7
CHLORIDE SERPL-SCNC: 101 MMOL/L (ref 95–110)
CHLORIDE SERPL-SCNC: 102 MMOL/L (ref 95–110)
CHLORIDE SERPL-SCNC: 102 MMOL/L (ref 95–110)
CHLORIDE SERPL-SCNC: 103 MMOL/L (ref 95–110)
CHLORIDE SERPL-SCNC: 103 MMOL/L (ref 95–110)
CHLORIDE SERPL-SCNC: 104 MMOL/L (ref 95–110)
CHLORIDE SERPL-SCNC: 105 MMOL/L (ref 95–110)
CHLORIDE SERPL-SCNC: 107 MMOL/L (ref 95–110)
CHLORIDE SERPL-SCNC: 108 MMOL/L (ref 95–110)
CHLORIDE SERPL-SCNC: 78 MMOL/L (ref 95–110)
CHLORIDE SERPL-SCNC: 81 MMOL/L (ref 95–110)
CHLORIDE SERPL-SCNC: 82 MMOL/L (ref 95–110)
CHLORIDE SERPL-SCNC: 83 MMOL/L (ref 95–110)
CHLORIDE SERPL-SCNC: 84 MMOL/L (ref 95–110)
CHLORIDE SERPL-SCNC: 85 MMOL/L (ref 95–110)
CHLORIDE SERPL-SCNC: 87 MMOL/L (ref 95–110)
CHLORIDE SERPL-SCNC: 88 MMOL/L (ref 95–110)
CHLORIDE SERPL-SCNC: 89 MMOL/L (ref 95–110)
CHLORIDE SERPL-SCNC: 90 MMOL/L (ref 95–110)
CHLORIDE SERPL-SCNC: 91 MMOL/L (ref 95–110)
CHLORIDE SERPL-SCNC: 92 MMOL/L (ref 95–110)
CHLORIDE SERPL-SCNC: 93 MMOL/L (ref 95–110)
CHLORIDE SERPL-SCNC: 94 MMOL/L (ref 95–110)
CHLORIDE SERPL-SCNC: 96 MMOL/L (ref 95–110)
CHLORIDE SERPL-SCNC: 96 MMOL/L (ref 95–110)
CHLORIDE SERPL-SCNC: 98 MMOL/L (ref 95–110)
CHLORIDE SERPL-SCNC: 98 MMOL/L (ref 95–110)
CHLORIDE UR-SCNC: <20 MMOL/L (ref 25–200)
CHOLEST SERPL-MCNC: 141 MG/DL (ref 120–199)
CHOLEST/HDLC SERPL: 3.2 {RATIO} (ref 2–5)
CLARITY UR REFRACT.AUTO: ABNORMAL
CLARITY UR REFRACT.AUTO: ABNORMAL
CLARITY UR REFRACT.AUTO: CLEAR
CLARITY UR: ABNORMAL
CO2 SERPL-SCNC: 20 MMOL/L (ref 23–29)
CO2 SERPL-SCNC: 20 MMOL/L (ref 23–29)
CO2 SERPL-SCNC: 21 MMOL/L (ref 23–29)
CO2 SERPL-SCNC: 23 MMOL/L (ref 23–29)
CO2 SERPL-SCNC: 24 MMOL/L (ref 23–29)
CO2 SERPL-SCNC: 25 MMOL/L (ref 23–29)
CO2 SERPL-SCNC: 26 MMOL/L (ref 23–29)
CO2 SERPL-SCNC: 26 MMOL/L (ref 23–29)
CO2 SERPL-SCNC: 27 MMOL/L (ref 23–29)
CO2 SERPL-SCNC: 28 MMOL/L (ref 23–29)
CO2 SERPL-SCNC: 29 MMOL/L (ref 23–29)
CO2 SERPL-SCNC: 30 MMOL/L (ref 23–29)
CO2 SERPL-SCNC: 31 MMOL/L (ref 23–29)
CO2 SERPL-SCNC: 32 MMOL/L (ref 23–29)
CO2 SERPL-SCNC: 33 MMOL/L (ref 23–29)
CO2 SERPL-SCNC: 33 MMOL/L (ref 23–29)
CO2 SERPL-SCNC: 35 MMOL/L (ref 23–29)
CO2 SERPL-SCNC: 36 MMOL/L (ref 23–29)
CO2 SERPL-SCNC: 38 MMOL/L (ref 23–29)
COLOR UR AUTO: ABNORMAL
COLOR UR AUTO: YELLOW
COLOR UR: YELLOW
CORTIS SERPL-MCNC: 13.8 UG/DL (ref 4.3–22.4)
CREAT SERPL-MCNC: 0.6 MG/DL (ref 0.5–1.4)
CREAT SERPL-MCNC: 0.7 MG/DL (ref 0.5–1.4)
CREAT SERPL-MCNC: 0.8 MG/DL (ref 0.5–1.4)
CREAT SERPL-MCNC: 0.9 MG/DL (ref 0.5–1.4)
CREAT SERPL-MCNC: 1 MG/DL (ref 0.5–1.4)
CREAT SERPL-MCNC: 1.1 MG/DL (ref 0.5–1.4)
CREAT UR-MCNC: 27 MG/DL (ref 15–325)
CREAT UR-MCNC: 36 MG/DL (ref 15–325)
CREAT UR-MCNC: 36 MG/DL (ref 15–325)
CREAT UR-MCNC: 39 MG/DL (ref 15–325)
CTP QC/QA: YES
CTP QC/QA: YES
CV ECHO LV RWT: 0.43 CM
CV ECHO LV RWT: 0.52 CM
CV ECHO LV RWT: 0.53 CM
CV ECHO LV RWT: 0.56 CM
CV STRESS BASE HR: 94 BPM
D DIMER PPP IA.FEU-MCNC: 3.01 MG/L FEU
DACRYOCYTES BLD QL SMEAR: ABNORMAL
DIASTOLIC BLOOD PRESSURE: 65 MMHG
DIFFERENTIAL METHOD: ABNORMAL
DIFFERENTIAL METHOD: NORMAL
DOP CALC AO PEAK VEL: 0.99 M/S
DOP CALC AO PEAK VEL: 1.05 M/S
DOP CALC AO PEAK VEL: 1.23 M/S
DOP CALC AO VTI: 17.18 CM
DOP CALC AO VTI: 20.97 CM
DOP CALC AO VTI: 25.1 CM
DOP CALC LVOT AREA: 2.5 CM2
DOP CALC LVOT AREA: 2.8 CM2
DOP CALC LVOT AREA: 2.8 CM2
DOP CALC LVOT AREA: 2.9 CM2
DOP CALC LVOT DIAMETER: 1.8 CM
DOP CALC LVOT DIAMETER: 1.89 CM
DOP CALC LVOT DIAMETER: 1.9 CM
DOP CALC LVOT DIAMETER: 1.92 CM
DOP CALC LVOT PEAK VEL: 0.5 M/S
DOP CALC LVOT PEAK VEL: 0.64 M/S
DOP CALC LVOT PEAK VEL: 0.7 M/S
DOP CALC LVOT PEAK VEL: 0.76 M/S
DOP CALC LVOT STROKE VOLUME: 26.5 CM3
DOP CALC LVOT STROKE VOLUME: 27.47 CM3
DOP CALC LVOT STROKE VOLUME: 33.18 CM3
DOP CALC LVOT STROKE VOLUME: 33.22 CM3
DOP CALCLVOT PEAK VEL VTI: 10.8 CM
DOP CALCLVOT PEAK VEL VTI: 11.48 CM
DOP CALCLVOT PEAK VEL VTI: 11.71 CM
DOP CALCLVOT PEAK VEL VTI: 9.45 CM
E WAVE DECELERATION TIME: 141.8 MSEC
E WAVE DECELERATION TIME: 154.52 MSEC
E WAVE DECELERATION TIME: 166.62 MSEC
E WAVE DECELERATION TIME: 232.57 MSEC
E/A RATIO: 2.59
E/A RATIO: 2.84
E/A RATIO: 3.31
E/A RATIO: 3.35
E/E' RATIO: 17.11 M/S
E/E' RATIO: 19.56 M/S
E/E' RATIO: 22.75 M/S
E/E' RATIO: 24.57 M/S
ECHO LV POSTERIOR WALL: 0.9 CM (ref 0.6–1.1)
ECHO LV POSTERIOR WALL: 0.97 CM (ref 0.6–1.1)
ECHO LV POSTERIOR WALL: 1.1 CM (ref 0.6–1.1)
ECHO LV POSTERIOR WALL: 1.1 CM (ref 0.6–1.1)
EJECTION FRACTION: 35 %
EJECTION FRACTION: 40 %
EJECTION FRACTION: 58 %
EJECTION FRACTION: 58 %
EOSINOPHIL # BLD AUTO: 0 K/UL (ref 0–0.5)
EOSINOPHIL # BLD AUTO: 0.1 K/UL (ref 0–0.5)
EOSINOPHIL NFR BLD: 0 % (ref 0–8)
EOSINOPHIL NFR BLD: 0 % (ref 0–8)
EOSINOPHIL NFR BLD: 0.1 % (ref 0–8)
EOSINOPHIL NFR BLD: 0.2 % (ref 0–8)
EOSINOPHIL NFR BLD: 0.3 % (ref 0–8)
EOSINOPHIL NFR BLD: 0.3 % (ref 0–8)
EOSINOPHIL NFR BLD: 0.4 % (ref 0–8)
EOSINOPHIL NFR BLD: 0.5 % (ref 0–8)
EOSINOPHIL NFR BLD: 0.6 % (ref 0–8)
EOSINOPHIL NFR BLD: 0.7 % (ref 0–8)
EOSINOPHIL NFR BLD: 0.7 % (ref 0–8)
EOSINOPHIL NFR BLD: 0.8 % (ref 0–8)
EOSINOPHIL NFR BLD: 1 % (ref 0–8)
EOSINOPHIL NFR BLD: 1.1 % (ref 0–8)
EOSINOPHIL NFR BLD: 1.2 % (ref 0–8)
EOSINOPHIL NFR BLD: 1.3 % (ref 0–8)
EOSINOPHIL NFR BLD: 1.4 % (ref 0–8)
EOSINOPHIL NFR BLD: 1.4 % (ref 0–8)
EOSINOPHIL NFR BLD: 1.5 % (ref 0–8)
EOSINOPHIL NFR BLD: 1.6 % (ref 0–8)
EOSINOPHIL NFR BLD: 1.6 % (ref 0–8)
EOSINOPHIL NFR BLD: 2 % (ref 0–8)
EOSINOPHIL NFR BLD: 2 % (ref 0–8)
EOSINOPHIL NFR BLD: 2.1 % (ref 0–8)
EOSINOPHIL NFR BLD: 2.2 % (ref 0–8)
EOSINOPHIL NFR BLD: 2.6 % (ref 0–8)
ERYTHROCYTE [DISTWIDTH] IN BLOOD BY AUTOMATED COUNT: 14.2 % (ref 11.5–14.5)
ERYTHROCYTE [DISTWIDTH] IN BLOOD BY AUTOMATED COUNT: 14.6 % (ref 11.5–14.5)
ERYTHROCYTE [DISTWIDTH] IN BLOOD BY AUTOMATED COUNT: 14.9 % (ref 11.5–14.5)
ERYTHROCYTE [DISTWIDTH] IN BLOOD BY AUTOMATED COUNT: 15.9 % (ref 11.5–14.5)
ERYTHROCYTE [DISTWIDTH] IN BLOOD BY AUTOMATED COUNT: 15.9 % (ref 11.5–14.5)
ERYTHROCYTE [DISTWIDTH] IN BLOOD BY AUTOMATED COUNT: 16.3 % (ref 11.5–14.5)
ERYTHROCYTE [DISTWIDTH] IN BLOOD BY AUTOMATED COUNT: 16.4 % (ref 11.5–14.5)
ERYTHROCYTE [DISTWIDTH] IN BLOOD BY AUTOMATED COUNT: 16.4 % (ref 11.5–14.5)
ERYTHROCYTE [DISTWIDTH] IN BLOOD BY AUTOMATED COUNT: 16.5 % (ref 11.5–14.5)
ERYTHROCYTE [DISTWIDTH] IN BLOOD BY AUTOMATED COUNT: 16.5 % (ref 11.5–14.5)
ERYTHROCYTE [DISTWIDTH] IN BLOOD BY AUTOMATED COUNT: 16.6 % (ref 11.5–14.5)
ERYTHROCYTE [DISTWIDTH] IN BLOOD BY AUTOMATED COUNT: 16.7 % (ref 11.5–14.5)
ERYTHROCYTE [DISTWIDTH] IN BLOOD BY AUTOMATED COUNT: 16.8 % (ref 11.5–14.5)
ERYTHROCYTE [DISTWIDTH] IN BLOOD BY AUTOMATED COUNT: 16.8 % (ref 11.5–14.5)
ERYTHROCYTE [DISTWIDTH] IN BLOOD BY AUTOMATED COUNT: 16.9 % (ref 11.5–14.5)
ERYTHROCYTE [DISTWIDTH] IN BLOOD BY AUTOMATED COUNT: 17 % (ref 11.5–14.5)
ERYTHROCYTE [DISTWIDTH] IN BLOOD BY AUTOMATED COUNT: 17.1 % (ref 11.5–14.5)
ERYTHROCYTE [DISTWIDTH] IN BLOOD BY AUTOMATED COUNT: 17.4 % (ref 11.5–14.5)
ERYTHROCYTE [DISTWIDTH] IN BLOOD BY AUTOMATED COUNT: 17.5 % (ref 11.5–14.5)
ERYTHROCYTE [DISTWIDTH] IN BLOOD BY AUTOMATED COUNT: 17.8 % (ref 11.5–14.5)
ERYTHROCYTE [DISTWIDTH] IN BLOOD BY AUTOMATED COUNT: 18.4 % (ref 11.5–14.5)
ERYTHROCYTE [DISTWIDTH] IN BLOOD BY AUTOMATED COUNT: 19.9 % (ref 11.5–14.5)
ERYTHROCYTE [DISTWIDTH] IN BLOOD BY AUTOMATED COUNT: 20.9 % (ref 11.5–14.5)
ERYTHROCYTE [DISTWIDTH] IN BLOOD BY AUTOMATED COUNT: 21.5 % (ref 11.5–14.5)
EST. GFR  (AFRICAN AMERICAN): 58 ML/MIN/1.73 M^2
EST. GFR  (AFRICAN AMERICAN): >60 ML/MIN/1.73 M^2
EST. GFR  (NO RACE VARIABLE): 48.3 ML/MIN/1.73 M^2
EST. GFR  (NO RACE VARIABLE): 54.2 ML/MIN/1.73 M^2
EST. GFR  (NO RACE VARIABLE): >60 ML/MIN/1.73 M^2
EST. GFR  (NON AFRICAN AMERICAN): 50 ML/MIN/1.73 M^2
EST. GFR  (NON AFRICAN AMERICAN): >60 ML/MIN/1.73 M^2
ESTIMATED AVG GLUCOSE: 160 MG/DL (ref 68–131)
FRACTIONAL SHORTENING: 13 % (ref 28–44)
FRACTIONAL SHORTENING: 17 % (ref 28–44)
FRACTIONAL SHORTENING: 19 % (ref 28–44)
FRACTIONAL SHORTENING: 26 % (ref 28–44)
GAMMA GLOB SERPL ELPH-MCNC: 1.91 G/DL (ref 0.67–1.58)
GGT SERPL-CCNC: 85 U/L (ref 8–55)
GIANT PLATELETS BLD QL SMEAR: PRESENT
GLUCOSE SERPL-MCNC: 100 MG/DL (ref 70–110)
GLUCOSE SERPL-MCNC: 102 MG/DL (ref 70–110)
GLUCOSE SERPL-MCNC: 105 MG/DL (ref 70–110)
GLUCOSE SERPL-MCNC: 107 MG/DL (ref 70–110)
GLUCOSE SERPL-MCNC: 111 MG/DL (ref 70–110)
GLUCOSE SERPL-MCNC: 114 MG/DL (ref 70–110)
GLUCOSE SERPL-MCNC: 117 MG/DL (ref 70–110)
GLUCOSE SERPL-MCNC: 119 MG/DL (ref 70–110)
GLUCOSE SERPL-MCNC: 123 MG/DL (ref 70–110)
GLUCOSE SERPL-MCNC: 125 MG/DL (ref 70–110)
GLUCOSE SERPL-MCNC: 125 MG/DL (ref 70–110)
GLUCOSE SERPL-MCNC: 127 MG/DL (ref 70–110)
GLUCOSE SERPL-MCNC: 131 MG/DL (ref 70–110)
GLUCOSE SERPL-MCNC: 137 MG/DL (ref 70–110)
GLUCOSE SERPL-MCNC: 139 MG/DL (ref 70–110)
GLUCOSE SERPL-MCNC: 139 MG/DL (ref 70–110)
GLUCOSE SERPL-MCNC: 146 MG/DL (ref 70–110)
GLUCOSE SERPL-MCNC: 147 MG/DL (ref 70–110)
GLUCOSE SERPL-MCNC: 147 MG/DL (ref 70–110)
GLUCOSE SERPL-MCNC: 156 MG/DL (ref 70–110)
GLUCOSE SERPL-MCNC: 161 MG/DL (ref 70–110)
GLUCOSE SERPL-MCNC: 164 MG/DL (ref 70–110)
GLUCOSE SERPL-MCNC: 170 MG/DL (ref 70–110)
GLUCOSE SERPL-MCNC: 174 MG/DL (ref 70–110)
GLUCOSE SERPL-MCNC: 175 MG/DL (ref 70–110)
GLUCOSE SERPL-MCNC: 179 MG/DL (ref 70–110)
GLUCOSE SERPL-MCNC: 186 MG/DL (ref 70–110)
GLUCOSE SERPL-MCNC: 186 MG/DL (ref 70–110)
GLUCOSE SERPL-MCNC: 189 MG/DL (ref 70–110)
GLUCOSE SERPL-MCNC: 194 MG/DL (ref 70–110)
GLUCOSE SERPL-MCNC: 204 MG/DL (ref 70–110)
GLUCOSE SERPL-MCNC: 204 MG/DL (ref 70–110)
GLUCOSE SERPL-MCNC: 209 MG/DL (ref 70–110)
GLUCOSE SERPL-MCNC: 214 MG/DL (ref 70–110)
GLUCOSE SERPL-MCNC: 65 MG/DL (ref 70–110)
GLUCOSE SERPL-MCNC: 69 MG/DL (ref 70–110)
GLUCOSE SERPL-MCNC: 79 MG/DL (ref 70–110)
GLUCOSE SERPL-MCNC: 81 MG/DL (ref 70–110)
GLUCOSE SERPL-MCNC: 83 MG/DL (ref 70–110)
GLUCOSE SERPL-MCNC: 96 MG/DL (ref 70–110)
GLUCOSE SERPL-MCNC: 97 MG/DL (ref 70–110)
GLUCOSE UR QL STRIP: NEGATIVE
HAV IGM SERPL QL IA: NEGATIVE
HBA1C MFR BLD: 7.2 % (ref 4–5.6)
HBV CORE IGM SERPL QL IA: NEGATIVE
HBV SURFACE AG SERPL QL IA: NEGATIVE
HCO3 UR-SCNC: 43.9 MMOL/L (ref 24–28)
HCT VFR BLD AUTO: 32.6 % (ref 37–48.5)
HCT VFR BLD AUTO: 33.9 % (ref 37–48.5)
HCT VFR BLD AUTO: 34.6 % (ref 37–48.5)
HCT VFR BLD AUTO: 34.6 % (ref 37–48.5)
HCT VFR BLD AUTO: 35 % (ref 37–48.5)
HCT VFR BLD AUTO: 35.5 % (ref 37–48.5)
HCT VFR BLD AUTO: 35.9 % (ref 37–48.5)
HCT VFR BLD AUTO: 36.3 % (ref 37–48.5)
HCT VFR BLD AUTO: 37.1 % (ref 37–48.5)
HCT VFR BLD AUTO: 37.3 % (ref 37–48.5)
HCT VFR BLD AUTO: 37.5 % (ref 37–48.5)
HCT VFR BLD AUTO: 37.6 % (ref 37–48.5)
HCT VFR BLD AUTO: 38.3 % (ref 37–48.5)
HCT VFR BLD AUTO: 38.8 % (ref 37–48.5)
HCT VFR BLD AUTO: 39.6 % (ref 37–48.5)
HCT VFR BLD AUTO: 39.8 % (ref 37–48.5)
HCT VFR BLD AUTO: 39.9 % (ref 37–48.5)
HCT VFR BLD AUTO: 39.9 % (ref 37–48.5)
HCT VFR BLD AUTO: 40.4 % (ref 37–48.5)
HCT VFR BLD AUTO: 40.5 % (ref 37–48.5)
HCT VFR BLD AUTO: 42.2 % (ref 37–48.5)
HCT VFR BLD AUTO: 42.4 % (ref 37–48.5)
HCT VFR BLD AUTO: 42.6 % (ref 37–48.5)
HCT VFR BLD AUTO: 42.6 % (ref 37–48.5)
HCT VFR BLD AUTO: 42.8 % (ref 37–48.5)
HCT VFR BLD AUTO: 43 % (ref 37–48.5)
HCT VFR BLD AUTO: 43.4 % (ref 37–48.5)
HCT VFR BLD AUTO: 43.6 % (ref 37–48.5)
HCT VFR BLD AUTO: 43.9 % (ref 37–48.5)
HCT VFR BLD AUTO: 44 % (ref 37–48.5)
HCT VFR BLD AUTO: 44.3 % (ref 37–48.5)
HCT VFR BLD AUTO: 44.5 % (ref 37–48.5)
HCT VFR BLD AUTO: 45.9 % (ref 37–48.5)
HCT VFR BLD CALC: 44 %PCV (ref 36–54)
HCV AB SERPL QL IA: NEGATIVE
HDLC SERPL-MCNC: 44 MG/DL (ref 40–75)
HDLC SERPL: 31.2 % (ref 20–50)
HGB BLD-MCNC: 11 G/DL (ref 12–16)
HGB BLD-MCNC: 11.1 G/DL (ref 12–16)
HGB BLD-MCNC: 11.1 G/DL (ref 12–16)
HGB BLD-MCNC: 11.4 G/DL (ref 12–16)
HGB BLD-MCNC: 11.7 G/DL (ref 12–16)
HGB BLD-MCNC: 12 G/DL (ref 12–16)
HGB BLD-MCNC: 12.1 G/DL (ref 12–16)
HGB BLD-MCNC: 12.1 G/DL (ref 12–16)
HGB BLD-MCNC: 12.2 G/DL (ref 12–16)
HGB BLD-MCNC: 12.3 G/DL (ref 12–16)
HGB BLD-MCNC: 12.5 G/DL (ref 12–16)
HGB BLD-MCNC: 12.6 G/DL (ref 12–16)
HGB BLD-MCNC: 12.7 G/DL (ref 12–16)
HGB BLD-MCNC: 12.7 G/DL (ref 12–16)
HGB BLD-MCNC: 12.8 G/DL (ref 12–16)
HGB BLD-MCNC: 12.8 G/DL (ref 12–16)
HGB BLD-MCNC: 13.2 G/DL (ref 12–16)
HGB BLD-MCNC: 13.5 G/DL (ref 12–16)
HGB BLD-MCNC: 13.6 G/DL (ref 12–16)
HGB BLD-MCNC: 13.6 G/DL (ref 12–16)
HGB BLD-MCNC: 13.7 G/DL (ref 12–16)
HGB BLD-MCNC: 13.8 G/DL (ref 12–16)
HGB BLD-MCNC: 13.9 G/DL (ref 12–16)
HGB BLD-MCNC: 13.9 G/DL (ref 12–16)
HGB BLD-MCNC: 14.1 G/DL (ref 12–16)
HGB BLD-MCNC: 14.1 G/DL (ref 12–16)
HGB BLD-MCNC: 14.3 G/DL (ref 12–16)
HGB BLD-MCNC: 14.4 G/DL (ref 12–16)
HGB BLD-MCNC: 14.6 G/DL (ref 12–16)
HGB BLD-MCNC: 14.7 G/DL (ref 12–16)
HGB BLD-MCNC: 14.7 G/DL (ref 12–16)
HGB BLD-MCNC: 14.9 G/DL (ref 12–16)
HGB BLD-MCNC: 15.7 G/DL (ref 12–16)
HGB UR QL STRIP: ABNORMAL
HGB UR QL STRIP: NEGATIVE
HYALINE CASTS #/AREA URNS LPF: 66 /LPF
HYALINE CASTS UR QL AUTO: 0 /LPF
HYALINE CASTS UR QL AUTO: 1 /LPF
HYALINE CASTS UR QL AUTO: 17 /LPF
HYALINE CASTS UR QL AUTO: 3 /LPF
HYPOCHROMIA BLD QL SMEAR: ABNORMAL
IMM GRANULOCYTES # BLD AUTO: 0 K/UL (ref 0–0.04)
IMM GRANULOCYTES # BLD AUTO: 0 K/UL (ref 0–0.04)
IMM GRANULOCYTES # BLD AUTO: 0.01 K/UL (ref 0–0.04)
IMM GRANULOCYTES # BLD AUTO: 0.02 K/UL (ref 0–0.04)
IMM GRANULOCYTES # BLD AUTO: 0.03 K/UL (ref 0–0.04)
IMM GRANULOCYTES # BLD AUTO: 0.04 K/UL (ref 0–0.04)
IMM GRANULOCYTES # BLD AUTO: 0.04 K/UL (ref 0–0.04)
IMM GRANULOCYTES # BLD AUTO: 0.05 K/UL (ref 0–0.04)
IMM GRANULOCYTES # BLD AUTO: 0.06 K/UL (ref 0–0.04)
IMM GRANULOCYTES # BLD AUTO: 0.07 K/UL (ref 0–0.04)
IMM GRANULOCYTES # BLD AUTO: 0.11 K/UL (ref 0–0.04)
IMM GRANULOCYTES NFR BLD AUTO: 0 % (ref 0–0.5)
IMM GRANULOCYTES NFR BLD AUTO: 0 % (ref 0–0.5)
IMM GRANULOCYTES NFR BLD AUTO: 0.1 % (ref 0–0.5)
IMM GRANULOCYTES NFR BLD AUTO: 0.1 % (ref 0–0.5)
IMM GRANULOCYTES NFR BLD AUTO: 0.2 % (ref 0–0.5)
IMM GRANULOCYTES NFR BLD AUTO: 0.3 % (ref 0–0.5)
IMM GRANULOCYTES NFR BLD AUTO: 0.4 % (ref 0–0.5)
IMM GRANULOCYTES NFR BLD AUTO: 0.5 % (ref 0–0.5)
IMM GRANULOCYTES NFR BLD AUTO: 0.6 % (ref 0–0.5)
IMM GRANULOCYTES NFR BLD AUTO: 0.8 % (ref 0–0.5)
IMM GRANULOCYTES NFR BLD AUTO: 2.5 % (ref 0–0.5)
INFLUENZA A, MOLECULAR: NOT DETECTED
INFLUENZA B, MOLECULAR: NOT DETECTED
INTERPRETATION SERPL IFE-IMP: NORMAL
INTERVENTRICULAR SEPTUM: 0.83 CM (ref 0.6–1.1)
INTERVENTRICULAR SEPTUM: 0.94 CM (ref 0.6–1.1)
INTERVENTRICULAR SEPTUM: 1 CM (ref 0.6–1.1)
INTERVENTRICULAR SEPTUM: 1.1 CM (ref 0.6–1.1)
KAPPA LC SER QL IA: 9.7 MG/DL (ref 0.33–1.94)
KAPPA LC/LAMBDA SER IA: 1.95 (ref 0.26–1.65)
KETONES UR QL STRIP: ABNORMAL
KETONES UR QL STRIP: ABNORMAL
KETONES UR QL STRIP: NEGATIVE
LA MAJOR: 4.54 CM
LA MAJOR: 5.28 CM
LA MAJOR: 5.72 CM
LA MAJOR: 5.9 CM
LA MINOR: 4.8 CM
LA MINOR: 5.13 CM
LA MINOR: 5.2 CM
LA MINOR: 6.12 CM
LA WIDTH: 3 CM
LA WIDTH: 3.11 CM
LA WIDTH: 3.3 CM
LA WIDTH: 3.8 CM
LACTATE SERPL-SCNC: 1.5 MMOL/L (ref 0.5–2.2)
LACTATE SERPL-SCNC: 1.5 MMOL/L (ref 0.5–2.2)
LACTATE SERPL-SCNC: 1.7 MMOL/L (ref 0.5–2.2)
LACTATE SERPL-SCNC: 3.4 MMOL/L (ref 0.5–2.2)
LAMBDA LC SER QL IA: 4.98 MG/DL (ref 0.57–2.63)
LDLC SERPL CALC-MCNC: 85.4 MG/DL (ref 63–159)
LEFT ATRIUM SIZE: 3.59 CM
LEFT ATRIUM SIZE: 3.62 CM
LEFT ATRIUM SIZE: 3.66 CM
LEFT ATRIUM SIZE: 3.81 CM
LEFT ATRIUM VOLUME INDEX MOD: 30.5 ML/M2
LEFT ATRIUM VOLUME INDEX MOD: 41.6 ML/M2
LEFT ATRIUM VOLUME INDEX: 30.6 ML/M2
LEFT ATRIUM VOLUME INDEX: 38.3 ML/M2
LEFT ATRIUM VOLUME INDEX: 43.5 ML/M2
LEFT ATRIUM VOLUME INDEX: 46.2 ML/M2
LEFT ATRIUM VOLUME MOD: 39.93 CM3
LEFT ATRIUM VOLUME MOD: 57 CM3
LEFT ATRIUM VOLUME: 44.75 CM3
LEFT ATRIUM VOLUME: 52.4 CM3
LEFT ATRIUM VOLUME: 60.51 CM3
LEFT ATRIUM VOLUME: 61.71 CM3
LEFT INTERNAL DIMENSION IN SYSTOLE: 3.14 CM (ref 2.1–4)
LEFT INTERNAL DIMENSION IN SYSTOLE: 3.19 CM (ref 2.1–4)
LEFT INTERNAL DIMENSION IN SYSTOLE: 3.19 CM (ref 2.1–4)
LEFT INTERNAL DIMENSION IN SYSTOLE: 3.5 CM (ref 2.1–4)
LEFT VENTRICLE DIASTOLIC VOLUME INDEX: 38.71 ML/M2
LEFT VENTRICLE DIASTOLIC VOLUME INDEX: 46.42 ML/M2
LEFT VENTRICLE DIASTOLIC VOLUME INDEX: 49.78 ML/M2
LEFT VENTRICLE DIASTOLIC VOLUME INDEX: 60.51 ML/M2
LEFT VENTRICLE DIASTOLIC VOLUME: 56.52 ML
LEFT VENTRICLE DIASTOLIC VOLUME: 65.92 ML
LEFT VENTRICLE DIASTOLIC VOLUME: 68.2 ML
LEFT VENTRICLE DIASTOLIC VOLUME: 79.27 ML
LEFT VENTRICLE MASS INDEX: 104 G/M2
LEFT VENTRICLE MASS INDEX: 105 G/M2
LEFT VENTRICLE MASS INDEX: 71 G/M2
LEFT VENTRICLE MASS INDEX: 87 G/M2
LEFT VENTRICLE SYSTOLIC VOLUME INDEX: 23.2 ML/M2
LEFT VENTRICLE SYSTOLIC VOLUME INDEX: 27.9 ML/M2
LEFT VENTRICLE SYSTOLIC VOLUME INDEX: 29.7 ML/M2
LEFT VENTRICLE SYSTOLIC VOLUME INDEX: 29.8 ML/M2
LEFT VENTRICLE SYSTOLIC VOLUME: 32.99 ML
LEFT VENTRICLE SYSTOLIC VOLUME: 39 ML
LEFT VENTRICLE SYSTOLIC VOLUME: 40.67 ML
LEFT VENTRICLE SYSTOLIC VOLUME: 40.72 ML
LEFT VENTRICULAR INTERNAL DIMENSION IN DIASTOLE: 3.66 CM (ref 3.5–6)
LEFT VENTRICULAR INTERNAL DIMENSION IN DIASTOLE: 3.96 CM (ref 3.5–6)
LEFT VENTRICULAR INTERNAL DIMENSION IN DIASTOLE: 4.2 CM (ref 3.5–6)
LEFT VENTRICULAR INTERNAL DIMENSION IN DIASTOLE: 4.22 CM (ref 3.5–6)
LEFT VENTRICULAR MASS: 103.59 G
LEFT VENTRICULAR MASS: 113.33 G
LEFT VENTRICULAR MASS: 143.41 G
LEFT VENTRICULAR MASS: 147 G
LEUKOCYTE ESTERASE UR QL STRIP: ABNORMAL
LEUKOCYTE ESTERASE UR QL STRIP: NEGATIVE
LIPASE SERPL-CCNC: 12 U/L (ref 4–60)
LIPASE SERPL-CCNC: 12 U/L (ref 4–60)
LIPASE SERPL-CCNC: 8 U/L (ref 4–60)
LV LATERAL E/E' RATIO: 15.4 M/S
LV LATERAL E/E' RATIO: 22 M/S
LV LATERAL E/E' RATIO: 22.75 M/S
LV LATERAL E/E' RATIO: 28.67 M/S
LV SEPTAL E/E' RATIO: 17.6 M/S
LV SEPTAL E/E' RATIO: 19.25 M/S
LV SEPTAL E/E' RATIO: 21.5 M/S
LV SEPTAL E/E' RATIO: 22.75 M/S
LYMPHOCYTES # BLD AUTO: 0.6 K/UL (ref 1–4.8)
LYMPHOCYTES # BLD AUTO: 0.9 K/UL (ref 1–4.8)
LYMPHOCYTES # BLD AUTO: 1 K/UL (ref 1–4.8)
LYMPHOCYTES # BLD AUTO: 1 K/UL (ref 1–4.8)
LYMPHOCYTES # BLD AUTO: 1.1 K/UL (ref 1–4.8)
LYMPHOCYTES # BLD AUTO: 1.2 K/UL (ref 1–4.8)
LYMPHOCYTES # BLD AUTO: 1.3 K/UL (ref 1–4.8)
LYMPHOCYTES # BLD AUTO: 1.4 K/UL (ref 1–4.8)
LYMPHOCYTES # BLD AUTO: 1.5 K/UL (ref 1–4.8)
LYMPHOCYTES # BLD AUTO: 1.5 K/UL (ref 1–4.8)
LYMPHOCYTES # BLD AUTO: 1.7 K/UL (ref 1–4.8)
LYMPHOCYTES # BLD AUTO: 2 K/UL (ref 1–4.8)
LYMPHOCYTES # BLD AUTO: 2.2 K/UL (ref 1–4.8)
LYMPHOCYTES NFR BLD: 10.9 % (ref 18–48)
LYMPHOCYTES NFR BLD: 11.3 % (ref 18–48)
LYMPHOCYTES NFR BLD: 11.4 % (ref 18–48)
LYMPHOCYTES NFR BLD: 14.9 % (ref 18–48)
LYMPHOCYTES NFR BLD: 15.8 % (ref 18–48)
LYMPHOCYTES NFR BLD: 16.4 % (ref 18–48)
LYMPHOCYTES NFR BLD: 18.1 % (ref 18–48)
LYMPHOCYTES NFR BLD: 18.3 % (ref 18–48)
LYMPHOCYTES NFR BLD: 19 % (ref 18–48)
LYMPHOCYTES NFR BLD: 19.8 % (ref 18–48)
LYMPHOCYTES NFR BLD: 19.8 % (ref 18–48)
LYMPHOCYTES NFR BLD: 20 % (ref 18–48)
LYMPHOCYTES NFR BLD: 20.6 % (ref 18–48)
LYMPHOCYTES NFR BLD: 20.6 % (ref 18–48)
LYMPHOCYTES NFR BLD: 22.2 % (ref 18–48)
LYMPHOCYTES NFR BLD: 22.7 % (ref 18–48)
LYMPHOCYTES NFR BLD: 24.2 % (ref 18–48)
LYMPHOCYTES NFR BLD: 24.6 % (ref 18–48)
LYMPHOCYTES NFR BLD: 24.9 % (ref 18–48)
LYMPHOCYTES NFR BLD: 25.2 % (ref 18–48)
LYMPHOCYTES NFR BLD: 26.4 % (ref 18–48)
LYMPHOCYTES NFR BLD: 27.9 % (ref 18–48)
LYMPHOCYTES NFR BLD: 28.5 % (ref 18–48)
LYMPHOCYTES NFR BLD: 29.3 % (ref 18–48)
LYMPHOCYTES NFR BLD: 30.2 % (ref 18–48)
LYMPHOCYTES NFR BLD: 30.3 % (ref 18–48)
LYMPHOCYTES NFR BLD: 31.3 % (ref 18–48)
LYMPHOCYTES NFR BLD: 31.4 % (ref 18–48)
LYMPHOCYTES NFR BLD: 31.5 % (ref 18–48)
LYMPHOCYTES NFR BLD: 32.6 % (ref 18–48)
LYMPHOCYTES NFR BLD: 33.3 % (ref 18–48)
LYMPHOCYTES NFR BLD: 33.5 % (ref 18–48)
LYMPHOCYTES NFR BLD: 43.7 % (ref 18–48)
MAGNESIUM SERPL-MCNC: 1.4 MG/DL (ref 1.6–2.6)
MAGNESIUM SERPL-MCNC: 1.5 MG/DL (ref 1.6–2.6)
MAGNESIUM SERPL-MCNC: 1.6 MG/DL (ref 1.6–2.6)
MAGNESIUM SERPL-MCNC: 1.7 MG/DL (ref 1.6–2.6)
MAGNESIUM SERPL-MCNC: 1.8 MG/DL (ref 1.6–2.6)
MAGNESIUM SERPL-MCNC: 1.9 MG/DL (ref 1.6–2.6)
MAGNESIUM SERPL-MCNC: 2 MG/DL (ref 1.6–2.6)
MAGNESIUM SERPL-MCNC: 2 MG/DL (ref 1.6–2.6)
MAGNESIUM SERPL-MCNC: 2.1 MG/DL (ref 1.6–2.6)
MAGNESIUM SERPL-MCNC: 2.1 MG/DL (ref 1.6–2.6)
MAGNESIUM SERPL-MCNC: 2.2 MG/DL (ref 1.6–2.6)
MAGNESIUM SERPL-MCNC: 2.3 MG/DL (ref 1.6–2.6)
MAGNESIUM SERPL-MCNC: 2.4 MG/DL (ref 1.6–2.6)
MAGNESIUM SERPL-MCNC: 2.5 MG/DL (ref 1.6–2.6)
MAGNESIUM SERPL-MCNC: 2.7 MG/DL (ref 1.6–2.6)
MAGNESIUM SERPL-MCNC: 2.9 MG/DL (ref 1.6–2.6)
MAGNESIUM SERPL-MCNC: 3.5 MG/DL (ref 1.6–2.6)
MAGNESIUM SERPL-MCNC: 3.9 MG/DL (ref 1.6–2.6)
MCH RBC QN AUTO: 27.7 PG (ref 27–31)
MCH RBC QN AUTO: 27.9 PG (ref 27–31)
MCH RBC QN AUTO: 28 PG (ref 27–31)
MCH RBC QN AUTO: 28.1 PG (ref 27–31)
MCH RBC QN AUTO: 28.2 PG (ref 27–31)
MCH RBC QN AUTO: 28.3 PG (ref 27–31)
MCH RBC QN AUTO: 28.3 PG (ref 27–31)
MCH RBC QN AUTO: 28.5 PG (ref 27–31)
MCH RBC QN AUTO: 28.6 PG (ref 27–31)
MCH RBC QN AUTO: 28.7 PG (ref 27–31)
MCH RBC QN AUTO: 28.8 PG (ref 27–31)
MCH RBC QN AUTO: 28.9 PG (ref 27–31)
MCH RBC QN AUTO: 29.1 PG (ref 27–31)
MCH RBC QN AUTO: 29.4 PG (ref 27–31)
MCH RBC QN AUTO: 29.5 PG (ref 27–31)
MCH RBC QN AUTO: 29.6 PG (ref 27–31)
MCH RBC QN AUTO: 29.7 PG (ref 27–31)
MCH RBC QN AUTO: 29.9 PG (ref 27–31)
MCHC RBC AUTO-ENTMCNC: 30.4 G/DL (ref 32–36)
MCHC RBC AUTO-ENTMCNC: 31.6 G/DL (ref 32–36)
MCHC RBC AUTO-ENTMCNC: 31.8 G/DL (ref 32–36)
MCHC RBC AUTO-ENTMCNC: 31.8 G/DL (ref 32–36)
MCHC RBC AUTO-ENTMCNC: 32 G/DL (ref 32–36)
MCHC RBC AUTO-ENTMCNC: 32.1 G/DL (ref 32–36)
MCHC RBC AUTO-ENTMCNC: 32.3 G/DL (ref 32–36)
MCHC RBC AUTO-ENTMCNC: 32.3 G/DL (ref 32–36)
MCHC RBC AUTO-ENTMCNC: 32.5 G/DL (ref 32–36)
MCHC RBC AUTO-ENTMCNC: 32.6 G/DL (ref 32–36)
MCHC RBC AUTO-ENTMCNC: 32.7 G/DL (ref 32–36)
MCHC RBC AUTO-ENTMCNC: 32.8 G/DL (ref 32–36)
MCHC RBC AUTO-ENTMCNC: 32.9 G/DL (ref 32–36)
MCHC RBC AUTO-ENTMCNC: 32.9 G/DL (ref 32–36)
MCHC RBC AUTO-ENTMCNC: 33 G/DL (ref 32–36)
MCHC RBC AUTO-ENTMCNC: 33.1 G/DL (ref 32–36)
MCHC RBC AUTO-ENTMCNC: 33.4 G/DL (ref 32–36)
MCHC RBC AUTO-ENTMCNC: 33.5 G/DL (ref 32–36)
MCHC RBC AUTO-ENTMCNC: 33.6 G/DL (ref 32–36)
MCHC RBC AUTO-ENTMCNC: 33.7 G/DL (ref 32–36)
MCHC RBC AUTO-ENTMCNC: 33.9 G/DL (ref 32–36)
MCHC RBC AUTO-ENTMCNC: 34 G/DL (ref 32–36)
MCHC RBC AUTO-ENTMCNC: 34.1 G/DL (ref 32–36)
MCHC RBC AUTO-ENTMCNC: 34.5 G/DL (ref 32–36)
MCHC RBC AUTO-ENTMCNC: 35.2 G/DL (ref 32–36)
MCHC RBC AUTO-ENTMCNC: 35.3 G/DL (ref 32–36)
MCHC RBC AUTO-ENTMCNC: 35.4 G/DL (ref 32–36)
MCV RBC AUTO: 82 FL (ref 82–98)
MCV RBC AUTO: 83 FL (ref 82–98)
MCV RBC AUTO: 84 FL (ref 82–98)
MCV RBC AUTO: 85 FL (ref 82–98)
MCV RBC AUTO: 85 FL (ref 82–98)
MCV RBC AUTO: 86 FL (ref 82–98)
MCV RBC AUTO: 87 FL (ref 82–98)
MCV RBC AUTO: 87 FL (ref 82–98)
MCV RBC AUTO: 88 FL (ref 82–98)
MCV RBC AUTO: 89 FL (ref 82–98)
MCV RBC AUTO: 89 FL (ref 82–98)
MCV RBC AUTO: 90 FL (ref 82–98)
MCV RBC AUTO: 90 FL (ref 82–98)
MCV RBC AUTO: 91 FL (ref 82–98)
MCV RBC AUTO: 91 FL (ref 82–98)
MCV RBC AUTO: 93 FL (ref 82–98)
MCV RBC AUTO: 94 FL (ref 82–98)
MICROSCOPIC COMMENT: ABNORMAL
MICROSCOPIC COMMENT: NORMAL
MICROSCOPIC COMMENT: NORMAL
MONOCYTES # BLD AUTO: 0.4 K/UL (ref 0.3–1)
MONOCYTES # BLD AUTO: 0.5 K/UL (ref 0.3–1)
MONOCYTES # BLD AUTO: 0.6 K/UL (ref 0.3–1)
MONOCYTES # BLD AUTO: 0.7 K/UL (ref 0.3–1)
MONOCYTES # BLD AUTO: 0.8 K/UL (ref 0.3–1)
MONOCYTES # BLD AUTO: 0.9 K/UL (ref 0.3–1)
MONOCYTES # BLD AUTO: 1 K/UL (ref 0.3–1)
MONOCYTES # BLD AUTO: 1.1 K/UL (ref 0.3–1)
MONOCYTES # BLD AUTO: 1.2 K/UL (ref 0.3–1)
MONOCYTES NFR BLD: 10.1 % (ref 4–15)
MONOCYTES NFR BLD: 10.5 % (ref 4–15)
MONOCYTES NFR BLD: 10.5 % (ref 4–15)
MONOCYTES NFR BLD: 10.9 % (ref 4–15)
MONOCYTES NFR BLD: 11 % (ref 4–15)
MONOCYTES NFR BLD: 11 % (ref 4–15)
MONOCYTES NFR BLD: 11.9 % (ref 4–15)
MONOCYTES NFR BLD: 12 % (ref 4–15)
MONOCYTES NFR BLD: 12.1 % (ref 4–15)
MONOCYTES NFR BLD: 12.5 % (ref 4–15)
MONOCYTES NFR BLD: 12.9 % (ref 4–15)
MONOCYTES NFR BLD: 13.2 % (ref 4–15)
MONOCYTES NFR BLD: 13.3 % (ref 4–15)
MONOCYTES NFR BLD: 13.8 % (ref 4–15)
MONOCYTES NFR BLD: 13.9 % (ref 4–15)
MONOCYTES NFR BLD: 14 % (ref 4–15)
MONOCYTES NFR BLD: 14.1 % (ref 4–15)
MONOCYTES NFR BLD: 14.2 % (ref 4–15)
MONOCYTES NFR BLD: 14.4 % (ref 4–15)
MONOCYTES NFR BLD: 14.6 % (ref 4–15)
MONOCYTES NFR BLD: 14.8 % (ref 4–15)
MONOCYTES NFR BLD: 14.8 % (ref 4–15)
MONOCYTES NFR BLD: 15.3 % (ref 4–15)
MONOCYTES NFR BLD: 15.6 % (ref 4–15)
MONOCYTES NFR BLD: 16.9 % (ref 4–15)
MONOCYTES NFR BLD: 19.1 % (ref 4–15)
MONOCYTES NFR BLD: 20 % (ref 4–15)
MONOCYTES NFR BLD: 7.9 % (ref 4–15)
MONOCYTES NFR BLD: 9.5 % (ref 4–15)
MONOCYTES NFR BLD: 9.7 % (ref 4–15)
MV PEAK A VEL: 0.23 M/S
MV PEAK A VEL: 0.26 M/S
MV PEAK A VEL: 0.32 M/S
MV PEAK A VEL: 0.34 M/S
MV PEAK E VEL: 0.77 M/S
MV PEAK E VEL: 0.86 M/S
MV PEAK E VEL: 0.88 M/S
MV PEAK E VEL: 0.91 M/S
MV STENOSIS PRESSURE HALF TIME: 41.12 MS
MV STENOSIS PRESSURE HALF TIME: 44.81 MS
MV STENOSIS PRESSURE HALF TIME: 48.32 MS
MV STENOSIS PRESSURE HALF TIME: 67.45 MS
MV VALVE AREA P 1/2 METHOD: 3.26 CM2
MV VALVE AREA P 1/2 METHOD: 4.55 CM2
MV VALVE AREA P 1/2 METHOD: 4.91 CM2
MV VALVE AREA P 1/2 METHOD: 5.35 CM2
NEUTROPHILS # BLD AUTO: 1.7 K/UL (ref 1.8–7.7)
NEUTROPHILS # BLD AUTO: 2 K/UL (ref 1.8–7.7)
NEUTROPHILS # BLD AUTO: 2 K/UL (ref 1.8–7.7)
NEUTROPHILS # BLD AUTO: 2.1 K/UL (ref 1.8–7.7)
NEUTROPHILS # BLD AUTO: 2.3 K/UL (ref 1.8–7.7)
NEUTROPHILS # BLD AUTO: 2.4 K/UL (ref 1.8–7.7)
NEUTROPHILS # BLD AUTO: 2.4 K/UL (ref 1.8–7.7)
NEUTROPHILS # BLD AUTO: 2.6 K/UL (ref 1.8–7.7)
NEUTROPHILS # BLD AUTO: 2.8 K/UL (ref 1.8–7.7)
NEUTROPHILS # BLD AUTO: 2.9 K/UL (ref 1.8–7.7)
NEUTROPHILS # BLD AUTO: 3.2 K/UL (ref 1.8–7.7)
NEUTROPHILS # BLD AUTO: 3.3 K/UL (ref 1.8–7.7)
NEUTROPHILS # BLD AUTO: 3.6 K/UL (ref 1.8–7.7)
NEUTROPHILS # BLD AUTO: 3.6 K/UL (ref 1.8–7.7)
NEUTROPHILS # BLD AUTO: 3.9 K/UL (ref 1.8–7.7)
NEUTROPHILS # BLD AUTO: 4.1 K/UL (ref 1.8–7.7)
NEUTROPHILS # BLD AUTO: 4.4 K/UL (ref 1.8–7.7)
NEUTROPHILS # BLD AUTO: 4.5 K/UL (ref 1.8–7.7)
NEUTROPHILS # BLD AUTO: 4.5 K/UL (ref 1.8–7.7)
NEUTROPHILS # BLD AUTO: 4.6 K/UL (ref 1.8–7.7)
NEUTROPHILS # BLD AUTO: 4.6 K/UL (ref 1.8–7.7)
NEUTROPHILS # BLD AUTO: 5.6 K/UL (ref 1.8–7.7)
NEUTROPHILS # BLD AUTO: 5.6 K/UL (ref 1.8–7.7)
NEUTROPHILS # BLD AUTO: 5.9 K/UL (ref 1.8–7.7)
NEUTROPHILS # BLD AUTO: 7 K/UL (ref 1.8–7.7)
NEUTROPHILS # BLD AUTO: 7.7 K/UL (ref 1.8–7.7)
NEUTROPHILS # BLD AUTO: 8.8 K/UL (ref 1.8–7.7)
NEUTROPHILS NFR BLD: 34.4 % (ref 38–73)
NEUTROPHILS NFR BLD: 48 % (ref 38–73)
NEUTROPHILS NFR BLD: 49.8 % (ref 38–73)
NEUTROPHILS NFR BLD: 51.8 % (ref 38–73)
NEUTROPHILS NFR BLD: 53 % (ref 38–73)
NEUTROPHILS NFR BLD: 53.5 % (ref 38–73)
NEUTROPHILS NFR BLD: 54.5 % (ref 38–73)
NEUTROPHILS NFR BLD: 54.7 % (ref 38–73)
NEUTROPHILS NFR BLD: 54.9 % (ref 38–73)
NEUTROPHILS NFR BLD: 55.7 % (ref 38–73)
NEUTROPHILS NFR BLD: 57.8 % (ref 38–73)
NEUTROPHILS NFR BLD: 58.2 % (ref 38–73)
NEUTROPHILS NFR BLD: 58.2 % (ref 38–73)
NEUTROPHILS NFR BLD: 58.4 % (ref 38–73)
NEUTROPHILS NFR BLD: 58.6 % (ref 38–73)
NEUTROPHILS NFR BLD: 59.2 % (ref 38–73)
NEUTROPHILS NFR BLD: 59.4 % (ref 38–73)
NEUTROPHILS NFR BLD: 61.8 % (ref 38–73)
NEUTROPHILS NFR BLD: 63 % (ref 38–73)
NEUTROPHILS NFR BLD: 64.6 % (ref 38–73)
NEUTROPHILS NFR BLD: 64.8 % (ref 38–73)
NEUTROPHILS NFR BLD: 65.2 % (ref 38–73)
NEUTROPHILS NFR BLD: 65.8 % (ref 38–73)
NEUTROPHILS NFR BLD: 66.4 % (ref 38–73)
NEUTROPHILS NFR BLD: 66.5 % (ref 38–73)
NEUTROPHILS NFR BLD: 67.4 % (ref 38–73)
NEUTROPHILS NFR BLD: 69.3 % (ref 38–73)
NEUTROPHILS NFR BLD: 70.6 % (ref 38–73)
NEUTROPHILS NFR BLD: 71.4 % (ref 38–73)
NEUTROPHILS NFR BLD: 71.6 % (ref 38–73)
NEUTROPHILS NFR BLD: 72.2 % (ref 38–73)
NEUTROPHILS NFR BLD: 78.2 % (ref 38–73)
NEUTROPHILS NFR BLD: 78.7 % (ref 38–73)
NITRITE UR QL STRIP: NEGATIVE
NON-SQ EPI CELLS #/AREA URNS AUTO: 2 /HPF
NON-SQ EPI CELLS #/AREA URNS AUTO: <1 /HPF
NONHDLC SERPL-MCNC: 97 MG/DL
NRBC BLD-RTO: 0 /100 WBC
NRBC BLD-RTO: 1 /100 WBC
NT-PROBNP SERPL IA-MCNC: 5166 PG/ML
NUC REST DIASTOLIC VOLUME INDEX: 79
NUC REST EJECTION FRACTION: 47
NUC REST SYSTOLIC VOLUME INDEX: 42
NUC STRESS DIASTOLIC VOLUME INDEX: 84
NUC STRESS EJECTION FRACTION: 50 %
NUC STRESS SYSTOLIC VOLUME INDEX: 42
OHS CV CPX 85 PERCENT MAX PREDICTED HEART RATE MALE: 110
OHS CV CPX MAX PREDICTED HEART RATE: 129
OHS CV CPX PATIENT IS FEMALE: 1
OHS CV CPX PATIENT IS MALE: 0
OHS CV CPX PEAK DIASTOLIC BLOOD PRESSURE: 67 MMHG
OHS CV CPX PEAK HEAR RATE: 96 BPM
OHS CV CPX PEAK RATE PRESSURE PRODUCT: NORMAL
OHS CV CPX PEAK SYSTOLIC BLOOD PRESSURE: 120 MMHG
OHS CV CPX PERCENT MAX PREDICTED HEART RATE ACHIEVED: 74
OHS CV CPX RATE PRESSURE PRODUCT PRESENTING: NORMAL
OSMOLALITY SERPL: 266 MOSM/KG (ref 275–295)
OSMOLALITY UR: 297 MOSM/KG (ref 50–1200)
OSMOLALITY UR: 308 MOSM/KG (ref 50–1200)
OSMOLALITY UR: 393 MOSM/KG (ref 50–1200)
OSMOLALITY UR: 433 MOSM/KG (ref 50–1200)
OVALOCYTES BLD QL SMEAR: ABNORMAL
OVALOCYTES BLD QL SMEAR: ABNORMAL
PATHOLOGIST INTERPRETATION IFE: NORMAL
PATHOLOGIST INTERPRETATION SPE: NORMAL
PCO2 BLDA: 66.8 MMHG (ref 35–45)
PH SMN: 7.43 [PH] (ref 7.35–7.45)
PH UR STRIP: 5 [PH] (ref 5–8)
PH UR STRIP: 5 [PH] (ref 5–8)
PH UR STRIP: 6 [PH] (ref 5–8)
PH UR STRIP: 7 [PH] (ref 5–8)
PH UR STRIP: 7 [PH] (ref 5–8)
PH UR STRIP: 8 [PH] (ref 5–8)
PH UR STRIP: >8 [PH] (ref 5–8)
PHOSPHATE SERPL-MCNC: 1.6 MG/DL (ref 2.7–4.5)
PHOSPHATE SERPL-MCNC: 1.8 MG/DL (ref 2.7–4.5)
PHOSPHATE SERPL-MCNC: 2.1 MG/DL (ref 2.7–4.5)
PHOSPHATE SERPL-MCNC: 2.2 MG/DL (ref 2.7–4.5)
PHOSPHATE SERPL-MCNC: 2.2 MG/DL (ref 2.7–4.5)
PHOSPHATE SERPL-MCNC: 2.3 MG/DL (ref 2.7–4.5)
PHOSPHATE SERPL-MCNC: 2.4 MG/DL (ref 2.7–4.5)
PHOSPHATE SERPL-MCNC: 2.5 MG/DL (ref 2.7–4.5)
PHOSPHATE SERPL-MCNC: 2.6 MG/DL (ref 2.7–4.5)
PHOSPHATE SERPL-MCNC: 2.7 MG/DL (ref 2.7–4.5)
PHOSPHATE SERPL-MCNC: 2.8 MG/DL (ref 2.7–4.5)
PHOSPHATE SERPL-MCNC: 3.2 MG/DL (ref 2.7–4.5)
PHOSPHATE SERPL-MCNC: 3.3 MG/DL (ref 2.7–4.5)
PHOSPHATE SERPL-MCNC: 3.3 MG/DL (ref 2.7–4.5)
PHOSPHATE SERPL-MCNC: 5.4 MG/DL (ref 2.7–4.5)
PHOSPHATE SERPL-MCNC: 5.6 MG/DL (ref 2.7–4.5)
PHOSPHATE SERPL-MCNC: 6.4 MG/DL (ref 2.7–4.5)
PISA MRMAX VEL: 0.04 M/S
PISA MRMAX VEL: 0.04 M/S
PISA TR MAX VEL: 2.71 M/S
PISA TR MAX VEL: 3 M/S
PISA TR MAX VEL: 3.18 M/S
PISA TR MAX VEL: 3.34 M/S
PLATELET # BLD AUTO: 112 K/UL (ref 150–450)
PLATELET # BLD AUTO: 139 K/UL (ref 150–450)
PLATELET # BLD AUTO: 151 K/UL (ref 150–450)
PLATELET # BLD AUTO: 169 K/UL (ref 150–450)
PLATELET # BLD AUTO: 170 K/UL (ref 150–450)
PLATELET # BLD AUTO: 173 K/UL (ref 150–450)
PLATELET # BLD AUTO: 181 K/UL (ref 150–450)
PLATELET # BLD AUTO: 186 K/UL (ref 150–450)
PLATELET # BLD AUTO: 187 K/UL (ref 150–450)
PLATELET # BLD AUTO: 188 K/UL (ref 150–450)
PLATELET # BLD AUTO: 192 K/UL (ref 150–450)
PLATELET # BLD AUTO: 195 K/UL (ref 150–450)
PLATELET # BLD AUTO: 195 K/UL (ref 150–450)
PLATELET # BLD AUTO: 197 K/UL (ref 150–450)
PLATELET # BLD AUTO: 215 K/UL (ref 150–450)
PLATELET # BLD AUTO: 220 K/UL (ref 150–450)
PLATELET # BLD AUTO: 224 K/UL (ref 150–450)
PLATELET # BLD AUTO: 224 K/UL (ref 150–450)
PLATELET # BLD AUTO: 235 K/UL (ref 150–450)
PLATELET # BLD AUTO: 242 K/UL (ref 150–450)
PLATELET # BLD AUTO: 253 K/UL (ref 150–450)
PLATELET # BLD AUTO: 265 K/UL (ref 150–450)
PLATELET # BLD AUTO: 269 K/UL (ref 150–450)
PLATELET # BLD AUTO: 278 K/UL (ref 150–450)
PLATELET # BLD AUTO: 279 K/UL (ref 150–450)
PLATELET # BLD AUTO: 293 K/UL (ref 150–450)
PLATELET # BLD AUTO: 297 K/UL (ref 150–450)
PLATELET # BLD AUTO: 314 K/UL (ref 150–450)
PLATELET # BLD AUTO: 315 K/UL (ref 150–450)
PLATELET # BLD AUTO: 329 K/UL (ref 150–450)
PLATELET # BLD AUTO: 344 K/UL (ref 150–450)
PLATELET BLD QL SMEAR: ABNORMAL
PLATELET BLD QL SMEAR: ABNORMAL
PMV BLD AUTO: 10.8 FL (ref 9.2–12.9)
PMV BLD AUTO: 10.9 FL (ref 9.2–12.9)
PMV BLD AUTO: 11 FL (ref 9.2–12.9)
PMV BLD AUTO: 11.2 FL (ref 9.2–12.9)
PMV BLD AUTO: 11.2 FL (ref 9.2–12.9)
PMV BLD AUTO: 11.5 FL (ref 9.2–12.9)
PMV BLD AUTO: 11.6 FL (ref 9.2–12.9)
PMV BLD AUTO: 11.7 FL (ref 9.2–12.9)
PMV BLD AUTO: 11.8 FL (ref 9.2–12.9)
PMV BLD AUTO: 11.9 FL (ref 9.2–12.9)
PMV BLD AUTO: 11.9 FL (ref 9.2–12.9)
PMV BLD AUTO: 12.1 FL (ref 9.2–12.9)
PMV BLD AUTO: 12.1 FL (ref 9.2–12.9)
PMV BLD AUTO: 12.2 FL (ref 9.2–12.9)
PMV BLD AUTO: 12.3 FL (ref 9.2–12.9)
PMV BLD AUTO: 12.4 FL (ref 9.2–12.9)
PMV BLD AUTO: 12.4 FL (ref 9.2–12.9)
PMV BLD AUTO: 12.5 FL (ref 9.2–12.9)
PMV BLD AUTO: 12.6 FL (ref 9.2–12.9)
PMV BLD AUTO: 12.7 FL (ref 9.2–12.9)
PO2 BLDA: 22 MMHG (ref 40–60)
POC BE: 20 MMOL/L
POC IONIZED CALCIUM: 1.29 MMOL/L (ref 1.06–1.42)
POC MOLECULAR INFLUENZA A AGN: NEGATIVE
POC MOLECULAR INFLUENZA B AGN: NEGATIVE
POC SATURATED O2: 36 % (ref 95–100)
POC TCO2 (MEASURED): 27 MMOL/L (ref 23–29)
POC TCO2: 46 MMOL/L (ref 24–29)
POCT GLUCOSE: 100 MG/DL (ref 70–110)
POCT GLUCOSE: 100 MG/DL (ref 70–110)
POCT GLUCOSE: 102 MG/DL (ref 70–110)
POCT GLUCOSE: 103 MG/DL (ref 70–110)
POCT GLUCOSE: 105 MG/DL (ref 70–110)
POCT GLUCOSE: 106 MG/DL (ref 70–110)
POCT GLUCOSE: 106 MG/DL (ref 70–110)
POCT GLUCOSE: 108 MG/DL (ref 70–110)
POCT GLUCOSE: 108 MG/DL (ref 70–110)
POCT GLUCOSE: 111 MG/DL (ref 70–110)
POCT GLUCOSE: 112 MG/DL (ref 70–110)
POCT GLUCOSE: 112 MG/DL (ref 70–110)
POCT GLUCOSE: 113 MG/DL (ref 70–110)
POCT GLUCOSE: 113 MG/DL (ref 70–110)
POCT GLUCOSE: 114 MG/DL (ref 70–110)
POCT GLUCOSE: 116 MG/DL (ref 70–110)
POCT GLUCOSE: 118 MG/DL (ref 70–110)
POCT GLUCOSE: 119 MG/DL (ref 70–110)
POCT GLUCOSE: 121 MG/DL (ref 70–110)
POCT GLUCOSE: 121 MG/DL (ref 70–110)
POCT GLUCOSE: 122 MG/DL (ref 70–110)
POCT GLUCOSE: 124 MG/DL (ref 70–110)
POCT GLUCOSE: 125 MG/DL (ref 70–110)
POCT GLUCOSE: 125 MG/DL (ref 70–110)
POCT GLUCOSE: 126 MG/DL (ref 70–110)
POCT GLUCOSE: 127 MG/DL (ref 70–110)
POCT GLUCOSE: 129 MG/DL (ref 70–110)
POCT GLUCOSE: 131 MG/DL (ref 70–110)
POCT GLUCOSE: 131 MG/DL (ref 70–110)
POCT GLUCOSE: 132 MG/DL (ref 70–110)
POCT GLUCOSE: 133 MG/DL (ref 70–110)
POCT GLUCOSE: 134 MG/DL (ref 70–110)
POCT GLUCOSE: 135 MG/DL (ref 70–110)
POCT GLUCOSE: 137 MG/DL (ref 70–110)
POCT GLUCOSE: 139 MG/DL (ref 70–110)
POCT GLUCOSE: 139 MG/DL (ref 70–110)
POCT GLUCOSE: 140 MG/DL (ref 70–110)
POCT GLUCOSE: 141 MG/DL (ref 70–110)
POCT GLUCOSE: 141 MG/DL (ref 70–110)
POCT GLUCOSE: 142 MG/DL (ref 70–110)
POCT GLUCOSE: 143 MG/DL (ref 70–110)
POCT GLUCOSE: 144 MG/DL (ref 70–110)
POCT GLUCOSE: 145 MG/DL (ref 70–110)
POCT GLUCOSE: 145 MG/DL (ref 70–110)
POCT GLUCOSE: 147 MG/DL (ref 70–110)
POCT GLUCOSE: 147 MG/DL (ref 70–110)
POCT GLUCOSE: 151 MG/DL (ref 70–110)
POCT GLUCOSE: 154 MG/DL (ref 70–110)
POCT GLUCOSE: 155 MG/DL (ref 70–110)
POCT GLUCOSE: 155 MG/DL (ref 70–110)
POCT GLUCOSE: 157 MG/DL (ref 70–110)
POCT GLUCOSE: 159 MG/DL (ref 70–110)
POCT GLUCOSE: 159 MG/DL (ref 70–110)
POCT GLUCOSE: 160 MG/DL (ref 70–110)
POCT GLUCOSE: 162 MG/DL (ref 70–110)
POCT GLUCOSE: 163 MG/DL (ref 70–110)
POCT GLUCOSE: 165 MG/DL (ref 70–110)
POCT GLUCOSE: 166 MG/DL (ref 70–110)
POCT GLUCOSE: 167 MG/DL (ref 70–110)
POCT GLUCOSE: 171 MG/DL (ref 70–110)
POCT GLUCOSE: 173 MG/DL (ref 70–110)
POCT GLUCOSE: 173 MG/DL (ref 70–110)
POCT GLUCOSE: 177 MG/DL (ref 70–110)
POCT GLUCOSE: 178 MG/DL (ref 70–110)
POCT GLUCOSE: 180 MG/DL (ref 70–110)
POCT GLUCOSE: 181 MG/DL (ref 70–110)
POCT GLUCOSE: 181 MG/DL (ref 70–110)
POCT GLUCOSE: 184 MG/DL (ref 70–110)
POCT GLUCOSE: 185 MG/DL (ref 70–110)
POCT GLUCOSE: 189 MG/DL (ref 70–110)
POCT GLUCOSE: 192 MG/DL (ref 70–110)
POCT GLUCOSE: 193 MG/DL (ref 70–110)
POCT GLUCOSE: 197 MG/DL (ref 70–110)
POCT GLUCOSE: 197 MG/DL (ref 70–110)
POCT GLUCOSE: 200 MG/DL (ref 70–110)
POCT GLUCOSE: 201 MG/DL (ref 70–110)
POCT GLUCOSE: 202 MG/DL (ref 70–110)
POCT GLUCOSE: 204 MG/DL (ref 70–110)
POCT GLUCOSE: 209 MG/DL (ref 70–110)
POCT GLUCOSE: 213 MG/DL (ref 70–110)
POCT GLUCOSE: 222 MG/DL (ref 70–110)
POCT GLUCOSE: 224 MG/DL (ref 70–110)
POCT GLUCOSE: 227 MG/DL (ref 70–110)
POCT GLUCOSE: 228 MG/DL (ref 70–110)
POCT GLUCOSE: 228 MG/DL (ref 70–110)
POCT GLUCOSE: 232 MG/DL (ref 70–110)
POCT GLUCOSE: 237 MG/DL (ref 70–110)
POCT GLUCOSE: 239 MG/DL (ref 70–110)
POCT GLUCOSE: 240 MG/DL (ref 70–110)
POCT GLUCOSE: 247 MG/DL (ref 70–110)
POCT GLUCOSE: 250 MG/DL (ref 70–110)
POCT GLUCOSE: 261 MG/DL (ref 70–110)
POCT GLUCOSE: 263 MG/DL (ref 70–110)
POCT GLUCOSE: 298 MG/DL (ref 70–110)
POCT GLUCOSE: 318 MG/DL (ref 70–110)
POCT GLUCOSE: 422 MG/DL (ref 70–110)
POCT GLUCOSE: 57 MG/DL (ref 70–110)
POCT GLUCOSE: 79 MG/DL (ref 70–110)
POCT GLUCOSE: 87 MG/DL (ref 70–110)
POCT GLUCOSE: 88 MG/DL (ref 70–110)
POCT GLUCOSE: 91 MG/DL (ref 70–110)
POCT GLUCOSE: 95 MG/DL (ref 70–110)
POCT GLUCOSE: 97 MG/DL (ref 70–110)
POIKILOCYTOSIS BLD QL SMEAR: SLIGHT
POIKILOCYTOSIS BLD QL SMEAR: SLIGHT
POLYCHROMASIA BLD QL SMEAR: ABNORMAL
POTASSIUM BLD-SCNC: 4.3 MMOL/L (ref 3.5–5.1)
POTASSIUM SERPL-SCNC: 3.3 MMOL/L (ref 3.5–5.1)
POTASSIUM SERPL-SCNC: 3.4 MMOL/L (ref 3.5–5.1)
POTASSIUM SERPL-SCNC: 3.5 MMOL/L (ref 3.5–5.1)
POTASSIUM SERPL-SCNC: 3.5 MMOL/L (ref 3.5–5.1)
POTASSIUM SERPL-SCNC: 3.6 MMOL/L (ref 3.5–5.1)
POTASSIUM SERPL-SCNC: 3.7 MMOL/L (ref 3.5–5.1)
POTASSIUM SERPL-SCNC: 3.8 MMOL/L (ref 3.5–5.1)
POTASSIUM SERPL-SCNC: 3.9 MMOL/L (ref 3.5–5.1)
POTASSIUM SERPL-SCNC: 4 MMOL/L (ref 3.5–5.1)
POTASSIUM SERPL-SCNC: 4.1 MMOL/L (ref 3.5–5.1)
POTASSIUM SERPL-SCNC: 4.2 MMOL/L (ref 3.5–5.1)
POTASSIUM SERPL-SCNC: 4.3 MMOL/L (ref 3.5–5.1)
POTASSIUM SERPL-SCNC: 4.5 MMOL/L (ref 3.5–5.1)
POTASSIUM SERPL-SCNC: 4.5 MMOL/L (ref 3.5–5.1)
POTASSIUM SERPL-SCNC: 4.6 MMOL/L (ref 3.5–5.1)
POTASSIUM SERPL-SCNC: 4.7 MMOL/L (ref 3.5–5.1)
POTASSIUM SERPL-SCNC: 4.8 MMOL/L (ref 3.5–5.1)
POTASSIUM SERPL-SCNC: 4.8 MMOL/L (ref 3.5–5.1)
POTASSIUM SERPL-SCNC: 4.9 MMOL/L (ref 3.5–5.1)
POTASSIUM SERPL-SCNC: 5.1 MMOL/L (ref 3.5–5.1)
POTASSIUM SERPL-SCNC: 5.2 MMOL/L (ref 3.5–5.1)
POTASSIUM SERPL-SCNC: 5.2 MMOL/L (ref 3.5–5.1)
POTASSIUM SERPL-SCNC: 5.4 MMOL/L (ref 3.5–5.1)
POTASSIUM SERPL-SCNC: 5.7 MMOL/L (ref 3.5–5.1)
POTASSIUM SERPL-SCNC: 5.9 MMOL/L (ref 3.5–5.1)
POTASSIUM UR-SCNC: 31 MMOL/L (ref 15–95)
PROCALCITONIN SERPL IA-MCNC: 0.1 NG/ML
PROT SERPL-MCNC: 5.9 G/DL (ref 6–8.4)
PROT SERPL-MCNC: 6 G/DL (ref 6–8.4)
PROT SERPL-MCNC: 6.4 G/DL (ref 6–8.4)
PROT SERPL-MCNC: 6.5 G/DL (ref 6–8.4)
PROT SERPL-MCNC: 6.6 G/DL (ref 6–8.4)
PROT SERPL-MCNC: 6.9 G/DL (ref 6–8.4)
PROT SERPL-MCNC: 7.1 G/DL (ref 6–8.4)
PROT SERPL-MCNC: 7.2 G/DL (ref 6–8.4)
PROT SERPL-MCNC: 7.3 G/DL (ref 6–8.4)
PROT SERPL-MCNC: 7.4 G/DL (ref 6–8.4)
PROT SERPL-MCNC: 7.5 G/DL (ref 6–8.4)
PROT SERPL-MCNC: 7.9 G/DL (ref 6–8.4)
PROT SERPL-MCNC: 7.9 G/DL (ref 6–8.4)
PROT SERPL-MCNC: 8 G/DL (ref 6–8.4)
PROT SERPL-MCNC: 8.2 G/DL (ref 6–8.4)
PROT SERPL-MCNC: 8.8 G/DL (ref 6–8.4)
PROT UR QL STRIP: ABNORMAL
PROT UR QL STRIP: NEGATIVE
PROT UR-MCNC: 43 MG/DL (ref 0–15)
PROT UR-MCNC: 7 MG/DL (ref 0–15)
PROT/CREAT UR: 0.26 MG/G{CREAT} (ref 0–0.2)
PROT/CREAT UR: 1.19 MG/G{CREAT} (ref 0–0.2)
RA MAJOR: 3.58 CM
RA MAJOR: 4.4 CM
RA MAJOR: 4.58 CM
RA MAJOR: 5.12 CM
RA PRESSURE: 15 MMHG
RA PRESSURE: 3 MMHG
RA WIDTH: 3.06 CM
RA WIDTH: 3.39 CM
RA WIDTH: 3.41 CM
RA WIDTH: 3.85 CM
RBC # BLD AUTO: 3.89 M/UL (ref 4–5.4)
RBC # BLD AUTO: 3.92 M/UL (ref 4–5.4)
RBC # BLD AUTO: 3.93 M/UL (ref 4–5.4)
RBC # BLD AUTO: 4.01 M/UL (ref 4–5.4)
RBC # BLD AUTO: 4.11 M/UL (ref 4–5.4)
RBC # BLD AUTO: 4.16 M/UL (ref 4–5.4)
RBC # BLD AUTO: 4.19 M/UL (ref 4–5.4)
RBC # BLD AUTO: 4.21 M/UL (ref 4–5.4)
RBC # BLD AUTO: 4.27 M/UL (ref 4–5.4)
RBC # BLD AUTO: 4.3 M/UL (ref 4–5.4)
RBC # BLD AUTO: 4.33 M/UL (ref 4–5.4)
RBC # BLD AUTO: 4.39 M/UL (ref 4–5.4)
RBC # BLD AUTO: 4.4 M/UL (ref 4–5.4)
RBC # BLD AUTO: 4.5 M/UL (ref 4–5.4)
RBC # BLD AUTO: 4.54 M/UL (ref 4–5.4)
RBC # BLD AUTO: 4.54 M/UL (ref 4–5.4)
RBC # BLD AUTO: 4.57 M/UL (ref 4–5.4)
RBC # BLD AUTO: 4.6 M/UL (ref 4–5.4)
RBC # BLD AUTO: 4.6 M/UL (ref 4–5.4)
RBC # BLD AUTO: 4.78 M/UL (ref 4–5.4)
RBC # BLD AUTO: 4.79 M/UL (ref 4–5.4)
RBC # BLD AUTO: 4.79 M/UL (ref 4–5.4)
RBC # BLD AUTO: 4.87 M/UL (ref 4–5.4)
RBC # BLD AUTO: 4.89 M/UL (ref 4–5.4)
RBC # BLD AUTO: 4.92 M/UL (ref 4–5.4)
RBC # BLD AUTO: 4.96 M/UL (ref 4–5.4)
RBC # BLD AUTO: 4.97 M/UL (ref 4–5.4)
RBC # BLD AUTO: 4.99 M/UL (ref 4–5.4)
RBC # BLD AUTO: 5.12 M/UL (ref 4–5.4)
RBC # BLD AUTO: 5.13 M/UL (ref 4–5.4)
RBC # BLD AUTO: 5.16 M/UL (ref 4–5.4)
RBC # BLD AUTO: 5.27 M/UL (ref 4–5.4)
RBC # BLD AUTO: 5.29 M/UL (ref 4–5.4)
RBC #/AREA URNS AUTO: 1 /HPF (ref 0–4)
RBC #/AREA URNS AUTO: 14 /HPF (ref 0–4)
RBC #/AREA URNS AUTO: 4 /HPF (ref 0–4)
RBC #/AREA URNS AUTO: 5 /HPF (ref 0–4)
RBC #/AREA URNS AUTO: 7 /HPF (ref 0–4)
RBC #/AREA URNS AUTO: 91 /HPF (ref 0–4)
RBC #/AREA URNS HPF: 10 /HPF (ref 0–4)
REST FLOW - ANTERIOR: 1.04 CC/MIN/G
REST FLOW - INFERIOR: 0.92 CC/MIN/G
REST FLOW - LATERAL: 0.95 CC/MIN/G
REST FLOW - MAX: 1.32 CC/MIN/G
REST FLOW - MIN: 0.49 CC/MIN/G
REST FLOW - SEPTAL: 0.89 CC/MIN/G
REST FLOW - WHOLE HEART: 0.95 CC/MIN/G
RIGHT VENTRICULAR END-DIASTOLIC DIMENSION: 3.06 CM
RIGHT VENTRICULAR END-DIASTOLIC DIMENSION: 3.2 CM
RIGHT VENTRICULAR END-DIASTOLIC DIMENSION: 3.25 CM
RIGHT VENTRICULAR END-DIASTOLIC DIMENSION: 3.46 CM
RSV AG BY MOLECULAR METHOD: NOT DETECTED
SAMPLE: ABNORMAL
SAMPLE: ABNORMAL
SARS-COV-2 RDRP RESP QL NAA+PROBE: NEGATIVE
SARS-COV-2 RDRP RESP QL NAA+PROBE: NEGATIVE
SARS-COV-2 RNA RESP QL NAA+PROBE: NOT DETECTED
SCHISTOCYTES BLD QL SMEAR: ABNORMAL
SCHISTOCYTES BLD QL SMEAR: PRESENT
SINUS: 2.19 CM
SINUS: 2.43 CM
SINUS: 2.48 CM
SINUS: 2.49 CM
SITE: ABNORMAL
SODIUM BLD-SCNC: 138 MMOL/L (ref 136–145)
SODIUM SERPL-SCNC: 120 MMOL/L (ref 136–145)
SODIUM SERPL-SCNC: 120 MMOL/L (ref 136–145)
SODIUM SERPL-SCNC: 122 MMOL/L (ref 136–145)
SODIUM SERPL-SCNC: 123 MMOL/L (ref 136–145)
SODIUM SERPL-SCNC: 124 MMOL/L (ref 136–145)
SODIUM SERPL-SCNC: 126 MMOL/L (ref 136–145)
SODIUM SERPL-SCNC: 126 MMOL/L (ref 136–145)
SODIUM SERPL-SCNC: 127 MMOL/L (ref 136–145)
SODIUM SERPL-SCNC: 127 MMOL/L (ref 136–145)
SODIUM SERPL-SCNC: 128 MMOL/L (ref 136–145)
SODIUM SERPL-SCNC: 129 MMOL/L (ref 136–145)
SODIUM SERPL-SCNC: 130 MMOL/L (ref 136–145)
SODIUM SERPL-SCNC: 131 MMOL/L (ref 136–145)
SODIUM SERPL-SCNC: 132 MMOL/L (ref 136–145)
SODIUM SERPL-SCNC: 134 MMOL/L (ref 136–145)
SODIUM SERPL-SCNC: 135 MMOL/L (ref 136–145)
SODIUM SERPL-SCNC: 136 MMOL/L (ref 136–145)
SODIUM SERPL-SCNC: 137 MMOL/L (ref 136–145)
SODIUM SERPL-SCNC: 138 MMOL/L (ref 136–145)
SODIUM SERPL-SCNC: 139 MMOL/L (ref 136–145)
SODIUM SERPL-SCNC: 140 MMOL/L (ref 136–145)
SODIUM UR-SCNC: 35 MMOL/L (ref 20–250)
SODIUM UR-SCNC: 46 MMOL/L (ref 20–250)
SODIUM UR-SCNC: 61 MMOL/L (ref 20–250)
SODIUM UR-SCNC: 66 MMOL/L (ref 20–250)
SODIUM UR-SCNC: 69 MMOL/L (ref 20–250)
SP GR UR STRIP: 1 (ref 1–1.03)
SP GR UR STRIP: 1.01 (ref 1–1.03)
SQUAMOUS #/AREA URNS AUTO: 0 /HPF
SQUAMOUS #/AREA URNS AUTO: 1 /HPF
SQUAMOUS #/AREA URNS AUTO: 11 /HPF
SQUAMOUS #/AREA URNS AUTO: 3 /HPF
SQUAMOUS #/AREA URNS AUTO: 3 /HPF
SQUAMOUS #/AREA URNS AUTO: 4 /HPF
SQUAMOUS #/AREA URNS HPF: 1 /HPF
STJ: 2.07 CM
STJ: 2.11 CM
STJ: 2.17 CM
STJ: 2.21 CM
STRESS FLOW - ANTERIOR: 1.72 CC/MIN/G
STRESS FLOW - INFERIOR: 1.51 CC/MIN/G
STRESS FLOW - LATERAL: 1.66 CC/MIN/G
STRESS FLOW - MAX: 2.32 CC/MIN/G
STRESS FLOW - MIN: 0.87 CC/MIN/G
STRESS FLOW - SEPTAL: 1.53 CC/MIN/G
STRESS FLOW - WHOLE HEART: 1.61 CC/MIN/G
SYSTOLIC BLOOD PRESSURE: 117 MMHG
T4 FREE SERPL-MCNC: 1.26 NG/DL (ref 0.71–1.51)
TARGETS BLD QL SMEAR: ABNORMAL
TDI LATERAL: 0.03 M/S
TDI LATERAL: 0.04 M/S
TDI LATERAL: 0.04 M/S
TDI LATERAL: 0.05 M/S
TDI SEPTAL: 0.04 M/S
TDI SEPTAL: 0.05 M/S
TDI: 0.04 M/S
TDI: 0.04 M/S
TDI: 0.05 M/S
TDI: 0.05 M/S
TR MAX PG: 29 MMHG
TR MAX PG: 36 MMHG
TR MAX PG: 40 MMHG
TR MAX PG: 45 MMHG
TRICUSPID ANNULAR PLANE SYSTOLIC EXCURSION: 1.24 CM
TRICUSPID ANNULAR PLANE SYSTOLIC EXCURSION: 1.27 CM
TRICUSPID ANNULAR PLANE SYSTOLIC EXCURSION: 1.61 CM
TRICUSPID ANNULAR PLANE SYSTOLIC EXCURSION: 1.85 CM
TRIGL SERPL-MCNC: 58 MG/DL (ref 30–150)
TROPONIN I SERPL DL<=0.01 NG/ML-MCNC: 0.06 NG/ML (ref 0–0.03)
TROPONIN I SERPL DL<=0.01 NG/ML-MCNC: 0.06 NG/ML (ref 0–0.03)
TROPONIN I SERPL DL<=0.01 NG/ML-MCNC: 0.07 NG/ML (ref 0–0.03)
TROPONIN I SERPL DL<=0.01 NG/ML-MCNC: 0.07 NG/ML (ref 0–0.03)
TROPONIN I SERPL DL<=0.01 NG/ML-MCNC: 0.09 NG/ML (ref 0–0.03)
TROPONIN I SERPL DL<=0.01 NG/ML-MCNC: 0.1 NG/ML (ref 0–0.03)
TROPONIN I SERPL DL<=0.01 NG/ML-MCNC: 0.12 NG/ML (ref 0–0.03)
TROPONIN I SERPL DL<=0.01 NG/ML-MCNC: 0.13 NG/ML (ref 0–0.03)
TROPONIN I SERPL DL<=0.01 NG/ML-MCNC: 0.14 NG/ML (ref 0–0.03)
TROPONIN I SERPL DL<=0.01 NG/ML-MCNC: 0.14 NG/ML (ref 0–0.03)
TROPONIN I SERPL DL<=0.01 NG/ML-MCNC: 0.16 NG/ML (ref 0–0.03)
TROPONIN I SERPL DL<=0.01 NG/ML-MCNC: 0.17 NG/ML (ref 0–0.03)
TROPONIN I SERPL DL<=0.01 NG/ML-MCNC: 0.17 NG/ML (ref 0–0.03)
TROPONIN I SERPL DL<=0.01 NG/ML-MCNC: 0.18 NG/ML (ref 0–0.03)
TROPONIN I SERPL DL<=0.01 NG/ML-MCNC: 0.18 NG/ML (ref 0–0.03)
TROPONIN I SERPL DL<=0.01 NG/ML-MCNC: 0.23 NG/ML (ref 0–0.03)
TROPONIN I SERPL DL<=0.01 NG/ML-MCNC: 0.23 NG/ML (ref 0–0.03)
TROPONIN I SERPL DL<=0.01 NG/ML-MCNC: 0.28 NG/ML (ref 0–0.03)
TROPONIN I SERPL DL<=0.01 NG/ML-MCNC: 0.49 NG/ML (ref 0–0.03)
TROPONIN I SERPL DL<=0.01 NG/ML-MCNC: 0.65 NG/ML (ref 0–0.03)
TROPONIN I SERPL DL<=0.01 NG/ML-MCNC: 0.65 NG/ML (ref 0–0.03)
TSH SERPL DL<=0.005 MIU/L-ACNC: 0.34 UIU/ML (ref 0.4–4)
TSH SERPL DL<=0.005 MIU/L-ACNC: 2.02 UIU/ML (ref 0.4–4)
TSH SERPL DL<=0.005 MIU/L-ACNC: 2.42 UIU/ML (ref 0.4–4)
TSH SERPL DL<=0.005 MIU/L-ACNC: 2.85 UIU/ML (ref 0.4–4)
TSH SERPL DL<=0.005 MIU/L-ACNC: 2.92 UIU/ML (ref 0.4–4)
TV REST PULMONARY ARTERY PRESSURE: 39 MMHG
TV REST PULMONARY ARTERY PRESSURE: 43 MMHG
TV REST PULMONARY ARTERY PRESSURE: 44 MMHG
TV REST PULMONARY ARTERY PRESSURE: 48 MMHG
URATE SERPL-MCNC: 4.3 MG/DL (ref 2.4–5.7)
URN SPEC COLLECT METH UR: ABNORMAL
URN SPEC COLLECT METH UR: NORMAL
URN SPEC COLLECT METH UR: NORMAL
UROBILINOGEN UR STRIP-ACNC: ABNORMAL EU/DL
WBC # BLD AUTO: 10.09 K/UL (ref 3.9–12.7)
WBC # BLD AUTO: 11.22 K/UL (ref 3.9–12.7)
WBC # BLD AUTO: 3.56 K/UL (ref 3.9–12.7)
WBC # BLD AUTO: 3.84 K/UL (ref 3.9–12.7)
WBC # BLD AUTO: 3.9 K/UL (ref 3.9–12.7)
WBC # BLD AUTO: 3.93 K/UL (ref 3.9–12.7)
WBC # BLD AUTO: 4.02 K/UL (ref 3.9–12.7)
WBC # BLD AUTO: 4.26 K/UL (ref 3.9–12.7)
WBC # BLD AUTO: 4.33 K/UL (ref 3.9–12.7)
WBC # BLD AUTO: 4.36 K/UL (ref 3.9–12.7)
WBC # BLD AUTO: 4.39 K/UL (ref 3.9–12.7)
WBC # BLD AUTO: 4.4 K/UL (ref 3.9–12.7)
WBC # BLD AUTO: 4.5 K/UL (ref 3.9–12.7)
WBC # BLD AUTO: 4.57 K/UL (ref 3.9–12.7)
WBC # BLD AUTO: 4.95 K/UL (ref 3.9–12.7)
WBC # BLD AUTO: 4.95 K/UL (ref 3.9–12.7)
WBC # BLD AUTO: 4.97 K/UL (ref 3.9–12.7)
WBC # BLD AUTO: 5 K/UL (ref 3.9–12.7)
WBC # BLD AUTO: 5.01 K/UL (ref 3.9–12.7)
WBC # BLD AUTO: 5.44 K/UL (ref 3.9–12.7)
WBC # BLD AUTO: 5.6 K/UL (ref 3.9–12.7)
WBC # BLD AUTO: 5.75 K/UL (ref 3.9–12.7)
WBC # BLD AUTO: 5.86 K/UL (ref 3.9–12.7)
WBC # BLD AUTO: 6.09 K/UL (ref 3.9–12.7)
WBC # BLD AUTO: 6.27 K/UL (ref 3.9–12.7)
WBC # BLD AUTO: 6.3 K/UL (ref 3.9–12.7)
WBC # BLD AUTO: 6.78 K/UL (ref 3.9–12.7)
WBC # BLD AUTO: 6.85 K/UL (ref 3.9–12.7)
WBC # BLD AUTO: 7.18 K/UL (ref 3.9–12.7)
WBC # BLD AUTO: 7.83 K/UL (ref 3.9–12.7)
WBC # BLD AUTO: 8.31 K/UL (ref 3.9–12.7)
WBC # BLD AUTO: 8.43 K/UL (ref 3.9–12.7)
WBC # BLD AUTO: 9.83 K/UL (ref 3.9–12.7)
WBC #/AREA URNS AUTO: 1 /HPF (ref 0–5)
WBC #/AREA URNS AUTO: 2 /HPF (ref 0–5)
WBC #/AREA URNS AUTO: 2 /HPF (ref 0–5)
WBC #/AREA URNS AUTO: 20 /HPF (ref 0–5)
WBC #/AREA URNS AUTO: 7 /HPF (ref 0–5)
WBC #/AREA URNS AUTO: 9 /HPF (ref 0–5)
WBC #/AREA URNS HPF: 7 /HPF (ref 0–5)
YEAST UR QL AUTO: ABNORMAL

## 2022-01-01 PROCEDURE — 99220 PR INITIAL OBSERVATION CARE,LEVL III: CPT | Mod: ,,, | Performed by: STUDENT IN AN ORGANIZED HEALTH CARE EDUCATION/TRAINING PROGRAM

## 2022-01-01 PROCEDURE — 83735 ASSAY OF MAGNESIUM: CPT | Performed by: INTERNAL MEDICINE

## 2022-01-01 PROCEDURE — 63600175 PHARM REV CODE 636 W HCPCS: Performed by: PHYSICIAN ASSISTANT

## 2022-01-01 PROCEDURE — 80048 BASIC METABOLIC PNL TOTAL CA: CPT

## 2022-01-01 PROCEDURE — 93010 EKG 12-LEAD: ICD-10-PCS | Mod: ,,, | Performed by: INTERNAL MEDICINE

## 2022-01-01 PROCEDURE — 96372 THER/PROPH/DIAG INJ SC/IM: CPT | Performed by: INTERNAL MEDICINE

## 2022-01-01 PROCEDURE — 25000003 PHARM REV CODE 250: Mod: HCNC | Performed by: INTERNAL MEDICINE

## 2022-01-01 PROCEDURE — 1111F DSCHRG MED/CURRENT MED MERGE: CPT | Mod: HCNC,CPTII,S$GLB,

## 2022-01-01 PROCEDURE — 25000003 PHARM REV CODE 250: Performed by: STUDENT IN AN ORGANIZED HEALTH CARE EDUCATION/TRAINING PROGRAM

## 2022-01-01 PROCEDURE — 85025 COMPLETE CBC W/AUTO DIFF WBC: CPT | Mod: HCNC | Performed by: STUDENT IN AN ORGANIZED HEALTH CARE EDUCATION/TRAINING PROGRAM

## 2022-01-01 PROCEDURE — 1101F PR PT FALLS ASSESS DOC 0-1 FALLS W/OUT INJ PAST YR: ICD-10-PCS | Mod: CPTII,S$GLB,, | Performed by: INTERNAL MEDICINE

## 2022-01-01 PROCEDURE — G0378 HOSPITAL OBSERVATION PER HR: HCPCS

## 2022-01-01 PROCEDURE — 83735 ASSAY OF MAGNESIUM: CPT | Performed by: NURSE PRACTITIONER

## 2022-01-01 PROCEDURE — 25000003 PHARM REV CODE 250

## 2022-01-01 PROCEDURE — 84100 ASSAY OF PHOSPHORUS: CPT | Mod: HCNC

## 2022-01-01 PROCEDURE — 36415 COLL VENOUS BLD VENIPUNCTURE: CPT | Performed by: STUDENT IN AN ORGANIZED HEALTH CARE EDUCATION/TRAINING PROGRAM

## 2022-01-01 PROCEDURE — 25000003 PHARM REV CODE 250: Performed by: PHYSICIAN ASSISTANT

## 2022-01-01 PROCEDURE — 63600175 PHARM REV CODE 636 W HCPCS: Performed by: INTERNAL MEDICINE

## 2022-01-01 PROCEDURE — 1160F RVW MEDS BY RX/DR IN RCRD: CPT | Mod: HCNC,CPTII,S$GLB,

## 2022-01-01 PROCEDURE — 71046 X-RAY EXAM CHEST 2 VIEWS: CPT | Mod: 26,,, | Performed by: RADIOLOGY

## 2022-01-01 PROCEDURE — 3288F FALL RISK ASSESSMENT DOCD: CPT | Mod: CPTII,S$GLB,, | Performed by: INTERNAL MEDICINE

## 2022-01-01 PROCEDURE — 11000001 HC ACUTE MED/SURG PRIVATE ROOM

## 2022-01-01 PROCEDURE — 99499 RISK ADDL DX/OHS AUDIT: ICD-10-PCS | Mod: HCNC,S$GLB,, | Performed by: NURSE PRACTITIONER

## 2022-01-01 PROCEDURE — 99233 PR SUBSEQUENT HOSPITAL CARE,LEVL III: ICD-10-PCS | Mod: HCNC,,, | Performed by: HOSPITALIST

## 2022-01-01 PROCEDURE — 81001 URINALYSIS AUTO W/SCOPE: CPT | Performed by: PHYSICIAN ASSISTANT

## 2022-01-01 PROCEDURE — 97530 THERAPEUTIC ACTIVITIES: CPT | Mod: CQ

## 2022-01-01 PROCEDURE — 83735 ASSAY OF MAGNESIUM: CPT | Mod: HCNC | Performed by: INTERNAL MEDICINE

## 2022-01-01 PROCEDURE — 80048 BASIC METABOLIC PNL TOTAL CA: CPT | Performed by: HOSPITALIST

## 2022-01-01 PROCEDURE — 93010 EKG 12-LEAD: ICD-10-PCS | Mod: 76,,, | Performed by: INTERNAL MEDICINE

## 2022-01-01 PROCEDURE — 97165 OT EVAL LOW COMPLEX 30 MIN: CPT

## 2022-01-01 PROCEDURE — 99284 EMERGENCY DEPT VISIT MOD MDM: CPT | Mod: HCNC,,, | Performed by: EMERGENCY MEDICINE

## 2022-01-01 PROCEDURE — 99285 EMERGENCY DEPT VISIT HI MDM: CPT | Mod: 25

## 2022-01-01 PROCEDURE — 99233 SBSQ HOSP IP/OBS HIGH 50: CPT | Mod: ,,, | Performed by: INTERNAL MEDICINE

## 2022-01-01 PROCEDURE — 93010 EKG 12-LEAD: ICD-10-PCS | Mod: HCNC,,, | Performed by: INTERNAL MEDICINE

## 2022-01-01 PROCEDURE — 81001 URINALYSIS AUTO W/SCOPE: CPT | Performed by: INTERNAL MEDICINE

## 2022-01-01 PROCEDURE — 80048 BASIC METABOLIC PNL TOTAL CA: CPT | Mod: HCNC

## 2022-01-01 PROCEDURE — 25000003 PHARM REV CODE 250: Performed by: EMERGENCY MEDICINE

## 2022-01-01 PROCEDURE — 84100 ASSAY OF PHOSPHORUS: CPT

## 2022-01-01 PROCEDURE — 99499 UNLISTED E&M SERVICE: CPT | Mod: ,,, | Performed by: STUDENT IN AN ORGANIZED HEALTH CARE EDUCATION/TRAINING PROGRAM

## 2022-01-01 PROCEDURE — 25000003 PHARM REV CODE 250: Mod: HCNC | Performed by: PHYSICIAN ASSISTANT

## 2022-01-01 PROCEDURE — 97530 THERAPEUTIC ACTIVITIES: CPT | Mod: CO

## 2022-01-01 PROCEDURE — 20000000 HC ICU ROOM

## 2022-01-01 PROCEDURE — 83690 ASSAY OF LIPASE: CPT | Performed by: EMERGENCY MEDICINE

## 2022-01-01 PROCEDURE — 25000003 PHARM REV CODE 250: Mod: HCNC

## 2022-01-01 PROCEDURE — 85025 COMPLETE CBC W/AUTO DIFF WBC: CPT

## 2022-01-01 PROCEDURE — 99220 PR INITIAL OBSERVATION CARE,LEVL III: CPT | Mod: ,,, | Performed by: PHYSICIAN ASSISTANT

## 2022-01-01 PROCEDURE — 99285 PR EMERGENCY DEPT VISIT,LEVEL V: ICD-10-PCS | Mod: ,,, | Performed by: EMERGENCY MEDICINE

## 2022-01-01 PROCEDURE — 3072F LOW RISK FOR RETINOPATHY: CPT | Mod: HCNC,CPTII,S$GLB, | Performed by: INTERNAL MEDICINE

## 2022-01-01 PROCEDURE — G0180 PR HOME HEALTH MD CERTIFICATION: ICD-10-PCS | Mod: ,,, | Performed by: HOSPITALIST

## 2022-01-01 PROCEDURE — 25000242 PHARM REV CODE 250 ALT 637 W/ HCPCS: Performed by: HOSPITALIST

## 2022-01-01 PROCEDURE — 93005 ELECTROCARDIOGRAM TRACING: CPT

## 2022-01-01 PROCEDURE — 36415 COLL VENOUS BLD VENIPUNCTURE: CPT | Mod: HCNC | Performed by: NURSE PRACTITIONER

## 2022-01-01 PROCEDURE — 85025 COMPLETE CBC W/AUTO DIFF WBC: CPT | Mod: HCNC

## 2022-01-01 PROCEDURE — 82570 ASSAY OF URINE CREATININE: CPT | Performed by: INTERNAL MEDICINE

## 2022-01-01 PROCEDURE — 85025 COMPLETE CBC W/AUTO DIFF WBC: CPT | Performed by: NURSE PRACTITIONER

## 2022-01-01 PROCEDURE — 25000003 PHARM REV CODE 250: Mod: HCNC | Performed by: STUDENT IN AN ORGANIZED HEALTH CARE EDUCATION/TRAINING PROGRAM

## 2022-01-01 PROCEDURE — 63600175 PHARM REV CODE 636 W HCPCS: Performed by: HOSPITALIST

## 2022-01-01 PROCEDURE — 93018 CARDIAC PET SCAN STRESS (CUPID ONLY): ICD-10-PCS | Mod: ,,, | Performed by: INTERNAL MEDICINE

## 2022-01-01 PROCEDURE — 3288F FALL RISK ASSESSMENT DOCD: CPT | Mod: CPTII,S$GLB,, | Performed by: NURSE PRACTITIONER

## 2022-01-01 PROCEDURE — 78431 CARDIAC PET SCAN STRESS (CUPID ONLY): ICD-10-PCS | Mod: 26,,, | Performed by: INTERNAL MEDICINE

## 2022-01-01 PROCEDURE — 97116 GAIT TRAINING THERAPY: CPT | Mod: HCNC

## 2022-01-01 PROCEDURE — 81003 URINALYSIS AUTO W/O SCOPE: CPT | Mod: HCNC | Performed by: INTERNAL MEDICINE

## 2022-01-01 PROCEDURE — 99220 PR INITIAL OBSERVATION CARE,LEVL III: ICD-10-PCS | Mod: ,,, | Performed by: INTERNAL MEDICINE

## 2022-01-01 PROCEDURE — 36415 COLL VENOUS BLD VENIPUNCTURE: CPT | Performed by: HOSPITALIST

## 2022-01-01 PROCEDURE — 99214 OFFICE O/P EST MOD 30 MIN: CPT | Mod: S$GLB,,, | Performed by: NURSE PRACTITIONER

## 2022-01-01 PROCEDURE — 83880 ASSAY OF NATRIURETIC PEPTIDE: CPT | Performed by: EMERGENCY MEDICINE

## 2022-01-01 PROCEDURE — 84484 ASSAY OF TROPONIN QUANT: CPT | Performed by: EMERGENCY MEDICINE

## 2022-01-01 PROCEDURE — 82436 ASSAY OF URINE CHLORIDE: CPT | Performed by: NURSE PRACTITIONER

## 2022-01-01 PROCEDURE — 81001 URINALYSIS AUTO W/SCOPE: CPT | Performed by: STUDENT IN AN ORGANIZED HEALTH CARE EDUCATION/TRAINING PROGRAM

## 2022-01-01 PROCEDURE — 99233 SBSQ HOSP IP/OBS HIGH 50: CPT | Mod: GC,,, | Performed by: INTERNAL MEDICINE

## 2022-01-01 PROCEDURE — 99232 SBSQ HOSP IP/OBS MODERATE 35: CPT | Mod: ,,, | Performed by: HOSPITALIST

## 2022-01-01 PROCEDURE — 94640 AIRWAY INHALATION TREATMENT: CPT

## 2022-01-01 PROCEDURE — 99999 PR PBB SHADOW E&M-EST. PATIENT-LVL II: CPT | Mod: PBBFAC,HCNC,, | Performed by: INTERNAL MEDICINE

## 2022-01-01 PROCEDURE — 84484 ASSAY OF TROPONIN QUANT: CPT | Performed by: NURSE PRACTITIONER

## 2022-01-01 PROCEDURE — 36415 COLL VENOUS BLD VENIPUNCTURE: CPT | Performed by: INTERNAL MEDICINE

## 2022-01-01 PROCEDURE — 25000003 PHARM REV CODE 250: Performed by: INTERNAL MEDICINE

## 2022-01-01 PROCEDURE — 83880 ASSAY OF NATRIURETIC PEPTIDE: CPT | Performed by: INTERNAL MEDICINE

## 2022-01-01 PROCEDURE — 99223 PR INITIAL HOSPITAL CARE,LEVL III: ICD-10-PCS | Mod: ,,, | Performed by: NURSE PRACTITIONER

## 2022-01-01 PROCEDURE — 99499 UNLISTED E&M SERVICE: CPT | Mod: HCNC,S$GLB,, | Performed by: NURSE PRACTITIONER

## 2022-01-01 PROCEDURE — 78434 CARDIAC PET SCAN STRESS (CUPID ONLY): ICD-10-PCS | Mod: 26,,, | Performed by: INTERNAL MEDICINE

## 2022-01-01 PROCEDURE — 99284 EMERGENCY DEPT VISIT MOD MDM: CPT | Mod: HCNC

## 2022-01-01 PROCEDURE — 80074 ACUTE HEPATITIS PANEL: CPT | Performed by: NURSE PRACTITIONER

## 2022-01-01 PROCEDURE — 97535 SELF CARE MNGMENT TRAINING: CPT

## 2022-01-01 PROCEDURE — 94761 N-INVAS EAR/PLS OXIMETRY MLT: CPT

## 2022-01-01 PROCEDURE — 25000003 PHARM REV CODE 250: Mod: HCNC | Performed by: HOSPITALIST

## 2022-01-01 PROCEDURE — 96375 TX/PRO/DX INJ NEW DRUG ADDON: CPT | Mod: HCNC

## 2022-01-01 PROCEDURE — 83036 HEMOGLOBIN GLYCOSYLATED A1C: CPT | Performed by: NURSE PRACTITIONER

## 2022-01-01 PROCEDURE — 96366 THER/PROPH/DIAG IV INF ADDON: CPT

## 2022-01-01 PROCEDURE — 97110 THERAPEUTIC EXERCISES: CPT | Mod: HCNC

## 2022-01-01 PROCEDURE — 99223 1ST HOSP IP/OBS HIGH 75: CPT | Mod: ,,, | Performed by: NURSE PRACTITIONER

## 2022-01-01 PROCEDURE — 80053 COMPREHEN METABOLIC PANEL: CPT | Performed by: NURSE PRACTITIONER

## 2022-01-01 PROCEDURE — 1159F PR MEDICATION LIST DOCUMENTED IN MEDICAL RECORD: ICD-10-PCS | Mod: CPTII,S$GLB,, | Performed by: NURSE PRACTITIONER

## 2022-01-01 PROCEDURE — 63600175 PHARM REV CODE 636 W HCPCS

## 2022-01-01 PROCEDURE — 96361 HYDRATE IV INFUSION ADD-ON: CPT

## 2022-01-01 PROCEDURE — 25000003 PHARM REV CODE 250: Performed by: HOSPITALIST

## 2022-01-01 PROCEDURE — 80053 COMPREHEN METABOLIC PANEL: CPT | Mod: HCNC

## 2022-01-01 PROCEDURE — 84295 ASSAY OF SERUM SODIUM: CPT | Mod: 91,HCNC | Performed by: STUDENT IN AN ORGANIZED HEALTH CARE EDUCATION/TRAINING PROGRAM

## 2022-01-01 PROCEDURE — 63600175 PHARM REV CODE 636 W HCPCS: Mod: HCNC | Performed by: INTERNAL MEDICINE

## 2022-01-01 PROCEDURE — 83735 ASSAY OF MAGNESIUM: CPT | Performed by: HOSPITALIST

## 2022-01-01 PROCEDURE — 1160F PR REVIEW ALL MEDS BY PRESCRIBER/CLIN PHARMACIST DOCUMENTED: ICD-10-PCS | Mod: CPTII,S$GLB,, | Performed by: INTERNAL MEDICINE

## 2022-01-01 PROCEDURE — 93010 ELECTROCARDIOGRAM REPORT: CPT | Mod: 76,,, | Performed by: INTERNAL MEDICINE

## 2022-01-01 PROCEDURE — G0180 MD CERTIFICATION HHA PATIENT: HCPCS | Mod: ,,, | Performed by: HOSPITALIST

## 2022-01-01 PROCEDURE — 99285 EMERGENCY DEPT VISIT HI MDM: CPT | Mod: ,,, | Performed by: EMERGENCY MEDICINE

## 2022-01-01 PROCEDURE — 1159F PR MEDICATION LIST DOCUMENTED IN MEDICAL RECORD: ICD-10-PCS | Mod: CPTII,S$GLB,, | Performed by: INTERNAL MEDICINE

## 2022-01-01 PROCEDURE — 78434 AQMBF PET REST & RX STRESS: CPT | Mod: 26,,, | Performed by: INTERNAL MEDICINE

## 2022-01-01 PROCEDURE — 1101F PR PT FALLS ASSESS DOC 0-1 FALLS W/OUT INJ PAST YR: ICD-10-PCS | Mod: HCNC,CPTII,S$GLB,

## 2022-01-01 PROCEDURE — 84550 ASSAY OF BLOOD/URIC ACID: CPT | Performed by: NURSE PRACTITIONER

## 2022-01-01 PROCEDURE — 99203 OFFICE O/P NEW LOW 30 MIN: CPT | Mod: GC,,, | Performed by: SURGERY

## 2022-01-01 PROCEDURE — 80061 LIPID PANEL: CPT | Performed by: INTERNAL MEDICINE

## 2022-01-01 PROCEDURE — 20600001 HC STEP DOWN PRIVATE ROOM: Mod: HCNC

## 2022-01-01 PROCEDURE — 85025 COMPLETE CBC W/AUTO DIFF WBC: CPT | Performed by: INTERNAL MEDICINE

## 2022-01-01 PROCEDURE — 3288F FALL RISK ASSESSMENT DOCD: CPT | Mod: HCNC,CPTII,S$GLB,

## 2022-01-01 PROCEDURE — G0180 PR HOME HEALTH MD CERTIFICATION: ICD-10-PCS | Mod: ,,, | Performed by: STUDENT IN AN ORGANIZED HEALTH CARE EDUCATION/TRAINING PROGRAM

## 2022-01-01 PROCEDURE — 96372 THER/PROPH/DIAG INJ SC/IM: CPT | Mod: 59

## 2022-01-01 PROCEDURE — 99220 PR INITIAL OBSERVATION CARE,LEVL III: ICD-10-PCS | Mod: ,,, | Performed by: PHYSICIAN ASSISTANT

## 2022-01-01 PROCEDURE — 99220 PR INITIAL OBSERVATION CARE,LEVL III: CPT | Mod: ,,, | Performed by: NURSE PRACTITIONER

## 2022-01-01 PROCEDURE — 85379 FIBRIN DEGRADATION QUANT: CPT | Performed by: INTERNAL MEDICINE

## 2022-01-01 PROCEDURE — 99233 SBSQ HOSP IP/OBS HIGH 50: CPT | Mod: HCNC,,, | Performed by: INTERNAL MEDICINE

## 2022-01-01 PROCEDURE — U0002 COVID-19 LAB TEST NON-CDC: HCPCS | Performed by: INTERNAL MEDICINE

## 2022-01-01 PROCEDURE — 84145 PROCALCITONIN (PCT): CPT | Performed by: HOSPITALIST

## 2022-01-01 PROCEDURE — 99233 PR SUBSEQUENT HOSPITAL CARE,LEVL III: ICD-10-PCS | Mod: ,,, | Performed by: INTERNAL MEDICINE

## 2022-01-01 PROCEDURE — 11721 PR DEBRIDEMENT OF NAILS, 6 OR MORE: ICD-10-PCS | Mod: Q8,S$GLB,, | Performed by: PODIATRIST

## 2022-01-01 PROCEDURE — 82533 TOTAL CORTISOL: CPT | Performed by: INTERNAL MEDICINE

## 2022-01-01 PROCEDURE — 80053 COMPREHEN METABOLIC PANEL: CPT | Performed by: STUDENT IN AN ORGANIZED HEALTH CARE EDUCATION/TRAINING PROGRAM

## 2022-01-01 PROCEDURE — 36415 COLL VENOUS BLD VENIPUNCTURE: CPT | Mod: HCNC | Performed by: STUDENT IN AN ORGANIZED HEALTH CARE EDUCATION/TRAINING PROGRAM

## 2022-01-01 PROCEDURE — 1101F PT FALLS ASSESS-DOCD LE1/YR: CPT | Mod: CPTII,S$GLB,, | Performed by: INTERNAL MEDICINE

## 2022-01-01 PROCEDURE — 86334 IMMUNOFIX E-PHORESIS SERUM: CPT | Mod: HCNC

## 2022-01-01 PROCEDURE — 96372 THER/PROPH/DIAG INJ SC/IM: CPT | Performed by: NURSE PRACTITIONER

## 2022-01-01 PROCEDURE — 99220 PR INITIAL OBSERVATION CARE,LEVL III: ICD-10-PCS | Mod: ,,, | Performed by: NURSE PRACTITIONER

## 2022-01-01 PROCEDURE — 27000221 HC OXYGEN, UP TO 24 HOURS

## 2022-01-01 PROCEDURE — 83880 ASSAY OF NATRIURETIC PEPTIDE: CPT | Mod: HCNC | Performed by: HOSPITALIST

## 2022-01-01 PROCEDURE — 25500020 PHARM REV CODE 255: Performed by: EMERGENCY MEDICINE

## 2022-01-01 PROCEDURE — 80053 COMPREHEN METABOLIC PANEL: CPT | Performed by: EMERGENCY MEDICINE

## 2022-01-01 PROCEDURE — 85025 COMPLETE CBC W/AUTO DIFF WBC: CPT | Performed by: EMERGENCY MEDICINE

## 2022-01-01 PROCEDURE — 84439 ASSAY OF FREE THYROXINE: CPT | Performed by: NURSE PRACTITIONER

## 2022-01-01 PROCEDURE — 85025 COMPLETE CBC W/AUTO DIFF WBC: CPT | Performed by: PHYSICIAN ASSISTANT

## 2022-01-01 PROCEDURE — 83735 ASSAY OF MAGNESIUM: CPT | Mod: HCNC

## 2022-01-01 PROCEDURE — G0008 FLU VACCINE - QUADRIVALENT - ADJUVANTED: ICD-10-PCS | Mod: S$GLB,,, | Performed by: INTERNAL MEDICINE

## 2022-01-01 PROCEDURE — 25000003 PHARM REV CODE 250: Mod: HCNC | Performed by: NURSE PRACTITIONER

## 2022-01-01 PROCEDURE — 80048 BASIC METABOLIC PNL TOTAL CA: CPT | Performed by: INTERNAL MEDICINE

## 2022-01-01 PROCEDURE — 99233 SBSQ HOSP IP/OBS HIGH 50: CPT | Mod: HCNC,,, | Performed by: HOSPITALIST

## 2022-01-01 PROCEDURE — 96366 THER/PROPH/DIAG IV INF ADDON: CPT | Mod: HCNC

## 2022-01-01 PROCEDURE — 99233 SBSQ HOSP IP/OBS HIGH 50: CPT | Mod: ,,, | Performed by: HOSPITALIST

## 2022-01-01 PROCEDURE — 99284 PR EMERGENCY DEPT VISIT,LEVEL IV: ICD-10-PCS | Mod: HCNC,,, | Performed by: EMERGENCY MEDICINE

## 2022-01-01 PROCEDURE — 84156 ASSAY OF PROTEIN URINE: CPT | Mod: HCNC | Performed by: INTERNAL MEDICINE

## 2022-01-01 PROCEDURE — 99214 OFFICE O/P EST MOD 30 MIN: CPT | Mod: HCNC,S$GLB,,

## 2022-01-01 PROCEDURE — 99203 PR OFFICE/OUTPT VISIT, NEW, LEVL III, 30-44 MIN: ICD-10-PCS | Mod: GC,,, | Performed by: SURGERY

## 2022-01-01 PROCEDURE — 20600001 HC STEP DOWN PRIVATE ROOM

## 2022-01-01 PROCEDURE — 80069 RENAL FUNCTION PANEL: CPT | Performed by: INTERNAL MEDICINE

## 2022-01-01 PROCEDURE — 87502 INFLUENZA DNA AMP PROBE: CPT

## 2022-01-01 PROCEDURE — 84100 ASSAY OF PHOSPHORUS: CPT | Performed by: INTERNAL MEDICINE

## 2022-01-01 PROCEDURE — 80047 BASIC METABLC PNL IONIZED CA: CPT | Mod: 59

## 2022-01-01 PROCEDURE — 25000003 PHARM REV CODE 250: Performed by: NURSE PRACTITIONER

## 2022-01-01 PROCEDURE — 99900035 HC TECH TIME PER 15 MIN (STAT)

## 2022-01-01 PROCEDURE — 83735 ASSAY OF MAGNESIUM: CPT

## 2022-01-01 PROCEDURE — 96376 TX/PRO/DX INJ SAME DRUG ADON: CPT

## 2022-01-01 PROCEDURE — 63600175 PHARM REV CODE 636 W HCPCS: Performed by: NURSE PRACTITIONER

## 2022-01-01 PROCEDURE — 99233 PR SUBSEQUENT HOSPITAL CARE,LEVL III: ICD-10-PCS | Mod: ,,, | Performed by: HOSPITALIST

## 2022-01-01 PROCEDURE — 36415 COLL VENOUS BLD VENIPUNCTURE: CPT | Mod: HCNC,PN

## 2022-01-01 PROCEDURE — 36415 COLL VENOUS BLD VENIPUNCTURE: CPT | Mod: HCNC | Performed by: INTERNAL MEDICINE

## 2022-01-01 PROCEDURE — 99496 TRANSITIONAL CARE MANAGE SERVICE 7 DAY DISCHARGE: ICD-10-PCS | Mod: HCNC,S$GLB,, | Performed by: INTERNAL MEDICINE

## 2022-01-01 PROCEDURE — 99215 OFFICE O/P EST HI 40 MIN: CPT | Mod: S$GLB,,, | Performed by: INTERNAL MEDICINE

## 2022-01-01 PROCEDURE — 96365 THER/PROPH/DIAG IV INF INIT: CPT

## 2022-01-01 PROCEDURE — 99999 PR PBB SHADOW E&M-EST. PATIENT-LVL I: ICD-10-PCS | Mod: PBBFAC,,, | Performed by: PODIATRIST

## 2022-01-01 PROCEDURE — 84484 ASSAY OF TROPONIN QUANT: CPT | Performed by: STUDENT IN AN ORGANIZED HEALTH CARE EDUCATION/TRAINING PROGRAM

## 2022-01-01 PROCEDURE — 74177 CT ABD & PELVIS W/CONTRAST: CPT | Mod: 26,,, | Performed by: RADIOLOGY

## 2022-01-01 PROCEDURE — 83735 ASSAY OF MAGNESIUM: CPT | Performed by: EMERGENCY MEDICINE

## 2022-01-01 PROCEDURE — 25500020 PHARM REV CODE 255: Performed by: NURSE PRACTITIONER

## 2022-01-01 PROCEDURE — 93005 ELECTROCARDIOGRAM TRACING: CPT | Mod: HCNC

## 2022-01-01 PROCEDURE — 99233 PR SUBSEQUENT HOSPITAL CARE,LEVL III: ICD-10-PCS | Mod: GC,,, | Performed by: INTERNAL MEDICINE

## 2022-01-01 PROCEDURE — 1101F PR PT FALLS ASSESS DOC 0-1 FALLS W/OUT INJ PAST YR: ICD-10-PCS | Mod: CPTII,S$GLB,, | Performed by: NURSE PRACTITIONER

## 2022-01-01 PROCEDURE — 99223 1ST HOSP IP/OBS HIGH 75: CPT | Mod: ,,, | Performed by: INTERNAL MEDICINE

## 2022-01-01 PROCEDURE — 83521 IG LIGHT CHAINS FREE EACH: CPT | Mod: 59,HCNC

## 2022-01-01 PROCEDURE — 36415 COLL VENOUS BLD VENIPUNCTURE: CPT

## 2022-01-01 PROCEDURE — 83880 ASSAY OF NATRIURETIC PEPTIDE: CPT | Performed by: PHYSICIAN ASSISTANT

## 2022-01-01 PROCEDURE — 99499 NO LOS: ICD-10-PCS | Mod: ,,, | Performed by: STUDENT IN AN ORGANIZED HEALTH CARE EDUCATION/TRAINING PROGRAM

## 2022-01-01 PROCEDURE — 99233 PR SUBSEQUENT HOSPITAL CARE,LEVL III: ICD-10-PCS | Mod: HCNC,,, | Performed by: NURSE PRACTITIONER

## 2022-01-01 PROCEDURE — 84300 ASSAY OF URINE SODIUM: CPT | Performed by: INTERNAL MEDICINE

## 2022-01-01 PROCEDURE — 1160F RVW MEDS BY RX/DR IN RCRD: CPT | Mod: CPTII,S$GLB,, | Performed by: INTERNAL MEDICINE

## 2022-01-01 PROCEDURE — 3072F PR LOW RISK FOR RETINOPATHY: ICD-10-PCS | Mod: HCNC,CPTII,S$GLB,

## 2022-01-01 PROCEDURE — 84443 ASSAY THYROID STIM HORMONE: CPT | Performed by: INTERNAL MEDICINE

## 2022-01-01 PROCEDURE — 85025 COMPLETE CBC W/AUTO DIFF WBC: CPT | Mod: 91 | Performed by: EMERGENCY MEDICINE

## 2022-01-01 PROCEDURE — 84100 ASSAY OF PHOSPHORUS: CPT | Performed by: STUDENT IN AN ORGANIZED HEALTH CARE EDUCATION/TRAINING PROGRAM

## 2022-01-01 PROCEDURE — 3072F LOW RISK FOR RETINOPATHY: CPT | Mod: CPTII,S$GLB,, | Performed by: PODIATRIST

## 2022-01-01 PROCEDURE — 25000242 PHARM REV CODE 250 ALT 637 W/ HCPCS: Performed by: INTERNAL MEDICINE

## 2022-01-01 PROCEDURE — 80053 COMPREHEN METABOLIC PANEL: CPT | Performed by: INTERNAL MEDICINE

## 2022-01-01 PROCEDURE — 83880 ASSAY OF NATRIURETIC PEPTIDE: CPT | Performed by: STUDENT IN AN ORGANIZED HEALTH CARE EDUCATION/TRAINING PROGRAM

## 2022-01-01 PROCEDURE — 97162 PT EVAL MOD COMPLEX 30 MIN: CPT

## 2022-01-01 PROCEDURE — 83935 ASSAY OF URINE OSMOLALITY: CPT | Performed by: INTERNAL MEDICINE

## 2022-01-01 PROCEDURE — 63600175 PHARM REV CODE 636 W HCPCS: Performed by: STUDENT IN AN ORGANIZED HEALTH CARE EDUCATION/TRAINING PROGRAM

## 2022-01-01 PROCEDURE — 1111F PR DISCHARGE MEDS RECONCILED W/ CURRENT OUTPATIENT MED LIST: ICD-10-PCS | Mod: HCNC,CPTII,S$GLB,

## 2022-01-01 PROCEDURE — 99999 PR PBB SHADOW E&M-EST. PATIENT-LVL V: CPT | Mod: PBBFAC,,, | Performed by: NURSE PRACTITIONER

## 2022-01-01 PROCEDURE — 93010 ELECTROCARDIOGRAM REPORT: CPT | Mod: ,,, | Performed by: INTERNAL MEDICINE

## 2022-01-01 PROCEDURE — 1159F MED LIST DOCD IN RCRD: CPT | Mod: CPTII,S$GLB,, | Performed by: NURSE PRACTITIONER

## 2022-01-01 PROCEDURE — 99284 PR EMERGENCY DEPT VISIT,LEVEL IV: ICD-10-PCS | Mod: GC,CS,, | Performed by: EMERGENCY MEDICINE

## 2022-01-01 PROCEDURE — 99233 PR SUBSEQUENT HOSPITAL CARE,LEVL III: ICD-10-PCS | Mod: ,,, | Performed by: NURSE PRACTITIONER

## 2022-01-01 PROCEDURE — 27100108

## 2022-01-01 PROCEDURE — 86334 PATHOLOGIST INTERPRETATION IFE: ICD-10-PCS | Mod: 26,HCNC,, | Performed by: PATHOLOGY

## 2022-01-01 PROCEDURE — 71046 XR CHEST PA AND LATERAL: ICD-10-PCS | Mod: 26,,, | Performed by: RADIOLOGY

## 2022-01-01 PROCEDURE — 84484 ASSAY OF TROPONIN QUANT: CPT | Mod: 91 | Performed by: NURSE PRACTITIONER

## 2022-01-01 PROCEDURE — 97535 SELF CARE MNGMENT TRAINING: CPT | Mod: CO

## 2022-01-01 PROCEDURE — 84484 ASSAY OF TROPONIN QUANT: CPT | Mod: 91

## 2022-01-01 PROCEDURE — 99214 PR OFFICE/OUTPT VISIT, EST, LEVL IV, 30-39 MIN: ICD-10-PCS | Mod: S$GLB,,, | Performed by: NURSE PRACTITIONER

## 2022-01-01 PROCEDURE — 97164 PT RE-EVAL EST PLAN CARE: CPT

## 2022-01-01 PROCEDURE — 97530 THERAPEUTIC ACTIVITIES: CPT

## 2022-01-01 PROCEDURE — 99225 PR SUBSEQUENT OBSERVATION CARE,LEVEL II: ICD-10-PCS | Mod: ,,, | Performed by: STUDENT IN AN ORGANIZED HEALTH CARE EDUCATION/TRAINING PROGRAM

## 2022-01-01 PROCEDURE — 80053 COMPREHEN METABOLIC PANEL: CPT | Mod: HCNC | Performed by: NURSE PRACTITIONER

## 2022-01-01 PROCEDURE — 25000242 PHARM REV CODE 250 ALT 637 W/ HCPCS: Performed by: STUDENT IN AN ORGANIZED HEALTH CARE EDUCATION/TRAINING PROGRAM

## 2022-01-01 PROCEDURE — 85025 COMPLETE CBC W/AUTO DIFF WBC: CPT | Performed by: STUDENT IN AN ORGANIZED HEALTH CARE EDUCATION/TRAINING PROGRAM

## 2022-01-01 PROCEDURE — 84300 ASSAY OF URINE SODIUM: CPT | Performed by: NURSE PRACTITIONER

## 2022-01-01 PROCEDURE — 25000242 PHARM REV CODE 250 ALT 637 W/ HCPCS: Performed by: NURSE PRACTITIONER

## 2022-01-01 PROCEDURE — 83935 ASSAY OF URINE OSMOLALITY: CPT | Performed by: NURSE PRACTITIONER

## 2022-01-01 PROCEDURE — 84443 ASSAY THYROID STIM HORMONE: CPT | Performed by: NURSE PRACTITIONER

## 2022-01-01 PROCEDURE — 3072F PR LOW RISK FOR RETINOPATHY: ICD-10-PCS | Mod: CPTII,S$GLB,, | Performed by: NURSE PRACTITIONER

## 2022-01-01 PROCEDURE — 96372 THER/PROPH/DIAG INJ SC/IM: CPT | Performed by: STUDENT IN AN ORGANIZED HEALTH CARE EDUCATION/TRAINING PROGRAM

## 2022-01-01 PROCEDURE — 83605 ASSAY OF LACTIC ACID: CPT | Performed by: INTERNAL MEDICINE

## 2022-01-01 PROCEDURE — 99232 PR SUBSEQUENT HOSPITAL CARE,LEVL II: ICD-10-PCS | Mod: ,,, | Performed by: HOSPITALIST

## 2022-01-01 PROCEDURE — 36415 COLL VENOUS BLD VENIPUNCTURE: CPT | Mod: HCNC

## 2022-01-01 PROCEDURE — 84165 PROTEIN E-PHORESIS SERUM: CPT | Mod: 26,HCNC,, | Performed by: PATHOLOGY

## 2022-01-01 PROCEDURE — 99285 PR EMERGENCY DEPT VISIT,LEVEL V: ICD-10-PCS | Mod: ,,, | Performed by: STUDENT IN AN ORGANIZED HEALTH CARE EDUCATION/TRAINING PROGRAM

## 2022-01-01 PROCEDURE — 87040 BLOOD CULTURE FOR BACTERIA: CPT | Mod: 59

## 2022-01-01 PROCEDURE — 83880 ASSAY OF NATRIURETIC PEPTIDE: CPT | Performed by: NURSE PRACTITIONER

## 2022-01-01 PROCEDURE — 99496 TRANSJ CARE MGMT HIGH F2F 7D: CPT | Mod: HCNC,S$GLB,, | Performed by: INTERNAL MEDICINE

## 2022-01-01 PROCEDURE — 99233 PR SUBSEQUENT HOSPITAL CARE,LEVL III: ICD-10-PCS | Mod: HCNC,,, | Performed by: INTERNAL MEDICINE

## 2022-01-01 PROCEDURE — 93016 CARDIAC PET SCAN STRESS (CUPID ONLY): ICD-10-PCS | Mod: ,,, | Performed by: INTERNAL MEDICINE

## 2022-01-01 PROCEDURE — 96365 THER/PROPH/DIAG IV INF INIT: CPT | Mod: HCNC

## 2022-01-01 PROCEDURE — 3072F LOW RISK FOR RETINOPATHY: CPT | Mod: CPTII,S$GLB,, | Performed by: INTERNAL MEDICINE

## 2022-01-01 PROCEDURE — 99999 PR PBB SHADOW E&M-EST. PATIENT-LVL III: CPT | Mod: PBBFAC,HCNC,,

## 2022-01-01 PROCEDURE — 83735 ASSAY OF MAGNESIUM: CPT | Performed by: STUDENT IN AN ORGANIZED HEALTH CARE EDUCATION/TRAINING PROGRAM

## 2022-01-01 PROCEDURE — 97116 GAIT TRAINING THERAPY: CPT | Mod: CQ

## 2022-01-01 PROCEDURE — 36406 VNPNXR<3YRS PHY/QHP OTHER VN: CPT

## 2022-01-01 PROCEDURE — 1101F PT FALLS ASSESS-DOCD LE1/YR: CPT | Mod: HCNC,CPTII,S$GLB,

## 2022-01-01 PROCEDURE — 84133 ASSAY OF URINE POTASSIUM: CPT | Performed by: NURSE PRACTITIONER

## 2022-01-01 PROCEDURE — 63600175 PHARM REV CODE 636 W HCPCS: Performed by: EMERGENCY MEDICINE

## 2022-01-01 PROCEDURE — 97110 THERAPEUTIC EXERCISES: CPT

## 2022-01-01 PROCEDURE — 99239 HOSP IP/OBS DSCHRG MGMT >30: CPT | Mod: HCNC,,, | Performed by: HOSPITALIST

## 2022-01-01 PROCEDURE — 84484 ASSAY OF TROPONIN QUANT: CPT | Mod: 91,HCNC | Performed by: HOSPITALIST

## 2022-01-01 PROCEDURE — 99203 PR OFFICE/OUTPT VISIT, NEW, LEVL III, 30-44 MIN: ICD-10-PCS | Mod: 25,S$GLB,, | Performed by: PODIATRIST

## 2022-01-01 PROCEDURE — 96374 THER/PROPH/DIAG INJ IV PUSH: CPT

## 2022-01-01 PROCEDURE — 1111F PR DISCHARGE MEDS RECONCILED W/ CURRENT OUTPATIENT MED LIST: ICD-10-PCS | Mod: HCNC,CPTII,, | Performed by: HOSPITALIST

## 2022-01-01 PROCEDURE — 83735 ASSAY OF MAGNESIUM: CPT | Mod: HCNC | Performed by: STUDENT IN AN ORGANIZED HEALTH CARE EDUCATION/TRAINING PROGRAM

## 2022-01-01 PROCEDURE — A9698 NON-RAD CONTRAST MATERIALNOC: HCPCS | Performed by: NURSE PRACTITIONER

## 2022-01-01 PROCEDURE — 93010 ELECTROCARDIOGRAM REPORT: CPT | Mod: HCNC,,, | Performed by: INTERNAL MEDICINE

## 2022-01-01 PROCEDURE — 27000221 HC OXYGEN, UP TO 24 HOURS: Mod: HCNC

## 2022-01-01 PROCEDURE — 83880 ASSAY OF NATRIURETIC PEPTIDE: CPT | Mod: HCNC

## 2022-01-01 PROCEDURE — 78434 AQMBF PET REST & RX STRESS: CPT

## 2022-01-01 PROCEDURE — 81003 URINALYSIS AUTO W/O SCOPE: CPT | Performed by: NURSE PRACTITIONER

## 2022-01-01 PROCEDURE — 97530 THERAPEUTIC ACTIVITIES: CPT | Mod: HCNC

## 2022-01-01 PROCEDURE — 1159F MED LIST DOCD IN RCRD: CPT | Mod: CPTII,S$GLB,, | Performed by: INTERNAL MEDICINE

## 2022-01-01 PROCEDURE — 84443 ASSAY THYROID STIM HORMONE: CPT | Mod: HCNC | Performed by: STUDENT IN AN ORGANIZED HEALTH CARE EDUCATION/TRAINING PROGRAM

## 2022-01-01 PROCEDURE — 96375 TX/PRO/DX INJ NEW DRUG ADDON: CPT | Mod: 59

## 2022-01-01 PROCEDURE — 87086 URINE CULTURE/COLONY COUNT: CPT | Performed by: EMERGENCY MEDICINE

## 2022-01-01 PROCEDURE — 83930 ASSAY OF BLOOD OSMOLALITY: CPT | Performed by: INTERNAL MEDICINE

## 2022-01-01 PROCEDURE — 71046 X-RAY EXAM CHEST 2 VIEWS: CPT | Mod: TC

## 2022-01-01 PROCEDURE — 94761 N-INVAS EAR/PLS OXIMETRY MLT: CPT | Mod: HCNC

## 2022-01-01 PROCEDURE — 99226 PR SUBSEQUENT OBSERVATION CARE,LEVEL III: ICD-10-PCS | Mod: ,,, | Performed by: NURSE PRACTITIONER

## 2022-01-01 PROCEDURE — 36410 VNPNXR 3YR/> PHY/QHP DX/THER: CPT

## 2022-01-01 PROCEDURE — 83605 ASSAY OF LACTIC ACID: CPT | Performed by: PHYSICIAN ASSISTANT

## 2022-01-01 PROCEDURE — 81000 URINALYSIS NONAUTO W/SCOPE: CPT | Performed by: EMERGENCY MEDICINE

## 2022-01-01 PROCEDURE — 90694 VACC AIIV4 NO PRSRV 0.5ML IM: CPT | Mod: S$GLB,,, | Performed by: INTERNAL MEDICINE

## 2022-01-01 PROCEDURE — 99291 PR CRITICAL CARE, E/M 30-74 MINUTES: ICD-10-PCS | Mod: ,,,

## 2022-01-01 PROCEDURE — 82977 ASSAY OF GGT: CPT | Performed by: NURSE PRACTITIONER

## 2022-01-01 PROCEDURE — 83880 ASSAY OF NATRIURETIC PEPTIDE: CPT | Mod: 91 | Performed by: EMERGENCY MEDICINE

## 2022-01-01 PROCEDURE — 78431 MYOCRD IMG PET RST&STRS CT: CPT | Mod: 26,,, | Performed by: INTERNAL MEDICINE

## 2022-01-01 PROCEDURE — 63600175 PHARM REV CODE 636 W HCPCS: Mod: HCNC | Performed by: STUDENT IN AN ORGANIZED HEALTH CARE EDUCATION/TRAINING PROGRAM

## 2022-01-01 PROCEDURE — 84295 ASSAY OF SERUM SODIUM: CPT | Performed by: INTERNAL MEDICINE

## 2022-01-01 PROCEDURE — G0180 MD CERTIFICATION HHA PATIENT: HCPCS | Mod: ,,, | Performed by: STUDENT IN AN ORGANIZED HEALTH CARE EDUCATION/TRAINING PROGRAM

## 2022-01-01 PROCEDURE — 99999 PR PBB SHADOW E&M-EST. PATIENT-LVL II: ICD-10-PCS | Mod: PBBFAC,HCNC,, | Performed by: INTERNAL MEDICINE

## 2022-01-01 PROCEDURE — 82962 GLUCOSE BLOOD TEST: CPT

## 2022-01-01 PROCEDURE — 99999 PR PBB SHADOW E&M-EST. PATIENT-LVL III: ICD-10-PCS | Mod: PBBFAC,,, | Performed by: INTERNAL MEDICINE

## 2022-01-01 PROCEDURE — 97116 GAIT TRAINING THERAPY: CPT

## 2022-01-01 PROCEDURE — 99214 PR OFFICE/OUTPT VISIT, EST, LEVL IV, 30-39 MIN: ICD-10-PCS | Mod: HCNC,S$GLB,,

## 2022-01-01 PROCEDURE — 51798 US URINE CAPACITY MEASURE: CPT | Mod: HCNC

## 2022-01-01 PROCEDURE — 63600175 PHARM REV CODE 636 W HCPCS: Mod: HCNC | Performed by: HOSPITALIST

## 2022-01-01 PROCEDURE — 99220 PR INITIAL OBSERVATION CARE,LEVL III: ICD-10-PCS | Mod: ,,, | Performed by: STUDENT IN AN ORGANIZED HEALTH CARE EDUCATION/TRAINING PROGRAM

## 2022-01-01 PROCEDURE — 99291 CRITICAL CARE FIRST HOUR: CPT | Mod: ,,,

## 2022-01-01 PROCEDURE — 3288F PR FALLS RISK ASSESSMENT DOCUMENTED: ICD-10-PCS | Mod: HCNC,CPTII,S$GLB,

## 2022-01-01 PROCEDURE — 84295 ASSAY OF SERUM SODIUM: CPT | Mod: 91,HCNC | Performed by: HOSPITALIST

## 2022-01-01 PROCEDURE — 3072F PR LOW RISK FOR RETINOPATHY: ICD-10-PCS | Mod: CPTII,S$GLB,, | Performed by: PODIATRIST

## 2022-01-01 PROCEDURE — 0241U SARS-COV2 (COVID) WITH FLU/RSV BY PCR: CPT | Performed by: PHYSICIAN ASSISTANT

## 2022-01-01 PROCEDURE — 99239 PR HOSPITAL DISCHARGE DAY,>30 MIN: ICD-10-PCS | Mod: HCNC,,, | Performed by: HOSPITALIST

## 2022-01-01 PROCEDURE — 74177 CT ABDOMEN PELVIS WITH CONTRAST: ICD-10-PCS | Mod: 26,,, | Performed by: RADIOLOGY

## 2022-01-01 PROCEDURE — 1101F PT FALLS ASSESS-DOCD LE1/YR: CPT | Mod: CPTII,S$GLB,, | Performed by: NURSE PRACTITIONER

## 2022-01-01 PROCEDURE — 99285 EMERGENCY DEPT VISIT HI MDM: CPT | Mod: CS,,, | Performed by: EMERGENCY MEDICINE

## 2022-01-01 PROCEDURE — 80048 BASIC METABOLIC PNL TOTAL CA: CPT | Mod: HCNC | Performed by: STUDENT IN AN ORGANIZED HEALTH CARE EDUCATION/TRAINING PROGRAM

## 2022-01-01 PROCEDURE — 36415 COLL VENOUS BLD VENIPUNCTURE: CPT | Performed by: NURSE PRACTITIONER

## 2022-01-01 PROCEDURE — C1751 CATH, INF, PER/CENT/MIDLINE: HCPCS

## 2022-01-01 PROCEDURE — 86334 IMMUNOFIX E-PHORESIS SERUM: CPT | Mod: 26,HCNC,, | Performed by: PATHOLOGY

## 2022-01-01 PROCEDURE — 99233 SBSQ HOSP IP/OBS HIGH 50: CPT | Mod: HCNC,,, | Performed by: NURSE PRACTITIONER

## 2022-01-01 PROCEDURE — 99215 PR OFFICE/OUTPT VISIT, EST, LEVL V, 40-54 MIN: ICD-10-PCS | Mod: S$GLB,,, | Performed by: INTERNAL MEDICINE

## 2022-01-01 PROCEDURE — 80048 BASIC METABOLIC PNL TOTAL CA: CPT | Mod: 91,HCNC | Performed by: STUDENT IN AN ORGANIZED HEALTH CARE EDUCATION/TRAINING PROGRAM

## 2022-01-01 PROCEDURE — G0008 ADMIN INFLUENZA VIRUS VAC: HCPCS | Mod: S$GLB,,, | Performed by: INTERNAL MEDICINE

## 2022-01-01 PROCEDURE — 99284 EMERGENCY DEPT VISIT MOD MDM: CPT | Mod: GC,CS,, | Performed by: EMERGENCY MEDICINE

## 2022-01-01 PROCEDURE — 99285 EMERGENCY DEPT VISIT HI MDM: CPT | Mod: ,,, | Performed by: STUDENT IN AN ORGANIZED HEALTH CARE EDUCATION/TRAINING PROGRAM

## 2022-01-01 PROCEDURE — 11721 DEBRIDE NAIL 6 OR MORE: CPT | Mod: Q8,S$GLB,, | Performed by: PODIATRIST

## 2022-01-01 PROCEDURE — 3072F PR LOW RISK FOR RETINOPATHY: ICD-10-PCS | Mod: HCNC,CPTII,S$GLB, | Performed by: INTERNAL MEDICINE

## 2022-01-01 PROCEDURE — 84100 ASSAY OF PHOSPHORUS: CPT | Performed by: EMERGENCY MEDICINE

## 2022-01-01 PROCEDURE — 74177 CT ABD & PELVIS W/CONTRAST: CPT | Mod: TC

## 2022-01-01 PROCEDURE — 85025 COMPLETE CBC W/AUTO DIFF WBC: CPT | Mod: 91 | Performed by: INTERNAL MEDICINE

## 2022-01-01 PROCEDURE — 81003 URINALYSIS AUTO W/O SCOPE: CPT

## 2022-01-01 PROCEDURE — 99233 SBSQ HOSP IP/OBS HIGH 50: CPT | Mod: ,,, | Performed by: NURSE PRACTITIONER

## 2022-01-01 PROCEDURE — 90694 FLU VACCINE - QUADRIVALENT - ADJUVANTED: ICD-10-PCS | Mod: S$GLB,,, | Performed by: INTERNAL MEDICINE

## 2022-01-01 PROCEDURE — 81001 URINALYSIS AUTO W/SCOPE: CPT | Performed by: EMERGENCY MEDICINE

## 2022-01-01 PROCEDURE — 1160F PR REVIEW ALL MEDS BY PRESCRIBER/CLIN PHARMACIST DOCUMENTED: ICD-10-PCS | Mod: HCNC,CPTII,S$GLB,

## 2022-01-01 PROCEDURE — 99223 PR INITIAL HOSPITAL CARE,LEVL III: ICD-10-PCS | Mod: ,,, | Performed by: INTERNAL MEDICINE

## 2022-01-01 PROCEDURE — 1159F PR MEDICATION LIST DOCUMENTED IN MEDICAL RECORD: ICD-10-PCS | Mod: HCNC,CPTII,S$GLB,

## 2022-01-01 PROCEDURE — 84484 ASSAY OF TROPONIN QUANT: CPT | Performed by: INTERNAL MEDICINE

## 2022-01-01 PROCEDURE — 94799 UNLISTED PULMONARY SVC/PX: CPT

## 2022-01-01 PROCEDURE — 99220 PR INITIAL OBSERVATION CARE,LEVL III: CPT | Mod: ,,, | Performed by: INTERNAL MEDICINE

## 2022-01-01 PROCEDURE — 99225 PR SUBSEQUENT OBSERVATION CARE,LEVEL II: CPT | Mod: ,,, | Performed by: STUDENT IN AN ORGANIZED HEALTH CARE EDUCATION/TRAINING PROGRAM

## 2022-01-01 PROCEDURE — 99217 PR OBSERVATION CARE DISCHARGE: ICD-10-PCS | Mod: ,,, | Performed by: STUDENT IN AN ORGANIZED HEALTH CARE EDUCATION/TRAINING PROGRAM

## 2022-01-01 PROCEDURE — 84443 ASSAY THYROID STIM HORMONE: CPT

## 2022-01-01 PROCEDURE — 1126F PR PAIN SEVERITY QUANTIFIED, NO PAIN PRESENT: ICD-10-PCS | Mod: HCNC,CPTII,S$GLB,

## 2022-01-01 PROCEDURE — 81001 URINALYSIS AUTO W/SCOPE: CPT | Performed by: NURSE PRACTITIONER

## 2022-01-01 PROCEDURE — U0002 COVID-19 LAB TEST NON-CDC: HCPCS | Performed by: EMERGENCY MEDICINE

## 2022-01-01 PROCEDURE — 99285 EMERGENCY DEPT VISIT HI MDM: CPT | Mod: 25,HCNC

## 2022-01-01 PROCEDURE — 99999 PR PBB SHADOW E&M-EST. PATIENT-LVL III: CPT | Mod: PBBFAC,,, | Performed by: INTERNAL MEDICINE

## 2022-01-01 PROCEDURE — 84484 ASSAY OF TROPONIN QUANT: CPT | Mod: 91 | Performed by: EMERGENCY MEDICINE

## 2022-01-01 PROCEDURE — 1111F DSCHRG MED/CURRENT MED MERGE: CPT | Mod: HCNC,CPTII,, | Performed by: HOSPITALIST

## 2022-01-01 PROCEDURE — 93018 CV STRESS TEST I&R ONLY: CPT | Mod: ,,, | Performed by: INTERNAL MEDICINE

## 2022-01-01 PROCEDURE — 99285 PR EMERGENCY DEPT VISIT,LEVEL V: ICD-10-PCS | Mod: CS,,, | Performed by: EMERGENCY MEDICINE

## 2022-01-01 PROCEDURE — 81001 URINALYSIS AUTO W/SCOPE: CPT

## 2022-01-01 PROCEDURE — 97535 SELF CARE MNGMENT TRAINING: CPT | Mod: HCNC,CO

## 2022-01-01 PROCEDURE — 85025 COMPLETE CBC W/AUTO DIFF WBC: CPT | Performed by: HOSPITALIST

## 2022-01-01 PROCEDURE — 3072F LOW RISK FOR RETINOPATHY: CPT | Mod: CPTII,S$GLB,, | Performed by: NURSE PRACTITIONER

## 2022-01-01 PROCEDURE — 63600175 PHARM REV CODE 636 W HCPCS: Mod: HCNC

## 2022-01-01 PROCEDURE — 3072F PR LOW RISK FOR RETINOPATHY: ICD-10-PCS | Mod: CPTII,S$GLB,, | Performed by: INTERNAL MEDICINE

## 2022-01-01 PROCEDURE — 99203 OFFICE O/P NEW LOW 30 MIN: CPT | Mod: 25,S$GLB,, | Performed by: PODIATRIST

## 2022-01-01 PROCEDURE — 84156 ASSAY OF PROTEIN URINE: CPT | Performed by: NURSE PRACTITIONER

## 2022-01-01 PROCEDURE — 96375 TX/PRO/DX INJ NEW DRUG ADDON: CPT

## 2022-01-01 PROCEDURE — 93016 CV STRESS TEST SUPVJ ONLY: CPT | Mod: ,,, | Performed by: INTERNAL MEDICINE

## 2022-01-01 PROCEDURE — 82803 BLOOD GASES ANY COMBINATION: CPT

## 2022-01-01 PROCEDURE — 25500020 PHARM REV CODE 255: Performed by: STUDENT IN AN ORGANIZED HEALTH CARE EDUCATION/TRAINING PROGRAM

## 2022-01-01 PROCEDURE — 99999 PR PBB SHADOW E&M-EST. PATIENT-LVL I: CPT | Mod: PBBFAC,,, | Performed by: PODIATRIST

## 2022-01-01 PROCEDURE — 97530 THERAPEUTIC ACTIVITIES: CPT | Mod: HCNC,CO

## 2022-01-01 PROCEDURE — 96360 HYDRATION IV INFUSION INIT: CPT | Mod: 59

## 2022-01-01 PROCEDURE — 83880 ASSAY OF NATRIURETIC PEPTIDE: CPT | Performed by: HOSPITALIST

## 2022-01-01 PROCEDURE — 36415 COLL VENOUS BLD VENIPUNCTURE: CPT | Mod: HCNC | Performed by: HOSPITALIST

## 2022-01-01 PROCEDURE — 99900035 HC TECH TIME PER 15 MIN (STAT): Mod: HCNC

## 2022-01-01 PROCEDURE — 84165 PATHOLOGIST INTERPRETATION SPE: ICD-10-PCS | Mod: 26,HCNC,, | Performed by: PATHOLOGY

## 2022-01-01 PROCEDURE — 3288F PR FALLS RISK ASSESSMENT DOCUMENTED: ICD-10-PCS | Mod: CPTII,S$GLB,, | Performed by: INTERNAL MEDICINE

## 2022-01-01 PROCEDURE — 84484 ASSAY OF TROPONIN QUANT: CPT | Performed by: PHYSICIAN ASSISTANT

## 2022-01-01 PROCEDURE — 99217 PR OBSERVATION CARE DISCHARGE: CPT | Mod: ,,, | Performed by: STUDENT IN AN ORGANIZED HEALTH CARE EDUCATION/TRAINING PROGRAM

## 2022-01-01 PROCEDURE — 99226 PR SUBSEQUENT OBSERVATION CARE,LEVEL III: CPT | Mod: ,,, | Performed by: NURSE PRACTITIONER

## 2022-01-01 PROCEDURE — 83605 ASSAY OF LACTIC ACID: CPT | Performed by: STUDENT IN AN ORGANIZED HEALTH CARE EDUCATION/TRAINING PROGRAM

## 2022-01-01 PROCEDURE — 1125F PR PAIN SEVERITY QUANTIFIED, PAIN PRESENT: ICD-10-PCS | Mod: CPTII,S$GLB,, | Performed by: NURSE PRACTITIONER

## 2022-01-01 PROCEDURE — 3288F PR FALLS RISK ASSESSMENT DOCUMENTED: ICD-10-PCS | Mod: CPTII,S$GLB,, | Performed by: NURSE PRACTITIONER

## 2022-01-01 PROCEDURE — 1126F AMNT PAIN NOTED NONE PRSNT: CPT | Mod: HCNC,CPTII,S$GLB,

## 2022-01-01 PROCEDURE — 99999 PR PBB SHADOW E&M-EST. PATIENT-LVL III: ICD-10-PCS | Mod: PBBFAC,HCNC,,

## 2022-01-01 PROCEDURE — 84165 PROTEIN E-PHORESIS SERUM: CPT | Mod: HCNC

## 2022-01-01 PROCEDURE — 87040 BLOOD CULTURE FOR BACTERIA: CPT | Performed by: PHYSICIAN ASSISTANT

## 2022-01-01 PROCEDURE — 84100 ASSAY OF PHOSPHORUS: CPT | Performed by: NURSE PRACTITIONER

## 2022-01-01 PROCEDURE — 1159F MED LIST DOCD IN RCRD: CPT | Mod: HCNC,CPTII,S$GLB,

## 2022-01-01 PROCEDURE — 99999 PR PBB SHADOW E&M-EST. PATIENT-LVL V: ICD-10-PCS | Mod: PBBFAC,,, | Performed by: NURSE PRACTITIONER

## 2022-01-01 PROCEDURE — 3072F LOW RISK FOR RETINOPATHY: CPT | Mod: HCNC,CPTII,S$GLB,

## 2022-01-01 PROCEDURE — 1125F AMNT PAIN NOTED PAIN PRSNT: CPT | Mod: CPTII,S$GLB,, | Performed by: NURSE PRACTITIONER

## 2022-01-01 RX ORDER — IBUPROFEN 200 MG
24 TABLET ORAL
Status: DISCONTINUED | OUTPATIENT
Start: 2022-01-01 | End: 2022-01-01

## 2022-01-01 RX ORDER — GUAIFENESIN/DEXTROMETHORPHAN 100-10MG/5
10 SYRUP ORAL EVERY 6 HOURS PRN
COMMUNITY
Start: 2022-01-01

## 2022-01-01 RX ORDER — ACETAMINOPHEN 325 MG/1
650 TABLET ORAL EVERY 6 HOURS PRN
Status: DISCONTINUED | OUTPATIENT
Start: 2022-01-01 | End: 2022-01-01 | Stop reason: HOSPADM

## 2022-01-01 RX ORDER — TRAMADOL HYDROCHLORIDE 50 MG/1
50 TABLET ORAL EVERY 12 HOURS PRN
Qty: 30 TABLET | Refills: 0 | Status: SHIPPED | OUTPATIENT
Start: 2022-01-01 | End: 2022-01-01

## 2022-01-01 RX ORDER — ALBUTEROL SULFATE 2.5 MG/.5ML
2.5 SOLUTION RESPIRATORY (INHALATION) ONCE
Status: COMPLETED | OUTPATIENT
Start: 2022-01-01 | End: 2022-01-01

## 2022-01-01 RX ORDER — BISACODYL 10 MG
10 SUPPOSITORY, RECTAL RECTAL DAILY PRN
Status: DISCONTINUED | OUTPATIENT
Start: 2022-01-01 | End: 2022-01-01 | Stop reason: HOSPADM

## 2022-01-01 RX ORDER — TOLVAPTAN 15 MG/1
15 TABLET ORAL DAILY
Status: DISCONTINUED | OUTPATIENT
Start: 2022-01-01 | End: 2022-01-01

## 2022-01-01 RX ORDER — MAG HYDROX/ALUMINUM HYD/SIMETH 200-200-20
30 SUSPENSION, ORAL (FINAL DOSE FORM) ORAL ONCE
Status: DISCONTINUED | OUTPATIENT
Start: 2022-01-01 | End: 2022-01-01

## 2022-01-01 RX ORDER — ATORVASTATIN CALCIUM 40 MG/1
40 TABLET, FILM COATED ORAL DAILY
Status: DISCONTINUED | OUTPATIENT
Start: 2022-01-01 | End: 2022-01-01

## 2022-01-01 RX ORDER — HYDROXYZINE PAMOATE 25 MG/1
25 CAPSULE ORAL EVERY 6 HOURS PRN
Status: DISCONTINUED | OUTPATIENT
Start: 2022-01-01 | End: 2022-01-01 | Stop reason: HOSPADM

## 2022-01-01 RX ORDER — BENZONATATE 100 MG/1
100 CAPSULE ORAL 3 TIMES DAILY PRN
Status: DISCONTINUED | OUTPATIENT
Start: 2022-01-01 | End: 2022-01-01 | Stop reason: HOSPADM

## 2022-01-01 RX ORDER — FUROSEMIDE 10 MG/ML
40 INJECTION INTRAMUSCULAR; INTRAVENOUS ONCE
Status: COMPLETED | OUTPATIENT
Start: 2022-01-01 | End: 2022-01-01

## 2022-01-01 RX ORDER — POTASSIUM CHLORIDE 20 MEQ/1
40 TABLET, EXTENDED RELEASE ORAL
Status: CANCELLED | OUTPATIENT
Start: 2022-01-01 | End: 2022-01-01

## 2022-01-01 RX ORDER — ACETAMINOPHEN 325 MG/1
650 TABLET ORAL EVERY 4 HOURS PRN
Status: DISCONTINUED | OUTPATIENT
Start: 2022-01-01 | End: 2022-01-01 | Stop reason: HOSPADM

## 2022-01-01 RX ORDER — TRAMADOL HYDROCHLORIDE 50 MG/1
TABLET ORAL
Qty: 30 TABLET | Refills: 0 | Status: SHIPPED | OUTPATIENT
Start: 2022-01-01 | End: 2022-01-01

## 2022-01-01 RX ORDER — GLUCAGON 1 MG
1 KIT INJECTION
Status: DISCONTINUED | OUTPATIENT
Start: 2022-01-01 | End: 2022-01-01

## 2022-01-01 RX ORDER — FUROSEMIDE 20 MG/1
20 TABLET ORAL NIGHTLY
Status: DISCONTINUED | OUTPATIENT
Start: 2022-01-01 | End: 2022-01-01

## 2022-01-01 RX ORDER — CEPHALEXIN 500 MG/1
500 CAPSULE ORAL EVERY 12 HOURS
Qty: 4 CAPSULE | Refills: 0 | Status: SHIPPED | OUTPATIENT
Start: 2022-01-01 | End: 2022-01-01 | Stop reason: HOSPADM

## 2022-01-01 RX ORDER — TALC
6 POWDER (GRAM) TOPICAL NIGHTLY PRN
Status: DISCONTINUED | OUTPATIENT
Start: 2022-01-01 | End: 2022-01-01 | Stop reason: HOSPADM

## 2022-01-01 RX ORDER — METOPROLOL SUCCINATE 25 MG/1
25 TABLET, EXTENDED RELEASE ORAL DAILY
Status: DISCONTINUED | OUTPATIENT
Start: 2022-01-01 | End: 2022-01-01 | Stop reason: HOSPADM

## 2022-01-01 RX ORDER — LIDOCAINE HYDROCHLORIDE 20 MG/ML
5 SOLUTION OROPHARYNGEAL
Status: COMPLETED | OUTPATIENT
Start: 2022-01-01 | End: 2022-01-01

## 2022-01-01 RX ORDER — LOSARTAN POTASSIUM 25 MG/1
12.5 TABLET ORAL DAILY
Qty: 45 TABLET | Refills: 3 | Status: CANCELLED | OUTPATIENT
Start: 2022-01-01 | End: 2023-12-14

## 2022-01-01 RX ORDER — METHIMAZOLE 5 MG/1
5 TABLET ORAL DAILY
Status: DISCONTINUED | OUTPATIENT
Start: 2022-01-01 | End: 2022-01-01 | Stop reason: HOSPADM

## 2022-01-01 RX ORDER — BENZONATATE 200 MG/1
200 CAPSULE ORAL
COMMUNITY
Start: 2022-01-01

## 2022-01-01 RX ORDER — MAG HYDROX/ALUMINUM HYD/SIMETH 200-200-20
30 SUSPENSION, ORAL (FINAL DOSE FORM) ORAL EVERY 6 HOURS PRN
Status: DISCONTINUED | OUTPATIENT
Start: 2022-01-01 | End: 2022-01-01 | Stop reason: HOSPADM

## 2022-01-01 RX ORDER — OMEPRAZOLE 40 MG/1
40 CAPSULE, DELAYED RELEASE ORAL EVERY MORNING
Qty: 90 CAPSULE | Refills: 1 | Status: SHIPPED | OUTPATIENT
Start: 2022-01-01 | End: 2022-01-01 | Stop reason: SDUPTHER

## 2022-01-01 RX ORDER — FAMOTIDINE 20 MG/1
20 TABLET, FILM COATED ORAL
Status: COMPLETED | OUTPATIENT
Start: 2022-01-01 | End: 2022-01-01

## 2022-01-01 RX ORDER — SODIUM,POTASSIUM PHOSPHATES 280-250MG
1 POWDER IN PACKET (EA) ORAL
Status: DISCONTINUED | OUTPATIENT
Start: 2022-01-01 | End: 2022-01-01

## 2022-01-01 RX ORDER — FUROSEMIDE 40 MG/1
40 TABLET ORAL DAILY
Status: DISCONTINUED | OUTPATIENT
Start: 2022-01-01 | End: 2022-01-01

## 2022-01-01 RX ORDER — POTASSIUM CHLORIDE 750 MG/1
10 CAPSULE, EXTENDED RELEASE ORAL DAILY
Status: DISCONTINUED | OUTPATIENT
Start: 2022-01-01 | End: 2022-01-01 | Stop reason: HOSPADM

## 2022-01-01 RX ORDER — POTASSIUM CHLORIDE 750 MG/1
20 TABLET, EXTENDED RELEASE ORAL DAILY
Qty: 30 TABLET | Refills: 11
Start: 2022-01-01

## 2022-01-01 RX ORDER — METOPROLOL SUCCINATE 25 MG/1
25 TABLET, EXTENDED RELEASE ORAL DAILY
Status: DISCONTINUED | OUTPATIENT
Start: 2022-01-01 | End: 2022-01-01

## 2022-01-01 RX ORDER — LIDOCAINE 50 MG/G
1 PATCH TOPICAL DAILY PRN
Qty: 30 PATCH | Refills: 0 | Status: CANCELLED | OUTPATIENT
Start: 2022-01-01 | End: 2023-01-01

## 2022-01-01 RX ORDER — LANOLIN ALCOHOL/MO/W.PET/CERES
1 CREAM (GRAM) TOPICAL DAILY
COMMUNITY
Start: 2022-01-01

## 2022-01-01 RX ORDER — IBUPROFEN 200 MG
16 TABLET ORAL
Status: DISCONTINUED | OUTPATIENT
Start: 2022-01-01 | End: 2022-01-01 | Stop reason: HOSPADM

## 2022-01-01 RX ORDER — NITROGLYCERIN 0.4 MG/1
0.4 TABLET SUBLINGUAL EVERY 5 MIN PRN
Qty: 100 TABLET | Refills: 0 | Status: CANCELLED | OUTPATIENT
Start: 2022-01-01 | End: 2023-12-13

## 2022-01-01 RX ORDER — AMIODARONE HYDROCHLORIDE 200 MG/1
200 TABLET ORAL
COMMUNITY
Start: 2022-01-01 | End: 2022-01-01

## 2022-01-01 RX ORDER — MUPIROCIN 20 MG/G
OINTMENT TOPICAL 2 TIMES DAILY
Status: DISPENSED | OUTPATIENT
Start: 2022-01-01 | End: 2022-01-01

## 2022-01-01 RX ORDER — INSULIN ASPART 100 [IU]/ML
0-5 INJECTION, SOLUTION INTRAVENOUS; SUBCUTANEOUS
Status: DISCONTINUED | OUTPATIENT
Start: 2022-01-01 | End: 2022-01-01 | Stop reason: HOSPADM

## 2022-01-01 RX ORDER — SODIUM CHLORIDE 0.9 % (FLUSH) 0.9 %
5 SYRINGE (ML) INJECTION
Status: DISCONTINUED | OUTPATIENT
Start: 2022-01-01 | End: 2022-01-01 | Stop reason: HOSPADM

## 2022-01-01 RX ORDER — TRAMADOL HYDROCHLORIDE 50 MG/1
50 TABLET ORAL EVERY 6 HOURS PRN
Status: DISCONTINUED | OUTPATIENT
Start: 2022-01-01 | End: 2022-01-01

## 2022-01-01 RX ORDER — ACETAMINOPHEN 325 MG/1
650 TABLET ORAL
Status: COMPLETED | OUTPATIENT
Start: 2022-01-01 | End: 2022-01-01

## 2022-01-01 RX ORDER — BUMETANIDE 1 MG/1
2 TABLET ORAL 3 TIMES DAILY
Status: DISCONTINUED | OUTPATIENT
Start: 2022-01-01 | End: 2022-01-01

## 2022-01-01 RX ORDER — MAGNESIUM SULFATE HEPTAHYDRATE 40 MG/ML
2 INJECTION, SOLUTION INTRAVENOUS
Status: COMPLETED | OUTPATIENT
Start: 2022-01-01 | End: 2022-01-01

## 2022-01-01 RX ORDER — SODIUM,POTASSIUM PHOSPHATES 280-250MG
2 POWDER IN PACKET (EA) ORAL
Status: DISCONTINUED | OUTPATIENT
Start: 2022-01-01 | End: 2022-01-01

## 2022-01-01 RX ORDER — ENOXAPARIN SODIUM 100 MG/ML
30 INJECTION SUBCUTANEOUS EVERY 24 HOURS
Status: DISCONTINUED | OUTPATIENT
Start: 2022-01-01 | End: 2022-01-01 | Stop reason: HOSPADM

## 2022-01-01 RX ORDER — GLUCAGON 1 MG
1 KIT INJECTION
Status: DISCONTINUED | OUTPATIENT
Start: 2022-01-01 | End: 2022-01-01 | Stop reason: HOSPADM

## 2022-01-01 RX ORDER — ACETAMINOPHEN 325 MG/1
650 TABLET ORAL EVERY 8 HOURS PRN
Status: DISCONTINUED | OUTPATIENT
Start: 2022-01-01 | End: 2022-01-01

## 2022-01-01 RX ORDER — FUROSEMIDE 10 MG/ML
80 INJECTION INTRAMUSCULAR; INTRAVENOUS 2 TIMES DAILY
Status: DISCONTINUED | OUTPATIENT
Start: 2022-01-01 | End: 2022-01-01

## 2022-01-01 RX ORDER — GABAPENTIN 300 MG/1
CAPSULE ORAL
Qty: 180 CAPSULE | Refills: 1 | Status: SHIPPED | OUTPATIENT
Start: 2022-01-01 | End: 2022-01-01

## 2022-01-01 RX ORDER — ENOXAPARIN SODIUM 100 MG/ML
40 INJECTION SUBCUTANEOUS EVERY 24 HOURS
Status: DISCONTINUED | OUTPATIENT
Start: 2022-01-01 | End: 2022-01-01 | Stop reason: HOSPADM

## 2022-01-01 RX ORDER — DICYCLOMINE HYDROCHLORIDE 10 MG/1
20 CAPSULE ORAL
Status: COMPLETED | OUTPATIENT
Start: 2022-01-01 | End: 2022-01-01

## 2022-01-01 RX ORDER — ALBUTEROL SULFATE 2.5 MG/.5ML
2.5 SOLUTION RESPIRATORY (INHALATION) EVERY 4 HOURS PRN
Status: DISCONTINUED | OUTPATIENT
Start: 2022-01-01 | End: 2022-01-01

## 2022-01-01 RX ORDER — AMIODARONE HYDROCHLORIDE 200 MG/1
200 TABLET ORAL DAILY
Qty: 30 TABLET | Refills: 5 | Status: SHIPPED | OUTPATIENT
Start: 2022-01-01

## 2022-01-01 RX ORDER — DICYCLOMINE HYDROCHLORIDE 10 MG/1
10 CAPSULE ORAL 4 TIMES DAILY
Status: DISCONTINUED | OUTPATIENT
Start: 2022-01-01 | End: 2022-01-01 | Stop reason: HOSPADM

## 2022-01-01 RX ORDER — LOSARTAN POTASSIUM 25 MG/1
25 TABLET ORAL DAILY
Status: ON HOLD | COMMUNITY
Start: 2022-01-01 | End: 2022-01-01 | Stop reason: HOSPADM

## 2022-01-01 RX ORDER — NALOXONE HCL 0.4 MG/ML
0.02 VIAL (ML) INJECTION
Status: DISCONTINUED | OUTPATIENT
Start: 2022-01-01 | End: 2022-01-01 | Stop reason: HOSPADM

## 2022-01-01 RX ORDER — METOCLOPRAMIDE 5 MG/1
10 TABLET ORAL
Status: DISPENSED | OUTPATIENT
Start: 2022-01-01 | End: 2022-01-01

## 2022-01-01 RX ORDER — ERGOCALCIFEROL 1.25 MG/1
50000 CAPSULE ORAL
Qty: 12 CAPSULE | Refills: 3 | Status: SHIPPED | OUTPATIENT
Start: 2022-01-01

## 2022-01-01 RX ORDER — FUROSEMIDE 10 MG/ML
80 INJECTION INTRAMUSCULAR; INTRAVENOUS
Status: DISCONTINUED | OUTPATIENT
Start: 2022-01-01 | End: 2022-01-01

## 2022-01-01 RX ORDER — IBUPROFEN 200 MG
16 TABLET ORAL
Status: DISCONTINUED | OUTPATIENT
Start: 2022-01-01 | End: 2022-01-01

## 2022-01-01 RX ORDER — ENOXAPARIN SODIUM 100 MG/ML
40 INJECTION SUBCUTANEOUS EVERY 24 HOURS
Status: DISCONTINUED | OUTPATIENT
Start: 2022-01-01 | End: 2022-01-01

## 2022-01-01 RX ORDER — ASPIRIN 325 MG
325 TABLET ORAL
Status: COMPLETED | OUTPATIENT
Start: 2022-01-01 | End: 2022-01-01

## 2022-01-01 RX ORDER — MAG HYDROX/ALUMINUM HYD/SIMETH 200-200-20
5 SUSPENSION, ORAL (FINAL DOSE FORM) ORAL
Status: COMPLETED | OUTPATIENT
Start: 2022-01-01 | End: 2022-01-01

## 2022-01-01 RX ORDER — IPRATROPIUM BROMIDE AND ALBUTEROL SULFATE 2.5; .5 MG/3ML; MG/3ML
3 SOLUTION RESPIRATORY (INHALATION) EVERY 4 HOURS PRN
Status: DISCONTINUED | OUTPATIENT
Start: 2022-01-01 | End: 2022-01-01 | Stop reason: HOSPADM

## 2022-01-01 RX ORDER — TRAMADOL HYDROCHLORIDE 50 MG/1
50 TABLET ORAL EVERY 12 HOURS PRN
Status: DISCONTINUED | OUTPATIENT
Start: 2022-01-01 | End: 2022-01-01 | Stop reason: HOSPADM

## 2022-01-01 RX ORDER — PROCHLORPERAZINE EDISYLATE 5 MG/ML
5 INJECTION INTRAMUSCULAR; INTRAVENOUS EVERY 6 HOURS PRN
Status: DISCONTINUED | OUTPATIENT
Start: 2022-01-01 | End: 2022-01-01 | Stop reason: HOSPADM

## 2022-01-01 RX ORDER — ATORVASTATIN CALCIUM 40 MG/1
40 TABLET, FILM COATED ORAL DAILY
Status: DISCONTINUED | OUTPATIENT
Start: 2022-01-01 | End: 2022-01-01 | Stop reason: HOSPADM

## 2022-01-01 RX ORDER — IBUPROFEN 200 MG
24 TABLET ORAL
Status: DISCONTINUED | OUTPATIENT
Start: 2022-01-01 | End: 2022-01-01 | Stop reason: HOSPADM

## 2022-01-01 RX ORDER — AMIODARONE HYDROCHLORIDE 200 MG/1
200 TABLET ORAL 2 TIMES DAILY
Status: DISCONTINUED | OUTPATIENT
Start: 2022-01-01 | End: 2022-01-01

## 2022-01-01 RX ORDER — METOPROLOL SUCCINATE 25 MG/1
25 TABLET, EXTENDED RELEASE ORAL DAILY
Qty: 30 TABLET | Refills: 1
Start: 2022-01-01 | End: 2022-01-01

## 2022-01-01 RX ORDER — FUROSEMIDE 20 MG/1
20 TABLET ORAL 2 TIMES DAILY
Qty: 180 TABLET | Refills: 3 | Status: SHIPPED | OUTPATIENT
Start: 2022-01-01 | End: 2023-01-01

## 2022-01-01 RX ORDER — MULTIVITAMIN
1 TABLET ORAL DAILY
COMMUNITY

## 2022-01-01 RX ORDER — FUROSEMIDE 10 MG/ML
40 INJECTION INTRAMUSCULAR; INTRAVENOUS
Status: COMPLETED | OUTPATIENT
Start: 2022-01-01 | End: 2022-01-01

## 2022-01-01 RX ORDER — SODIUM CHLORIDE 0.9 % (FLUSH) 0.9 %
10 SYRINGE (ML) INJECTION
Status: DISCONTINUED | OUTPATIENT
Start: 2022-01-01 | End: 2022-01-01 | Stop reason: HOSPADM

## 2022-01-01 RX ORDER — ERGOCALCIFEROL (VITAMIN D2) 10 MCG
1 TABLET ORAL DAILY
Status: ON HOLD | COMMUNITY
End: 2022-01-01 | Stop reason: HOSPADM

## 2022-01-01 RX ORDER — SODIUM CHLORIDE 0.9 % (FLUSH) 0.9 %
10 SYRINGE (ML) INJECTION EVERY 12 HOURS PRN
Status: DISCONTINUED | OUTPATIENT
Start: 2022-01-01 | End: 2022-01-01 | Stop reason: HOSPADM

## 2022-01-01 RX ORDER — GUAIFENESIN 100 MG/5ML
200 SOLUTION ORAL EVERY 4 HOURS PRN
Status: DISCONTINUED | OUTPATIENT
Start: 2022-01-01 | End: 2022-01-01

## 2022-01-01 RX ORDER — ONDANSETRON 4 MG/1
4 TABLET, ORALLY DISINTEGRATING ORAL EVERY 8 HOURS PRN
Qty: 12 TABLET | Refills: 0 | Status: SHIPPED | OUTPATIENT
Start: 2022-01-01

## 2022-01-01 RX ORDER — CEPHALEXIN 500 MG/1
500 CAPSULE ORAL 3 TIMES DAILY
Qty: 9 CAPSULE | Refills: 0 | Status: SHIPPED | OUTPATIENT
Start: 2022-01-01 | End: 2022-01-01

## 2022-01-01 RX ORDER — GABAPENTIN 300 MG/1
300 CAPSULE ORAL 2 TIMES DAILY
Status: DISCONTINUED | OUTPATIENT
Start: 2022-01-01 | End: 2022-01-01 | Stop reason: HOSPADM

## 2022-01-01 RX ORDER — FUROSEMIDE 40 MG/1
40 TABLET ORAL 2 TIMES DAILY
Status: ON HOLD
Start: 2022-01-01 | End: 2022-01-01 | Stop reason: HOSPADM

## 2022-01-01 RX ORDER — ONDANSETRON 2 MG/ML
8 INJECTION INTRAMUSCULAR; INTRAVENOUS EVERY 6 HOURS PRN
Status: DISCONTINUED | OUTPATIENT
Start: 2022-01-01 | End: 2022-01-01 | Stop reason: HOSPADM

## 2022-01-01 RX ORDER — ACETAMINOPHEN 500 MG
500 TABLET ORAL DAILY PRN
COMMUNITY

## 2022-01-01 RX ORDER — SODIUM CHLORIDE, SODIUM LACTATE, POTASSIUM CHLORIDE, CALCIUM CHLORIDE 600; 310; 30; 20 MG/100ML; MG/100ML; MG/100ML; MG/100ML
INJECTION, SOLUTION INTRAVENOUS CONTINUOUS
Status: DISCONTINUED | OUTPATIENT
Start: 2022-01-01 | End: 2022-01-01

## 2022-01-01 RX ORDER — BUMETANIDE 1 MG/1
2 TABLET ORAL 2 TIMES DAILY
Status: DISCONTINUED | OUTPATIENT
Start: 2022-01-01 | End: 2022-01-01

## 2022-01-01 RX ORDER — LANCETS
EACH MISCELLANEOUS
Qty: 200 EACH | Refills: 3 | Status: SHIPPED | OUTPATIENT
Start: 2022-01-01

## 2022-01-01 RX ORDER — ATORVASTATIN CALCIUM 20 MG/1
40 TABLET, FILM COATED ORAL DAILY
Status: DISCONTINUED | OUTPATIENT
Start: 2022-01-01 | End: 2022-01-01 | Stop reason: HOSPADM

## 2022-01-01 RX ORDER — MAG HYDROX/ALUMINUM HYD/SIMETH 200-200-20
30 SUSPENSION, ORAL (FINAL DOSE FORM) ORAL ONCE
Status: COMPLETED | OUTPATIENT
Start: 2022-01-01 | End: 2022-01-01

## 2022-01-01 RX ORDER — LIDOCAINE 50 MG/G
1 PATCH TOPICAL DAILY PRN
Status: DISCONTINUED | OUTPATIENT
Start: 2022-01-01 | End: 2022-01-01 | Stop reason: HOSPADM

## 2022-01-01 RX ORDER — DOXYCYCLINE HYCLATE 100 MG
100 TABLET ORAL EVERY 12 HOURS
Qty: 4 TABLET | Refills: 0 | Status: SHIPPED | OUTPATIENT
Start: 2022-01-01 | End: 2022-01-01 | Stop reason: HOSPADM

## 2022-01-01 RX ORDER — NITROGLYCERIN 0.4 MG/1
0.4 TABLET SUBLINGUAL EVERY 5 MIN PRN
Qty: 25 TABLET | Refills: 0 | Status: SHIPPED | OUTPATIENT
Start: 2022-01-01 | End: 2023-12-13

## 2022-01-01 RX ORDER — ACETAMINOPHEN 500 MG
1000 TABLET ORAL
Status: COMPLETED | OUTPATIENT
Start: 2022-01-01 | End: 2022-01-01

## 2022-01-01 RX ORDER — NITROGLYCERIN 0.4 MG/1
0.4 TABLET SUBLINGUAL EVERY 5 MIN PRN
Status: DISCONTINUED | OUTPATIENT
Start: 2022-01-01 | End: 2022-01-01 | Stop reason: HOSPADM

## 2022-01-01 RX ORDER — FENTANYL CITRATE 50 UG/ML
50 INJECTION, SOLUTION INTRAMUSCULAR; INTRAVENOUS
Status: DISCONTINUED | OUTPATIENT
Start: 2022-01-01 | End: 2022-01-01

## 2022-01-01 RX ORDER — FLUTICASONE PROPIONATE 50 MCG
1 SPRAY, SUSPENSION (ML) NASAL DAILY
Status: DISCONTINUED | OUTPATIENT
Start: 2022-01-01 | End: 2022-01-01 | Stop reason: HOSPADM

## 2022-01-01 RX ORDER — DOXYCYCLINE HYCLATE 100 MG
100 TABLET ORAL EVERY 12 HOURS
Status: COMPLETED | OUTPATIENT
Start: 2022-01-01 | End: 2022-01-01

## 2022-01-01 RX ORDER — MAGNESIUM SULFATE HEPTAHYDRATE 40 MG/ML
2 INJECTION, SOLUTION INTRAVENOUS ONCE
Status: COMPLETED | OUTPATIENT
Start: 2022-01-01 | End: 2022-01-01

## 2022-01-01 RX ORDER — ONDANSETRON 8 MG/1
8 TABLET, ORALLY DISINTEGRATING ORAL EVERY 8 HOURS PRN
Status: DISCONTINUED | OUTPATIENT
Start: 2022-01-01 | End: 2022-01-01 | Stop reason: HOSPADM

## 2022-01-01 RX ORDER — ALUMINUM HYDROXIDE, MAGNESIUM HYDROXIDE, AND SIMETHICONE 2400; 240; 2400 MG/30ML; MG/30ML; MG/30ML
30 SUSPENSION ORAL EVERY 6 HOURS PRN
Status: DISCONTINUED | OUTPATIENT
Start: 2022-01-01 | End: 2022-01-01 | Stop reason: HOSPADM

## 2022-01-01 RX ORDER — POLYETHYLENE GLYCOL 3350 17 G/17G
17 POWDER, FOR SOLUTION ORAL DAILY PRN
Status: DISCONTINUED | OUTPATIENT
Start: 2022-01-01 | End: 2022-01-01 | Stop reason: HOSPADM

## 2022-01-01 RX ORDER — TALC
6 POWDER (GRAM) TOPICAL NIGHTLY
Status: DISCONTINUED | OUTPATIENT
Start: 2022-01-01 | End: 2022-01-01

## 2022-01-01 RX ORDER — POLYETHYLENE GLYCOL 3350 17 G/17G
17 POWDER, FOR SOLUTION ORAL DAILY
Status: DISCONTINUED | OUTPATIENT
Start: 2022-01-01 | End: 2022-01-01 | Stop reason: HOSPADM

## 2022-01-01 RX ORDER — MAG HYDROX/ALUMINUM HYD/SIMETH 200-200-20
30 SUSPENSION, ORAL (FINAL DOSE FORM) ORAL 4 TIMES DAILY PRN
Status: DISCONTINUED | OUTPATIENT
Start: 2022-01-01 | End: 2022-01-01 | Stop reason: HOSPADM

## 2022-01-01 RX ORDER — TOLVAPTAN 15 MG/1
15 TABLET ORAL EVERY OTHER DAY
Qty: 15 TABLET | Refills: 0 | Status: ON HOLD | OUTPATIENT
Start: 2022-01-01 | End: 2023-01-01 | Stop reason: HOSPADM

## 2022-01-01 RX ORDER — TRAMADOL HYDROCHLORIDE 50 MG/1
TABLET ORAL
Qty: 60 TABLET | Refills: 0 | Status: SHIPPED | OUTPATIENT
Start: 2022-01-01

## 2022-01-01 RX ORDER — NAPROXEN SODIUM 220 MG/1
81 TABLET, FILM COATED ORAL DAILY
Status: DISCONTINUED | OUTPATIENT
Start: 2022-01-01 | End: 2022-01-01 | Stop reason: HOSPADM

## 2022-01-01 RX ORDER — SODIUM CHLORIDE 9 MG/ML
INJECTION, SOLUTION INTRAVENOUS CONTINUOUS
Status: ACTIVE | OUTPATIENT
Start: 2022-01-01 | End: 2022-01-01

## 2022-01-01 RX ORDER — PANTOPRAZOLE SODIUM 40 MG/1
40 TABLET, DELAYED RELEASE ORAL DAILY
Refills: 0 | Status: DISCONTINUED | OUTPATIENT
Start: 2022-01-01 | End: 2022-01-01 | Stop reason: HOSPADM

## 2022-01-01 RX ORDER — POTASSIUM CHLORIDE 20 MEQ/1
20 TABLET, EXTENDED RELEASE ORAL DAILY
COMMUNITY
Start: 2022-01-01 | End: 2022-01-01

## 2022-01-01 RX ORDER — FUROSEMIDE 20 MG/1
20 TABLET ORAL NIGHTLY
Qty: 90 TABLET | Refills: 0 | Status: ON HOLD | OUTPATIENT
Start: 2022-01-01 | End: 2022-01-01 | Stop reason: HOSPADM

## 2022-01-01 RX ORDER — FUROSEMIDE 20 MG/1
TABLET ORAL
Qty: 180 TABLET | Refills: 11 | Status: SHIPPED | OUTPATIENT
Start: 2022-01-01 | End: 2022-01-01 | Stop reason: HOSPADM

## 2022-01-01 RX ORDER — METOPROLOL SUCCINATE 25 MG/1
TABLET, EXTENDED RELEASE ORAL
Qty: 30 TABLET | Refills: 1 | Status: ON HOLD | OUTPATIENT
Start: 2022-01-01 | End: 2022-01-01 | Stop reason: SDUPTHER

## 2022-01-01 RX ORDER — SODIUM CHLORIDE 9 MG/ML
500 INJECTION, SOLUTION INTRAVENOUS
Status: DISCONTINUED | OUTPATIENT
Start: 2022-01-01 | End: 2022-01-01

## 2022-01-01 RX ORDER — FLUTICASONE PROPIONATE 50 MCG
1 SPRAY, SUSPENSION (ML) NASAL DAILY
Status: DISCONTINUED | OUTPATIENT
Start: 2022-01-01 | End: 2022-01-01

## 2022-01-01 RX ORDER — LIDOCAINE HYDROCHLORIDE 20 MG/ML
10 SOLUTION OROPHARYNGEAL ONCE
Status: COMPLETED | OUTPATIENT
Start: 2022-01-01 | End: 2022-01-01

## 2022-01-01 RX ORDER — SIMETHICONE 80 MG
1 TABLET,CHEWABLE ORAL 4 TIMES DAILY PRN
Status: DISCONTINUED | OUTPATIENT
Start: 2022-01-01 | End: 2022-01-01 | Stop reason: HOSPADM

## 2022-01-01 RX ORDER — BISACODYL 10 MG
10 SUPPOSITORY, RECTAL RECTAL ONCE
Status: COMPLETED | OUTPATIENT
Start: 2022-01-01 | End: 2022-01-01

## 2022-01-01 RX ORDER — AZITHROMYCIN 250 MG/1
250 TABLET, FILM COATED ORAL DAILY
Status: DISCONTINUED | OUTPATIENT
Start: 2022-01-01 | End: 2022-01-01

## 2022-01-01 RX ORDER — SODIUM POLYSTYRENE SULFONATE 4.1 MEQ/G
15 POWDER, FOR SUSPENSION ORAL; RECTAL ONCE
Qty: 15 G | Refills: 0 | Status: SHIPPED | OUTPATIENT
Start: 2022-01-01 | End: 2022-01-01

## 2022-01-01 RX ORDER — HEPARIN SODIUM 5000 [USP'U]/ML
5000 INJECTION, SOLUTION INTRAVENOUS; SUBCUTANEOUS EVERY 12 HOURS
Status: DISCONTINUED | OUTPATIENT
Start: 2022-01-01 | End: 2022-01-01

## 2022-01-01 RX ORDER — METOCLOPRAMIDE HYDROCHLORIDE 5 MG/ML
10 INJECTION INTRAMUSCULAR; INTRAVENOUS EVERY 6 HOURS
Status: DISCONTINUED | OUTPATIENT
Start: 2022-01-01 | End: 2022-01-01

## 2022-01-01 RX ORDER — GABAPENTIN 300 MG/1
300 CAPSULE ORAL 2 TIMES DAILY
Status: DISCONTINUED | OUTPATIENT
Start: 2022-01-01 | End: 2022-01-01

## 2022-01-01 RX ORDER — CEFEPIME HYDROCHLORIDE 1 G/50ML
2 INJECTION, SOLUTION INTRAVENOUS
Status: DISCONTINUED | OUTPATIENT
Start: 2022-01-01 | End: 2022-01-01

## 2022-01-01 RX ORDER — ALBUTEROL SULFATE 2.5 MG/.5ML
2.5 SOLUTION RESPIRATORY (INHALATION)
Status: DISCONTINUED | OUTPATIENT
Start: 2022-01-01 | End: 2022-01-01

## 2022-01-01 RX ORDER — POLYETHYLENE GLYCOL 3350 17 G/17G
17 POWDER, FOR SOLUTION ORAL 2 TIMES DAILY PRN
Status: DISCONTINUED | OUTPATIENT
Start: 2022-01-01 | End: 2022-01-01 | Stop reason: HOSPADM

## 2022-01-01 RX ORDER — LIDOCAINE HYDROCHLORIDE 20 MG/ML
10 SOLUTION OROPHARYNGEAL ONCE
Status: DISCONTINUED | OUTPATIENT
Start: 2022-01-01 | End: 2022-01-01

## 2022-01-01 RX ORDER — LOSARTAN POTASSIUM 25 MG/1
25 TABLET ORAL
COMMUNITY
Start: 2022-01-01 | End: 2022-01-01

## 2022-01-01 RX ORDER — POTASSIUM CHLORIDE 20 MEQ/1
20 TABLET, EXTENDED RELEASE ORAL
COMMUNITY
Start: 2022-01-01 | End: 2022-01-01

## 2022-01-01 RX ORDER — ALUMINUM HYDROXIDE, MAGNESIUM HYDROXIDE, AND SIMETHICONE 2400; 240; 2400 MG/30ML; MG/30ML; MG/30ML
30 SUSPENSION ORAL
Status: DISCONTINUED | OUTPATIENT
Start: 2022-01-01 | End: 2022-01-01

## 2022-01-01 RX ORDER — LOSARTAN POTASSIUM 25 MG/1
12.5 TABLET ORAL DAILY
Qty: 45 TABLET | Refills: 3 | Status: SHIPPED | OUTPATIENT
Start: 2022-01-01 | End: 2023-12-14

## 2022-01-01 RX ORDER — FUROSEMIDE 10 MG/ML
40 INJECTION INTRAMUSCULAR; INTRAVENOUS
Status: DISCONTINUED | OUTPATIENT
Start: 2022-01-01 | End: 2022-01-01

## 2022-01-01 RX ORDER — BENZONATATE 100 MG/1
100 CAPSULE ORAL
Status: COMPLETED | OUTPATIENT
Start: 2022-01-01 | End: 2022-01-01

## 2022-01-01 RX ORDER — FENTANYL CITRATE 50 UG/ML
25 INJECTION, SOLUTION INTRAMUSCULAR; INTRAVENOUS
Status: COMPLETED | OUTPATIENT
Start: 2022-01-01 | End: 2022-01-01

## 2022-01-01 RX ORDER — TRAMADOL HYDROCHLORIDE 50 MG/1
50 TABLET ORAL EVERY 12 HOURS PRN
Qty: 60 TABLET | Refills: 0 | Status: SHIPPED | OUTPATIENT
Start: 2022-01-01 | End: 2022-01-01

## 2022-01-01 RX ORDER — NOREPINEPHRINE BITARTRATE/D5W 4MG/250ML
0-.2 PLASTIC BAG, INJECTION (ML) INTRAVENOUS CONTINUOUS
Status: DISCONTINUED | OUTPATIENT
Start: 2022-01-01 | End: 2022-01-01

## 2022-01-01 RX ORDER — GUAIFENESIN/DEXTROMETHORPHAN 100-10MG/5
5 SYRUP ORAL EVERY 4 HOURS PRN
Status: DISCONTINUED | OUTPATIENT
Start: 2022-01-01 | End: 2022-01-01 | Stop reason: HOSPADM

## 2022-01-01 RX ORDER — METOPROLOL SUCCINATE 25 MG/1
TABLET, EXTENDED RELEASE ORAL
Qty: 90 TABLET | OUTPATIENT
Start: 2022-01-01

## 2022-01-01 RX ORDER — LANOLIN ALCOHOL/MO/W.PET/CERES
400 CREAM (GRAM) TOPICAL
COMMUNITY
Start: 2022-01-01 | End: 2022-01-01

## 2022-01-01 RX ORDER — PANTOPRAZOLE SODIUM 40 MG/1
40 TABLET, DELAYED RELEASE ORAL DAILY
Status: DISCONTINUED | OUTPATIENT
Start: 2022-01-01 | End: 2022-01-01 | Stop reason: HOSPADM

## 2022-01-01 RX ORDER — ERGOCALCIFEROL 1.25 MG/1
400 CAPSULE ORAL DAILY
COMMUNITY
End: 2022-01-01 | Stop reason: HOSPADM

## 2022-01-01 RX ORDER — HYDROCODONE BITARTRATE AND ACETAMINOPHEN 5; 325 MG/1; MG/1
1 TABLET ORAL
Status: DISCONTINUED | OUTPATIENT
Start: 2022-01-01 | End: 2022-01-01

## 2022-01-01 RX ORDER — BUMETANIDE 2 MG/1
2 TABLET ORAL DAILY
Qty: 30 TABLET | Refills: 11 | Status: SHIPPED | OUTPATIENT
Start: 2022-01-01 | End: 2022-01-01 | Stop reason: HOSPADM

## 2022-01-01 RX ORDER — ERGOCALCIFEROL 1.25 MG/1
50000 CAPSULE ORAL
Status: DISCONTINUED | OUTPATIENT
Start: 2022-01-01 | End: 2022-01-01 | Stop reason: HOSPADM

## 2022-01-01 RX ORDER — ALBUTEROL SULFATE 90 UG/1
2 AEROSOL, METERED RESPIRATORY (INHALATION) EVERY 6 HOURS PRN
COMMUNITY
Start: 2022-01-01

## 2022-01-01 RX ORDER — LIDOCAINE HYDROCHLORIDE 20 MG/ML
10 SOLUTION OROPHARYNGEAL ONCE
Status: DISCONTINUED | OUTPATIENT
Start: 2022-01-01 | End: 2022-01-01 | Stop reason: HOSPADM

## 2022-01-01 RX ORDER — ACETAMINOPHEN 500 MG
1000 TABLET ORAL EVERY 8 HOURS PRN
Status: DISCONTINUED | OUTPATIENT
Start: 2022-01-01 | End: 2022-01-01 | Stop reason: HOSPADM

## 2022-01-01 RX ORDER — BENZONATATE 100 MG/1
100 CAPSULE ORAL 3 TIMES DAILY PRN
Status: DISCONTINUED | OUTPATIENT
Start: 2022-01-01 | End: 2022-01-01

## 2022-01-01 RX ORDER — FUROSEMIDE 10 MG/ML
100 INJECTION INTRAMUSCULAR; INTRAVENOUS
Status: DISCONTINUED | OUTPATIENT
Start: 2022-01-01 | End: 2022-01-01

## 2022-01-01 RX ORDER — METOLAZONE 2.5 MG/1
5 TABLET ORAL DAILY
Status: DISCONTINUED | OUTPATIENT
Start: 2022-01-01 | End: 2022-01-01

## 2022-01-01 RX ORDER — GABAPENTIN 100 MG/1
300 CAPSULE ORAL 2 TIMES DAILY
Status: DISCONTINUED | OUTPATIENT
Start: 2022-01-01 | End: 2022-01-01

## 2022-01-01 RX ORDER — LIDOCAINE HYDROCHLORIDE 20 MG/ML
15 SOLUTION OROPHARYNGEAL ONCE
Status: COMPLETED | OUTPATIENT
Start: 2022-01-01 | End: 2022-01-01

## 2022-01-01 RX ORDER — MAG HYDROX/ALUMINUM HYD/SIMETH 200-200-20
30 SUSPENSION, ORAL (FINAL DOSE FORM) ORAL ONCE
Status: DISCONTINUED | OUTPATIENT
Start: 2022-01-01 | End: 2022-01-01 | Stop reason: HOSPADM

## 2022-01-01 RX ORDER — TOLVAPTAN 15 MG/1
15 TABLET ORAL ONCE
Status: COMPLETED | OUTPATIENT
Start: 2022-01-01 | End: 2022-01-01

## 2022-01-01 RX ORDER — FUROSEMIDE 10 MG/ML
60 INJECTION INTRAMUSCULAR; INTRAVENOUS 2 TIMES DAILY
Status: DISCONTINUED | OUTPATIENT
Start: 2022-01-01 | End: 2022-01-01

## 2022-01-01 RX ORDER — AMIODARONE HYDROCHLORIDE 200 MG/1
200 TABLET ORAL 2 TIMES DAILY
Status: ON HOLD | COMMUNITY
Start: 2022-01-01 | End: 2022-01-01 | Stop reason: SDUPTHER

## 2022-01-01 RX ORDER — REGADENOSON 0.08 MG/ML
0.4 INJECTION, SOLUTION INTRAVENOUS ONCE
Status: COMPLETED | OUTPATIENT
Start: 2022-01-01 | End: 2022-01-01

## 2022-01-01 RX ORDER — AMIODARONE HYDROCHLORIDE 200 MG/1
200 TABLET ORAL DAILY
Status: DISCONTINUED | OUTPATIENT
Start: 2022-01-01 | End: 2022-01-01 | Stop reason: HOSPADM

## 2022-01-01 RX ORDER — OMEPRAZOLE 40 MG/1
40 CAPSULE, DELAYED RELEASE ORAL EVERY MORNING
Qty: 90 CAPSULE | Refills: 1 | Status: SHIPPED | OUTPATIENT
Start: 2022-01-01

## 2022-01-01 RX ORDER — FUROSEMIDE 10 MG/ML
60 INJECTION INTRAMUSCULAR; INTRAVENOUS EVERY 6 HOURS
Status: COMPLETED | OUTPATIENT
Start: 2022-01-01 | End: 2022-01-01

## 2022-01-01 RX ORDER — POTASSIUM CHLORIDE 750 MG/1
TABLET, EXTENDED RELEASE ORAL
Qty: 30 TABLET | Refills: 11 | Status: ON HOLD | OUTPATIENT
Start: 2022-01-01 | End: 2022-01-01 | Stop reason: SDUPTHER

## 2022-01-01 RX ORDER — TOLVAPTAN 15 MG/1
15 TABLET ORAL DAILY
Status: COMPLETED | OUTPATIENT
Start: 2022-01-01 | End: 2022-01-01

## 2022-01-01 RX ADMIN — ACETAMINOPHEN 650 MG: 325 TABLET ORAL at 10:12

## 2022-01-01 RX ADMIN — HEPARIN SODIUM 5000 UNITS: 5000 INJECTION INTRAVENOUS; SUBCUTANEOUS at 08:11

## 2022-01-01 RX ADMIN — HEPARIN SODIUM 5000 UNITS: 5000 INJECTION INTRAVENOUS; SUBCUTANEOUS at 09:11

## 2022-01-01 RX ADMIN — BUMETANIDE 2 MG: 1 TABLET ORAL at 02:12

## 2022-01-01 RX ADMIN — GABAPENTIN 300 MG: 300 CAPSULE ORAL at 09:12

## 2022-01-01 RX ADMIN — FUROSEMIDE 60 MG: 10 INJECTION, SOLUTION INTRAVENOUS at 07:09

## 2022-01-01 RX ADMIN — FUROSEMIDE 40 MG: 10 INJECTION, SOLUTION INTRAVENOUS at 06:11

## 2022-01-01 RX ADMIN — POTASSIUM & SODIUM PHOSPHATES POWDER PACK 280-160-250 MG 2 PACKET: 280-160-250 PACK at 12:12

## 2022-01-01 RX ADMIN — AMIODARONE HYDROCHLORIDE 200 MG: 200 TABLET ORAL at 09:12

## 2022-01-01 RX ADMIN — CEFTRIAXONE 1 G: 1 INJECTION, POWDER, FOR SOLUTION INTRAMUSCULAR; INTRAVENOUS at 12:12

## 2022-01-01 RX ADMIN — TOLVAPTAN 15 MG: 15 TABLET ORAL at 01:12

## 2022-01-01 RX ADMIN — ATORVASTATIN CALCIUM 40 MG: 40 TABLET, FILM COATED ORAL at 08:12

## 2022-01-01 RX ADMIN — Medication 6 MG: at 09:12

## 2022-01-01 RX ADMIN — METHIMAZOLE 2.5 MG: 5 TABLET ORAL at 08:11

## 2022-01-01 RX ADMIN — LIDOCAINE HYDROCHLORIDE 15 ML: 20 SOLUTION OROPHARYNGEAL at 06:09

## 2022-01-01 RX ADMIN — ALUMINUM HYDROXIDE, MAGNESIUM HYDROXIDE, AND DIMETHICONE 30 ML: 400; 400; 40 SUSPENSION ORAL at 05:11

## 2022-01-01 RX ADMIN — ASPIRIN 325 MG ORAL TABLET 325 MG: 325 PILL ORAL at 08:09

## 2022-01-01 RX ADMIN — LIDOCAINE HYDROCHLORIDE 5 ML: 20 SOLUTION ORAL; TOPICAL at 07:03

## 2022-01-01 RX ADMIN — METHIMAZOLE 2.5 MG: 5 TABLET ORAL at 09:11

## 2022-01-01 RX ADMIN — MAGNESIUM SULFATE 2 G: 2 INJECTION INTRAVENOUS at 09:12

## 2022-01-01 RX ADMIN — BENZONATATE 100 MG: 100 CAPSULE ORAL at 09:11

## 2022-01-01 RX ADMIN — GABAPENTIN 300 MG: 300 CAPSULE ORAL at 10:06

## 2022-01-01 RX ADMIN — GUAIFENESIN AND DEXTROMETHORPHAN 5 ML: 100; 10 SYRUP ORAL at 08:11

## 2022-01-01 RX ADMIN — GUAIFENESIN AND DEXTROMETHORPHAN 5 ML: 100; 10 SYRUP ORAL at 05:11

## 2022-01-01 RX ADMIN — FUROSEMIDE 60 MG: 10 INJECTION, SOLUTION INTRAVENOUS at 06:11

## 2022-01-01 RX ADMIN — ASPIRIN 81 MG CHEWABLE TABLET 81 MG: 81 TABLET CHEWABLE at 09:12

## 2022-01-01 RX ADMIN — POTASSIUM & SODIUM PHOSPHATES POWDER PACK 280-160-250 MG 1 PACKET: 280-160-250 PACK at 08:12

## 2022-01-01 RX ADMIN — GUAIFENESIN AND DEXTROMETHORPHAN 5 ML: 100; 10 SYRUP ORAL at 09:11

## 2022-01-01 RX ADMIN — GABAPENTIN 300 MG: 300 CAPSULE ORAL at 09:11

## 2022-01-01 RX ADMIN — ATORVASTATIN CALCIUM 40 MG: 40 TABLET, FILM COATED ORAL at 07:09

## 2022-01-01 RX ADMIN — GABAPENTIN 300 MG: 300 CAPSULE ORAL at 10:11

## 2022-01-01 RX ADMIN — ATORVASTATIN CALCIUM 40 MG: 40 TABLET, FILM COATED ORAL at 08:11

## 2022-01-01 RX ADMIN — HYDROXYZINE PAMOATE 25 MG: 25 CAPSULE ORAL at 09:12

## 2022-01-01 RX ADMIN — ONDANSETRON 8 MG: 2 INJECTION INTRAMUSCULAR; INTRAVENOUS at 09:12

## 2022-01-01 RX ADMIN — ENOXAPARIN SODIUM 30 MG: 30 INJECTION SUBCUTANEOUS at 04:12

## 2022-01-01 RX ADMIN — ASPIRIN 81 MG CHEWABLE TABLET 81 MG: 81 TABLET CHEWABLE at 09:11

## 2022-01-01 RX ADMIN — GUAIFENESIN 200 MG: 200 SOLUTION ORAL at 05:11

## 2022-01-01 RX ADMIN — POTASSIUM & SODIUM PHOSPHATES POWDER PACK 280-160-250 MG 2 PACKET: 280-160-250 PACK at 04:12

## 2022-01-01 RX ADMIN — INSULIN ASPART 1 UNITS: 100 INJECTION, SOLUTION INTRAVENOUS; SUBCUTANEOUS at 09:11

## 2022-01-01 RX ADMIN — PANTOPRAZOLE SODIUM 40 MG: 40 TABLET, DELAYED RELEASE ORAL at 08:12

## 2022-01-01 RX ADMIN — FLUTICASONE PROPIONATE 50 MCG: 50 SPRAY, METERED NASAL at 10:06

## 2022-01-01 RX ADMIN — DOXYCYCLINE HYCLATE 100 MG: 100 TABLET, COATED ORAL at 09:12

## 2022-01-01 RX ADMIN — FLUTICASONE PROPIONATE 50 MCG: 50 SPRAY, METERED NASAL at 09:11

## 2022-01-01 RX ADMIN — GABAPENTIN 300 MG: 300 CAPSULE ORAL at 08:11

## 2022-01-01 RX ADMIN — ASPIRIN 81 MG CHEWABLE TABLET 81 MG: 81 TABLET CHEWABLE at 08:12

## 2022-01-01 RX ADMIN — ALUMINUM HYDROXIDE, MAGNESIUM HYDROXIDE, AND DIMETHICONE 30 ML: 400; 400; 40 SUSPENSION ORAL at 04:11

## 2022-01-01 RX ADMIN — PANTOPRAZOLE SODIUM 40 MG: 40 TABLET, DELAYED RELEASE ORAL at 09:12

## 2022-01-01 RX ADMIN — POTASSIUM & SODIUM PHOSPHATES POWDER PACK 280-160-250 MG 1 PACKET: 280-160-250 PACK at 06:12

## 2022-01-01 RX ADMIN — MAGNESIUM SULFATE 2 G: 2 INJECTION INTRAVENOUS at 08:09

## 2022-01-01 RX ADMIN — POTASSIUM & SODIUM PHOSPHATES POWDER PACK 280-160-250 MG 1 PACKET: 280-160-250 PACK at 11:12

## 2022-01-01 RX ADMIN — ACETAMINOPHEN 650 MG: 325 TABLET ORAL at 09:12

## 2022-01-01 RX ADMIN — FUROSEMIDE 80 MG: 10 INJECTION, SOLUTION INTRAVENOUS at 08:11

## 2022-01-01 RX ADMIN — ALBUTEROL SULFATE 2.5 MG: 2.5 SOLUTION RESPIRATORY (INHALATION) at 03:11

## 2022-01-01 RX ADMIN — IOHEXOL 75 ML: 350 INJECTION, SOLUTION INTRAVENOUS at 07:09

## 2022-01-01 RX ADMIN — METHIMAZOLE 2.5 MG: 5 TABLET ORAL at 08:12

## 2022-01-01 RX ADMIN — PANTOPRAZOLE SODIUM 40 MG: 40 TABLET, DELAYED RELEASE ORAL at 09:11

## 2022-01-01 RX ADMIN — TRAMADOL HYDROCHLORIDE 50 MG: 50 TABLET, COATED ORAL at 10:12

## 2022-01-01 RX ADMIN — GABAPENTIN 300 MG: 300 CAPSULE ORAL at 08:06

## 2022-01-01 RX ADMIN — METHIMAZOLE 2.5 MG: 5 TABLET ORAL at 11:12

## 2022-01-01 RX ADMIN — ATORVASTATIN CALCIUM 40 MG: 40 TABLET, FILM COATED ORAL at 09:11

## 2022-01-01 RX ADMIN — METHIMAZOLE 2.5 MG: 5 TABLET ORAL at 10:11

## 2022-01-01 RX ADMIN — ALUMINUM HYDROXIDE, MAGNESIUM HYDROXIDE, AND DIMETHICONE 30 ML: 400; 400; 40 SUSPENSION ORAL at 05:12

## 2022-01-01 RX ADMIN — ASPIRIN 325 MG: 325 TABLET ORAL at 10:11

## 2022-01-01 RX ADMIN — TRAMADOL HYDROCHLORIDE 50 MG: 50 TABLET, COATED ORAL at 01:11

## 2022-01-01 RX ADMIN — ACETAMINOPHEN 650 MG: 325 TABLET ORAL at 05:12

## 2022-01-01 RX ADMIN — DICYCLOMINE HYDROCHLORIDE 10 MG: 10 CAPSULE ORAL at 08:06

## 2022-01-01 RX ADMIN — GUAIFENESIN AND DEXTROMETHORPHAN 5 ML: 100; 10 SYRUP ORAL at 01:11

## 2022-01-01 RX ADMIN — ACETAMINOPHEN 650 MG: 325 TABLET ORAL at 03:06

## 2022-01-01 RX ADMIN — AMIODARONE HYDROCHLORIDE 200 MG: 200 TABLET ORAL at 08:12

## 2022-01-01 RX ADMIN — GABAPENTIN 300 MG: 300 CAPSULE ORAL at 08:09

## 2022-01-01 RX ADMIN — CEFEPIME 2 G: 2 INJECTION, POWDER, FOR SOLUTION INTRAVENOUS at 09:12

## 2022-01-01 RX ADMIN — LOSARTAN POTASSIUM 12.5 MG: 25 TABLET, FILM COATED ORAL at 08:12

## 2022-01-01 RX ADMIN — POTASSIUM & SODIUM PHOSPHATES POWDER PACK 280-160-250 MG 2 PACKET: 280-160-250 PACK at 10:11

## 2022-01-01 RX ADMIN — REGADENOSON 0.4 MG: 0.08 INJECTION, SOLUTION INTRAVENOUS at 10:02

## 2022-01-01 RX ADMIN — FUROSEMIDE 60 MG: 10 INJECTION, SOLUTION INTRAVENOUS at 12:11

## 2022-01-01 RX ADMIN — LOSARTAN POTASSIUM 25 MG: 25 TABLET, FILM COATED ORAL at 12:12

## 2022-01-01 RX ADMIN — ACETAMINOPHEN 650 MG: 325 TABLET ORAL at 02:12

## 2022-01-01 RX ADMIN — MAGNESIUM SULFATE 2 G: 2 INJECTION INTRAVENOUS at 02:12

## 2022-01-01 RX ADMIN — ALBUTEROL SULFATE 2.5 MG: 2.5 SOLUTION RESPIRATORY (INHALATION) at 08:11

## 2022-01-01 RX ADMIN — HEPARIN SODIUM 5000 UNITS: 5000 INJECTION INTRAVENOUS; SUBCUTANEOUS at 09:12

## 2022-01-01 RX ADMIN — POLYETHYLENE GLYCOL 3350 17 G: 17 POWDER, FOR SOLUTION ORAL at 06:09

## 2022-01-01 RX ADMIN — MUPIROCIN: 20 OINTMENT TOPICAL at 08:12

## 2022-01-01 RX ADMIN — POTASSIUM & SODIUM PHOSPHATES POWDER PACK 280-160-250 MG 2 PACKET: 280-160-250 PACK at 06:11

## 2022-01-01 RX ADMIN — DOXYCYCLINE HYCLATE 100 MG: 100 TABLET, COATED ORAL at 09:11

## 2022-01-01 RX ADMIN — BENZONATATE 100 MG: 100 CAPSULE ORAL at 03:11

## 2022-01-01 RX ADMIN — FUROSEMIDE 80 MG: 10 INJECTION, SOLUTION INTRAVENOUS at 09:11

## 2022-01-01 RX ADMIN — POTASSIUM & SODIUM PHOSPHATES POWDER PACK 280-160-250 MG 2 PACKET: 280-160-250 PACK at 11:11

## 2022-01-01 RX ADMIN — ALUMINUM HYDROXIDE, MAGNESIUM HYDROXIDE, AND SIMETHICONE 30 ML: 200; 200; 20 SUSPENSION ORAL at 06:09

## 2022-01-01 RX ADMIN — ERGOCALCIFEROL 50000 UNITS: 1.25 CAPSULE ORAL at 08:12

## 2022-01-01 RX ADMIN — BENZONATATE 100 MG: 100 CAPSULE ORAL at 08:11

## 2022-01-01 RX ADMIN — PANTOPRAZOLE SODIUM 40 MG: 40 TABLET, DELAYED RELEASE ORAL at 07:09

## 2022-01-01 RX ADMIN — POTASSIUM & SODIUM PHOSPHATES POWDER PACK 280-160-250 MG 1 PACKET: 280-160-250 PACK at 04:12

## 2022-01-01 RX ADMIN — ATORVASTATIN CALCIUM 40 MG: 40 TABLET, FILM COATED ORAL at 09:12

## 2022-01-01 RX ADMIN — BENZONATATE 100 MG: 100 CAPSULE ORAL at 05:11

## 2022-01-01 RX ADMIN — DICYCLOMINE HYDROCHLORIDE 10 MG: 10 CAPSULE ORAL at 10:06

## 2022-01-01 RX ADMIN — POTASSIUM & SODIUM PHOSPHATES POWDER PACK 280-160-250 MG 2 PACKET: 280-160-250 PACK at 06:12

## 2022-01-01 RX ADMIN — DICYCLOMINE HYDROCHLORIDE 10 MG: 10 CAPSULE ORAL at 08:07

## 2022-01-01 RX ADMIN — LOSARTAN POTASSIUM 12.5 MG: 25 TABLET, FILM COATED ORAL at 09:12

## 2022-01-01 RX ADMIN — ALUMINUM HYDROXIDE, MAGNESIUM HYDROXIDE, AND SIMETHICONE 5 ML: 200; 200; 20 SUSPENSION ORAL at 07:03

## 2022-01-01 RX ADMIN — POTASSIUM & SODIUM PHOSPHATES POWDER PACK 280-160-250 MG 2 PACKET: 280-160-250 PACK at 05:12

## 2022-01-01 RX ADMIN — AMIODARONE HYDROCHLORIDE 200 MG: 200 TABLET ORAL at 09:11

## 2022-01-01 RX ADMIN — ONDANSETRON 8 MG: 2 INJECTION INTRAMUSCULAR; INTRAVENOUS at 10:11

## 2022-01-01 RX ADMIN — POTASSIUM CHLORIDE 10 MEQ: 10 CAPSULE, COATED, EXTENDED RELEASE ORAL at 07:09

## 2022-01-01 RX ADMIN — ALUMINUM HYDROXIDE, MAGNESIUM HYDROXIDE, AND DIMETHICONE 30 ML: 400; 400; 40 SUSPENSION ORAL at 06:11

## 2022-01-01 RX ADMIN — FLUTICASONE PROPIONATE 50 MCG: 50 SPRAY, METERED NASAL at 03:11

## 2022-01-01 RX ADMIN — MAGNESIUM SULFATE 2 G: 2 INJECTION INTRAVENOUS at 06:12

## 2022-01-01 RX ADMIN — ALBUTEROL SULFATE 2.5 MG: 2.5 SOLUTION RESPIRATORY (INHALATION) at 07:12

## 2022-01-01 RX ADMIN — GABAPENTIN 300 MG: 300 CAPSULE ORAL at 10:09

## 2022-01-01 RX ADMIN — Medication 6 MG: at 09:11

## 2022-01-01 RX ADMIN — METHIMAZOLE 5 MG: 5 TABLET ORAL at 03:09

## 2022-01-01 RX ADMIN — ASPIRIN 81 MG CHEWABLE TABLET 81 MG: 81 TABLET CHEWABLE at 10:11

## 2022-01-01 RX ADMIN — FLUTICASONE PROPIONATE 50 MCG: 50 SPRAY, METERED NASAL at 09:12

## 2022-01-01 RX ADMIN — FUROSEMIDE 40 MG: 10 INJECTION, SOLUTION INTRAMUSCULAR; INTRAVENOUS at 03:06

## 2022-01-01 RX ADMIN — GUAIFENESIN AND DEXTROMETHORPHAN 5 ML: 100; 10 SYRUP ORAL at 09:12

## 2022-01-01 RX ADMIN — PANTOPRAZOLE SODIUM 40 MG: 40 TABLET, DELAYED RELEASE ORAL at 08:11

## 2022-01-01 RX ADMIN — BENZONATATE 100 MG: 100 CAPSULE ORAL at 09:12

## 2022-01-01 RX ADMIN — GABAPENTIN 300 MG: 300 CAPSULE ORAL at 08:12

## 2022-01-01 RX ADMIN — GUAIFENESIN AND DEXTROMETHORPHAN 5 ML: 100; 10 SYRUP ORAL at 03:11

## 2022-01-01 RX ADMIN — INSULIN ASPART 2 UNITS: 100 INJECTION, SOLUTION INTRAVENOUS; SUBCUTANEOUS at 04:11

## 2022-01-01 RX ADMIN — ALUMINUM HYDROXIDE, MAGNESIUM HYDROXIDE, AND DIMETHICONE 30 ML: 400; 400; 40 SUSPENSION ORAL at 12:11

## 2022-01-01 RX ADMIN — ALBUTEROL SULFATE 2.5 MG: 2.5 SOLUTION RESPIRATORY (INHALATION) at 03:12

## 2022-01-01 RX ADMIN — ATORVASTATIN CALCIUM 40 MG: 20 TABLET, FILM COATED ORAL at 10:09

## 2022-01-01 RX ADMIN — TRAMADOL HYDROCHLORIDE 50 MG: 50 TABLET, COATED ORAL at 12:12

## 2022-01-01 RX ADMIN — TRAMADOL HYDROCHLORIDE 50 MG: 50 TABLET, COATED ORAL at 08:11

## 2022-01-01 RX ADMIN — SODIUM CHLORIDE 250 ML: 0.9 INJECTION, SOLUTION INTRAVENOUS at 02:06

## 2022-01-01 RX ADMIN — HEPARIN SODIUM 5000 UNITS: 5000 INJECTION INTRAVENOUS; SUBCUTANEOUS at 10:11

## 2022-01-01 RX ADMIN — PANTOPRAZOLE SODIUM 40 MG: 40 TABLET, DELAYED RELEASE ORAL at 10:09

## 2022-01-01 RX ADMIN — BENZONATATE 100 MG: 100 CAPSULE ORAL at 02:11

## 2022-01-01 RX ADMIN — METHIMAZOLE 5 MG: 5 TABLET ORAL at 07:09

## 2022-01-01 RX ADMIN — IOHEXOL 100 ML: 350 INJECTION, SOLUTION INTRAVENOUS at 04:11

## 2022-01-01 RX ADMIN — ALUMINUM HYDROXIDE, MAGNESIUM HYDROXIDE, AND DIMETHICONE 30 ML: 400; 400; 40 SUSPENSION ORAL at 01:11

## 2022-01-01 RX ADMIN — ASPIRIN 81 MG CHEWABLE TABLET 81 MG: 81 TABLET CHEWABLE at 08:11

## 2022-01-01 RX ADMIN — METOLAZONE 5 MG: 2.5 TABLET ORAL at 09:11

## 2022-01-01 RX ADMIN — MUPIROCIN: 20 OINTMENT TOPICAL at 09:12

## 2022-01-01 RX ADMIN — SODIUM CHLORIDE: 0.9 INJECTION, SOLUTION INTRAVENOUS at 07:12

## 2022-01-01 RX ADMIN — FLUTICASONE PROPIONATE 50 MCG: 50 SPRAY, METERED NASAL at 12:12

## 2022-01-01 RX ADMIN — GUAIFENESIN AND DEXTROMETHORPHAN 5 ML: 100; 10 SYRUP ORAL at 05:12

## 2022-01-01 RX ADMIN — ENOXAPARIN SODIUM 30 MG: 30 INJECTION SUBCUTANEOUS at 05:12

## 2022-01-01 RX ADMIN — METHIMAZOLE 2.5 MG: 5 TABLET ORAL at 09:12

## 2022-01-01 RX ADMIN — FUROSEMIDE 40 MG: 10 INJECTION, SOLUTION INTRAMUSCULAR; INTRAVENOUS at 06:03

## 2022-01-01 RX ADMIN — ASPIRIN 81 MG CHEWABLE TABLET 81 MG: 81 TABLET CHEWABLE at 10:06

## 2022-01-01 RX ADMIN — FUROSEMIDE 80 MG: 10 INJECTION, SOLUTION INTRAVENOUS at 06:11

## 2022-01-01 RX ADMIN — Medication 6 MG: at 08:12

## 2022-01-01 RX ADMIN — POLYETHYLENE GLYCOL 3350 17 G: 17 POWDER, FOR SOLUTION ORAL at 04:12

## 2022-01-01 RX ADMIN — FAMOTIDINE 20 MG: 20 TABLET ORAL at 03:06

## 2022-01-01 RX ADMIN — FUROSEMIDE 80 MG: 10 INJECTION, SOLUTION INTRAVENOUS at 10:11

## 2022-01-01 RX ADMIN — ATORVASTATIN CALCIUM 40 MG: 40 TABLET, FILM COATED ORAL at 10:09

## 2022-01-01 RX ADMIN — POTASSIUM & SODIUM PHOSPHATES POWDER PACK 280-160-250 MG 2 PACKET: 280-160-250 PACK at 08:11

## 2022-01-01 RX ADMIN — ENOXAPARIN SODIUM 40 MG: 100 INJECTION SUBCUTANEOUS at 05:06

## 2022-01-01 RX ADMIN — CEFTRIAXONE 1 G: 1 INJECTION, SOLUTION INTRAVENOUS at 04:06

## 2022-01-01 RX ADMIN — ALUMINUM HYDROXIDE, MAGNESIUM HYDROXIDE, AND DIMETHICONE 30 ML: 400; 400; 40 SUSPENSION ORAL at 03:11

## 2022-01-01 RX ADMIN — MAGNESIUM SULFATE 2 G: 2 INJECTION INTRAVENOUS at 03:11

## 2022-01-01 RX ADMIN — ASPIRIN 325 MG ORAL TABLET 325 MG: 325 PILL ORAL at 06:03

## 2022-01-01 RX ADMIN — TRAMADOL HYDROCHLORIDE 50 MG: 50 TABLET, COATED ORAL at 10:11

## 2022-01-01 RX ADMIN — DICYCLOMINE HYDROCHLORIDE 20 MG: 10 CAPSULE ORAL at 07:03

## 2022-01-01 RX ADMIN — ASPIRIN 81 MG CHEWABLE TABLET 81 MG: 81 TABLET CHEWABLE at 08:07

## 2022-01-01 RX ADMIN — DICYCLOMINE HYDROCHLORIDE 10 MG: 10 CAPSULE ORAL at 03:06

## 2022-01-01 RX ADMIN — ALUMINUM HYDROXIDE, MAGNESIUM HYDROXIDE, AND DIMETHICONE 30 ML: 400; 400; 40 SUSPENSION ORAL at 10:11

## 2022-01-01 RX ADMIN — SODIUM CHLORIDE, SODIUM LACTATE, POTASSIUM CHLORIDE, AND CALCIUM CHLORIDE: .6; .31; .03; .02 INJECTION, SOLUTION INTRAVENOUS at 05:06

## 2022-01-01 RX ADMIN — SODIUM CHLORIDE, SODIUM LACTATE, POTASSIUM CHLORIDE, AND CALCIUM CHLORIDE 500 ML: .6; .31; .03; .02 INJECTION, SOLUTION INTRAVENOUS at 12:12

## 2022-01-01 RX ADMIN — ALBUTEROL SULFATE 2.5 MG: 2.5 SOLUTION RESPIRATORY (INHALATION) at 08:12

## 2022-01-01 RX ADMIN — INSULIN ASPART 2 UNITS: 100 INJECTION, SOLUTION INTRAVENOUS; SUBCUTANEOUS at 05:11

## 2022-01-01 RX ADMIN — METOCLOPRAMIDE 10 MG: 5 TABLET ORAL at 11:11

## 2022-01-01 RX ADMIN — NOREPINEPHRINE BITARTRATE 0.1 MCG/KG/MIN: 4 INJECTION, SOLUTION INTRAVENOUS at 07:12

## 2022-01-01 RX ADMIN — FUROSEMIDE 60 MG: 10 INJECTION, SOLUTION INTRAVENOUS at 05:11

## 2022-01-01 RX ADMIN — ONDANSETRON 8 MG: 2 INJECTION INTRAMUSCULAR; INTRAVENOUS at 05:12

## 2022-01-01 RX ADMIN — INSULIN ASPART 2 UNITS: 100 INJECTION, SOLUTION INTRAVENOUS; SUBCUTANEOUS at 04:12

## 2022-01-01 RX ADMIN — FLUTICASONE PROPIONATE 50 MCG: 50 SPRAY, METERED NASAL at 08:11

## 2022-01-01 RX ADMIN — ALUMINUM HYDROXIDE, MAGNESIUM HYDROXIDE, AND DIMETHICONE 30 ML: 400; 400; 40 SUSPENSION ORAL at 11:11

## 2022-01-01 RX ADMIN — MUPIROCIN: 20 OINTMENT TOPICAL at 11:12

## 2022-01-01 RX ADMIN — FUROSEMIDE 80 MG: 10 INJECTION, SOLUTION INTRAVENOUS at 05:11

## 2022-01-01 RX ADMIN — ONDANSETRON 8 MG: 2 INJECTION INTRAMUSCULAR; INTRAVENOUS at 02:11

## 2022-01-01 RX ADMIN — FENTANYL CITRATE 25 MCG: 50 INJECTION, SOLUTION INTRAMUSCULAR; INTRAVENOUS at 08:09

## 2022-01-01 RX ADMIN — Medication 6 MG: at 08:11

## 2022-01-01 RX ADMIN — FUROSEMIDE 40 MG: 10 INJECTION, SOLUTION INTRAVENOUS at 10:11

## 2022-01-01 RX ADMIN — GUAIFENESIN AND DEXTROMETHORPHAN 5 ML: 100; 10 SYRUP ORAL at 12:12

## 2022-01-01 RX ADMIN — METOPROLOL SUCCINATE 25 MG: 25 TABLET, EXTENDED RELEASE ORAL at 10:06

## 2022-01-01 RX ADMIN — METHIMAZOLE 2.5 MG: 5 TABLET ORAL at 10:06

## 2022-01-01 RX ADMIN — CEFEPIME 2 G: 2 INJECTION, POWDER, FOR SOLUTION INTRAVENOUS at 08:12

## 2022-01-01 RX ADMIN — BENZONATATE 100 MG: 100 CAPSULE ORAL at 05:12

## 2022-01-01 RX ADMIN — INSULIN ASPART 1 UNITS: 100 INJECTION, SOLUTION INTRAVENOUS; SUBCUTANEOUS at 08:09

## 2022-01-01 RX ADMIN — ALBUTEROL SULFATE 2.5 MG: 2.5 SOLUTION RESPIRATORY (INHALATION) at 11:11

## 2022-01-01 RX ADMIN — GABAPENTIN 300 MG: 300 CAPSULE ORAL at 07:09

## 2022-01-01 RX ADMIN — FUROSEMIDE 40 MG: 10 INJECTION, SOLUTION INTRAMUSCULAR; INTRAVENOUS at 10:09

## 2022-01-01 RX ADMIN — TOLVAPTAN 15 MG: 15 TABLET ORAL at 12:12

## 2022-01-01 RX ADMIN — AMIODARONE HYDROCHLORIDE 200 MG: 200 TABLET ORAL at 08:11

## 2022-01-01 RX ADMIN — ALBUTEROL SULFATE 2.5 MG: 2.5 SOLUTION RESPIRATORY (INHALATION) at 09:12

## 2022-01-01 RX ADMIN — BARIUM SULFATE 450 ML: 20 SUSPENSION ORAL at 04:11

## 2022-01-01 RX ADMIN — POLYETHYLENE GLYCOL 3350 17 G: 17 POWDER, FOR SOLUTION ORAL at 08:09

## 2022-01-01 RX ADMIN — GUAIFENESIN 200 MG: 200 SOLUTION ORAL at 12:11

## 2022-01-01 RX ADMIN — GABAPENTIN 300 MG: 300 CAPSULE ORAL at 12:11

## 2022-01-01 RX ADMIN — FUROSEMIDE 80 MG: 10 INJECTION, SOLUTION INTRAVENOUS at 01:11

## 2022-01-01 RX ADMIN — METOCLOPRAMIDE 10 MG: 5 TABLET ORAL at 06:11

## 2022-01-01 RX ADMIN — INSULIN ASPART 2 UNITS: 100 INJECTION, SOLUTION INTRAVENOUS; SUBCUTANEOUS at 11:12

## 2022-01-01 RX ADMIN — POTASSIUM & SODIUM PHOSPHATES POWDER PACK 280-160-250 MG 2 PACKET: 280-160-250 PACK at 09:12

## 2022-01-01 RX ADMIN — TRAMADOL HYDROCHLORIDE 50 MG: 50 TABLET, COATED ORAL at 09:11

## 2022-01-01 RX ADMIN — ACETAMINOPHEN 1000 MG: 500 TABLET ORAL at 10:04

## 2022-01-01 RX ADMIN — ASPIRIN 81 MG: 81 TABLET, CHEWABLE ORAL at 10:09

## 2022-01-01 RX ADMIN — HYDROXYZINE PAMOATE 25 MG: 25 CAPSULE ORAL at 08:11

## 2022-01-01 RX ADMIN — GUAIFENESIN 200 MG: 200 SOLUTION ORAL at 01:11

## 2022-01-01 RX ADMIN — NITROGLYCERIN 0.4 MG: 0.4 TABLET, ORALLY DISINTEGRATING SUBLINGUAL at 02:12

## 2022-01-01 RX ADMIN — ONDANSETRON 8 MG: 2 INJECTION INTRAMUSCULAR; INTRAVENOUS at 12:12

## 2022-01-01 RX ADMIN — HYDROXYZINE PAMOATE 25 MG: 25 CAPSULE ORAL at 02:12

## 2022-01-01 RX ADMIN — POTASSIUM & SODIUM PHOSPHATES POWDER PACK 280-160-250 MG 2 PACKET: 280-160-250 PACK at 08:12

## 2022-01-01 RX ADMIN — INSULIN ASPART 1 UNITS: 100 INJECTION, SOLUTION INTRAVENOUS; SUBCUTANEOUS at 02:12

## 2022-01-01 RX ADMIN — MAGNESIUM SULFATE 2 G: 2 INJECTION INTRAVENOUS at 05:11

## 2022-01-01 RX ADMIN — FLUTICASONE PROPIONATE 50 MCG: 50 SPRAY, METERED NASAL at 08:07

## 2022-01-01 RX ADMIN — POTASSIUM & SODIUM PHOSPHATES POWDER PACK 280-160-250 MG 1 PACKET: 280-160-250 PACK at 05:12

## 2022-01-01 RX ADMIN — GUAIFENESIN AND DEXTROMETHORPHAN HYDROBROMIDE 1 TABLET: 600; 30 TABLET, EXTENDED RELEASE ORAL at 09:12

## 2022-01-01 RX ADMIN — DOXYCYCLINE HYCLATE 100 MG: 100 TABLET, COATED ORAL at 11:11

## 2022-01-01 RX ADMIN — BISACODYL 10 MG: 10 SUPPOSITORY RECTAL at 01:11

## 2022-01-01 RX ADMIN — LIDOCAINE 1 PATCH: 50 PATCH CUTANEOUS at 09:12

## 2022-01-01 RX ADMIN — ONDANSETRON 8 MG: 2 INJECTION INTRAMUSCULAR; INTRAVENOUS at 11:11

## 2022-01-01 RX ADMIN — POTASSIUM & SODIUM PHOSPHATES POWDER PACK 280-160-250 MG 1 PACKET: 280-160-250 PACK at 09:12

## 2022-01-01 RX ADMIN — POTASSIUM CHLORIDE 10 MEQ: 10 CAPSULE, COATED, EXTENDED RELEASE ORAL at 10:09

## 2022-01-01 RX ADMIN — FLUTICASONE PROPIONATE 50 MCG: 50 SPRAY, METERED NASAL at 10:11

## 2022-01-01 RX ADMIN — ALBUTEROL SULFATE 2.5 MG: 2.5 SOLUTION RESPIRATORY (INHALATION) at 12:12

## 2022-01-01 RX ADMIN — POTASSIUM & SODIUM PHOSPHATES POWDER PACK 280-160-250 MG 1 PACKET: 280-160-250 PACK at 10:12

## 2022-01-01 RX ADMIN — METHIMAZOLE 2.5 MG: 5 TABLET ORAL at 11:11

## 2022-01-01 RX ADMIN — FUROSEMIDE 20 MG: 20 TABLET ORAL at 01:09

## 2022-01-01 RX ADMIN — CEFEPIME 2 G: 2 INJECTION, POWDER, FOR SOLUTION INTRAVENOUS at 11:12

## 2022-01-01 RX ADMIN — METOCLOPRAMIDE 10 MG: 5 TABLET ORAL at 01:11

## 2022-01-01 RX ADMIN — CEFTRIAXONE 1 G: 1 INJECTION, POWDER, FOR SOLUTION INTRAMUSCULAR; INTRAVENOUS at 10:11

## 2022-01-01 RX ADMIN — IOHEXOL 100 ML: 350 INJECTION, SOLUTION INTRAVENOUS at 11:04

## 2022-01-01 RX ADMIN — AMIODARONE HYDROCHLORIDE 200 MG: 200 TABLET ORAL at 10:11

## 2022-01-01 RX ADMIN — ALBUTEROL SULFATE 2.5 MG: 2.5 SOLUTION RESPIRATORY (INHALATION) at 05:12

## 2022-01-01 RX ADMIN — ACETAMINOPHEN 650 MG: 325 TABLET ORAL at 01:12

## 2022-01-01 RX ADMIN — HEPARIN SODIUM 5000 UNITS: 5000 INJECTION INTRAVENOUS; SUBCUTANEOUS at 08:12

## 2022-01-01 RX ADMIN — ASPIRIN 81 MG CHEWABLE TABLET 81 MG: 81 TABLET CHEWABLE at 10:09

## 2022-01-01 RX ADMIN — ACETAMINOPHEN 650 MG: 325 TABLET ORAL at 11:12

## 2022-01-01 RX ADMIN — FUROSEMIDE 100 MG: 10 INJECTION, SOLUTION INTRAMUSCULAR; INTRAVENOUS at 09:11

## 2022-01-01 RX ADMIN — ASPIRIN 81 MG CHEWABLE TABLET 81 MG: 81 TABLET CHEWABLE at 07:09

## 2022-01-01 RX ADMIN — BUMETANIDE 2 MG: 1 TABLET ORAL at 10:12

## 2022-01-01 RX ADMIN — FUROSEMIDE 60 MG: 10 INJECTION, SOLUTION INTRAVENOUS at 09:09

## 2022-01-01 RX ADMIN — TRAMADOL HYDROCHLORIDE 50 MG: 50 TABLET, COATED ORAL at 11:11

## 2022-01-01 RX ADMIN — GUAIFENESIN AND DEXTROMETHORPHAN HYDROBROMIDE 1 TABLET: 600; 30 TABLET, EXTENDED RELEASE ORAL at 12:12

## 2022-01-01 RX ADMIN — ALUMINUM HYDROXIDE, MAGNESIUM HYDROXIDE, AND SIMETHICONE 30 ML: 200; 200; 20 SUSPENSION ORAL at 03:06

## 2022-01-01 RX ADMIN — METHIMAZOLE 2.5 MG: 5 TABLET ORAL at 08:07

## 2022-01-01 RX ADMIN — CEFTRIAXONE 1 G: 1 INJECTION, POWDER, FOR SOLUTION INTRAMUSCULAR; INTRAVENOUS at 12:11

## 2022-01-01 RX ADMIN — ERGOCALCIFEROL 50000 UNITS: 1.25 CAPSULE ORAL at 08:11

## 2022-01-01 RX ADMIN — FUROSEMIDE 60 MG: 10 INJECTION, SOLUTION INTRAVENOUS at 05:09

## 2022-01-01 RX ADMIN — ALUMINUM HYDROXIDE, MAGNESIUM HYDROXIDE, AND DIMETHICONE 30 ML: 400; 400; 40 SUSPENSION ORAL at 08:11

## 2022-01-01 RX ADMIN — METOPROLOL SUCCINATE 25 MG: 25 TABLET, EXTENDED RELEASE ORAL at 10:09

## 2022-01-01 RX ADMIN — CEFTRIAXONE 1 G: 1 INJECTION, SOLUTION INTRAVENOUS at 03:07

## 2022-01-01 RX ADMIN — TRAMADOL HYDROCHLORIDE 50 MG: 50 TABLET, COATED ORAL at 04:12

## 2022-01-01 RX ADMIN — ATORVASTATIN CALCIUM 40 MG: 40 TABLET, FILM COATED ORAL at 10:11

## 2022-01-01 RX ADMIN — ENOXAPARIN SODIUM 40 MG: 100 INJECTION SUBCUTANEOUS at 05:09

## 2022-01-01 RX ADMIN — BENZONATATE 100 MG: 100 CAPSULE ORAL at 10:11

## 2022-01-01 RX ADMIN — ONDANSETRON 8 MG: 2 INJECTION INTRAMUSCULAR; INTRAVENOUS at 08:11

## 2022-01-01 RX ADMIN — TRAMADOL HYDROCHLORIDE 50 MG: 50 TABLET, COATED ORAL at 02:11

## 2022-01-01 RX ADMIN — BENZONATATE 100 MG: 100 CAPSULE ORAL at 12:11

## 2022-01-01 RX ADMIN — BUMETANIDE 2 MG: 1 TABLET ORAL at 04:12

## 2022-01-01 RX ADMIN — IOHEXOL 100 ML: 350 INJECTION, SOLUTION INTRAVENOUS at 01:11

## 2022-01-01 RX ADMIN — GABAPENTIN 300 MG: 300 CAPSULE ORAL at 08:07

## 2022-01-01 RX ADMIN — HYDROXYZINE PAMOATE 25 MG: 25 CAPSULE ORAL at 12:12

## 2022-01-01 RX ADMIN — INSULIN ASPART 3 UNITS: 100 INJECTION, SOLUTION INTRAVENOUS; SUBCUTANEOUS at 05:11

## 2022-01-01 RX ADMIN — FUROSEMIDE 40 MG: 10 INJECTION, SOLUTION INTRAMUSCULAR; INTRAVENOUS at 09:12

## 2022-01-01 RX ADMIN — CEFTRIAXONE 1 G: 1 INJECTION, POWDER, FOR SOLUTION INTRAMUSCULAR; INTRAVENOUS at 11:11

## 2022-01-01 RX ADMIN — ACETAMINOPHEN 1000 MG: 500 TABLET ORAL at 10:11

## 2022-01-01 RX ADMIN — LIDOCAINE HYDROCHLORIDE 10 ML: 20 SOLUTION ORAL; TOPICAL at 03:06

## 2022-01-01 RX ADMIN — TOLVAPTAN 15 MG: 15 TABLET ORAL at 08:12

## 2022-01-01 RX ADMIN — ERGOCALCIFEROL 50000 UNITS: 1.25 CAPSULE ORAL at 09:12

## 2022-01-01 RX ADMIN — INSULIN ASPART 1 UNITS: 100 INJECTION, SOLUTION INTRAVENOUS; SUBCUTANEOUS at 10:11

## 2022-01-01 RX ADMIN — POTASSIUM & SODIUM PHOSPHATES POWDER PACK 280-160-250 MG 2 PACKET: 280-160-250 PACK at 03:11

## 2022-01-01 RX ADMIN — MAGNESIUM SULFATE 2 G: 2 INJECTION INTRAVENOUS at 04:12

## 2022-01-01 RX ADMIN — PANTOPRAZOLE SODIUM 40 MG: 40 TABLET, DELAYED RELEASE ORAL at 10:06

## 2022-01-01 RX ADMIN — BENZONATATE 100 MG: 100 CAPSULE ORAL at 03:12

## 2022-01-01 RX ADMIN — DICYCLOMINE HYDROCHLORIDE 10 MG: 10 CAPSULE ORAL at 01:07

## 2022-01-01 RX ADMIN — PANTOPRAZOLE SODIUM 40 MG: 40 TABLET, DELAYED RELEASE ORAL at 08:07

## 2022-01-01 RX ADMIN — POTASSIUM BICARBONATE 50 MEQ: 978 TABLET, EFFERVESCENT ORAL at 04:12

## 2022-01-01 RX ADMIN — ACETAMINOPHEN 650 MG: 325 TABLET ORAL at 09:11

## 2022-01-01 RX ADMIN — INSULIN ASPART 2 UNITS: 100 INJECTION, SOLUTION INTRAVENOUS; SUBCUTANEOUS at 11:06

## 2022-01-01 RX ADMIN — CEFTRIAXONE 2 G: 2 INJECTION, SOLUTION INTRAVENOUS at 08:11

## 2022-01-01 RX ADMIN — Medication 6 MG: at 01:12

## 2022-01-01 RX ADMIN — INSULIN ASPART 1 UNITS: 100 INJECTION, SOLUTION INTRAVENOUS; SUBCUTANEOUS at 09:12

## 2022-01-01 RX ADMIN — ASPIRIN 325 MG ORAL TABLET 325 MG: 325 PILL ORAL at 01:06

## 2022-01-01 RX ADMIN — TRAMADOL HYDROCHLORIDE 50 MG: 50 TABLET, COATED ORAL at 06:11

## 2022-01-01 RX ADMIN — METOCLOPRAMIDE 10 MG: 5 TABLET ORAL at 05:11

## 2022-01-01 RX ADMIN — INSULIN ASPART 0 UNITS: 100 INJECTION, SOLUTION INTRAVENOUS; SUBCUTANEOUS at 06:12

## 2022-01-01 RX ADMIN — CEFTRIAXONE 1 G: 1 INJECTION, POWDER, FOR SOLUTION INTRAMUSCULAR; INTRAVENOUS at 01:11

## 2022-01-01 RX ADMIN — ALBUTEROL SULFATE 2.5 MG: 2.5 SOLUTION RESPIRATORY (INHALATION) at 01:12

## 2022-01-01 RX ADMIN — Medication 24 G: at 08:07

## 2022-01-20 ENCOUNTER — OFFICE VISIT (OUTPATIENT)
Dept: INTERNAL MEDICINE | Facility: CLINIC | Age: 87
End: 2022-01-20
Payer: MEDICARE

## 2022-01-20 ENCOUNTER — IMMUNIZATION (OUTPATIENT)
Dept: INTERNAL MEDICINE | Facility: CLINIC | Age: 87
End: 2022-01-20
Payer: MEDICARE

## 2022-01-20 ENCOUNTER — LAB VISIT (OUTPATIENT)
Dept: LAB | Facility: HOSPITAL | Age: 87
End: 2022-01-20
Attending: INTERNAL MEDICINE
Payer: MEDICARE

## 2022-01-20 VITALS
HEIGHT: 59 IN | WEIGHT: 116 LBS | DIASTOLIC BLOOD PRESSURE: 70 MMHG | SYSTOLIC BLOOD PRESSURE: 120 MMHG | BODY MASS INDEX: 23.39 KG/M2

## 2022-01-20 DIAGNOSIS — C83.35 DIFFUSE LARGE B-CELL LYMPHOMA OF LYMPH NODES OF LOWER EXTREMITY: ICD-10-CM

## 2022-01-20 DIAGNOSIS — M89.9 LYTIC LESION OF BONE ON X-RAY: ICD-10-CM

## 2022-01-20 DIAGNOSIS — Z00.00 ANNUAL PHYSICAL EXAM: Primary | ICD-10-CM

## 2022-01-20 DIAGNOSIS — E78.2 MIXED HYPERLIPIDEMIA: ICD-10-CM

## 2022-01-20 DIAGNOSIS — E11.65 TYPE 2 DIABETES MELLITUS WITH HYPERGLYCEMIA, WITHOUT LONG-TERM CURRENT USE OF INSULIN: ICD-10-CM

## 2022-01-20 DIAGNOSIS — I70.0 AORTIC ATHEROSCLEROSIS: ICD-10-CM

## 2022-01-20 DIAGNOSIS — I50.22 CHRONIC SYSTOLIC CONGESTIVE HEART FAILURE: ICD-10-CM

## 2022-01-20 DIAGNOSIS — K63.5 POLYP OF COLON, UNSPECIFIED PART OF COLON, UNSPECIFIED TYPE: ICD-10-CM

## 2022-01-20 DIAGNOSIS — E04.1 THYROID NODULE: ICD-10-CM

## 2022-01-20 DIAGNOSIS — E05.90 HYPERTHYROIDISM: ICD-10-CM

## 2022-01-20 LAB
ALBUMIN SERPL BCP-MCNC: 2.9 G/DL (ref 3.5–5.2)
ALBUMIN/CREAT UR: NORMAL UG/MG (ref 0–30)
ALP SERPL-CCNC: 91 U/L (ref 55–135)
ALT SERPL W/O P-5'-P-CCNC: 23 U/L (ref 10–44)
ANION GAP SERPL CALC-SCNC: 7 MMOL/L (ref 8–16)
AST SERPL-CCNC: 31 U/L (ref 10–40)
BASOPHILS # BLD AUTO: 0.02 K/UL (ref 0–0.2)
BASOPHILS NFR BLD: 0.4 % (ref 0–1.9)
BILIRUB SERPL-MCNC: 1.7 MG/DL (ref 0.1–1)
BUN SERPL-MCNC: 10 MG/DL (ref 8–23)
CALCIUM SERPL-MCNC: 10.1 MG/DL (ref 8.7–10.5)
CHLORIDE SERPL-SCNC: 105 MMOL/L (ref 95–110)
CO2 SERPL-SCNC: 20 MMOL/L (ref 23–29)
CREAT SERPL-MCNC: 0.6 MG/DL (ref 0.5–1.4)
CREAT UR-MCNC: 16 MG/DL (ref 15–325)
DIFFERENTIAL METHOD: ABNORMAL
EOSINOPHIL # BLD AUTO: 0.1 K/UL (ref 0–0.5)
EOSINOPHIL NFR BLD: 1.1 % (ref 0–8)
ERYTHROCYTE [DISTWIDTH] IN BLOOD BY AUTOMATED COUNT: 14.8 % (ref 11.5–14.5)
EST. GFR  (AFRICAN AMERICAN): >60 ML/MIN/1.73 M^2
EST. GFR  (NON AFRICAN AMERICAN): >60 ML/MIN/1.73 M^2
ESTIMATED AVG GLUCOSE: 166 MG/DL (ref 68–131)
GLUCOSE SERPL-MCNC: 171 MG/DL (ref 70–110)
HBA1C MFR BLD: 7.4 % (ref 4–5.6)
HCT VFR BLD AUTO: 42.2 % (ref 37–48.5)
HGB BLD-MCNC: 13.1 G/DL (ref 12–16)
IMM GRANULOCYTES # BLD AUTO: 0.01 K/UL (ref 0–0.04)
IMM GRANULOCYTES NFR BLD AUTO: 0.2 % (ref 0–0.5)
LYMPHOCYTES # BLD AUTO: 1.6 K/UL (ref 1–4.8)
LYMPHOCYTES NFR BLD: 32.8 % (ref 18–48)
MCH RBC QN AUTO: 28.5 PG (ref 27–31)
MCHC RBC AUTO-ENTMCNC: 31 G/DL (ref 32–36)
MCV RBC AUTO: 92 FL (ref 82–98)
MICROALBUMIN UR DL<=1MG/L-MCNC: <5 UG/ML
MONOCYTES # BLD AUTO: 0.5 K/UL (ref 0.3–1)
MONOCYTES NFR BLD: 10.4 % (ref 4–15)
NEUTROPHILS # BLD AUTO: 2.6 K/UL (ref 1.8–7.7)
NEUTROPHILS NFR BLD: 55.1 % (ref 38–73)
NRBC BLD-RTO: 0 /100 WBC
PLATELET # BLD AUTO: 197 K/UL (ref 150–450)
PMV BLD AUTO: 12 FL (ref 9.2–12.9)
POTASSIUM SERPL-SCNC: 4.5 MMOL/L (ref 3.5–5.1)
PROT SERPL-MCNC: 7.2 G/DL (ref 6–8.4)
RBC # BLD AUTO: 4.6 M/UL (ref 4–5.4)
SODIUM SERPL-SCNC: 132 MMOL/L (ref 136–145)
TSH SERPL DL<=0.005 MIU/L-ACNC: 1.14 UIU/ML (ref 0.4–4)
WBC # BLD AUTO: 4.72 K/UL (ref 3.9–12.7)

## 2022-01-20 PROCEDURE — 1125F AMNT PAIN NOTED PAIN PRSNT: CPT | Mod: HCNC,CPTII,S$GLB, | Performed by: INTERNAL MEDICINE

## 2022-01-20 PROCEDURE — 3051F HG A1C>EQUAL 7.0%<8.0%: CPT | Mod: HCNC,CPTII,S$GLB, | Performed by: INTERNAL MEDICINE

## 2022-01-20 PROCEDURE — 90694 FLU VACCINE - QUADRIVALENT - ADJUVANTED: ICD-10-PCS | Mod: HCNC,S$GLB,, | Performed by: INTERNAL MEDICINE

## 2022-01-20 PROCEDURE — 1101F PT FALLS ASSESS-DOCD LE1/YR: CPT | Mod: HCNC,CPTII,S$GLB, | Performed by: INTERNAL MEDICINE

## 2022-01-20 PROCEDURE — 1101F PR PT FALLS ASSESS DOC 0-1 FALLS W/OUT INJ PAST YR: ICD-10-PCS | Mod: HCNC,CPTII,S$GLB, | Performed by: INTERNAL MEDICINE

## 2022-01-20 PROCEDURE — 99397 PER PM REEVAL EST PAT 65+ YR: CPT | Mod: HCNC,S$GLB,, | Performed by: INTERNAL MEDICINE

## 2022-01-20 PROCEDURE — G0008 ADMIN INFLUENZA VIRUS VAC: HCPCS | Mod: HCNC,S$GLB,, | Performed by: INTERNAL MEDICINE

## 2022-01-20 PROCEDURE — 1159F MED LIST DOCD IN RCRD: CPT | Mod: HCNC,CPTII,S$GLB, | Performed by: INTERNAL MEDICINE

## 2022-01-20 PROCEDURE — 36415 COLL VENOUS BLD VENIPUNCTURE: CPT | Mod: HCNC | Performed by: INTERNAL MEDICINE

## 2022-01-20 PROCEDURE — 3288F FALL RISK ASSESSMENT DOCD: CPT | Mod: HCNC,CPTII,S$GLB, | Performed by: INTERNAL MEDICINE

## 2022-01-20 PROCEDURE — 99397 PR PREVENTIVE VISIT,EST,65 & OVER: ICD-10-PCS | Mod: HCNC,S$GLB,, | Performed by: INTERNAL MEDICINE

## 2022-01-20 PROCEDURE — 1125F PR PAIN SEVERITY QUANTIFIED, PAIN PRESENT: ICD-10-PCS | Mod: HCNC,CPTII,S$GLB, | Performed by: INTERNAL MEDICINE

## 2022-01-20 PROCEDURE — 1159F PR MEDICATION LIST DOCUMENTED IN MEDICAL RECORD: ICD-10-PCS | Mod: HCNC,CPTII,S$GLB, | Performed by: INTERNAL MEDICINE

## 2022-01-20 PROCEDURE — G0008 FLU VACCINE - QUADRIVALENT - ADJUVANTED: ICD-10-PCS | Mod: HCNC,S$GLB,, | Performed by: INTERNAL MEDICINE

## 2022-01-20 PROCEDURE — 3072F LOW RISK FOR RETINOPATHY: CPT | Mod: HCNC,CPTII,S$GLB, | Performed by: INTERNAL MEDICINE

## 2022-01-20 PROCEDURE — 3072F PR LOW RISK FOR RETINOPATHY: ICD-10-PCS | Mod: HCNC,CPTII,S$GLB, | Performed by: INTERNAL MEDICINE

## 2022-01-20 PROCEDURE — 84443 ASSAY THYROID STIM HORMONE: CPT | Mod: HCNC | Performed by: INTERNAL MEDICINE

## 2022-01-20 PROCEDURE — 85025 COMPLETE CBC W/AUTO DIFF WBC: CPT | Mod: HCNC | Performed by: INTERNAL MEDICINE

## 2022-01-20 PROCEDURE — 99499 RISK ADDL DX/OHS AUDIT: ICD-10-PCS | Mod: HCNC,S$GLB,, | Performed by: INTERNAL MEDICINE

## 2022-01-20 PROCEDURE — 83036 HEMOGLOBIN GLYCOSYLATED A1C: CPT | Mod: HCNC | Performed by: INTERNAL MEDICINE

## 2022-01-20 PROCEDURE — 1160F RVW MEDS BY RX/DR IN RCRD: CPT | Mod: HCNC,CPTII,S$GLB, | Performed by: INTERNAL MEDICINE

## 2022-01-20 PROCEDURE — 82570 ASSAY OF URINE CREATININE: CPT | Mod: HCNC | Performed by: INTERNAL MEDICINE

## 2022-01-20 PROCEDURE — 99999 PR PBB SHADOW E&M-EST. PATIENT-LVL V: CPT | Mod: PBBFAC,HCNC,, | Performed by: INTERNAL MEDICINE

## 2022-01-20 PROCEDURE — 1160F PR REVIEW ALL MEDS BY PRESCRIBER/CLIN PHARMACIST DOCUMENTED: ICD-10-PCS | Mod: HCNC,CPTII,S$GLB, | Performed by: INTERNAL MEDICINE

## 2022-01-20 PROCEDURE — 90694 VACC AIIV4 NO PRSRV 0.5ML IM: CPT | Mod: HCNC,S$GLB,, | Performed by: INTERNAL MEDICINE

## 2022-01-20 PROCEDURE — 99499 UNLISTED E&M SERVICE: CPT | Mod: HCNC,S$GLB,, | Performed by: INTERNAL MEDICINE

## 2022-01-20 PROCEDURE — 3288F PR FALLS RISK ASSESSMENT DOCUMENTED: ICD-10-PCS | Mod: HCNC,CPTII,S$GLB, | Performed by: INTERNAL MEDICINE

## 2022-01-20 PROCEDURE — 99999 PR PBB SHADOW E&M-EST. PATIENT-LVL V: ICD-10-PCS | Mod: PBBFAC,HCNC,, | Performed by: INTERNAL MEDICINE

## 2022-01-20 PROCEDURE — 80053 COMPREHEN METABOLIC PANEL: CPT | Mod: HCNC | Performed by: INTERNAL MEDICINE

## 2022-01-20 PROCEDURE — 3051F PR MOST RECENT HEMOGLOBIN A1C LEVEL 7.0 - < 8.0%: ICD-10-PCS | Mod: HCNC,CPTII,S$GLB, | Performed by: INTERNAL MEDICINE

## 2022-01-20 RX ORDER — METOPROLOL SUCCINATE 25 MG/1
25 TABLET, EXTENDED RELEASE ORAL DAILY
Qty: 90 TABLET | Refills: 3 | Status: SHIPPED | OUTPATIENT
Start: 2022-01-20 | End: 2022-01-01

## 2022-01-20 NOTE — PROGRESS NOTES
Verified patient name and . Advised patient to wait 15 mins. post injection of Fluzone HD to right deltoid. Patient tolerated well.

## 2022-01-20 NOTE — PROGRESS NOTES
Subjective:       Patient ID: Peri Johansen is a 87 y.o. female.    Chief Complaint: Annual Exam    Annual exam; seen with her granddaughter Blanquita.    Follow-up of multiple medical issues.    Recall hospitalized last year with CHF, outside of [facility].  She was then seen in Cardiology in September 2021 and medications were adjusted; she is due for follow-up currently.  She denies PND or orthopnea.  No syncope, chest pain or tightness.  Minimal chronic edema.  No significant weight gain.    There was some concern about a lytic lesion; MRI of the thoracic spine did not reveal this.    She was supposed to be seen in Oncology, she usually follows on an annual basis.  Her last evaluation was July of 2020 so she is currently overdue.  She is aware of this.  This has been recommended multiple times.  She has not had any night sweats.  Weight has been stable.    She was seen in rheumatology in July of 2021, no further follow-up recommended.    History of hyperthyroidism, has been on medications; overdue to see endocrinology presently.  Last TSH was 4.7 last fall.  She was on methimazole and states that she is still taking this.    Recent urinary infection, treated.  No current symptoms.    She is due for flu shot, COVID booster and eye exam as well as urine.  She declines colonoscopy.    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     Mixed hyperlipidemia     Renal cyst: R side see ultrasound 6/16; stable 2018 and 2019     Stromal cell tumor     Lipoma of back     Thyroid nodules: several see u/s 2017 FNA 2017 benign, stable 2019, also 1/21     Gastric nodule: 2013-m per GI no need for further follow up     Spondylosis without myelopathy     Gastroesophageal reflux disease without esophagitis     Colon polyp: hyperplastic 2015- repeat 2020     Primary insomnia     Aortic  atherosclerosis: see CT scan 3/15     Diverticulosis of large intestine without hemorrhage: see CT scan 3/15     Thoracic and lumbosacral neuritis     Chronic bilateral low back pain with right-sided sciatica     Left carpal tunnel syndrome     Hearing loss of left ear     Hyperthyroidism     Cervical spine arthritis     Sacroiliac joint dysfunction of both sides     Chronic congestive heart failure     Type 2 diabetes mellitus with hyperglycemia, without long-term current use of insulin     Vitamin D deficiency     Osteopenia     Lytic lesion of bone on x-ray: outside CTA 4/21 T10- however PET CT and MRI June 2021 do not show lytic lesion      Review of Systems   Constitutional: Negative for activity change, appetite change, chills, fatigue and fever.   HENT: Negative for congestion, hearing loss, sinus pressure and sore throat.    Eyes: Negative for visual disturbance.   Respiratory: Negative for apnea, cough, shortness of breath and wheezing.    Cardiovascular: Negative for chest pain, palpitations and leg swelling.   Gastrointestinal: Negative for abdominal distention, abdominal pain, constipation, diarrhea, nausea and vomiting.   Genitourinary: Negative for difficulty urinating, dysuria, frequency, hematuria and vaginal bleeding.   Musculoskeletal: Positive for arthralgias and back pain. Negative for gait problem, joint swelling and myalgias.   Skin: Negative for rash.   Neurological: Negative for dizziness, weakness, light-headedness, numbness and headaches.   Hematological: Negative for adenopathy. Does not bruise/bleed easily.   Psychiatric/Behavioral: Negative for confusion, hallucinations, sleep disturbance and suicidal ideas.       Objective:      Physical Exam  Vitals and nursing note reviewed.   Constitutional:       Appearance: She is well-developed.   HENT:      Head: Normocephalic and atraumatic.      Right Ear: External ear normal.      Left Ear: External ear normal.      Nose: Nose normal.       Mouth/Throat:      Pharynx: No oropharyngeal exudate.   Eyes:      General: No scleral icterus.     Extraocular Movements: Extraocular movements intact.      Conjunctiva/sclera: Conjunctivae normal.   Neck:      Thyroid: No thyromegaly.      Vascular: No JVD.   Cardiovascular:      Rate and Rhythm: Normal rate and regular rhythm.      Heart sounds: Normal heart sounds. No murmur heard.  No gallop.    Pulmonary:      Effort: Pulmonary effort is normal. No respiratory distress.      Breath sounds: Normal breath sounds. No wheezing.   Abdominal:      General: Bowel sounds are normal. There is no distension.      Palpations: Abdomen is soft. There is no mass.      Tenderness: There is no abdominal tenderness. There is no guarding or rebound.   Musculoskeletal:         General: No tenderness. Normal range of motion.      Cervical back: Normal range of motion and neck supple.      Comments: Trace bilateral edema both lower extremities   Lymphadenopathy:      Cervical: No cervical adenopathy.   Skin:     General: Skin is warm.      Findings: No erythema or rash.   Neurological:      General: No focal deficit present.      Mental Status: She is alert and oriented to person, place, and time.      Cranial Nerves: No cranial nerve deficit.      Coordination: Coordination normal.   Psychiatric:         Behavior: Behavior normal.         Thought Content: Thought content normal.         Judgment: Judgment normal.         Assessment:       1. Annual physical exam    2. 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 Chronic systolic congestive heart failure    4. Type 2 diabetes mellitus with hyperglycemia, without long-term current use of insulin    5. Mixed hyperlipidemia    6. Aortic atherosclerosis: see CT scan 3/15    7. Thyroid nodules: several see u/s 2017 FNA 2017 benign, stable 2019, also 1/21    8. Hyperthyroidism    9. Polyp of colon, unspecified part of colon, unspecified type    10. Lytic lesion  of bone on x-ray: outside CTA 4/21 T10- however PET CT and MRI June 2021 do not show lytic lesion        Plan:         Peri was seen today for annual exam.    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  -     Ambulatory referral/consult to Hematology / Oncology; Future    Chronic systolic congestive heart failure  -     Ambulatory referral/consult to Cardiology; Future    Type 2 diabetes mellitus with hyperglycemia, without long-term current use of insulin:  Continue regimen; labs and review  -     Ambulatory referral/consult to Optometry; Future  -     Microalbumin/Creatinine Ratio, Urine  -     Comprehensive Metabolic Panel; Future  -     CBC Auto Differential; Future  -     Hemoglobin A1C; Future    Mixed hyperlipidemia; continue regimen; labs acceptable in October    Aortic atherosclerosis: see CT scan 3/15:  Continue regimen; no symptoms.  Labs acceptable in October    Thyroid nodules: several see u/s 2017 FNA 2017 benign, stable 2019, also 1/21  -     Ambulatory referral/consult to Endocrinology; Future  -     TSH; Future    Hyperthyroidism  -     Ambulatory referral/consult to Endocrinology; Future  -     TSH; Future    Polyp of colon, unspecified part of colon, unspecified type; she declines colonoscopy    Lytic lesion of bone on x-ray: outside CTA 4/21 T10- however PET CT and MRI June 2021 do not show lytic lesion    Other orders  -     metoprolol succinate (TOPROL-XL) 25 MG 24 hr tablet; Take 1 tablet (25 mg total) by mouth once daily.    I will review all studies and determine further tx depending on findings

## 2022-01-21 ENCOUNTER — OFFICE VISIT (OUTPATIENT)
Dept: CARDIOLOGY | Facility: CLINIC | Age: 87
End: 2022-01-21
Payer: MEDICARE

## 2022-01-21 ENCOUNTER — TELEPHONE (OUTPATIENT)
Dept: ENDOCRINOLOGY | Facility: CLINIC | Age: 87
End: 2022-01-21
Payer: MEDICARE

## 2022-01-21 ENCOUNTER — OFFICE VISIT (OUTPATIENT)
Dept: ENDOCRINOLOGY | Facility: CLINIC | Age: 87
End: 2022-01-21
Payer: MEDICARE

## 2022-01-21 VITALS
DIASTOLIC BLOOD PRESSURE: 56 MMHG | HEIGHT: 59 IN | BODY MASS INDEX: 22.8 KG/M2 | WEIGHT: 113.13 LBS | HEART RATE: 88 BPM | SYSTOLIC BLOOD PRESSURE: 119 MMHG | OXYGEN SATURATION: 95 %

## 2022-01-21 DIAGNOSIS — R07.89 OTHER CHEST PAIN: ICD-10-CM

## 2022-01-21 DIAGNOSIS — E04.1 THYROID NODULE: ICD-10-CM

## 2022-01-21 DIAGNOSIS — E05.90 HYPERTHYROIDISM: ICD-10-CM

## 2022-01-21 DIAGNOSIS — E11.65 TYPE 2 DIABETES MELLITUS WITH HYPERGLYCEMIA, WITHOUT LONG-TERM CURRENT USE OF INSULIN: ICD-10-CM

## 2022-01-21 DIAGNOSIS — R07.9 CHEST PAIN, UNSPECIFIED TYPE: Primary | ICD-10-CM

## 2022-01-21 DIAGNOSIS — M85.80 OSTEOPENIA, UNSPECIFIED LOCATION: ICD-10-CM

## 2022-01-21 DIAGNOSIS — I50.22 CHRONIC SYSTOLIC CONGESTIVE HEART FAILURE: Primary | ICD-10-CM

## 2022-01-21 DIAGNOSIS — E78.2 MIXED HYPERLIPIDEMIA: ICD-10-CM

## 2022-01-21 DIAGNOSIS — R07.9 CHEST PAIN, UNSPECIFIED TYPE: ICD-10-CM

## 2022-01-21 DIAGNOSIS — E55.9 VITAMIN D DEFICIENCY: ICD-10-CM

## 2022-01-21 PROCEDURE — 99499 RISK ADDL DX/OHS AUDIT: ICD-10-PCS | Mod: 95,,, | Performed by: NURSE PRACTITIONER

## 2022-01-21 PROCEDURE — 99214 PR OFFICE/OUTPT VISIT, EST, LEVL IV, 30-39 MIN: ICD-10-PCS | Mod: HCNC,95,, | Performed by: NURSE PRACTITIONER

## 2022-01-21 PROCEDURE — 1160F PR REVIEW ALL MEDS BY PRESCRIBER/CLIN PHARMACIST DOCUMENTED: ICD-10-PCS | Mod: HCNC,CPTII,S$GLB, | Performed by: INTERNAL MEDICINE

## 2022-01-21 PROCEDURE — 1160F PR REVIEW ALL MEDS BY PRESCRIBER/CLIN PHARMACIST DOCUMENTED: ICD-10-PCS | Mod: HCNC,CPTII,95, | Performed by: NURSE PRACTITIONER

## 2022-01-21 PROCEDURE — 1159F MED LIST DOCD IN RCRD: CPT | Mod: HCNC,CPTII,S$GLB, | Performed by: INTERNAL MEDICINE

## 2022-01-21 PROCEDURE — 1160F RVW MEDS BY RX/DR IN RCRD: CPT | Mod: HCNC,CPTII,S$GLB, | Performed by: INTERNAL MEDICINE

## 2022-01-21 PROCEDURE — 1126F PR PAIN SEVERITY QUANTIFIED, NO PAIN PRESENT: ICD-10-PCS | Mod: HCNC,CPTII,S$GLB, | Performed by: INTERNAL MEDICINE

## 2022-01-21 PROCEDURE — 3072F LOW RISK FOR RETINOPATHY: CPT | Mod: HCNC,CPTII,95, | Performed by: NURSE PRACTITIONER

## 2022-01-21 PROCEDURE — 3051F PR MOST RECENT HEMOGLOBIN A1C LEVEL 7.0 - < 8.0%: ICD-10-PCS | Mod: HCNC,CPTII,95, | Performed by: NURSE PRACTITIONER

## 2022-01-21 PROCEDURE — 1159F PR MEDICATION LIST DOCUMENTED IN MEDICAL RECORD: ICD-10-PCS | Mod: HCNC,CPTII,95, | Performed by: NURSE PRACTITIONER

## 2022-01-21 PROCEDURE — 99204 OFFICE O/P NEW MOD 45 MIN: CPT | Mod: HCNC,S$GLB,, | Performed by: INTERNAL MEDICINE

## 2022-01-21 PROCEDURE — 3072F PR LOW RISK FOR RETINOPATHY: ICD-10-PCS | Mod: HCNC,CPTII,S$GLB, | Performed by: INTERNAL MEDICINE

## 2022-01-21 PROCEDURE — 3072F PR LOW RISK FOR RETINOPATHY: ICD-10-PCS | Mod: HCNC,CPTII,95, | Performed by: NURSE PRACTITIONER

## 2022-01-21 PROCEDURE — 3072F LOW RISK FOR RETINOPATHY: CPT | Mod: HCNC,CPTII,S$GLB, | Performed by: INTERNAL MEDICINE

## 2022-01-21 PROCEDURE — 3051F HG A1C>EQUAL 7.0%<8.0%: CPT | Mod: HCNC,CPTII,95, | Performed by: NURSE PRACTITIONER

## 2022-01-21 PROCEDURE — 1126F AMNT PAIN NOTED NONE PRSNT: CPT | Mod: HCNC,CPTII,S$GLB, | Performed by: INTERNAL MEDICINE

## 2022-01-21 PROCEDURE — 1159F MED LIST DOCD IN RCRD: CPT | Mod: HCNC,CPTII,95, | Performed by: NURSE PRACTITIONER

## 2022-01-21 PROCEDURE — 1159F PR MEDICATION LIST DOCUMENTED IN MEDICAL RECORD: ICD-10-PCS | Mod: HCNC,CPTII,S$GLB, | Performed by: INTERNAL MEDICINE

## 2022-01-21 PROCEDURE — 99214 OFFICE O/P EST MOD 30 MIN: CPT | Mod: HCNC,95,, | Performed by: NURSE PRACTITIONER

## 2022-01-21 PROCEDURE — 99499 UNLISTED E&M SERVICE: CPT | Mod: 95,,, | Performed by: NURSE PRACTITIONER

## 2022-01-21 PROCEDURE — 1160F RVW MEDS BY RX/DR IN RCRD: CPT | Mod: HCNC,CPTII,95, | Performed by: NURSE PRACTITIONER

## 2022-01-21 PROCEDURE — 99999 PR PBB SHADOW E&M-EST. PATIENT-LVL V: ICD-10-PCS | Mod: PBBFAC,HCNC,, | Performed by: INTERNAL MEDICINE

## 2022-01-21 PROCEDURE — 99999 PR PBB SHADOW E&M-EST. PATIENT-LVL V: CPT | Mod: PBBFAC,HCNC,, | Performed by: INTERNAL MEDICINE

## 2022-01-21 PROCEDURE — 99204 PR OFFICE/OUTPT VISIT, NEW, LEVL IV, 45-59 MIN: ICD-10-PCS | Mod: HCNC,S$GLB,, | Performed by: INTERNAL MEDICINE

## 2022-01-21 RX ORDER — FUROSEMIDE 40 MG/1
40 TABLET ORAL DAILY
Qty: 30 TABLET | Refills: 11 | Status: ON HOLD | OUTPATIENT
Start: 2022-01-21 | End: 2022-01-01 | Stop reason: SDUPTHER

## 2022-01-21 RX ORDER — METHIMAZOLE 5 MG/1
TABLET ORAL
Qty: 45 TABLET | Refills: 0 | Status: SHIPPED | OUTPATIENT
Start: 2022-01-21 | End: 2022-01-01 | Stop reason: SDUPTHER

## 2022-01-21 RX ORDER — FUROSEMIDE 20 MG/1
20 TABLET ORAL NIGHTLY
Qty: 90 TABLET | Refills: 3 | Status: SHIPPED | OUTPATIENT
Start: 2022-01-21 | End: 2022-01-01 | Stop reason: SDUPTHER

## 2022-01-21 NOTE — PROGRESS NOTES
Interventional Cardiology Clinic Note    Subjective:   Patient ID:  Peri Johansen is a 87 y.o. female with hypertension, hyperlipidemia, GERD, lymphoma who presents for routine follow up.    HPI   Patient has been having LE swelling that improves with extra doses of lasix. She takes lasix 20mg PO BID and additional 40mg PO dose 2-3 times a week for volume overload. The most active thing she does is walk around the house, down the major way. She uses a cane when she thinks she needs to walk long distances. She is able to perform ADLs, examples include cooking and cleaning for herself. They help her in and out of the shower because they don't want her to slip. She stays with her granddaughter some days out of the week who is an RN and helps with her medication, her granddaughter is here with her and helped with her history. She is compliant with all of her medications. She has shortness of breath with exertion, no shortness of breath at rest. The shortness of breath with walking improves when she takes additional lasix doses. She has LE edema, improves with lasix. She also has orthopnea, sleeps on 4 pillows and thinks she needs 5. Denies PND. She had an echo stress in 5/2021 that was negative for ischemia. She reports having a coronary angiogram in the past but is unsure of the results. Denies issues with bleeding. Denies syncope. Denies palpitations. Denies recent illness and hospitalization. She has not had COVID to her knowledge and has been vaccinated with two doses, has not taken the booster. Blood pressure and heart rate well controlled at home.       Review of Systems   Constitutional: Negative for chills and fever.   HENT: Negative for nosebleeds.    Eyes: Negative for redness.   Cardiovascular: Negative for chest pain, claudication, dyspnea on exertion, leg swelling, near-syncope, orthopnea, palpitations, paroxysmal nocturnal dyspnea and syncope.   Respiratory: Negative for cough, shortness of breath and  sputum production.    Hematologic/Lymphatic: Negative for bleeding problem.   Musculoskeletal: Negative for joint swelling and muscle weakness.   Gastrointestinal: Negative for hematemesis, melena, nausea and vomiting.   Genitourinary: Negative for hematuria.   Neurological: Negative for dizziness and weakness.          History:       Past Medical History:   Diagnosis Date    Aortic atherosclerosis 1/7/2016    Arthritis     Colon polyp: hyperplastic 2015- repeat 2020 8/6/2015    Diabetes mellitus, type II 8/16/2012    Diverticulosis     Gastric nodule 8/7/2014    GERD (gastroesophageal reflux disease) 8/16/2012    Goiter 8/16/2012    Hyperlipidemia 8/16/2012    Hypertension     Joint pain     Leg pain 8/16/2012    Lymphoma 8/16/2012    Osteopenia 8/16/2012    Osteoporosis     Renal cyst 3/28/2013    Rickets, vitamin D deficiency 8/16/2012    Thyroid nodule 2/24/2014    Vitamin D deficiency disease 8/16/2012     Past Surgical History:   Procedure Laterality Date    CARPAL TUNNEL RELEASE      CATARACT EXTRACTION W/  INTRAOCULAR LENS IMPLANT Bilateral     HYSTERECTOMY      partial    lymphoma surgery      L tibia     Social History     Socioeconomic History    Marital status: Single   Tobacco Use    Smoking status: Never Smoker    Smokeless tobacco: Never Used   Substance and Sexual Activity    Alcohol use: No     Alcohol/week: 0.0 standard drinks    Drug use: No    Sexual activity: Not Currently   Other Topics Concern    Are you pregnant or think you may be? No    Breast-feeding No     Social Determinants of Health     Financial Resource Strain: Low Risk     Difficulty of Paying Living Expenses: Not hard at all   Food Insecurity: No Food Insecurity    Worried About Running Out of Food in the Last Year: Never true    Ran Out of Food in the Last Year: Never true   Transportation Needs: Unmet Transportation Needs    Lack of Transportation (Medical): Yes    Lack of Transportation  (Non-Medical): No   Physical Activity: Unknown    Days of Exercise per Week: 0 days   Stress: No Stress Concern Present    Feeling of Stress : Not at all   Social Connections: Unknown    Frequency of Communication with Friends and Family: More than three times a week    Frequency of Social Gatherings with Friends and Family: Patient refused    Active Member of Clubs or Organizations: Yes    Attends Club or Organization Meetings: Never    Marital Status:    Housing Stability: Low Risk     Unable to Pay for Housing in the Last Year: No    Number of Places Lived in the Last Year: 1    Unstable Housing in the Last Year: No     Family History   Problem Relation Age of Onset    Hypertension Mother     Hypertension Father     Heart disease Father     Breast cancer Maternal Aunt     No Known Problems Maternal Uncle     No Known Problems Paternal Aunt     No Known Problems Paternal Uncle     No Known Problems Maternal Grandmother     No Known Problems Maternal Grandfather     No Known Problems Paternal Grandmother     No Known Problems Paternal Grandfather     Cancer Brother         colon    No Known Problems Daughter     No Known Problems Son     No Known Problems Son     No Known Problems Daughter     No Known Problems Daughter     Diabetes Neg Hx     Glaucoma Neg Hx     Amblyopia Neg Hx     Blindness Neg Hx     Cataracts Neg Hx     Macular degeneration Neg Hx     Retinal detachment Neg Hx     Strabismus Neg Hx     Stroke Neg Hx     Thyroid disease Neg Hx     Ovarian cancer Neg Hx     Liver disease Neg Hx     Liver cancer Neg Hx     Cirrhosis Neg Hx     Colon polyps Neg Hx     Colon cancer Neg Hx     Melanoma Neg Hx       Review of patient's allergies indicates:   Allergen Reactions    Latex, natural rubber Itching     has a current medication list which includes the following prescription(s): ammonium lactate, aspirin, atorvastatin, true metrix glucose test strip, blood  sugar diagnostic, calcium, dextran 70-hypromellose, diclofenac sodium, ergocalciferol, fluticasone propionate, gabapentin, lancets, methimazole, metoprolol succinate, micro thin lancets, omeprazole, potassium chloride, tramadol, blood-glucose meter, furosemide, and furosemide.           Meds:     Review of patient's allergies indicates:   Allergen Reactions    Latex, natural rubber Itching       Current Outpatient Medications:     ammonium lactate 12 % Crea, Apply 1 application topically once daily., Disp: 280 g, Rfl: 3    aspirin 81 MG Chew, Take 1 tablet (81 mg total) by mouth once daily., Disp: , Rfl:     atorvastatin (LIPITOR) 40 MG tablet, TAKE 1 TABLET BY MOUTH EVERY DAY, Disp: 90 tablet, Rfl: 0    blood sugar diagnostic (TRUE METRIX GLUCOSE TEST STRIP) Strp, USE AS DIRECTED, Disp: 150 strip, Rfl: 12    blood sugar diagnostic Strp, Test 2 times daily, Disp: 200 strip, Rfl: 12    calcium 500 mg Tab, Take 1 tablet by mouth Twice daily., Disp: , Rfl:     dextran 70-hypromellose (ARTIFICIAL TEARS,[id number],) ophthalmic solution, Place 1 drop into both eyes 4 (four) times daily as needed., Disp: 1 Bottle, Rfl: 5    diclofenac sodium (VOLTAREN) 1 % Gel, APPLY 2 GRAMS EXTERNALLY TO THE AFFECTED AREA FOUR TIMES DAILY, Disp: 100 g, Rfl: 1    ergocalciferol (ERGOCALCIFEROL) 50,000 unit Cap, Take 1 capsule (50,000 Units total) by mouth every 7 days., Disp: 12 capsule, Rfl: 3    fluticasone propionate (FLONASE) 50 mcg/actuation nasal spray, 1 spray (50 mcg total) by Each Nostril route once daily., Disp: 16 g, Rfl: 1    gabapentin (NEURONTIN) 300 MG capsule, Take 1 capsule (300 mg total) by mouth 2 (two) times daily., Disp: 180 capsule, Rfl: 1    lancets Misc, Test 2 x daily, Disp: 200 each, Rfl: 12    methIMAzole (TAPAZOLE) 5 MG Tab, TAKE 1/2 TABLET BY MOUTH DAILY, Disp: 45 tablet, Rfl: 0    metoprolol succinate (TOPROL-XL) 25 MG 24 hr tablet, Take 1 tablet (25 mg total) by mouth once daily., Disp: 90  "tablet, Rfl: 3    MICRO THIN LANCETS 33 gauge Misc, USE AS DIRECTED, Disp: , Rfl: 0    omeprazole (PRILOSEC) 40 MG capsule, Take 1 capsule (40 mg total) by mouth every morning., Disp: 90 capsule, Rfl: 0    potassium chloride (KLOR-CON) 10 MEQ TbSR, Take 1 tablet (10 mEq total) by mouth once daily., Disp: 30 tablet, Rfl: 11    traMADoL (ULTRAM) 50 mg tablet, Take 1 tablet (50 mg total) by mouth every 8 (eight) hours as needed for Pain., Disp: 90 tablet, Rfl: 0    blood-glucose meter kit, Use as instructed.  ACCUCHECK or whatever is preferred by insurance, Disp: 1 each, Rfl: 0    furosemide (LASIX) 20 MG tablet, Take 1 tablet (20 mg total) by mouth every evening., Disp: 90 tablet, Rfl: 3    furosemide (LASIX) 40 MG tablet, Take 1 tablet (40 mg total) by mouth once daily., Disp: 30 tablet, Rfl: 11    Objective:   BP (!) 119/56 (BP Location: Left arm, Patient Position: Sitting, BP Method: Large (Automatic))   Pulse 88   Ht 4' 11" (1.499 m)   Wt 51.3 kg (113 lb 1.5 oz)   SpO2 95%   BMI 22.84 kg/m²   Physical Exam  Constitutional:       General: She is not in acute distress.     Appearance: Normal appearance. She is not ill-appearing.   HENT:      Head: Normocephalic and atraumatic.   Eyes:      Extraocular Movements: Extraocular movements intact.      Conjunctiva/sclera: Conjunctivae normal.   Cardiovascular:      Rate and Rhythm: Normal rate and regular rhythm.      Pulses:           Radial pulses are 2+ on the right side and 1+ on the left side.      Heart sounds: No murmur heard.      Pulmonary:      Effort: Pulmonary effort is normal.      Breath sounds: Normal breath sounds. No wheezing, rhonchi or rales.   Abdominal:      General: Bowel sounds are normal. There is no distension.      Tenderness: There is no abdominal tenderness.   Musculoskeletal:         General: No swelling.      Cervical back: Normal range of motion.      Right lower le+ Edema present.      Left lower le+ Edema present. "   Skin:     General: Skin is warm and dry.   Neurological:      Mental Status: She is oriented to person, place, and time.   Psychiatric:         Mood and Affect: Mood normal.         Behavior: Behavior normal.         Labs:     Lab Results   Component Value Date     (L) 01/20/2022    K 4.5 01/20/2022     01/20/2022    CO2 20 (L) 01/20/2022    BUN 10 01/20/2022    CREATININE 0.6 01/20/2022    ANIONGAP 7 (L) 01/20/2022     Lab Results   Component Value Date    HGBA1C 7.4 (H) 01/20/2022     Lab Results   Component Value Date     (H) 09/24/2021     (H) 04/08/2021     (H) 06/12/2019       Lab Results   Component Value Date    WBC 4.72 01/20/2022    HGB 13.1 01/20/2022    HCT 42.2 01/20/2022     01/20/2022    GRAN 2.6 01/20/2022    GRAN 55.1 01/20/2022     Lab Results   Component Value Date    CHOL 134 10/21/2021    HDL 55 10/21/2021    LDLCALC 61.6 (L) 10/21/2021    TRIG 87 10/21/2021       Lab Results   Component Value Date     (L) 01/20/2022    K 4.5 01/20/2022     01/20/2022    CO2 20 (L) 01/20/2022    BUN 10 01/20/2022    CREATININE 0.6 01/20/2022    ANIONGAP 7 (L) 01/20/2022     Lab Results   Component Value Date    HGBA1C 7.4 (H) 01/20/2022     Lab Results   Component Value Date     (H) 09/24/2021     (H) 04/08/2021     (H) 06/12/2019    Lab Results   Component Value Date    WBC 4.72 01/20/2022    HGB 13.1 01/20/2022    HCT 42.2 01/20/2022     01/20/2022    GRAN 2.6 01/20/2022    GRAN 55.1 01/20/2022     Lab Results   Component Value Date    CHOL 134 10/21/2021    HDL 55 10/21/2021    LDLCALC 61.6 (L) 10/21/2021    TRIG 87 10/21/2021                Cardiovascular Imaging:     Stress echo 5/2021  Summary:  1. Dobutamine stress echocardiogram is negative for ischemia.  2. Baseline echocardiogram with good left ventricular systolic function without wall motion abnormalities.        Echo 6/2019  · Mildly decreased left ventricular systolic  function. The estimated ejection fraction is 45-50%  · Grade I (mild) left ventricular diastolic dysfunction consistent with impaired relaxation. Normal left atrial pressure.  · Concentric left ventricular remodeling.  · Mild mitral regurgitation.  · Normal right ventricular systolic function.  · The estimated PA systolic pressure is 33 mm Hg  · Normal central venous pressure (3 mm Hg).     ]    Assessment:       1. Chronic systolic congestive heart failure    2. Hyperthyroidism    3. Mixed hyperlipidemia    4. Chest pain, unspecified type    5. Other chest pain             Plan:     Chronic congestive heart failure  NYHA Class III symptoms, patient complains of dyspnea on exertion that improves with additional lasix dosing, LE edema, and PND.   Discussed increasing dose with patient and granddaughter, plan to increase lasix from 20mg BID to 40mg in the morning and 20mg in the evening. On potassium supplements.  Continue beta blocker   Plan to refer patient to general cardiology clinic visits    Hyperthyroidism  TSH is WNL  On methimazole    Mixed hyperlipidemia  Lipids well controlled, last lipid panel from 10/2021 reviewed   Can continue statin    Other chest pain  Patient complaining of chest tightness with minimal exertion at home, will order pharm NM stress test to evaluate for ischemia  Unknown if patient has history of CAD       Signed:  Dennys Garcia M.D.  Interventional Cardiology Fellow PGY-7  [facility]   01/21/2022

## 2022-01-21 NOTE — ASSESSMENT & PLAN NOTE
-- I have reviewed management options including observation, FNA or surgery.  -- Discussed indications for repeat FNA as well as surgical indications (abnormal FNA, compressive symptoms or interval change).  -- FNA: 12/28/17  1. Thyroid, right, fine-needle aspiration:  The [place] System for Reporting Thyroid Cytopathology category: II, Benign.  Scantly cellular specimen with abundant colloid, macrophages, and few benign follicular epithelial cells consistent  with benign colloid nodule.  2. Thyroid, left, fine-needle aspiration:  The [place] System for Reporting Thyroid Cytopathology category: II, Benign.  Scantly cellular specimen with abundant colloid, macrophages, and few benign follicular epithelial cells consistent with benign colloid nodule.  -- Patient prefers proceeding with observation and at this time prefers to not have a biopsy. Repeat thyroid US 1/2023.

## 2022-01-21 NOTE — ASSESSMENT & PLAN NOTE
-- Graves disease diagnosed about 2017.  -- Cause-specific treatment options and associated risks discussed with patient.  -- Reviewed possible options of methimazole, I131 therapy and surgery.  -- Thionamide monitoring: I have reviewed the risk of agranulocytosis that can occur in 1 of 500 patients who are taking either PTU or methimazole. I have also reviewed the even rarer risk of hepatic dysfunction. A baseline CBC with differential and Liver function tests are available. The instruction sheet was given to the patient that describes these risks, warning symptoms, and instructions to stop the medication, contact the covering endocrinology fellow, and check blood tests.   -- TFTs within acceptable range. Repeat in 6 months.  -- CONTINUE:  Methimazole started 2018  MMI 2.5mg daily

## 2022-01-21 NOTE — ASSESSMENT & PLAN NOTE
Patient complaining of chest tightness with minimal exertion at home, will order pharm NM stress test to evaluate for ischemia  Unknown if patient has history of CAD

## 2022-01-21 NOTE — ASSESSMENT & PLAN NOTE
F/U bridge plan. Pt had a procedure with Dr. Serrano on 8/31 (SOLOMON injection). Per pt everything went well and bridge plan followed. Pt also recently has an EGD/Colonoscopy with Dr. Rose on 8/16 and bridged with Lovenox at that time. Today INR is 2.0, in range and up from last INR of 1.1 (pre-procedure), in one week with 42.5mg in the last seven days with one hold day. TWD on file is 37.5mg/wk. Last dose of Lovenox was 9/6/21. Denies changes in meds/diet since last INR. Pt did report a 'scratch' on right upper extremity that he did have a hard time stopping the bleeding yesterday. Area currently intact and no s/s of bleeding or infections. Pt instructed to discontinue Lovenox due to therapeutic result and resume prior very stable dose of 37.5mg/wk. Recheck INR in two weeks to verify remains in range.  Lipids well controlled, last lipid panel from 10/2021 reviewed   Can continue statin

## 2022-01-21 NOTE — PROGRESS NOTES
Subjective:      Patient ID: Peri Johansen is a 87 y.o. female.    Chief Complaint:  No chief complaint on file.    History of Present Illness  Peri Johansen is here for follow up of T2DM, thyroid nodule, hyperthyroidism, Vit D def, and osteopenia.  Previously seen by me on 1/2021.  This is a MyChart video visit.    The patient location is: [place]  The chief complaint leading to consultation is: DM and hyperthyroidism  Visit type: Virtual visit with synchronous audio and video  Total time spent with patient: see below  Each patient to whom he or she provides medical services by telemedicine is:  (1) informed of the relationship between the physician and patient and the respective role of any other health care provider with respect to management of the patient; and (2) notified that he or she may decline to receive medical services by telemedicine and may withdraw from such care at any time.    Patients granddaughter is present for visit.    With regards to diabetes:    Diagnosed: ~2000s  Known complications:  DKA -  RN -  PN -  Nephropathy -    Current regimen:  None    Other medications tried:  MFM  Glimepiride     Glucose Monitor:   2 times a day testing  Log reviewed: yes oral recall  100-150    Diet/Exercise:  Eats 2 meals a day.  Snacks : rarely- crackers  Drinks : water   Exercise : None.     Hypoglycemia:  Denies any since stopping SFU  Knows how to correct with 15 grams of carbs- juice, coke, or a peppermint.      Education - last visit: None  Eye Exam: 1/2021  Podiatry: 8/27/18    Denies history of pancreatitis & personal/family history of medullary thyroid cancer.     Diabetes Management Status  Statin: Taking  ACE/ARB: Not taking  Screening or Prevention Patient's value Goal Complete/Controlled?   HgA1C Testing and Control   Lab Results   Component Value Date    HGBA1C 7.4 (H) 01/20/2022      Annually/Less than 8% Yes   Lipid profile : 10/21/2021 Annually Yes   LDL control Lab Results   Component Value  Date    LDLCALC 61.6 (L) 10/21/2021    Annually/Less than 100 mg/dl  Yes   Nephropathy screening Lab Results   Component Value Date    LABMICR <5.0 01/20/2022     Lab Results   Component Value Date    PROTEINUA Negative 07/05/2019    Annually Yes   Blood pressure BP Readings from Last 1 Encounters:   01/20/22 120/70    Less than 140/90 Yes   Dilated retinal exam : 01/13/2021 Annually Yes   Foot exam   : 02/11/2021 Annually Yes     With regards to thyroid nodule:    Thyroid US 1/4/2021  The thyroid is normal in size .  The right lobe measures 4.3 cm in length and 1.3 x 2.2 cm in transverse dimensions.  The left lobe measures 4.0 cm in length and 1.9 x 1.2 cm in transverse dimension.  The thyroid gland is heterogeneous echotexture.  Nodule in the right midpole region measuring 0.1 x 1.7 x 1.0 cm, stable.  Smaller 2nd nodule right midpole region, stable.  Left lower pole nodule measuring 1.7 x 1.5 x 1.1 cm, stable.  The color doppler flow is unremarkable. No abnormal lymph nodes identified.  Impression:  Normal sized heterogeneous thyroid containing small nodules, not significantly changed from the 05/20/2019 exam.  No new/ concerning nodule identified    FNA: 12/28/17  1. Thyroid, right, fine-needle aspiration:  [facility] [place] [facility] Thyroid Cytopathology category: II, Benign.  Scantly cellular specimen with abundant colloid, macrophages, and few benign follicular epithelial cells consistent  with benign colloid nodule.  2. Thyroid, left, fine-needle aspiration:  [facility] [place] [facility] Thyroid Cytopathology category: II, Benign.  Scantly cellular specimen with abundant colloid, macrophages, and few benign follicular epithelial cells consistent with benign colloid nodule.    Signs or Symptoms:   Difficulty breathing: Denies  Difficulty swallowing: Denies  Voice Changes: Denies  FH of thyroid cancer: Denies  Personal history of radiation treatment or exposure: Denies    With regards to  hyperthyroidism:    NM Thyroid Uptake and Scan: 10/26/17  Findings: Patient was administered 5.2 mCi of technetium 99m labeled pertechnetate intravenously and 0.249 mCi of I-123 orally. There are cold nodules. There could be a hyperfunctioning nodule on the right. Uptake is 20.1%. Recommend treatment dose is 32.9 mCi of iodine-131 orally.  Ultrasound correlation recommended for nodules.      Lab Results   Component Value Date    TSH 1.139 01/20/2022    H0IWNTG 7.7 09/21/2010    FREET4 0.94 10/21/2021     Current symptoms:   - Palpations  + Weight loss - current weight: 116lb  - Diarrhea  - Hair loss  - Brittle nails  - Skin changes  - Tremor   - Anxiety    Current medication:  Methimazole started 2018  MMI 2.5mg daily     With regards to Vitamin D Deficiency:    Vit D, 25-Hydroxy   Date Value Ref Range Status   12/29/2020 28 (L) 30 - 96 ng/mL Final     Comment:     Vitamin D deficiency.........<10 ng/mL                              Vitamin D insufficiency......10-29 ng/mL       Vitamin D sufficiency........> or equal to 30 ng/mL  Vitamin D toxicity............>100 ng/mL       Current Meds: OTC Vit D3 400iu daily     With regards to osteopenia:    BMD 1/4/2021  The L1 to L4 vertebral bone mineral density is equal to 1.498 g/cm squared with a T score of 2.5.  The left femoral neck bone mineral density is equal to 0.755 g/cm squared with a T score of -2.0.  The right femoral neck bone mineral density is equal to 0.740 g/cm squared with a T score of -2.1.  There is a 7.5% risk of a major osteoporotic fracture and a 2.5% risk of hip fracture in the next 10 years (FRAX).  Impression:  Normal bone density of the lumbar spine (L1-L4), and osteopenia of the femoral necks.    Denies recent falls or fractures.     Review of SystemsAs above    There were no vitals taken for this visit.  There is no height or weight on file to calculate BMI.    Lab Review:   Lab Results   Component Value Date    HGBA1C 7.4 (H) 01/20/2022     Lab  Results   Component Value Date    CHOL 134 10/21/2021    HDL 55 10/21/2021    LDLCALC 61.6 (L) 10/21/2021    TRIG 87 10/21/2021    CHOLHDL 41.0 10/21/2021     Lab Results   Component Value Date     (L) 01/20/2022    K 4.5 01/20/2022     01/20/2022    CO2 20 (L) 01/20/2022     (H) 01/20/2022    BUN 10 01/20/2022    CREATININE 0.6 01/20/2022    CALCIUM 10.1 01/20/2022    PROT 7.2 01/20/2022    ALBUMIN 2.9 (L) 01/20/2022    BILITOT 1.7 (H) 01/20/2022    ALKPHOS 91 01/20/2022    AST 31 01/20/2022    ALT 23 01/20/2022    ANIONGAP 7 (L) 01/20/2022    ESTGFRAFRICA >60.0 01/20/2022    EGFRNONAA >60.0 01/20/2022    TSH 1.139 01/20/2022     Vit D, 25-Hydroxy   Date Value Ref Range Status   12/29/2020 28 (L) 30 - 96 ng/mL Final     Comment:     Vitamin D deficiency.........<10 ng/mL                              Vitamin D insufficiency......10-29 ng/mL       Vitamin D sufficiency........> or equal to 30 ng/mL  Vitamin D toxicity............>100 ng/mL       Assessment and Plan     1. Type 2 diabetes mellitus with hyperglycemia, without long-term current use of insulin     2. Thyroid nodules: several see u/s 2017 FNA 2017 benign, stable 2019, also 1/21  Ambulatory referral/consult to Endocrinology    TSH   3. Hyperthyroidism  Ambulatory referral/consult to Endocrinology    TSH   4. Vitamin D deficiency     5. Osteopenia, unspecified location       Type 2 diabetes mellitus with hyperglycemia, without long-term current use of insulin  -- A1c goal 7.5-8%. Allow for higher A1c and glucose given age and comorbidities.  -- Medications discussed:  MFM   GLP1-DPP4   SPAIN   Insulin   -- Reviewed logs/CGM:  A1c stable since stopped SFU, given risks vs benefits I advise not restarting.   Instructed to send glucose logs in 14 days.  Reach out to me sooner for any glucose <70 or consistently >200.  -- If need to start a medication will start DPP4 given low risk of hypoglycemia. We discussed this medication.  -- Medication  Changes:   NONE  -- Reviewed goals of therapy are to get the best control we can without hypoglycemia.  -- Reviewed patient's current insulin regimen. Clarified proper insulin dose and timing in relation to meals, etc. Insulin injection sites and proper rotation instructed.    -- Advised frequent self blood glucose monitoring.  Patient encouraged to document glucose results and bring them to every clinic visit.  -- Hypoglycemia precautions discussed. Instructed on precautions before driving.    -- Call for Bg repeatedly < 90 or > 180.   -- Close adherence to lifestyle changes recommended.   -- Periodic follow ups for eye evaluations, foot care and dental care suggested.    Thyroid nodules: several see u/s 2017 FNA 2017 benign, stable 2019, also 1/21  -- I have reviewed management options including observation, FNA or surgery.  -- Discussed indications for repeat FNA as well as surgical indications (abnormal FNA, compressive symptoms or interval change).  -- FNA: 12/28/17  1. Thyroid, right, fine-needle aspiration:  [facility] [place] [facility] Reporting Thyroid Cytopathology category: II, Benign.  Scantly cellular specimen with abundant colloid, macrophages, and few benign follicular epithelial cells consistent  with benign colloid nodule.  2. Thyroid, left, fine-needle aspiration:  [facility] [place] [facility] Reporting Thyroid Cytopathology category: II, Benign.  Scantly cellular specimen with abundant colloid, macrophages, and few benign follicular epithelial cells consistent with benign colloid nodule.  -- Patient prefers proceeding with observation and at this time prefers to not have a biopsy. Repeat thyroid US 1/2023.    Hyperthyroidism  -- Graves disease diagnosed about 2017.  -- Cause-specific treatment options and associated risks discussed with patient.  -- Reviewed possible options of methimazole, I131 therapy and surgery.  -- Thionamide monitoring: I have reviewed the risk of agranulocytosis that can occur in 1  of 500 patients who are taking either PTU or methimazole. I have also reviewed the even rarer risk of hepatic dysfunction. A baseline CBC with differential and Liver function tests are available. The instruction sheet was given to the patient that describes these risks, warning symptoms, and instructions to stop the medication, contact the covering endocrinology fellow, and check blood tests.   -- TFTs within acceptable range. Repeat in 6 months.  -- CONTINUE:  Methimazole started 2018  MMI 2.5mg daily       Vitamin D deficiency  -- Continue vitamin D supplement.     Osteopenia  -- Reassuring she is not fracturing.   -- BMD due 1/2023.    No follow-ups on file.    I spent 25 minutes face-to-face with the patient, over half of the visit was spent on counseling and/or coordinating the care of the patient.    Counseling includes:  Diagnostic results, impressions, recommendations   Prognosis   Risk and benefits of management/treatment options   Instructions for management treatment and or follow-up   Importance of compliance with management   Risk factor reduction   Patient education

## 2022-01-21 NOTE — ASSESSMENT & PLAN NOTE
NYHA Class III symptoms, patient complains of dyspnea on exertion that improves with additional lasix dosing, LE edema, and PND.   Discussed increasing dose with patient and granddaughter, plan to increase lasix from 20mg BID to 40mg in the morning and 20mg in the evening. On potassium supplements.  Continue beta blocker   Plan to refer patient to general cardiology clinic visits

## 2022-01-26 ENCOUNTER — TELEPHONE (OUTPATIENT)
Dept: INTERNAL MEDICINE | Facility: CLINIC | Age: 87
End: 2022-01-26
Payer: MEDICARE

## 2022-01-26 DIAGNOSIS — E11.65 TYPE 2 DIABETES MELLITUS WITH HYPERGLYCEMIA, WITHOUT LONG-TERM CURRENT USE OF INSULIN: Primary | ICD-10-CM

## 2022-01-26 NOTE — TELEPHONE ENCOUNTER
Spoke to patient and advised of lab results and recommendation. Patient verbalized understanding.      Appt scheduled for fu labs

## 2022-01-26 NOTE — TELEPHONE ENCOUNTER
Please let her know that her labs are stable other than her salt level (sodium) was slightly low.  This may be related to some of the medications that she is taking.    Sugar is elevated and she needs to work on a diabetic eating plan.  I would like to recheck her labs in 3 months and we can determine if at that point she needs to be on medication.  Keep other appointments.  Orders in, please schedule labs and let me know, thank you

## 2022-03-08 NOTE — FIRST PROVIDER EVALUATION
Emergency Department TeleTriage Encounter Note      CHIEF COMPLAINT    Chief Complaint   Patient presents with    Shortness of Breath     Hx chf       VITAL SIGNS   Initial Vitals [03/08/22 1513]   BP Pulse Resp Temp SpO2   129/62 84 18 98.7 °F (37.1 °C) 98 %      MAP       --            ALLERGIES    Review of patient's allergies indicates:   Allergen Reactions    Latex, natural rubber Itching       PROVIDER TRIAGE NOTE  This is a teletriage evaluation of a 87 y.o. female presenting to the ED complaining of SOB and CP starting yesterday.  Reports bilateral feet swelling. Denies fever. PMhx of CHF.     Initial orders will be placed and care will be transferred to an alternate provider when patient is roomed for a full evaluation. Any additional orders and the final disposition will be determined by that provider.         ORDERS  Labs Reviewed   CBC W/ AUTO DIFFERENTIAL   COMPREHENSIVE METABOLIC PANEL   TROPONIN I   B-TYPE NATRIURETIC PEPTIDE   SARS-COV-2 RDRP GENE       ED Orders (720h ago, onward)    Start Ordered     Status Ordering Provider    03/08/22 1700 03/08/22 1616  Strict intake and output Monitor and record urine output (in I's & O's section) every hour after initial furosemide injection given in ED  Every hour        Comments: Monitor and record urine output (in I's & O's section) every hour after initial furosemide injection given in ED    Ordered NELSON AHUMADA N.    03/08/22 1617 03/08/22 1616  Confirm Patient is not Eligible for Congestive Heart Failure Pathway  Until discontinued         Ordered NELSON AHUMADA N.    03/08/22 1617 03/08/22 1616  Vital signs  Every 30 min         Ordered NELSON AHUMADA N.    03/08/22 1617 03/08/22 1616  Cardiac Monitoring - Adult  Continuous        Comments: Notify Physician If:    Ordered NELSON AHUMADA N.    03/08/22 1617 03/08/22 1616  Pulse Oximetry Continuous  Continuous         Ordered NELSON AHUMADA N.    03/08/22 1617  03/08/22 1616  Insert peripheral IV  Once         Ordered NELSON AHUMADA N.    03/08/22 1617 03/08/22 1616  CBC auto differential  STAT         Ordered BRANDYN, NELSON N.    03/08/22 1617 03/08/22 1616  Comprehensive metabolic panel  STAT         Ordered BRANDYN, NELSON N.    03/08/22 1617 03/08/22 1616  Troponin I  STAT         Ordered BRANDYN NELSON N.    03/08/22 1617 03/08/22 1616  Brain natriuretic peptide  STAT         Ordered BRANDYN, NELSON N.    03/08/22 1617 03/08/22 1616  EKG 12-lead  Once         Ordered BRANDYN NELSON N.    03/08/22 1617 03/08/22 1616  X-Ray Chest AP Portable  1 time imaging         Ordered BRANDYN NELSON N.    03/08/22 1617 03/08/22 1616  POCT COVID-19 Rapid Screening  Once         Ordered BRANDYN NELSON N.    03/08/22 1516 03/08/22 1516  EKG 12-lead  Once         Completed by JARETH HARPER on 3/8/2022 at  3:30 PM [place]            Virtual Visit Note: The provider triage portion of this emergency department evaluation and documentation was performed via [employer], a HIPAA-compliant telemedicine application, in concert with a tele-presenter in the room. A face to face patient evaluation with one of my colleagues will occur once the patient is placed in an emergency department room.      DISCLAIMER: This note was prepared with [employer] voice recognition transcription software. Garbled syntax, mangled pronouns, and other bizarre constructions may be attributed to that software system.

## 2022-03-08 NOTE — ED NOTES
"Patient identifiers verified and correct for Peri Johansen   C/C: Pt states "I feel short winded"  APPEARANCE: awake and alert in no acute distress.  SKIN: warm, dry and intact. No breakdown or bruising.  MUSCULOSKELETAL: Patient moving all extremities spontaneously, no obvious swelling or deformities noted. Ambulates independently.  RESPIRATORY: Confirms shortness of breath. Respirations unlabored.   CARDIAC: Confirms CP, 2+ distal pulses; no peripheral edema  ABDOMEN: soft, non-tender, and non-distended, Denies N/V  : voids spontaneously, denies difficulty  Neurologic: AAO x 4; follows commands equal strength in all extremities; denies numbness/tingling. Denies dizziness  "

## 2022-03-08 NOTE — ED PROVIDER NOTES
Encounter Date: 3/8/2022       History     Chief Complaint   Patient presents with    Shortness of Breath     Hx chf     Peri Johansen is a 87 y.o. female who  has a past medical history of Aortic atherosclerosis (1/7/2016), Arthritis, Colon polyp: hyperplastic 2015- repeat 2020 (8/6/2015), Diabetes mellitus, type II (8/16/2012), Diverticulosis, Gastric nodule (8/7/2014), GERD (gastroesophageal reflux disease) (8/16/2012), Goiter (8/16/2012), Hyperlipidemia (8/16/2012), Hypertension, Joint pain, Leg pain (8/16/2012), Lymphoma (8/16/2012), Osteopenia (8/16/2012), Osteoporosis, Renal cyst (3/28/2013), Rickets, vitamin D deficiency (8/16/2012), Thyroid nodule (2/24/2014), and Vitamin D deficiency disease (8/16/2012).    The patient presents to the ED due to shortness of breath.   Patient reports symptoms have been ongoing for many months. She has history of CHF and reports compliance with all of her medications. She describes some pain to the bottom of her chest as well, that has been intermittent for several weeks. She also endorses decreased appetite. She denies any palpitations, syncope, N/V/D, radiation of pain, or any other concerns.   She is a somewhat poor historian, and additional history obtained via chart review.           Review of patient's allergies indicates:   Allergen Reactions    Latex, natural rubber Itching     Past Medical History:   Diagnosis Date    Aortic atherosclerosis 1/7/2016    Arthritis     Colon polyp: hyperplastic 2015- repeat 2020 8/6/2015    Diabetes mellitus, type II 8/16/2012    Diverticulosis     Gastric nodule 8/7/2014    GERD (gastroesophageal reflux disease) 8/16/2012    Goiter 8/16/2012    Hyperlipidemia 8/16/2012    Hypertension     Joint pain     Leg pain 8/16/2012    Lymphoma 8/16/2012    Osteopenia 8/16/2012    Osteoporosis     Renal cyst 3/28/2013    Rickets, vitamin D deficiency 8/16/2012    Thyroid nodule 2/24/2014    Vitamin D deficiency disease  8/16/2012     Past Surgical History:   Procedure Laterality Date    CARPAL TUNNEL RELEASE      CATARACT EXTRACTION W/  INTRAOCULAR LENS IMPLANT Bilateral     HYSTERECTOMY      partial    lymphoma surgery      L tibia     Family History   Problem Relation Age of Onset    Hypertension Mother     Hypertension Father     Heart disease Father     Breast cancer Maternal Aunt     No Known Problems Maternal Uncle     No Known Problems Paternal Aunt     No Known Problems Paternal Uncle     No Known Problems Maternal Grandmother     No Known Problems Maternal Grandfather     No Known Problems Paternal Grandmother     No Known Problems Paternal Grandfather     Cancer Brother         colon    No Known Problems Daughter     No Known Problems Son     No Known Problems Son     No Known Problems Daughter     No Known Problems Daughter     Diabetes Neg Hx     Glaucoma Neg Hx     Amblyopia Neg Hx     Blindness Neg Hx     Cataracts Neg Hx     Macular degeneration Neg Hx     Retinal detachment Neg Hx     Strabismus Neg Hx     Stroke Neg Hx     Thyroid disease Neg Hx     Ovarian cancer Neg Hx     Liver disease Neg Hx     Liver cancer Neg Hx     Cirrhosis Neg Hx     Colon polyps Neg Hx     Colon cancer Neg Hx     Melanoma Neg Hx      Social History     Tobacco Use    Smoking status: Never Smoker    Smokeless tobacco: Never Used   Substance Use Topics    Alcohol use: No     Alcohol/week: 0.0 standard drinks    Drug use: No     Review of Systems   Constitutional: Negative for chills and fever.   HENT: Negative for sore throat.    Respiratory: Positive for shortness of breath. Negative for cough.    Cardiovascular: Positive for chest pain. Negative for palpitations and leg swelling.   Gastrointestinal: Positive for abdominal pain. Negative for nausea and vomiting.   Genitourinary: Negative for dysuria, frequency and urgency.   Musculoskeletal: Negative for back pain.   Skin: Negative for rash and  wound.   Neurological: Negative for syncope and weakness.   Hematological: Does not bruise/bleed easily.   Psychiatric/Behavioral: Negative for agitation, behavioral problems and confusion.       Physical Exam     Initial Vitals [03/08/22 1513]   BP Pulse Resp Temp SpO2   129/62 84 18 98.7 °F (37.1 °C) 98 %      MAP       --         Physical Exam    Nursing note and vitals reviewed.  Constitutional: She appears well-developed and well-nourished. She is not diaphoretic. No distress.   Elderly, calm, well-appearing, in no distress.   HENT:   Head: Normocephalic and atraumatic.   Mouth/Throat: Oropharynx is clear and moist.   Eyes: EOM are normal. Pupils are equal, round, and reactive to light.   Neck: No tracheal deviation present.   Cardiovascular: Normal rate, regular rhythm, normal heart sounds and intact distal pulses.   Pulmonary/Chest: Breath sounds normal. No stridor. No respiratory distress. She has no wheezes.   Abdominal: Abdomen is soft. Bowel sounds are normal. She exhibits no distension and no mass. There is no abdominal tenderness.   Musculoskeletal:         General: No edema. Normal range of motion.     Neurological: She is alert and oriented to person, place, and time. She has normal strength. No cranial nerve deficit or sensory deficit.   Skin: Skin is warm and dry. Capillary refill takes less than 2 seconds. No pallor.   Psychiatric: She has a normal mood and affect. Her behavior is normal. Thought content normal.         ED Course   Procedures  Labs Reviewed   CBC W/ AUTO DIFFERENTIAL - Abnormal; Notable for the following components:       Result Value    RDW 14.6 (*)     All other components within normal limits   COMPREHENSIVE METABOLIC PANEL - Abnormal; Notable for the following components:    Glucose 194 (*)     Albumin 3.2 (*)     Total Bilirubin 1.5 (*)     All other components within normal limits   TROPONIN I - Abnormal; Notable for the following components:    Troponin I 0.117 (*)     All  other components within normal limits   B-TYPE NATRIURETIC PEPTIDE - Abnormal; Notable for the following components:     (*)     All other components within normal limits   TROPONIN I - Abnormal; Notable for the following components:    Troponin I 0.085 (*)     All other components within normal limits   SARS-COV-2 RDRP GENE     EKG Readings: (Independently Interpreted)   Initial Reading: No STEMI. Previous EKG: Compared with most recent EKG Rhythm: Normal Sinus Rhythm.   Low-voltage QRS, normal sinus rhythm, rate 78, ST flattening inferior and lateral leads, no ST elevations or other signs of ischemia, otherwise normal intervals.  Compared with prior from February 2022, no significant change.   Other EKG Interpretations:     Repeat EKG:  Sinus rhythm, rate 76, occasional PACs, no ST changes or evidence of ischemia, normal intervals.  Stable from prior.       Imaging Results          X-Ray Chest AP Portable (Final result)  Result time 03/08/22 17:24:37    Final result by Vinh Hernandez MD (03/08/22 17:24:37)                 Impression:      Minimal cardiomegaly.      Electronically signed by: Vinh Hernandez  Date:    03/08/2022  Time:    17:24             Narrative:    EXAMINATION:  XR CHEST AP PORTABLE    CLINICAL HISTORY:  CHF;    TECHNIQUE:  Single frontal view of the chest was performed.    COMPARISON:  06/12/2019    FINDINGS:  Cardiac silhouette is minimally enlarged.    Lungs are clear.  No effusion or pneumothorax.  No mass or consolidation.  No acute osseous abnormality.  Minimal nonspecific perihilar and bibasilar interstitial change.    Interval improvement from the prior study.                                 Medications   furosemide injection 40 mg (40 mg Intravenous Given 3/8/22 1848)   aspirin tablet 325 mg (325 mg Oral Given 3/8/22 1848)   aluminum-magnesium hydroxide-simethicone 200-200-20 mg/5 mL suspension 5 mL (5 mLs Oral Given 3/8/22 1957)   LIDOcaine HCl 2% oral solution 5 mL (5 mLs Oral  Given 3/8/22 1956)   dicyclomine capsule 20 mg (20 mg Oral Given 3/8/22 1957)     Medical Decision Making:   History:   Old Medical Records: I decided to obtain old medical records.  Old Records Summarized: records from clinic visits, other records and records from another hospital.       <> Summary of Records: Patient has history of CHF, followed by cardiology.  Last evaluated 01/2022.  She has been having leg swelling that improves with additional doses of Lasix.  Plan to increased to 40 milligrams in the morning and 20 milligrams at night, also on potassium supplementation.  PET stress in 02/2022 negative for ischemia.   Initial Assessment:   86 yo F with CHF on Lasix presents with SOB, leg swelling.  Vitals and exam benign.   Will obtain cardiac eval, labs, CXR, reassess.  Differential Diagnosis:   Differential Diagnosis includes, but is not limited to:  PE, MI/ACS, pneumothorax, pericardial effusion/tamonade, pneumonia, lung abscess, pericarditis/myocarditis, pleural effusion, lung mass, CHF exacerbation, asthma exacerbation, COPD exacerbation, aspirated/ingested foreign body, airway obstruction, CO poisoning, anemia, metabolic derangement, allergy/atopy, influenza, viral URI, viral syndrome.    Clinical Tests:   Lab Tests: Ordered and Reviewed  Radiological Study: Reviewed and Ordered  Medical Tests: Ordered and Reviewed  ED Management:  EKG without ischemia or arrhythmia.   CXR with cardiomegaly, no evidence of pulmonary edema.      On re-evaluation, the patient's status has improved.  After complete ED evaluation, clinical impression is most consistent with CHF.  PCP/Cardiology follow-up within 2-3 days was recommended.    After taking into careful account the patient's history, physical exam findings, as well as empirical and objective data obtained throughout ED workup, I feel no emergent medical condition has been identified. No further evaluation or admission was felt to be required, and the patient is  stable for discharge from the ED. The patient and any additional family present were updated with test results, overall clinical impression, and recommended further plan of care, including discharge instructions as provided and outpatient follow-up for continued evaluation and management as needed. All questions were answered. The patient expressed understanding and agreed with current plan for discharge and follow-up plan of care. Strict ED return precautions were provided, including return/worsening of current symptoms, new symptoms, or any other concerns.               ED Course as of 03/08/22 2116 Tue Mar 08, 2022   1848 Labs concerning for elevated BNP and elevated troponin.  Vitals have remained stable.  Discussed with Cardiology, who reviewed history. Cardiology feels presentation not consistent with ACS. Patient had recent negative PET stress, and EKG does not show acute ischemia.  Troponin chronically elevated in the past. Recommend repeat troponin.  Will treat with GI cocktail in interim. [SS]   2048 Repeat troponin trending down.  Patient resting comfortably on reassessment. [SS]   2055 Discussed results with patient's granddaughter, who was reassured.  She will come pick the patient up on discharge. [SS]      ED Course User Index  [SS] Anastacio Foster MD             Clinical Impression:   Final diagnoses:  [R06.02] SOB (shortness of breath)  [R06.02] Shortness of breath  [I50.9] Acute on chronic congestive heart failure, unspecified heart failure type (Primary)          ED Disposition Condition    Discharge Stable          ED Prescriptions     None        Follow-up Information     Follow up With Specialties Details Why Contact Info    Annika Garland MD Internal Medicine Schedule an appointment as soon as possible for a visit   [address]  [place] [address]  [phone]      Brett Vail MD Interventional Cardiology, Cardiology Schedule an appointment as soon as possible for a visit    [address]  [facility] [address]  [phone]                  Anastacio Foster MD  03/08/22 [id number]

## 2022-03-10 NOTE — ED NOTES
LOC: The patient is awake, alert, and oriented to self, place, time, and situation. Pt is calm and cooperative. Affect is appropriate.  Speech is appropriate and clear.     APPEARANCE: Patient resting comfortably in no acute distress.  Patient is clean and well groomed.    SKIN: The skin is warm and dry; color consistent with ethnicity.  Patient has normal skin turgor and moist mucus membranes.  Skin intact; no breakdown or bruising noted.     MUSCULOSKELETAL: Patient moving upper and lower extremities without difficulty; denies pain in the extremities or back.  Denies weakness.     Extremities: Upper and lower extremities are atraumatic in appearance without tenderness or deformity. No swelling or erythema. Full range of motion is noted to all joints. Muscle strength is 5/5 bilaterally. Tendon function is normal. Capillary refill is less than 3 seconds in all extremities. Pulses palpable. Steady gait noted.    Spine: Neck and back are without deformity, external skin changes, or signs of trauma. Bony features of the shoulders and hips are of equal height bilaterally. Posture is upright, gait is smooth, steady, and within normal limits.     RESPIRATORY: Airway is open and patent. Respirations spontaneous, even, easy, and non-labored.  Patient has a normal effort and rate.  No accessory muscle use noted. Denies cough. The chest wall is symmetric and without deformity. No signs of trauma. Chest wall is non-tender. No signs of respiratory distress. Lung sounds are clear in all lobes bilaterally without rales, ronchi, or wheezes.     CARDIAC:  Normal rhythm and rate noted.  No peripheral edema noted. No complaints of chest pain.     ABDOMEN: Soft, symmetric, and non-tender without distention. There are no visible lesions or scars. The aorta is midline without bruit or visible pulsation. Umbilicus is midline without herniation. Bowel sounds are present and normoactive in all four quadrants. No masses, hepatomegaly, or  splenomegaly are noted. Pt denies abdominal pain; denies nausea, vomiting, diarrhea, or constipation.    NEUROLOGICAL: Eyes open spontaneously.  Behavior appropriate to situation.  Follows commands; facial expression symmetrical.  Purposeful motor response noted; normal sensation in all extremities. Pt denies headache; denies lightheadedness or dizziness; denies visual disturbances; denies loss of balance; denies unilateral weakness.    Psychiatric: Appropriate mood and affect. Good judgement and insight. No visual or auditory hallucinations. No suicidal or homicidal ideation    Pt complained of dizziness with ambulation. Pt currently denies sob.

## 2022-04-06 NOTE — TELEPHONE ENCOUNTER
No new care gaps identified.  Powered by [employer]. Reference number: [id number].   4/06/2022 9:45:56 AM CDT

## 2022-04-06 NOTE — TELEPHONE ENCOUNTER
Refill Routing Note   Medication(s) are not appropriate for processing by [facility] for the following reason(s):      - Patient has been seen in the Emergency Room/Department since the last PCP visit    [facility] action(s):  Defer          Medication reconciliation completed: No     Appointments  past 12m or future 3m with PCP    Date Provider   Last Visit   1/20/2022 Annika Garland MD   Next Visit   Visit date not found Annika Garland MD   ED visits in past 90 days: 1        Note composed:2:14 PM 04/06/2022

## 2022-04-19 NOTE — ED PROVIDER NOTES
Encounter date: 9:55 AM 04/19/2022    Source of History   Patient    Chief Complaint   Pt presents with:   Shortness of Breath (Hx chf)      History Of Present Illness   Peri Johansen is a 87 y.o. female with Thyroid nodule, type 2 diabetes, GERD, hyperlipidemia, diverticulosis, hypertension who presents to the ED with chief complaint of shortness of breath which began the day prior to arrival.  Patient states that she was in normal status of health but last night the night prior to arrival she started having shortness of breath and states that she is unable to ambulate her normal distance.  She also notes abdominal pain and states that she had 1 bout of nonbloody nonbilious emesis.  Her last bowel movement was today.  She denies any fever but states that she has had some mild chills.  She also complains of a headache but denies any thunderclap onset.  No medications given prior to arrival.  She denies chest pain.      This is the extent to the patients complaints today here in the emergency department.    Review Of Systems   As per HPI and below:  Positive for:  + shortness of breath,+ headache  Constitutional: No fever.   HEENT: No voice change.   Eyes:  No visual disturbances. No eye pain.   Respiratory:  + shortness of breath. No wheezing. No cough.   Cardio:  No chest pain. No leg swelling. No palpitations.   GI:  + abdominal pain. + nausea + vomiting. No diarrhea.   :  No blood in urine. No difficulty urinating.   MSK:  No back pain. No neck pain.   Skin: No rash.   Neurologic: + headache. No dizziness.       Review of patient's allergies indicates:   Allergen Reactions    Latex, natural rubber Itching       No current facility-administered medications on file prior to encounter.     Current Outpatient Medications on File Prior to Encounter   Medication Sig Dispense Refill    ammonium lactate 12 % Crea Apply 1 application topically once daily. 280 g 3    aspirin 81 MG Chew Take 1 tablet (81 mg total) by  mouth once daily.      atorvastatin (LIPITOR) 40 MG tablet TAKE 1 TABLET BY MOUTH EVERY DAY 90 tablet 0    blood sugar diagnostic (TRUE METRIX GLUCOSE TEST STRIP) Strp USE AS DIRECTED 150 strip 12    blood sugar diagnostic Strp Test 2 times daily 200 strip 12    blood-glucose meter kit Use as instructed.  ACCUCHECK or whatever is preferred by insurance 1 each 0    calcium 500 mg Tab Take 1 tablet by mouth Twice daily.      dextran 70-hypromellose (ARTIFICIAL TEARS,[id number],) ophthalmic solution Place 1 drop into both eyes 4 (four) times daily as needed. 1 Bottle 5    diclofenac sodium (VOLTAREN) 1 % Gel APPLY 2 GRAMS EXTERNALLY TO THE AFFECTED AREA FOUR TIMES DAILY 100 g 1    ergocalciferol (ERGOCALCIFEROL) 50,000 unit Cap Take 1 capsule (50,000 Units total) by mouth every 7 days. 12 capsule 3    fluticasone propionate (FLONASE) 50 mcg/actuation nasal spray 1 spray (50 mcg total) by Each Nostril route once daily. 16 g 1    furosemide (LASIX) 20 MG tablet Take 1 tablet (20 mg total) by mouth every evening. 90 tablet 3    furosemide (LASIX) 40 MG tablet Take 1 tablet (40 mg total) by mouth once daily. 30 tablet 11    gabapentin (NEURONTIN) 300 MG capsule Take 1 capsule (300 mg total) by mouth 2 (two) times daily. 180 capsule 1    lancets Misc Test 2 x daily 200 each 12    methIMAzole (TAPAZOLE) 5 MG Tab TAKE 1/2 TABLET BY MOUTH DAILY 45 tablet 0    metoprolol succinate (TOPROL-XL) 25 MG 24 hr tablet TAKE 1 TABLET(25 MG) BY MOUTH EVERY DAY 30 tablet 1    MICRO THIN LANCETS 33 gauge Misc USE AS DIRECTED  0    omeprazole (PRILOSEC) 40 MG capsule Take 1 capsule (40 mg total) by mouth every morning. 90 capsule 0    potassium chloride (KLOR-CON) 10 MEQ TbSR TAKE 1 TABLET(10 MEQ) BY MOUTH EVERY DAY 30 tablet 11    traMADoL (ULTRAM) 50 mg tablet Take 1 tablet (50 mg total) by mouth every 8 (eight) hours as needed for Pain. 90 tablet 0       Past History   As per HPI and below:  Past Medical History:    Diagnosis Date    Aortic atherosclerosis 1/7/2016    Arthritis     Colon polyp: hyperplastic 2015- repeat 2020 8/6/2015    Diabetes mellitus, type II 8/16/2012    Diverticulosis     Gastric nodule 8/7/2014    GERD (gastroesophageal reflux disease) 8/16/2012    Goiter 8/16/2012    Hyperlipidemia 8/16/2012    Hypertension     Joint pain     Leg pain 8/16/2012    Lymphoma 8/16/2012    Osteopenia 8/16/2012    Osteoporosis     Renal cyst 3/28/2013    Rickets, vitamin D deficiency 8/16/2012    Thyroid nodule 2/24/2014    Vitamin D deficiency disease 8/16/2012     Past Surgical History:   Procedure Laterality Date    CARPAL TUNNEL RELEASE      CATARACT EXTRACTION W/  INTRAOCULAR LENS IMPLANT Bilateral     HYSTERECTOMY      partial    lymphoma surgery      L tibia       Social History     Socioeconomic History    Marital status: Single   Tobacco Use    Smoking status: Never Smoker    Smokeless tobacco: Never Used   Substance and Sexual Activity    Alcohol use: No     Alcohol/week: 0.0 standard drinks    Drug use: No    Sexual activity: Not Currently   Other Topics Concern    Are you pregnant or think you may be? No    Breast-feeding No     Social Determinants of Health     Financial Resource Strain: Low Risk     Difficulty of Paying Living Expenses: Not hard at all   Food Insecurity: No Food Insecurity    Worried About Running Out of Food in the Last Year: Never true    Ran Out of Food in the Last Year: Never true   Transportation Needs: Unmet Transportation Needs    Lack of Transportation (Medical): Yes    Lack of Transportation (Non-Medical): No   Physical Activity: Unknown    Days of Exercise per Week: 0 days   Stress: No Stress Concern Present    Feeling of Stress : Not at all   Social Connections: Unknown    Frequency of Communication with Friends and Family: More than three times a week    Frequency of Social Gatherings with Friends and Family: Patient refused    Active  Member of Clubs or Organizations: Yes    Attends Club or Organization Meetings: Never    Marital Status:    Housing Stability: Low Risk     Unable to Pay for Housing in the Last Year: No    Number of Places Lived in the Last Year: 1    Unstable Housing in the Last Year: No       Family History   Problem Relation Age of Onset    Hypertension Mother     Hypertension Father     Heart disease Father     Breast cancer Maternal Aunt     No Known Problems Maternal Uncle     No Known Problems Paternal Aunt     No Known Problems Paternal Uncle     No Known Problems Maternal Grandmother     No Known Problems Maternal Grandfather     No Known Problems Paternal Grandmother     No Known Problems Paternal Grandfather     Cancer Brother         colon    No Known Problems Daughter     No Known Problems Son     No Known Problems Son     No Known Problems Daughter     No Known Problems Daughter     Diabetes Neg Hx     Glaucoma Neg Hx     Amblyopia Neg Hx     Blindness Neg Hx     Cataracts Neg Hx     Macular degeneration Neg Hx     Retinal detachment Neg Hx     Strabismus Neg Hx     Stroke Neg Hx     Thyroid disease Neg Hx     Ovarian cancer Neg Hx     Liver disease Neg Hx     Liver cancer Neg Hx     Cirrhosis Neg Hx     Colon polyps Neg Hx     Colon cancer Neg Hx     Melanoma Neg Hx        Physical Exam     Vitals:    04/19/22 1115 04/19/22 1205 04/19/22 1230 04/19/22 1400   BP: (!) 105/55 (!) 101/54 (!) 106/53 (!) 117/59   Pulse: 69 72 71 72   Resp: 18  20 20   Temp:       TempSrc:       SpO2: 100% 100% 100% 100%   Weight:       Height:         Physical Exam:   Nursing note and vitals reviewed.  Appearance:  Well-appearing, nontoxic elderly female in no acute respiratory distress.  Making purposeful movements.  Speaking in full sentences.  Skin: No rashes seen.  Good turgor.  No abrasions.  No ecchymoses.  Eyes: No conjunctival injection. EOMI, PERRL.  Head: NC/AT  ENT: Oropharynx clear.     Chest: CTAB. No increased work of breathing, bilateral chest rise.  No JVD.  No lower extremity edema.  Cardiovascular: Regular rate and rhythm.  Normal equal bilateral radial pulses.  Abdomen: Soft.  Diffusely tender. No guarding.  No rebound. No Masses  Musculoskeletal: Good range of motion all joints.  No deformities.  Neck supple, full range of motion, no obvious deformity.  No tenderness to palpation of cervical through lumbar spine.  Neurologic: Moves all extremities.  Normal sensation.  No facial droop.  Normal speech.    Mental Status:  Alert and oriented x 3.  Appropriate, conversant.      Results and Medications    Procedures    Labs Reviewed   CBC W/ AUTO DIFFERENTIAL - Abnormal; Notable for the following components:       Result Value    RDW 14.9 (*)     All other components within normal limits   COMPREHENSIVE METABOLIC PANEL - Abnormal; Notable for the following components:    Glucose 123 (*)     Albumin 3.1 (*)     Total Bilirubin 2.5 (*)     AST 57 (*)     All other components within normal limits   TROPONIN I - Abnormal; Notable for the following components:    Troponin I 0.058 (*)     All other components within normal limits   B-TYPE NATRIURETIC PEPTIDE - Abnormal; Notable for the following components:     (*)     All other components within normal limits   D DIMER, QUANTITATIVE - Abnormal; Notable for the following components:    D-Dimer 3.01 (*)     All other components within normal limits   ISTAT PROCEDURE - Abnormal; Notable for the following components:    POC Glucose 127 (*)     All other components within normal limits   SARS-COV-2 RDRP GENE    Narrative:     This test utilizes isothermal nucleic acid amplification   technology to detect the SARS-CoV-2 RdRp nucleic acid segment.   The analytical sensitivity (limit of detection) is 125 genome   equivalents/mL.   A POSITIVE result implies infection with the SARS-CoV-2 virus;   the patient is presumed to be contagious.     A  NEGATIVE result means that SARS-CoV-2 nucleic acids are not   present above the limit of detection. A NEGATIVE result should be   treated as presumptive. It does not rule out the possibility of   COVID-19 and should not be the sole basis for treatment decisions.   If COVID-19 is strongly suspected based on clinical and exposure   history, re-testing using an alternate molecular assay should be   considered.   This test is only for use under the Food and Drug   Administration s Emergency Use Authorization (EUA).   Commercial kits are provided by [employer].   Performance characteristics of the EUA have been independently   verified by [facility] Department of   Pathology and Laboratory Medicine.   _________________________________________________________________   The authorized Fact Sheet for Healthcare Providers and the authorized Fact   Sheet for Patients of the ID NOW COVID-19 are available on the FDA   website:     https://www.fda.gov/media/457052/download  https://www.fda.gov/media/138424/download          POCT INFLUENZA A/B MOLECULAR   ISTAT CHEM8       Imaging Results          CT Abdomen Pelvis With Contrast (Final result)  Result time 04/19/22 12:30:29    Final result by Yuri Alves MD (04/19/22 12:30:29)                 Impression:      1. No pulmonary thromboembolism.  2. No acute findings in the abdomen or pelvis.  3. Mildly enlarged subcarinal lymph node, similar to PET-CT 06/17/2021 and not hypermetabolic on that exam.  4. Trace right pleural fluid.  5. 1.5 cm left renal hypodensity with higher than fluid attenuation not definitively identified on prior ultrasound 07/09/2019.  Retroperitoneal ultrasound could characterized as cystic versus solid lesion.  6. Few small pulmonary micronodules measuring up to 3 mm and stable from PET-CT 06/17/2021.  7. Diverticulosis coli without evidence of acute diverticulitis.  8. Stable lucent lesion in the left lateral aspect of T10.  9. Severe  degenerative change of the bilateral hips.    Electronically signed by resident: Juventino Pacheco  Date:    04/19/2022  Time:    11:48    Electronically signed by: Yuri Alves MD  Date:    04/19/2022  Time:    12:30             Narrative:    EXAMINATION:  CT ABDOMEN PELVIS WITH CONTRAST; CTA CHEST NON CORONARY    CLINICAL HISTORY:  Abdominal pain, acute, nonlocalized;; Pulmonary embolism (PE) suspected, positive D-dimer;    TECHNIQUE:  Low dose axial images, sagittal and coronal reformations were obtained from the thoracic inlet to the lung bases following the IV administration of 100 mL of Omnipaque 350.  Contrast timing was optimized to evaluate the pulmonary arteries.    Following this, axial images of the abdomen and pelvis were acquired.  Coronal and sagittal reconstructions were also obtained    COMPARISON:  FDG PET-CT 06/17/2021    Retroperitoneal ultrasound 07/09/2019    FINDINGS:  CTA CHEST:    Base of Neck: No significant abnormality.    Thoracic soft tissues: No significant abnormality.    Aorta: Left-sided aortic arch normal in caliber with mild scattered calcific atherosclerosis.    Heart: Heart is not enlarged. No pericardial effusion. Multi-vessel coronary artery calcific atherosclerosis.    Pulmonary vasculature: The pulmonary arteries distribute normally without evidence of filling defect to indicate pulmonary thromboembolism.    Gisela/Mediastinum: Enlarged subcarinal lymph node measuring 1.5 cm in short axis (axial series 2, image 217), similar to PET-CT 06/30/2010.  No additional mediastinal or hilar lymphadenopathy although assessment somewhat limited by artifact from contrast bolus.    Airways: Trachea is midline and proximal airways are patent without significant abnormality.    Lungs: Trace right pleural fluid with mild adjacent dependent atelectasis.  No left pleural fluid.  No large consolidation.  No pneumothorax.  Few small pulmonary micronodules measuring up to 3 mm (axial series 2, image  139) and stable from PET-CT 06/17/2021.    Esophagus: Normal in course and caliber.    Bones: Degenerative changes of the visualized osseous structures without acute fracture or lytic or sclerotic lesions.    CT ABDOMEN/PELVIS:    Liver: Normal in size and contour.  No focal hepatic lesion.    Gallbladder: No calcified gallstones.    Bile Ducts: No evidence of dilated ducts.    Pancreas: No mass or peripancreatic fat stranding.    Spleen: Unremarkable.    Stomach and duodenum: Unremarkable.    Adrenals: Unremarkable.    Kidneys/ Ureters: Normal in size and location. Normal enhancement. No hydronephrosis or nephrolithiasis. No ureteral dilatation.  1.5 cm left renal hypodensity with higher than fluid attenuation (axial series 3, image 36).  Bilateral renal simple cysts.    Bladder: No evidence of wall thickening.    Reproductive organs: Uterus surgically absent.    Bowel/Mesentery: Small bowel is normal in caliber with no evidence of obstruction. No evidence of inflammation or wall thickening.  Colon demonstrates no focal wall thickening.  Extensive diverticulosis coli without evidence of acute diverticulitis.    Peritoneum: No intraperitoneal free air or fluid    Lymph nodes: No retroperitoneal lymphadenopathy.    Vasculature: No aneurysm. Scattered calcific atherosclerosis including at the origins of the mesenteric vessels.  Celiac, SMA, bilateral renal arteries, and SOFIYA are patent.    Abdominal wall:  Unremarkable.    Bones: Bones are demineralized.  Severe degenerative change of the bilateral hips with joint space narrowing and subchondral cystic change.  Multilevel degenerative change of the spine.  Stable lucent lesion in the left lateral aspect of T10, non FDG avid on PET-CT 06/17/2021 and possible hemangioma.  No acute fracture. No suspicious osseous lesions.                               CTA Chest Non-Coronary - PE Study (Final result)  Result time 04/19/22 12:30:29    Final result by Yuri Alves MD  (04/19/22 12:30:29)                 Impression:      1. No pulmonary thromboembolism.  2. No acute findings in the abdomen or pelvis.  3. Mildly enlarged subcarinal lymph node, similar to PET-CT 06/17/2021 and not hypermetabolic on that exam.  4. Trace right pleural fluid.  5. 1.5 cm left renal hypodensity with higher than fluid attenuation not definitively identified on prior ultrasound 07/09/2019.  Retroperitoneal ultrasound could characterized as cystic versus solid lesion.  6. Few small pulmonary micronodules measuring up to 3 mm and stable from PET-CT 06/17/2021.  7. Diverticulosis coli without evidence of acute diverticulitis.  8. Stable lucent lesion in the left lateral aspect of T10.  9. 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    Electronically signed by resident: Juventino Pacheco  Date:    04/19/2022  Time:    11:48    Electronically signed by: Yuri Alves MD  Date:    04/19/2022  Time:    12:30             Narrative:    EXAMINATION:  CT ABDOMEN PELVIS WITH CONTRAST; CTA CHEST NON CORONARY    CLINICAL HISTORY:  Abdominal pain, acute, nonlocalized;; Pulmonary embolism (PE) suspected, positive D-dimer;    TECHNIQUE:  Low dose axial images, sagittal and coronal reformations were obtained from the thoracic inlet to the lung bases following the IV administration of 100 mL of Omnipaque 350.  Contrast timing was optimized to evaluate the pulmonary arteries.    Following this, axial images of the abdomen and pelvis were acquired.  Coronal and sagittal reconstructions were also obtained    COMPARISON:  FDG PET-CT 06/17/2021    Retroperitoneal ultrasound 07/09/2019    FINDINGS:  CTA CHEST:    Base of Neck: No significant abnormality.    Thoracic soft tissues: No significant abnormality.    Aorta: Left-sided aortic arch normal in caliber with mild scattered calcific atherosclerosis.    Heart: Heart is not enlarged. No pericardial effusion. Multi-vessel coronary artery calcific atherosclerosis.    Pulmonary  vasculature: The pulmonary arteries distribute normally without evidence of filling defect to indicate pulmonary thromboembolism.    Gisela/Mediastinum: Enlarged subcarinal lymph node measuring 1.5 cm in short axis (axial series 2, image 217), similar to PET-CT 06/30/2010.  No additional mediastinal or hilar lymphadenopathy although assessment somewhat limited by artifact from contrast bolus.    Airways: Trachea is midline and proximal airways are patent without significant abnormality.    Lungs: Trace right pleural fluid with mild adjacent dependent atelectasis.  No left pleural fluid.  No large consolidation.  No pneumothorax.  Few small pulmonary micronodules measuring up to 3 mm (axial series 2, image 139) and stable from PET-CT 06/17/2021.    Esophagus: Normal in course and caliber.    Bones: Degenerative changes of the visualized osseous structures without acute fracture or lytic or sclerotic lesions.    CT ABDOMEN/PELVIS:    Liver: Normal in size and contour.  No focal hepatic lesion.    Gallbladder: No calcified gallstones.    Bile Ducts: No evidence of dilated ducts.    Pancreas: No mass or peripancreatic fat stranding.    Spleen: Unremarkable.    Stomach and duodenum: Unremarkable.    Adrenals: Unremarkable.    Kidneys/ Ureters: Normal in size and location. Normal enhancement. No hydronephrosis or nephrolithiasis. No ureteral dilatation.  1.5 cm left renal hypodensity with higher than fluid attenuation (axial series 3, image 36).  Bilateral renal simple cysts.    Bladder: No evidence of wall thickening.    Reproductive organs: Uterus surgically absent.    Bowel/Mesentery: Small bowel is normal in caliber with no evidence of obstruction. No evidence of inflammation or wall thickening.  Colon demonstrates no focal wall thickening.  Extensive diverticulosis coli without evidence of acute diverticulitis.    Peritoneum: No intraperitoneal free air or fluid    Lymph nodes: No retroperitoneal  lymphadenopathy.    Vasculature: No aneurysm. Scattered calcific atherosclerosis including at the origins of the mesenteric vessels.  Celiac, SMA, bilateral renal arteries, and SOFIYA are patent.    Abdominal wall:  Unremarkable.    Bones: Bones are demineralized.  Severe degenerative change of the bilateral hips with joint space narrowing and subchondral cystic change.  Multilevel degenerative change of the spine.  Stable lucent lesion in the left lateral aspect of T10, non FDG avid on PET-CT 06/17/2021 and possible hemangioma.  No acute fracture. No suspicious osseous lesions.                               CT Head Without Contrast (Final result)  Result time 04/19/22 11:52:00    Final result by Jarvis Coronel MD (04/19/22 11:52:00)                 Impression:      No acute intracranial process.      Electronically signed by: Jarvis Coronel  Date:    04/19/2022  Time:    11:52             Narrative:    EXAMINATION:  CT HEAD WITHOUT CONTRAST    CLINICAL HISTORY:  Headache, new or worsening (Age >= 50y);    TECHNIQUE:  Low dose axial images were obtained through the head.  Coronal and sagittal reformations were also performed. Contrast was not administered.    COMPARISON:  10/09/2018    FINDINGS:  There is no evidence of hydrocephalus mass effect intracranial hemorrhage or acute territorial infarct.  The parenchyma maintains normal attenuation.  Developmentally there is a cavum septum pellucidum et vergae again noted.    Atherosclerotic vascular calcifications are prominent at the skull base.    The visualized sinuses and mastoid air cells are clear.                               X-Ray Chest AP Portable (Final result)  Result time 04/19/22 10:51:13    Final result by Dwight Rowley MD (04/19/22 10:51:13)                 Impression:      Mildly prominent interstitial markings, possibly on the basis of vascular congestion/edema in the setting of reported CHF.      Electronically signed by: Dwight  Halley  Date:    04/19/2022  Time:    10:51             Narrative:    EXAMINATION:  XR CHEST AP PORTABLE    CLINICAL HISTORY:  CHF;    TECHNIQUE:  Single frontal view of the chest was performed.    COMPARISON:  Chest radiograph 03/08/2022    FINDINGS:  Unchanged cardiomediastinal contours.  Prominent interstitial markings are noted.  No definite pneumothorax or large volume pleural effusion.    No definite acute findings in the visualized abdomen.  Osseous and soft tissue structures without acute change.                                    Medications   aluminum-magnesium hydroxide-simethicone 200-200-20 mg/5 mL suspension 30 mL (has no administration in time range)     And   LIDOcaine HCl 2% oral solution 10 mL (has no administration in time range)   acetaminophen tablet 1,000 mg (1,000 mg Oral Given 4/19/22 1040)   iohexoL (OMNIPAQUE 350) injection 100 mL (100 mLs Intravenous Given 4/19/22 1138)       MDM, Impression and Plan   Previous Records:  I decided to obtain old medical records which showed:   Patient was recently seen in the ED on 03/08/2020 to for presenting with shortness of breath.  At which time her chest x-ray showed no signs of cardiomegaly or pulmonary edema her EKG was without ischemia or arrhythmia and she was discharged home to follow-up with cardiology.  Initial Assessment:   Urgent evaluation of 87 y.o. female with history as above who presents the ED with chief complaint of shortness of breath, abdominal pain and headache x1 day.  On arrival to the ED she is noted to be afebrile, hemodynamically stable and in no acute respiratory distress.  She is satting 100% on room air.  Differential Diagnosis:   -pulmonary embolus  -intra-abdominal pathology  -ICH  -pneumonia  -anemia  -CHF  -ACS  -COVID  -flu  Independently Interpreted Test(s):   EKG:  I independently reviewed and interpreted the EKG and my findings are as follows:   Please see ED course.  EKG shows normal sinus rhythm with ventricular  rate of 74 beats per minute.  Narrow QRS.  No ST segment elevations or depressions.  No STEMI.  Similar to previous EKG.   IMAGING:  I have ordered and independently interpreted X-rays and my findings are as follow:  Please see ED course.  Chest x-ray with no focal pneumonia, pneumothorax or pleural effusions but noted mild pulmonary congestion.    Clinical Tests:   Lab Tests: Ordered and Reviewed  Radiological Study: Ordered and Reviewed  Medical Tests: Ordered and Reviewed    ED Management:    Vitals remained stable while patient was in the emergency department.  EKG and troponin making ACS unlikely.  I believe that her elevated troponin is due to a troponin leak in his lower than previous values obtained.  She has a mildly elevated BNP and chest x-ray with some signs of vascular congestion.  Prior to initiation of IV Lasix patient was ambulated and did not desaturate or note any shortness of breath of the decision was made to hold off on IV diuresis with her soft blood pressures.  She was noted to have a elevated D-dimer that she had a CT PE which shows no pulmonary embolus.  CT abdomen and pelvis was obtained due to abdominal pain which showed no signs of obstruction and did show diverticulosis without evidence of acute diverticulitis.  CT head with no gross intracranial bleeds or abnormalities.  Hemoglobin near baseline with no leukocytosis.  Creatinine near baseline with no gross electrolyte abnormalities.  COVID and flu negative.  Patient was p.o. challenged.  She was able to ambulate without difficulty.  I called and discussed the findings with her granddaughter and the patient.  Strict return precautions were discussed.  I instructed her to follow-up with her PCP in the next 1-2 days.  I instructed to continue taking her medications.  They voiced verbal understanding agreement the plan.  Patient deemed stable for discharge  Please see ED course for discussion of labs.              Final diagnoses:  [I50.9]  CHF (congestive heart failure)  [R06.02] Shortness of breath  [B34.9] Viral syndrome (Primary)          ED Disposition Condition    Discharge         ED Prescriptions     None        Follow-up Information     Follow up With Specialties Details Why Contact Info    Annika Garland MD Internal Medicine In 2 days  [address]  [place] [address]  [phone]      [facility] - Emergency Dept Emergency Medicine  If symptoms worsen [address] [facility] [id number] [phone]          ED Course as of 04/19/22 1523   Tue Apr 19, 2022   1013 EKG shows normal sinus rhythm with ventricular rate of 74 beats per minute.  Narrow QRS.  No ST segment elevations depressions.  No STEMI. [HM]      ED Course User Index  [HM] MD Sarah Trinidad MD   Emergency Resident      Sarah Lara MD  Resident  04/19/22 1521

## 2022-04-19 NOTE — ED NOTES
I-STAT Chem-8+ Results:   Value Reference Range   Sodium 138 136-145 mmol/L   Potassium  4.3 3.5-5.1 mmol/L   Chloride 101  mmol/L   Ionized Calcium 1.29 1.06-1.42 mmol/L   CO2 (measured) 27 23-29 mmol/L   Glucose 127  mg/dL   BUN 22 6-30 mg/dL   Creatinine 0.6 0.5-1.4 mg/dL   Hematocrit 44 36-54%

## 2022-04-19 NOTE — DISCHARGE INSTRUCTIONS
Diagnosis:   1. Viral syndrome    2. CHF (congestive heart failure)    3. Shortness of breath    4. Type 2 diabetes mellitus with hyperglycemia, without long-term current use of insulin    5. Cervical spine arthritis        Tests you had showed:   Labs Reviewed   CBC W/ AUTO DIFFERENTIAL - Abnormal; Notable for the following components:       Result Value    RDW 14.9 (*)     All other components within normal limits   COMPREHENSIVE METABOLIC PANEL - Abnormal; Notable for the following components:    Glucose 123 (*)     Albumin 3.1 (*)     Total Bilirubin 2.5 (*)     AST 57 (*)     All other components within normal limits   TROPONIN I - Abnormal; Notable for the following components:    Troponin I 0.058 (*)     All other components within normal limits   B-TYPE NATRIURETIC PEPTIDE - Abnormal; Notable for the following components:     (*)     All other components within normal limits   D DIMER, QUANTITATIVE - Abnormal; Notable for the following components:    D-Dimer 3.01 (*)     All other components within normal limits   ISTAT PROCEDURE - Abnormal; Notable for the following components:    POC Glucose 127 (*)     All other components within normal limits   SARS-COV-2 RDRP GENE    Narrative:     This test utilizes isothermal nucleic acid amplification   technology to detect the SARS-CoV-2 RdRp nucleic acid segment.   The analytical sensitivity (limit of detection) is 125 genome   equivalents/mL.   A POSITIVE result implies infection with the SARS-CoV-2 virus;   the patient is presumed to be contagious.     A NEGATIVE result means that SARS-CoV-2 nucleic acids are not   present above the limit of detection. A NEGATIVE result should be   treated as presumptive. It does not rule out the possibility of   COVID-19 and should not be the sole basis for treatment decisions.   If COVID-19 is strongly suspected based on clinical and exposure   history, re-testing using an alternate molecular assay should be    considered.   This test is only for use under the Food and Drug   Administration s Emergency Use Authorization (EUA).   Commercial kits are provided by [employer].   Performance characteristics of the EUA have been independently   verified by [facility] Department of   Pathology and Laboratory Medicine.   _________________________________________________________________   The authorized Fact Sheet for Healthcare Providers and the authorized Fact   Sheet for Patients of the ID NOW COVID-19 are available on the FDA   website:     https://www.fda.gov/media/753154/download  https://www.fda.gov/media/462258/download          POCT INFLUENZA A/B MOLECULAR   ISTAT CHEM8      CT Abdomen Pelvis With Contrast   Final Result      1. No pulmonary thromboembolism.   2. No acute findings in the abdomen or pelvis.   3. Mildly enlarged subcarinal lymph node, similar to PET-CT 06/17/2021 and not hypermetabolic on that exam.   4. Trace right pleural fluid.   5. 1.5 cm left renal hypodensity with higher than fluid attenuation not definitively identified on prior ultrasound 07/09/2019.  Retroperitoneal ultrasound could characterized as cystic versus solid lesion.   6. Few small pulmonary micronodules measuring up to 3 mm and stable from PET-CT 06/17/2021.   7. Diverticulosis coli without evidence of acute diverticulitis.   8. Stable lucent lesion in the left lateral aspect of T10.   9. 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      Electronically signed by resident: Juventino Pacheco   Date:    04/19/2022   Time:    11:48      Electronically signed by: Yuri Alves MD   Date:    04/19/2022   Time:    12:30      CTA Chest Non-Coronary - PE Study   Final Result      1. No pulmonary thromboembolism.   2. No acute findings in the abdomen or pelvis.   3. Mildly enlarged subcarinal lymph node, similar to PET-CT 06/17/2021 and not hypermetabolic on that exam.   4. Trace right pleural fluid.   5. 1.5 cm left renal  hypodensity with higher than fluid attenuation not definitively identified on prior ultrasound 07/09/2019.  Retroperitoneal ultrasound could characterized as cystic versus solid lesion.   6. Few small pulmonary micronodules measuring up to 3 mm and stable from PET-CT 06/17/2021.   7. Diverticulosis coli without evidence of acute diverticulitis.   8. Stable lucent lesion in the left lateral aspect of T10.   9. 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      Electronically signed by resident: Juventino Pacheco   Date:    04/19/2022   Time:    11:48      Electronically signed by: Yuri Alves MD   Date:    04/19/2022   Time:    12:30      CT Head Without Contrast   Final Result      No acute intracranial process.         Electronically signed by: Jarvis Coronel   Date:    04/19/2022   Time:    11:52      X-Ray Chest AP Portable   Final Result      Mildly prominent interstitial markings, possibly on the basis of vascular congestion/edema in the setting of reported CHF.         Electronically signed by: Dwight Rowley   Date:    04/19/2022   Time:    10:51          Treatments you received were:   Medications   aluminum-magnesium hydroxide-simethicone 200-200-20 mg/5 mL suspension 30 mL (has no administration in time range)     And   LIDOcaine HCl 2% oral solution 10 mL (has no administration in time range)   acetaminophen tablet 1,000 mg (1,000 mg Oral Given 4/19/22 1040)   iohexoL (OMNIPAQUE 350) injection 100 mL (100 mLs Intravenous Given 4/19/22 1138)       Home Care Instructions:  - Medications: Continue taking your home medications as prescribed    Follow-Up Plan:  - Follow-up with: Primary care doctor within 1-2  days  - Additional testing and/or evaluation will be directed by your primary doctor    Return to the Emergency Department for symptoms including but not limited to: worsening symptoms, severe back pain, shortness of breath or chest pain, vomiting with inability to hold down fluids, blood in vomit or  poop, fevers greater than 100.4°F, passing out/fainting/unconsciousness, or other concerning symptoms.     Future Appointments   Date Time Provider Department Center   4/26/2022  8:20 AM LAB, APPOINTMENT [facility] LAB IM Parth PRIDE   5/11/2022  8:30 AM Sagar Butts OD [facility] Parth Bolanos   7/21/2022  8:20 AM LAB, APPOINTMENT [facility] LAB BAILEY PRIDE

## 2022-04-29 NOTE — PROGRESS NOTES
Outpatient Care Management  Initial Patient Assessment    Patient: Peri Johansen  MRN: [id number]  Date of Service: 04/29/2022  Completed by: Samantha Butts RN  Referral Date: 04/19/2022  Program: High Risk  Status: Ongoing  Effective Dates: 4/29/2022 - present  Responsible Staff: Samantha Butts RN        Reason for Visit   Patient presents with    OPCM Chart Review    OPCM Enrollment Call    Initial Assessment    Plan Of Care       Brief Summary:  Peri Johansen was referred by Dr. Candis Lopes for CHF, Type 2 DM with hyperglycemia and cervical spine anthritis. Patient qualifies for program based on risk score of 84.5%.   Active problem list, medical, surgical and social history reviewed. Active comorbidities include DM2, GERD, HLD, HTN and CHF. Areas of need identified by patient include CHF.   Complex care plan created with patient/caregiver input. By next encounter, patient agrees to follow up phone call in one week, daily weights and record and low Na diet.     Next steps:   Follow up in one week  Follow up on daily weights and log  Follow up on low Na diet  Review low Na diet and S&S of CHF      Follow up in about 1 week (around 5/6/2022).    Assessment Documentation     OPCM Initial Assessment    Involvement of Care  Do I have permission to speak with other family members about your care?: Yes  Assessment completed by: Patient  Identified Areas of Need  Advanced Care Planning: No  Housing: no  Medication Adherence: No  *Active medication list was reviewed and reconciled with patient and/or caregiver:   Lab Adherence: no  Depression: No  Cognitive/Behavioral Health: no  Communication: no  Health Literacy: Yes  Fall risk?: No  Equipment/Supplies/Services: no          Problem List and History     Problems Addressed This Visit    Heart Failure: Identified as current problem  Diabetes: Not identified by patient as current problem  Chronic Kidney Disease: Not identified by patient as current  problem  Hyperlipidemia: Not identified by patient as current problem  Heart Disease: Not identified by patient as current problem         Reviewed medical and social history with patient and/or caregiver. A complex care plan was discussed and completed today, with input from patient and/or caregiver.    Patient Instructions     Instructions were provided via the [employer] patient resources and are available for the patient to view on the patient portal, if active.    Todays OPCM Self-Management Care Plan was developed with the patients/caregivers input and was based on identified barriers from todays assessment.  Goals were written today with the patient/caregiver and the patient has agreed to work towards these goals to improve his/her overall well-being. Patient verbalized understanding of the care plan, goals, and all of today's instructions. Encouraged patient/caregiver to communicate with his/her physician and health care team about health conditions and the treatment plan.  Provided my contact information today and encouraged patient/caregiver to call me with any questions as needed.

## 2022-05-06 NOTE — PROGRESS NOTES
Outpatient Care Management  Plan of Care Follow Up Visit    Patient: Peri Johansen  MRN: [id number]  Date of Service: 05/06/2022  Completed by: Samantha Butts RN  Referral Date: 04/19/2022  Program:     Reason for Visit   Patient presents with    OPCM Chart Review     5/6/2022    OPCM RN First Follow-Up Attempt     5/6/2022  Patient answered, call cut off.  Busy on call back and left voicemail message on cell phone.    OPCM RN Second Follow-Up Attempt     5/9/2022  No answer, CM left voicemail message.    OPCM RN Third Follow-Up Attempt     5/10/2022  No answer, CM left voicemail message and sent FU attempt letter.    Follow-up     5/13/2022    Update Plan Of Care     5/13/2022       Brief Summary: CM spoke to patient and granddaughter, Zoltan Majano, on speaker phone.  Zoltan states she is a nurse and is the patient's primary caregiver.  She states they have a working scale but the patient doesn't weigh herself everyday. The patient states last time she weighed herself she lost 2 lbs.  She doesn't have much of an appetite and she is trying to make herself eat.  The patient states her feet and ankles are swollen.  Ricquel states she doesn't put her feet up as much as she should when she is sitting.  Patient states she doesn't use salt at all.  Kalquel states she does the cooking and doesn't really use salt much when cooking.  Patient states she doesn't have much of an appetite so she doesn't eat much even though Ricquel encourages her to eat more.  She does drink Glucerna supplements to help her with her nutrition.    CM reiterated to patient the importance of weighing herself daily and logging it is to know when to contact her PCP for fluid weight gain.  CM informed patient and Nilsl that if patient gains more than 3 lbs in one day or 5 lbs in a week then she needs to call her PCP.  CM encouraged patient to put her feet up on pillows when she is sitting.  CM encouraged patient to eat more and to drink  her Glucerna supplements like her granddaughter asks her to do.  CM also reiterated what a 2000 mg sodium diet is and the importance of following fluid restriction.    Next steps:   Follow up in two week  Follow up on daily weights and log  Follow up on low Na diet and fluid restriction  Review low Na diet and S&S of CHF  Follow up if eating more.    Follow up in about 2 weeks (around 5/27/2022).    Patient Summary     Involvement of Care:  Do I have permission to speak with other family members about your care?  Yes    Patient Reported Labs & Vitals:  1.  Any Patient Reported Labs & Vitals?  No  2.  Patient Reported Blood Pressure:     3.  Patient Reported Pulse:     4.  Patient Reported Weight (Kg):     5.  Patient Reported Blood Glucose (mg/dl):       Medical and social history was reviewed with patient and/or caregiver.     Clinical Assessment     Reviewed and provided basic information on available community resources for mental health, transportation, wellness resources, and palliative care programs with patient and/or caregiver.     Complex Care Plan     Care plan was discussed and completed today with input from patient and/or caregiver.    Patient Instructions     Instructions were provided via the [employer] patient resources and are available for the patient to view on the patient portal.    Todays OPCM Self-Management Care Plan was developed with the patients/caregivers input and was based on identified barriers from todays assessment.  Goals were written today with the patient/caregiver and the patient has agreed to work towards these goals to improve his/her overall well-being. Patient verbalized understanding of the care plan, goals, and all of today's instructions. Encouraged patient/caregiver to communicate with his/her physician and health care team about health conditions and the treatment plan.  Provided my contact information today and encouraged patient/caregiver to call me with any questions  as needed.

## 2022-05-09 NOTE — TELEPHONE ENCOUNTER
Please call    She no showed labs, need to be rescheduled    She needs an appointment with Hematology Oncology, she canceled that, please reschedule    She also needs to schedule appointment in Cardiology for heart failure, this has been ordered multiple times, thank you

## 2022-05-12 NOTE — TELEPHONE ENCOUNTER
No new care gaps identified.  [facility] Embedded Care Gaps. Reference number: [id number]. 5/12/2022   9:42:56 AM YUANT

## 2022-05-26 NOTE — PROGRESS NOTES
Outpatient Care Management  Plan of Care Follow Up Visit    Patient: Peri Johansen  MRN: [id number]  Date of Service: 05/26/2022  Completed by: Samantha Butts RN  Referral Date: 04/19/2022  Program:     Reason for Visit   Patient presents with    OPCM Chart Review     5/26/2022    Follow-up     5/26/2022    Update Plan Of Care     5/26/2022  Patient requests that LULÚ calls Zoltan.  No answer from Zoltan, LULÚ left voicemail message.       Brief Summary: Patient states she has been weighing herself daily and he last weight is 107 which is 6 lbs down from last weight recorded on her chart.  She states her feet are still swollen and she tries to prop them up sometimes.  LULÚ spoke to Kalniraliny, patient's granddaughter, who states the last weight she saw was 109, still 4 lbs down from last weight recorded on her chart.  Zoltan, patient's granddaughter, states the patient tries to follow a low sodium diet.  She states every now and then she will ask for something that has too much sodium and she will let her have a little of it to satisfy her cravings.  For instance she asked for crawfish the other day and they let her have some.  She did not have any signs or symptoms of CHF afterwards.  She is still drinking Glucerna when she doesn't eat enough of a meal.      LULÚ discussed with Zoltan about making an appointment with Dr. Garland to discuss weight loss.  Zoltan requested a virtual appointment.  LULÚ made a virtual appointment for patient to see Dr. Garland virtually on June 1 at 2:30 PM.  LULÚ reiterated the importance of following the 2 gm sodium diet and the importance of continuing to drink Glucerna when she doesn't have an appetite to eat.  LULÚ made a virtual appointment for patient to discuss her weight loss with Dr. Garland.         Next steps:   Follow up in two week  Follow up on daily weights and log  Follow up on low Na diet and fluid restriction  Review low Na diet and S&S of CHF  Follow up if eating more  Follow up  on appointment with Dr. Garland about weight loss  Follow up on receipt of Glucerna coupons    Follow up in about 22 days (around 6/17/2022).    Patient Summary     Involvement of Care:  Do I have permission to speak with other family members about your care?  Yes    Patient Reported Labs & Vitals:  1.  Any Patient Reported Labs & Vitals?  Yes  2.  Patient Reported Blood Pressure:     3.  Patient Reported Pulse:     4.  Patient Reported Weight (Kg):  109  5.  Patient Reported Blood Glucose (mg/dl):       Medical and social history was reviewed with patient and/or caregiver.     Clinical Assessment     Reviewed and provided basic information on available community resources for mental health, transportation, wellness resources, and palliative care programs with patient and/or caregiver.     Complex Care Plan     Care plan was discussed and completed today with input from patient and/or caregiver.    Patient Instructions     Instructions were provided via the [employer] patient resources and are available for the patient to view on the patient portal.    Todays OPCM Self-Management Care Plan was developed with the patients/caregivers input and was based on identified barriers from todays assessment.  Goals were written today with the patient/caregiver and the patient has agreed to work towards these goals to improve his/her overall well-being. Patient verbalized understanding of the care plan, goals, and all of today's instructions. Encouraged patient/caregiver to communicate with his/her physician and health care team about health conditions and the treatment plan.  Provided my contact information today and encouraged patient/caregiver to call me with any questions as needed.

## 2022-06-15 NOTE — TELEPHONE ENCOUNTER
No new care gaps identified.  [facility] Embedded Care Gaps. Reference number: [id number]. 6/15/2022   9:45:01 AM YUANT

## 2022-06-15 NOTE — TELEPHONE ENCOUNTER
Refill Routing Note   Medication(s) are not appropriate for processing by [facility] for the following reason(s):      - Patient has been seen in the ED/Hospital since the last PCP visit    [facility] action(s):  Defer          Medication reconciliation completed: No     Appointments  past 12m or future 3m with PCP    Date Provider   Last Visit   1/20/2022 Annika Garland MD   Next Visit   Visit date not found Annika Garland MD   ED visits in past 90 days: 1        Note composed:5:15 PM 06/15/2022

## 2022-06-17 NOTE — PROGRESS NOTES
Outpatient Care Management  Plan of Care Follow Up Visit    Patient: Peri Johansen  MRN: [id number]  Date of Service: 06/17/2022  Completed by: Samantha Butts RN  Referral Date: 04/19/2022  Program:     Reason for Visit   Patient presents with    OPCM Chart Review     6/17/2022    OPCM RN First Follow-Up Attempt     6/17/2022  No answer, CM left voicemail messages on both phones.    OPCM RN Second Follow-Up Attempt     6/20/2022  No answer, CM left voicemail messages on both phones.  CM also sent follow up attempt letter through the portal.    OPCM RN Third Follow-Up Attempt     6/21/2022  No answer, CM left voicemail messages on both phones.  6/22/2022  No answer, CM left voicemail message on home phone, cell phone mailbox full.  6/23/2022  CM called Riquel per Ms Whitt's request, no answer, left voicemail message.       Brief Summary: Patient's granddaughter, Zoltan, stated that the patient told her that she doesn't weigh herself daily because her scale is broken.  Zoltan is going to buy her a new scale today.  Zoltan states she knows to contact the MD if the patient  becomes SOB or has ankle/foot swelling.  Patient's granddaughter, Zoltan, states that the patient will follow a low sodium diet at times but goes off it whenever she wants to, especially when she is around food high in sodium.  Like she went to her brothers house and they had cooked a lot of stuff that the patient shouldn't have but she ate it anyway.  She had an emergency room visit for nausea, vomiting and diarrhea with SOB.  They gave her a little bit of fluid because they felt she was dehydrated some.  She did fine and went home.  She doesn't eat that much and drinks glucerna to help with nutrients.  She is still losing weight and Zoltan will address with Dr. Garland when she sees the patient on July 5.      CM discussed with Zoltan the importance of weighing herself.  CM also discussed signs and symptoms of CHF like SOB, ankle/foot  swelling or clothes feeling tighter.  CM informed Zoltan to call MD if she has any of these signs of CHF.  CM reviewed a low sodium diet with Zoltan including using other spices like Ms Dash to flavor foods.  CM also reviewed fluid restriction with Zoltan.      Next steps:   Follow up in four week  Follow up on working scale  Follow up on daily weights and log  Follow up on low Na diet and fluid restriction  Review fluid restriction  Follow up if eating more and gaining weight  Follow up on appointment with Dr. Garland about weight loss  Follow up on receipt of Glucerna coupons    Follow up in about 5 weeks (around 7/25/2022).    Patient Summary     Involvement of Care:  Do I have permission to speak with other family members about your care?  Yes    Patient Reported Labs & Vitals:  1.  Any Patient Reported Labs & Vitals?  No  2.  Patient Reported Blood Pressure:     3.  Patient Reported Pulse:     4.  Patient Reported Weight (Kg):     5.  Patient Reported Blood Glucose (mg/dl):       Medical and social history was reviewed with patient and/or caregiver.     Clinical Assessment     Reviewed and provided basic information on available community resources for mental health, transportation, wellness resources, and palliative care programs with patient and/or caregiver.     Complex Care Plan     Care plan was discussed and completed today with input from patient and/or caregiver.    Patient Instructions     Instructions were provided via the [employer] patient resources and are available for the patient to view on the patient portal.    Todays OPCM Self-Management Care Plan was developed with the patients/caregivers input and was based on identified barriers from todays assessment.  Goals were written today with the patient/caregiver and the patient has agreed to work towards these goals to improve his/her overall well-being. Patient verbalized understanding of the care plan, goals, and all of today's instructions.  Encouraged patient/caregiver to communicate with his/her physician and health care team about health conditions and the treatment plan.  Provided my contact information today and encouraged patient/caregiver to call me with any questions as needed.

## 2022-06-17 NOTE — LETTER
June 20, 2022    Peri Johansen  [address] [facility] [place] [address]             Outpatient Case Management  [address] [facility] [address] Dear Peri,    I work with [facility] Outpatient Case Management Department. I have been unsuccessful at reaching you to follow-up to see how you have been doing. Please call me back at your earliest convenience to discuss your health care needs.      I can be reached at [phone]  from 8:00AM to 4:30 PM on Monday thru Friday. [facility] is a program offered through [facility] where a nurse is available 24/7 to answer questions or provide medical advice, their number is [phone].    Thanks,    Michell Butts RN  [facility]  Outpatient Case Management  [phone]

## 2022-06-17 NOTE — TELEPHONE ENCOUNTER
Quick DC. Request already responded to by other means (e.g. phone or fax)   Refill Authorization Note   Peri Johansen  is requesting a refill authorization.  Brief Assessment and Rationale for Refill:  Quick Discontinue  Medication Therapy Plan:  Approved 6/15/22 by PCP    Medication Reconciliation Completed:  No      Comments:     Note composed:4:56 PM 06/17/2022

## 2022-06-17 NOTE — TELEPHONE ENCOUNTER
No new care gaps identified.  [facility] Embedded Care Gaps. Reference number: [id number]. 6/17/2022   4:33:56 PM CDT

## 2022-06-22 NOTE — DISCHARGE INSTRUCTIONS
Emergency department testing is within acceptable ranges.  Her symptoms appear most fluid to gastroenteritis.  Drink adequate fluids to remain hydrated.  Use Zofran as needed for nausea.  Use Tylenol as needed for pain.  Schedule close follow-up with your primary physician.  Return to the emergency department for any new, worsening or significantly concerning symptoms.    Thank you for coming to our Emergency Department today. It is important to remember that some problems are difficult to diagnose and may not be found during your first visit. Be sure to follow up with your primary care doctor and review any labs/imaging that was performed with them. If you do not have a primary care doctor, you may contact the one listed on your discharge paperwork or you may also call the [facility] Appointment Desk at [phone] to schedule an appointment with one.     All medications may potentially have side effects and it is impossible to predict which medications may give you side effects. If you feel that you are having a negative effect of any medication you should immediately stop taking them and seek medical attention.    Return to the ER with any questions/concerns, new/concerning symptoms, worsening or failure to improve. Do not drive or make any important decisions for 24 hours if you have received any pain medications, sedatives or mood altering drugs during your ER visit.

## 2022-06-22 NOTE — ED PROVIDER NOTES
Encounter Date: 6/21/2022    SCRIBE #1 NOTE: I, Celso Riddle, am scribing for, and in the presence of,  Rosalina Childress MD. I have scribed the following portions of the note - Other sections scribed: HPI, ROS, PE.       History     Chief Complaint   Patient presents with    Vomiting    Diarrhea     Pt c/o nausea, vomiting, and diarrhea x 2 hours, states she had three episodes or both nausea and vomiting at home, no episodes reported while with EMS.       This is an 88 y. o female with a PMHx of DM type 2, HTN, HLP, and CHF, that comes to the ED via EMS complaining of vomiting and diarrhea beginning 30 minutes ago. The patient reports complaints of nausea, vomiting, diarrhea, and generalized abdominal pain beginning 30 minutes ago. Patient also complains of SOB which she endorses is worse than normal and began before onset of symptoms. EMS reports no emesis episodes en route. No medications taken PTA. No alleviating or exacerbating factors noted. Denies fever, dysuria, or hematuria. Allergic to latex.    The history is provided by the patient. No  was used.     Review of patient's allergies indicates:   Allergen Reactions    Latex, natural rubber Itching     Past Medical History:   Diagnosis Date    Aortic atherosclerosis 1/7/2016    Arthritis     Colon polyp: hyperplastic 2015- repeat 2020 8/6/2015    Diabetes mellitus, type II 8/16/2012    Diverticulosis     Gastric nodule 8/7/2014    GERD (gastroesophageal reflux disease) 8/16/2012    Goiter 8/16/2012    Hyperlipidemia 8/16/2012    Hypertension     Joint pain     Leg pain 8/16/2012    Lymphoma 8/16/2012    Osteopenia 8/16/2012    Osteoporosis     Renal cyst 3/28/2013    Rickets, vitamin D deficiency 8/16/2012    Thyroid nodule 2/24/2014    Vitamin D deficiency disease 8/16/2012     Past Surgical History:   Procedure Laterality Date    CARPAL TUNNEL RELEASE      CATARACT EXTRACTION W/  INTRAOCULAR LENS IMPLANT Bilateral      HYSTERECTOMY      partial    lymphoma surgery      L tibia     Family History   Problem Relation Age of Onset    Hypertension Mother     Hypertension Father     Heart disease Father     Breast cancer Maternal Aunt     No Known Problems Maternal Uncle     No Known Problems Paternal Aunt     No Known Problems Paternal Uncle     No Known Problems Maternal Grandmother     No Known Problems Maternal Grandfather     No Known Problems Paternal Grandmother     No Known Problems Paternal Grandfather     Cancer Brother         colon    No Known Problems Daughter     No Known Problems Son     No Known Problems Son     No Known Problems Daughter     No Known Problems Daughter     Diabetes Neg Hx     Glaucoma Neg Hx     Amblyopia Neg Hx     Blindness Neg Hx     Cataracts Neg Hx     Macular degeneration Neg Hx     Retinal detachment Neg Hx     Strabismus Neg Hx     Stroke Neg Hx     Thyroid disease Neg Hx     Ovarian cancer Neg Hx     Liver disease Neg Hx     Liver cancer Neg Hx     Cirrhosis Neg Hx     Colon polyps Neg Hx     Colon cancer Neg Hx     Melanoma Neg Hx      Social History     Tobacco Use    Smoking status: Never Smoker    Smokeless tobacco: Never Used   Substance Use Topics    Alcohol use: No     Alcohol/week: 0.0 standard drinks    Drug use: No     Review of Systems   Constitutional: Negative for fever.   HENT: Negative for sore throat.    Eyes: Negative for pain.   Respiratory: Positive for shortness of breath.    Cardiovascular: Negative for chest pain.   Gastrointestinal: Positive for abdominal pain (generalized), diarrhea, nausea and vomiting.   Genitourinary: Negative for dysuria.   Musculoskeletal: Negative for back pain.   Skin: Negative for rash.   Neurological: Negative for weakness.       Physical Exam     Initial Vitals [06/21/22 1854]   BP Pulse Resp Temp SpO2   (!) 107/58 (!) 52 18 96.5 °F (35.8 °C) 98 %      MAP       --         Physical Exam    Nursing note  and vitals reviewed.  Constitutional: She is not diaphoretic. No distress.   HENT:   Head: Normocephalic and atraumatic.   Mouth/Throat: Oropharynx is clear and moist.   Eyes: Conjunctivae and EOM are normal. No scleral icterus.   Neck: Neck supple. No tracheal deviation present.   Normal range of motion.  Cardiovascular: Normal rate, regular rhythm and intact distal pulses.   Pulmonary/Chest: Breath sounds normal. No stridor. No respiratory distress.   Abdominal: Abdomen is soft. She exhibits no distension. There is abdominal tenderness (lower). There is no rebound and no guarding.   Musculoskeletal:         General: No tenderness or edema. Normal range of motion.      Cervical back: Normal range of motion and neck supple.     Neurological: She is alert. She has normal strength. No cranial nerve deficit or sensory deficit. GCS score is 15. GCS eye subscore is 4. GCS verbal subscore is 5. GCS motor subscore is 6.   Skin: Skin is warm and dry.   Psychiatric: She has a normal mood and affect.         ED Course   Procedures  Labs Reviewed   COMPREHENSIVE METABOLIC PANEL - Abnormal; Notable for the following components:       Result Value    Glucose 139 (*)     BUN 24 (*)     Albumin 3.2 (*)     Total Bilirubin 2.3 (*)     Alkaline Phosphatase 149 (*)      (*)     ALT 57 (*)     eGFR if  58 (*)     eGFR if non  50 (*)     All other components within normal limits   TROPONIN I - Abnormal; Notable for the following components:    Troponin I 0.169 (*)     All other components within normal limits   B-TYPE NATRIURETIC PEPTIDE - Abnormal; Notable for the following components:     (*)     All other components within normal limits   URINALYSIS, REFLEX TO URINE CULTURE - Abnormal; Notable for the following components:    Appearance, UA Hazy (*)     Protein, UA Trace (*)     Ketones, UA Trace (*)     Occult Blood UA 1+ (*)     Urobilinogen, UA 2.0-3.0 (*)     Leukocytes, UA Trace (*)      All other components within normal limits    Narrative:     Specimen Source->Urine   TROPONIN I - Abnormal; Notable for the following components:    Troponin I 0.131 (*)     All other components within normal limits   URINALYSIS MICROSCOPIC - Abnormal; Notable for the following components:    RBC, UA 10 (*)     WBC, UA 7 (*)     Hyaline Casts, UA 66 (*)     All other components within normal limits    Narrative:     Specimen Source->Urine   CBC W/ AUTO DIFFERENTIAL   LIPASE        ECG Results          EKG 12-lead (Final result)  Result time 06/22/22 15:04:26    Final result by Interface, Lab In [facility] (06/22/22 15:04:26)                 Narrative:    Test Reason : R06.02,    Vent. Rate : 065 BPM     Atrial Rate : 065 BPM     P-R Int : 162 ms          QRS Dur : 072 ms      QT Int : 554 ms       P-R-T Axes : 077 -52 070 degrees     QTc Int : 576 ms    Normal sinus rhythm with sinus arrhythmia  Possible Left atrial enlargement  Left axis deviation  Low voltage QRS  Septal infarct (cited on or before 19-APR-2022)  Abnormal ECG  When compared with ECG of 19-APR-2022 09:39,  Significant changes have occurred  Confirmed by Nato Pfeiffer MD ([id number]) on 6/22/2022 3:04:18 PM    Referred By: [email or web address]   SELF           Confirmed By:Nato Pfeiffer MD                             EKG 12-LEAD (Final result)  Result time 06/22/22 14:12:05    Final result by Unknown User (06/22/22 14:12:05)                                Imaging Results          CT Abdomen Pelvis  Without Contrast (Final result)  Result time 06/21/22 23:13:50    Final result by Maria L Trejo MD (06/21/22 23:13:50)                 Impression:      No acute findings on CT abdomen and pelvis without contrast.    Right renal cyst.    Colonic diverticulosis.    Small to moderate bilateral pleural effusions.  Trace pericardial effusion versus mild pericardial wall thickening.      Electronically signed by: Maria L Trejo  Date:    06/21/2022  Time:    23:13  "            Narrative:    EXAMINATION:  CT ABDOMEN PELVIS WITHOUT    CLINICAL HISTORY:  Nausea, vomiting and diarrhea.    TECHNIQUE:  5 mm unenhanced axial images from the lung bases through the greater trochanters were performed.  Coronal and sagittal reformatted images were provided.    COMPARISON:  04/19/2022    FINDINGS:  Within the limits of a noncontrast examination, the liver, spleen, pancreas, left kidney and adrenal glands are unremarkable.  The gallbladder contains no calcified gallstones.    There is a right renal cyst.  There is no obstructive uropathy.    There is no gross abdominal adenopathy or ascites.  Moderate vascular calcifications present.    There is colonic diverticulosis without evidence of diverticulitis.  There are no pelvic masses or adenopathy.  There is no free pelvic fluid.  The uterus is surgically absent.    At the lung bases, there are small to moderate bilateral pleural effusions with associated compressive atelectasis.  When compared to the previous examination of 04/19/2022, the right pleural effusion is worsened, and the left pleural effusion has developed.  There is trace pericardial effusion versus mild pericardial wall thickening.    The bones are osteopenic.  Severe degenerative changes are seen at both hips.  There are degenerative changes also seen involving the spine.                               X-Ray Chest AP Portable (Final result)  Result time 06/21/22 20:38:32    Final result by Elio Romo MD (06/21/22 20:38:32)                 Impression:      No detrimental change when compared with 04/19/2022.      Electronically signed by: Elio Romo MD  Date:    06/21/2022  Time:    20:38             Narrative:    EXAMINATION:  XR CHEST AP PORTABLE    CLINICAL HISTORY:  Provided history is "SOB;  ".    TECHNIQUE:  One view of the chest.    COMPARISON:  04/19/2022.    FINDINGS:  Cardiomediastinal silhouette is not enlarged.  Atherosclerotic calcifications overlie the " aortic arch.  There are coarsened interstitial lung markings but no large focal consolidation.  No large pleural effusion.  No pneumothorax.  Metallic densities partially visualized overlying the upper abdomen, likely external to the patient.                                 Medications   acetaminophen tablet 650 mg (650 mg Oral Given 6/22/22 0333)   famotidine tablet 20 mg (20 mg Oral Given 6/22/22 0333)     Medical Decision Making:   History:   Old Medical Records: I decided to obtain old medical records.  Old Records Summarized: other records.       <> Summary of Records: Patient was previously seen on 04/19/2022 for complaints of shortness of breath during which she was diagnosed with viral syndrome. Patient was also seen on 03/08/2022 for complaints of shortness of breath and was diagnosed with CHF exacerbation.  Differential Diagnosis:   This includes, but it is not limited to: viral syndrome, gastritis, diverticulitis, colitis, UTI, ACS, CHF.  Independently Interpreted Test(s):   I have ordered and independently interpreted EKG Reading(s) - see prior notes  Clinical Tests:   Lab Tests: Ordered and Reviewed  Radiological Study: Ordered and Reviewed  Medical Tests: Ordered and Reviewed  ED Management:  Patient is afebrile and not toxic appearing at time history and physical.  She is saturating adequately.  She does not have an acute surgical abdomen.  Labs without leukocytosis or granulocytosis or significant electrolyte abnormality.  She has very slight PUJA with a BUN of 24 creatinine of 1.  Patient is tolerating p.o..  CT scan does not demonstrate acute findings.  No evidence of appendicitis, diverticulitis.  There is some increase in patient's pleural effusion.  BNP is elevated but improved compared to prior.  She is not hypoxic, tachypneic or in respiratory distress.  Patient does not appear to require diuresis. Patient observed in the emergency department for several hours.  On reassessment she reports  feeling well.  She expresses comfort with discharge.  Case discussed with patient's daughter.  She is stable for discharge to follow up with her primary physician. counseled on supportive care, appropriate medication usage, concerning symptoms for which to return to ER and the importance of follow up. Understanding and agreement with treatment plan was expressed.   This chart was completed using dictation software, as a result there may be some transcription errors.           Scribe Attestation:   Scribe #1: I performed the above scribed service and the documentation accurately describes the services I performed. I attest to the accuracy of the note.               I, Rosalina Childress , personally performed the services described in this documentation. All medical record entries made by the scribe were at my direction and in my presence. I have reviewed the chart and agree that the record reflects my personal performance and is accurate and complete.    Clinical Impression:   Final diagnoses:  [R06.02] Shortness of breath  [R19.7] Diarrhea, unspecified type (Primary)  [R11.2] Non-intractable vomiting with nausea, unspecified vomiting type          ED Disposition Condition    Discharge Stable        ED Prescriptions     Medication Sig Dispense Start Date End Date Auth. Provider    ondansetron (ZOFRAN-ODT) 4 MG TbDL Take 1 tablet (4 mg total) by mouth every 8 (eight) hours as needed (Nausea or vomiting). 12 tablet 6/22/2022  Rosalina Childress MD        Follow-up Information     Follow up With Specialties Details Why Contact Info    Annika Garland MD Internal Medicine Schedule an appointment as soon as possible for a visit   [address]  [place] [address]  [phone]             Rosalina Childress MD  06/24/22 1136       Rosalina Childress MD  06/24/22 1132

## 2022-06-23 NOTE — TELEPHONE ENCOUNTER
Please call to schedule an ER follow up with me in next 1-2 weeks, 30 minutes, can schedule any time thanks

## 2022-06-30 PROBLEM — R10.9 ABDOMINAL PAIN: Status: ACTIVE | Noted: 2022-01-01

## 2022-06-30 PROBLEM — R07.9 CHEST PAIN: Status: ACTIVE | Noted: 2022-01-21

## 2022-06-30 PROBLEM — R79.89 ELEVATED LFTS: Status: ACTIVE | Noted: 2022-01-01

## 2022-06-30 PROBLEM — N30.00 ACUTE CYSTITIS WITHOUT HEMATURIA: Status: ACTIVE | Noted: 2022-01-01

## 2022-06-30 PROBLEM — R10.13 EPIGASTRIC ABDOMINAL PAIN: Status: ACTIVE | Noted: 2022-01-01

## 2022-06-30 NOTE — ASSESSMENT & PLAN NOTE
Patient's FSGs are controlled on current hypoglycemics.   Last A1c reviewed-   Lab Results   Component Value Date    HGBA1C 7.4 (H) 01/20/2022     Most recent fingerstick glucose reviewed- No results for input(s): POCTGLUCOSE in the last 24 hours.  Current correctional scale  Low  Maintain anti-hyperglycemic dose as follows-   Antihyperglycemics (From admission, onward)            Start     Stop Route Frequency Ordered    06/30/22 0349  insulin aspart U-100 pen 0-5 Units         -- SubQ Before meals & nightly PRN 06/30/22 0257      -Acccuhecks AC/HS  -Diabetic/cardiac diet able to tolerate

## 2022-06-30 NOTE — H&P
"[facility] [address] - Emergency Dept  [facility] Medicine  History & Physical    Patient Name: Peri Johansen  MRN: [id number]  Patient Class: OP- Observation  Admission Date: 6/30/2022  Attending Physician: Alyssia Coates MD   Primary Care Provider: Annika Garland MD         Patient information was obtained from patient, past medical records and ER records.     Subjective:     Principal Problem:Chest pain    Chief Complaint:   Chief Complaint   Patient presents with    Chest Pain     Pt reports 10/10 chest pain midsternal/epigastric area. +n/v. Hx of CHF        HPI: Peri Johansen is a 88 y.o. female with a PMHx of DM2, GERD, HLD,  HTN, lymphoma, CHF, and hyperthyroidism who presents to the ED with complaints of chest/ epigastric pain. Patient reports pain started abruptly a few hours prior to arrival.  Was 10/10 at its worst.  It was worsened with exertion.  Endorses nausea, vomiting, decreased oral intake, and fatigue.  She also notes intermittent RUQ abdominal pain that has been going on for "quite some time".  The patient reports intermittent MONSON for several months slightly worse and intermittent BLE edema.  She notes that sometimes it hurts when she starts to urinate. The patient denies any diarrhea, fever, chills, cough, headache, or weakness.    In the ED, patient mildly hypotensive but otherwise VSSAF. CBC unremarkable. Glucose 147. Tbili 1.3. Alk phos 152. AST/ALT 69/46. Lipase WNL. Troponin 0.074 (at baseline).  (slightly above baseline). UA with 3+ leukocytes, 20 WBCs, and, occasional bacteria. EKG sinus rhythm rate 62, no acute ischemic changes. CXR with mild cardiomegaly with mild interstitial edema. No significant change from prior study.The patient received small fluid bolus, ASA, and GI cocktail in the ED.      Past Medical History:   Diagnosis Date    Aortic atherosclerosis 1/7/2016    Arthritis     Colon polyp: hyperplastic 2015- repeat 2020 8/6/2015    Diabetes mellitus, type II 8/16/2012    " Diverticulosis     Gastric nodule 8/7/2014    GERD (gastroesophageal reflux disease) 8/16/2012    Goiter 8/16/2012    Hyperlipidemia 8/16/2012    Hypertension     Joint pain     Leg pain 8/16/2012    Lymphoma 8/16/2012    Osteopenia 8/16/2012    Osteoporosis     Renal cyst 3/28/2013    Rickets, vitamin D deficiency 8/16/2012    Thyroid nodule 2/24/2014    Vitamin D deficiency disease 8/16/2012       Past Surgical History:   Procedure Laterality Date    CARPAL TUNNEL RELEASE      CATARACT EXTRACTION W/  INTRAOCULAR LENS IMPLANT Bilateral     HYSTERECTOMY      partial    lymphoma surgery      L tibia       Review of patient's allergies indicates:   Allergen Reactions    Latex, natural rubber Itching       No current facility-administered medications on file prior to encounter.     Current Outpatient Medications on File Prior to Encounter   Medication Sig    ammonium lactate 12 % Crea Apply 1 application topically once daily.    aspirin 81 MG Chew Take 1 tablet (81 mg total) by mouth once daily.    atorvastatin (LIPITOR) 40 MG tablet TAKE 1 TABLET BY MOUTH EVERY DAY    blood sugar diagnostic (TRUE METRIX GLUCOSE TEST STRIP) Strp USE AS DIRECTED    blood sugar diagnostic Strp Test 2 times daily    blood-glucose meter kit Use as instructed.  ACCUCHECK or whatever is preferred by insurance    calcium 500 mg Tab Take 1 tablet by mouth Twice daily.    dextran 70-hypromellose (ARTIFICIAL TEARS,[id number],) ophthalmic solution Place 1 drop into both eyes 4 (four) times daily as needed.    diclofenac sodium (VOLTAREN) 1 % Gel APPLY 2 GRAMS EXTERNALLY TO THE AFFECTED AREA FOUR TIMES DAILY    ergocalciferol (ERGOCALCIFEROL) 50,000 unit Cap Take 1 capsule (50,000 Units total) by mouth every 7 days.    fluticasone propionate (FLONASE) 50 mcg/actuation nasal spray 1 spray (50 mcg total) by Each Nostril route once daily.    furosemide (LASIX) 20 MG tablet Take 1 tablet (20 mg total) by mouth every  evening.    furosemide (LASIX) 40 MG tablet Take 1 tablet (40 mg total) by mouth once daily.    gabapentin (NEURONTIN) 300 MG capsule TAKE 1 CAPSULE(300 MG) BY MOUTH TWICE DAILY    lancets Misc Test 2 x daily    methIMAzole (TAPAZOLE) 5 MG Tab TAKE 1/2 TABLET BY MOUTH DAILY    metoprolol succinate (TOPROL-XL) 25 MG 24 hr tablet TAKE 1 TABLET BY MOUTH EVERY DAY    MICRO THIN LANCETS 33 gauge Misc USE AS DIRECTED    omeprazole (PRILOSEC) 40 MG capsule Take 1 capsule (40 mg total) by mouth every morning.    ondansetron (ZOFRAN-ODT) 4 MG TbDL Take 1 tablet (4 mg total) by mouth every 8 (eight) hours as needed (Nausea or vomiting).    potassium chloride (KLOR-CON) 10 MEQ TbSR TAKE 1 TABLET(10 MEQ) BY MOUTH EVERY DAY    traMADoL (ULTRAM) 50 mg tablet Take 1 tablet (50 mg total) by mouth every 12 (twelve) hours as needed for Pain.     Family History       Problem Relation (Age of Onset)    Breast cancer Maternal Aunt    Cancer Brother    Heart disease Father    Hypertension Mother, Father    No Known Problems Maternal Uncle, Paternal Aunt, Paternal Uncle, Maternal Grandmother, Maternal Grandfather, Paternal Grandmother, Paternal Grandfather, Daughter, Son, Son, Daughter, Daughter          Tobacco Use    Smoking status: Never Smoker    Smokeless tobacco: Never Used   Substance and Sexual Activity    Alcohol use: No     Alcohol/week: 0.0 standard drinks    Drug use: No    Sexual activity: Not Currently     Review of Systems   Constitutional:  Positive for appetite change (decreased) and fatigue. Negative for chills, diaphoresis and fever.   HENT:  Negative for congestion, postnasal drip, rhinorrhea and sore throat.    Eyes:  Negative for photophobia and visual disturbance.   Respiratory:  Negative for cough and wheezing.    Cardiovascular:  Positive for chest pain and leg swelling. Negative for palpitations.   Gastrointestinal:  Positive for abdominal pain, nausea and vomiting. Negative for abdominal  distention and diarrhea.   Genitourinary:  Positive for dysuria. Negative for difficulty urinating, flank pain and hematuria.   Musculoskeletal:  Negative for back pain, gait problem, myalgias and neck pain.   Skin:  Negative for color change, pallor, rash and wound.   Neurological:  Negative for dizziness, syncope, light-headedness and headaches.   Psychiatric/Behavioral:  Negative for confusion and hallucinations. The patient is not nervous/anxious.    Objective:     Vital Signs (Most Recent):  Temp: 97.5 °F (36.4 °C) (06/30/22 0021)  Pulse: 69 (06/30/22 0234)  Resp: (!) 22 (06/30/22 0234)  BP: (!) 95/49 (06/30/22 0234)  SpO2: 99 % (06/30/22 0234)   Vital Signs (24h Range):  Temp:  [97.5 °F (36.4 °C)] 97.5 °F (36.4 °C)  Pulse:  [66-96] 69  Resp:  [16-22] 22  SpO2:  [96 %-99 %] 99 %  BP: ()/(46-56) 95/49     Weight: 49 kg (108 lb)  Body mass index is 21.81 kg/m².    Physical Exam  Vitals and nursing note reviewed.   Constitutional:       General: She is not in acute distress.     Appearance: She is not toxic-appearing or diaphoretic.   HENT:      Head: Normocephalic and atraumatic.      Nose: Nose normal.      Mouth/Throat:      Mouth: Mucous membranes are dry.   Eyes:      Pupils: Pupils are equal, round, and reactive to light.   Cardiovascular:      Rate and Rhythm: Normal rate and regular rhythm.      Pulses: Normal pulses.      Heart sounds: No murmur heard.  Pulmonary:      Effort: Pulmonary effort is normal. No respiratory distress.      Breath sounds: Examination of the right-lower field reveals decreased breath sounds. Examination of the left-lower field reveals decreased breath sounds. Decreased breath sounds present. No wheezing, rhonchi or rales.      Comments: Currently on room air  Chest:      Chest wall: Tenderness present.   Abdominal:      General: Bowel sounds are normal. There is no distension.      Palpations: Abdomen is soft.      Tenderness: There is abdominal tenderness in the right  upper quadrant and epigastric area. There is no guarding.   Genitourinary:     Comments: deferred  Musculoskeletal:         General: No swelling, tenderness or deformity. Normal range of motion.      Cervical back: Normal range of motion. No tenderness.   Skin:     General: Skin is warm and dry.      Capillary Refill: Capillary refill takes less than 2 seconds.   Neurological:      General: No focal deficit present.      Mental Status: She is alert and oriented to person, place, and time.   Psychiatric:         Mood and Affect: Mood normal.         Behavior: Behavior normal.         CRANIAL NERVES     CN III, IV, VI   Pupils are equal, round, and reactive to light.     Significant Labs: All pertinent labs within the past 24 hours have been reviewed.  CBC:   Recent Labs   Lab 06/30/22 0112   WBC 3.90   HGB 14.1   HCT 43.6        CMP:   Recent Labs   Lab 06/30/22 0112      K 3.9      CO2 24   *   BUN 11   CREATININE 0.8   CALCIUM 9.7   PROT 7.4   ALBUMIN 2.9*   BILITOT 1.3*   ALKPHOS 152*   AST 69*   ALT 46*   ANIONGAP 10   EGFRNONAA >60.0     Cardiac Markers:   Recent Labs   Lab 06/30/22 0112   *     Lipase:   Recent Labs   Lab 06/30/22 0114   LIPASE 12     Troponin:   Recent Labs   Lab 06/30/22 0112   TROPONINI 0.074*       Significant Imaging: I have reviewed all pertinent imaging results/findings within the past 24 hours.    Imaging Results              X-Ray Chest AP Portable (Final result)  Result time 06/30/22 01:47:36      Final result by Dominick Valdivia MD (06/30/22 01:47:36)                   Impression:      Mild cardiomegaly with mild interstitial edema.    No significant change from prior study.      Electronically signed by: Dominick Valdivia MD  Date:    06/30/2022  Time:    01:47               Narrative:    EXAMINATION:  XR CHEST AP PORTABLE    CLINICAL HISTORY:  Chest Pain;    TECHNIQUE:  Single frontal view of the chest was  performed.    COMPARISON:  06/21/2022.    FINDINGS:  Mild cardiomegaly with mild interstitial edema.    Heart and lungs  appear unchanged when allowing for differences in technique and positioning.                                        Assessment/Plan:     * Chest pain  Epigastric abdominal pain  Patient wish sharp chest pain midsternal radiating to epigastric region. Endorses associated N/V. Denies palpitations. Pain reproducible on exam. Low suspicion for cardiac cause. Reports improvement with ASA and GI cocktail.    -Troponin 0.074, repeat 0.074, appears to be at baseline.  -Received ASA daily, continue daily.  -Cardiac monitoring.   -PRN EKG for chest pain  -Reviewed NM stress from January 2022 which was negative.  -Reported symptoms appear GI in nature.   -CLD, advance as tolerated.  -Start bentyl  -PRN antiemetics    Acute cystitis without hematuria  -UA reviewed, follow cx.  -Started on rocephin, continue for now.    Elevated LFTs  -Patient with elevated LFTs  -Hold statin.  -Obtain RUQ US    Type 2 diabetes mellitus with hyperglycemia, without long-term current use of insulin  Patient's FSGs are controlled on current hypoglycemics.   Last A1c reviewed-   Lab Results   Component Value Date    HGBA1C 7.4 (H) 01/20/2022     Most recent fingerstick glucose reviewed- No results for input(s): POCTGLUCOSE in the last 24 hours.  Current correctional scale  Low  Maintain anti-hyperglycemic dose as follows-   Antihyperglycemics (From admission, onward)            Start     Stop Route Frequency Ordered    06/30/22 0349  insulin aspart U-100 pen 0-5 Units         -- SubQ Before meals & nightly PRN 06/30/22 0257      -Acccuhecks AC/HS  -Diabetic/cardiac diet able to tolerate    Chronic congestive heart failure  Patient is identified as having Combined Systolic and Diastolic heart failure that is Chronic. CHF is currently controlled. Latest ECHO performed and demonstrates- Results for orders placed during the hospital  encounter of 06/12/19    Transthoracic echo (TTE) 2D with Color Flow    Interpretation Summary  · Mildly decreased left ventricular systolic function. The estimated ejection fraction is 45-50%  · Grade I (mild) left ventricular diastolic dysfunction consistent with impaired relaxation. Normal left atrial pressure.  · Concentric left ventricular remodeling.  · Mild mitral regurgitation.  · Normal right ventricular systolic function.  · The estimated PA systolic pressure is 33 mm Hg  · Normal central venous pressure (3 mm Hg).  . Continue Beta Blocker and monitor clinical status closely. Monitor on telemetry. Patient is off CHF pathway.  Monitor strict Is&Os and daily weights.  Continue to stress to patient importance of self efficacy and  on diet for CHF. Last BNP reviewed- and noted below   Recent Labs   Lab 06/30/22  0112   *   -Patient reports chronic MONSON and intermittent BLE edema. CXR with mild interstitial edema.  -On exam patient appears dry. She reports N/V and poor oral intake. Hyaline casts on UA. Mildly hypotensive.  -Will hold lasix today and re evaluate volume status.     Hyperthyroidism  -Check TSH  - Continue MMI 2.5mg daily     Gastroesophageal reflux disease without esophagitis  -Chronic, stable.   -Continue PPI.  -PRN maalox.    Mixed hyperlipidemia   Patient is chronically on statin.will not continue for now. Monitor clinically. Last LDL was   Lab Results   Component Value Date    LDLCALC 61.6 (L) 10/21/2021     VTE Risk Mitigation (From admission, onward)         Ordered     enoxaparin injection 40 mg  Daily         06/30/22 0257     IP VTE HIGH RISK PATIENT  Once         06/30/22 0257     Place sequential compression device  Until discontinued         06/30/22 0257                   Wilda Frey NP  Department of Hospital Medicine   [facility] - Emergency Dept

## 2022-06-30 NOTE — ED TRIAGE NOTES
Peri Johansen, a 88 y.o. female presents to the ED w/ complaint of chest pain.  Started 30 mins ago.  Pain is sharp, epigastric, and radiated to her stomach.  Pt has had nausea and vomiting.        Triage note:  Chief Complaint   Patient presents with    Chest Pain     Pt reports 10/10 chest pain midsternal/epigastric area. +n/v. Hx of CHF     Review of patient's allergies indicates:   Allergen Reactions    Latex, natural rubber Itching     Past Medical History:   Diagnosis Date    Aortic atherosclerosis 1/7/2016    Arthritis     Colon polyp: hyperplastic 2015- repeat 2020 8/6/2015    Diabetes mellitus, type II 8/16/2012    Diverticulosis     Gastric nodule 8/7/2014    GERD (gastroesophageal reflux disease) 8/16/2012    Goiter 8/16/2012    Hyperlipidemia 8/16/2012    Hypertension     Joint pain     Leg pain 8/16/2012    Lymphoma 8/16/2012    Osteopenia 8/16/2012    Osteoporosis     Renal cyst 3/28/2013    Rickets, vitamin D deficiency 8/16/2012    Thyroid nodule 2/24/2014    Vitamin D deficiency disease 8/16/2012

## 2022-06-30 NOTE — ED PROVIDER NOTES
Source of History   Patient    Chief Complaint   Chest Pain (Pt reports 10/10 chest pain midsternal/epigastric area. +n/v. Hx of CHF)      History Of Present Illness   Peri Johansen is a 88 y.o. female presenting with midsternal and epigastric chest pain that started abruptly a few hours prior to arrival.  Was 10/10 at its worst.  It was worsened with exertion.  Associated shortness of breath.  She also occasionally has pain on her lower right side, but none currently.  She notes that sometimes it hurts when she starts to urinate.  No constipation.  Notes nausea and vomiting.    Review Of Systems   As per HPI and below:  General: No fever.  No chills.  Eyes: No visual changes.  Head: No headache.    Integument: No rashes or lesions.  Chest: Notes shortness of breath.  Cardiovascular: Notes chest pain.  Abdomen: Notes abdominal pain.  Notes nausea and vomiting.  Urinary: Notes abnormal urination.  Neurologic: No focal weakness.  No numbness.  Hematologic: No easy bruising.  Endocrine: No excessive thirst or urination.        Review of patient's allergies indicates:   Allergen Reactions    Latex, natural rubber Itching       No current facility-administered medications on file prior to encounter.     Current Outpatient Medications on File Prior to Encounter   Medication Sig Dispense Refill    ammonium lactate 12 % Crea Apply 1 application topically once daily. 280 g 3    aspirin 81 MG Chew Take 1 tablet (81 mg total) by mouth once daily.      atorvastatin (LIPITOR) 40 MG tablet TAKE 1 TABLET BY MOUTH EVERY DAY 90 tablet 0    blood sugar diagnostic (TRUE METRIX GLUCOSE TEST STRIP) Strp USE AS DIRECTED 150 strip 12    blood sugar diagnostic Strp Test 2 times daily 200 strip 12    blood-glucose meter kit Use as instructed.  ACCUCHECK or whatever is preferred by insurance 1 each 0    calcium 500 mg Tab Take 1 tablet by mouth Twice daily.      dextran 70-hypromellose (ARTIFICIAL TEARS,[id number],) ophthalmic  solution Place 1 drop into both eyes 4 (four) times daily as needed. 1 Bottle 5    diclofenac sodium (VOLTAREN) 1 % Gel APPLY 2 GRAMS EXTERNALLY TO THE AFFECTED AREA FOUR TIMES DAILY 100 g 1    ergocalciferol (ERGOCALCIFEROL) 50,000 unit Cap Take 1 capsule (50,000 Units total) by mouth every 7 days. 12 capsule 3    fluticasone propionate (FLONASE) 50 mcg/actuation nasal spray 1 spray (50 mcg total) by Each Nostril route once daily. 16 g 1    furosemide (LASIX) 20 MG tablet Take 1 tablet (20 mg total) by mouth every evening. 90 tablet 3    furosemide (LASIX) 40 MG tablet Take 1 tablet (40 mg total) by mouth once daily. 30 tablet 11    gabapentin (NEURONTIN) 300 MG capsule TAKE 1 CAPSULE(300 MG) BY MOUTH TWICE DAILY 180 capsule 1    lancets Misc Test 2 x daily 200 each 12    methIMAzole (TAPAZOLE) 5 MG Tab TAKE 1/2 TABLET BY MOUTH DAILY 45 tablet 0    metoprolol succinate (TOPROL-XL) 25 MG 24 hr tablet TAKE 1 TABLET BY MOUTH EVERY DAY 30 tablet 1    MICRO THIN LANCETS 33 gauge Misc USE AS DIRECTED  0    omeprazole (PRILOSEC) 40 MG capsule Take 1 capsule (40 mg total) by mouth every morning. 90 capsule 0    ondansetron (ZOFRAN-ODT) 4 MG TbDL Take 1 tablet (4 mg total) by mouth every 8 (eight) hours as needed (Nausea or vomiting). 12 tablet 0    potassium chloride (KLOR-CON) 10 MEQ TbSR TAKE 1 TABLET(10 MEQ) BY MOUTH EVERY DAY 30 tablet 11    traMADoL (ULTRAM) 50 mg tablet Take 1 tablet (50 mg total) by mouth every 12 (twelve) hours as needed for Pain. 60 tablet 0       Past History   As per HPI and below:  Past Medical History:   Diagnosis Date    Aortic atherosclerosis 1/7/2016    Arthritis     Colon polyp: hyperplastic 2015- repeat 2020 8/6/2015    Diabetes mellitus, type II 8/16/2012    Diverticulosis     Gastric nodule 8/7/2014    GERD (gastroesophageal reflux disease) 8/16/2012    Goiter 8/16/2012    Hyperlipidemia 8/16/2012    Hypertension     Joint pain     Leg pain 8/16/2012     Lymphoma 8/16/2012    Osteopenia 8/16/2012    Osteoporosis     Renal cyst 3/28/2013    Rickets, vitamin D deficiency 8/16/2012    Thyroid nodule 2/24/2014    Vitamin D deficiency disease 8/16/2012     Past Surgical History:   Procedure Laterality Date    CARPAL TUNNEL RELEASE      CATARACT EXTRACTION W/  INTRAOCULAR LENS IMPLANT Bilateral     HYSTERECTOMY      partial    lymphoma surgery      L tibia       Social History     Socioeconomic History    Marital status: Single   Tobacco Use    Smoking status: Never Smoker    Smokeless tobacco: Never Used   Substance and Sexual Activity    Alcohol use: No     Alcohol/week: 0.0 standard drinks    Drug use: No    Sexual activity: Not Currently   Other Topics Concern    Are you pregnant or think you may be? No    Breast-feeding No     Social Determinants of Health     Financial Resource Strain: Low Risk     Difficulty of Paying Living Expenses: Not very hard   Food Insecurity: No Food Insecurity    Worried About Running Out of Food in the Last Year: Never true    Ran Out of Food in the Last Year: Never true   Transportation Needs: Unmet Transportation Needs    Lack of Transportation (Medical): Yes    Lack of Transportation (Non-Medical): No   Physical Activity: Inactive    Days of Exercise per Week: 0 days    Minutes of Exercise per Session: 0 min   Stress: No Stress Concern Present    Feeling of Stress : Not at all   Social Connections: Unknown    Frequency of Communication with Friends and Family: More than three times a week    Frequency of Social Gatherings with Friends and Family: Never    Active Member of Clubs or Organizations: No    Attends Club or Organization Meetings: Never    Marital Status:    Housing Stability: Low Risk     Unable to Pay for Housing in the Last Year: No    Number of Places Lived in the Last Year: 1    Unstable Housing in the Last Year: No       Family History   Problem Relation Age of Onset     Hypertension Mother     Hypertension Father     Heart disease Father     Breast cancer Maternal Aunt     No Known Problems Maternal Uncle     No Known Problems Paternal Aunt     No Known Problems Paternal Uncle     No Known Problems Maternal Grandmother     No Known Problems Maternal Grandfather     No Known Problems Paternal Grandmother     No Known Problems Paternal Grandfather     Cancer Brother         colon    No Known Problems Daughter     No Known Problems Son     No Known Problems Son     No Known Problems Daughter     No Known Problems Daughter     Diabetes Neg Hx     Glaucoma Neg Hx     Amblyopia Neg Hx     Blindness Neg Hx     Cataracts Neg Hx     Macular degeneration Neg Hx     Retinal detachment Neg Hx     Strabismus Neg Hx     Stroke Neg Hx     Thyroid disease Neg Hx     Ovarian cancer Neg Hx     Liver disease Neg Hx     Liver cancer Neg Hx     Cirrhosis Neg Hx     Colon polyps Neg Hx     Colon cancer Neg Hx     Melanoma Neg Hx        Physical Exam     Vitals:    06/30/22 0021 06/30/22 0130 06/30/22 0200 06/30/22 0234   BP: (!) 117/56 (!) 92/46 (!) 87/47 (!) 95/49   Pulse: 96 66 68 69   Resp: 16 18 19 (!) 22   Temp: 97.5 °F (36.4 °C)      TempSrc: Oral      SpO2: 96% 99% 98% 99%   Weight: 49 kg (108 lb)        Appearance: No acute distress.  Skin: No rashes seen.  Good turgor.  No abrasions.  No ecchymoses.  Eyes: No conjunctival injection.  ENT: Oropharynx clear.    Chest: Clear to auscultation bilaterally.  Good air movement.  No wheezes.  No rhonchi.  Cardiovascular: Regular rate and rhythm.  No murmurs. No gallops. No rubs.  Abdomen: Soft.  Not distended.  Nontender.  No guarding.  No rebound.  Musculoskeletal: Good range of motion all joints.  No deformities.  Neck supple.  No meningismus.  Neurologic: Motor intact.  Sensation intact.  Cerebellar intact.  Cranial nerves intact.  Mental Status:  Alert and oriented x 3.  Appropriate, conversant.      Initial MDM    Chest pain, abrupt onset, history of CHF.  Also some urinary symptoms and reported flank pain.  Abdominal exam is benign.  Will obtain cardiac and abdominal workup.  Do not think she needs advanced imaging at this time.  Anticipate observation given age.    I decided to obtain the patient's medical records.    Medications   sodium chloride 0.9% flush 10 mL (has no administration in time range)   melatonin tablet 6 mg (has no administration in time range)   dicyclomine capsule 10 mg (10 mg Oral Given 6/30/22 0301)   aspirin chewable tablet 81 mg (has no administration in time range)   atorvastatin tablet 40 mg (has no administration in time range)   traMADoL tablet 50 mg (has no administration in time range)   pantoprazole EC tablet 40 mg (has no administration in time range)   metoprolol succinate (TOPROL-XL) 24 hr tablet 25 mg (has no administration in time range)   furosemide tablet 40 mg (has no administration in time range)   fluticasone propionate 50 mcg/actuation nasal spray 50 mcg (has no administration in time range)   methIMAzole split tablet 2.5 mg (has no administration in time range)   furosemide tablet 20 mg (has no administration in time range)   gabapentin capsule 300 mg (has no administration in time range)   sodium chloride 0.9% flush 10 mL (has no administration in time range)   naloxone 0.4 mg/mL injection 0.02 mg (has no administration in time range)   glucose chewable tablet 16 g (has no administration in time range)   glucose chewable tablet 24 g (has no administration in time range)   glucagon (human recombinant) injection 1 mg (has no administration in time range)   dextrose 10% bolus 125 mL (has no administration in time range)   dextrose 10% bolus 250 mL (has no administration in time range)   enoxaparin injection 40 mg (has no administration in time range)   acetaminophen tablet 650 mg (has no administration in time range)   insulin aspart U-100 pen 0-5 Units (has no administration in  time range)   cefTRIAXone (ROCEPHIN) 1 g/50 mL D5W IVPB (has no administration in time range)   aspirin tablet 325 mg (325 mg Oral Given 6/30/22 0113)   aluminum-magnesium hydroxide-simethicone 200-200-20 mg/5 mL suspension 30 mL (30 mLs Oral Given 6/30/22 0301)     And   LIDOcaine HCl 2% oral solution 10 mL (10 mLs Oral Given 6/30/22 0301)       Results and Course     Labs Reviewed   CBC W/ AUTO DIFFERENTIAL - Abnormal; Notable for the following components:       Result Value    RDW 14.6 (*)     All other components within normal limits   COMPREHENSIVE METABOLIC PANEL - Abnormal; Notable for the following components:    Glucose 147 (*)     Albumin 2.9 (*)     Total Bilirubin 1.3 (*)     Alkaline Phosphatase 152 (*)     AST 69 (*)     ALT 46 (*)     All other components within normal limits   TROPONIN I - Abnormal; Notable for the following components:    Troponin I 0.074 (*)     All other components within normal limits   B-TYPE NATRIURETIC PEPTIDE - Abnormal; Notable for the following components:     (*)     All other components within normal limits   URINALYSIS, REFLEX TO URINE CULTURE - Abnormal; Notable for the following components:    Appearance, UA Cloudy (*)     Protein, UA 1+ (*)     Leukocytes, UA 3+ (*)     All other components within normal limits    Narrative:     Specimen Source->Urine   URINALYSIS MICROSCOPIC - Abnormal; Notable for the following components:    RBC, UA 14 (*)     WBC, UA 20 (*)     Hyaline Casts, UA 17 (*)     All other components within normal limits    Narrative:     Specimen Source->Urine   CULTURE, URINE   LIPASE   MAGNESIUM   TROPONIN I   TSH       Imaging Results          X-Ray Chest AP Portable (Final result)  Result time 06/30/22 01:47:36    Final result by Dominick Valdivia MD (06/30/22 01:47:36)                 Impression:      Mild cardiomegaly with mild interstitial edema.    No significant change from prior study.      Electronically signed by: Dominick Valdivia  MD  Date:    06/30/2022  Time:    01:47             Narrative:    EXAMINATION:  XR CHEST AP PORTABLE    CLINICAL HISTORY:  Chest Pain;    TECHNIQUE:  Single frontal view of the chest was performed.    COMPARISON:  06/21/2022.    FINDINGS:  Mild cardiomegaly with mild interstitial edema.    Heart and lungs  appear unchanged when allowing for differences in technique and positioning.                                ED Course as of 06/30/22 0319   Thu Jun 30, 2022   0119 EKG 12-lead  EKG shows normal sinus rhythm and no acute ischemia per my independent interpretation.     [DC]   0156 Creatinine: 0.8 [DC]   0156 Lipase: 12 [DC]   0156 Troponin I(!): 0.074 [DC]   0156 WBC: 3.90 [DC]   0156 Hemoglobin: 14.1 [DC]   0156 Platelets: 192 [DC]   0203 BNP(!): 813 [DC]      ED Course User Index  [DC] Sebastian Garibay MD       Additional MDM:   Heart Score:    History:          Moderately suspicious.  ECG:             Normal  Age:               >65 years  Risk factors: >= 3 risk factors or history of atherosclerotic disease  Troponin:       >2x normal limit  Final Score: 7            MDM, Impression and Plan   88 y.o. female with chest pain, recurrent, history of CHF and atherosclerotic disease.  Pain improved in the ED.  Heart score demands hospitalization.  Discussed with hospital medicine for observation.  Will give a dose of antibiotics for UTI.         Final diagnoses:  [R07.9] Chest pain (Primary)  [N39.0] Urinary tract infection without hematuria, site unspecified          ED Disposition Condition    Observation               Sebastian Garibay MD  06/30/22 0319

## 2022-06-30 NOTE — CONSULTS
[facility] Consultation Note    Patient Complaint:  abdominal pain  PCP:   Annika Garland       LOS: 0        Initial History of Present Illness (HPI):  This is a 88 y.o. female consulted to GI service for Cholelithiasis and right hepatic lobe lesion. Patient reports acute onset of severe non radiating chest pain along with RLQ abdominal pain and tenderness  associated with faigue, nausea, vomiting and diarrhea that began on yesterday. Patient reports no vomiting since yesterday. Reports stooling twice this am however unsure if it was diarrhea. Patient denies any loss of appetite or constipation. Chem labs slightly abnormal.        Review of Systems   Constitutional: Positive for fatigue. Negative for activity change, chills, diaphoresis and fever.   HENT: Negative for congestion, drooling, rhinorrhea, sore throat and trouble swallowing.    Eyes: Negative for discharge.   Respiratory: Negative for cough, shortness of breath and wheezing.    Cardiovascular: Negative for chest pain, palpitations and leg swelling.   Gastrointestinal: Positive for abdominal pain (RLQ), diarrhea and nausea. Negative for abdominal distention, anal bleeding, blood in stool, constipation, rectal pain and vomiting.   Endocrine: Negative for cold intolerance and heat intolerance.   Genitourinary: Positive for dysuria and flank pain. Negative for frequency, hematuria and urgency.   Musculoskeletal: Positive for back pain. Negative for arthralgias.   Skin: Negative for color change, pallor and rash.   Neurological: Positive for weakness. Negative for syncope, facial asymmetry and numbness.   Psychiatric/Behavioral: Negative for agitation and confusion. The patient is not nervous/anxious.            Medical History:  has a past medical history of Aortic atherosclerosis (1/7/2016), Arthritis, Colon polyp: hyperplastic 2015- repeat 2020 (8/6/2015), Diabetes mellitus, type II (8/16/2012), Diverticulosis, Gastric nodule  (8/7/2014), GERD (gastroesophageal reflux disease) (8/16/2012), Goiter (8/16/2012), Hyperlipidemia (8/16/2012), Hypertension, Joint pain, Leg pain (8/16/2012), Lymphoma (8/16/2012), Osteopenia (8/16/2012), Osteoporosis, Renal cyst (3/28/2013), Rickets, vitamin D deficiency (8/16/2012), Thyroid nodule (2/24/2014), and Vitamin D deficiency disease (8/16/2012).    Surgical History:  has a past surgical history that includes lymphoma surgery; Carpal tunnel release; Cataract extraction w/  intraocular lens implant (Bilateral); and Hysterectomy.    Family History: family history includes Breast cancer in her maternal aunt; Cancer in her brother; Heart disease in her father; Hypertension in her father and mother; No Known Problems in her daughter, daughter, daughter, maternal grandfather, maternal grandmother, maternal uncle, paternal aunt, paternal grandfather, paternal grandmother, paternal uncle, son, and son..     Social History:  reports that she has never smoked. She has never used smokeless tobacco. She reports that she does not drink alcohol and does not use drugs.    Review of patient's allergies indicates:   Allergen Reactions    Latex, natural rubber Itching       No current facility-administered medications on file prior to encounter.     Current Outpatient Medications on File Prior to Encounter   Medication Sig Dispense Refill    ammonium lactate 12 % Crea Apply 1 application topically once daily. 280 g 3    aspirin 81 MG Chew Take 1 tablet (81 mg total) by mouth once daily.      atorvastatin (LIPITOR) 40 MG tablet TAKE 1 TABLET BY MOUTH EVERY DAY 90 tablet 0    blood sugar diagnostic (TRUE METRIX GLUCOSE TEST STRIP) Strp USE AS DIRECTED 150 strip 12    blood sugar diagnostic Strp Test 2 times daily 200 strip 12    blood-glucose meter kit Use as instructed.  ACCUCHECK or whatever is preferred by insurance 1 each 0    calcium 500 mg Tab Take 1 tablet by mouth Twice daily.      dextran 70-hypromellose  (ARTIFICIAL TEARS,[id number],) ophthalmic solution Place 1 drop into both eyes 4 (four) times daily as needed. 1 Bottle 5    diclofenac sodium (VOLTAREN) 1 % Gel APPLY 2 GRAMS EXTERNALLY TO THE AFFECTED AREA FOUR TIMES DAILY 100 g 1    ergocalciferol (ERGOCALCIFEROL) 50,000 unit Cap Take 1 capsule (50,000 Units total) by mouth every 7 days. 12 capsule 3    fluticasone propionate (FLONASE) 50 mcg/actuation nasal spray 1 spray (50 mcg total) by Each Nostril route once daily. 16 g 1    furosemide (LASIX) 20 MG tablet Take 1 tablet (20 mg total) by mouth every evening. 90 tablet 3    furosemide (LASIX) 40 MG tablet Take 1 tablet (40 mg total) by mouth once daily. 30 tablet 11    gabapentin (NEURONTIN) 300 MG capsule TAKE 1 CAPSULE(300 MG) BY MOUTH TWICE DAILY 180 capsule 1    lancets Misc Test 2 x daily 200 each 12    methIMAzole (TAPAZOLE) 5 MG Tab TAKE 1/2 TABLET BY MOUTH DAILY 45 tablet 0    metoprolol succinate (TOPROL-XL) 25 MG 24 hr tablet TAKE 1 TABLET BY MOUTH EVERY DAY 30 tablet 1    MICRO THIN LANCETS 33 gauge Misc USE AS DIRECTED  0    omeprazole (PRILOSEC) 40 MG capsule Take 1 capsule (40 mg total) by mouth every morning. 90 capsule 0    ondansetron (ZOFRAN-ODT) 4 MG TbDL Take 1 tablet (4 mg total) by mouth every 8 (eight) hours as needed (Nausea or vomiting). 12 tablet 0    potassium chloride (KLOR-CON) 10 MEQ TbSR TAKE 1 TABLET(10 MEQ) BY MOUTH EVERY DAY 30 tablet 11    traMADoL (ULTRAM) 50 mg tablet Take 1 tablet (50 mg total) by mouth every 12 (twelve) hours as needed for Pain. 60 tablet 0        Objective Findings:    Vital Signs:  Temp:  [97.5 °F (36.4 °C)-98.7 °F (37.1 °C)]   Pulse:  [66-96]   Resp:  [10-22]   BP: ()/(46-68)   SpO2:  [95 %-100 %]   Body mass index is 21.82 kg/m².      Physical Exam  Vitals and nursing note reviewed.   Constitutional:       Appearance: She is underweight.   HENT:      Head: Normocephalic.      Nose: Nose normal.      Mouth/Throat:      Mouth:  Mucous membranes are moist.   Eyes:      Pupils: Pupils are equal, round, and reactive to light.   Cardiovascular:      Heart sounds: Normal heart sounds.   Pulmonary:      Effort: Pulmonary effort is normal.      Breath sounds: Normal breath sounds.   Abdominal:      General: Bowel sounds are normal. There is no distension.      Palpations: Abdomen is soft.      Tenderness: There is abdominal tenderness (RLQ).   Musculoskeletal:         General: Normal range of motion.      Cervical back: Normal range of motion.   Skin:     Capillary Refill: Capillary refill takes less than 2 seconds.   Neurological:      General: No focal deficit present.      Mental Status: She is alert and oriented to person, place, and time.   Psychiatric:         Mood and Affect: Mood normal.         Behavior: Behavior normal.               Labs:  Lab Results   Component Value Date    WBC 3.90 06/30/2022    HGB 14.1 06/30/2022    HCT 43.6 06/30/2022     06/30/2022    CHOL 134 10/21/2021    TRIG 87 10/21/2021    HDL 55 10/21/2021    ALT 46 (H) 06/30/2022    AST 69 (H) 06/30/2022     06/30/2022    K 3.9 06/30/2022     06/30/2022    CREATININE 0.8 06/30/2022    BUN 11 06/30/2022    CO2 24 06/30/2022    TSH 0.341 (L) 06/30/2022    INR 1.1 06/12/2019    HGBA1C 7.2 (H) 06/30/2022             Imaging:   EXAMINATION:  US ABDOMEN LIMITED     CLINICAL HISTORY:  epigastric pain, elevated LFTs;     TECHNIQUE:  Limited ultrasound of the right upper quadrant of the abdomen (including pancreas, liver, gallbladder, common bile duct, and spleen) was performed.     COMPARISON:  Abdominal ultrasound 06/12/2019.     FINDINGS:  Liver: The liver measures 19.3 cm.  The liver demonstrates homogeneous echotexture.  There is a hyperechoic lesion in the right hepatic lobe measuring 0.9 x 0.9 x 1.0 cm.     Gallbladder: Sonographic evidence of discrete cholelithiasis.  There is mild gallbladder wall thickening/gallbladder wall edema.  There is no  gallbladder wall hyperemia.  Sonographic Harp sign is reported to be negative by the ultrasound technologist.  Is     Biliary system: The common duct is not dilated, measuring 2 mm.  No intrahepatic ductal dilatation.     Spleen: Normal in size and echotexture, measuring 7.2 x 2.9 cm.     Miscellaneous: No upper abdominal ascites.  Bilateral pleural fluid.     Impression:     1. Hyperechoic lesion in the right hepatic lobe measuring up to 1.0 cm.  This demonstrates a nonspecific sonographic appearance which can be seen with benign or neoplastic processes.  Further evaluation with nonemergent triple phase liver CT or MRI exam is advised if clinically appropriate.  2. Mild gallbladder wall thickening/edema.  Findings can be seen with underlying hepatic dysfunction.  Further evaluation as clinically indicated.  3. Bilateral pleural effusions.        Electronically signed by: Mary Kate Del Castillo MD  Date:                                            06/30/2022  Time:                                           05:38      Assessment:  Patient is a .88 y.o. y/o .female with  has a past medical history of Aortic atherosclerosis (1/7/2016), Arthritis, Colon polyp: hyperplastic 2015- repeat 2020 (8/6/2015), Diabetes mellitus, type II (8/16/2012), Diverticulosis, Gastric nodule (8/7/2014), GERD (gastroesophageal reflux disease) (8/16/2012), Goiter (8/16/2012), Hyperlipidemia (8/16/2012), Hypertension, Joint pain, Leg pain (8/16/2012), Lymphoma (8/16/2012), Osteopenia (8/16/2012), Osteoporosis, Renal cyst (3/28/2013), Rickets, vitamin D deficiency (8/16/2012), Thyroid nodule (2/24/2014), and Vitamin D deficiency disease (8/16/2012). Consulted to the GI service for Cholelithiasis and right hepatic lobe lesion.               Recommendations:  1. Cholelithiasis. Abdominal pain. N/V/D.  Confirmed by imaging. No CBD dilation noted. Supportive care (started fluids LR@125, manage symptoms). Bili with slight elevation, alk phos elevated.  Monitor levels. Follow surgery recs.    2.  Right hepatic lobe lesion. Visible on imaging. LFTs with slight elevation. Monitor levels.    3. Chest pain. Elevated trop. Possible demand ischemia per hosp medicine. Hosp Med following.    Will continue to follow    Thank you so much for allowing us to participate in the care of Peri Johansen . Please contact us if you have any additional questions.    Cyn Castillo NP  Gastroenterology  [facility] - Med Surg

## 2022-06-30 NOTE — ED NOTES
Pt wanted me to speak to her granddaughter to discuss transferring patient to [facility]. Pt's granddaughter, Zoltan Majano, approved transfer to [facility]. Pt and Zoltan updated on plan.

## 2022-06-30 NOTE — ASSESSMENT & PLAN NOTE
Patient is chronically on statin.will not continue for now. Monitor clinically. Last LDL was   Lab Results   Component Value Date    LDLCALC 61.6 (L) 10/21/2021

## 2022-06-30 NOTE — ED NOTES
Received bedside rounding report from Raz RN. Patient in stretcher resting with eyes closed, awakens easily to verbal stimuli, Respiration even and unlabored, skin warm/dry. Ongoing assessment unchanged, awaiting disposition at this time. Patient on all monitors. SR up x2, will continue to monitor.

## 2022-06-30 NOTE — HPI
"Peri Johansen is a 88 y.o. female with a PMHx of DM2, GERD, HLD,  HTN, lymphoma, CHF, and hyperthyroidism who presents to the ED with complaints of chest/ epigastric pain. Patient reports pain started abruptly a few hours prior to arrival.  Was 10/10 at its worst.  It was worsened with exertion.  Endorses nausea, vomiting, decreased oral intake, and fatigue.  She also notes intermittent RUQ abdominal pain that has been going on for "quite some time".  The patient reports intermittent MONSON for several months slightly worse and intermittent BLE edema.  She notes that sometimes it hurts when she starts to urinate. The patient denies any diarrhea, fever, chills, cough, headache, or weakness.    In the ED, patient mildly hypotensive but otherwise VSSAF. CBC unremarkable. Glucose 147. Tbili 1.3. Alk phos 152. AST/ALT 69/46. Lipase WNL. Troponin 0.074 (at baseline).  (slightly above baseline). UA with 3+ leukocytes, 20 WBCs, and, occasional bacteria. EKG sinus rhythm rate 62, no acute ischemic changes. CXR with mild cardiomegaly with mild interstitial edema. No significant change from prior study.The patient received small fluid bolus, ASA, and GI cocktail in the ED.  "

## 2022-06-30 NOTE — ASSESSMENT & PLAN NOTE
Patient is identified as having Combined Systolic and Diastolic heart failure that is Chronic. CHF is currently controlled. Latest ECHO performed and demonstrates- Results for orders placed during the hospital encounter of 06/12/19    Transthoracic echo (TTE) 2D with Color Flow    Interpretation Summary  · Mildly decreased left ventricular systolic function. The estimated ejection fraction is 45-50%  · Grade I (mild) left ventricular diastolic dysfunction consistent with impaired relaxation. Normal left atrial pressure.  · Concentric left ventricular remodeling.  · Mild mitral regurgitation.  · Normal right ventricular systolic function.  · The estimated PA systolic pressure is 33 mm Hg  · Normal central venous pressure (3 mm Hg).  . Continue Beta Blocker and monitor clinical status closely. Monitor on telemetry. Patient is off CHF pathway.  Monitor strict Is&Os and daily weights.  Continue to stress to patient importance of self efficacy and  on diet for CHF. Last BNP reviewed- and noted below   Recent Labs   Lab 06/30/22  0112   *   -Patient reports chronic MONSON and intermittent BLE edema. CXR with mild interstitial edema.  -On exam patient appears dry. She reports N/V and poor oral intake. Hyaline casts on UA. Mildly hypotensive.  -Will hold lasix today and re evaluate volume status.

## 2022-06-30 NOTE — ASSESSMENT & PLAN NOTE
Epigastric abdominal pain  Patient wish sharp chest pain midsternal radiating to epigastric region. Endorses associated N/V. Denies palpitations. Pain reproducible on exam. Low suspicion for cardiac cause. Reports improvement with ASA and GI cocktail.    -Troponin 0.074, repeat 0.074, appears to be at baseline.  -Received ASA daily, continue daily.  -Cardiac monitoring.   -PRN EKG for chest pain  -Reviewed NM stress from January 2022 which was negative.  -Reported symptoms appear GI in nature.   -CLD, advance as tolerated.  -Start bentyl  -PRN antiemetics

## 2022-06-30 NOTE — SUBJECTIVE & OBJECTIVE
Past Medical History:   Diagnosis Date    Aortic atherosclerosis 1/7/2016    Arthritis     Colon polyp: hyperplastic 2015- repeat 2020 8/6/2015    Diabetes mellitus, type II 8/16/2012    Diverticulosis     Gastric nodule 8/7/2014    GERD (gastroesophageal reflux disease) 8/16/2012    Goiter 8/16/2012    Hyperlipidemia 8/16/2012    Hypertension     Joint pain     Leg pain 8/16/2012    Lymphoma 8/16/2012    Osteopenia 8/16/2012    Osteoporosis     Renal cyst 3/28/2013    Rickets, vitamin D deficiency 8/16/2012    Thyroid nodule 2/24/2014    Vitamin D deficiency disease 8/16/2012       Past Surgical History:   Procedure Laterality Date    CARPAL TUNNEL RELEASE      CATARACT EXTRACTION W/  INTRAOCULAR LENS IMPLANT Bilateral     HYSTERECTOMY      partial    lymphoma surgery      L tibia       Review of patient's allergies indicates:   Allergen Reactions    Latex, natural rubber Itching       No current facility-administered medications on file prior to encounter.     Current Outpatient Medications on File Prior to Encounter   Medication Sig    ammonium lactate 12 % Crea Apply 1 application topically once daily.    aspirin 81 MG Chew Take 1 tablet (81 mg total) by mouth once daily.    atorvastatin (LIPITOR) 40 MG tablet TAKE 1 TABLET BY MOUTH EVERY DAY    blood sugar diagnostic (TRUE METRIX GLUCOSE TEST STRIP) Strp USE AS DIRECTED    blood sugar diagnostic Strp Test 2 times daily    blood-glucose meter kit Use as instructed.  ACCUCHECK or whatever is preferred by insurance    calcium 500 mg Tab Take 1 tablet by mouth Twice daily.    dextran 70-hypromellose (ARTIFICIAL TEARS,[id number]-OSNKO,) ophthalmic solution Place 1 drop into both eyes 4 (four) times daily as needed.    diclofenac sodium (VOLTAREN) 1 % Gel APPLY 2 GRAMS EXTERNALLY TO THE AFFECTED AREA FOUR TIMES DAILY    ergocalciferol (ERGOCALCIFEROL) 50,000 unit Cap Take 1 capsule (50,000 Units total) by mouth every 7 days.    fluticasone propionate (FLONASE) 50  mcg/actuation nasal spray 1 spray (50 mcg total) by Each Nostril route once daily.    furosemide (LASIX) 20 MG tablet Take 1 tablet (20 mg total) by mouth every evening.    furosemide (LASIX) 40 MG tablet Take 1 tablet (40 mg total) by mouth once daily.    gabapentin (NEURONTIN) 300 MG capsule TAKE 1 CAPSULE(300 MG) BY MOUTH TWICE DAILY    lancets Misc Test 2 x daily    methIMAzole (TAPAZOLE) 5 MG Tab TAKE 1/2 TABLET BY MOUTH DAILY    metoprolol succinate (TOPROL-XL) 25 MG 24 hr tablet TAKE 1 TABLET BY MOUTH EVERY DAY    MICRO THIN LANCETS 33 gauge Misc USE AS DIRECTED    omeprazole (PRILOSEC) 40 MG capsule Take 1 capsule (40 mg total) by mouth every morning.    ondansetron (ZOFRAN-ODT) 4 MG TbDL Take 1 tablet (4 mg total) by mouth every 8 (eight) hours as needed (Nausea or vomiting).    potassium chloride (KLOR-CON) 10 MEQ TbSR TAKE 1 TABLET(10 MEQ) BY MOUTH EVERY DAY    traMADoL (ULTRAM) 50 mg tablet Take 1 tablet (50 mg total) by mouth every 12 (twelve) hours as needed for Pain.     Family History       Problem Relation (Age of Onset)    Breast cancer Maternal Aunt    Cancer Brother    Heart disease Father    Hypertension Mother, Father    No Known Problems Maternal Uncle, Paternal Aunt, Paternal Uncle, Maternal Grandmother, Maternal Grandfather, Paternal Grandmother, Paternal Grandfather, Daughter, Son, Son, Daughter, Daughter          Tobacco Use    Smoking status: Never Smoker    Smokeless tobacco: Never Used   Substance and Sexual Activity    Alcohol use: No     Alcohol/week: 0.0 standard drinks    Drug use: No    Sexual activity: Not Currently     Review of Systems   Constitutional:  Positive for appetite change (decreased) and fatigue. Negative for chills, diaphoresis and fever.   HENT:  Negative for congestion, postnasal drip, rhinorrhea and sore throat.    Eyes:  Negative for photophobia and visual disturbance.   Respiratory:  Negative for cough and wheezing.    Cardiovascular:  Positive for chest pain  and leg swelling. Negative for palpitations.   Gastrointestinal:  Positive for abdominal pain, nausea and vomiting. Negative for abdominal distention and diarrhea.   Genitourinary:  Positive for dysuria. Negative for difficulty urinating, flank pain and hematuria.   Musculoskeletal:  Negative for back pain, gait problem, myalgias and neck pain.   Skin:  Negative for color change, pallor, rash and wound.   Neurological:  Negative for dizziness, syncope, light-headedness and headaches.   Psychiatric/Behavioral:  Negative for confusion and hallucinations. The patient is not nervous/anxious.    Objective:     Vital Signs (Most Recent):  Temp: 97.5 °F (36.4 °C) (06/30/22 0021)  Pulse: 69 (06/30/22 0234)  Resp: (!) 22 (06/30/22 0234)  BP: (!) 95/49 (06/30/22 0234)  SpO2: 99 % (06/30/22 0234)   Vital Signs (24h Range):  Temp:  [97.5 °F (36.4 °C)] 97.5 °F (36.4 °C)  Pulse:  [66-96] 69  Resp:  [16-22] 22  SpO2:  [96 %-99 %] 99 %  BP: ()/(46-56) 95/49     Weight: 49 kg (108 lb)  Body mass index is 21.81 kg/m².    Physical Exam  Vitals and nursing note reviewed.   Constitutional:       General: She is not in acute distress.     Appearance: She is not toxic-appearing or diaphoretic.   HENT:      Head: Normocephalic and atraumatic.      Nose: Nose normal.      Mouth/Throat:      Mouth: Mucous membranes are dry.   Eyes:      Pupils: Pupils are equal, round, and reactive to light.   Cardiovascular:      Rate and Rhythm: Normal rate and regular rhythm.      Pulses: Normal pulses.      Heart sounds: No murmur heard.  Pulmonary:      Effort: Pulmonary effort is normal. No respiratory distress.      Breath sounds: Examination of the right-lower field reveals decreased breath sounds. Examination of the left-lower field reveals decreased breath sounds. Decreased breath sounds present. No wheezing, rhonchi or rales.      Comments: Currently on room air  Chest:      Chest wall: Tenderness present.   Abdominal:      General: Bowel  sounds are normal. There is no distension.      Palpations: Abdomen is soft.      Tenderness: There is abdominal tenderness in the right upper quadrant and epigastric area. There is no guarding.   Genitourinary:     Comments: deferred  Musculoskeletal:         General: No swelling, tenderness or deformity. Normal range of motion.      Cervical back: Normal range of motion. No tenderness.   Skin:     General: Skin is warm and dry.      Capillary Refill: Capillary refill takes less than 2 seconds.   Neurological:      General: No focal deficit present.      Mental Status: She is alert and oriented to person, place, and time.   Psychiatric:         Mood and Affect: Mood normal.         Behavior: Behavior normal.         CRANIAL NERVES     CN III, IV, VI   Pupils are equal, round, and reactive to light.     Significant Labs: All pertinent labs within the past 24 hours have been reviewed.  CBC:   Recent Labs   Lab 06/30/22 0112   WBC 3.90   HGB 14.1   HCT 43.6        CMP:   Recent Labs   Lab 06/30/22 0112      K 3.9      CO2 24   *   BUN 11   CREATININE 0.8   CALCIUM 9.7   PROT 7.4   ALBUMIN 2.9*   BILITOT 1.3*   ALKPHOS 152*   AST 69*   ALT 46*   ANIONGAP 10   EGFRNONAA >60.0     Cardiac Markers:   Recent Labs   Lab 06/30/22 0112   *     Lipase:   Recent Labs   Lab 06/30/22 0114   LIPASE 12     Troponin:   Recent Labs   Lab 06/30/22 0112   TROPONINI 0.074*       Significant Imaging: I have reviewed all pertinent imaging results/findings within the past 24 hours.    Imaging Results              X-Ray Chest AP Portable (Final result)  Result time 06/30/22 01:47:36      Final result by Dominick Valdivia MD (06/30/22 01:47:36)                   Impression:      Mild cardiomegaly with mild interstitial edema.    No significant change from prior study.      Electronically signed by: Dominick Valdivia MD  Date:    06/30/2022  Time:    01:47               Narrative:    EXAMINATION:  XR CHEST  AP PORTABLE    CLINICAL HISTORY:  Chest Pain;    TECHNIQUE:  Single frontal view of the chest was performed.    COMPARISON:  06/21/2022.    FINDINGS:  Mild cardiomegaly with mild interstitial edema.    Heart and lungs  appear unchanged when allowing for differences in technique and positioning.

## 2022-07-01 PROBLEM — K80.20 CHOLELITHIASIS: Status: ACTIVE | Noted: 2022-01-01

## 2022-07-01 NOTE — ASSESSMENT & PLAN NOTE
Chronic and atypical troponin 0.07 and trending down. PET stress negative for ischemia 2/2022. Currently CP free. Doubt ACS. No plan to repeat ischemic evaluation

## 2022-07-01 NOTE — ASSESSMENT & PLAN NOTE
Combined CHF with baseline EF 40-50%. Diuresis and afterload reduction as tolerated  Echo    Interpretation Summary  · The left ventricle is normal in size with mild concentric hypertrophy and mildly decreased systolic function. The estimated ejection fraction is 40%.  · The left ventricular global longitudinal strain is -10%. 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  · Normal right ventricular size with normal right ventricular systolic function.  · Biatrial enlargement.  · Mild mitral regurgitation.  · Moderate tricuspid regurgitation.  · The estimated PA systolic pressure is 44 mmHg.  · There is pulmonary hypertension.  · Elevated central venous pressure (15 mmHg).  · There is a right pleural effusion.    Recent Labs   Lab 06/30/22  0112   *   .

## 2022-07-01 NOTE — ASSESSMENT & PLAN NOTE
Patient was in ED 9 days ago for NVD and abdominal pain.    Elevated LFTs and T bili similar to previous labs. Elevated GGT. Lipase normal.   US abdomen showed discrete cholelithiasis and mild gall bladder wall thickening/gallbladder wall edema.   US also showed right hepatic lobe lesion 1.0 cm and recommends non emergent triple phase CT or MRI exam and bilateral effusion.    Acute hep panel pending  6/30, GI and General surgery consult for concern symptomatic cholelithiasis.  7/1 , this am diffuse upper chest and abdominal L>R pain on palpation. Obtain MRCP. Follow up Gen Surg and GI recs.

## 2022-07-01 NOTE — NURSING
PER handoff given to RANDY Cardona     Pt resting in bed quietly. NAD noted. No c/o pain.   Fall and safety precautions maintained. Bed alarm activated and audible.. Bed locked in lowest position, with side rails up x2. Call bell and personal items within reach

## 2022-07-01 NOTE — PROGRESS NOTES
[facility] G[facility]enterology Progress Note        CC: abdominal pain    HPI 88 y.o. female consulted to GI service for Cholelithiasis and right hepatic lobe lesion. Patient reports acute onset of severe non radiating chest pain along with RLQ abdominal pain and tenderness  associated with faigue, nausea, vomiting and diarrhea that began on yesterday. Patient reports no vomiting since yesterday. Reports stooling twice this am however unsure if it was diarrhea. Patient denies any loss of appetite or constipation. Chem labs slightly abnormal    Interval History:  Patient seen and examined today in bed with her son at bedside. Reports chest, abdominal and back pain unchanged however intermittent now. Reports abdominal tenderness is improved.  No acute signs of distress noted. No acute events reported over night. VSS, BP soft, afebrile. Denies loss of appetite, constipation, diarrhea, nausea or vomiting. Chem labs showing improvement. Bili elevation is unchanged 1.3. AST elevated at 46. ALT normal.      Past Medical History  Past Medical History:   Diagnosis Date    Aortic atherosclerosis 1/7/2016    Arthritis     Colon polyp: hyperplastic 2015- repeat 2020 8/6/2015    Diabetes mellitus, type II 8/16/2012    Diverticulosis     Gastric nodule 8/7/2014    GERD (gastroesophageal reflux disease) 8/16/2012    Goiter 8/16/2012    Hyperlipidemia 8/16/2012    Hypertension     Joint pain     Leg pain 8/16/2012    Lymphoma 8/16/2012    Osteopenia 8/16/2012    Osteoporosis     Renal cyst 3/28/2013    Rickets, vitamin D deficiency 8/16/2012    Thyroid nodule 2/24/2014    Vitamin D deficiency disease 8/16/2012       Review of Systems     Review of Systems   Constitutional: Positive for fatigue. Negative for activity change, chills, diaphoresis and fever.   HENT: Negative for congestion, drooling, rhinorrhea, sore throat and trouble swallowing.    Eyes: Negative for discharge.   Respiratory: Negative for cough,  shortness of breath and wheezing.    Cardiovascular: Negative for chest pain, palpitations and leg swelling.   Gastrointestinal: Positive for abdominal pain (RLQ). Negative for nausea, abdominal distention anal bleeding, blood in stool, constipation, rectal pain and vomiting.   Endocrine: Negative for cold intolerance and heat intolerance.   Genitourinary: Positive for dysuria and flank pain. Negative for frequency, hematuria and urgency.   Musculoskeletal: Positive for back pain. Negative for arthralgias.   Skin: Negative for color change, pallor and rash.   Neurological: Positive for weakness. Negative for syncope, facial asymmetry and numbness.   Psychiatric/Behavioral: Negative for agitation and confusion. The patient is not nervous/anxious.             Physical Examination    Temp:  [97.3 °F (36.3 °C)-98.7 °F (37.1 °C)]   Pulse:  [67-71]   Resp:  [16-19]   BP: ()/(52-62)   SpO2:  [94 %-99 %]     Physical Exam   Vitals and nursing note reviewed.   Constitutional:       Appearance: She is underweight.   HENT:      Head: Normocephalic.      Nose: Nose normal.      Mouth/Throat:      Mouth: Mucous membranes are moist.   Eyes:      Pupils: Pupils are equal, round, and reactive to light.   Cardiovascular:      Heart sounds: Normal heart sounds.   Pulmonary:      Effort: Pulmonary effort is normal.      Breath sounds: Normal breath sounds.   Abdominal:      General: Bowel sounds are normal. There is no distension.      Palpations: Abdomen is soft.      Tenderness: There is abdominal tenderness (RLQ) (resolving).   Musculoskeletal:         General: Normal range of motion.      Cervical back: Normal range of motion.   Skin:     Capillary Refill: Capillary refill takes less than 2 seconds.   Neurological:      General: No focal deficit present.      Mental Status: She is alert and oriented to person, place, and time.   Psychiatric:         Mood and Affect: Mood normal.         Behavior: Behavior normal.    Labs:  Lab Results   Component Value Date    WBC 4.97 07/01/2022    HGB 12.1 07/01/2022    HCT 37.1 07/01/2022    MCV 89 07/01/2022     07/01/2022         CMP  Sodium   Date Value Ref Range Status   07/01/2022 139 136 - 145 mmol/L Final     Potassium   Date Value Ref Range Status   07/01/2022 3.7 3.5 - 5.1 mmol/L Final     Chloride   Date Value Ref Range Status   07/01/2022 107 95 - 110 mmol/L Final     CO2   Date Value Ref Range Status   07/01/2022 24 23 - 29 mmol/L Final     Glucose   Date Value Ref Range Status   07/01/2022 65 (L) 70 - 110 mg/dL Final     BUN   Date Value Ref Range Status   07/01/2022 12 8 - 23 mg/dL Final     Creatinine   Date Value Ref Range Status   07/01/2022 0.6 0.5 - 1.4 mg/dL Final     Calcium   Date Value Ref Range Status   07/01/2022 9.2 8.7 - 10.5 mg/dL Final     Total Protein   Date Value Ref Range Status   07/01/2022 6.5 6.0 - 8.4 g/dL Final     Albumin   Date Value Ref Range Status   07/01/2022 2.5 (L) 3.5 - 5.2 g/dL Final     Total Bilirubin   Date Value Ref Range Status   07/01/2022 1.3 (H) 0.1 - 1.0 mg/dL Final     Comment:     For infants and newborns, interpretation of results should be based  on gestational age, weight and in agreement with clinical  observations.    Premature Infant recommended reference ranges:  Up to 24 hours.............<8.0 mg/dL  Up to 48 hours............<12.0 mg/dL  3-5 days..................<15.0 mg/dL  6-29 days.................<15.0 mg/dL       Alkaline Phosphatase   Date Value Ref Range Status   07/01/2022 112 55 - 135 U/L Final     AST   Date Value Ref Range Status   07/01/2022 46 (H) 10 - 40 U/L Final     ALT   Date Value Ref Range Status   07/01/2022 40 10 - 44 U/L Final     Anion Gap   Date Value Ref Range Status   07/01/2022 8 8 - 16 mmol/L Final     eGFR if    Date Value Ref Range Status   07/01/2022 >60 >60 mL/min/1.73 m^2 Final     eGFR if non    Date Value Ref Range Status   07/01/2022 >60 >60  mL/min/1.73 m^2 Final     Comment:     Calculation used to obtain the estimated glomerular filtration  rate (eGFR) is the CKD-EPI equation.          Imaging:    EXAMINATION:  US ABDOMEN LIMITED     CLINICAL HISTORY:  epigastric pain, elevated LFTs;     TECHNIQUE:  Limited ultrasound of the right upper quadrant of the abdomen (including pancreas, liver, gallbladder, common bile duct, and spleen) was performed.     COMPARISON:  Abdominal ultrasound 06/12/2019.     FINDINGS:  Liver: The liver measures 19.3 cm.  The liver demonstrates homogeneous echotexture.  There is a hyperechoic lesion in the right hepatic lobe measuring 0.9 x 0.9 x 1.0 cm.     Gallbladder: Sonographic evidence of discrete cholelithiasis.  There is mild gallbladder wall thickening/gallbladder wall edema.  There is no gallbladder wall hyperemia.  Sonographic Harp sign is reported to be negative by the ultrasound technologist.  Is     Biliary system: The common duct is not dilated, measuring 2 mm.  No intrahepatic ductal dilatation.     Spleen: Normal in size and echotexture, measuring 7.2 x 2.9 cm.     Miscellaneous: No upper abdominal ascites.  Bilateral pleural fluid.     Impression:     1. Hyperechoic lesion in the right hepatic lobe measuring up to 1.0 cm.  This demonstrates a nonspecific sonographic appearance which can be seen with benign or neoplastic processes.  Further evaluation with nonemergent triple phase liver CT or MRI exam is advised if clinically appropriate.  2. Mild gallbladder wall thickening/edema.  Findings can be seen with underlying hepatic dysfunction.  Further evaluation as clinically indicated.  3. Bilateral pleural effusions.        Electronically signed by: Mary Kate Del Castillo MD  Date:                                            06/30/2022  Time:                                           05:38      Assessment:   Patient is a .88 y.o. y/o .female with  has a past medical history of Aortic atherosclerosis (1/7/2016), Arthritis,  Colon polyp: hyperplastic 2015- repeat 2020 (8/6/2015), Diabetes mellitus, type II (8/16/2012), Diverticulosis, Gastric nodule (8/7/2014), GERD (gastroesophageal reflux disease) (8/16/2012), Goiter (8/16/2012), Hyperlipidemia (8/16/2012), Hypertension, Joint pain, Leg pain (8/16/2012), Lymphoma (8/16/2012), Osteopenia (8/16/2012), Osteoporosis, Renal cyst (3/28/2013), Rickets, vitamin D deficiency (8/16/2012), Thyroid nodule (2/24/2014), and Vitamin D deficiency disease (8/16/2012).  Consulted to the GI service for Cholelithiasis and right hepatic lobe lesion.    Plan:  1. Cholelithiasis. Abdominal pain. N/V/D.  Confirmed by imaging. No CBD dilation noted. Supportive care, manage symptoms. Bili unchanged, alk phos normalized. Monitor levels. Surgery recs to follow up outpatient in clinic. OK to discharge from GI standpoint.     2.  Right hepatic lobe lesion. Visible on imaging. LFTs with slight elevation. Monitor levels. LFT's normalizing. Will need nonemergent triple phase liver CT or MRI exam to work up lesion.     3. Chest pain. Elevated trop. Possible demand ischemia per hosp medicine. Cardiology now following.       We will sign off     Thank you so much for allowing us to participate in the care of Peri AISLINN Johansen. Please contact us if you have any additional questions.    Cyn Castillo NP  Gastroenterology  [facility] - Med Surg

## 2022-07-01 NOTE — HOSPITAL COURSE
Transferred from [facility] to [facility] for mid sternal chest pain radiating to epigastric region and to sides. Patient was in ED 9 days ago for NVD and abdominal pain.  Elevated LFTs and T bili similar to previous labs. Elevated GGT. Lipase normal. US abdomen showed discrete cholelithiasis and mild gall bladder wall thickening/gallbladder wall edema. US also showed right hepatic lobe lesion 1.0 cm and recommends non emergent triple phase CT or MRI exam and bilateral effusion. CXR showed mild interstitial edema. On exam, lung diminished and BLE edema. Denies chest pain or sob. Mild elevated troponin trending down suspect demand ischemia from HF. 2 d echo EF 40%, Grade III DD, biatrial enlargement, PH 44mmg, LV gblobal strain 10%-pattern suggestive infiltrative cardiomyopathy (amyloidosis). Consult Cardiology chest pain and new finding -amyloidosis- on 2 D echo. Cardiology does not suspect ACS or recommends repeat ischemic evaluation.    Lasix 40 mg IV with significant improvement in BLE swelling. Continue home lasix.   6/30, consult GI and General surgery for concern symptomatic cholelithiasis.7/1 , this am diffuse upper chest and abdominal L>R pain on palpation. General Surgery have low suspicion for cholecystitis and recommends no surgical intervention and outpatient follow up. On repeat exam, no tenderness and tolerated regular diet.   Patient HDS for discharge home with close follow up PCP, GI, and General Surgery.

## 2022-07-01 NOTE — PLAN OF CARE
[facility] - Telemetry  Discharge Final Note    Primary Care Provider: Annika Garland MD    Expected Discharge Date: 7/1/2022    Final Discharge Note (most recent)     Final Note - 07/01/22 1408        Final Note    Assessment Type Final Discharge Note     Anticipated Discharge Disposition Home or Self Care     Hospital Resources/Appts/Education Provided Appointments scheduled and added to AVS        Post-Acute Status    Post-Acute Authorization Other     Other Status No Post-Acute Service Needs     Discharge Delays None known at this time                 Important Message from Medicare

## 2022-07-01 NOTE — SUBJECTIVE & OBJECTIVE
No current facility-administered medications on file prior to encounter.     Current Outpatient Medications on File Prior to Encounter   Medication Sig    ammonium lactate 12 % Crea Apply 1 application topically once daily.    aspirin 81 MG Chew Take 1 tablet (81 mg total) by mouth once daily.    atorvastatin (LIPITOR) 40 MG tablet TAKE 1 TABLET BY MOUTH EVERY DAY    blood sugar diagnostic (TRUE METRIX GLUCOSE TEST STRIP) Strp USE AS DIRECTED    blood sugar diagnostic Strp Test 2 times daily    blood-glucose meter kit Use as instructed.  ACCUCHECK or whatever is preferred by insurance    calcium 500 mg Tab Take 1 tablet by mouth Twice daily.    dextran 70-hypromellose (ARTIFICIAL TEARS,[id number]-GOWMP,) ophthalmic solution Place 1 drop into both eyes 4 (four) times daily as needed.    diclofenac sodium (VOLTAREN) 1 % Gel APPLY 2 GRAMS EXTERNALLY TO THE AFFECTED AREA FOUR TIMES DAILY    ergocalciferol (ERGOCALCIFEROL) 50,000 unit Cap Take 1 capsule (50,000 Units total) by mouth every 7 days.    fluticasone propionate (FLONASE) 50 mcg/actuation nasal spray 1 spray (50 mcg total) by Each Nostril route once daily.    furosemide (LASIX) 20 MG tablet Take 1 tablet (20 mg total) by mouth every evening.    furosemide (LASIX) 40 MG tablet Take 1 tablet (40 mg total) by mouth once daily.    gabapentin (NEURONTIN) 300 MG capsule TAKE 1 CAPSULE(300 MG) BY MOUTH TWICE DAILY    lancets Misc Test 2 x daily    methIMAzole (TAPAZOLE) 5 MG Tab TAKE 1/2 TABLET BY MOUTH DAILY    metoprolol succinate (TOPROL-XL) 25 MG 24 hr tablet TAKE 1 TABLET BY MOUTH EVERY DAY    MICRO THIN LANCETS 33 gauge Misc USE AS DIRECTED    omeprazole (PRILOSEC) 40 MG capsule Take 1 capsule (40 mg total) by mouth every morning.    ondansetron (ZOFRAN-ODT) 4 MG TbDL Take 1 tablet (4 mg total) by mouth every 8 (eight) hours as needed (Nausea or vomiting).    potassium chloride (KLOR-CON) 10 MEQ TbSR TAKE 1 TABLET(10 MEQ) BY MOUTH EVERY DAY    traMADoL (ULTRAM) 50  mg tablet Take 1 tablet (50 mg total) by mouth every 12 (twelve) hours as needed for Pain.       Review of patient's allergies indicates:   Allergen Reactions    Latex, natural rubber Itching       Past Medical History:   Diagnosis Date    Aortic atherosclerosis 1/7/2016    Arthritis     Colon polyp: hyperplastic 2015- repeat 2020 8/6/2015    Diabetes mellitus, type II 8/16/2012    Diverticulosis     Gastric nodule 8/7/2014    GERD (gastroesophageal reflux disease) 8/16/2012    Goiter 8/16/2012    Hyperlipidemia 8/16/2012    Hypertension     Joint pain     Leg pain 8/16/2012    Lymphoma 8/16/2012    Osteopenia 8/16/2012    Osteoporosis     Renal cyst 3/28/2013    Rickets, vitamin D deficiency 8/16/2012    Thyroid nodule 2/24/2014    Vitamin D deficiency disease 8/16/2012     Past Surgical History:   Procedure Laterality Date    CARPAL TUNNEL RELEASE      CATARACT EXTRACTION W/  INTRAOCULAR LENS IMPLANT Bilateral     HYSTERECTOMY      partial    lymphoma surgery      L tibia     Family History       Problem Relation (Age of Onset)    Breast cancer Maternal Aunt    Cancer Brother    Heart disease Father    Hypertension Mother, Father    No Known Problems Maternal Uncle, Paternal Aunt, Paternal Uncle, Maternal Grandmother, Maternal Grandfather, Paternal Grandmother, Paternal Grandfather, Daughter, Son, Son, Daughter, Daughter          Tobacco Use    Smoking status: Never Smoker    Smokeless tobacco: Never Used   Substance and Sexual Activity    Alcohol use: No     Alcohol/week: 0.0 standard drinks    Drug use: No    Sexual activity: Not Currently     Review of Systems   Constitutional:  Negative for chills and fever.   HENT:  Negative for hearing loss and trouble swallowing.    Eyes:  Negative for discharge and visual disturbance.   Respiratory:  Negative for chest tightness and shortness of breath.    Cardiovascular:  Negative for chest pain and palpitations.   Gastrointestinal:  Positive for abdominal pain, diarrhea,  nausea and vomiting. Negative for abdominal distention.   Genitourinary:  Negative for difficulty urinating and hematuria.   Musculoskeletal:  Negative for arthralgias and back pain.   Skin:  Negative for rash and wound.   Neurological:  Negative for dizziness and headaches.   Objective:     Vital Signs (Most Recent):  Temp: 97.7 °F (36.5 °C) (07/01/22 0819)  Pulse: 71 (07/01/22 0819)  Resp: 18 (07/01/22 0819)  BP: (!) 97/56 (07/01/22 0819)  SpO2: 96 % (07/01/22 0819)   Vital Signs (24h Range):  Temp:  [97.3 °F (36.3 °C)-98.7 °F (37.1 °C)] 97.7 °F (36.5 °C)  Pulse:  [67-71] 71  Resp:  [16-19] 18  SpO2:  [94 %-99 %] 96 %  BP: ()/(52-62) 97/56     Weight: 49 kg (108 lb 0.4 oz)  Body mass index is 21.82 kg/m².    Physical Exam  Constitutional:       General: She is not in acute distress.     Appearance: Normal appearance.   HENT:      Head: Normocephalic and atraumatic.   Cardiovascular:      Rate and Rhythm: Normal rate and regular rhythm.   Pulmonary:      Effort: Pulmonary effort is normal. No respiratory distress.      Breath sounds: Normal breath sounds.   Abdominal:      General: Abdomen is flat. There is no distension.      Palpations: Abdomen is soft.      Tenderness: There is no abdominal tenderness.      Comments: No tenderness to RUQ  Minimally tender to suprapubic area   Neurological:      General: No focal deficit present.      Mental Status: She is alert and oriented to person, place, and time.   Psychiatric:         Behavior: Behavior normal.         Thought Content: Thought content normal.       Significant Labs:  I have reviewed all pertinent lab results within the past 24 hours.  CBC:   Recent Labs   Lab 07/01/22 0415   WBC 4.97   RBC 4.16   HGB 12.1   HCT 37.1      MCV 89   MCH 29.1   MCHC 32.6     CMP:   Recent Labs   Lab 07/01/22 0415   GLU 65*   CALCIUM 9.2   ALBUMIN 2.5*   PROT 6.5      K 3.7   CO2 24      BUN 12   CREATININE 0.6   ALKPHOS 112   ALT 40   AST 46*    BILITOT 1.3*       Significant Diagnostics:  I have reviewed all pertinent imaging results/findings within the past 24 hours.

## 2022-07-01 NOTE — NURSING
Bedside Report given to night nurse RANDY Hinojosa. Walking rounds completed. Visualized and assessed patient NAD noted. Safety precautions maintained and call light within reach.     Chart check completed.

## 2022-07-01 NOTE — PROGRESS NOTES
"[facility] Medicine  Progress Note    Patient Name: Peri Johansen  MRN: [id number]  Patient Class: OP- Observation   Admission Date: 6/30/2022  Length of Stay: 0 days  Attending Physician: Cristi Delong MD  Primary Care Provider: Annika Garland MD        Subjective:     Principal Problem:Chest pain        HPI:  Peri Johansen is a 88 y.o. female with a PMHx of DM2, GERD, HLD,  HTN, lymphoma, CHF, and hyperthyroidism who presents to the ED with complaints of chest/ epigastric pain. Patient reports pain started abruptly a few hours prior to arrival.  Was 10/10 at its worst.  It was worsened with exertion.  Endorses nausea, vomiting, decreased oral intake, and fatigue.  She also notes intermittent RUQ abdominal pain that has been going on for "quite some time".  The patient reports intermittent MONSON for several months slightly worse and intermittent BLE edema.  She notes that sometimes it hurts when she starts to urinate. The patient denies any diarrhea, fever, chills, cough, headache, or weakness.    In the ED, patient mildly hypotensive but otherwise VSSAF. CBC unremarkable. Glucose 147. Tbili 1.3. Alk phos 152. AST/ALT 69/46. Lipase WNL. Troponin 0.074 (at baseline).  (slightly above baseline). UA with 3+ leukocytes, 20 WBCs, and, occasional bacteria. EKG sinus rhythm rate 62, no acute ischemic changes. CXR with mild cardiomegaly with mild interstitial edema. No significant change from prior study.The patient received small fluid bolus, ASA, and GI cocktail in the ED.      Overview/Hospital Course:  Transferred from [facility] to [facility] for mid sternal chest pain radiating to epigastric region and to sides. Patient was in ED 9 days ago for NVD and abdominal pain.  Elevated LFTs and T bili similar to previous labs. Elevated GGT. Lipase normal. US abdomen showed discrete cholelithiasis and mild gall bladder wall thickening/gallbladder wall edema. US also showed right hepatic lobe " lesion 1.0 cm and recommends non emergent triple phase CT or MRI exam and bilateral effusion. CXR showed mild interstitial edema. On exam, lung diminished and BLE edema. Denies chest pain or sob. Mild elevated troponin trending down suspect demand ischemia from HF. 2 d echo EF 40%, Grade III DD, biatrial enlargement, PH 44mmg, LV gblobal strain 10%-pattern suggestive infiltrative cardiomyopathy (amyloidosis). Consult Cardiology chest pain and new finding -amyloidosis- on 2 D echo.     Acute hep panel pending  Lasix 40 mg IV with significant improvement in BLE swelling. Continue home lasix.   6/30, consult GI and General surgery for concern symptomatic cholelithiasis.  7/1 , this am diffuse upper chest and abdominal L>R pain on palpation. General Surgery have low suspicion for cholecystitis and recommends no surgical intervention and outpatient follow up.         Interval History: BLE edema improved. Pain everywhere -points to upper chest and upper abdomen - epigastric , right and left upper abd     Review of Systems   Constitutional:  Positive for appetite change (decreased) and fatigue. Negative for chills, diaphoresis and fever.   HENT:  Negative for congestion, postnasal drip, rhinorrhea and sore throat.    Eyes:  Negative for photophobia and visual disturbance.   Respiratory:  Negative for cough and wheezing.    Cardiovascular:  Positive for chest pain. Negative for palpitations. Leg swelling: improving.  Gastrointestinal:  Positive for abdominal pain, diarrhea (resolved), nausea and vomiting. Negative for abdominal distention.   Genitourinary:  Positive for dysuria. Negative for difficulty urinating, flank pain and hematuria.   Musculoskeletal:  Negative for back pain, gait problem, myalgias and neck pain.   Skin:  Negative for color change, pallor, rash and wound.   Neurological:  Negative for dizziness, syncope, light-headedness and headaches.   Psychiatric/Behavioral:  Negative for confusion and  hallucinations. The patient is not nervous/anxious.    Objective:     Vital Signs (Most Recent):  Temp: 97.7 °F (36.5 °C) (07/01/22 0819)  Pulse: 71 (07/01/22 0819)  Resp: 18 (07/01/22 0819)  BP: (!) 97/56 (07/01/22 0819)  SpO2: 96 % (07/01/22 0819)   Vital Signs (24h Range):  Temp:  [97.3 °F (36.3 °C)-98.7 °F (37.1 °C)] 97.7 °F (36.5 °C)  Pulse:  [67-71] 71  Resp:  [16-20] 18  SpO2:  [94 %-99 %] 96 %  BP: ()/(52-62) 97/56     Weight: 49 kg (108 lb 0.4 oz)  Body mass index is 21.82 kg/m².    Intake/Output Summary (Last 24 hours) at 7/1/2022 0958  Last data filed at 7/1/2022 0435  Gross per 24 hour   Intake --   Output 350 ml   Net -350 ml      Physical Exam  Vitals and nursing note reviewed.   Constitutional:       General: She is not in acute distress.     Appearance: She is not toxic-appearing or diaphoretic.   HENT:      Head: Normocephalic and atraumatic.      Nose: Nose normal.      Mouth/Throat:      Mouth: Mucous membranes are dry.   Eyes:      Pupils: Pupils are equal, round, and reactive to light.   Cardiovascular:      Rate and Rhythm: Normal rate and regular rhythm.      Pulses: Normal pulses.      Heart sounds: No murmur heard.  Pulmonary:      Effort: Pulmonary effort is normal. No respiratory distress.      Breath sounds: Examination of the right-lower field reveals decreased breath sounds. Examination of the left-lower field reveals decreased breath sounds. Decreased breath sounds present. No wheezing, rhonchi or rales.      Comments: Currently on room air  Chest:      Chest wall: Tenderness present.   Abdominal:      General: Bowel sounds are normal.      Palpations: Abdomen is soft.      Tenderness: Tenderness: epigastric , rUQ, LUQ. There is no guarding.   Genitourinary:     Comments: deferred  Musculoskeletal:         General: No swelling, tenderness or deformity. Normal range of motion.      Cervical back: Normal range of motion. No tenderness.   Skin:     General: Skin is warm and dry.       Capillary Refill: Capillary refill takes less than 2 seconds.   Neurological:      General: No focal deficit present.      Mental Status: She is alert and oriented to person, place, and time. Mental status is at baseline.       Significant Labs: All pertinent labs within the past 24 hours have been reviewed.    Significant Imaging: I have reviewed all pertinent imaging results/findings within the past 24 hours.      Assessment/Plan:      * Chest pain  On admission, patient with sharp chest pain midsternal radiating to epigastric region. Endorses associated N/V. Denies palpitations. Pain reproducible on exam. Low suspicion for cardiac cause. Reports improvement with ASA and GI cocktail.  Troponin 0.074, repeat 0.074  1/2022 Cardiac pet stress    The relative PET images show no clinically significant regional resting or stress induced perfusion abnormalities.    The whole heart absolute myocardial perfusion values averaged 0.95 cc/min/g at rest, which is normal; 1.61 cc/min/g at stress, which is mildly reduced; and CFR is 1.70 , which is mildly reduced.  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  Patient is a poor historian   Cardiology consult for 2 D echo finding -amyloidosis, chest pain     Continue ASA    Elevated LFTs, T bili and epigatric pain  Patient was in ED 9 days ago for NVD and abdominal pain.    Elevated LFTs and T bili similar to previous labs. Elevated GGT. Lipase normal.   US abdomen showed discrete cholelithiasis and mild gall bladder wall thickening/gallbladder wall edema.   US also showed right hepatic lobe lesion 1.0 cm and recommends non emergent triple phase CT or MRI exam and  bilateral effusion.    Acute hep panel pending  6/30, GI and General surgery consult for concern symptomatic cholelithiasis.  7/1 , this am diffuse upper chest and abdominal L>R pain on palpation. General Surgery have low suspicion for cholecystitis and recommends no surgical intervention and outpatient follow up.         Chronic congestive heart failure  Patient is identified as having Combined Systolic and Diastolic heart failure that is Chronic. CHF is currently controlled. Latest ECHO performed and demonstrates- Results for orders placed during the hospital encounter of 06/12/19  Patient reports chronic MONSON and intermittent BLE edema. CXR with mild interstitial edema.  BLE significant improvement with IV lasix x 1  Hold BB and lasix for now due to hypotension    Acute cystitis without hematuria  Continue rocephin. Follow up urine culture     Type 2 diabetes mellitus with hyperglycemia, without long-term current use of insulin    Lab Results   Component Value Date    HGBA1C 7.2 (H) 06/30/2022   Hypoglycemic this- glucose tab now  Hold all insulin while NPO        Hyperthyroidism  TSH 0.341 with normal Free T4  Continue MMI 2.5mg daily     Gastroesophageal reflux disease without esophagitis  -Chronic, stable.   -Continue PPI.  -PRN maalox.    Mixed hyperlipidemia   Patient is chronically on statin.will not continue for now. Monitor clinically. Last LDL was   Lab Results   Component Value Date    LDLCALC 61.6 (L) 10/21/2021     VTE Risk Mitigation (From admission, onward)         Ordered     enoxaparin injection 40 mg  Daily         06/30/22 0257     IP VTE HIGH RISK PATIENT  Once         06/30/22 0257     Place sequential compression device  Until discontinued         06/30/22 0257                Discharge Planning   ENDY:      Code Status: Full Code   Is the patient medically ready for discharge?: No    Reason for patient still in hospital (select all that apply): Treatment           Uma Morales,  NP  Department of [facility] Medicine   [facility] - Telemetry

## 2022-07-01 NOTE — NURSING
Report received from night nurse JOSE Govea. Visualized patient and assessed patient's overall condition and appearance. No acute distress noted. Will continue to monitor

## 2022-07-01 NOTE — HPI
"HPI: Peri Johansen is a 88 y.o. female with a PMHx of DM2, GERD, HLD,  HTN, lymphoma, CHF, and hyperthyroidism who presents to the ED with complaints of chest/ epigastric pain. Patient reports pain started abruptly a few hours prior to arrival.  Was 10/10 at its worst.  It was worsened with exertion.  Endorses nausea, vomiting, decreased oral intake, and fatigue.  She also notes intermittent RUQ abdominal pain that has been going on for "quite some time".  The patient reports intermittent MONSON for several months slightly worse and intermittent BLE edema.  She notes that sometimes it hurts when she starts to urinate. The patient denies any diarrhea, fever, chills, cough, headache, or weakness.     In the ED, patient mildly hypotensive but otherwise VSSAF. CBC unremarkable. Glucose 147. Tbili 1.3. Alk phos 152. AST/ALT 69/46. Lipase WNL. Troponin 0.074 (at baseline).  (slightly above baseline). UA with 3+ leukocytes, 20 WBCs, and, occasional bacteria. EKG sinus rhythm rate 62, no acute ischemic changes. CXR with mild cardiomegaly with mild interstitial edema. No significant change from prior study.The patient received small fluid bolus, ASA, and GI cocktail in the ED.    Currently denies CP or SOB. CP is somewhat chronic per chart  Troponin 0.07 trending down slowly    EKG NSR PRWP no acute STT changes    PET stress 2/18/22    The relative PET images show no clinically significant regional resting or stress induced perfusion abnormalities.    The whole heart absolute myocardial perfusion values averaged 0.95 cc/min/g at rest, which is normal; 1.61 cc/min/g at stress, which is mildly reduced; and CFR is 1.70 , which is mildly reduced.    CT attenuation images demonstrate moderate diffuse coronary calcifications in the LAD, LCX and RCA territory and moderate diffuse aortic calcifications of the descending aorta.    The gated perfusion images showed an ejection fraction of 47% at rest and 50% during " stress. A normal ejection fraction is greater than %.    There is mild global hypokinesis at rest and during stress.    The LV cavity size is normal at rest and stress.    The EKG portion of this study is negative for ischemia.    During stress, rare PVCs are noted.    The patient reported no chest pain during the stress test.    There are no prior studies for comparison.    Echo 6/30/22  The left ventricle is normal in size with mild concentric hypertrophy and mildly decreased systolic function. The estimated ejection fraction is 40%.  The left ventricular global longitudinal strain is -10%. The pattern is suggestive of infiltrative cardiomyopathy ( Amyloidosis)  Grade III left ventricular diastolic dysfunction.  Normal right ventricular size with normal right ventricular systolic function.  Biatrial enlargement.  Mild mitral regurgitation.  Moderate tricuspid regurgitation.  The estimated PA systolic pressure is 44 mmHg.  There is pulmonary hypertension.  Elevated central venous pressure (15 mmHg).  There is a right pleural effusion.     Followed at [facility]  Patient has been having LE swelling that improves with extra doses of lasix. She takes lasix 20mg PO BID and additional 40mg PO dose 2-3 times a week for volume overload. The most active thing she does is walk around the house, down the major way. She uses a cane when she thinks she needs to walk long distances. She is able to perform ADLs, examples include cooking and cleaning for herself. They help her in and out of the shower because they don't want her to slip. She stays with her granddaughter some days out of the week who is an RN and helps with her medication, her granddaughter is here with her and helped with her history. She is compliant with all of her medications. She has shortness of breath with exertion, no shortness of breath at rest. The shortness of breath with walking improves when she takes additional lasix doses. She has LE edema, improves with  lasix. She also has orthopnea, sleeps on 4 pillows and thinks she needs 5. Denies PND. She had an echo stress in 5/2021 that was negative for ischemia. She reports having a coronary angiogram in the past but is unsure of the results. Denies issues with bleeding. Denies syncope. Denies palpitations. Denies recent illness and hospitalization. She has not had COVID to her knowledge and has been vaccinated with two doses, has not taken the booster. Blood pressure and heart rate well controlled at home.

## 2022-07-01 NOTE — NURSING
Discharge information given to patient prior to discharge. States understanding of all information provided. Also spoke with her granddaughter and gave her the information as well. All personal belongings with patient at the time of discharge.Transported to personal vehicle via hospital transport wheelchair.

## 2022-07-01 NOTE — ASSESSMENT & PLAN NOTE
Lab Results   Component Value Date    HGBA1C 7.2 (H) 06/30/2022   Hypoglycemic this- glucose tab now  Hold all insulin while NPO

## 2022-07-01 NOTE — ASSESSMENT & PLAN NOTE
On admission, patient with sharp chest pain midsternal radiating to epigastric region. Endorses associated N/V. Denies palpitations. Pain reproducible on exam. Low suspicion for cardiac cause. Reports improvement with ASA and GI cocktail.  Troponin 0.074, repeat 0.074  1/2022 Cardiac pet stress    The relative PET images show no clinically significant regional resting or stress induced perfusion abnormalities.    The whole heart absolute myocardial perfusion values averaged 0.95 cc/min/g at rest, which is normal; 1.61 cc/min/g at stress, which is mildly reduced; and CFR is 1.70 , which is mildly reduced.  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  Patient is a poor historian   Cardiology consult for 2 D echo finding -amyloidosis, chest pain     Continue ASA

## 2022-07-01 NOTE — ASSESSMENT & PLAN NOTE
On admission, patient with sharp chest pain midsternal radiating to epigastric region. Endorses associated N/V. Denies palpitations. Pain reproducible on exam. Low suspicion for cardiac cause. Reports improvement with ASA and GI cocktail.  Troponin 0.074, repeat 0.074  1/2022 Cardiac pet stress    The relative PET images show no clinically significant regional resting or stress induced perfusion abnormalities.    The whole heart absolute myocardial perfusion values averaged 0.95 cc/min/g at rest, which is normal; 1.61 cc/min/g at stress, which is mildly reduced; and CFR is 1.70 , which is mildly reduced.  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  Patient is a poor historian   Cardiology consult for 2 D echo finding -amyloidosis, chest pain, possible need for OR     Continue ASA

## 2022-07-01 NOTE — TELEPHONE ENCOUNTER
----- Message from Felipe Hassan sent at 7/1/2022  8:43 AM CDT -----  Name of Who is Calling: Aliyah ([facility])         What is the request in detail: Aliyah is calling in a in patient consult from Dr. Delong. Please advise          Can the clinic reply by [facility]: No        What Number to Call Back [facility] not [facility]: [phone]

## 2022-07-01 NOTE — HPI
Peri Johansen is a 88 y.o. lady with PMH of DM, diverticulosis, GERD, HLD, HTN, and CHF with EF 40% who general surgery is consulted for symptomatic cholelithiasis. She originally presented with chest pain and suprapubic pain. She also reported nausea, vomiting, and diarrhea for the past day. Troponin was found to be elevated, peak 0.07, most recently at 0.05. Tbili found to be 1.3, similar to her previous Tbili. Elevated alk phos at 152 and AST/ALT 69/46. RUQ US showed cholelithiasis without CBD dilation and with mild gallbladder wall thickening. Echo performed revealed EF 40% with suspicion for pulmonary hypertension    Upon evaluation in her room, she is HDS on RA. She is minimally tender to palpation in her RUQ, reports mild tenderness in her suprapubic area.

## 2022-07-01 NOTE — CONSULTS
[facility] [place]  [place]  Consult Note    Patient Name: Peri Johansen  MRN: [id number]  Code Status: Full Code  Admission Date: 6/30/2022  Hospital Length of Stay: 0 days  Attending Physician: Cristi Delong MD  Primary Care Provider: Annika Garland MD    Patient information was obtained from patient, past medical records and ER records.     Inpatient consult to General Surgery  Consult performed by: Parish Carey MD  Consult ordered by: Uma Morales NP        Subjective:     Principal Problem: Chest pain    History of Present Illness: Peri Johansen is a 88 y.o. lady with PMH of DM, diverticulosis, GERD, HLD, HTN, and CHF with EF 40% who general surgery is consulted for symptomatic cholelithiasis. She originally presented with chest pain and suprapubic pain. She also reported nausea, vomiting, and diarrhea for the past day. Troponin was found to be elevated, peak 0.07, most recently at 0.05. Tbili found to be 1.3, similar to her previous Tbili. Elevated alk phos at 152 and AST/ALT 69/46. RUQ US showed cholelithiasis without CBD dilation and with mild gallbladder wall thickening. Echo performed revealed EF 40% with suspicion for pulmonary hypertension    Upon evaluation in her room, she is HDS on RA. She is minimally tender to palpation in her RUQ, reports mild tenderness in her suprapubic area.                     No current facility-administered medications on file prior to encounter.     Current Outpatient Medications on File Prior to Encounter   Medication Sig    ammonium lactate 12 % Crea Apply 1 application topically once daily.    aspirin 81 MG Chew Take 1 tablet (81 mg total) by mouth once daily.    atorvastatin (LIPITOR) 40 MG tablet TAKE 1 TABLET BY MOUTH EVERY DAY    blood sugar diagnostic (TRUE METRIX GLUCOSE TEST STRIP) Strp USE AS DIRECTED    blood sugar diagnostic Strp Test 2 times daily    blood-glucose meter kit Use as instructed.  ACCUCHECK or whatever is preferred  by insurance    calcium 500 mg Tab Take 1 tablet by mouth Twice daily.    dextran 70-hypromellose (ARTIFICIAL TEARS,[id number],) ophthalmic solution Place 1 drop into both eyes 4 (four) times daily as needed.    diclofenac sodium (VOLTAREN) 1 % Gel APPLY 2 GRAMS EXTERNALLY TO THE AFFECTED AREA FOUR TIMES DAILY    ergocalciferol (ERGOCALCIFEROL) 50,000 unit Cap Take 1 capsule (50,000 Units total) by mouth every 7 days.    fluticasone propionate (FLONASE) 50 mcg/actuation nasal spray 1 spray (50 mcg total) by Each Nostril route once daily.    furosemide (LASIX) 20 MG tablet Take 1 tablet (20 mg total) by mouth every evening.    furosemide (LASIX) 40 MG tablet Take 1 tablet (40 mg total) by mouth once daily.    gabapentin (NEURONTIN) 300 MG capsule TAKE 1 CAPSULE(300 MG) BY MOUTH TWICE DAILY    lancets Misc Test 2 x daily    methIMAzole (TAPAZOLE) 5 MG Tab TAKE 1/2 TABLET BY MOUTH DAILY    metoprolol succinate (TOPROL-XL) 25 MG 24 hr tablet TAKE 1 TABLET BY MOUTH EVERY DAY    MICRO THIN LANCETS 33 gauge Misc USE AS DIRECTED    omeprazole (PRILOSEC) 40 MG capsule Take 1 capsule (40 mg total) by mouth every morning.    ondansetron (ZOFRAN-ODT) 4 MG TbDL Take 1 tablet (4 mg total) by mouth every 8 (eight) hours as needed (Nausea or vomiting).    potassium chloride (KLOR-CON) 10 MEQ TbSR TAKE 1 TABLET(10 MEQ) BY MOUTH EVERY DAY    traMADoL (ULTRAM) 50 mg tablet Take 1 tablet (50 mg total) by mouth every 12 (twelve) hours as needed for Pain.       Review of patient's allergies indicates:   Allergen Reactions    Latex, natural rubber Itching       Past Medical History:   Diagnosis Date    Aortic atherosclerosis 1/7/2016    Arthritis     Colon polyp: hyperplastic 2015- repeat 2020 8/6/2015    Diabetes mellitus, type II 8/16/2012    Diverticulosis     Gastric nodule 8/7/2014    GERD (gastroesophageal reflux disease) 8/16/2012    Goiter 8/16/2012    Hyperlipidemia 8/16/2012    Hypertension      Joint pain     Leg pain 8/16/2012    Lymphoma 8/16/2012    Osteopenia 8/16/2012    Osteoporosis     Renal cyst 3/28/2013    Rickets, vitamin D deficiency 8/16/2012    Thyroid nodule 2/24/2014    Vitamin D deficiency disease 8/16/2012     Past Surgical History:   Procedure Laterality Date    CARPAL TUNNEL RELEASE      CATARACT EXTRACTION W/  INTRAOCULAR LENS IMPLANT Bilateral     HYSTERECTOMY      partial    lymphoma surgery      L tibia     Family History       Problem Relation (Age of Onset)    Breast cancer Maternal Aunt    Cancer Brother    Heart disease Father    Hypertension Mother, Father    No Known Problems Maternal Uncle, Paternal Aunt, Paternal Uncle, Maternal Grandmother, Maternal Grandfather, Paternal Grandmother, Paternal Grandfather, Daughter, Son, Son, Daughter, Daughter          Tobacco Use    Smoking status: Never Smoker    Smokeless tobacco: Never Used   Substance and Sexual Activity    Alcohol use: No     Alcohol/week: 0.0 standard drinks    Drug use: No    Sexual activity: Not Currently     Review of Systems   Constitutional:  Negative for chills and fever.   HENT:  Negative for hearing loss and trouble swallowing.    Eyes:  Negative for discharge and visual disturbance.   Respiratory:  Negative for chest tightness and shortness of breath.    Cardiovascular:  Negative for chest pain and palpitations.   Gastrointestinal:  Positive for abdominal pain, diarrhea, nausea and vomiting. Negative for abdominal distention.   Genitourinary:  Negative for difficulty urinating and hematuria.   Musculoskeletal:  Negative for arthralgias and back pain.   Skin:  Negative for rash and wound.   Neurological:  Negative for dizziness and headaches.   Objective:     Vital Signs (Most Recent):  Temp: 97.7 °F (36.5 °C) (07/01/22 0819)  Pulse: 71 (07/01/22 0819)  Resp: 18 (07/01/22 0819)  BP: (!) 97/56 (07/01/22 0819)  SpO2: 96 % (07/01/22 0819)   Vital Signs (24h Range):  Temp:  [97.3 °F (36.3  °C)-98.7 °F (37.1 °C)] 97.7 °F (36.5 °C)  Pulse:  [67-71] 71  Resp:  [16-19] 18  SpO2:  [94 %-99 %] 96 %  BP: ()/(52-62) 97/56     Weight: 49 kg (108 lb 0.4 oz)  Body mass index is 21.82 kg/m².    Physical Exam  Constitutional:       General: She is not in acute distress.     Appearance: Normal appearance.   HENT:      Head: Normocephalic and atraumatic.   Cardiovascular:      Rate and Rhythm: Normal rate and regular rhythm.   Pulmonary:      Effort: Pulmonary effort is normal. No respiratory distress.      Breath sounds: Normal breath sounds.   Abdominal:      General: Abdomen is flat. There is no distension.      Palpations: Abdomen is soft.      Tenderness: There is no abdominal tenderness.      Comments: No tenderness to RUQ  Minimally tender to suprapubic area   Neurological:      General: No focal deficit present.      Mental Status: She is alert and oriented to person, place, and time.   Psychiatric:         Behavior: Behavior normal.         Thought Content: Thought content normal.       Significant Labs:  I have reviewed all pertinent lab results within the past 24 hours.  CBC:   Recent Labs   Lab 07/01/22  0415   WBC 4.97   RBC 4.16   HGB 12.1   HCT 37.1      MCV 89   MCH 29.1   MCHC 32.6     CMP:   Recent Labs   Lab 07/01/22  0415   GLU 65*   CALCIUM 9.2   ALBUMIN 2.5*   PROT 6.5      K 3.7   CO2 24      BUN 12   CREATININE 0.6   ALKPHOS 112   ALT 40   AST 46*   BILITOT 1.3*       Significant Diagnostics:  I have reviewed all pertinent imaging results/findings within the past 24 hours.      Assessment/Plan:     Cholelithiasis  Peri Johansen is a 88 y.o. lady with complicated PMH who now has cholelithiasis    - With elevation in troponin and comorbidity, do not recommend surgical intervention for cholelithiasis at this time  - Low suspicion for cholecystitis given minimal tenderness to palpation in her RUQ, normal white count, and US findings  - Will need to be optimized from a  cardio and pulm standpoint prior to an outpatient elective cholecystsectomy, please refer to general surgery after she is otherwise stabilized      VTE Risk Mitigation (From admission, onward)         Ordered     enoxaparin injection 40 mg  Daily         06/30/22 0257     IP VTE HIGH RISK PATIENT  Once         06/30/22 0257     Place sequential compression device  Until discontinued         06/30/22 0257                Thank you for your consult.    Parish Carey MD  General Surgery  [facility] [place] [facility]

## 2022-07-01 NOTE — SUBJECTIVE & OBJECTIVE
Past Medical History:   Diagnosis Date    Aortic atherosclerosis 1/7/2016    Arthritis     Colon polyp: hyperplastic 2015- repeat 2020 8/6/2015    Diabetes mellitus, type II 8/16/2012    Diverticulosis     Gastric nodule 8/7/2014    GERD (gastroesophageal reflux disease) 8/16/2012    Goiter 8/16/2012    Hyperlipidemia 8/16/2012    Hypertension     Joint pain     Leg pain 8/16/2012    Lymphoma 8/16/2012    Osteopenia 8/16/2012    Osteoporosis     Renal cyst 3/28/2013    Rickets, vitamin D deficiency 8/16/2012    Thyroid nodule 2/24/2014    Vitamin D deficiency disease 8/16/2012       Past Surgical History:   Procedure Laterality Date    CARPAL TUNNEL RELEASE      CATARACT EXTRACTION W/  INTRAOCULAR LENS IMPLANT Bilateral     HYSTERECTOMY      partial    lymphoma surgery      L tibia       Review of patient's allergies indicates:   Allergen Reactions    Latex, natural rubber Itching       No current facility-administered medications on file prior to encounter.     Current Outpatient Medications on File Prior to Encounter   Medication Sig    ammonium lactate 12 % Crea Apply 1 application topically once daily.    aspirin 81 MG Chew Take 1 tablet (81 mg total) by mouth once daily.    atorvastatin (LIPITOR) 40 MG tablet TAKE 1 TABLET BY MOUTH EVERY DAY    blood sugar diagnostic (TRUE METRIX GLUCOSE TEST STRIP) Strp USE AS DIRECTED    blood sugar diagnostic Strp Test 2 times daily    blood-glucose meter kit Use as instructed.  ACCUCHECK or whatever is preferred by insurance    calcium 500 mg Tab Take 1 tablet by mouth Twice daily.    dextran 70-hypromellose (ARTIFICIAL TEARS,[id number],) ophthalmic solution Place 1 drop into both eyes 4 (four) times daily as needed.    diclofenac sodium (VOLTAREN) 1 % Gel APPLY 2 GRAMS EXTERNALLY TO THE AFFECTED AREA FOUR TIMES DAILY    ergocalciferol (ERGOCALCIFEROL) 50,000 unit Cap Take 1 capsule (50,000 Units total) by mouth every 7 days.    fluticasone propionate (FLONASE) 50  mcg/actuation nasal spray 1 spray (50 mcg total) by Each Nostril route once daily.    furosemide (LASIX) 20 MG tablet Take 1 tablet (20 mg total) by mouth every evening.    furosemide (LASIX) 40 MG tablet Take 1 tablet (40 mg total) by mouth once daily.    gabapentin (NEURONTIN) 300 MG capsule TAKE 1 CAPSULE(300 MG) BY MOUTH TWICE DAILY    lancets Misc Test 2 x daily    methIMAzole (TAPAZOLE) 5 MG Tab TAKE 1/2 TABLET BY MOUTH DAILY    metoprolol succinate (TOPROL-XL) 25 MG 24 hr tablet TAKE 1 TABLET BY MOUTH EVERY DAY    MICRO THIN LANCETS 33 gauge Misc USE AS DIRECTED    omeprazole (PRILOSEC) 40 MG capsule Take 1 capsule (40 mg total) by mouth every morning.    ondansetron (ZOFRAN-ODT) 4 MG TbDL Take 1 tablet (4 mg total) by mouth every 8 (eight) hours as needed (Nausea or vomiting).    potassium chloride (KLOR-CON) 10 MEQ TbSR TAKE 1 TABLET(10 MEQ) BY MOUTH EVERY DAY    traMADoL (ULTRAM) 50 mg tablet Take 1 tablet (50 mg total) by mouth every 12 (twelve) hours as needed for Pain.     Family History       Problem Relation (Age of Onset)    Breast cancer Maternal Aunt    Cancer Brother    Heart disease Father    Hypertension Mother, Father    No Known Problems Maternal Uncle, Paternal Aunt, Paternal Uncle, Maternal Grandmother, Maternal Grandfather, Paternal Grandmother, Paternal Grandfather, Daughter, Son, Son, Daughter, Daughter          Tobacco Use    Smoking status: Never Smoker    Smokeless tobacco: Never Used   Substance and Sexual Activity    Alcohol use: No     Alcohol/week: 0.0 standard drinks    Drug use: No    Sexual activity: Not Currently     Review of Systems   Constitutional: Negative for decreased appetite.   HENT:  Negative for ear discharge.    Eyes:  Negative for blurred vision.   Respiratory:  Negative for hemoptysis.    Endocrine: Negative for polyphagia.   Hematologic/Lymphatic: Negative for adenopathy.   Skin:  Negative for color change.   Musculoskeletal:  Negative for joint swelling.    Genitourinary:  Negative for bladder incontinence.   Neurological:  Negative for brief paralysis.   Psychiatric/Behavioral:  Negative for hallucinations.    Allergic/Immunologic: Negative for hives.   Objective:     Vital Signs (Most Recent):  Temp: 97.7 °F (36.5 °C) (07/01/22 0819)  Pulse: 71 (07/01/22 0819)  Resp: 18 (07/01/22 0819)  BP: (!) 97/56 (07/01/22 0819)  SpO2: 96 % (07/01/22 0819)   Vital Signs (24h Range):  Temp:  [97.3 °F (36.3 °C)-98.7 °F (37.1 °C)] 97.7 °F (36.5 °C)  Pulse:  [67-71] 71  Resp:  [16-19] 18  SpO2:  [94 %-99 %] 96 %  BP: ()/(52-62) 97/56     Weight: 49 kg (108 lb 0.4 oz)  Body mass index is 21.82 kg/m².    SpO2: 96 %  O2 Device (Oxygen Therapy): room air      Intake/Output Summary (Last 24 hours) at 7/1/2022 1052  Last data filed at 7/1/2022 0435  Gross per 24 hour   Intake --   Output 350 ml   Net -350 ml       Lines/Drains/Airways       Peripheral Intravenous Line  Duration                  Peripheral IV - Single Lumen 06/30/22 0418 20 G Anterior;Left Forearm 1 day                    Physical Exam  Constitutional:       Appearance: She is well-developed.   HENT:      Head: Normocephalic and atraumatic.   Eyes:      Conjunctiva/sclera: Conjunctivae normal.      Pupils: Pupils are equal, round, and reactive to light.   Cardiovascular:      Rate and Rhythm: Normal rate.      Pulses: Intact distal pulses.      Heart sounds: Normal heart sounds.   Pulmonary:      Effort: Pulmonary effort is normal.      Breath sounds: Normal breath sounds.   Abdominal:      General: Bowel sounds are normal.      Palpations: Abdomen is soft.   Musculoskeletal:         General: Normal range of motion.      Cervical back: Normal range of motion and neck supple.   Skin:     General: Skin is warm and dry.   Neurological:      Mental Status: She is alert and oriented to person, place, and time.       Significant Labs: All pertinent lab results from the last 24 hours have been reviewed.    Significant  Imaging: Echocardiogram: Transthoracic echo (TTE) complete (Cupid Only):   Results for orders placed or performed during the hospital encounter of 06/30/22   Echo   Result Value Ref Range    Ascending aorta 2.15 cm    STJ 2.17 cm    AV mean gradient 3 mmHg    Ao peak russ 1.05 m/s    Ao VTI 20.97 cm    IVS 1.00 0.6 - 1.1 cm    LA size 3.66 cm    Left Atrium Major Axis 5.72 cm    Left Atrium Minor Axis 4.80 cm    LVIDd 4.20 3.5 - 6.0 cm    LVIDs 3.50 2.1 - 4.0 cm    LVOT diameter 1.89 cm    LVOT peak VTI 9.45 cm    Posterior Wall 1.10 0.6 - 1.1 cm    MV Peak A Russ 0.23 m/s    E wave deceleration time 232.57 msec    MV Peak E Russ 0.77 m/s    RA Major Axis 5.12 cm    RA Width 3.85 cm    RVDD 3.46 cm    Sinus 2.19 cm    TAPSE 1.85 cm    TR Max Russ 2.71 m/s    TDI LATERAL 0.05 m/s    TDI SEPTAL 0.04 m/s    LA WIDTH 3.80 cm    MV stenosis pressure 1/2 time 67.45 ms    LV Diastolic Volume 65.92 mL    LV Systolic Volume 32.99 mL    LVOT peak russ 0.50 m/s    LV LATERAL E/E' RATIO 15.40 m/s    LV SEPTAL E/E' RATIO 19.25 m/s    FS 17 %    LA volume 61.71 cm3    LV mass 147.00 g    Left Ventricle Relative Wall Thickness 0.52 cm    AV valve area 1.26 cm2    AV Velocity Ratio 0.48     AV index (prosthetic) 0.45     MV valve area p 1/2 method 3.26 cm2    E/A ratio 3.35     Mean e' 0.05 m/s    LVOT area 2.8 cm2    LVOT stroke volume 26.50 cm3    AV peak gradient 4 mmHg    E/E' ratio 17.11 m/s    Triscuspid Valve Regurgitation Peak Gradient 29 mmHg    BSA 1.43 m2    LV Systolic Volume Index 23.2 mL/m2    LV Diastolic Volume Index 46.42 mL/m2    LA Volume Index 43.5 mL/m2    LV Mass Index 104 g/m2    Right Atrial Pressure (from IVC) 15 mmHg    EF 40 %    TV rest pulmonary artery pressure 44 mmHg    Narrative    · The left ventricle is normal in size with mild concentric hypertrophy   and mildly decreased systolic function. The estimated ejection fraction is   40%.  · The left ventricular global longitudinal strain is -10%. The pattern  is   suggestive of infiltrative cardiomyopathy ( Amyloidosis)  · Grade III left ventricular diastolic dysfunction.  · Normal right ventricular size with normal right ventricular systolic   function.  · Biatrial enlargement.  · Mild mitral regurgitation.  · Moderate tricuspid regurgitation.  · The estimated PA systolic pressure is 44 mmHg.  · There is pulmonary hypertension.  · Elevated central venous pressure (15 mmHg).  · There is a right pleural effusion.

## 2022-07-01 NOTE — ASSESSMENT & PLAN NOTE
Peri Johansen is a 88 y.o. lady with complicated PMH who now has cholelithiasis    - With elevation in troponin and comorbidity, do not recommend surgical intervention for cholelithiasis at this time  - Low suspicion for cholecystitis given minimal tenderness to palpation in her RUQ, normal white count, and US findings  - Will need to be optimized from a cardio and pulm standpoint prior to an outpatient elective cholecystsectomy, please refer to general surgery after she is otherwise stabilized

## 2022-07-01 NOTE — ASSESSMENT & PLAN NOTE
Patient is identified as having Combined Systolic and Diastolic heart failure that is Chronic. CHF is currently controlled. Latest ECHO performed and demonstrates- Results for orders placed during the hospital encounter of 06/12/19  Patient reports chronic MONSON and intermittent BLE edema. CXR with mild interstitial edema.  BLE significant improvement with IV lasix x 1  Hold BB and lasix for now due to hypotension

## 2022-07-01 NOTE — SUBJECTIVE & OBJECTIVE
Interval History: BLE edema improved. Pain everywhere -points to upper chest and upper abdomen - epigastric , right and left upper abd     Review of Systems   Constitutional:  Positive for appetite change (decreased) and fatigue. Negative for chills, diaphoresis and fever.   HENT:  Negative for congestion, postnasal drip, rhinorrhea and sore throat.    Eyes:  Negative for photophobia and visual disturbance.   Respiratory:  Negative for cough and wheezing.    Cardiovascular:  Positive for chest pain. Negative for palpitations. Leg swelling: improving.  Gastrointestinal:  Positive for abdominal pain, diarrhea (resolved), nausea and vomiting. Negative for abdominal distention.   Genitourinary:  Positive for dysuria. Negative for difficulty urinating, flank pain and hematuria.   Musculoskeletal:  Negative for back pain, gait problem, myalgias and neck pain.   Skin:  Negative for color change, pallor, rash and wound.   Neurological:  Negative for dizziness, syncope, light-headedness and headaches.   Psychiatric/Behavioral:  Negative for confusion and hallucinations. The patient is not nervous/anxious.    Objective:     Vital Signs (Most Recent):  Temp: 97.7 °F (36.5 °C) (07/01/22 0819)  Pulse: 71 (07/01/22 0819)  Resp: 18 (07/01/22 0819)  BP: (!) 97/56 (07/01/22 0819)  SpO2: 96 % (07/01/22 0819)   Vital Signs (24h Range):  Temp:  [97.3 °F (36.3 °C)-98.7 °F (37.1 °C)] 97.7 °F (36.5 °C)  Pulse:  [67-71] 71  Resp:  [16-20] 18  SpO2:  [94 %-99 %] 96 %  BP: ()/(52-62) 97/56     Weight: 49 kg (108 lb 0.4 oz)  Body mass index is 21.82 kg/m².    Intake/Output Summary (Last 24 hours) at 7/1/2022 0907  Last data filed at 7/1/2022 0435  Gross per 24 hour   Intake --   Output 350 ml   Net -350 ml      Physical Exam  Vitals and nursing note reviewed.   Constitutional:       General: She is not in acute distress.     Appearance: She is not toxic-appearing or diaphoretic.   HENT:      Head: Normocephalic and atraumatic.       Nose: Nose normal.      Mouth/Throat:      Mouth: Mucous membranes are dry.   Eyes:      Pupils: Pupils are equal, round, and reactive to light.   Cardiovascular:      Rate and Rhythm: Normal rate and regular rhythm.      Pulses: Normal pulses.      Heart sounds: No murmur heard.  Pulmonary:      Effort: Pulmonary effort is normal. No respiratory distress.      Breath sounds: Examination of the right-lower field reveals decreased breath sounds. Examination of the left-lower field reveals decreased breath sounds. Decreased breath sounds present. No wheezing, rhonchi or rales.      Comments: Currently on room air  Chest:      Chest wall: Tenderness present.   Abdominal:      General: Bowel sounds are normal.      Palpations: Abdomen is soft.      Tenderness: Tenderness: epigastric , rUQ, LUQ. There is no guarding.   Genitourinary:     Comments: deferred  Musculoskeletal:         General: No swelling, tenderness or deformity. Normal range of motion.      Cervical back: Normal range of motion. No tenderness.   Skin:     General: Skin is warm and dry.      Capillary Refill: Capillary refill takes less than 2 seconds.   Neurological:      General: No focal deficit present.      Mental Status: She is alert and oriented to person, place, and time. Mental status is at baseline.       Significant Labs: All pertinent labs within the past 24 hours have been reviewed.    Significant Imaging: I have reviewed all pertinent imaging results/findings within the past 24 hours.

## 2022-07-01 NOTE — CONSULTS
"[facility] [place]iology  Consult Note    Patient Name: Peri Johansen  MRN: [id number]  Admission Date: 6/30/2022  Hospital Length of Stay: 0 days  Code Status: Full Code   Attending Provider: Cristi Delong MD   Consulting Provider: Emil Brannon MD  Primary Care Physician: Annika Garland MD  Principal Problem:Chest pain    Patient information was obtained from patient and ER records.     Consults  Subjective:     Chief Complaint:  CP, CHF     HPI: Peri Johansen is a 88 y.o. female with a PMHx of DM2, GERD, HLD,  HTN, lymphoma, CHF, and hyperthyroidism who presents to the ED with complaints of chest/ epigastric pain. Patient reports pain started abruptly a few hours prior to arrival.  Was 10/10 at its worst.  It was worsened with exertion.  Endorses nausea, vomiting, decreased oral intake, and fatigue.  She also notes intermittent RUQ abdominal pain that has been going on for "quite some time".  The patient reports intermittent MONSON for several months slightly worse and intermittent BLE edema.  She notes that sometimes it hurts when she starts to urinate. The patient denies any diarrhea, fever, chills, cough, headache, or weakness.     In the ED, patient mildly hypotensive but otherwise VSSAF. CBC unremarkable. Glucose 147. Tbili 1.3. Alk phos 152. AST/ALT 69/46. Lipase WNL. Troponin 0.074 (at baseline).  (slightly above baseline). UA with 3+ leukocytes, 20 WBCs, and, occasional bacteria. EKG sinus rhythm rate 62, no acute ischemic changes. CXR with mild cardiomegaly with mild interstitial edema. No significant change from prior study.The patient received small fluid bolus, ASA, and GI cocktail in the ED.    Currently denies CP or SOB. CP is somewhat chronic per chart  Troponin 0.07 trending down slowly    EKG NSR PRWP no acute STT changes    PET stress 2/18/22    The relative PET images show no clinically significant regional resting or stress induced perfusion abnormalities.    " The whole heart absolute myocardial perfusion values averaged 0.95 cc/min/g at rest, which is normal; 1.61 cc/min/g at stress, which is mildly reduced; and CFR is 1.70 , which is mildly reduced.    CT attenuation images demonstrate moderate diffuse coronary calcifications in the LAD, LCX and RCA territory and moderate diffuse aortic calcifications of the descending aorta.    The gated perfusion images showed an ejection fraction of 47% at rest and 50% during stress. A normal ejection fraction is greater than %.    There is mild global hypokinesis at rest and during stress.    The LV cavity size is normal at rest and stress.    The EKG portion of this study is negative for ischemia.    During stress, rare PVCs are noted.    The patient reported no chest pain during the stress test.    There are no prior studies for comparison.    Echo 6/30/22  · The left ventricle is normal in size with mild concentric hypertrophy and mildly decreased systolic function. The estimated ejection fraction is 40%.  · The left ventricular global longitudinal strain is -10%. 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  · Normal right ventricular size with normal right ventricular systolic function.  · Biatrial enlargement.  · Mild mitral regurgitation.  · Moderate tricuspid regurgitation.  · The estimated PA systolic pressure is 44 mmHg.  · There is pulmonary hypertension.  · Elevated central venous pressure (15 mmHg).  · There is a right pleural effusion.     [facility]  Patient has been having LE swelling that improves with extra doses of lasix. She takes lasix 20mg PO BID and additional 40mg PO dose 2-3 times a week for volume overload. The most active thing she does is walk around the house, down the major way. She uses a cane when she thinks she needs to walk long distances. She is able to perform ADLs, examples include cooking and cleaning for  herself. They help her in and out of the shower because they don't want her to slip. She stays with her granddaughter some days out of the week who is an RN and helps with her medication, her granddaughter is here with her and helped with her history. She is compliant with all of her medications. She has shortness of breath with exertion, no shortness of breath at rest. The shortness of breath with walking improves when she takes additional lasix doses. She has LE edema, improves with lasix. She also has orthopnea, sleeps on 4 pillows and thinks she needs 5. Denies PND. She had an echo stress in 5/2021 that was negative for ischemia. She reports having a coronary angiogram in the past but is unsure of the results. Denies issues with bleeding. Denies syncope. Denies palpitations. Denies recent illness and hospitalization. She has not had COVID to her knowledge and has been vaccinated with two doses, has not taken the booster. Blood pressure and heart rate well controlled at home.       Past Medical History:   Diagnosis Date    Aortic atherosclerosis 1/7/2016    Arthritis     Colon polyp: hyperplastic 2015- repeat 2020 8/6/2015    Diabetes mellitus, type II 8/16/2012    Diverticulosis     Gastric nodule 8/7/2014    GERD (gastroesophageal reflux disease) 8/16/2012    Goiter 8/16/2012    Hyperlipidemia 8/16/2012    Hypertension     Joint pain     Leg pain 8/16/2012    Lymphoma 8/16/2012    Osteopenia 8/16/2012    Osteoporosis     Renal cyst 3/28/2013    Rickets, vitamin D deficiency 8/16/2012    Thyroid nodule 2/24/2014    Vitamin D deficiency disease 8/16/2012       Past Surgical History:   Procedure Laterality Date    CARPAL TUNNEL RELEASE      CATARACT EXTRACTION W/  INTRAOCULAR LENS IMPLANT Bilateral     HYSTERECTOMY      partial    lymphoma surgery      L tibia       Review of patient's allergies indicates:   Allergen Reactions    Latex, natural rubber Itching       No current  facility-administered medications on file prior to encounter.     Current Outpatient Medications on File Prior to Encounter   Medication Sig    ammonium lactate 12 % Crea Apply 1 application topically once daily.    aspirin 81 MG Chew Take 1 tablet (81 mg total) by mouth once daily.    atorvastatin (LIPITOR) 40 MG tablet TAKE 1 TABLET BY MOUTH EVERY DAY    blood sugar diagnostic (TRUE METRIX GLUCOSE TEST STRIP) Strp USE AS DIRECTED    blood sugar diagnostic Strp Test 2 times daily    blood-glucose meter kit Use as instructed.  ACCUCHECK or whatever is preferred by insurance    calcium 500 mg Tab Take 1 tablet by mouth Twice daily.    dextran 70-hypromellose (ARTIFICIAL TEARS,[id number],) ophthalmic solution Place 1 drop into both eyes 4 (four) times daily as needed.    diclofenac sodium (VOLTAREN) 1 % Gel APPLY 2 GRAMS EXTERNALLY TO THE AFFECTED AREA FOUR TIMES DAILY    ergocalciferol (ERGOCALCIFEROL) 50,000 unit Cap Take 1 capsule (50,000 Units total) by mouth every 7 days.    fluticasone propionate (FLONASE) 50 mcg/actuation nasal spray 1 spray (50 mcg total) by Each Nostril route once daily.    furosemide (LASIX) 20 MG tablet Take 1 tablet (20 mg total) by mouth every evening.    furosemide (LASIX) 40 MG tablet Take 1 tablet (40 mg total) by mouth once daily.    gabapentin (NEURONTIN) 300 MG capsule TAKE 1 CAPSULE(300 MG) BY MOUTH TWICE DAILY    lancets Misc Test 2 x daily    methIMAzole (TAPAZOLE) 5 MG Tab TAKE 1/2 TABLET BY MOUTH DAILY    metoprolol succinate (TOPROL-XL) 25 MG 24 hr tablet TAKE 1 TABLET BY MOUTH EVERY DAY    MICRO THIN LANCETS 33 gauge Misc USE AS DIRECTED    omeprazole (PRILOSEC) 40 MG capsule Take 1 capsule (40 mg total) by mouth every morning.    ondansetron (ZOFRAN-ODT) 4 MG TbDL Take 1 tablet (4 mg total) by mouth every 8 (eight) hours as needed (Nausea or vomiting).    potassium chloride (KLOR-CON) 10 MEQ TbSR TAKE 1 TABLET(10 MEQ) BY MOUTH EVERY DAY    traMADoL  (ULTRAM) 50 mg tablet Take 1 tablet (50 mg total) by mouth every 12 (twelve) hours as needed for Pain.     Family History       Problem Relation (Age of Onset)    Breast cancer Maternal Aunt    Cancer Brother    Heart disease Father    Hypertension Mother, Father    No Known Problems Maternal Uncle, Paternal Aunt, Paternal Uncle, Maternal Grandmother, Maternal Grandfather, Paternal Grandmother, Paternal Grandfather, Daughter, Son, Son, Daughter, Daughter          Tobacco Use    Smoking status: Never Smoker    Smokeless tobacco: Never Used   Substance and Sexual Activity    Alcohol use: No     Alcohol/week: 0.0 standard drinks    Drug use: No    Sexual activity: Not Currently     Review of Systems   Constitutional: Negative for decreased appetite.   HENT:  Negative for ear discharge.    Eyes:  Negative for blurred vision.   Respiratory:  Negative for hemoptysis.    Endocrine: Negative for polyphagia.   Hematologic/Lymphatic: Negative for adenopathy.   Skin:  Negative for color change.   Musculoskeletal:  Negative for joint swelling.   Genitourinary:  Negative for bladder incontinence.   Neurological:  Negative for brief paralysis.   Psychiatric/Behavioral:  Negative for hallucinations.    Allergic/Immunologic: Negative for hives.   Objective:     Vital Signs (Most Recent):  Temp: 97.7 °F (36.5 °C) (07/01/22 0819)  Pulse: 71 (07/01/22 0819)  Resp: 18 (07/01/22 0819)  BP: (!) 97/56 (07/01/22 0819)  SpO2: 96 % (07/01/22 0819)   Vital Signs (24h Range):  Temp:  [97.3 °F (36.3 °C)-98.7 °F (37.1 °C)] 97.7 °F (36.5 °C)  Pulse:  [67-71] 71  Resp:  [16-19] 18  SpO2:  [94 %-99 %] 96 %  BP: ()/(52-62) 97/56     Weight: 49 kg (108 lb 0.4 oz)  Body mass index is 21.82 kg/m².    SpO2: 96 %  O2 Device (Oxygen Therapy): room air      Intake/Output Summary (Last 24 hours) at 7/1/2022 1052  Last data filed at 7/1/2022 0435  Gross per 24 hour   Intake --   Output 350 ml   Net -350 ml       Lines/Drains/Airways        Peripheral Intravenous Line  Duration                  Peripheral IV - Single Lumen 06/30/22 0418 20 G Anterior;Left Forearm 1 day                    Physical Exam  Constitutional:       Appearance: She is well-developed.   HENT:      Head: Normocephalic and atraumatic.   Eyes:      Conjunctiva/sclera: Conjunctivae normal.      Pupils: Pupils are equal, round, and reactive to light.   Cardiovascular:      Rate and Rhythm: Normal rate.      Pulses: Intact distal pulses.      Heart sounds: Normal heart sounds.   Pulmonary:      Effort: Pulmonary effort is normal.      Breath sounds: Normal breath sounds.   Abdominal:      General: Bowel sounds are normal.      Palpations: Abdomen is soft.   Musculoskeletal:         General: Normal range of motion.      Cervical back: Normal range of motion and neck supple.   Skin:     General: Skin is warm and dry.   Neurological:      Mental Status: She is alert and oriented to person, place, and time.       Significant Labs: All pertinent lab results from the last 24 hours have been reviewed.    Significant Imaging: Echocardiogram: Transthoracic echo (TTE) complete (Cupid Only):   Results for orders placed or performed during the hospital encounter of 06/30/22   Echo   Result Value Ref Range    Ascending aorta 2.15 cm    STJ 2.17 cm    AV mean gradient 3 mmHg    Ao peak russ 1.05 m/s    Ao VTI 20.97 cm    IVS 1.00 0.6 - 1.1 cm    LA size 3.66 cm    Left Atrium Major Axis 5.72 cm    Left Atrium Minor Axis 4.80 cm    LVIDd 4.20 3.5 - 6.0 cm    LVIDs 3.50 2.1 - 4.0 cm    LVOT diameter 1.89 cm    LVOT peak VTI 9.45 cm    Posterior Wall 1.10 0.6 - 1.1 cm    MV Peak A Russ 0.23 m/s    E wave deceleration time 232.57 msec    MV Peak E Russ 0.77 m/s    RA Major Axis 5.12 cm    RA Width 3.85 cm    RVDD 3.46 cm    Sinus 2.19 cm    TAPSE 1.85 cm    TR Max Russ 2.71 m/s    TDI LATERAL 0.05 m/s    TDI SEPTAL 0.04 m/s    LA WIDTH 3.80 cm    MV stenosis pressure 1/2 time 67.45 ms    LV Diastolic  Volume 65.92 mL    LV Systolic Volume 32.99 mL    LVOT peak jude 0.50 m/s    LV LATERAL E/E' RATIO 15.40 m/s    LV SEPTAL E/E' RATIO 19.25 m/s    FS 17 %    LA volume 61.71 cm3    LV mass 147.00 g    Left Ventricle Relative Wall Thickness 0.52 cm    AV valve area 1.26 cm2    AV Velocity Ratio 0.48     AV index (prosthetic) 0.45     MV valve area p 1/2 method 3.26 cm2    E/A ratio 3.35     Mean e' 0.05 m/s    LVOT area 2.8 cm2    LVOT stroke volume 26.50 cm3    AV peak gradient 4 mmHg    E/E' ratio 17.11 m/s    Triscuspid Valve Regurgitation Peak Gradient 29 mmHg    BSA 1.43 m2    LV Systolic Volume Index 23.2 mL/m2    LV Diastolic Volume Index 46.42 mL/m2    LA Volume Index 43.5 mL/m2    LV Mass Index 104 g/m2    Right Atrial Pressure (from IVC) 15 mmHg    EF 40 %    TV rest pulmonary artery pressure 44 mmHg    Narrative    · The left ventricle is normal in size with mild concentric hypertrophy   and mildly decreased systolic function. The estimated ejection fraction is   40%.  · The left ventricular global longitudinal strain is -10%. The pattern is   suggestive of infiltrative cardiomyopathy ( Amyloidosis)  · Grade III left ventricular diastolic dysfunction.  · Normal right ventricular size with normal right ventricular systolic   function.  · Biatrial enlargement.  · Mild mitral regurgitation.  · Moderate tricuspid regurgitation.  · The estimated PA systolic pressure is 44 mmHg.  · There is pulmonary hypertension.  · Elevated central venous pressure (15 mmHg).  · There is a right pleural effusion.        Assessment and Plan:     * Chest pain  Chronic and atypical troponin 0.07 and trending down. PET stress negative for ischemia 2/2022. Currently CP free. Doubt ACS. No plan to repeat ischemic evaluation    Type 2 diabetes mellitus with hyperglycemia, without long-term current use of insulin  Per primary    Chronic congestive heart failure  Combined CHF with baseline EF 40-50%. Diuresis and afterload reduction as  tolerated  Echo    Interpretation Summary  · The left ventricle is normal in size with mild concentric hypertrophy and mildly decreased systolic function. The estimated ejection fraction is 40%.  · The left ventricular global longitudinal strain is -10%. 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  · Normal right ventricular size with normal right ventricular systolic function.  · Biatrial enlargement.  · Mild mitral regurgitation.  · Moderate tricuspid regurgitation.  · The estimated PA systolic pressure is 44 mmHg.  · There is pulmonary hypertension.  · Elevated central venous pressure (15 mmHg).  · There is a right pleural effusion.    Recent Labs   Lab 06/30/22  0112   *   .          VTE Risk Mitigation (From admission, onward)         Ordered     enoxaparin injection 40 mg  Daily         06/30/22 0257     IP VTE HIGH RISK PATIENT  Once         06/30/22 0257     Place sequential compression device  Until discontinued         06/30/22 0257                Thank you for your consult. I will sign off. Please contact us if you have any additional questions.    Emil Brannon MD  Cardiology   [facility] [place]

## 2022-07-01 NOTE — ASSESSMENT & PLAN NOTE
Patient was in ED 9 days ago for NVD and abdominal pain.    Elevated LFTs and T bili similar to previous labs. Elevated GGT. Lipase normal.   US abdomen showed discrete cholelithiasis and mild gall bladder wall thickening/gallbladder wall edema.   US also showed right hepatic lobe lesion 1.0 cm and recommends non emergent triple phase CT or MRI exam and bilateral effusion.    Acute hep panel pending  6/30, GI and General surgery consult for concern symptomatic cholelithiasis.  7/1 , this am diffuse upper chest and abdominal L>R pain on palpation. General Surgery have low suspicion for cholecystitis and recommends no surgical intervention and outpatient follow up.

## 2022-07-01 NOTE — PLAN OF CARE
[facility] - [place] - Telemetry  Initial Discharge Assessment       Primary Care Provider: Annika Garland MD    Admission Diagnosis: Chest pain [R07.9]  Urinary tract infection without hematuria, site unspecified [N39.0]    Admission Date: 6/30/2022  Expected Discharge Date: 7/1/2022         Payor: HUMA[employer] MANAGED MEDICARE / Plan: HUMANA SNP (SPECIAL NEEDS PLAN) / Product Type: Medicare Advantage /     Extended Emergency Contact Information  Primary Emergency Contact: Zoltan Majano   [employer]  Home Phone: [phone]  Work Phone: [phone]  Mobile Phone: [phone]  Relation: Grandchild  Secondary Emergency Contact: Glenda Majano  Address: [address] [place] [address] [employer]  Home Phone: [phone]  Relation: Daughter    Discharge Plan A: Home with family         [id number] [place] [address] AT [facility]  [address]  [facility] [address]  Phone: [phone] Fax: [phone]    [id number] [place] [address]  [address]  [address]  [facility] [address]  Phone: [phone] Fax: [phone]    [facility] [place] - [address]  [address]  [place] [address]  Phone: [phone] Fax: [phone]      Initial Assessment (most recent)     Adult Discharge Assessment - 07/01/22 1350        Discharge Assessment    Assessment Type Discharge Planning Assessment     Confirmed/corrected address, phone number and insurance Yes     Confirmed Demographics Correct on Facesheet     Source of Information family     Does patient/caregiver understand observation status Yes     Lives With child(raine), adult     Do you expect to return to your current living situation? Yes     Do you have help at home or someone to help you manage your care at home? Yes     Prior to hospitilization cognitive status: Alert/Oriented      Current cognitive status: Alert/Oriented     Walking or Climbing Stairs Difficulty ambulation difficulty, requires equipment     Dressing/Bathing Difficulty bathing difficulty, requires equipment     Home Layout Able to live on 1st floor     Equipment Currently Used at Home cane, straight;shower chair;bedside commode     Readmission within 30 days? No     Patient currently being followed by outpatient case management? No     Do you currently have service(s) that help you manage your care at home? No     Do you take prescription medications? Yes     Do you have prescription coverage? Yes     Coverage [employer]     Do you have any problems affording any of your prescribed medications? No     Is the patient taking medications as prescribed? yes     Who is going to help you get home at discharge? Daughter or granddaughter     How do you get to doctors appointments? family or friend will provide     Are you on dialysis? No     Do you take coumadin? No     Discharge Plan A Home with family     DME Needed Upon Discharge  none     Discharge Plan discussed with: Adult children        Relationship/Environment    Name(s) of Who Lives With Patient Daughter and granddaughter

## 2022-07-02 NOTE — DISCHARGE SUMMARY
"[facility]  Discharge Summary      Patient Name: Peri Johansen  MRN: [id number]  Patient Class: OP- Observation  Admission Date: 6/30/2022  Hospital Length of Stay: 0 days  Discharge Date and Time: 7/1/2022  Attending Physician: Cristi Delong mD  Discharging Provider: Uma Morales NP  Primary Care Provider: Annika Garland MD      HPI:   Peri Johansen is a 88 y.o. female with a PMHx of DM2, GERD, HLD,  HTN, lymphoma, CHF, and hyperthyroidism who presents to the ED with complaints of chest/ epigastric pain. Patient reports pain started abruptly a few hours prior to arrival.  Was 10/10 at its worst.  It was worsened with exertion.  Endorses nausea, vomiting, decreased oral intake, and fatigue.  She also notes intermittent RUQ abdominal pain that has been going on for "quite some time".  The patient reports intermittent MONSON for several months slightly worse and intermittent BLE edema.  She notes that sometimes it hurts when she starts to urinate. The patient denies any diarrhea, fever, chills, cough, headache, or weakness.    In the ED, patient mildly hypotensive but otherwise VSSAF. CBC unremarkable. Glucose 147. Tbili 1.3. Alk phos 152. AST/ALT 69/46. Lipase WNL. Troponin 0.074 (at baseline).  (slightly above baseline). UA with 3+ leukocytes, 20 WBCs, and, occasional bacteria. EKG sinus rhythm rate 62, no acute ischemic changes. CXR with mild cardiomegaly with mild interstitial edema. No significant change from prior study.The patient received small fluid bolus, ASA, and GI cocktail in the ED.      * No surgery found *      Hospital Course:   Transferred from [facility] to [facility] for mid sternal chest pain radiating to epigastric region and to sides. Patient was in ED 9 days ago for NVD and abdominal pain.  Elevated LFTs and T bili similar to previous labs. Elevated GGT. Lipase normal. US abdomen showed discrete cholelithiasis and mild gall bladder wall " thickening/gallbladder wall edema. US also showed right hepatic lobe lesion 1.0 cm and recommends non emergent triple phase CT or MRI exam and bilateral effusion. CXR showed mild interstitial edema. On exam, lung diminished and BLE edema. Denies chest pain or sob. Mild elevated troponin trending down suspect demand ischemia from HF. 2 d echo EF 40%, Grade III DD, biatrial enlargement, PH 44mmg, LV gblobal strain 10%-pattern suggestive infiltrative cardiomyopathy (amyloidosis). Consult Cardiology chest pain and new finding -amyloidosis- on 2 D echo. Cardiology does not suspect ACS or recommends repeat ischemic evaluation.    Lasix 40 mg IV with significant improvement in BLE swelling. Continue home lasix.   6/30, consult GI and General surgery for concern symptomatic cholelithiasis.7/1 , this am diffuse upper chest and abdominal L>R pain on palpation. General Surgery have low suspicion for cholecystitis and recommends no surgical intervention and outpatient follow up. On repeat exam, no tenderness and tolerated regular diet.   Patient HDS for discharge home with close follow up PCP, GI, and General Surgery.        Goals of Care Treatment Preferences:  Code Status: Full Code      Consults:   Consults (From admission, onward)        Status Ordering Provider     Inpatient consult to General Surgery  Once        Provider:  Alfredo Fry MD    Completed JOSE ALBERTO SMITH     Inpatient consult to Gastroenterology  Once        Provider:  Gigi Mansfield MD    Completed JOSE ALBERTO SMITH          * Chest pain  On admission, patient with sharp chest pain midsternal radiating to epigastric region. Endorses associated N/V. Denies palpitations. Pain reproducible on exam. Low suspicion for cardiac cause. Reports improvement with ASA and GI cocktail.  Troponin 0.074, repeat 0.074  1/2022 Cardiac pet stress    The relative PET images show no clinically significant regional resting or stress induced perfusion abnormalities.     The whole heart absolute myocardial perfusion values averaged 0.95 cc/min/g at rest, which is normal; 1.61 cc/min/g at stress, which is mildly reduced; and CFR is 1.70 , which is mildly reduced.  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  Patient is a poor historian   Cardiology consult for 2 D echo finding -amyloidosis, chest pain     Continue ASA    Elevated LFTs, T bili and epigatric pain  Patient was in ED 9 days ago for NVD and abdominal pain.    Elevated LFTs and T bili similar to previous labs. Elevated GGT. Lipase normal.   US abdomen showed discrete cholelithiasis and mild gall bladder wall thickening/gallbladder wall edema.   US also showed right hepatic lobe lesion 1.0 cm and recommends non emergent triple phase CT or MRI exam and bilateral effusion.    Acute hep panel pending  6/30, GI and General surgery consult for concern symptomatic cholelithiasis.  7/1 , this am diffuse upper chest and abdominal L>R pain on palpation. General Surgery have low suspicion for cholecystitis and recommends no surgical intervention and outpatient follow up.           Final Active Diagnoses:    Diagnosis Date Noted POA    PRINCIPAL PROBLEM:  Chest pain [R07.9] 01/21/2022 Yes    Elevated LFTs, T bili and epigatric pain [R79.89] 06/30/2022 Yes    Chronic congestive heart failure [I50.9] 06/12/2019 Yes    Cholelithiasis [K80.20] 07/01/2022 Yes    Acute cystitis without hematuria [N30.00] 06/30/2022 Yes    Type 2 diabetes mellitus with hyperglycemia, without long-term current use of insulin [E11.65] 06/12/2019 Yes    Hyperthyroidism [E05.90] 04/15/2019 Yes    Gastroesophageal reflux disease without  esophagitis [K21.9] 08/06/2015 Yes    Mixed hyperlipidemia [E78.2] 08/16/2012 Yes      Problems Resolved During this Admission:       Discharged Condition: stable    Disposition: Home or Self Care    Follow Up:    Patient Instructions:      Diet Cardiac     Notify your health care provider if you experience any of the following:  temperature >100.4     Notify your health care provider if you experience any of the following:  difficulty breathing or increased cough     Notify your health care provider if you experience any of the following:  increased confusion or weakness     Activity as tolerated       Significant Diagnostic Studies: Labs: All labs within the past 24 hours have been reviewed    Pending Diagnostic Studies:     Procedure Component Value Units Date/Time    EKG 12-lead [[id number]]     Order Status: Sent Lab Status: No result          Medications:  Reconciled Home Medications:      Medication List      START taking these medications    cephALEXin 500 MG capsule  Commonly known as: KEFLEX  Take 1 capsule (500 mg total) by mouth 3 (three) times daily. for 3 days        CHANGE how you take these medications    metoprolol succinate 25 MG 24 hr tablet  Commonly known as: TOPROL-XL  Take 1 tablet (25 mg total) by mouth once daily. Hold if SBP <120 or <55  What changed: additional instructions        CONTINUE taking these medications    ammonium lactate 12 % Crea  Apply 1 application topically once daily.     aspirin 81 MG Chew  Take 1 tablet (81 mg total) by mouth once daily.     atorvastatin 40 MG tablet  Commonly known as: LIPITOR  TAKE 1 TABLET BY MOUTH EVERY DAY     blood-glucose meter kit  Use as instructed.  ACCUCHECK or whatever is preferred by insurance     calcium 500 mg Tab  Take 1 tablet by mouth Twice daily.     dextran 70-hypromellose ophthalmic solution  Commonly known as: ARTIFICIAL TEARS(DLNT95-ETCIP)  Place 1 drop into both eyes 4 (four) times daily as needed.     diclofenac sodium 1 %  Gel  Commonly known as: VOLTAREN  APPLY 2 GRAMS EXTERNALLY TO THE AFFECTED AREA FOUR TIMES DAILY     ergocalciferol 50,000 unit Cap  Commonly known as: ERGOCALCIFEROL  Take 1 capsule (50,000 Units total) by mouth every 7 days.     fluticasone propionate 50 mcg/actuation nasal spray  Commonly known as: FLONASE  1 spray (50 mcg total) by Each Nostril route once daily.     * furosemide 40 MG tablet  Commonly known as: LASIX  Take 1 tablet (40 mg total) by mouth once daily.     * furosemide 20 MG tablet  Commonly known as: LASIX  Take 1 tablet (20 mg total) by mouth every evening.     gabapentin 300 MG capsule  Commonly known as: NEURONTIN  TAKE 1 CAPSULE(300 MG) BY MOUTH TWICE DAILY     methIMAzole 5 MG Tab  Commonly known as: TAPAZOLE  TAKE 1/2 TABLET BY MOUTH DAILY     * MICRO THIN LANCETS 33 gauge Misc  Generic drug: lancets  USE AS DIRECTED     * lancets Misc  Test 2 x daily     omeprazole 40 MG capsule  Commonly known as: PRILOSEC  Take 1 capsule (40 mg total) by mouth every morning.     ondansetron 4 MG Tbdl  Commonly known as: ZOFRAN-ODT  Take 1 tablet (4 mg total) by mouth every 8 (eight) hours as needed (Nausea or vomiting).     potassium chloride 10 MEQ Tbsr  Commonly known as: KLOR-CON  TAKE 1 TABLET(10 MEQ) BY MOUTH EVERY DAY     traMADoL 50 mg tablet  Commonly known as: ULTRAM  Take 1 tablet (50 mg total) by mouth every 12 (twelve) hours as needed for Pain.     * TRUE METRIX GLUCOSE TEST STRIP Strp  Generic drug: blood sugar diagnostic  USE AS DIRECTED     * blood sugar diagnostic Strp  Test 2 times daily         * This list has 6 medication(s) that are the same as other medications prescribed for you. Read the directions carefully, and ask your doctor or other care provider to review them with you.                Indwelling Lines/Drains at time of discharge:   Lines/Drains/Airways     None                 Time spent on the discharge of patient: 35 minutes         Uma Morales NP  Department of  [facility] - Telemetry

## 2022-07-07 NOTE — TELEPHONE ENCOUNTER
Please call, she was in the hospital recently.  Please arrange for hospital follow-up with me, it can be a virtual visit sometime in the next couple of weeks, thank you

## 2022-07-20 NOTE — TELEPHONE ENCOUNTER
Left pt a voicemail instructing her to call back so that she can be scheduled.     Sharath PAREDES.

## 2022-07-20 NOTE — TELEPHONE ENCOUNTER
reviewed, controlled meds refilled but for only 2 weeks.  She canceled her last appointment with me.  I have not seen her since January.  She needs an office visit for additional refills.

## 2022-07-20 NOTE — TELEPHONE ENCOUNTER
Care Due:                  Date            Visit Type   Department     Provider  --------------------------------------------------------------------------------                                EP -                              PRIMARY      [facility] INTER[facility]  Last Visit: 01-      CARE ([facility])   MEDICINE       Annika Garland  Next Visit: None Scheduled  None         None Found                                                            Last  Test          Frequency    Reason                     Performed    Due Date  --------------------------------------------------------------------------------    Lipid Panel.  12 months..  atorvastatin.............  10-   10-    [facility] Embedded Care Gaps. Reference number: [id number]. 7/20/2022   12:33:47 PM CDT

## 2022-07-25 NOTE — PROGRESS NOTES
Outpatient Care Management  Plan of Care Follow Up Visit    Patient: Peri Johansen  MRN: [id number]  Date of Service: 07/25/2022  Completed by: Samantha Butts RN  Referral Date: 04/19/2022  Program:     Reason for Visit   Patient presents with    OPCM Chart Review     7/25/2022      Follow-up     7/25/2022    Update Plan Of Care     7/25/2022       Brief Summary: Patient's granddaughter, Zoltan, states that patient now has a working scale at home but she is not consistent in weighing herself like she should.  She weighed herself at [facility]'s house yesterday and she was 107 lbs.  Zoltan states the patient has some edema, 2+, in her ankles and lower extremities but no shortness of breath.  She has not gained 3 lbs in one day or 5 lbs in one week.  Patient's granddaughter, Zoltan, states the patient follows a low sodium diet whenever she is visiting [facility] but goes off her diet whenever she is at home when she feels like it.  Yesterday she had a bowl of gumbo with a lot of sodium in it from her brother.  She is not following a fluid restriction.  She drinks mostly water and tea.  Zoltan received the glucerna coupons and the patient drinks the glucerna when she isn't eating much.  She missed her appointments with her primary care doctor, Annika Garland MD.  Zoltan requests an appointment to be made Tuesday through Friday in the afternoon.    LULÚ reviewed parameters of notifying PCP/Cardiologist if patient gains 3 lbs in one day or 5 lbs in one week.  Zoltan repeated back the parameters to notify for.  LULÚ re-reviewed fluid restriction with Zoltan.  LULÚ explained if she takes in too much fluid then she will swell.  She should be keeping her fluids around 1500 mls.  LULÚ also called primary care schedulers to schedule an appointment with Dr. Annika Garland.  No appointments available until November so a message was sent to Dr. Sarah nurse to call LULÚ with an appointment.    Next steps:   Follow up  in four week  Follow up on daily weights and log  Follow up on low Na diet and fluid restriction  Follow up if eating more and gaining weight  Follow up on appointment with Dr. Garland about weight loss    Follow up in about 4 weeks (around 8/22/2022).    Patient Summary     Involvement of Care:  Do I have permission to speak with other family members about your care?  Yes    Patient Reported Labs & Vitals:  1.  Any Patient Reported Labs & Vitals?  Yes  2.  Patient Reported Blood Pressure:     3.  Patient Reported Pulse:     4.  Patient Reported Weight (Kg):  48.5  5.  Patient Reported Blood Glucose (mg/dl):       Medical and social history was reviewed with patient and/or caregiver.     Clinical Assessment     Reviewed and provided basic information on available community resources for mental health, transportation, wellness resources, and palliative care programs with patient and/or caregiver.     Complex Care Plan     Care plan was discussed and completed today with input from patient and/or caregiver.    Patient Instructions     Instructions were provided via the [employer] patient resources and are available for the patient to view on the patient portal.    Todays OPCM Self-Management Care Plan was developed with the patients/caregivers input and was based on identified barriers from todays assessment.  Goals were written today with the patient/caregiver and the patient has agreed to work towards these goals to improve his/her overall well-being. Patient verbalized understanding of the care plan, goals, and all of today's instructions. Encouraged patient/caregiver to communicate with his/her physician and health care team about health conditions and the treatment plan.  Provided my contact information today and encouraged patient/caregiver to call me with any questions as needed.

## 2022-08-10 NOTE — TELEPHONE ENCOUNTER
Yes, please have her submit a urine, order signed    Please let her know that when she was discharged from the hospital, it was recommended that she see General surgery and Gastro, referrals are in for both, she should schedule as soon as possible

## 2022-08-10 NOTE — TELEPHONE ENCOUNTER
Spoke to patient via portal.  Stated she was admitted to the hospital for a UTI.  When she was dc she didn't get a chance to finish her antibiotics until about a week ago.     Patient stated she is experiencing burning and itching.   Patient declined abd pain, fever, or blood in urine.     Patient is requesting a rx for uti.   Please advise

## 2022-08-11 NOTE — TELEPHONE ENCOUNTER
----- Message from Av Be sent at 8/11/2022  1:10 PM CDT -----  Contact: [phone]  Pt missed a call and would like a call back.

## 2022-08-16 NOTE — TELEPHONE ENCOUNTER
Please let her know that it does not appear that she has a urinary infection right now.  If she is continuing to have urine issues, I would recommend a consultation with Urology    Thank you

## 2022-08-22 NOTE — PROGRESS NOTES
Outpatient Care Management  Plan of Care Follow Up Visit    Patient: Peri Johansen  MRN: [id number]  Date of Service: 08/22/2022  Completed by: Samantha Butts RN  Referral Date: 04/19/2022    Reason for Visit   Patient presents with    OPCM Chart Review     8/22/2022    OPCM RN First Follow-Up Attempt     8/22/2022  No answer, CM left voicemail messages and sent follow up attempt letter through the portal.    OPCM RN Second Follow-Up Attempt     8/23/2022  No answer, CM left voicemail messages.    Follow-up     8/25/2022    Update Plan Of Care     8/25/2022       Brief Summary: Patient only weighs herself sometimes.  She is still only weighing around 107 lbs.  She has not gained any weight.  She is still having swelling to the ankles bilaterally.  Her podiatrist gave her some compression type stockings to wear.  Patient's appetite comes and goes on a daily basis.  She doesn't always eat like she should and she is not drinking her Glucerna whenever she doesn't eat as much as she should.  Today she ate a lot at lunch at [employer] caf[employer]ia.  She had meatloaf, mashed potatoes and vegetables.  She sticks to her fluid restriction for the most part.  She is still eating whatever she wants when not around [place].    CM discussed the importance of weighing herself daily to know if she should contact her PCP with a 3 lb weight gain in one day or a 5 lb weight gain in one week.  CM educated again on 2-3 gms sodium diet and the importance to stick to diet to prevent CHF flare.  CM also reviewed fluid restriction.     Next steps:   Follow up in four week  Follow up on daily weights and log  Follow up on low Na diet and fluid restriction  Follow up if eating more and gaining weight    Follow up in about 1 month (around 9/22/2022).    Patient Summary     Involvement of Care:  Do I have permission to speak with other family members about your care?  Yes    Patient Reported Labs & Vitals:  1.  Any Patient Reported Labs &  Vitals?  Yes  2.  Patient Reported Blood Pressure:     3.  Patient Reported Pulse:     4.  Patient Reported Weight (Kg):  107 lbs  5.  Patient Reported Blood Glucose (mg/dl):       Medical and social history was reviewed with patient and/or caregiver.     Clinical Assessment     Reviewed and provided basic information on available community resources for mental health, transportation, wellness resources, and palliative care programs with patient and/or caregiver.     Complex Care Plan     Care plan was discussed and completed today with input from patient and/or caregiver.    Patient Instructions     Instructions were provided via the [employer] patient resources and are available for the patient to view on the patient portal.    Todays OPCM Self-Management Care Plan was developed with the patients/caregivers input and was based on identified barriers from todays assessment.  Goals were written today with the patient/caregiver and the patient has agreed to work towards these goals to improve his/her overall well-being. Patient verbalized understanding of the care plan, goals, and all of today's instructions. Encouraged patient/caregiver to communicate with his/her physician and health care team about health conditions and the treatment plan.  Provided my contact information today and encouraged patient/caregiver to call me with any questions as needed.

## 2022-08-22 NOTE — TELEPHONE ENCOUNTER
No new care gaps identified.  [facility] Embedded Care Gaps. Reference number: [id number]. 8/22/2022   4:11:04 PM CDT

## 2022-08-26 NOTE — TELEPHONE ENCOUNTER
----- Message from Lyn Banerjee NP sent at 8/26/2022  7:44 AM CDT -----  Patient is alittle overloaded. Please have her increase lasix to 40mg BID (from 40mg in am and 20mg in pm). F/u with me in 2 weeks for repeat labs and assessment.   Pt's granddaughter assists with care.

## 2022-08-26 NOTE — PROGRESS NOTES
Subjective:      Patient ID: Peri Johansen is a 88 y.o. female.    Chief Complaint: Diabetic Foot Exam (PCP - Annika Garland MD - 10/2019)    Peri is a 88 y.o. female who presents to the clinic for evaluation and treatment of high risk feet. Peri has a past medical history of Aortic atherosclerosis (1/7/2016), Arthritis, Colon polyp: hyperplastic 2015- repeat 2020 (8/6/2015), Diabetes mellitus, type II (8/16/2012), Diverticulosis, Gastric nodule (8/7/2014), GERD (gastroesophageal reflux disease) (8/16/2012), Goiter (8/16/2012), Hyperlipidemia (8/16/2012), Hypertension, Joint pain, Leg pain (8/16/2012), Lymphoma (8/16/2012), Osteopenia (8/16/2012), Osteoporosis, Renal cyst (3/28/2013), Rickets, vitamin D deficiency (8/16/2012), Thyroid nodule (2/24/2014), and Vitamin D deficiency disease (8/16/2012). The patient's chief complaint is long, thick toenails. This patient has documented high risk feet requiring routine maintenance secondary to peripheral vascular disease.    PCP: Annika Garland MD    Date Last Seen by PCP:   Chief Complaint   Patient presents with    Diabetic Foot Exam     PCP - Annika Garland MD - 10/2019         Current shoe gear:  Affected Foot: Slip-on shoes     Unaffected Foot: Slip-on shoes    Hemoglobin A1C   Date Value Ref Range Status   06/30/2022 7.2 (H) 4.0 - 5.6 % Final     Comment:     ADA Screening Guidelines:  5.7-6.4%  Consistent with prediabetes  >or=6.5%  Consistent with diabetes    High levels of fetal hemoglobin interfere with the HbA1C  assay. Heterozygous hemoglobin variants (HbS, HgC, etc)do  not significantly interfere with this assay.   However, presence of multiple variants may affect accuracy.     01/20/2022 7.4 (H) 4.0 - 5.6 % Final     Comment:     ADA Screening Guidelines:  5.7-6.4%  Consistent with prediabetes  >or=6.5%  Consistent with diabetes    High levels of fetal hemoglobin interfere with the HbA1C  assay. Heterozygous hemoglobin variants (HbS, HgC,  etc)do  not significantly interfere with this assay.   However, presence of multiple variants may affect accuracy.     10/21/2021 7.5 (H) 4.0 - 5.6 % Final     Comment:     ADA Screening Guidelines:  5.7-6.4%  Consistent with prediabetes  >or=6.5%  Consistent with diabetes    High levels of fetal hemoglobin interfere with the HbA1C  assay. Heterozygous hemoglobin variants (HbS, HgC, etc)do  not significantly interfere with this assay.   However, presence of multiple variants may affect accuracy.         Review of Systems   Constitutional: Negative for chills, fever and malaise/fatigue.   HENT: Negative for hearing loss.    Cardiovascular: Negative for claudication.   Respiratory: Negative for shortness of breath.    Skin: Positive for color change, dry skin, nail changes and unusual hair distribution. Negative for flushing and rash.   Musculoskeletal: Negative for joint pain and myalgias.   Neurological: Positive for sensory change. Negative for loss of balance, numbness and paresthesias.   Psychiatric/Behavioral: Negative for altered mental status.           Objective:      Physical Exam  Vitals reviewed.   Constitutional:       Appearance: She is well-developed.   Cardiovascular:      Pulses:           Dorsalis pedis pulses are 0 on the right side and 0 on the left side.        Posterior tibial pulses are 1+ on the right side and 1+ on the left side.   Musculoskeletal:      Right ankle: Decreased range of motion. Abnormal pulse.      Right Achilles Tendon: Jean's test negative.      Left ankle: Decreased range of motion. Abnormal pulse.      Left Achilles Tendon: Jean's test negative.      Right foot: Decreased range of motion.      Left foot: Decreased range of motion.   Feet:      Right foot:      Protective Sensation: 5 sites tested. 2 sites sensed.      Left foot:      Protective Sensation: 5 sites tested. 2 sites sensed.   Skin:     General: Skin is cool and dry.      Capillary Refill: Capillary  refill takes more than 3 seconds.   Neurological:      Mental Status: She is alert.      Comments: diminished sensation noted to b/L lower extremities   Psychiatric:         Behavior: Behavior normal. Behavior is cooperative.         Nails x10 are elongated by  5-7mm's, thickened by 2-3 mm's, dystrophic, and are yellowish in  coloration . Xerosis Bilaterally. No open lesions noted.             Assessment:       Encounter Diagnoses   Name Primary?    Diabetes mellitus with peripheral vascular disease Yes    Onychomycosis due to dermatophyte          Plan:       Peri was seen today for diabetic foot exam.    Diagnoses and all orders for this visit:    Diabetes mellitus with peripheral vascular disease    Onychomycosis due to dermatophyte      I counseled the patient on her conditions, their implications and medical management.        - Shoe inspection. Diabetic Foot Education. Patient reminded of the importance of good nutrition and blood sugar control to help prevent podiatric complications of diabetes. Patient instructed on proper foot hygeine. We discussed wearing proper shoe gear, daily foot inspections, never walking without protective shoe gear, never putting sharp instruments to feet, routine podiatric nail visits every 2-3 months.      - With patient's permission, nails were aggressively reduced and debrided x 10 to their soft tissue attachment mechanically and with electric , removing all offending nail and debris. Patient relates relief following the procedure. She will continue to monitor the areas daily, inspect her feet, wear protective shoe gear when ambulatory, moisturizer to maintain skin integrity and follow in this office in approximately 2-3 months, sooner p.r.n.

## 2022-09-07 PROBLEM — E87.1 HYPONATREMIA: Status: ACTIVE | Noted: 2022-01-01

## 2022-09-07 PROBLEM — E80.6 HYPERBILIRUBINEMIA: Status: ACTIVE | Noted: 2022-01-01

## 2022-09-07 NOTE — DISCHARGE INSTRUCTIONS
"Please hold off on taking the lasix until Friday morning. Weigh yourself on Friday morning, and this will be your new "dry weight". I have put in home health orders to have physical/occupational therapy and nursing to come out to the house. Please return with any new symptoms of chest pain, worsening shortness of breath, dizziness, swelling, or any other symptoms that concern you. Follow up with your primary care provider about your liver.   "

## 2022-09-07 NOTE — FIRST PROVIDER EVALUATION
Emergency Department TeleTriage Encounter Note      CHIEF COMPLAINT    Chief Complaint   Patient presents with    Chest Pain     Pt c/o CP, SOB, and flank pain since this AM. Denies vomiting or diarrhea.        VITAL SIGNS   Initial Vitals [09/06/22 2145]   BP Pulse Resp Temp SpO2   (!) 101/51 91 18 98.3 °F (36.8 °C) 95 %      MAP       --            ALLERGIES    Review of patient's allergies indicates:   Allergen Reactions    Latex, natural rubber Itching       PROVIDER TRIAGE NOTE  This is a teletriage evaluation of a 88 y.o. female presenting to the ED with c/o cp, sob and flank pain x 1 day.      ROS: (-) fever, (-) rash  PE:  NAD    Initial orders will be placed and care will be transferred to an alternate provider when patient is roomed for a full evaluation. Any additional orders and the final disposition will be determined by that provider.         ORDERS  Labs Reviewed   CBC W/ AUTO DIFFERENTIAL   COMPREHENSIVE METABOLIC PANEL   TROPONIN I   B-TYPE NATRIURETIC PEPTIDE   SARS-COV-2 RDRP GENE       ED Orders (720h ago, onward)      Start Ordered     Status Ordering Provider    09/06/22 2312 09/06/22 2311  POCT COVID-19 Rapid Screening  Once         Ordered HAROON BRIAN    09/06/22 2311 09/06/22 2311  Vital signs  Every 15 min         Ordered HAROON BRIAN    09/06/22 2311 09/06/22 2311  Cardiac Monitoring - Adult  Continuous        Comments: Notify Physician If:    Ordered HAROON BRIAN    09/06/22 2311 09/06/22 2311  Pulse Oximetry Continuous  Continuous         Ordered HAROON BRIAN    09/06/22 2311 09/06/22 2311  Diet NPO  Diet effective now         Ordered HAROON BRIAN    09/06/22 2311 09/06/22 2311  Saline lock IV  Once         Ordered HAROON BRIAN    09/06/22 2311 09/06/22 2311  EKG 12-lead  Once        Comments: Do not perform if previously done during this visit/ triage    Ordered HAROON BRIAN    09/06/22 2311 09/06/22 2311  CBC auto differential  STAT          Ordered HAROON BRIAN.    09/06/22 2311 09/06/22 2311  Comprehensive metabolic panel  STAT         Ordered HAROON BRIAN.    09/06/22 2311 09/06/22 2311  Troponin I #1  STAT         Ordered HAROON BRIAN.    09/06/22 2311 09/06/22 2311  B-Type natriuretic peptide (BNP)  STAT         Ordered HAROON BRIAN.    09/06/22 2311 09/06/22 2311  X-Ray Chest PA And Lateral  1 time imaging         Ordered HAROON BRIAN.    09/06/22 2147 09/06/22 2147  EKG 12-lead  Once         Completed by ELIZABETH HERNANDEZ on 9/6/2022 at  9:58 PM CARA COSTELLO              Virtual Visit Note: The provider triage portion of this emergency department evaluation and documentation was performed via [employer], a HIPAA-compliant telemedicine application, in concert with a tele-presenter in the room. A face to face patient evaluation with one of my colleagues will occur once the patient is placed in an emergency department room.      DISCLAIMER: This note was prepared with [employer] voice recognition transcription software. Garbled syntax, mangled pronouns, and other bizarre constructions may be attributed to that software system.

## 2022-09-07 NOTE — NURSING
Patient discharged. Discharge instructions given to patient in hand with son at bedside. Daughter also notified of patient status and discharge education per request by MD. All questions answers. Patient not being sent home with medication. IV's removed with catheter tip intact. No noted distress. Home health to be set up by MIRIAN. Patient transport will be requested upon sons arrival for transportation off unit. Will monitor patient until off unit.

## 2022-09-07 NOTE — PLAN OF CARE
09/07/22 1315   Post-Acute Status   Post-Acute Authorization Home Health   Home Health Status Set-up Complete/Auth obtained   Discharge Delays None known at this time   Discharge Plan   Discharge Plan A Home Health

## 2022-09-07 NOTE — SUBJECTIVE & OBJECTIVE
Past Medical History:   Diagnosis Date    Aortic atherosclerosis 1/7/2016    Arthritis     Colon polyp: hyperplastic 2015- repeat 2020 8/6/2015    Diabetes mellitus, type II 8/16/2012    Diverticulosis     Gastric nodule 8/7/2014    GERD (gastroesophageal reflux disease) 8/16/2012    Goiter 8/16/2012    Hyperlipidemia 8/16/2012    Hypertension     Joint pain     Leg pain 8/16/2012    Lymphoma 8/16/2012    Osteopenia 8/16/2012    Osteoporosis     Renal cyst 3/28/2013    Rickets, vitamin D deficiency 8/16/2012    Thyroid nodule 2/24/2014    Vitamin D deficiency disease 8/16/2012       Past Surgical History:   Procedure Laterality Date    CARPAL TUNNEL RELEASE      CATARACT EXTRACTION W/  INTRAOCULAR LENS IMPLANT Bilateral     HYSTERECTOMY      partial    lymphoma surgery      L tibia       Review of patient's allergies indicates:   Allergen Reactions    Latex, natural rubber Itching       No current facility-administered medications on file prior to encounter.     Current Outpatient Medications on File Prior to Encounter   Medication Sig    ammonium lactate 12 % Crea Apply 1 application topically once daily.    aspirin 81 MG Chew Take 1 tablet (81 mg total) by mouth once daily.    atorvastatin (LIPITOR) 40 MG tablet TAKE 1 TABLET BY MOUTH EVERY DAY    blood sugar diagnostic (TRUE METRIX GLUCOSE TEST STRIP) Strp USE AS DIRECTED    blood sugar diagnostic Strp Test 2 times daily    blood-glucose meter kit Use as instructed.  ACCUCHECK or whatever is preferred by insurance    calcium 500 mg Tab Take 1 tablet by mouth Twice daily.    dextran 70-hypromellose (ARTIFICIAL TEARS,[id number],) ophthalmic solution Place 1 drop into both eyes 4 (four) times daily as needed.    diclofenac sodium (VOLTAREN) 1 % Gel APPLY 2 GRAMS EXTERNALLY TO THE AFFECTED AREA FOUR TIMES DAILY    ergocalciferol (ERGOCALCIFEROL) 50,000 unit Cap Take 1 capsule (50,000 Units total) by mouth every 7 days. (Patient not taking: Reported on 8/25/2022)     ergocalciferol (VITAMIN D2) 50,000 unit Cap Take 400 Units by mouth Daily.    fluticasone propionate (FLONASE) 50 mcg/actuation nasal spray 1 spray (50 mcg total) by Each Nostril route once daily.    furosemide (LASIX) 20 MG tablet Take 1 tablet (20 mg total) by mouth every evening.    furosemide (LASIX) 40 MG tablet Take 1 tablet (40 mg total) by mouth once daily.    gabapentin (NEURONTIN) 300 MG capsule TAKE 1 CAPSULE(300 MG) BY MOUTH TWICE DAILY    lancets Misc Test 2 x daily    methIMAzole (TAPAZOLE) 5 MG Tab TAKE 1/2 TABLET BY MOUTH DAILY    metoprolol succinate (TOPROL-XL) 25 MG 24 hr tablet Take 1 tablet (25 mg total) by mouth once daily. Hold if SBP <120 or <55    MICRO THIN LANCETS 33 gauge Misc USE AS DIRECTED    omeprazole (PRILOSEC) 40 MG capsule Take 1 capsule (40 mg total) by mouth every morning.    ondansetron (ZOFRAN-ODT) 4 MG TbDL Take 1 tablet (4 mg total) by mouth every 8 (eight) hours as needed (Nausea or vomiting).    potassium chloride (KLOR-CON) 10 MEQ TbSR TAKE 1 TABLET(10 MEQ) BY MOUTH EVERY DAY    traMADoL (ULTRAM) 50 mg tablet TAKE 1 TABLET(50 MG) BY MOUTH EVERY 12 HOURS AS NEEDED FOR PAIN     Family History       Problem Relation (Age of Onset)    Breast cancer Maternal Aunt    Cancer Brother    Heart disease Father    Hypertension Mother, Father    No Known Problems Maternal Uncle, Paternal Aunt, Paternal Uncle, Maternal Grandmother, Maternal Grandfather, Paternal Grandmother, Paternal Grandfather, Daughter, Son, Son, Daughter, Daughter          Tobacco Use    Smoking status: Never    Smokeless tobacco: Never   Substance and Sexual Activity    Alcohol use: No     Alcohol/week: 0.0 standard drinks    Drug use: No    Sexual activity: Not Currently     Review of Systems   Constitutional:  Negative for appetite change, chills, fatigue and fever.   HENT:  Negative for rhinorrhea and sore throat.    Eyes:  Negative for photophobia and visual disturbance.   Respiratory:  Positive for shortness  of breath. Negative for cough and wheezing.         +MONSON   Cardiovascular:  Positive for chest pain. Negative for palpitations and leg swelling.   Gastrointestinal:  Positive for abdominal pain. Negative for abdominal distention, diarrhea, nausea and vomiting.   Genitourinary:  Negative for dysuria, frequency and hematuria.   Musculoskeletal:  Positive for back pain. Negative for myalgias.   Skin:  Negative for color change, pallor, rash and wound.   Neurological:  Negative for dizziness, light-headedness and headaches.   Psychiatric/Behavioral:  Negative for agitation and hallucinations. The patient is not nervous/anxious.    Objective:     Vital Signs (Most Recent):  Temp: 98.3 °F (36.8 °C) (09/06/22 2145)  Pulse: 93 (09/07/22 0024)  Resp: 18 (09/07/22 0024)  BP: (!) 116/54 (09/07/22 0002)  SpO2: 100 % (09/07/22 0024)   Vital Signs (24h Range):  Temp:  [98.3 °F (36.8 °C)] 98.3 °F (36.8 °C)  Pulse:  [91-94] 93  Resp:  [18] 18  SpO2:  [95 %-100 %] 100 %  BP: (101-116)/(51-54) 116/54     Weight: 52.2 kg (115 lb)  Body mass index is 23.23 kg/m².    Physical Exam  Vitals and nursing note reviewed.   Constitutional:       General: She is not in acute distress.     Appearance: She is not toxic-appearing or diaphoretic.      Comments: Chronically ill appearing   HENT:      Head: Normocephalic and atraumatic.      Nose: Nose normal.      Mouth/Throat:      Mouth: Mucous membranes are dry.   Eyes:      Pupils: Pupils are equal, round, and reactive to light.   Cardiovascular:      Rate and Rhythm: Normal rate and regular rhythm.      Pulses: Normal pulses.   Pulmonary:      Effort: Pulmonary effort is normal. No respiratory distress.      Breath sounds: No wheezing, rhonchi or rales.   Chest:      Chest wall: Tenderness present.   Abdominal:      General: Bowel sounds are normal. There is no distension.      Palpations: Abdomen is soft.      Tenderness: There is abdominal tenderness in the epigastric area. There is no  guarding.   Musculoskeletal:         General: No swelling, tenderness or deformity. Normal range of motion.      Cervical back: Normal range of motion.      Right lower leg: No edema.      Left lower leg: No edema.   Skin:     General: Skin is warm and dry.      Capillary Refill: Capillary refill takes less than 2 seconds.   Neurological:      General: No focal deficit present.      Mental Status: She is alert and oriented to person, place, and time.      Sensory: No sensory deficit.      Motor: No weakness.   Psychiatric:         Mood and Affect: Mood normal.         Behavior: Behavior normal.         Cognition and Memory: Memory is impaired.         CRANIAL NERVES     CN III, IV, VI   Pupils are equal, round, and reactive to light.     Significant Labs: All pertinent labs within the past 24 hours have been reviewed.  CBC:   Recent Labs   Lab 09/06/22 2329   WBC 5.75   HGB 15.7   HCT 44.5        CMP:   Recent Labs   Lab 09/06/22 2329   *   K 4.6   CL 94*   CO2 24   *   BUN 16   CREATININE 0.7   CALCIUM 10.5   PROT 8.8*   ALBUMIN 3.0*   BILITOT 2.6*   ALKPHOS 135   AST 44*   ALT 24   ANIONGAP 8     Cardiac Markers:   Recent Labs   Lab 09/06/22 2329   *     Troponin:   Recent Labs   Lab 09/06/22 2329   TROPONINI 0.137*       Significant Imaging: I have reviewed all pertinent imaging results/findings within the past 24 hours.    Imaging Results              X-Ray Chest 1 View (Final result)  Result time 09/07/22 00:35:43   Procedure changed from X-Ray Chest PA And Lateral     Final result by Dominick Valdivia MD (09/07/22 00:35:43)                   Impression:      Interval decreased cardiac size, vascular congestion and resolution of bibasilar edema. Persistent small right effusion.      Electronically signed by: Dominick Valdivia MD  Date:    09/07/2022  Time:    00:35               Narrative:    EXAMINATION:  XR CHEST 1 VIEW    CLINICAL HISTORY:  Chest Pain;    TECHNIQUE:  Single  frontal view of the chest was performed.    COMPARISON:  08/25/2022.    FINDINGS:  Interval decreased cardiac size, vascular congestion and resolution of bibasilar edema.  Persistent small right effusion.    Heart and lungs otherwise appear unchanged when allowing for differences in technique and positioning.

## 2022-09-07 NOTE — H&P
[facility] - Emergency Dept  [facility]  History & Physical    Patient Name: Peri Johansen  MRN: [id number]  Patient Class: OP- Observation  Admission Date: 9/6/2022  Attending Physician: Devi Piper MD   Primary Care Provider: Annika Garland MD         Patient information was obtained from patient, past medical records, and ER records.     Subjective:     Principal Problem:Hyponatremia    Chief Complaint:   Chief Complaint   Patient presents with    Chest Pain     Pt c/o CP, SOB, and flank pain since this AM. Denies vomiting or diarrhea.         HPI: Peri Johansen is a 88 y.o. female with a PMHx of HLD, HTN, hyperthyroidism, chronic back pain, and CHF (EF 40%) who presents to the ED with complaints of shortness of breath and chest pain. Patient reports SOB and pain began yesterday, worse with exertion. She reports the pain is to the left side of her chest and radiates to the epigastric region. It is reproducible on exam. She also notes right lower back pain, but she is unable to quantify how long the pain has been present. Patient is a very poor historian. The patient denies any N/V/D, fever, chills, cough, or dysuria. She reports compliance with her home lasix. Of note the patient was recently admitted to [facility] 9/2-9/5 for CHF exacerbation, chest pain, and elevated troponin.     In the ED, pt mildly tachycardic but otherwise VSSAF. CBC unremarkable. Na 126. Tbili 2.6. AST/ALT 44/24. Troponin 0.137. . EKG sinus rhythm with PACs, no ST elevation or depression. CXR with interval decreased cardiac size, vascular congestion and resolution of bibasilar edema. Persistent small right effusion.      Past Medical History:   Diagnosis Date    Aortic atherosclerosis 1/7/2016    Arthritis     Colon polyp: hyperplastic 2015- repeat 2020 8/6/2015    Diabetes mellitus, type II 8/16/2012    Diverticulosis     Gastric nodule 8/7/2014    GERD (gastroesophageal reflux disease) 8/16/2012     Goiter 8/16/2012    Hyperlipidemia 8/16/2012    Hypertension     Joint pain     Leg pain 8/16/2012    Lymphoma 8/16/2012    Osteopenia 8/16/2012    Osteoporosis     Renal cyst 3/28/2013    Rickets, vitamin D deficiency 8/16/2012    Thyroid nodule 2/24/2014    Vitamin D deficiency disease 8/16/2012       Past Surgical History:   Procedure Laterality Date    CARPAL TUNNEL RELEASE      CATARACT EXTRACTION W/  INTRAOCULAR LENS IMPLANT Bilateral     HYSTERECTOMY      partial    lymphoma surgery      L tibia       Review of patient's allergies indicates:   Allergen Reactions    Latex, natural rubber Itching       No current facility-administered medications on file prior to encounter.     Current Outpatient Medications on File Prior to Encounter   Medication Sig    ammonium lactate 12 % Crea Apply 1 application topically once daily.    aspirin 81 MG Chew Take 1 tablet (81 mg total) by mouth once daily.    atorvastatin (LIPITOR) 40 MG tablet TAKE 1 TABLET BY MOUTH EVERY DAY    blood sugar diagnostic (TRUE METRIX GLUCOSE TEST STRIP) Strp USE AS DIRECTED    blood sugar diagnostic Strp Test 2 times daily    blood-glucose meter kit Use as instructed.  ACCUCHECK or whatever is preferred by insurance    calcium 500 mg Tab Take 1 tablet by mouth Twice daily.    dextran 70-hypromellose (ARTIFICIAL TEARS,[id number],) ophthalmic solution Place 1 drop into both eyes 4 (four) times daily as needed.    diclofenac sodium (VOLTAREN) 1 % Gel APPLY 2 GRAMS EXTERNALLY TO THE AFFECTED AREA FOUR TIMES DAILY    ergocalciferol (ERGOCALCIFEROL) 50,000 unit Cap Take 1 capsule (50,000 Units total) by mouth every 7 days. (Patient not taking: Reported on 8/25/2022)    ergocalciferol (VITAMIN D2) 50,000 unit Cap Take 400 Units by mouth Daily.    fluticasone propionate (FLONASE) 50 mcg/actuation nasal spray 1 spray (50 mcg total) by Each Nostril route once daily.    furosemide (LASIX) 20 MG tablet Take 1 tablet (20 mg total) by mouth every evening.     furosemide (LASIX) 40 MG tablet Take 1 tablet (40 mg total) by mouth once daily.    gabapentin (NEURONTIN) 300 MG capsule TAKE 1 CAPSULE(300 MG) BY MOUTH TWICE DAILY    lancets Misc Test 2 x daily    methIMAzole (TAPAZOLE) 5 MG Tab TAKE 1/2 TABLET BY MOUTH DAILY    metoprolol succinate (TOPROL-XL) 25 MG 24 hr tablet Take 1 tablet (25 mg total) by mouth once daily. Hold if SBP <120 or <55    MICRO THIN LANCETS 33 gauge Misc USE AS DIRECTED    omeprazole (PRILOSEC) 40 MG capsule Take 1 capsule (40 mg total) by mouth every morning.    ondansetron (ZOFRAN-ODT) 4 MG TbDL Take 1 tablet (4 mg total) by mouth every 8 (eight) hours as needed (Nausea or vomiting).    potassium chloride (KLOR-CON) 10 MEQ TbSR TAKE 1 TABLET(10 MEQ) BY MOUTH EVERY DAY    traMADoL (ULTRAM) 50 mg tablet TAKE 1 TABLET(50 MG) BY MOUTH EVERY 12 HOURS AS NEEDED FOR PAIN     Family History       Problem Relation (Age of Onset)    Breast cancer Maternal Aunt    Cancer Brother    Heart disease Father    Hypertension Mother, Father    No Known Problems Maternal Uncle, Paternal Aunt, Paternal Uncle, Maternal Grandmother, Maternal Grandfather, Paternal Grandmother, Paternal Grandfather, Daughter, Son, Son, Daughter, Daughter          Tobacco Use    Smoking status: Never    Smokeless tobacco: Never   Substance and Sexual Activity    Alcohol use: No     Alcohol/week: 0.0 standard drinks    Drug use: No    Sexual activity: Not Currently     Review of Systems   Constitutional:  Negative for appetite change, chills, fatigue and fever.   HENT:  Negative for rhinorrhea and sore throat.    Eyes:  Negative for photophobia and visual disturbance.   Respiratory:  Positive for shortness of breath. Negative for cough and wheezing.         +MONSON   Cardiovascular:  Positive for chest pain. Negative for palpitations and leg swelling.   Gastrointestinal:  Positive for abdominal pain. Negative for abdominal distention, diarrhea, nausea and vomiting.   Genitourinary:   Negative for dysuria, frequency and hematuria.   Musculoskeletal:  Positive for back pain. Negative for myalgias.   Skin:  Negative for color change, pallor, rash and wound.   Neurological:  Negative for dizziness, light-headedness and headaches.   Psychiatric/Behavioral:  Negative for agitation and hallucinations. The patient is not nervous/anxious.    Objective:     Vital Signs (Most Recent):  Temp: 98.3 °F (36.8 °C) (09/06/22 2145)  Pulse: 93 (09/07/22 0024)  Resp: 18 (09/07/22 0024)  BP: (!) 116/54 (09/07/22 0002)  SpO2: 100 % (09/07/22 0024)   Vital Signs (24h Range):  Temp:  [98.3 °F (36.8 °C)] 98.3 °F (36.8 °C)  Pulse:  [91-94] 93  Resp:  [18] 18  SpO2:  [95 %-100 %] 100 %  BP: (101-116)/(51-54) 116/54     Weight: 52.2 kg (115 lb)  Body mass index is 23.23 kg/m².    Physical Exam  Vitals and nursing note reviewed.   Constitutional:       General: She is not in acute distress.     Appearance: She is not toxic-appearing or diaphoretic.      Comments: Chronically ill appearing   HENT:      Head: Normocephalic and atraumatic.      Nose: Nose normal.      Mouth/Throat:      Mouth: Mucous membranes are dry.   Eyes:      Pupils: Pupils are equal, round, and reactive to light.   Cardiovascular:      Rate and Rhythm: Normal rate and regular rhythm.      Pulses: Normal pulses.   Pulmonary:      Effort: Pulmonary effort is normal. No respiratory distress.      Breath sounds: No wheezing, rhonchi or rales.   Chest:      Chest wall: Tenderness present.   Abdominal:      General: Bowel sounds are normal. There is no distension.      Palpations: Abdomen is soft.      Tenderness: There is abdominal tenderness in the epigastric area. There is no guarding.   Musculoskeletal:         General: No swelling, tenderness or deformity. Normal range of motion.      Cervical back: Normal range of motion.      Right lower leg: No edema.      Left lower leg: No edema.   Skin:     General: Skin is warm and dry.      Capillary Refill:  Capillary refill takes less than 2 seconds.   Neurological:      General: No focal deficit present.      Mental Status: She is alert and oriented to person, place, and time.      Sensory: No sensory deficit.      Motor: No weakness.   Psychiatric:         Mood and Affect: Mood normal.         Behavior: Behavior normal.         Cognition and Memory: Memory is impaired.         CRANIAL NERVES     CN III, IV, VI   Pupils are equal, round, and reactive to light.     Significant Labs: All pertinent labs within the past 24 hours have been reviewed.  CBC:   Recent Labs   Lab 09/06/22 2329   WBC 5.75   HGB 15.7   HCT 44.5        CMP:   Recent Labs   Lab 09/06/22 2329   *   K 4.6   CL 94*   CO2 24   *   BUN 16   CREATININE 0.7   CALCIUM 10.5   PROT 8.8*   ALBUMIN 3.0*   BILITOT 2.6*   ALKPHOS 135   AST 44*   ALT 24   ANIONGAP 8     Cardiac Markers:   Recent Labs   Lab 09/06/22 2329   *     Troponin:   Recent Labs   Lab 09/06/22 2329   TROPONINI 0.137*       Significant Imaging: I have reviewed all pertinent imaging results/findings within the past 24 hours.    Imaging Results              X-Ray Chest 1 View (Final result)  Result time 09/07/22 00:35:43   Procedure changed from X-Ray Chest PA And Lateral     Final result by Dominick Valdivia MD (09/07/22 00:35:43)                   Impression:      Interval decreased cardiac size, vascular congestion and resolution of bibasilar edema. Persistent small right effusion.      Electronically signed by: Dominick Valdivia MD  Date:    09/07/2022  Time:    00:35               Narrative:    EXAMINATION:  XR CHEST 1 VIEW    CLINICAL HISTORY:  Chest Pain;    TECHNIQUE:  Single frontal view of the chest was performed.    COMPARISON:  08/25/2022.    FINDINGS:  Interval decreased cardiac size, vascular congestion and resolution of bibasilar edema.  Persistent small right effusion.    Heart and lungs otherwise appear unchanged when allowing for differences in  technique and positioning.                                    Assessment/Plan:     * Hyponatremia  Acute, 126 on admit. Previously 138 on 9/4 at OSH. Patient is asymptomatic.   -Urine na, urine osm  -TSH  -Possibly over diuresed during previous admit at OSH, will hold lasix and trend Na.    Chest pain  Elevated troponin  On admission, patient with left sided chest pain radiating to epigastric region. Denies palpitations. Pain reproducible on exam. Low suspicion for cardiac cause.   -Will attempt GI cocktail  -Troponin 0.137, similar to recent admit at [facility] will continue to trend.  -EKG with no acute ischemic changes.  1/2022 Cardiac pet stress    The relative PET images show no clinically significant regional resting or stress induced perfusion abnormalities.    The whole heart absolute myocardial perfusion values averaged 0.95 cc/min/g at rest, which is normal; 1.61 cc/min/g at stress, which is mildly reduced; and CFR is 1.70 , which is mildly reduced.  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  Patient is a poor historian   -Continue ASA    Hyperbilirubinemia  -Chronic issue, more elevated on admit.  -Abd US.  -Continue to trend.    Type 2 diabetes mellitus with hyperglycemia, without long-term current use of insulin  Patient's FSGs are controlled on current medication regimen.  Last A1c reviewed-   Lab Results   Component Value Date    HGBA1C 7.2 (H) 06/30/2022     Most recent fingerstick glucose reviewed- No results for input(s): POCTGLUCOSE in the last 24 hours.  Current correctional scale  Low  Maintain anti-hyperglycemic dose as follows-   Antihyperglycemics (From admission, onward)       Start     Stop Route Frequency Ordered    09/07/22 0248  insulin aspart U-100 pen 0-5 Units         -- SubQ Before meals & nightly PRN 09/07/22 0148          Hold Oral hypoglycemics while patient is in the hospital.  -Accuchecks AC/HS  -Diabetic diet    Chronic congestive heart failure  Patient is identified as having Combined Systolic and Diastolic heart failure that is Chronic. CHF is currently controlled. Latest ECHO performed and demonstrates- Results for orders placed during the hospital encounter of 06/30/22    Echo    Interpretation Summary  · The left ventricle is normal in size with mild concentric hypertrophy and mildly decreased systolic function. The estimated ejection fraction is 40%.  · The left ventricular global longitudinal strain is -10%. 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  · Normal right ventricular size with normal right ventricular systolic function.  · Biatrial enlargement.  · Mild mitral regurgitation.  · Moderate tricuspid regurgitation.  · The estimated PA systolic pressure is 44 mmHg.  · There is pulmonary hypertension.  · Elevated central venous pressure (15 mmHg).  · There is a right pleural effusion.  . Continue Beta Blocker and monitor clinical status closely. Monitor on telemetry. Patient is off CHF pathway.  Monitor strict Is&Os and daily weights.  Place on fluid restriction of 1 L. Continue to stress to patient importance of self efficacy and  on diet for CHF. Last BNP reviewed- and noted below   Recent Labs   Lab 09/06/22 2329   *   -Continue metoprolol  -Hold lasix    Hyperthyroidism    Lab Results   Component Value Date    TSH 0.341 (L) 06/30/2022   normal Free T4  -Continue MMI 2.5mg daily     Chronic bilateral low back pain with right-sided sciatica  -Chronic, stable.  -Continue gabapentin and PRN tramadol.    Gastroesophageal reflux disease without  esophagitis  -Chronic.  -PRN maalox.    Mixed hyperlipidemia   Patient is chronically on statin.will continue for now. Monitor clinically. Last LDL was   Lab Results   Component Value Date    LDLCALC 61.6 (L) 10/21/2021       VTE Risk Mitigation (From admission, onward)           Ordered     enoxaparin injection 40 mg  Daily         09/07/22 0422     IP VTE HIGH RISK PATIENT  Once         09/07/22 0140     Place sequential compression device  Until discontinued         09/07/22 0140                       Wilda Frey NP  Department of Hospital Medicine   [facility] - Emergency Dept

## 2022-09-07 NOTE — ED PROVIDER NOTES
"Encounter Date: 9/6/2022       History     Chief Complaint   Patient presents with    Chest Pain     Pt c/o CP, SOB, and flank pain since this AM. Denies vomiting or diarrhea.      88-year-old female with PMH of hypertension, chronic back pain, CHF (EF 40%) presents with shortness of breath and chest pain.  Shortness of breath is new x1 day, acute onset, worse with exertion, progressive, and associated with left chest pain.  Patient was discharged yesterday from an [facility] hospital for CHF exacerbation.  She states she feels "much worse" than she felt going to the hospital that time.  She denies fever, chills, cough, abdominal pain, nausea, vomiting, diarrhea, constipation, dysuria, hematuria, and black/bloody stools.    The history is provided by the patient and medical records.   Review of patient's allergies indicates:   Allergen Reactions    Latex, natural rubber Itching     Past Medical History:   Diagnosis Date    Aortic atherosclerosis 1/7/2016    Arthritis     Colon polyp: hyperplastic 2015- repeat 2020 8/6/2015    Diabetes mellitus, type II 8/16/2012    Diverticulosis     Gastric nodule 8/7/2014    GERD (gastroesophageal reflux disease) 8/16/2012    Goiter 8/16/2012    Hyperlipidemia 8/16/2012    Hypertension     Joint pain     Leg pain 8/16/2012    Lymphoma 8/16/2012    Osteopenia 8/16/2012    Osteoporosis     Renal cyst 3/28/2013    Rickets, vitamin D deficiency 8/16/2012    Thyroid nodule 2/24/2014    Vitamin D deficiency disease 8/16/2012     Past Surgical History:   Procedure Laterality Date    CARPAL TUNNEL RELEASE      CATARACT EXTRACTION W/  INTRAOCULAR LENS IMPLANT Bilateral     HYSTERECTOMY      partial    lymphoma surgery      L tibia     Family History   Problem Relation Age of Onset    Hypertension Mother     Hypertension Father     Heart disease Father     Breast cancer Maternal Aunt     No Known Problems Maternal Uncle     No Known Problems Paternal Aunt     No Known Problems Paternal Uncle     " No Known Problems Maternal Grandmother     No Known Problems Maternal Grandfather     No Known Problems Paternal Grandmother     No Known Problems Paternal Grandfather     Cancer Brother         colon    No Known Problems Daughter     No Known Problems Son     No Known Problems Son     No Known Problems Daughter     No Known Problems Daughter     Diabetes Neg Hx     Glaucoma Neg Hx     Amblyopia Neg Hx     Blindness Neg Hx     Cataracts Neg Hx     Macular degeneration Neg Hx     Retinal detachment Neg Hx     Strabismus Neg Hx     Stroke Neg Hx     Thyroid disease Neg Hx     Ovarian cancer Neg Hx     Liver disease Neg Hx     Liver cancer Neg Hx     Cirrhosis Neg Hx     Colon polyps Neg Hx     Colon cancer Neg Hx     Melanoma Neg Hx      Social History     Tobacco Use    Smoking status: Never    Smokeless tobacco: Never   Substance Use Topics    Alcohol use: No     Alcohol/week: 0.0 standard drinks    Drug use: No     Review of Systems   Constitutional:  Negative for chills and fever.   HENT:  Negative for congestion and sinus pain.    Eyes:  Negative for pain and visual disturbance.   Respiratory:  Positive for shortness of breath. Negative for cough.    Cardiovascular:  Positive for chest pain. Negative for leg swelling.   Gastrointestinal:  Negative for abdominal pain, constipation, diarrhea, nausea and vomiting.   Endocrine: Negative for polydipsia and polyuria.   Genitourinary:  Negative for dysuria and hematuria.   Musculoskeletal:  Positive for back pain. Negative for arthralgias.   Skin:  Negative for color change and rash.   Neurological:  Negative for dizziness, weakness and headaches.     Physical Exam     Initial Vitals [09/06/22 2145]   BP Pulse Resp Temp SpO2   (!) 101/51 91 18 98.3 °F (36.8 °C) 95 %      MAP       --         Physical Exam    Nursing note and vitals reviewed.  Constitutional: She appears well-developed. She is not diaphoretic. No distress.   HENT:   Head: Normocephalic and atraumatic.    Eyes: Conjunctivae and EOM are normal. Pupils are equal, round, and reactive to light.   Neck: Neck supple.   Normal range of motion.  Cardiovascular:  Normal rate, regular rhythm, normal heart sounds and intact distal pulses.           No murmur heard.  Pulmonary/Chest: No respiratory distress.   Coarse breath sounds bilaterally with increased work of breathing without tachypnea. No wheezing   Abdominal: Abdomen is soft. She exhibits no distension. There is no abdominal tenderness. There is no rebound and no guarding.   Musculoskeletal:         General: No tenderness or edema.      Cervical back: Normal range of motion and neck supple.     Neurological: She is alert and oriented to person, place, and time.   Skin: Skin is warm and dry. Capillary refill takes less than 2 seconds.       ED Course   Procedures  Labs Reviewed   CBC W/ AUTO DIFFERENTIAL - Abnormal; Notable for the following components:       Result Value    RDW 15.9 (*)     All other components within normal limits   COMPREHENSIVE METABOLIC PANEL - Abnormal; Notable for the following components:    Sodium 126 (*)     Chloride 94 (*)     Glucose 119 (*)     Total Protein 8.8 (*)     Albumin 3.0 (*)     Total Bilirubin 2.6 (*)     AST 44 (*)     All other components within normal limits   TROPONIN I - Abnormal; Notable for the following components:    Troponin I 0.137 (*)     All other components within normal limits   B-TYPE NATRIURETIC PEPTIDE - Abnormal; Notable for the following components:     (*)     All other components within normal limits   TROPONIN I - Abnormal; Notable for the following components:    Troponin I 0.180 (*)     All other components within normal limits   TROPONIN I - Abnormal; Notable for the following components:    Troponin I 0.180 (*)     All other components within normal limits   COMPREHENSIVE METABOLIC PANEL - Abnormal; Notable for the following components:    Sodium 131 (*)     Chloride 94 (*)     Albumin 2.8 (*)      Total Bilirubin 2.7 (*)     AST 48 (*)     All other components within normal limits   CBC W/ AUTO DIFFERENTIAL - Abnormal; Notable for the following components:    RDW 15.9 (*)     All other components within normal limits   POCT GLUCOSE - Abnormal; Notable for the following components:    POCT Glucose 112 (*)     All other components within normal limits   SARS-COV-2 RNA AMPLIFICATION, QUAL   MAGNESIUM   PHOSPHORUS   TSH   SODIUM, URINE, RANDOM    Narrative:     Specimen Source->Urine   OSMOLALITY, URINE RANDOM    Narrative:     Specimen Source->Urine   BASIC METABOLIC PANEL   TSH   SARS-COV-2 RDRP GENE   POCT GLUCOSE MONITORING CONTINUOUS          Imaging Results              US Abdomen Limited (Final result)  Result time 09/07/22 05:24:48      Final result by Mary Kate Del Castillo MD (09/07/22 05:24:48)                   Impression:      1. Redemonstration of hyperechoic lesion in the right hepatic lobe measuring up to 1.1 cm, similar to prior ultrasound exam.  This demonstrates a nonspecific sonographic appearance which can be seen with benign and neoplastic processes.  Further evaluation with nonemergent triple phase liver CT or MRI exam is advised if/when clinically appropriate.  2. Biliary sludge.  No sonographic evidence to suggest acute cholecystitis.  3. Bilateral pleural effusions.  4. Incidentally noted right renal cyst.      Electronically signed by: Mary Kate Del Castillo MD  Date:    09/07/2022  Time:    05:24               Narrative:    EXAMINATION:  US ABDOMEN LIMITED    CLINICAL HISTORY:  elevated tbili;    TECHNIQUE:  Limited ultrasound of the right upper quadrant of the abdomen (including pancreas, liver, gallbladder, common bile duct, and spleen) was performed.    COMPARISON:  Abdominal ultrasound 06/30/2022    FINDINGS:  Liver: The liver measures 12.0 cm.  There is redemonstration of a hyperechoic lesion in the right hepatic lobe measuring 0.9 x 1.1 x 1.0 cm.    Gallbladder: There is biliary sludge within  the gallbladder lumen.  There is no sonographic evidence of discrete cholelithiasis.  No significant gallbladder wall thickening or gallbladder wall hyperemia.  Sonographic Harp sign is reported to be negative by the ultrasound technologist.    Biliary system: The common duct is not dilated, measuring 2 mm.  No intrahepatic ductal dilatation.    Spleen: Normal in size and echotexture, measuring 7.5 x 2.1 cm.    Pancreas: Unremarkable in its visualized extent.    IVC: Unremarkable in its visualized extent.    Miscellaneous: No upper abdominal ascites.  Bilateral pleural fluid present.  Incidentally noted anechoic structure in the midpole of the right kidney measuring 2.9 x 2.8 x 2.6 cm suggestive of a cyst.                                       X-Ray Chest 1 View (Final result)  Result time 09/07/22 00:35:43   Procedure changed from X-Ray Chest PA And Lateral     Final result by Dominick Valdivia MD (09/07/22 00:35:43)                   Impression:      Interval decreased cardiac size, vascular congestion and resolution of bibasilar edema. Persistent small right effusion.      Electronically signed by: Dominick Valdivia MD  Date:    09/07/2022  Time:    00:35               Narrative:    EXAMINATION:  XR CHEST 1 VIEW    CLINICAL HISTORY:  Chest Pain;    TECHNIQUE:  Single frontal view of the chest was performed.    COMPARISON:  08/25/2022.    FINDINGS:  Interval decreased cardiac size, vascular congestion and resolution of bibasilar edema.  Persistent small right effusion.    Heart and lungs otherwise appear unchanged when allowing for differences in technique and positioning.                                       Medications   sodium chloride 0.9% flush 10 mL (has no administration in time range)   melatonin tablet 6 mg (has no administration in time range)   aspirin chewable tablet 81 mg (has no administration in time range)   atorvastatin tablet 40 mg (has no administration in time range)   traMADoL tablet 50 mg  (has no administration in time range)   metoprolol succinate (TOPROL-XL) 24 hr tablet 25 mg (has no administration in time range)   gabapentin capsule 300 mg (has no administration in time range)   glucose chewable tablet 16 g (has no administration in time range)   glucose chewable tablet 24 g (has no administration in time range)   glucagon (human recombinant) injection 1 mg (has no administration in time range)   dextrose 10% bolus 125 mL (has no administration in time range)   dextrose 10% bolus 250 mL (has no administration in time range)   insulin aspart U-100 pen 0-5 Units (has no administration in time range)   methIMAzole split tablet 2.5 mg (has no administration in time range)   aluminum-magnesium hydroxide-simethicone 200-200-20 mg/5 mL suspension 30 mL (has no administration in time range)   enoxaparin injection 40 mg (has no administration in time range)   aluminum-magnesium hydroxide-simethicone 200-200-20 mg/5 mL suspension 30 mL (30 mLs Oral Given 9/7/22 0641)     And   LIDOcaine HCl 2% oral solution 15 mL (15 mLs Oral Given 9/7/22 0641)     Medical Decision Making:   History:   Old Medical Records: I decided to obtain old medical records.  Old Records Summarized: records from another hospital.       <> Summary of Records: Patient discharged yesterday from [facility] for CHF exacerbation, BNP in the 900s admission  Initial Assessment:   88-year-old female with multiple comorbidities presents with chest pain and shortness of breath, afebrile and hemodynamically stable  Differential Diagnosis:   CHF exacerbation, COPD, pneumonia, ACS, pulmonary edema, pneumothorax, sepsis  Clinical Tests:   Lab Tests: Ordered and Reviewed  Radiological Study: Ordered and Reviewed  Medical Tests: Ordered and Reviewed  ED Management:  See ED course           ED Course as of 09/07/22 0720   Wed Sep 07, 2022   0021 EKG independently interpreted by me shows sinus arrhythmia with PACs, low voltage QRS, no STEMI [BD]   0022 CBC  auto differential(!)  CBC unremarkable without leukocytosis, significant anemia, or decreased platelets   [BD]   0041 Comprehensive metabolic panel(!)  CMP remarkable for hyponatremia 126 with normal renal function. Will admit the patient for hyponatremia.  [BD]   0042 Troponin I(!): 0.137  Stably elevated [BD]   0042 BNP(!): 621  Downtrending from BNP at [facility] a few days ago (was 900s). Low suspicion for active CHF exacerbation [BD]   0042 SARS-CoV-2 RNA, Amplification, Qual: Negative [BD]   0042 X-Ray Chest 1 View  CXR showing improvement in pulmonary edema. No focal consolidation    Update:   Patient's workup remarkable for hyponatremia of 126, so we will admit the patient to Hospital Medicine.  She agrees with and is comfortable with the plan.  No obvious source of her reported worsening shortness of breath.  [BD]      ED Course User Index  [BD] Dada Benson MD             Clinical Impression:   Final diagnoses:  [R07.9] Chest pain  [E87.1] Hyponatremia (Primary)        ED Disposition Condition    Observation                 Dada Benson MD  Resident  09/07/22 3584

## 2022-09-07 NOTE — ED TRIAGE NOTES
Peri Johansen, an 88 y.o. female presents to the ED with c/o CP, SOB, and flank pain x 1 day. Pt was recently hospitalized at [facility] with CHF. Pt states that she feels similar to when she did at [facility].       Chief Complaint   Patient presents with    Chest Pain     Pt c/o CP, SOB, and flank pain since this AM. Denies vomiting or diarrhea.      Review of patient's allergies indicates:   Allergen Reactions    Latex, natural rubber Itching     Past Medical History:   Diagnosis Date    Aortic atherosclerosis 1/7/2016    Arthritis     Colon polyp: hyperplastic 2015- repeat 2020 8/6/2015    Diabetes mellitus, type II 8/16/2012    Diverticulosis     Gastric nodule 8/7/2014    GERD (gastroesophageal reflux disease) 8/16/2012    Goiter 8/16/2012    Hyperlipidemia 8/16/2012    Hypertension     Joint pain     Leg pain 8/16/2012    Lymphoma 8/16/2012    Osteopenia 8/16/2012    Osteoporosis     Renal cyst 3/28/2013    Rickets, vitamin D deficiency 8/16/2012    Thyroid nodule 2/24/2014    Vitamin D deficiency disease 8/16/2012

## 2022-09-07 NOTE — ASSESSMENT & PLAN NOTE
Lab Results   Component Value Date    TSH 0.341 (L) 06/30/2022   normal Free T4  -Continue MMI 2.5mg daily   
 Patient is chronically on statin.will continue for now. Monitor clinically. Last LDL was   Lab Results   Component Value Date    LDLCALC 61.6 (L) 10/21/2021     
-Chronic issue, more elevated on admit.  -Abd US.  -Continue to trend.  
-Chronic, stable.  -Continue gabapentin and PRN tramadol.  
-Chronic.  -PRN maalox.  
Acute, 126 on admit. Previously 138 on 9/4 at OSH. Patient is asymptomatic.   -Urine na, urine osm  -TSH  -Possibly over diuresed during previous admit at OSH, will hold lasix and trend Na.  
Elevated troponin  On admission, patient with left sided chest pain radiating to epigastric region. Denies palpitations. Pain reproducible on exam. Low suspicion for cardiac cause.   -Will attempt GI cocktail  -Troponin 0.137, similar to recent admit at [place] will continue to trend.  -EKG with no acute ischemic changes.  1/2022 Cardiac pet stress    The relative PET images show no clinically significant regional resting or stress induced perfusion abnormalities.    The whole heart absolute myocardial perfusion values averaged 0.95 cc/min/g at rest, which is normal; 1.61 cc/min/g at stress, which is mildly reduced; and CFR is 1.70 , which is mildly reduced.  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  Patient is a poor historian   -Continue ASA  
Patient is identified as having Combined Systolic and Diastolic heart failure that is Chronic. CHF is currently controlled. Latest ECHO performed and demonstrates- Results for orders placed during the hospital encounter of 06/30/22    Echo    Interpretation Summary  · The left ventricle is normal in size with mild concentric hypertrophy and mildly decreased systolic function. The estimated ejection fraction is 40%.  · The left ventricular global longitudinal strain is -10%. 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  · Normal right ventricular size with normal right ventricular systolic function.  · Biatrial enlargement.  · Mild mitral regurgitation.  · Moderate tricuspid regurgitation.  · The estimated PA systolic pressure is 44 mmHg.  · There is pulmonary hypertension.  · Elevated central venous pressure (15 mmHg).  · There is a right pleural effusion.  . Continue Beta Blocker and monitor clinical status closely. Monitor on telemetry. Patient is off CHF pathway.  Monitor strict Is&Os and daily weights.  Place on fluid restriction of 1 L. Continue to stress to patient importance of self efficacy and  on diet for CHF. Last BNP reviewed- and noted below   Recent Labs   Lab 09/06/22  [id number]   *   -Continue metoprolol  -Hold lasix  
Patient's FSGs are controlled on current medication regimen.  Last A1c reviewed-   Lab Results   Component Value Date    HGBA1C 7.2 (H) 06/30/2022     Most recent fingerstick glucose reviewed- No results for input(s): POCTGLUCOSE in the last 24 hours.  Current correctional scale  Low  Maintain anti-hyperglycemic dose as follows-   Antihyperglycemics (From admission, onward)    Start     Stop Route Frequency Ordered    09/07/22 0248  insulin aspart U-100 pen 0-5 Units         -- SubQ Before meals & nightly PRN 09/07/22 0148        Hold Oral hypoglycemics while patient is in the hospital.  -Accuchecks AC/HS  -Diabetic diet  
Temp > 100F or < 96.8F; SBP < 90 mmHG; HR > 120bpm; Resp > 24/min

## 2022-09-07 NOTE — ED NOTES
Pt grand daughter had to leave before pt was roomed. Would like to be called when pt is checked in and in a room.   Mickey (grand-daughter) phone number: [phone]

## 2022-09-07 NOTE — PLAN OF CARE
[facility] - Emergency Dept      HOME HEALTH ORDERS  FACE TO FACE ENCOUNTER    Patient Name: Peri Johansen  YOB: 1934    PCP: Annika Garland MD   PCP Address: [address] / [place] [address]  PCP Phone Number: [phone]  PCP Fax: [phone]    Encounter Date: 9/6/22    Admit to Home Health    Diagnoses:  Active Hospital Problems    Diagnosis  POA    *Hyponatremia [E87.1]  Yes    Hyperbilirubinemia [E80.6]  Yes    Chest pain [R07.9]  Yes    Type 2 diabetes mellitus with hyperglycemia, without long-term current use of insulin [E11.65]  Yes    Chronic congestive heart failure [I50.9]  Yes    Elevated troponin [R77.8]  Yes    Hyperthyroidism [E05.90]  Yes    Chronic bilateral low back pain with right-sided sciatica [M54.41, G89.29]  Yes    Gastroesophageal reflux disease without esophagitis [K21.9]  Yes    Mixed hyperlipidemia [E78.2]  Yes      Resolved Hospital Problems   No resolved problems to display.       Follow Up Appointments:  Future Appointments   Date Time Provider Department Center   9/15/2022  9:30 AM [facility] CT OP LIMIT 450 LBS [facility]AN[facility]   11/23/2022  9:30 AM Sabrina Vaughan DPM [facility] POD [facility]   12/1/2022  9:00 AM Jarvis Lagos OD [facility] OPTOMTY [facility]   1/12/2023  9:30 AM Chitra Wang MD [facility] CARDIO [facility]       Allergies:  Review of patient's allergies indicates:   Allergen Reactions    Latex, natural rubber Itching       Medications: Review discharge medications with patient and family and provide education.    Current Facility-Administered Medications   Medication Dose Route Frequency Provider Last Rate Last Admin    aluminum-magnesium hydroxide-simethicone 200-200-20 mg/5 mL suspension 30 mL  30 mL Oral Q6H PRN Wilda Frey NP        aspirin chewable tablet 81 mg  81 mg Oral Daily Wilda Frey NP   81 mg at 09/07/22 1001    atorvastatin tablet 40 mg  40 mg Oral Daily Wilda Frey NP   40 mg at 09/07/22 1001    dextrose 10%  bolus 125 mL  12.5 g Intravenous PRN Wilda Frey NP        dextrose 10% bolus 250 mL  25 g Intravenous PRN Wilda Frey NP        enoxaparin injection 40 mg  40 mg Subcutaneous Daily Wilda Frey NP        gabapentin capsule 300 mg  300 mg Oral BID Wilda Frey NP   300 mg at 09/07/22 1001    glucagon (human recombinant) injection 1 mg  1 mg Intramuscular PRN Wilda Frey NP        glucose chewable tablet 16 g  16 g Oral PRN Wilda Frey NP        glucose chewable tablet 24 g  24 g Oral PRN Wilda Frey NP        insulin aspart U-100 pen 0-5 Units  0-5 Units Subcutaneous QID (AC + HS) PRN Wilda Frey NP        melatonin tablet 6 mg  6 mg Oral Nightly PRN Dada Benson MD        methIMAzole split tablet 2.5 mg  2.5 mg Oral Daily Wilda Frey NP        metoprolol succinate (TOPROL-XL) 24 hr tablet 25 mg  25 mg Oral Daily Wilda Frey NP   25 mg at 09/07/22 1001    sodium chloride 0.9% flush 10 mL  10 mL Intravenous PRN Dada Benson MD        traMADoL tablet 50 mg  50 mg Oral Q12H PRN Wilda Frey NP         Current Outpatient Medications   Medication Sig Dispense Refill    ammonium lactate 12 % Crea Apply 1 application topically once daily. 280 g 3    aspirin 81 MG Chew Take 1 tablet (81 mg total) by mouth once daily.      atorvastatin (LIPITOR) 40 MG tablet TAKE 1 TABLET BY MOUTH EVERY DAY 90 tablet 0    blood sugar diagnostic (TRUE METRIX GLUCOSE TEST STRIP) Strp USE AS DIRECTED 150 strip 12    blood sugar diagnostic Strp Test 2 times daily 200 strip 12    blood-glucose meter kit Use as instructed.  ACCUCHECK or whatever is preferred by insurance 1 each 0    calcium 500 mg Tab Take 1 tablet by mouth Twice daily.      dextran 70-hypromellose (ARTIFICIAL TEARS,[id number],) ophthalmic solution Place 1 drop into both eyes 4 (four) times daily as needed. 1 Bottle 5    diclofenac sodium (VOLTAREN) 1 % Gel APPLY 2 GRAMS EXTERNALLY TO THE AFFECTED  AREA FOUR TIMES DAILY 100 g 1    ergocalciferol (ERGOCALCIFEROL) 50,000 unit Cap Take 1 capsule (50,000 Units total) by mouth every 7 days. (Patient not taking: Reported on 8/25/2022) 12 capsule 3    ergocalciferol (VITAMIN D2) 50,000 unit Cap Take 400 Units by mouth Daily.      fluticasone propionate (FLONASE) 50 mcg/actuation nasal spray 1 spray (50 mcg total) by Each Nostril route once daily. 16 g 1    furosemide (LASIX) 20 MG tablet Take 1 tablet (20 mg total) by mouth every evening. 90 tablet 0    furosemide (LASIX) 40 MG tablet Take 1 tablet (40 mg total) by mouth once daily. 30 tablet 11    gabapentin (NEURONTIN) 300 MG capsule TAKE 1 CAPSULE(300 MG) BY MOUTH TWICE DAILY 180 capsule 1    lancets Misc Test 2 x daily 200 each 12    methIMAzole (TAPAZOLE) 5 MG Tab TAKE 1/2 TABLET BY MOUTH DAILY 45 tablet 0    metoprolol succinate (TOPROL-XL) 25 MG 24 hr tablet Take 1 tablet (25 mg total) by mouth once daily. Hold if SBP <120 or <55 30 tablet 1    MICRO THIN LANCETS 33 gauge Misc USE AS DIRECTED  0    omeprazole (PRILOSEC) 40 MG capsule Take 1 capsule (40 mg total) by mouth every morning. 90 capsule 0    ondansetron (ZOFRAN-ODT) 4 MG TbDL Take 1 tablet (4 mg total) by mouth every 8 (eight) hours as needed (Nausea or vomiting). 12 tablet 0    potassium chloride (KLOR-CON) 10 MEQ TbSR TAKE 1 TABLET(10 MEQ) BY MOUTH EVERY DAY 30 tablet 11    traMADoL (ULTRAM) 50 mg tablet TAKE 1 TABLET(50 MG) BY MOUTH EVERY 12 HOURS AS NEEDED FOR PAIN 30 tablet 0     Current Discharge Medication List        CONTINUE these medications which have NOT CHANGED    Details   ammonium lactate 12 % Crea Apply 1 application topically once daily.  Qty: 280 g, Refills: 3    Associated Diagnoses: Type II or unspecified type diabetes mellitus with neurological manifestations, not stated as uncontrolled(250.60)      aspirin 81 MG Chew Take 1 tablet (81 mg total) by mouth once daily.    Associated Diagnoses: Aortic atherosclerosis       atorvastatin (LIPITOR) 40 MG tablet TAKE 1 TABLET BY MOUTH EVERY DAY  Qty: 90 tablet, Refills: 0      !! blood sugar diagnostic (TRUE METRIX GLUCOSE TEST STRIP) Strp USE AS DIRECTED  Qty: 150 strip, Refills: 12    Comments: **Patient requests 90 days supply**      !! blood sugar diagnostic Strp Test 2 times daily  Qty: 200 strip, Refills: 12      blood-glucose meter kit Use as instructed.  ACCUCHECK or whatever is preferred by insurance  Qty: 1 each, Refills: 0      calcium 500 mg Tab Take 1 tablet by mouth Twice daily.      dextran 70-hypromellose (ARTIFICIAL TEARS,[id number],) ophthalmic solution Place 1 drop into both eyes 4 (four) times daily as needed.  Qty: 1 Bottle, Refills: 5      diclofenac sodium (VOLTAREN) 1 % Gel APPLY 2 GRAMS EXTERNALLY TO THE AFFECTED AREA FOUR TIMES DAILY  Qty: 100 g, Refills: 1    Associated Diagnoses: Lumbar degenerative disc disease      !! ergocalciferol (ERGOCALCIFEROL) 50,000 unit Cap Take 1 capsule (50,000 Units total) by mouth every 7 days.  Qty: 12 capsule, Refills: 3      !! ergocalciferol (VITAMIN D2) 50,000 unit Cap Take 400 Units by mouth Daily.      fluticasone propionate (FLONASE) 50 mcg/actuation nasal spray 1 spray (50 mcg total) by Each Nostril route once daily.  Qty: 16 g, Refills: 1      !! furosemide (LASIX) 20 MG tablet Take 1 tablet (20 mg total) by mouth every evening.  Qty: 90 tablet, Refills: 0      !! furosemide (LASIX) 40 MG tablet Take 1 tablet (40 mg total) by mouth once daily.  Qty: 30 tablet, Refills: 11      gabapentin (NEURONTIN) 300 MG capsule TAKE 1 CAPSULE(300 MG) BY MOUTH TWICE DAILY  Qty: 180 capsule, Refills: 1      !! lancets Misc Test 2 x daily  Qty: 200 each, Refills: 12      methIMAzole (TAPAZOLE) 5 MG Tab TAKE 1/2 TABLET BY MOUTH DAILY  Qty: 45 tablet, Refills: 0      metoprolol succinate (TOPROL-XL) 25 MG 24 hr tablet Take 1 tablet (25 mg total) by mouth once daily. Hold if SBP <120 or <55  Qty: 30 tablet, Refills: 1    Comments: .       !! MICRO THIN LANCETS 33 gauge Misc USE AS DIRECTED  Refills: 0      omeprazole (PRILOSEC) 40 MG capsule Take 1 capsule (40 mg total) by mouth every morning.  Qty: 90 capsule, Refills: 0    Comments: **Patient requests 90 days supply**      ondansetron (ZOFRAN-ODT) 4 MG TbDL Take 1 tablet (4 mg total) by mouth every 8 (eight) hours as needed (Nausea or vomiting).  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 - Potential duplicate medications found. Please discuss with provider.            I have seen and examined this patient within the last 30 days. My clinical findings that support the need for the home health skilled services and home bound status are the following:no   Requiring assistive device to leave home due to unsteady gait caused by  Heart Failure.     Diet:   cardiac diet    Labs:  None    Referrals/ Consults  Physical Therapy to evaluate and treat. Evaluate for home safety and equipment needs; Establish/upgrade home exercise program. Perform / instruct on therapeutic exercises, gait training, transfer training, and Range of Motion.  Occupational Therapy to evaluate and treat. Evaluate home environment for safety and equipment needs. Perform/Instruct on transfers, ADL training, ROM, and therapeutic exercises.  Aide to provide assistance with personal care, ADLs, and vital signs.    Activities:   activity as tolerated    Nursing:   Agency to admit patient within 24 hours of hospital discharge unless specified on physician order or at patient request    SN to complete comprehensive assessment including routine vital signs. Instruct on disease process and s/s of complications to report  to MD. Review/verify medication list sent home with the patient at time of discharge  and instruct patient/caregiver as needed. Frequency may be adjusted depending on start of care date.     Skilled nurse to perform up to 3 visits PRN for symptoms related to diagnosis    Notify MD if SBP > 160 or < 90; DBP > 90 or < 50; HR > 120 or < 50; Temp > 101; O2 < 88%; Other:   NA    Ok to schedule additional visits based on staff availability and patient request on consecutive days within the home health episode.    When multiple disciplines ordered:    Start of Care occurs on Sunday - Wednesday schedule remaining discipline evaluations as ordered on separate consecutive days following the start of care.    Thursday SOC -schedule subsequent evaluations Friday and Monday the following week.     Friday - Saturday SOC - schedule subsequent discipline evaluations on consecutive days starting Monday of the following week.    For all post-discharge communication and subsequent orders please contact patient's primary care physician. If unable to reach primary care physician or do not receive response within 30 minutes, please contact Primary Care Physician for clinical staff order clarification    Miscellaneous   Heart Failure:      SN to instruct on the following:    Instruct on the definition of CHF.   Instruct on the signs/sympoms of CHF to be reported.   Instruct on and monitor daily weights.   Instruct on factors that cause exacerbation.   Instruct on action, dose, schedule, and side effects of medications.   Instruct on diet as prescribed.   Instruct on activity allowed.   Instruct on life-style modifications for life long management of CHF   SN to assess compliance with daily weights, diet, medications, fluid retention,    safety precautions, activities permitted and life-style modifications.   Additional 1-2 SN visits per week as needed for signs and symptoms     of CHF exacerbation.      For Weight Gain > 2-3 lbs in 1 day  or 4-6 lbs over 1 week notify PCP:  CHF DIURETIC SLIDING SCALE     Skilled nurse visits daily to instruct and monitor medication adherence until target weight. Then resume prior order frequency.     If weight gain exceeds 5 lbs over target weight, call MD  If weight gain of 3-4 lbs over target weight, then:     Increase Furosemide - current dose (mg/day) to 80 double dose and frequency of twice daily until target weight achieved or maximum 3 days.     If already on max oral daily dose, see IV diuretic instructions.    After 3 days of increased oral diuretic dose, get BMP. If patient is on increased oral diuretic dose greater than 5 days, repeat BMP at day 7 of increased dose.     Potassium supplementation   Scr > 1.5 mg/dl  Scr  1.5 mg/dl    K < 3.0 - NOTIFY MD and 40 mEq bid  40 mEq tid   K- 3.1-3.3  20 mEq bid  20 mEq tid    K 3.4-3.7  10 mEq bid  10 mEq tid      If target weight not reached after 5 days of increased oral diuretic, proceed to IV diuretic with daily patient contact to include face-to-face visit and telephone encounters.       Home Health Aide:  Nursing Three times weekly, Physical Therapy Three times weekly, Occupational Therapy Three times weekly, and Home Health Aide Three times weekly    Wound Care Orders  no    I certify that this patient is confined to her home and needs intermittent skilled nursing care, physical therapy, and occupational therapy.

## 2022-09-07 NOTE — PLAN OF CARE
Initial Discharge Planning Case Management Assessment:    Patient admitted on 9-7-22  Chart reviewed  Care plan discussed with treatment team,    attending Dr Piper  PCP updated in Epic: yes    Current dispo: home today  See PCP in 1-2 weeks, see cardiology as discussed  [facility] home [facility] to follow, spoke with Rhiannon  Transportation: has reliable  Consults following: case mgt  Case management  to follow    [facility] - Emergency Dept  Initial Discharge Assessment       Primary Care Provider: Annika Garland MD    Admission Diagnosis: Hyponatremia [E87.1]    Admission Date: 9/6/2022  Expected Discharge Date: 9/7/2022    Discharge Barriers Identified: (P) None    Payor: [employer] MANAGED MEDICARE / Plan: [employer] SNP (SPECIAL NEEDS PLAN) / Product Type: Medicare Advantage /     Extended Emergency Contact Information  Primary Emergency Contact: Zoltan Majano   [facility]  Home Phone: [phone]  Work Phone: [phone]  Mobile Phone: [phone]  Relation: Grandchild  Secondary Emergency Contact: Glenda Majano  Address: [address] [place] [address] [id number] [place] [address]  Home Phone: [phone]  Relation: Daughter    Discharge Plan A: (P) Home Health         [id number] [address] AT [facility]  [address]  [facility] [address]  Phone: [phone] Fax: [phone]    [id number] [place], [place]  [address]  [address]  [address]  [facility] [address]  Phone: [phone] Fax: [phone]    [facility] - [address]  [address]  [place] [address]  Phone: [phone] Fax: [phone]      Initial Assessment (most recent)       Adult Discharge Assessment - 09/07/22 1306          Discharge Assessment    Assessment Type Discharge Planning Assessment (P)      Confirmed/corrected address, phone number and  insurance Yes (P)      Confirmed Demographics Correct on Facesheet (P)      Source of Information patient;family;health record (P)      Lives With child(raine), adult (P)      Prior to hospitilization cognitive status: Alert/Oriented (P)      Current cognitive status: Alert/Oriented (P)      How do you get to doctors appointments? family or friend will provide (P)      Discharge Plan A Home Health (P)      DME Needed Upon Discharge  none (P)      Discharge Plan discussed with: Patient;Adult children (P)      Discharge Barriers Identified None (P)

## 2022-09-07 NOTE — HPI
Peri Johansen is a 88 y.o. female with a PMHx of HLD, HTN, hyperthyroidism, chronic back pain, and CHF (EF 40%) who presents to the ED with complaints of shortness of breath and chest pain. Patient reports SOB and pain began yesterday, worse with exertion. She reports the pain is to the left side of her chest and radiates to the epigastric region. It is reproducible on exam. She also notes right lower back pain, but she is unable to quantify how long the pain has been present. Patient is a very poor historian. The patient denies any N/V/D, fever, chills, cough, or dysuria. She reports compliance with her home lasix. Of note the patient was recently admitted to [facility] 9/2-9/5 for CHF exacerbation, chest pain, and elevated troponin.     In the ED, pt mildly tachycardic but otherwise VSSAF. CBC unremarkable. Na 126. Tbili 2.6. AST/ALT 44/24. Troponin 0.137. . EKG sinus rhythm with PACs, no ST elevation or depression. CXR with interval decreased cardiac size, vascular congestion and resolution of bibasilar edema. Persistent small right effusion.

## 2022-09-07 NOTE — PLAN OF CARE
Discharge Planning Case Management Assessment:     Patient admitted on 9-7-22  Chart reviewed  Care plan discussed with treatment team,    attending Dr Piper  PCP updated in Epic: yes     Current dispo: home today  See PCP in 1-2 weeks, see cardiology as discussed  [facility] home [facility] to follow, spoke with Rhiannon  Transportation: has reliable  Consults following: case mgt  Case management  to follow as needed    [facility] - Emergency Dept  Discharge Final Note    Primary Care Provider: Annika Garland MD    Expected Discharge Date: 9/7/2022    Final Discharge Note (most recent)       Final Note - 09/07/22 1310          Final Note    Assessment Type Final Discharge Note (P)         Post-Acute Status    Post-Acute Authorization Home Health (P)      Home Health Status Set-up Complete/Auth obtained (P)      Discharge Delays None known at this time (P)                      Important Message from Medicare

## 2022-09-08 NOTE — DISCHARGE SUMMARY
[facility] - Emergency Dept  Hospital Medicine  Discharge Summary      Patient Name: Peri Johansen  MRN: [id number]  Patient Class: OP- Observation  Admission Date: 9/6/2022  Hospital Length of Stay: 0 days  Discharge Date and Time:  09/07/2022 8:50 PM  Attending Physician: No att. providers found   Discharging Provider: Devi Piper MD  Primary Care Provider: Annika Garland MD  Hospital Medicine Team: [facility]  Devi Piper MD    HPI:   Peri Johansen is a 88 y.o. female with a PMHx of HLD, HTN, hyperthyroidism, chronic back pain, and CHF (EF 40%) who presents to the ED with complaints of shortness of breath and chest pain. Patient reports SOB and pain began yesterday, worse with exertion. She reports the pain is to the left side of her chest and radiates to the epigastric region. It is reproducible on exam. She also notes right lower back pain, but she is unable to quantify how long the pain has been present. Patient is a very poor historian. The patient denies any N/V/D, fever, chills, cough, or dysuria. She reports compliance with her home lasix. Of note the patient was recently admitted to [facility] 9/2-9/5 for CHF exacerbation, chest pain, and elevated troponin.     In the ED, pt mildly tachycardic but otherwise VSSAF. CBC unremarkable. Na 126. Tbili 2.6. AST/ALT 44/24. Troponin 0.137. . EKG sinus rhythm with PACs, no ST elevation or depression. CXR with interval decreased cardiac size, vascular congestion and resolution of bibasilar edema. Persistent small right effusion.      * No surgery found *      Hospital Course:   Improvement of chest pain with GI cocktail. By AM evaluation she was chest pain free. Labs overall reassuring, with stable trop. Tbili notably mildly elevated above baseline, with abdominal US done and reassuring. She was otherwise asymptomatic. Did not appear volume overloaded on exam. Labs on admission with hyponatremia - possible that she could have  been over-diuresed at recent hospitalization. Instructed her to hold diuretics until Friday AM. Then restart. Ordered HH PTOT and nursing (CHF nurse to monitor and adjust diuretics). Counseled to start protonix. Strict return precautions provided. Follow up placed for cardiology after discharge.        Goals of Care Treatment Preferences:  Code Status: Full Code      Consults:     No new Assessment & Plan notes have been filed under this hospital service since the last note was generated.  Service: Hospital Medicine    Final Active Diagnoses:    Diagnosis Date Noted POA    PRINCIPAL PROBLEM:  Hyponatremia [E87.1] 09/07/2022 Yes    Hyperbilirubinemia [E80.6] 09/07/2022 Yes    Chest pain [R07.9] 01/21/2022 Yes    Type 2 diabetes mellitus with hyperglycemia, without long-term current use of insulin [E11.65] 06/12/2019 Yes    Chronic congestive heart failure [I50.9] 06/12/2019 Yes    Elevated troponin [R77.8] 06/12/2019 Yes    Hyperthyroidism [E05.90] 04/15/2019 Yes    Chronic bilateral low back pain with right-sided sciatica [M54.41, G89.29] 02/03/2016 Yes    Gastroesophageal reflux disease without esophagitis [K21.9] 08/06/2015 Yes    Mixed hyperlipidemia [E78.2] 08/16/2012 Yes      Problems Resolved During this Admission:       Discharged Condition: good    Disposition: Home or Self Care    Follow Up:   Follow-up Information     Annika Garland MD. Go in 1 week(s).    Specialty: Internal Medicine  Why: see PCP in 1 week  Contact information:  [address] [facility] [address]  [phone]             [facility] [place] Follow up in 1 day(s).    Specialty: Home Health Services  Why: Home Health  Contact information:  [address]  [address]  [place] [address] [phone]                       Patient Instructions:      Ambulatory referral/consult to Cardiology   Standing Status: Future   Referral Priority: Urgent Referral Type: Consultation   Referral Reason: Specialty Services  Required   Requested Specialty: Cardiology   Number of Visits Requested: 1     Diet Cardiac   Order Comments: 1500mL fluid restriction     Notify your health care provider if you experience any of the following:  temperature >100.4     Notify your health care provider if you experience any of the following:  persistent nausea and vomiting or diarrhea     Notify your health care provider if you experience any of the following:  severe uncontrolled pain     Notify your health care provider if you experience any of the following:  difficulty breathing or increased cough     Notify your health care provider if you experience any of the following:  severe persistent headache     Notify your health care provider if you experience any of the following:  worsening rash     Notify your health care provider if you experience any of the following:  persistent dizziness, light-headedness, or visual disturbances     Notify your health care provider if you experience any of the following:  increased confusion or weakness     Activity as tolerated       Significant Diagnostic Studies: Labs: All labs within the past 24 hours have been reviewed    Pending Diagnostic Studies:     None         Medications:  Reconciled Home Medications:      Medication List      CONTINUE taking these medications    aspirin 81 MG Chew  Take 1 tablet (81 mg total) by mouth once daily.     atorvastatin 40 MG tablet  Commonly known as: LIPITOR  TAKE 1 TABLET BY MOUTH EVERY DAY     blood-glucose meter kit  Use as instructed.  ACCUCHECK or whatever is preferred by insurance     dextran 70-hypromellose ophthalmic solution  Commonly known as: ARTIFICIAL TEARS([id number])  Place 1 drop into both eyes 4 (four) times daily as needed.     diclofenac sodium 1 % Gel  Commonly known as: VOLTAREN  APPLY 2 GRAMS EXTERNALLY TO THE AFFECTED AREA FOUR TIMES DAILY     fluticasone propionate 50 mcg/actuation nasal spray  Commonly known as: FLONASE  1 spray (50 mcg total)  by Each Nostril route once daily.     * furosemide 40 MG tablet  Commonly known as: LASIX  Take 1 tablet (40 mg total) by mouth once daily.     * furosemide 20 MG tablet  Commonly known as: LASIX  Take 1 tablet (20 mg total) by mouth every evening.     gabapentin 300 MG capsule  Commonly known as: NEURONTIN  TAKE 1 CAPSULE(300 MG) BY MOUTH TWICE DAILY     methIMAzole 5 MG Tab  Commonly known as: TAPAZOLE  TAKE 1/2 TABLET BY MOUTH DAILY     metoprolol succinate 25 MG 24 hr tablet  Commonly known as: TOPROL-XL  Take 1 tablet (25 mg total) by mouth once daily. Hold if SBP <120 or <55     * MICRO THIN LANCETS 33 gauge Misc  Generic drug: lancets  USE AS DIRECTED     * lancets Misc  Test 2 x daily     omeprazole 40 MG capsule  Commonly known as: PRILOSEC  Take 1 capsule (40 mg total) by mouth every morning.     ondansetron 4 MG Tbdl  Commonly known as: ZOFRAN-ODT  Take 1 tablet (4 mg total) by mouth every 8 (eight) hours as needed (Nausea or vomiting).     potassium chloride 10 MEQ Tbsr  Commonly known as: KLOR-CON  TAKE 1 TABLET(10 MEQ) BY MOUTH EVERY DAY     traMADoL 50 mg tablet  Commonly known as: ULTRAM  TAKE 1 TABLET(50 MG) BY MOUTH EVERY 12 HOURS AS NEEDED FOR PAIN     * TRUE METRIX GLUCOSE TEST STRIP Strp  Generic drug: blood sugar diagnostic  USE AS DIRECTED     * blood sugar diagnostic Strp  Test 2 times daily         * This list has 6 medication(s) that are the same as other medications prescribed for you. Read the directions carefully, and ask your doctor or other care provider to review them with you.            STOP taking these medications    ammonium lactate 12 % Crea     calcium 500 mg Tab     VITAMIN D2 50,000 unit Cap  Generic drug: ergocalciferol        ASK your doctor about these medications    ergocalciferol 50,000 unit Cap  Commonly known as: ERGOCALCIFEROL  Take 1 capsule (50,000 Units total) by mouth every 7 days.            Indwelling Lines/Drains at time of discharge:   Lines/Drains/Airways      Drain  Duration           Female External Urinary Catheter 09/07/22 0813 <1 day                Time spent on the discharge of patient: 35 minutes         May CHARLENE Piper MD  Department of Hospital Medicine  [facility] - Emergency Dept

## 2022-09-08 NOTE — HOSPITAL COURSE
Improvement of chest pain with GI cocktail. By AM evaluation she was chest pain free. Labs overall reassuring, with stable trop. Tbili notably mildly elevated above baseline, with abdominal US done and reassuring. She was otherwise asymptomatic. Did not appear volume overloaded on exam. Labs on admission with hyponatremia - possible that she could have been over-diuresed at recent hospitalization. Instructed her to hold diuretics until Friday AM. Then restart. Ordered HH PTOT and nursing (CHF nurse to monitor and adjust diuretics). Counseled to start protonix. Strict return precautions provided. Follow up placed for cardiology after discharge.

## 2022-09-14 NOTE — FIRST PROVIDER EVALUATION
Medical screening examination initiated.  I have conducted a focused provider triage encounter, findings are as follows:    Brief history of present illness:  88 F hx of HLD, lymphoma here for chest pain that started this morning.  Described as sharp, located in center of the chest w/ radiation to upper stomach, and associated with sob.     Vitals:    09/14/22 1709   BP: 100/62   Pulse: 76   Resp: 18   Temp: 98.4 °F (36.9 °C)   TempSrc: Oral   SpO2: 99%       Pertinent physical exam:  well appearing, no distress     Brief workup plan:  labs, ekg, cxr      Preliminary workup initiated; this workup will be continued and followed by the physician or advanced practice provider that is assigned to the patient when roomed.

## 2022-09-14 NOTE — Clinical Note
Diagnosis: Chest pain [978221]   Future Attending Provider: SHYLA FINE [[id number]]   Admitting Provider:: SHYLA FINE [[id number]]

## 2022-09-15 PROBLEM — I50.32 CHRONIC DIASTOLIC CONGESTIVE HEART FAILURE: Status: ACTIVE | Noted: 2019-06-12

## 2022-09-15 PROBLEM — I50.33 ACUTE ON CHRONIC DIASTOLIC CONGESTIVE HEART FAILURE: Status: ACTIVE | Noted: 2019-06-12

## 2022-09-15 PROBLEM — I50.32 CHRONIC DIASTOLIC CONGESTIVE HEART FAILURE: Status: ACTIVE | Noted: 2022-01-01

## 2022-09-15 PROBLEM — R53.81 DEBILITY: Status: ACTIVE | Noted: 2022-01-01

## 2022-09-15 NOTE — ASSESSMENT & PLAN NOTE
Patient appears fluid overloaded on physical exam. Symptoms of dyspnea and clinical exam findings consistent with diagnosis of CHF exacerbation.   - BNP 1156, troponin elevated but flat per chart review  - CXR shows cardiomegaly with bilateral pleural effusions (R>L)  - Started IV Lasix: 40 mg given in the ED, scheduled for 60 mg BID starting in AM  - Daily weights, I/Os, cardiac telemetry  - Last echo on 9/7/2022  - Continue aspirin 81 mg, metoprolol succinate 25 mg, PO daily    Results for orders placed during the hospital encounter of 09/06/22    Echo    Interpretation Summary  · The left ventricle is normal in size with concentric remodeling and normal systolic function.  · The estimated ejection fraction is 58%.  · Grade III left ventricular diastolic dysfunction.  · Normal right ventricular size with normal right ventricular systolic function.  · There is right ventricular hypertrophy.  · There is mild aortic valve stenosis.  · Aortic valve area is 1.60 cm2; peak velocity is 0.99 m/s; mean gradient is 2 mmHg.  · Mild-to-moderate mitral regurgitation.  · Mild to moderate tricuspid regurgitation.  · Normal central venous pressure (3 mmHg).  · The estimated PA systolic pressure is 43 mmHg.  · There is MILD pulmonary hypertension.  · Trivial posterior pericardial effusion. And under the RA.  · There is a small right pleural effusion.

## 2022-09-15 NOTE — HOSPITAL COURSE
IV diuresis initiated upon admission with symptomatic improvement.  Transitioned to p.o. Lasix 9/16.  Patient medically stable to discharge 09/16.  Return precautions advised.  Patient and family in agreement with discharge plan. Home health services arranged.    Vitals:    09/16/22 0845 09/16/22 1130 09/16/22 1235 09/16/22 1545   BP: (!) 92/53  100/60    BP Location: Right arm  Right arm    Patient Position: Lying  Sitting    Pulse: 71 81 74 75   Resp: 16  18 17   Temp: 97.6 °F (36.4 °C)  97.7 °F (36.5 °C)    TempSrc: Oral  Oral    SpO2: 100%  98% 99%   Weight:       Height:         Physical Exam  Vitals and nursing note reviewed.   Constitutional:       General: She is not in acute distress.     Appearance: She is well-developed and normal weight.   HENT:      Head: Normocephalic and atraumatic.      Right Ear: External ear normal.      Left Ear: External ear normal.      Mouth/Throat:      Pharynx: No oropharyngeal exudate.   Eyes:      General: No scleral icterus.        Right eye: No discharge.         Left eye: No discharge.   Neck:      Vascular: JVD improved.   Cardiovascular:      Rate and Rhythm: Normal rate and regular rhythm.      Heart sounds: Normal heart sounds.   Pulmonary:      Effort: Pulmonary effort is normal. No respiratory distress.      Breath sounds: Rales present (improved). No wheezing.   Abdominal:      General: Bowel sounds are normal. There is no distension.      Palpations: Abdomen is soft.      Tenderness: There is no abdominal tenderness.   Musculoskeletal:         Right lower leg: Edema (1+) present, improved.      Left lower leg: Edema (1+) present, improved.   Lymphadenopathy:      Cervical: No cervical adenopathy.   Skin:     General: Skin is warm and dry.   Neurological:      Mental Status: She is alert and oriented to person, place, and time. Mental status is at baseline.   Psychiatric:         Behavior: Behavior normal.

## 2022-09-15 NOTE — ASSESSMENT & PLAN NOTE
POCT BG x4  Hypoglycemia protocol  Goal 140-180  Diabetic diet  LDSSI  Continue to monitor closely and adjust scheduled and sliding scale insulin as clinically indicated      Last A1c reviewed-   Lab Results   Component Value Date    HGBA1C 7.2 (H) 06/30/2022

## 2022-09-15 NOTE — ED TRIAGE NOTES
Peri M Eleno, a 88 y.o. female presents to the ED w/ complaint of chest pain constantly since last night. Given 1 spray of nitro and 325 of Asprin. Pt denies SOB.     Triage note:  Chief Complaint   Patient presents with    Chest Pain     Since last night. Given 1 spray of nitro and 325 of aspirin     Review of patient's allergies indicates:   Allergen Reactions    Latex, natural rubber Itching     Past Medical History:   Diagnosis Date    Aortic atherosclerosis 1/7/2016    Arthritis     Colon polyp: hyperplastic 2015- repeat 2020 8/6/2015    Diabetes mellitus, type II 8/16/2012    Diverticulosis     Gastric nodule 8/7/2014    GERD (gastroesophageal reflux disease) 8/16/2012    Goiter 8/16/2012    Hyperlipidemia 8/16/2012    Hypertension     Joint pain     Leg pain 8/16/2012    Lymphoma 8/16/2012    Osteopenia 8/16/2012    Osteoporosis     Renal cyst 3/28/2013    Rickets, vitamin D deficiency 8/16/2012    Thyroid nodule 2/24/2014    Vitamin D deficiency disease 8/16/2012

## 2022-09-15 NOTE — ASSESSMENT & PLAN NOTE
- Na 128 upon admission  - Most likely 2/2 patient's fluid overload status  - Monitor and manage with diuresis

## 2022-09-15 NOTE — ASSESSMENT & PLAN NOTE
On admit: Patient appears fluid overloaded on physical exam. Symptoms of dyspnea and clinical exam findings consistent with diagnosis of CHF exacerbation.   - BNP 1156, troponin elevated but flat per chart review  - CXR shows cardiomegaly with bilateral pleural effusions (R>L)  - Started IV Lasix: 40 mg given in the ED, scheduled for 60 mg BID, will continue  - Daily weights, I/Os, cardiac telemetry  - Last echo on 9/7/2022  - Continue aspirin 81 mg, metoprolol succinate 25 mg, PO daily    Results for orders placed during the hospital encounter of 09/06/22    Echo    Interpretation Summary  · The left ventricle is normal in size with concentric remodeling and normal systolic function.  · The estimated ejection fraction is 58%.  · Grade III left ventricular diastolic dysfunction.  · Normal right ventricular size with normal right ventricular systolic function.  · There is right ventricular hypertrophy.  · There is mild aortic valve stenosis.  · Aortic valve area is 1.60 cm2; peak velocity is 0.99 m/s; mean gradient is 2 mmHg.  · Mild-to-moderate mitral regurgitation.  · Mild to moderate tricuspid regurgitation.  · Normal central venous pressure (3 mmHg).  · The estimated PA systolic pressure is 43 mmHg.  · There is MILD pulmonary hypertension.  · Trivial posterior pericardial effusion. And under the RA.  · There is a small right pleural effusion.

## 2022-09-15 NOTE — HPI
Peri Johansen is a 88 y.o. female with PMHx of CHF, T2DM, HLD, HTN, DLBCL, GERD and hyperthyroidism presents to the ED with shortness of breath and chest pain x 1 day. Patient is A&Ox4. Patient states the symptoms have been persistent since yesterday evening. She experiences dyspnea upon exertion and had to constantly sit down and rest to recover. She also noticed edema on her lower extremities. Came to the ED via EMS, received 325 mg aspirin and 1 spray of SL nitro en route. She stated that the nitro did not help with her chest pain. She endorses mild nausea and abdominal discomfort. Denies fevers, chills, dysuria, hematuria, vomiting, diarrhea, cough, diaphoresis.     ED: /58, HR 76, SpO2 98% on RA. No supplemental O2 required. CBC wnl. Na 128, Cr 0.8, Glucose 179, albumin 2.9. Troponin elevated at baseline. BNP 1156. No EKG changes. CXR: Mild cardiomegaly with right greater than left pleural effusions and possible mild interstitial edema. CT C/A/P: Moderate right and mild left pleural effusions. Mild anasarca. Cardiomegaly and biatrial enlargement. Significant reflux of contrast into the hepatic veins suggests component of right heart failure. Cardiology consulted, do not recommend starting ACS protocol, suspect heart failure exacerbation. 40 mg IV Lasix administered and ad

## 2022-09-15 NOTE — ASSESSMENT & PLAN NOTE
Admitted [facility]  9/12  CM/SW aware and following  Pt uses cane and rollator at baseline for ambulation, however, per daughter description regarding patient mobility and need for assistance, patient would benefit from wheelchair, hospital bed   Home walk test to evaluate for oxygen needs closer to discharge

## 2022-09-15 NOTE — ASSESSMENT & PLAN NOTE
- Diabetic diet ordered  - Monitor blood glucose with daily BMP    Last A1c reviewed-   Lab Results   Component Value Date    HGBA1C 7.2 (H) 06/30/2022

## 2022-09-15 NOTE — PROGRESS NOTES
[facility] - Emergency Dept  [facility] Medicine  Progress Note    Patient Name: Peri Johansen  MRN: [id number]  Patient Class: OP- Observation   Admission Date: 9/14/2022  Length of Stay: 0 days  Attending Physician: Jennie Thomson MD  Primary Care Provider: Annika Garland MD        Subjective:     Principal Problem:Chronic diastolic congestive heart failure        HPI:  Peri Johansen is a 88 y.o. female with PMHx of CHF, T2DM, HLD, HTN, DLBCL, GERD and hyperthyroidism presents to the ED with shortness of breath and chest pain x 1 day. Patient is A&Ox4. Patient states the symptoms have been persistent since yesterday evening. She experiences dyspnea upon exertion and had to constantly sit down and rest to recover. She also noticed edema on her lower extremities. Came to the ED via EMS, received 325 mg aspirin and 1 spray of SL nitro en route. She stated that the nitro did not help with her chest pain. She endorses mild nausea and abdominal discomfort. Denies fevers, chills, dysuria, hematuria, vomiting, diarrhea, cough, diaphoresis.     ED: /58, HR 76, SpO2 98% on RA. No supplemental O2 required. CBC wnl. Na 128, Cr 0.8, Glucose 179, albumin 2.9. Troponin elevated at baseline. BNP 1156. No EKG changes. CXR: Mild cardiomegaly with right greater than left pleural effusions and possible mild interstitial edema. CT C/A/P: Moderate right and mild left pleural effusions. Mild anasarca. Cardiomegaly and biatrial enlargement. Significant reflux of contrast into the hepatic veins suggests component of right heart failure. Cardiology consulted, do not recommend starting ACS protocol, suspect heart failure exacerbation. 40 mg IV Lasix administered and ad      Overview/Hospital Course:  IV diuresis initiated upon admission.      Interval History:   Patient seen and examined at bedside.    No acute events overnight.  Patient has no new, acute complaints this morning. SOB and leg swelling improving. SOB still notable w/  exertion.   Patient and son updated regarding care plan.      Review of Systems   Constitutional:  Negative for activity change, chills and fever.   HENT:  Negative for trouble swallowing.    Eyes:  Negative for photophobia and visual disturbance.   Respiratory:  Positive for shortness of breath. Negative for cough and chest tightness.    Cardiovascular:  Positive for chest pain (resolved) and leg swelling. Negative for palpitations.   Gastrointestinal:  Positive for nausea (resolved). Negative for abdominal pain, constipation, diarrhea and vomiting.   Genitourinary:  Negative for dysuria, frequency and hematuria.   Musculoskeletal:  Negative for back pain, gait problem and neck pain.   Skin:  Negative for rash and wound.   Neurological:  Negative for dizziness, syncope, speech difficulty and light-headedness.   Psychiatric/Behavioral:  Negative for agitation and confusion. The patient is not nervous/anxious.      Objective:     Vital Signs (Most Recent):  Temp: 98.3 °F (36.8 °C) (09/15/22 1157)  Pulse: 74 (09/15/22 1444)  Resp: 15 (09/15/22 0324)  BP: (!) 108/55 (09/15/22 1304)  SpO2: 97 % (09/15/22 1444)   Vital Signs (24h Range):  Temp:  [97.6 °F (36.4 °C)-98.4 °F (36.9 °C)] 98.3 °F (36.8 °C)  Pulse:  [67-86] 74  Resp:  [15-20] 15  SpO2:  [97 %-100 %] 97 %  BP: (100-117)/(55-73) 108/55     Weight: 52.2 kg (115 lb 1.3 oz)  Body mass index is 23.24 kg/m².    Intake/Output Summary (Last 24 hours) at 9/15/2022 1450  Last data filed at 9/15/2022 0755  Gross per 24 hour   Intake --   Output 1050 ml   Net -1050 ml      Physical Exam  Vitals and nursing note reviewed.   Constitutional:       General: She is not in acute distress.     Appearance: She is well-developed and normal weight.   HENT:      Head: Normocephalic and atraumatic.      Right Ear: External ear normal.      Left Ear: External ear normal.      Mouth/Throat:      Pharynx: No oropharyngeal exudate.   Eyes:      General: No scleral icterus.        Right  eye: No discharge.         Left eye: No discharge.   Neck:      Vascular: JVD present.   Cardiovascular:      Rate and Rhythm: Normal rate and regular rhythm.      Heart sounds: Normal heart sounds.   Pulmonary:      Effort: Pulmonary effort is normal. No respiratory distress.      Breath sounds: Rales present. No wheezing.   Abdominal:      General: Bowel sounds are normal. There is no distension.      Palpations: Abdomen is soft.      Tenderness: There is no abdominal tenderness.   Musculoskeletal:         General: No tenderness. Normal range of motion.      Cervical back: Normal range of motion and neck supple.      Right lower leg: Edema (1+) present.      Left lower leg: Edema (1+) present.   Lymphadenopathy:      Cervical: No cervical adenopathy.   Skin:     General: Skin is warm and dry.      Capillary Refill: Capillary refill takes less than 2 seconds.      Findings: No rash.   Neurological:      Mental Status: She is alert and oriented to person, place, and time. Mental status is at baseline.      Cranial Nerves: No cranial nerve deficit.      Sensory: No sensory deficit.      Coordination: Coordination normal.   Psychiatric:         Behavior: Behavior normal.         Thought Content: Thought content normal.         Judgment: Judgment normal.       Significant Labs: All pertinent labs within the past 24 hours have been reviewed.    Recent Results (from the past 24 hour(s))   CBC auto differential    Collection Time: 09/14/22  5:42 PM   Result Value Ref Range    WBC 3.84 (L) 3.90 - 12.70 K/uL    RBC 4.60 4.00 - 5.40 M/uL    Hemoglobin 13.6 12.0 - 16.0 g/dL    Hematocrit 40.5 37.0 - 48.5 %    MCV 88 82 - 98 fL    MCH 29.6 27.0 - 31.0 pg    MCHC 33.6 32.0 - 36.0 g/dL    RDW 16.4 (H) 11.5 - 14.5 %    Platelets 215 150 - 450 K/uL    MPV 11.5 9.2 - 12.9 fL    Immature Granulocytes 0.3 0.0 - 0.5 %    Gran # (ANC) 2.1 1.8 - 7.7 K/uL    Immature Grans (Abs) 0.01 0.00 - 0.04 K/uL    Lymph # 1.2 1.0 - 4.8 K/uL    Mono  # 0.5 0.3 - 1.0 K/uL    Eos # 0.0 0.0 - 0.5 K/uL    Baso # 0.01 0.00 - 0.20 K/uL    nRBC 0 0 /100 WBC    Gran % 53.5 38.0 - 73.0 %    Lymph % 31.3 18.0 - 48.0 %    Mono % 14.1 4.0 - 15.0 %    Eosinophil % 0.5 0.0 - 8.0 %    Basophil % 0.3 0.0 - 1.9 %    Differential Method Automated    Comprehensive metabolic panel    Collection Time: 09/14/22  5:42 PM   Result Value Ref Range    Sodium 128 (L) 136 - 145 mmol/L    Potassium 4.8 3.5 - 5.1 mmol/L    Chloride 104 95 - 110 mmol/L    CO2 23 23 - 29 mmol/L    Glucose 179 (H) 70 - 110 mg/dL    BUN 24 (H) 8 - 23 mg/dL    Creatinine 0.8 0.5 - 1.4 mg/dL    Calcium 10.0 8.7 - 10.5 mg/dL    Total Protein 7.5 6.0 - 8.4 g/dL    Albumin 2.9 (L) 3.5 - 5.2 g/dL    Total Bilirubin 1.4 (H) 0.1 - 1.0 mg/dL    Alkaline Phosphatase 109 55 - 135 U/L    AST 45 (H) 10 - 40 U/L    ALT 27 10 - 44 U/L    Anion Gap 1 (L) 8 - 16 mmol/L    eGFR >60.0 >60 mL/min/1.73 m^2   Troponin I #1    Collection Time: 09/14/22  5:42 PM   Result Value Ref Range    Troponin I 0.103 (H) 0.000 - 0.026 ng/mL   B-Type natriuretic peptide (BNP)    Collection Time: 09/14/22  5:42 PM   Result Value Ref Range    BNP 1,101 (H) 0 - 99 pg/mL   Troponin I #2    Collection Time: 09/14/22  8:29 PM   Result Value Ref Range    Troponin I 0.142 (H) 0.000 - 0.026 ng/mL   CBC auto differential    Collection Time: 09/14/22  8:32 PM   Result Value Ref Range    WBC 3.93 3.90 - 12.70 K/uL    RBC 4.57 4.00 - 5.40 M/uL    Hemoglobin 13.5 12.0 - 16.0 g/dL    Hematocrit 39.6 37.0 - 48.5 %    MCV 87 82 - 98 fL    MCH 29.5 27.0 - 31.0 pg    MCHC 34.1 32.0 - 36.0 g/dL    RDW 16.5 (H) 11.5 - 14.5 %    Platelets 224 150 - 450 K/uL    MPV 11.6 9.2 - 12.9 fL    Immature Granulocytes 0.0 0.0 - 0.5 %    Gran # (ANC) 2.0 1.8 - 7.7 K/uL    Immature Grans (Abs) 0.00 0.00 - 0.04 K/uL    Lymph # 1.3 1.0 - 4.8 K/uL    Mono # 0.6 0.3 - 1.0 K/uL    Eos # 0.1 0.0 - 0.5 K/uL    Baso # 0.03 0.00 - 0.20 K/uL    nRBC 0 0 /100 WBC    Gran % 49.8 38.0 - 73.0 %     Lymph % 33.3 18.0 - 48.0 %    Mono % 14.8 4.0 - 15.0 %    Eosinophil % 1.3 0.0 - 8.0 %    Basophil % 0.8 0.0 - 1.9 %    Differential Method Automated    B-Type natriuretic peptide (BNP)    Collection Time: 09/14/22  8:32 PM   Result Value Ref Range    BNP 1,156 (H) 0 - 99 pg/mL   Magnesium    Collection Time: 09/14/22  8:39 PM   Result Value Ref Range    Magnesium 1.9 1.6 - 2.6 mg/dL   Phosphorus    Collection Time: 09/14/22  8:39 PM   Result Value Ref Range    Phosphorus 2.7 2.7 - 4.5 mg/dL   Urinalysis, Reflex to Urine Culture Urine, Clean Catch    Collection Time: 09/15/22  1:25 AM    Specimen: Urine   Result Value Ref Range    Specimen UA Urine, Clean Catch     Color, UA Yellow Yellow, Straw, Patricia    Appearance, UA Clear Clear    pH, UA 6.0 5.0 - 8.0    Specific Gravity, UA 1.015 1.005 - 1.030    Protein, UA Negative Negative    Glucose, UA Negative Negative    Ketones, UA Negative Negative    Bilirubin (UA) Negative Negative    Occult Blood UA Negative Negative    Nitrite, UA Negative Negative    Leukocytes, UA 1+ (A) Negative   Urinalysis Microscopic    Collection Time: 09/15/22  1:25 AM   Result Value Ref Range    RBC, UA 1 0 - 4 /hpf    WBC, UA 2 0 - 5 /hpf    Squam Epithel, UA 0 /hpf    Microscopic Comment SEE COMMENT    Magnesium - if not done in ED    Collection Time: 09/15/22  1:56 AM   Result Value Ref Range    Magnesium 3.9 (H) 1.6 - 2.6 mg/dL   Phosphorus - if not done in ED    Collection Time: 09/15/22  1:56 AM   Result Value Ref Range    Phosphorus 2.7 2.7 - 4.5 mg/dL   POCT glucose    Collection Time: 09/15/22  2:58 AM   Result Value Ref Range    POCT Glucose 113 (H) 70 - 110 mg/dL   Basic Metabolic Panel (BMP)    Collection Time: 09/15/22  3:23 AM   Result Value Ref Range    Sodium 136 136 - 145 mmol/L    Potassium 4.1 3.5 - 5.1 mmol/L    Chloride 103 95 - 110 mmol/L    CO2 23 23 - 29 mmol/L    Glucose 107 70 - 110 mg/dL    BUN 19 8 - 23 mg/dL    Creatinine 0.8 0.5 - 1.4 mg/dL    Calcium 9.2  8.7 - 10.5 mg/dL    Anion Gap 10 8 - 16 mmol/L    eGFR >60.0 >60 mL/min/1.73 m^2   Magnesium    Collection Time: 09/15/22  3:23 AM   Result Value Ref Range    Magnesium 3.5 (H) 1.6 - 2.6 mg/dL   Phosphorus    Collection Time: 09/15/22  3:23 AM   Result Value Ref Range    Phosphorus 2.5 (L) 2.7 - 4.5 mg/dL   CBC with Automated Differential    Collection Time: 09/15/22  3:23 AM   Result Value Ref Range    WBC 4.39 3.90 - 12.70 K/uL    RBC 4.79 4.00 - 5.40 M/uL    Hemoglobin 14.1 12.0 - 16.0 g/dL    Hematocrit 39.8 37.0 - 48.5 %    MCV 83 82 - 98 fL    MCH 29.4 27.0 - 31.0 pg    MCHC 35.4 32.0 - 36.0 g/dL    RDW 16.3 (H) 11.5 - 14.5 %    Platelets 170 150 - 450 K/uL    MPV 11.9 9.2 - 12.9 fL    Immature Granulocytes 2.5 (H) 0.0 - 0.5 %    Gran # (ANC) 2.1 1.8 - 7.7 K/uL    Immature Grans (Abs) 0.11 (H) 0.00 - 0.04 K/uL    Lymph # 1.4 1.0 - 4.8 K/uL    Mono # 0.7 0.3 - 1.0 K/uL    Eos # 0.1 0.0 - 0.5 K/uL    Baso # 0.06 0.00 - 0.20 K/uL    nRBC 0 0 /100 WBC    Gran % 48.0 38.0 - 73.0 %    Lymph % 31.4 18.0 - 48.0 %    Mono % 15.3 (H) 4.0 - 15.0 %    Eosinophil % 1.4 0.0 - 8.0 %    Basophil % 1.4 0.0 - 1.9 %    Platelet Estimate Appears normal     Aniso Slight     Poik Slight     Poly Occasional     Hypo Occasional     Ovalocytes Occasional     Target Cells CANCELED     Tear Drop Cells Occasional     Pernell Cells Occasional     Schistocytes Present     Large/Giant Platelets Present     Fragmented Cells Occasional     Differential Method Automated          Significant Imaging: I have reviewed all pertinent imaging results/findings within the past 24 hours.    Imaging Results              X-Ray Chest AP Portable (Final result)  Result time 09/14/22 20:33:13      Final result by Elio Romo MD (09/14/22 20:33:13)                   Impression:      Mild cardiomegaly with right greater than left pleural effusions and possible mild interstitial edema.      Electronically signed by: Elio Romo,  "MD  Date:    09/14/2022  Time:    20:33               Narrative:    EXAMINATION:  XR CHEST AP PORTABLE    CLINICAL HISTORY:  Provided history is "Chest Pain;  ".    TECHNIQUE:  One view of the chest.    COMPARISON:  09/07/2022.    FINDINGS:  Cardiac wires overlie the chest.  Cardiomediastinal silhouette is mildly enlarged and similar to prior study.  Atherosclerotic calcifications overlie the aortic arch.  Central vascular congestion and prominent perihilar interstitial lung markings.  Moderate right and small left pleural effusions with adjacent passive atelectasis.  Skin fold artifact overlying the right hemithorax.  No gross pneumothorax.  Degenerative changes in both shoulders.                                       CTA Chest Abdomen Pelvis (Final result)  Result time 09/14/22 20:46:18      Final result by Elio Romo MD (09/14/22 20:46:18)                   Impression:      1. No aortic aneurysm, dissection, or other acute aortic pathology.  Severe atherosclerosis of the major aortic branch vessels.  2. Moderate right and mild left pleural effusions.  Mild anasarca.  3. Cardiomegaly and biatrial enlargement.  Significant reflux of contrast into the hepatic veins suggests component of right heart failure.  4. Moderate rectal stool burden with circumferential anorectal wall thickening compatible with proctitis.  5. Sigmoid colonic diverticulosis.  6. Grossly stable enlarged subcarinal lymph node as seen on prior studies.  7. No hydronephrosis or acute renal pathology, though evaluation is significantly limited by motion.  8. Severe bilateral hip DJD.  9. Motion and artifact limited study.    Electronically signed by resident: Miguel Martinez  Date:    09/14/2022  Time:    20:02    Electronically signed by: Elio Romo MD  Date:    09/14/2022  Time:    20:46               Narrative:    EXAMINATION:  CTA CHEST ABDOMEN PELVIS    CLINICAL HISTORY:  Aortic aneurysm, known or suspected;chest pain, epigastric " pain, and b/l flank pain, r/o dissection;    TECHNIQUE:  Using 75 cc of  Omnipaque 350 IV contrast, and multi-detector helical CT technique, axial CT angiogram images of the abdomen were obtained from the lung bases through the pelvis.  2D post-processing coronal and sagittal reconstructions of the abdominal aorta and visceral arteries performed.    Image quality degraded by streak artifact from overlying cardiac monitoring leads.  Exam quality is also limited by motion.    COMPARISON:  CT abdomen pelvis 06/21/2022    FINDINGS:  Base of the neck: Enlarged heterogeneous thyroid gland.    Thoracic soft tissues: No axillary or infraclavicular lymphadenopathy.    Mediastinum/hilum: Several prominent upper mediastinal lymph nodes.  Enlarged subcarinal lymph node measures up to 1.6 cm in short axis, though is poorly delineated on the current study secondary to image noise and motion.  This finding is overall similar when compared with prior CT dated 04/19/2022 and prior PET-CT dated 06/17/2021.  No pathologic hilar lymphadenopathy.    Heart: The heart is enlarged and there is prominent biatrial enlargement.  Trace pericardial fluid.  Severe multi-vessel coronary artery calcific atherosclerosis.    Lungs: Moderate volume right and small volume left dependent pleural effusions with adjacent compressive atelectasis.  No focal lung consolidation, noting suboptimal evaluation of pulmonary parenchyma secondary to CTA technique and breathing motion artifact.    Liver: Normal in size and attenuation without focal hepatic abnormality.    Gallbladder: Normal appearance without evidence for cholecystitis.    Bile Ducts: No intra or extrahepatic biliary ductal dilation.    Pancreas: Fatty atrophy of the pancreas.  No pancreatic mass lesion or peripancreatic inflammatory change.    Spleen: Normal.    Adrenals: Normal.    Genitourinary: The kidneys enhance symmetrically with no enhancing renal mass.  Bilateral renal cysts and multiple  other subcentimeter renal cortical hypoattenuating lesions are too small to characterize.  Bladder demonstrates smooth contours without bladder wall thickening.    Reproductive organs: The uterus is surgically absent.    GI tract/Mesentery: Stomach is normal appearance.  Visualized loops of small and large bowel are normal in caliber without evidence for obstruction.Moderate amount of stool within the rectum.  Circumferential thickening of the anorectal junction with surrounding fat stranding.  Sigmoid colonic diverticulosis without evidence of diverticulitis.    Peritoneal Space: No abdominopelvic ascites or intraperitoneal free air.    Retroperitoneum: No significant adenopathy.    Abdominal wall: Generalized body wall edema.  Irregular contour of the skin in the ventral abdomen at the level of the umbilicus.  No subcutaneous fluid collections.    Bones: Multilevel degenerative changes throughout the visualized spine.  Severe bilateral femoroacetabular degenerative change with complete joint space loss and extensive subchondral cystic change.  The shoulders also demonstrate degenerative change.    Vasculature: Left-sided 4 vessel aortic arch with direct aortic origin of the left vertebral artery.  Calcification of the aortic annulus.  Mild thoracic and moderate abdominal aortoiliac calcific atherosclerosis.  No aortic aneurysm or dissection.  The pulmonary arteries distribute normally and are sufficiently opacified for diagnostic assessment.  No evidence of pulmonary thromboembolism to the level of the proximal subsegmental pulmonary arteries.  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  Severe stenosis of the splenic artery.  The superior mesenteric and bilateral renal arteries are patent at their origins with prominent atherosclerotic plaque.  The inferior mesenteric artery is patent.  Extensive atherosclerotic change throughout the proximal SMA distribution.  Significant reflux of contrast bolus into  the hepatic veins.                                          Assessment/Plan:      * Acute on chronic diastolic congestive heart failure  On admit: Patient appears fluid overloaded on physical exam. Symptoms of dyspnea and clinical exam findings consistent with diagnosis of CHF exacerbation.   - BNP 1156, troponin elevated but flat per chart review  - CXR shows cardiomegaly with bilateral pleural effusions (R>L)  - Started IV Lasix: 40 mg given in the ED, scheduled for 60 mg BID, will continue  - Daily weights, I/Os, cardiac telemetry  - Last echo on 9/7/2022  - Continue aspirin 81 mg, metoprolol succinate 25 mg, PO daily    Results for orders placed during the hospital encounter of 09/06/22    Echo    Interpretation Summary  · The left ventricle is normal in size with concentric remodeling and normal systolic function.  · The estimated ejection fraction is 58%.  · Grade III left ventricular diastolic dysfunction.  · Normal right ventricular size with normal right ventricular systolic function.  · There is right ventricular hypertrophy.  · There is mild aortic valve stenosis.  · Aortic valve area is 1.60 cm2; peak velocity is 0.99 m/s; mean gradient is 2 mmHg.  · Mild-to-moderate mitral regurgitation.  · Mild to moderate tricuspid regurgitation.  · Normal central venous pressure (3 mmHg).  · The estimated PA systolic pressure is 43 mmHg.  · There is MILD pulmonary hypertension.  · Trivial posterior pericardial effusion. And under the RA.  · There is a small right pleural effusion.    Debility  Admitted [facility] 9/12  CM/SW aware and following  Pt uses cane and rollator at baseline for ambulation, however, per daughter description regarding patient mobility and need for assistance, patient would benefit from wheelchair, hospital bed   Home walk test to evaluate for oxygen needs closer to discharge       Hyponatremia  - Na 128 upon admission  - Most likely 2/2 patient's fluid overload status  - Monitor and manage with  diuresis, improving     Type 2 diabetes mellitus with hyperglycemia, without long-term current use of insulin  POCT BG x4  Hypoglycemia protocol  Goal 140-180  Diabetic diet  LDSSI  Continue to monitor closely and adjust scheduled and sliding scale insulin as clinically indicated      Last A1c reviewed-   Lab Results   Component Value Date    HGBA1C 7.2 (H) 06/30/2022       Hyperthyroidism  - Continue methimazole 5 mg, half tablet, PO daily    Gastroesophageal reflux disease without esophagitis  - Continue omeprazole 40 mg, PO daily    Mixed hyperlipidemia  - Continue lipitor 40 mg, PO daily    VTE Risk Mitigation (From admission, onward)           Ordered     enoxaparin injection 40 mg  Daily         09/15/22 0109     IP VTE HIGH RISK PATIENT  Once         09/15/22 0109                    Discharge Planning   ENDY: 9/16/2022     Code Status: Full Code   Is the patient medically ready for discharge?: No    Reason for patient still in hospital (select all that apply): Patient trending condition and Treatment  Discharge Plan A: Home, Home with family, Home Health, Community Services                  Jennie Thomson MD  Department of Hospital Medicine   [facility] - Emergency Dept

## 2022-09-15 NOTE — PLAN OF CARE
Pt lives with daughter, Glenda Majano. Pt reports independence and ambulation at baseline with rolling walker and w/c. Pt states she has not attended any follow up appointments with cardiology. Pt was seen in the ED on 9/7 and referred to [facility]. Pt states they have not come out yet, but are supposed to for PT/OT. Pt denies any additional support/placement needs at this time.     Spoke with Rhiannon with [facility]- Admitted on 9/12 for RN and PT/OT. OT did initial eval, but pt has not been seen by RN or PT. Updated HH on pt's admission and ENDY.     SW contacted pt's daughter, Glenda [phone]- Daughter states that pt lives with her and is mostly independent at baseline but has needed more physical assist for ambulation (along with rollator) and bathing recently d/t increased weakness/endurance along with SOB. Pt uses cane and rollator at baseline for ambulation, but daughter feels that a wheelchair would be helpful as well as a bedside commode. Daughter reports pt is unable to transfer independently from her bed to standing or a wheelchair and feels that she may need a hospital bed. Daughter expressed understanding that this would be pending insurance approval and if approved would be delivered to her home. She also feels that d/t pts increasing SOB at home that she may need home O2. Pt's daughter feels capable of meeting pt's care needs along with HH at the moment but is agreeable to [facility] referral to discuss any future placement or additional support needs.     SW will place order for BSC, wheelchair, and hospital bed. Provider informed of request for home O2 so walk test/home O2 eval can be completed. Referral made to [facility].     SW will continue to monitor to address any additional d/c needs or barriers as they arise.     Brandi Davis, [facility]  ED   Care Management  [facility]  Ext. [phone]    [facility] - Emergency Dept  Initial Discharge Assessment       Primary Care Provider: Annika Garland,  MD    Admission Diagnosis: Chest pain [R07.9]    Admission Date: 9/14/2022  Expected Discharge Date: 9/16/2022    Discharge Barriers Identified: None    Payor: [employer] MANAGED MEDICARE / Plan: [employer] SNP (SPECIAL NEEDS PLAN) / Product Type: Medicare Advantage /     Extended Emergency Contact Information  Primary Emergency Contact: Zoltan Majano   [facility]  Home Phone: [phone]  Work Phone: [phone]  Mobile Phone: [phone]  Relation: Grandchild  Secondary Emergency Contact: Glenda Majano  Address: [address] [place] [address] [facility]  Home Phone: [phone]  Relation: Daughter    Discharge Plan A: Home, Home with family, Home Health, Community Services  Discharge Plan B: Skilled Nursing Facility      [id number] [place], [place] - [address] AT [facility] & [address]  [address]  [facility] [address]  Phone: [phone] Fax: [phone]    [id number] [place], [place] - [address]  [address] [place]  [address]   [facility] [address]  Phone: [phone] Fax: [phone]    [facility] - [address]  [address] [place] [address]  Phone: [phone] Fax: [phone]      Initial Assessment (most recent)       Adult Discharge Assessment - 09/15/22 1221          Discharge Assessment    Assessment Type Discharge Planning Assessment     Confirmed/corrected address, phone number and insurance Yes     Confirmed Demographics Correct on Facesheet     Source of Information patient;family;health record     Does patient/caregiver understand observation status Yes     Communicated ENDY with patient/caregiver Yes     Reason For Admission SOB, CHF     Lives With child(raine), adult     Do you expect to return to your current living situation? Yes     Do you have help at home or someone to help you manage your care at home? Yes     Who  are your caregiver(s) and their phone number(s)? Daughter, Glenda     Prior to hospitilization cognitive status: Alert/Oriented;No Deficits     Current cognitive status: No Deficits;Alert/Oriented     Walking or Climbing Stairs Difficulty ambulation difficulty, requires equipment;ambulation difficulty, assistance 1 person     Mobility Management Walker, 1 person assist for stairs     Dressing/Bathing Difficulty bathing difficulty, requires equipment;bathing difficulty, assistance 1 person     Dressing/Bathing Management Shower chair, 1 person assist     Do you have any problems with: Needs other help     Specify other help Daughter helps with food prep, household tasks, transportation     Home Accessibility wheelchair accessible     Home Layout Able to live on 1st floor     Equipment Currently Used at Home cane, quad;rollator;shower chair     Readmission within 30 days? Yes     Patient currently being followed by outpatient case management? No   Referred    Do you currently have service(s) that help you manage your care at home? Yes     Name and Contact number of agency [facility]     Is the pt/caregiver preference to resume services with current agency Yes     Do you take prescription medications? Yes     Do you have prescription coverage? Yes     Coverage [employer]     Do you have any problems affording any of your prescribed medications? No     Is the patient taking medications as prescribed? yes     Who is going to help you get home at discharge? Daughter     How do you get to doctors appointments? family or friend will provide     Are you on dialysis? No     Do you take coumadin? No     Discharge Plan A Home;Home with family;Home Health;Community Services     Discharge Plan B Skilled Nursing Facility     DME Needed Upon Discharge  bedside commode;wheelchair;hospital bed     Discharge Plan discussed with: Patient;Adult children     Discharge Barriers Identified None        Relationship/Environment    Name(s)  of Who Lives With Patient Daughter, Glenda

## 2022-09-15 NOTE — SUBJECTIVE & OBJECTIVE
Past Medical History:   Diagnosis Date    Aortic atherosclerosis 1/7/2016    Arthritis     Colon polyp: hyperplastic 2015- repeat 2020 8/6/2015    Diabetes mellitus, type II 8/16/2012    Diverticulosis     Gastric nodule 8/7/2014    GERD (gastroesophageal reflux disease) 8/16/2012    Goiter 8/16/2012    Hyperlipidemia 8/16/2012    Hypertension     Joint pain     Leg pain 8/16/2012    Lymphoma 8/16/2012    Osteopenia 8/16/2012    Osteoporosis     Renal cyst 3/28/2013    Rickets, vitamin D deficiency 8/16/2012    Thyroid nodule 2/24/2014    Vitamin D deficiency disease 8/16/2012       Past Surgical History:   Procedure Laterality Date    CARPAL TUNNEL RELEASE      CATARACT EXTRACTION W/  INTRAOCULAR LENS IMPLANT Bilateral     HYSTERECTOMY      partial    lymphoma surgery      L tibia       Review of patient's allergies indicates:   Allergen Reactions    Latex, natural rubber Itching       No current facility-administered medications on file prior to encounter.     Current Outpatient Medications on File Prior to Encounter   Medication Sig    aspirin 81 MG Chew Take 1 tablet (81 mg total) by mouth once daily.    atorvastatin (LIPITOR) 40 MG tablet TAKE 1 TABLET BY MOUTH EVERY DAY    blood sugar diagnostic (TRUE METRIX GLUCOSE TEST STRIP) Strp USE AS DIRECTED    blood sugar diagnostic Strp Test 2 times daily    blood-glucose meter kit Use as instructed.  ACCUCHECK or whatever is preferred by insurance    dextran 70-hypromellose (ARTIFICIAL TEARS,[id number],) ophthalmic solution Place 1 drop into both eyes 4 (four) times daily as needed.    diclofenac sodium (VOLTAREN) 1 % Gel APPLY 2 GRAMS EXTERNALLY TO THE AFFECTED AREA FOUR TIMES DAILY    ergocalciferol (ERGOCALCIFEROL) 50,000 unit Cap Take 1 capsule (50,000 Units total) by mouth every 7 days. (Patient not taking: Reported on 8/25/2022)    fluticasone propionate (FLONASE) 50 mcg/actuation nasal spray 1 spray (50 mcg total) by Each Nostril route once daily.     furosemide (LASIX) 20 MG tablet Take 1 tablet (20 mg total) by mouth every evening.    furosemide (LASIX) 40 MG tablet Take 1 tablet (40 mg total) by mouth once daily.    gabapentin (NEURONTIN) 300 MG capsule TAKE 1 CAPSULE(300 MG) BY MOUTH TWICE DAILY    lancets Misc Test 2 x daily    methIMAzole (TAPAZOLE) 5 MG Tab TAKE 1/2 TABLET BY MOUTH DAILY    metoprolol succinate (TOPROL-XL) 25 MG 24 hr tablet Take 1 tablet (25 mg total) by mouth once daily. Hold if SBP <120 or <55    MICRO THIN LANCETS 33 gauge Misc USE AS DIRECTED    omeprazole (PRILOSEC) 40 MG capsule Take 1 capsule (40 mg total) by mouth every morning.    ondansetron (ZOFRAN-ODT) 4 MG TbDL Take 1 tablet (4 mg total) by mouth every 8 (eight) hours as needed (Nausea or vomiting).    potassium chloride (KLOR-CON) 10 MEQ TbSR TAKE 1 TABLET(10 MEQ) BY MOUTH EVERY DAY    traMADoL (ULTRAM) 50 mg tablet TAKE 1 TABLET(50 MG) BY MOUTH EVERY 12 HOURS AS NEEDED FOR PAIN     Family History       Problem Relation (Age of Onset)    Breast cancer Maternal Aunt    Cancer Brother    Heart disease Father    Hypertension Mother, Father    No Known Problems Maternal Uncle, Paternal Aunt, Paternal Uncle, Maternal Grandmother, Maternal Grandfather, Paternal Grandmother, Paternal Grandfather, Daughter, Son, Son, Daughter, Daughter          Tobacco Use    Smoking status: Never    Smokeless tobacco: Never   Substance and Sexual Activity    Alcohol use: No     Alcohol/week: 0.0 standard drinks    Drug use: No    Sexual activity: Not Currently     Review of Systems   Constitutional:  Negative for activity change, chills and fever.   HENT:  Negative for trouble swallowing.    Eyes:  Negative for photophobia and visual disturbance.   Respiratory:  Positive for shortness of breath. Negative for cough and chest tightness.    Cardiovascular:  Positive for chest pain (resolved) and leg swelling. Negative for palpitations.   Gastrointestinal:  Positive for nausea (resolved). Negative for  abdominal pain, constipation, diarrhea and vomiting.   Genitourinary:  Negative for dysuria, frequency and hematuria.   Musculoskeletal:  Negative for back pain, gait problem and neck pain.   Skin:  Negative for rash and wound.   Neurological:  Negative for dizziness, syncope, speech difficulty and light-headedness.   Psychiatric/Behavioral:  Negative for agitation and confusion. The patient is not nervous/anxious.    Objective:     Vital Signs (Most Recent):  Temp: 97.6 °F (36.4 °C) (09/15/22 0222)  Pulse: 74 (09/15/22 0222)  Resp: 20 (09/15/22 0222)  BP: (!) 107/57 (09/15/22 0222)  SpO2: 98 % (09/15/22 0222)   Vital Signs (24h Range):  Temp:  [97.6 °F (36.4 °C)-98.4 °F (36.9 °C)] 97.6 °F (36.4 °C)  Pulse:  [67-79] 74  Resp:  [15-20] 20  SpO2:  [98 %-100 %] 98 %  BP: (100-117)/(55-73) 107/57     Weight: 52.2 kg (115 lb 1.3 oz)  Body mass index is 23.24 kg/m².    Physical Exam  Vitals and nursing note reviewed.   Constitutional:       General: She is not in acute distress.     Appearance: She is well-developed and normal weight.   HENT:      Head: Normocephalic and atraumatic.      Mouth/Throat:      Pharynx: No oropharyngeal exudate.   Eyes:      Extraocular Movements: Extraocular movements intact.      Conjunctiva/sclera: Conjunctivae normal.   Neck:      Vascular: JVD present.   Cardiovascular:      Rate and Rhythm: Normal rate and regular rhythm.      Heart sounds: Normal heart sounds.   Pulmonary:      Effort: Pulmonary effort is normal. No respiratory distress.      Breath sounds: Rales present. No wheezing.   Abdominal:      General: Bowel sounds are normal. There is no distension.      Palpations: Abdomen is soft.      Tenderness: There is no abdominal tenderness.   Musculoskeletal:         General: No tenderness. Normal range of motion.      Cervical back: Normal range of motion and neck supple.      Right lower leg: Edema (1+) present.      Left lower leg: Edema (1+) present.   Lymphadenopathy:       Cervical: No cervical adenopathy.   Skin:     General: Skin is warm and dry.      Capillary Refill: Capillary refill takes less than 2 seconds.      Findings: No rash.   Neurological:      Mental Status: She is alert and oriented to person, place, and time. Mental status is at baseline.      Cranial Nerves: No cranial nerve deficit.      Sensory: No sensory deficit.      Coordination: Coordination normal.   Psychiatric:         Behavior: Behavior normal.         Thought Content: Thought content normal.         Judgment: Judgment normal.        Significant Labs: All pertinent labs within the past 24 hours have been reviewed.  BMP:   Recent Labs   Lab 09/14/22  1742 09/14/22  2039 09/15/22  0156   *  --   --    *  --   --    K 4.8  --   --      --   --    CO2 23  --   --    BUN 24*  --   --    CREATININE 0.8  --   --    CALCIUM 10.0  --   --    MG  --    < > 3.9*    < > = values in this interval not displayed.     CBC:   Recent Labs   Lab 09/14/22 1742 09/14/22 2032   WBC 3.84* 3.93   HGB 13.6 13.5   HCT 40.5 39.6    224     CMP:   Recent Labs   Lab 09/14/22 1742   *   K 4.8      CO2 23   *   BUN 24*   CREATININE 0.8   CALCIUM 10.0   PROT 7.5   ALBUMIN 2.9*   BILITOT 1.4*   ALKPHOS 109   AST 45*   ALT 27   ANIONGAP 1*     Cardiac Markers:   Recent Labs   Lab 09/14/22 2032   BNP 1,156*     Troponin:   Recent Labs   Lab 09/14/22 1742 09/14/22 2029   TROPONINI 0.103* 0.142*     TSH:   Recent Labs   Lab 09/07/22  0224   TSH 2.419       Significant Imaging: I have reviewed all pertinent imaging results/findings within the past 24 hours.

## 2022-09-15 NOTE — ASSESSMENT & PLAN NOTE
- Na 128 upon admission  - Most likely 2/2 patient's fluid overload status  - Monitor and manage with diuresis, improving

## 2022-09-15 NOTE — ED PROVIDER NOTES
Encounter Date: 9/14/2022       History     Chief Complaint   Patient presents with    Chest Pain     Since last night. Given 1 spray of nitro and 325 of aspirin     HPI  Patient is an 88-year-old female with history of heart failure, hyperlipidemia, hypertension, type 2 diabetes who presents for chest pain.  Patient is a poor historian.  Onset of symptoms last night.  Symptoms have been constant since yesterday evening.  She reports chest pain is associated with shortness of breath and nausea but no vomiting.  She denies diaphoresis.  She does state that she has had bilateral flank pain and epigastric discomfort.  No fevers, chills, dysuria, hematuria, vomiting, diarrhea, cough, congestion.  She received 325 mg of aspirin and 1 spray of sublingual nitro EN route with EMS.  She states that the nitro did not really help with her discomfort.    Review of patient's allergies indicates:   Allergen Reactions    Latex, natural rubber Itching     Past Medical History:   Diagnosis Date    Aortic atherosclerosis 1/7/2016    Arthritis     Colon polyp: hyperplastic 2015- repeat 2020 8/6/2015    Diabetes mellitus, type II 8/16/2012    Diverticulosis     Gastric nodule 8/7/2014    GERD (gastroesophageal reflux disease) 8/16/2012    Goiter 8/16/2012    Hyperlipidemia 8/16/2012    Hypertension     Joint pain     Leg pain 8/16/2012    Lymphoma 8/16/2012    Osteopenia 8/16/2012    Osteoporosis     Renal cyst 3/28/2013    Rickets, vitamin D deficiency 8/16/2012    Thyroid nodule 2/24/2014    Vitamin D deficiency disease 8/16/2012     Past Surgical History:   Procedure Laterality Date    CARPAL TUNNEL RELEASE      CATARACT EXTRACTION W/  INTRAOCULAR LENS IMPLANT Bilateral     HYSTERECTOMY      partial    lymphoma surgery      L tibia     Family History   Problem Relation Age of Onset    Hypertension Mother     Hypertension Father     Heart disease Father     Breast cancer Maternal Aunt     No Known Problems Maternal Uncle     No Known  Problems Paternal Aunt     No Known Problems Paternal Uncle     No Known Problems Maternal Grandmother     No Known Problems Maternal Grandfather     No Known Problems Paternal Grandmother     No Known Problems Paternal Grandfather     Cancer Brother         colon    No Known Problems Daughter     No Known Problems Son     No Known Problems Son     No Known Problems Daughter     No Known Problems Daughter     Diabetes Neg Hx     Glaucoma Neg Hx     Amblyopia Neg Hx     Blindness Neg Hx     Cataracts Neg Hx     Macular degeneration Neg Hx     Retinal detachment Neg Hx     Strabismus Neg Hx     Stroke Neg Hx     Thyroid disease Neg Hx     Ovarian cancer Neg Hx     Liver disease Neg Hx     Liver cancer Neg Hx     Cirrhosis Neg Hx     Colon polyps Neg Hx     Colon cancer Neg Hx     Melanoma Neg Hx      Social History     Tobacco Use    Smoking status: Never    Smokeless tobacco: Never   Substance Use Topics    Alcohol use: No     Alcohol/week: 0.0 standard drinks    Drug use: No     Review of Systems  Constitutional: No fever  HENT: No sore throat  Eyes: No eye pain  Respiratory:  Positive shortness of breath, no cough   Cardiovascular:  Positive chest   Gastrointestinal:  Positive epigastric pain, positive nausea, no vomiting, no diarrhea, no melena, hematochezia  Genitourinary: No dysuria, no hematuria  Musculoskeletal: No back pain  Neurological: No headache  Psychiatric: No agitation     Physical Exam     Initial Vitals [09/14/22 1709]   BP Pulse Resp Temp SpO2   100/62 76 18 98.4 °F (36.9 °C) 99 %      MAP       --         Physical Exam  Constitutional: No acute distress, thin elderly female  Respiratory: Non-labored, lungs clear  Cardiovascular: Well perfused, normal rate, regular rhythm  Gastrointestinal: Soft, non-distended, abdomen has mild diffuse tenderness without rebound or guarding  Integumentary: Warm and dry  Musculoskeletal: No deformity, no unilateral leg swelling/warmth/erythema  Neurological: Awake  "and alert, 5/5 motor and sensation light touch intact in all extremities  Psychiatric: Cooperative     ED Course   Procedures  Labs Reviewed   CBC W/ AUTO DIFFERENTIAL - Abnormal; Notable for the following components:       Result Value    WBC 3.84 (*)     RDW 16.4 (*)     All other components within normal limits   COMPREHENSIVE METABOLIC PANEL - Abnormal; Notable for the following components:    Sodium 128 (*)     Glucose 179 (*)     BUN 24 (*)     Albumin 2.9 (*)     Total Bilirubin 1.4 (*)     AST 45 (*)     Anion Gap 1 (*)     All other components within normal limits   TROPONIN I - Abnormal; Notable for the following components:    Troponin I 0.103 (*)     All other components within normal limits   TROPONIN I - Abnormal; Notable for the following components:    Troponin I 0.142 (*)     All other components within normal limits   B-TYPE NATRIURETIC PEPTIDE - Abnormal; Notable for the following components:    BNP 1,101 (*)     All other components within normal limits   CBC W/ AUTO DIFFERENTIAL - Abnormal; Notable for the following components:    RDW 16.5 (*)     All other components within normal limits   B-TYPE NATRIURETIC PEPTIDE - Abnormal; Notable for the following components:    BNP 1,156 (*)     All other components within normal limits   MAGNESIUM   PHOSPHORUS     EKG Readings: (Independently Interpreted)   EKG with normal sinus rhythm, prolonged QTC, no STEMI, similar to prior.     Imaging Results              X-Ray Chest AP Portable (Final result)  Result time 09/14/22 20:33:13      Final result by Elio Romo MD (09/14/22 20:33:13)                   Impression:      Mild cardiomegaly with right greater than left pleural effusions and possible mild interstitial edema.      Electronically signed by: Elio Romo MD  Date:    09/14/2022  Time:    20:33               Narrative:    EXAMINATION:  XR CHEST AP PORTABLE    CLINICAL HISTORY:  Provided history is "Chest Pain;  ".    TECHNIQUE:  One " view of the chest.    COMPARISON:  09/07/2022.    FINDINGS:  Cardiac wires overlie the chest.  Cardiomediastinal silhouette is mildly enlarged and similar to prior study.  Atherosclerotic calcifications overlie the aortic arch.  Central vascular congestion and prominent perihilar interstitial lung markings.  Moderate right and small left pleural effusions with adjacent passive atelectasis.  Skin fold artifact overlying the right hemithorax.  No gross pneumothorax.  Degenerative changes in both shoulders.                                       CTA Chest Abdomen Pelvis (Final result)  Result time 09/14/22 20:46:18      Final result by Elio Romo MD (09/14/22 20:46:18)                   Impression:      1. No aortic aneurysm, dissection, or other acute aortic pathology.  Severe atherosclerosis of the major aortic branch vessels.  2. Moderate right and mild left pleural effusions.  Mild anasarca.  3. Cardiomegaly and biatrial enlargement.  Significant reflux of contrast into the hepatic veins suggests component of right heart failure.  4. Moderate rectal stool burden with circumferential anorectal wall thickening compatible with proctitis.  5. Sigmoid colonic diverticulosis.  6. Grossly stable enlarged subcarinal lymph node as seen on prior studies.  7. No hydronephrosis or acute renal pathology, though evaluation is significantly limited by motion.  8. Severe bilateral hip DJD.  9. Motion and artifact limited study.    Electronically signed by resident: Miguel Martinez  Date:    09/14/2022  Time:    20:02    Electronically signed by: Elio Romo MD  Date:    09/14/2022  Time:    20:46               Narrative:    EXAMINATION:  CTA CHEST ABDOMEN PELVIS    CLINICAL HISTORY:  Aortic aneurysm, known or suspected;chest pain, epigastric pain, and b/l flank pain, r/o dissection;    TECHNIQUE:  Using 75 cc of  Omnipaque 350 IV contrast, and multi-detector helical CT technique, axial CT angiogram images of the abdomen  were obtained from the lung bases through the pelvis.  2D post-processing coronal and sagittal reconstructions of the abdominal aorta and visceral arteries performed.    Image quality degraded by streak artifact from overlying cardiac monitoring leads.  Exam quality is also limited by motion.    COMPARISON:  CT abdomen pelvis 06/21/2022    FINDINGS:  Base of the neck: Enlarged heterogeneous thyroid gland.    Thoracic soft tissues: No axillary or infraclavicular lymphadenopathy.    Mediastinum/hilum: Several prominent upper mediastinal lymph nodes.  Enlarged subcarinal lymph node measures up to 1.6 cm in short axis, though is poorly delineated on the current study secondary to image noise and motion.  This finding is overall similar when compared with prior CT dated 04/19/2022 and prior PET-CT dated 06/17/2021.  No pathologic hilar lymphadenopathy.    Heart: The heart is enlarged and there is prominent biatrial enlargement.  Trace pericardial fluid.  Severe multi-vessel coronary artery calcific atherosclerosis.    Lungs: Moderate volume right and small volume left dependent pleural effusions with adjacent compressive atelectasis.  No focal lung consolidation, noting suboptimal evaluation of pulmonary parenchyma secondary to CTA technique and breathing motion artifact.    Liver: Normal in size and attenuation without focal hepatic abnormality.    Gallbladder: Normal appearance without evidence for cholecystitis.    Bile Ducts: No intra or extrahepatic biliary ductal dilation.    Pancreas: Fatty atrophy of the pancreas.  No pancreatic mass lesion or peripancreatic inflammatory change.    Spleen: Normal.    Adrenals: Normal.    Genitourinary: The kidneys enhance symmetrically with no enhancing renal mass.  Bilateral renal cysts and multiple other subcentimeter renal cortical hypoattenuating lesions are too small to characterize.  Bladder demonstrates smooth contours without bladder wall thickening.    Reproductive  organs: The uterus is surgically absent.    GI tract/Mesentery: Stomach is normal appearance.  Visualized loops of small and large bowel are normal in caliber without evidence for obstruction.Moderate amount of stool within the rectum.  Circumferential thickening of the anorectal junction with surrounding fat stranding.  Sigmoid colonic diverticulosis without evidence of diverticulitis.    Peritoneal Space: No abdominopelvic ascites or intraperitoneal free air.    Retroperitoneum: No significant adenopathy.    Abdominal wall: Generalized body wall edema.  Irregular contour of the skin in the ventral abdomen at the level of the umbilicus.  No subcutaneous fluid collections.    Bones: Multilevel degenerative changes throughout the visualized spine.  Severe bilateral femoroacetabular degenerative change with complete joint space loss and extensive subchondral cystic change.  The shoulders also demonstrate degenerative change.    Vasculature: Left-sided 4 vessel aortic arch with direct aortic origin of the left vertebral artery.  Calcification of the aortic annulus.  Mild thoracic and moderate abdominal aortoiliac calcific atherosclerosis.  No aortic aneurysm or dissection.  The pulmonary arteries distribute normally and are sufficiently opacified for diagnostic assessment.  No evidence of pulmonary thromboembolism to the level of the proximal subsegmental pulmonary arteries.  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  Severe stenosis of the splenic artery.  The superior mesenteric and bilateral renal arteries are patent at their origins with prominent atherosclerotic plaque.  The inferior mesenteric artery is patent.  Extensive atherosclerotic change throughout the proximal SMA distribution.  Significant reflux of contrast bolus into the hepatic veins.                                       Medications   magnesium sulfate 2g in water 50mL IVPB (premix) (2 g Intravenous New Bag 9/14/22 2059)   furosemide  injection 40 mg (has no administration in time range)   aspirin tablet 325 mg (325 mg Oral Given 9/14/22 2009)   fentaNYL 50 mcg/mL injection 25 mcg (25 mcg Intravenous Given 9/14/22 2009)   iohexoL (OMNIPAQUE 350) injection 75 mL (75 mLs Intravenous Given 9/14/22 1954)     Medical Decision Making:   History:   I obtained history from: someone other than patient.  Old Records Summarized: records from previous admission(s) and records from clinic visits.  Clinical Tests:   Lab Tests: Ordered and Reviewed  Radiological Study: Ordered and Reviewed  Medical Tests: Ordered and Reviewed  ED Management:  Patient presents for chest pain, shortness of breath and abdominal pain.  She is a poor historian.  She seems most concerned about the shortness of breath and chest pain.  She received nitro and aspirin prior to arrival.  The nitro did not help her pain.  She has some mild epigastric tenderness on exam.  CTA ordered.  She has no dissection or PE.  She does have bilateral pleural effusions and some mild anasarca concerning for volume overload.  Her BNP is also elevated.  Lasix is ordered.  Her troponin is elevated but appears mostly stable from her previous admissions.  Her repeat is slightly up trending.  I did discuss with Cardiology.  They think this is likely from her heart failure and do not recommend starting ACS protocol at this time.  Patient admitted to Hospital Medicine for diuresis and further medical management.  Other:   I have discussed this case with another health care provider.             ED Course as of 09/14/22 2201   Wed Sep 14, 2022   1958 BNP(!): 1,101 [NN]   1958 Troponin I(!): 0.103 [NN]   1958 Sodium(!): 128 [NN]      ED Course User Index  [NN] Allyn Obrien MD                 Clinical Impression:   Final diagnoses:  [R07.9] Chest pain  [I50.9] Acute on chronic congestive heart failure, unspecified heart failure type (Primary)        ED Disposition Condition    Observation Stable                 Allyn Obrien MD  09/14/22 [id number]

## 2022-09-15 NOTE — ED NOTES
"Pt AAOx4 & VSS. Pt amy care provided and changed. Pt ED bed low & locked. Pt UO 750ml. Pt c/o "itching" in sacral area. On assessment of sacral area, a small red non-blanchable wound found. Area cleaned and new dressing applied.   "

## 2022-09-15 NOTE — H&P
[facility] - Emergency Dept  [facility] Medicine  History & Physical    Patient Name: Peri Johansen  MRN: [id number]  Patient Class: OP- Observation  Admission Date: 9/14/2022  Attending Physician: Clarisa Burden MD   Primary Care Provider: Annika Garland MD         Patient information was obtained from patient, past medical records and ER records.     Subjective:     Principal Problem:Chronic diastolic congestive heart failure    Chief Complaint:   Chief Complaint   Patient presents with    Chest Pain     Since last night. Given 1 spray of nitro and 325 of aspirin        HPI: Peri Johansen is a 88 y.o. female with PMHx of CHF, T2DM, HLD, HTN, DLBCL, GERD and hyperthyroidism presents to the ED with shortness of breath and chest pain x 1 day. Patient is A&Ox4. Patient states the symptoms have been persistent since yesterday evening. She experiences dyspnea upon exertion and had to constantly sit down and rest to recover. She also noticed edema on her lower extremities. Came to the ED via EMS, received 325 mg aspirin and 1 spray of SL nitro en route. She stated that the nitro did not help with her chest pain. She endorses mild nausea and abdominal discomfort. Denies fevers, chills, dysuria, hematuria, vomiting, diarrhea, cough, diaphoresis.     ED: /58, HR 76, SpO2 98% on RA. No supplemental O2 required. CBC wnl. Na 128, Cr 0.8, Glucose 179, albumin 2.9. Troponin elevated at baseline. BNP 1156. No EKG changes. CXR: Mild cardiomegaly with right greater than left pleural effusions and possible mild interstitial edema. CT C/A/P: Moderate right and mild left pleural effusions. Mild anasarca. Cardiomegaly and biatrial enlargement. Significant reflux of contrast into the hepatic veins suggests component of right heart failure. Cardiology consulted, do not recommend starting ACS protocol, suspect heart failure exacerbation. 40 mg IV Lasix administered and ad      Past Medical History:   Diagnosis Date    Aortic  atherosclerosis 1/7/2016    Arthritis     Colon polyp: hyperplastic 2015- repeat 2020 8/6/2015    Diabetes mellitus, type II 8/16/2012    Diverticulosis     Gastric nodule 8/7/2014    GERD (gastroesophageal reflux disease) 8/16/2012    Goiter 8/16/2012    Hyperlipidemia 8/16/2012    Hypertension     Joint pain     Leg pain 8/16/2012    Lymphoma 8/16/2012    Osteopenia 8/16/2012    Osteoporosis     Renal cyst 3/28/2013    Rickets, vitamin D deficiency 8/16/2012    Thyroid nodule 2/24/2014    Vitamin D deficiency disease 8/16/2012       Past Surgical History:   Procedure Laterality Date    CARPAL TUNNEL RELEASE      CATARACT EXTRACTION W/  INTRAOCULAR LENS IMPLANT Bilateral     HYSTERECTOMY      partial    lymphoma surgery      L tibia       Review of patient's allergies indicates:   Allergen Reactions    Latex, natural rubber Itching       No current facility-administered medications on file prior to encounter.     Current Outpatient Medications on File Prior to Encounter   Medication Sig    aspirin 81 MG Chew Take 1 tablet (81 mg total) by mouth once daily.    atorvastatin (LIPITOR) 40 MG tablet TAKE 1 TABLET BY MOUTH EVERY DAY    blood sugar diagnostic (TRUE METRIX GLUCOSE TEST STRIP) Strp USE AS DIRECTED    blood sugar diagnostic Strp Test 2 times daily    blood-glucose meter kit Use as instructed.  ACCUCHECK or whatever is preferred by insurance    dextran 70-hypromellose (ARTIFICIAL TEARS,[id number],) ophthalmic solution Place 1 drop into both eyes 4 (four) times daily as needed.    diclofenac sodium (VOLTAREN) 1 % Gel APPLY 2 GRAMS EXTERNALLY TO THE AFFECTED AREA FOUR TIMES DAILY    ergocalciferol (ERGOCALCIFEROL) 50,000 unit Cap Take 1 capsule (50,000 Units total) by mouth every 7 days. (Patient not taking: Reported on 8/25/2022)    fluticasone propionate (FLONASE) 50 mcg/actuation nasal spray 1 spray (50 mcg total) by Each Nostril route once daily.    furosemide (LASIX) 20  MG tablet Take 1 tablet (20 mg total) by mouth every evening.    furosemide (LASIX) 40 MG tablet Take 1 tablet (40 mg total) by mouth once daily.    gabapentin (NEURONTIN) 300 MG capsule TAKE 1 CAPSULE(300 MG) BY MOUTH TWICE DAILY    lancets Misc Test 2 x daily    methIMAzole (TAPAZOLE) 5 MG Tab TAKE 1/2 TABLET BY MOUTH DAILY    metoprolol succinate (TOPROL-XL) 25 MG 24 hr tablet Take 1 tablet (25 mg total) by mouth once daily. Hold if SBP <120 or <55    MICRO THIN LANCETS 33 gauge Misc USE AS DIRECTED    omeprazole (PRILOSEC) 40 MG capsule Take 1 capsule (40 mg total) by mouth every morning.    ondansetron (ZOFRAN-ODT) 4 MG TbDL Take 1 tablet (4 mg total) by mouth every 8 (eight) hours as needed (Nausea or vomiting).    potassium chloride (KLOR-CON) 10 MEQ TbSR TAKE 1 TABLET(10 MEQ) BY MOUTH EVERY DAY    traMADoL (ULTRAM) 50 mg tablet TAKE 1 TABLET(50 MG) BY MOUTH EVERY 12 HOURS AS NEEDED FOR PAIN     Family History       Problem Relation (Age of Onset)    Breast cancer Maternal Aunt    Cancer Brother    Heart disease Father    Hypertension Mother, Father    No Known Problems Maternal Uncle, Paternal Aunt, Paternal Uncle, Maternal Grandmother, Maternal Grandfather, Paternal Grandmother, Paternal Grandfather, Daughter, Son, Son, Daughter, Daughter          Tobacco Use    Smoking status: Never    Smokeless tobacco: Never   Substance and Sexual Activity    Alcohol use: No     Alcohol/week: 0.0 standard drinks    Drug use: No    Sexual activity: Not Currently     Review of Systems   Constitutional:  Negative for activity change, chills and fever.   HENT:  Negative for trouble swallowing.    Eyes:  Negative for photophobia and visual disturbance.   Respiratory:  Positive for shortness of breath. Negative for cough and chest tightness.    Cardiovascular:  Positive for chest pain (resolved) and leg swelling. Negative for palpitations.   Gastrointestinal:  Positive for nausea (resolved). Negative for  abdominal pain, constipation, diarrhea and vomiting.   Genitourinary:  Negative for dysuria, frequency and hematuria.   Musculoskeletal:  Negative for back pain, gait problem and neck pain.   Skin:  Negative for rash and wound.   Neurological:  Negative for dizziness, syncope, speech difficulty and light-headedness.   Psychiatric/Behavioral:  Negative for agitation and confusion. The patient is not nervous/anxious.    Objective:     Vital Signs (Most Recent):  Temp: 97.6 °F (36.4 °C) (09/15/22 0222)  Pulse: 74 (09/15/22 0222)  Resp: 20 (09/15/22 0222)  BP: (!) 107/57 (09/15/22 0222)  SpO2: 98 % (09/15/22 0222)   Vital Signs (24h Range):  Temp:  [97.6 °F (36.4 °C)-98.4 °F (36.9 °C)] 97.6 °F (36.4 °C)  Pulse:  [67-79] 74  Resp:  [15-20] 20  SpO2:  [98 %-100 %] 98 %  BP: (100-117)/(55-73) 107/57     Weight: 52.2 kg (115 lb 1.3 oz)  Body mass index is 23.24 kg/m².    Physical Exam  Vitals and nursing note reviewed.   Constitutional:       General: She is not in acute distress.     Appearance: She is well-developed and normal weight.   HENT:      Head: Normocephalic and atraumatic.      Mouth/Throat:      Pharynx: No oropharyngeal exudate.   Eyes:      Extraocular Movements: Extraocular movements intact.      Conjunctiva/sclera: Conjunctivae normal.   Neck:      Vascular: JVD present.   Cardiovascular:      Rate and Rhythm: Normal rate and regular rhythm.      Heart sounds: Normal heart sounds.   Pulmonary:      Effort: Pulmonary effort is normal. No respiratory distress.      Breath sounds: Rales present. No wheezing.   Abdominal:      General: Bowel sounds are normal. There is no distension.      Palpations: Abdomen is soft.      Tenderness: There is no abdominal tenderness.   Musculoskeletal:         General: No tenderness. Normal range of motion.      Cervical back: Normal range of motion and neck supple.      Right lower leg: Edema (1+) present.      Left lower leg: Edema (1+) present.   Lymphadenopathy:       Cervical: No cervical adenopathy.   Skin:     General: Skin is warm and dry.      Capillary Refill: Capillary refill takes less than 2 seconds.      Findings: No rash.   Neurological:      Mental Status: She is alert and oriented to person, place, and time. Mental status is at baseline.      Cranial Nerves: No cranial nerve deficit.      Sensory: No sensory deficit.      Coordination: Coordination normal.   Psychiatric:         Behavior: Behavior normal.         Thought Content: Thought content normal.         Judgment: Judgment normal.        Significant Labs: All pertinent labs within the past 24 hours have been reviewed.  BMP:   Recent Labs   Lab 09/14/22  1742 09/14/22  2039 09/15/22  0156   *  --   --    *  --   --    K 4.8  --   --      --   --    CO2 23  --   --    BUN 24*  --   --    CREATININE 0.8  --   --    CALCIUM 10.0  --   --    MG  --    < > 3.9*    < > = values in this interval not displayed.     CBC:   Recent Labs   Lab 09/14/22 1742 09/14/22 2032   WBC 3.84* 3.93   HGB 13.6 13.5   HCT 40.5 39.6    224     CMP:   Recent Labs   Lab 09/14/22 1742   *   K 4.8      CO2 23   *   BUN 24*   CREATININE 0.8   CALCIUM 10.0   PROT 7.5   ALBUMIN 2.9*   BILITOT 1.4*   ALKPHOS 109   AST 45*   ALT 27   ANIONGAP 1*     Cardiac Markers:   Recent Labs   Lab 09/14/22 2032   BNP 1,156*     Troponin:   Recent Labs   Lab 09/14/22 1742 09/14/22 2029   TROPONINI 0.103* 0.142*     TSH:   Recent Labs   Lab 09/07/22  0224   TSH 2.419       Significant Imaging: I have reviewed all pertinent imaging results/findings within the past 24 hours.    Assessment/Plan:     * Chronic diastolic congestive heart failure  Patient appears fluid overloaded on physical exam. Symptoms of dyspnea and clinical exam findings consistent with diagnosis of CHF exacerbation.   - BNP 1156, troponin elevated but flat per chart review  - CXR shows cardiomegaly with bilateral pleural effusions (R>L)  -  Started IV Lasix: 40 mg given in the ED, scheduled for 60 mg BID starting in AM  - Daily weights, I/Os, cardiac telemetry  - Last echo on 9/7/2022  - Continue aspirin 81 mg, metoprolol succinate 25 mg, PO daily    Results for orders placed during the hospital encounter of 09/06/22    Echo    Interpretation Summary  · The left ventricle is normal in size with concentric remodeling and normal systolic function.  · The estimated ejection fraction is 58%.  · Grade III left ventricular diastolic dysfunction.  · Normal right ventricular size with normal right ventricular systolic function.  · There is right ventricular hypertrophy.  · There is mild aortic valve stenosis.  · Aortic valve area is 1.60 cm2; peak velocity is 0.99 m/s; mean gradient is 2 mmHg.  · Mild-to-moderate mitral regurgitation.  · Mild to moderate tricuspid regurgitation.  · Normal central venous pressure (3 mmHg).  · The estimated PA systolic pressure is 43 mmHg.  · There is MILD pulmonary hypertension.  · Trivial posterior pericardial effusion. And under the RA.  · There is a small right pleural effusion.    Hyponatremia  - Na 128 upon admission  - Most likely 2/2 patient's fluid overload status  - Monitor and manage with diuresis    Type 2 diabetes mellitus with hyperglycemia, without long-term current use of insulin  - Diabetic diet ordered  - Monitor blood glucose with daily BMP    Last A1c reviewed-   Lab Results   Component Value Date    HGBA1C 7.2 (H) 06/30/2022       Hyperthyroidism  - Continue methimazole 5 mg, half tablet, PO daily    Gastroesophageal reflux disease without esophagitis  - Continue omeprazole 40 mg, PO daily    Mixed hyperlipidemia  - Continue lipitor 40 mg, PO daily      VTE Risk Mitigation (From admission, onward)         Ordered     enoxaparin injection 40 mg  Daily         09/15/22 0109     IP VTE HIGH RISK PATIENT  Once         09/15/22 0109                   ROMELIA WilsonC  Department of Hospital Medicine    [facility] - Emergency Dept

## 2022-09-15 NOTE — SUBJECTIVE & OBJECTIVE
Interval History:   Patient seen and examined at bedside.    No acute events overnight.  Patient has no new, acute complaints this morning. SOB and leg swelling improving. SOB still notable w/ exertion.   Patient and son updated regarding care plan.      Review of Systems   Constitutional:  Negative for activity change, chills and fever.   HENT:  Negative for trouble swallowing.    Eyes:  Negative for photophobia and visual disturbance.   Respiratory:  Positive for shortness of breath. Negative for cough and chest tightness.    Cardiovascular:  Positive for chest pain (resolved) and leg swelling. Negative for palpitations.   Gastrointestinal:  Positive for nausea (resolved). Negative for abdominal pain, constipation, diarrhea and vomiting.   Genitourinary:  Negative for dysuria, frequency and hematuria.   Musculoskeletal:  Negative for back pain, gait problem and neck pain.   Skin:  Negative for rash and wound.   Neurological:  Negative for dizziness, syncope, speech difficulty and light-headedness.   Psychiatric/Behavioral:  Negative for agitation and confusion. The patient is not nervous/anxious.      Objective:     Vital Signs (Most Recent):  Temp: 98.3 °F (36.8 °C) (09/15/22 1157)  Pulse: 74 (09/15/22 1444)  Resp: 15 (09/15/22 0324)  BP: (!) 108/55 (09/15/22 1304)  SpO2: 97 % (09/15/22 1444)   Vital Signs (24h Range):  Temp:  [97.6 °F (36.4 °C)-98.4 °F (36.9 °C)] 98.3 °F (36.8 °C)  Pulse:  [67-86] 74  Resp:  [15-20] 15  SpO2:  [97 %-100 %] 97 %  BP: (100-117)/(55-73) 108/55     Weight: 52.2 kg (115 lb 1.3 oz)  Body mass index is 23.24 kg/m².    Intake/Output Summary (Last 24 hours) at 9/15/2022 1450  Last data filed at 9/15/2022 0755  Gross per 24 hour   Intake --   Output 1050 ml   Net -1050 ml      Physical Exam  Vitals and nursing note reviewed.   Constitutional:       General: She is not in acute distress.     Appearance: She is well-developed and normal weight.   HENT:      Head: Normocephalic and  atraumatic.      Right Ear: External ear normal.      Left Ear: External ear normal.      Mouth/Throat:      Pharynx: No oropharyngeal exudate.   Eyes:      General: No scleral icterus.        Right eye: No discharge.         Left eye: No discharge.   Neck:      Vascular: JVD present.   Cardiovascular:      Rate and Rhythm: Normal rate and regular rhythm.      Heart sounds: Normal heart sounds.   Pulmonary:      Effort: Pulmonary effort is normal. No respiratory distress.      Breath sounds: Rales present. No wheezing.   Abdominal:      General: Bowel sounds are normal. There is no distension.      Palpations: Abdomen is soft.      Tenderness: There is no abdominal tenderness.   Musculoskeletal:         General: No tenderness. Normal range of motion.      Cervical back: Normal range of motion and neck supple.      Right lower leg: Edema (1+) present.      Left lower leg: Edema (1+) present.   Lymphadenopathy:      Cervical: No cervical adenopathy.   Skin:     General: Skin is warm and dry.      Capillary Refill: Capillary refill takes less than 2 seconds.      Findings: No rash.   Neurological:      Mental Status: She is alert and oriented to person, place, and time. Mental status is at baseline.      Cranial Nerves: No cranial nerve deficit.      Sensory: No sensory deficit.      Coordination: Coordination normal.   Psychiatric:         Behavior: Behavior normal.         Thought Content: Thought content normal.         Judgment: Judgment normal.       Significant Labs: All pertinent labs within the past 24 hours have been reviewed.    Recent Results (from the past 24 hour(s))   CBC auto differential    Collection Time: 09/14/22  5:42 PM   Result Value Ref Range    WBC 3.84 (L) 3.90 - 12.70 K/uL    RBC 4.60 4.00 - 5.40 M/uL    Hemoglobin 13.6 12.0 - 16.0 g/dL    Hematocrit 40.5 37.0 - 48.5 %    MCV 88 82 - 98 fL    MCH 29.6 27.0 - 31.0 pg    MCHC 33.6 32.0 - 36.0 g/dL    RDW 16.4 (H) 11.5 - 14.5 %    Platelets 215  150 - 450 K/uL    MPV 11.5 9.2 - 12.9 fL    Immature Granulocytes 0.3 0.0 - 0.5 %    Gran # (ANC) 2.1 1.8 - 7.7 K/uL    Immature Grans (Abs) 0.01 0.00 - 0.04 K/uL    Lymph # 1.2 1.0 - 4.8 K/uL    Mono # 0.5 0.3 - 1.0 K/uL    Eos # 0.0 0.0 - 0.5 K/uL    Baso # 0.01 0.00 - 0.20 K/uL    nRBC 0 0 /100 WBC    Gran % 53.5 38.0 - 73.0 %    Lymph % 31.3 18.0 - 48.0 %    Mono % 14.1 4.0 - 15.0 %    Eosinophil % 0.5 0.0 - 8.0 %    Basophil % 0.3 0.0 - 1.9 %    Differential Method Automated    Comprehensive metabolic panel    Collection Time: 09/14/22  5:42 PM   Result Value Ref Range    Sodium 128 (L) 136 - 145 mmol/L    Potassium 4.8 3.5 - 5.1 mmol/L    Chloride 104 95 - 110 mmol/L    CO2 23 23 - 29 mmol/L    Glucose 179 (H) 70 - 110 mg/dL    BUN 24 (H) 8 - 23 mg/dL    Creatinine 0.8 0.5 - 1.4 mg/dL    Calcium 10.0 8.7 - 10.5 mg/dL    Total Protein 7.5 6.0 - 8.4 g/dL    Albumin 2.9 (L) 3.5 - 5.2 g/dL    Total Bilirubin 1.4 (H) 0.1 - 1.0 mg/dL    Alkaline Phosphatase 109 55 - 135 U/L    AST 45 (H) 10 - 40 U/L    ALT 27 10 - 44 U/L    Anion Gap 1 (L) 8 - 16 mmol/L    eGFR >60.0 >60 mL/min/1.73 m^2   Troponin I #1    Collection Time: 09/14/22  5:42 PM   Result Value Ref Range    Troponin I 0.103 (H) 0.000 - 0.026 ng/mL   B-Type natriuretic peptide (BNP)    Collection Time: 09/14/22  5:42 PM   Result Value Ref Range    BNP 1,101 (H) 0 - 99 pg/mL   Troponin I #2    Collection Time: 09/14/22  8:29 PM   Result Value Ref Range    Troponin I 0.142 (H) 0.000 - 0.026 ng/mL   CBC auto differential    Collection Time: 09/14/22  8:32 PM   Result Value Ref Range    WBC 3.93 3.90 - 12.70 K/uL    RBC 4.57 4.00 - 5.40 M/uL    Hemoglobin 13.5 12.0 - 16.0 g/dL    Hematocrit 39.6 37.0 - 48.5 %    MCV 87 82 - 98 fL    MCH 29.5 27.0 - 31.0 pg    MCHC 34.1 32.0 - 36.0 g/dL    RDW 16.5 (H) 11.5 - 14.5 %    Platelets 224 150 - 450 K/uL    MPV 11.6 9.2 - 12.9 fL    Immature Granulocytes 0.0 0.0 - 0.5 %    Gran # (ANC) 2.0 1.8 - 7.7 K/uL    Immature  Grans (Abs) 0.00 0.00 - 0.04 K/uL    Lymph # 1.3 1.0 - 4.8 K/uL    Mono # 0.6 0.3 - 1.0 K/uL    Eos # 0.1 0.0 - 0.5 K/uL    Baso # 0.03 0.00 - 0.20 K/uL    nRBC 0 0 /100 WBC    Gran % 49.8 38.0 - 73.0 %    Lymph % 33.3 18.0 - 48.0 %    Mono % 14.8 4.0 - 15.0 %    Eosinophil % 1.3 0.0 - 8.0 %    Basophil % 0.8 0.0 - 1.9 %    Differential Method Automated    B-Type natriuretic peptide (BNP)    Collection Time: 09/14/22  8:32 PM   Result Value Ref Range    BNP 1,156 (H) 0 - 99 pg/mL   Magnesium    Collection Time: 09/14/22  8:39 PM   Result Value Ref Range    Magnesium 1.9 1.6 - 2.6 mg/dL   Phosphorus    Collection Time: 09/14/22  8:39 PM   Result Value Ref Range    Phosphorus 2.7 2.7 - 4.5 mg/dL   Urinalysis, Reflex to Urine Culture Urine, Clean Catch    Collection Time: 09/15/22  1:25 AM    Specimen: Urine   Result Value Ref Range    Specimen UA Urine, Clean Catch     Color, UA Yellow Yellow, Straw, Patricia    Appearance, UA Clear Clear    pH, UA 6.0 5.0 - 8.0    Specific Gravity, UA 1.015 1.005 - 1.030    Protein, UA Negative Negative    Glucose, UA Negative Negative    Ketones, UA Negative Negative    Bilirubin (UA) Negative Negative    Occult Blood UA Negative Negative    Nitrite, UA Negative Negative    Leukocytes, UA 1+ (A) Negative   Urinalysis Microscopic    Collection Time: 09/15/22  1:25 AM   Result Value Ref Range    RBC, UA 1 0 - 4 /hpf    WBC, UA 2 0 - 5 /hpf    Squam Epithel, UA 0 /hpf    Microscopic Comment SEE COMMENT    Magnesium - if not done in ED    Collection Time: 09/15/22  1:56 AM   Result Value Ref Range    Magnesium 3.9 (H) 1.6 - 2.6 mg/dL   Phosphorus - if not done in ED    Collection Time: 09/15/22  1:56 AM   Result Value Ref Range    Phosphorus 2.7 2.7 - 4.5 mg/dL   POCT glucose    Collection Time: 09/15/22  2:58 AM   Result Value Ref Range    POCT Glucose 113 (H) 70 - 110 mg/dL   Basic Metabolic Panel (BMP)    Collection Time: 09/15/22  3:23 AM   Result Value Ref Range    Sodium 136 136 -  145 mmol/L    Potassium 4.1 3.5 - 5.1 mmol/L    Chloride 103 95 - 110 mmol/L    CO2 23 23 - 29 mmol/L    Glucose 107 70 - 110 mg/dL    BUN 19 8 - 23 mg/dL    Creatinine 0.8 0.5 - 1.4 mg/dL    Calcium 9.2 8.7 - 10.5 mg/dL    Anion Gap 10 8 - 16 mmol/L    eGFR >60.0 >60 mL/min/1.73 m^2   Magnesium    Collection Time: 09/15/22  3:23 AM   Result Value Ref Range    Magnesium 3.5 (H) 1.6 - 2.6 mg/dL   Phosphorus    Collection Time: 09/15/22  3:23 AM   Result Value Ref Range    Phosphorus 2.5 (L) 2.7 - 4.5 mg/dL   CBC with Automated Differential    Collection Time: 09/15/22  3:23 AM   Result Value Ref Range    WBC 4.39 3.90 - 12.70 K/uL    RBC 4.79 4.00 - 5.40 M/uL    Hemoglobin 14.1 12.0 - 16.0 g/dL    Hematocrit 39.8 37.0 - 48.5 %    MCV 83 82 - 98 fL    MCH 29.4 27.0 - 31.0 pg    MCHC 35.4 32.0 - 36.0 g/dL    RDW 16.3 (H) 11.5 - 14.5 %    Platelets 170 150 - 450 K/uL    MPV 11.9 9.2 - 12.9 fL    Immature Granulocytes 2.5 (H) 0.0 - 0.5 %    Gran # (ANC) 2.1 1.8 - 7.7 K/uL    Immature Grans (Abs) 0.11 (H) 0.00 - 0.04 K/uL    Lymph # 1.4 1.0 - 4.8 K/uL    Mono # 0.7 0.3 - 1.0 K/uL    Eos # 0.1 0.0 - 0.5 K/uL    Baso # 0.06 0.00 - 0.20 K/uL    nRBC 0 0 /100 WBC    Gran % 48.0 38.0 - 73.0 %    Lymph % 31.4 18.0 - 48.0 %    Mono % 15.3 (H) 4.0 - 15.0 %    Eosinophil % 1.4 0.0 - 8.0 %    Basophil % 1.4 0.0 - 1.9 %    Platelet Estimate Appears normal     Aniso Slight     Poik Slight     Poly Occasional     Hypo Occasional     Ovalocytes Occasional     Target Cells CANCELED     Tear Drop Cells Occasional     [place] Cells Occasional     Schistocytes Present     Large/Giant Platelets Present     Fragmented Cells Occasional     Differential Method Automated          Significant Imaging: I have reviewed all pertinent imaging results/findings within the past 24 hours.    Imaging Results              X-Ray Chest AP Portable (Final result)  Result time 09/14/22 20:33:13      Final result by Elio Romo MD (09/14/22 20:33:13)      "              Impression:      Mild cardiomegaly with right greater than left pleural effusions and possible mild interstitial edema.      Electronically signed by: Elio Romo MD  Date:    09/14/2022  Time:    20:33               Narrative:    EXAMINATION:  XR CHEST AP PORTABLE    CLINICAL HISTORY:  Provided history is "Chest Pain;  ".    TECHNIQUE:  One view of the chest.    COMPARISON:  09/07/2022.    FINDINGS:  Cardiac wires overlie the chest.  Cardiomediastinal silhouette is mildly enlarged and similar to prior study.  Atherosclerotic calcifications overlie the aortic arch.  Central vascular congestion and prominent perihilar interstitial lung markings.  Moderate right and small left pleural effusions with adjacent passive atelectasis.  Skin fold artifact overlying the right hemithorax.  No gross pneumothorax.  Degenerative changes in both shoulders.                                       CTA Chest Abdomen Pelvis (Final result)  Result time 09/14/22 20:46:18      Final result by Elio Romo MD (09/14/22 20:46:18)                   Impression:      1. No aortic aneurysm, dissection, or other acute aortic pathology.  Severe atherosclerosis of the major aortic branch vessels.  2. Moderate right and mild left pleural effusions.  Mild anasarca.  3. Cardiomegaly and biatrial enlargement.  Significant reflux of contrast into the hepatic veins suggests component of right heart failure.  4. Moderate rectal stool burden with circumferential anorectal wall thickening compatible with proctitis.  5. Sigmoid colonic diverticulosis.  6. Grossly stable enlarged subcarinal lymph node as seen on prior studies.  7. No hydronephrosis or acute renal pathology, though evaluation is significantly limited by motion.  8. Severe bilateral hip DJD.  9. Motion and artifact limited study.    Electronically signed by resident: Miguel Martinez  Date:    09/14/2022  Time:    20:02    Electronically signed by: Elio Romo, " MD  Date:    09/14/2022  Time:    20:46               Narrative:    EXAMINATION:  CTA CHEST ABDOMEN PELVIS    CLINICAL HISTORY:  Aortic aneurysm, known or suspected;chest pain, epigastric pain, and b/l flank pain, r/o dissection;    TECHNIQUE:  Using 75 cc of  Omnipaque 350 IV contrast, and multi-detector helical CT technique, axial CT angiogram images of the abdomen were obtained from the lung bases through the pelvis.  2D post-processing coronal and sagittal reconstructions of the abdominal aorta and visceral arteries performed.    Image quality degraded by streak artifact from overlying cardiac monitoring leads.  Exam quality is also limited by motion.    COMPARISON:  CT abdomen pelvis 06/21/2022    FINDINGS:  Base of the neck: Enlarged heterogeneous thyroid gland.    Thoracic soft tissues: No axillary or infraclavicular lymphadenopathy.    Mediastinum/hilum: Several prominent upper mediastinal lymph nodes.  Enlarged subcarinal lymph node measures up to 1.6 cm in short axis, though is poorly delineated on the current study secondary to image noise and motion.  This finding is overall similar when compared with prior CT dated 04/19/2022 and prior PET-CT dated 06/17/2021.  No pathologic hilar lymphadenopathy.    Heart: The heart is enlarged and there is prominent biatrial enlargement.  Trace pericardial fluid.  Severe multi-vessel coronary artery calcific atherosclerosis.    Lungs: Moderate volume right and small volume left dependent pleural effusions with adjacent compressive atelectasis.  No focal lung consolidation, noting suboptimal evaluation of pulmonary parenchyma secondary to CTA technique and breathing motion artifact.    Liver: Normal in size and attenuation without focal hepatic abnormality.    Gallbladder: Normal appearance without evidence for cholecystitis.    Bile Ducts: No intra or extrahepatic biliary ductal dilation.    Pancreas: Fatty atrophy of the pancreas.  No pancreatic mass lesion or  peripancreatic inflammatory change.    Spleen: Normal.    Adrenals: Normal.    Genitourinary: The kidneys enhance symmetrically with no enhancing renal mass.  Bilateral renal cysts and multiple other subcentimeter renal cortical hypoattenuating lesions are too small to characterize.  Bladder demonstrates smooth contours without bladder wall thickening.    Reproductive organs: The uterus is surgically absent.    GI tract/Mesentery: Stomach is normal appearance.  Visualized loops of small and large bowel are normal in caliber without evidence for obstruction.Moderate amount of stool within the rectum.  Circumferential thickening of the anorectal junction with surrounding fat stranding.  Sigmoid colonic diverticulosis without evidence of diverticulitis.    Peritoneal Space: No abdominopelvic ascites or intraperitoneal free air.    Retroperitoneum: No significant adenopathy.    Abdominal wall: Generalized body wall edema.  Irregular contour of the skin in the ventral abdomen at the level of the umbilicus.  No subcutaneous fluid collections.    Bones: Multilevel degenerative changes throughout the visualized spine.  Severe bilateral femoroacetabular degenerative change with complete joint space loss and extensive subchondral cystic change.  The shoulders also demonstrate degenerative change.    Vasculature: Left-sided 4 vessel aortic arch with direct aortic origin of the left vertebral artery.  Calcification of the aortic annulus.  Mild thoracic and moderate abdominal aortoiliac calcific atherosclerosis.  No aortic aneurysm or dissection.  The pulmonary arteries distribute normally and are sufficiently opacified for diagnostic assessment.  No evidence of pulmonary thromboembolism to the level of the proximal subsegmental pulmonary arteries.  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  Severe stenosis of the splenic artery.  The superior mesenteric and bilateral renal arteries are patent at their origins with  prominent atherosclerotic plaque.  The inferior mesenteric artery is patent.  Extensive atherosclerotic change throughout the proximal SMA distribution.  Significant reflux of contrast bolus into the hepatic veins.

## 2022-09-16 NOTE — NURSING
Discharge instructions and education given to pt and family. Verbalized understanding. Removal of IV. No redness, swelling or drainage noted. Telemetry removed.  Pt escort requested/family at bedside.

## 2022-09-16 NOTE — PLAN OF CARE
Problem: Diabetes Comorbidity  Goal: Blood Glucose Level Within Targeted Range  Outcome: Ongoing, Progressing     Problem: Impaired Wound Healing  Goal: Optimal Wound Healing  Outcome: Ongoing, Progressing     Problem: Skin Injury Risk Increased  Goal: Skin Health and Integrity  Outcome: Ongoing, Progressing

## 2022-09-16 NOTE — PLAN OF CARE
[facility] - Telemetry Stepdown ([place] [address])      HOME HEALTH ORDERS  FACE TO FACE ENCOUNTER    Patient Name: Peri Johansen  YOB: 1934    PCP: Annika Garland MD   [facility] [address] [facility] [address]  PCP Phone Number: [phone]  PCP Fax: [phone]    Encounter Date: 9/14/22    Admit to Home Health    Diagnoses:  Active Hospital Problems    Diagnosis  POA    *Acute on chronic diastolic congestive heart failure [I50.33]  Yes    Debility [R53.81]  Yes    Chronic diastolic congestive heart failure [I50.32]  Unknown    Hyponatremia [E87.1]  Yes    Type 2 diabetes mellitus with hyperglycemia, without long-term current use of insulin [E11.65]  Yes    Hyperthyroidism [E05.90]  Yes    Gastroesophageal reflux disease without esophagitis [K21.9]  Yes    Mixed hyperlipidemia [E78.2]  Yes      Resolved Hospital Problems   No resolved problems to display.       Follow Up Appointments:  Future Appointments   Date Time Provider Department Center   11/23/2022  9:30 AM Sabrina Vaughan DPM [facility]   12/1/2022  9:00 AM Jarvis Lagos OD [facility] OPTOMTY [facility]   1/12/2023  9:30 AM Chitra Wang MD [facility] CARDIO [facility]       Allergies:  Review of patient's allergies indicates:   Allergen Reactions    Latex, natural rubber Itching       Medications: Review discharge medications with patient and family and provide education.    Current Facility-Administered Medications   Medication Dose Route Frequency Provider Last Rate Last Admin    acetaminophen tablet 1,000 mg  1,000 mg Oral Q8H PRN Madhuri Best PA-C        acetaminophen tablet 650 mg  650 mg Oral Q4H PRN Madhuri Best PA-C        albuterol-ipratropium 2.5 mg-0.5 mg/3 mL nebulizer solution 3 mL  3 mL Nebulization Q4H PRN Madhuri Best PA-C        aluminum-magnesium hydroxide-simethicone 200-200-20 mg/5 mL suspension 30 mL  30 mL Oral QID PRN Madhuri Best PA-C        aspirin chewable tablet 81 mg  81 mg Oral  Daily MadhuriROMELIA FrancoisC   81 mg at 09/16/22 0755    atorvastatin tablet 40 mg  40 mg Oral Daily Madhuri JUAN Ashley-C   40 mg at 09/16/22 0754    bisacodyL suppository 10 mg  10 mg Rectal Daily PRN Madhuri Best PA-C        dextrose 10% bolus 125 mL  12.5 g Intravenous PRN Jennie Thomson MD        dextrose 10% bolus 250 mL  25 g Intravenous PRN Jennie Thomson MD        enoxaparin injection 40 mg  40 mg Subcutaneous Daily MadhuriJUAN Francois-C   40 mg at 09/15/22 1700    gabapentin capsule 300 mg  300 mg Oral BID MadhuriROMELIA FrancoisC   300 mg at 09/16/22 0754    glucagon (human recombinant) injection 1 mg  1 mg Intramuscular PRN Jennie Thomson MD        glucose chewable tablet 16 g  16 g Oral PRN Jennie Thomson MD        glucose chewable tablet 24 g  24 g Oral PRN Jennie Thomson MD        insulin aspart U-100 pen 0-5 Units  0-5 Units Subcutaneous QID (AC + HS) PRN Jennie Thomson MD   1 Units at 09/15/22 2013    melatonin tablet 6 mg  6 mg Oral Nightly PRN Madhuri Best PA-C        methIMAzole tablet 5 mg  5 mg Oral Daily MadhuriROMELIA FrancoisC   5 mg at 09/16/22 0754    metoprolol succinate (TOPROL-XL) 24 hr tablet 25 mg  25 mg Oral Daily MadhuriROMELIA FrancoisC   25 mg at 09/15/22 1000    naloxone 0.4 mg/mL injection 0.02 mg  0.02 mg Intravenous PRN Madhurigabriella Best PA-C        ondansetron disintegrating tablet 8 mg  8 mg Oral Q8H PRN Madhurigabriella Best PA-C        pantoprazole EC tablet 40 mg  40 mg Oral Daily Madhuri JUAN Ashley-C   40 mg at 09/16/22 0754    polyethylene glycol packet 17 g  17 g Oral Daily PRN MadhuriJUAN Francois-C   17 g at 09/15/22 1851    polyethylene glycol packet 17 g  17 g Oral Daily Madhuri JUAN Ashley-C   17 g at 09/16/22 0805    potassium chloride CR capsule 10 mEq  10 mEq Oral Daily Madhuri Best PA-C   10 mEq at 09/16/22 0758    prochlorperazine injection Soln 5 mg  5 mg Intravenous Q6H PRN Madhuri Best PA-C        simethicone chewable tablet 80 mg  1 tablet Oral  QID PRN Madhuri Best PA-C        sodium chloride 0.9% flush 10 mL  10 mL Intravenous PRN Madhuri Best PA-C        sodium chloride 0.9% flush 5 mL  5 mL Intravenous PRN Madhuri Best PA-C        traMADoL tablet 50 mg  50 mg Oral Q12H PRN Jennie Thomson MD         Current Discharge Medication List        CONTINUE these medications which have CHANGED    Details   furosemide (LASIX) 40 MG tablet Take 1 tablet (40 mg total) by mouth 2 (two) times a day.      potassium chloride (KLOR-CON) 10 MEQ TbSR Take 2 tablets (20 mEq total) by mouth once daily.  Qty: 30 tablet, Refills: 11           CONTINUE these medications which have NOT CHANGED    Details   aspirin 81 MG Chew Take 1 tablet (81 mg total) by mouth once daily.    Associated Diagnoses: Aortic atherosclerosis      atorvastatin (LIPITOR) 40 MG tablet TAKE 1 TABLET BY MOUTH EVERY DAY  Qty: 90 tablet, Refills: 0      !! blood sugar diagnostic (TRUE METRIX GLUCOSE TEST STRIP) Strp USE AS DIRECTED  Qty: 150 strip, Refills: 12    Comments: **Patient requests 90 days supply**      !! blood sugar diagnostic Strp Test 2 times daily  Qty: 200 strip, Refills: 12      blood-glucose meter kit Use as instructed.  ACCUCHECK or whatever is preferred by insurance  Qty: 1 each, Refills: 0      dextran 70-hypromellose (ARTIFICIAL TEARS,[id number],) ophthalmic solution Place 1 drop into both eyes 4 (four) times daily as needed.  Qty: 1 Bottle, Refills: 5      diclofenac sodium (VOLTAREN) 1 % Gel APPLY 2 GRAMS EXTERNALLY TO THE AFFECTED AREA FOUR TIMES DAILY  Qty: 100 g, Refills: 1    Associated Diagnoses: Lumbar degenerative disc disease      ergocalciferol (ERGOCALCIFEROL) 50,000 unit Cap Take 1 capsule (50,000 Units total) by mouth every 7 days.  Qty: 12 capsule, Refills: 3      fluticasone propionate (FLONASE) 50 mcg/actuation nasal spray 1 spray (50 mcg total) by Each Nostril route once daily.  Qty: 16 g, Refills: 1      gabapentin (NEURONTIN) 300 MG capsule TAKE 1  CAPSULE(300 MG) BY MOUTH TWICE DAILY  Qty: 180 capsule, Refills: 1      !! lancets Misc Test 2 x daily  Qty: 200 each, Refills: 12      methIMAzole (TAPAZOLE) 5 MG Tab TAKE 1/2 TABLET BY MOUTH DAILY  Qty: 45 tablet, Refills: 0      metoprolol succinate (TOPROL-XL) 25 MG 24 hr tablet Take 1 tablet (25 mg total) by mouth once daily. Hold if SBP <120 or <55  Qty: 30 tablet, Refills: 1    Comments: .      !! MICRO THIN LANCETS 33 gauge Misc USE AS DIRECTED  Refills: 0      omeprazole (PRILOSEC) 40 MG capsule Take 1 capsule (40 mg total) by mouth every morning.  Qty: 90 capsule, Refills: 0    Comments: **Patient requests 90 days supply**      ondansetron (ZOFRAN-ODT) 4 MG TbDL Take 1 tablet (4 mg total) by mouth every 8 (eight) hours as needed (Nausea or vomiting).  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 - Potential duplicate medications found. Please discuss with provider.            I have seen and examined this patient within the last 30 days. My clinical findings that support the need for the home health skilled services and home bound status are the following:no   Weakness/numbness causing balance and gait disturbance due to Heart Failure making it taxing to leave home.     Diet:   cardiac diet and diabetic diet 2000 calorie    Labs:  SN to perform labs:  BMP and Mg: Weekly; 3 week(s)   Report Lab results to PCP.    Referrals/ Consults  Physical Therapy to evaluate and treat. Evaluate for home safety and equipment needs; Establish/upgrade home exercise program. Perform / instruct on therapeutic exercises, gait training, transfer training, and Range of Motion.  Occupational Therapy to evaluate and treat. Evaluate home environment for safety and equipment needs. Perform/Instruct on transfers, ADL training,  ROM, and therapeutic exercises.   to evaluate for community resources/long-range planning.  Aide to provide assistance with personal care, ADLs, and vital signs.    Activities:   activity as tolerated    Nursing:   Agency to admit patient within 24 hours of hospital discharge unless specified on physician order or at patient request    SN to complete comprehensive assessment including routine vital signs. Instruct on disease process and s/s of complications to report to MD. Review/verify medication list sent home with the patient at time of discharge  and instruct patient/caregiver as needed. Frequency may be adjusted depending on start of care date.     Skilled nurse to perform up to 3 visits PRN for symptoms related to diagnosis    Notify MD if SBP > 160 or < 90; DBP > 90 or < 50; HR > 120 or < 50; Temp > 101; O2 < 88%;   Ok to schedule additional visits based on staff availability and patient request on consecutive days within the home health episode.    When multiple disciplines ordered:    Start of Care occurs on Sunday - Wednesday schedule remaining discipline evaluations as ordered on separate consecutive days following the start of care.    Thursday SOC -schedule subsequent evaluations Friday and Monday the following week.     Friday - Saturday SOC - schedule subsequent discipline evaluations on consecutive days starting Monday of the following week.    For all post-discharge communication and subsequent orders please contact patient's primary care physician. If unable to reach primary care physician or do not receive response within 30 minutes, please contact PCP for clinical staff order clarification    Miscellaneous   Heart Failure:       SN to instruct on the following:               Instruct on the definition of CHF.              Instruct on the signs/sympoms of CHF to be reported.              Instruct on and monitor daily weights.              Instruct on factors that cause  exacerbation.              Instruct on action, dose, schedule, and side effects of medications.              Instruct on diet as prescribed.              Instruct on activity allowed.              Instruct on life-style modifications for life long management of CHF              SN to assess compliance with daily weights, diet, medications, fluid retention,                          safety precautions, activities permitted and life-style modifications.              Additional 1-2 SN visits per week as needed for signs and symptoms                           of CHF exacerbation.        For Weight Gain > 2-3 lbs in 1 day or 4-6 lbs over 1 week notify PCP:    Home Health Aide:  Nursing Three times weekly, Physical Therapy Three times weekly, Occupational Therapy Three times weekly, and Home Health Aide Three times weekly    Wound Care Orders  Sacrum stage 1: apply protective dressing    I certify that this patient is confined to her home and needs intermittent skilled nursing care, physical therapy, and occupational therapy.

## 2022-09-16 NOTE — PLAN OF CARE
[facility] - Tele[facility]down ([place])  Discharge Final Note    Primary Care Provider: Annika Garland MD    Expected Discharge Date: 9/16/2022    Final Discharge Note (most recent)       Final Note - 09/16/22 0918          Final Note    Assessment Type Final Discharge Note (P)                      Important Message from Medicare              09/16/22 0918   Discharge Planning   Assessment Type Final Discharge Note   Resource/Environmental Concerns none   Support Systems Children   Equipment Currently Used at Home cane, quad;shower chair;rollator   Current Living Arrangements home/apartment/condo   Patient/Family Anticipates Transition to home with family   Patient/Family Anticipated Services at Transition home health care;durable medical equipment   DME Needed Upon Discharge  wheelchair;bedside commode   Discharge Plan A Home with family;Home Health       Pt to discharge home with daughter today. Notified daughter. Confirmed with HME that pt/family declined hospital bed and that bsc and wc will be delivered to home today. Waiting on walk test. Pt had [facility]  prior to admit. Notified of dc today. Pt will need wheelchair van home. CM will continue to follow.    Jace Joe RN Case Manager   [phone] 1436 update: Pt does not qualify for home O2. HH orders sent to [facility] . Wheelchair van arranged for 1530. Updated pt's daughter and informed RN.

## 2022-09-16 NOTE — PLAN OF CARE
Problem: Adult Inpatient Plan of Care  Goal: Plan of Care Review  Outcome: Met  Goal: Patient-Specific Goal (Individualized)  Outcome: Met  Goal: Absence of Hospital-Acquired Illness or Injury  Outcome: Met  Goal: Optimal Comfort and Wellbeing  Outcome: Met  Goal: Readiness for Transition of Care  Outcome: Met     Problem: Infection  Goal: Absence of Infection Signs and Symptoms  Outcome: Met     Problem: Diabetes Comorbidity  Goal: Blood Glucose Level Within Targeted Range  Outcome: Met     Problem: Impaired Wound Healing  Goal: Optimal Wound Healing  Outcome: Met     Problem: Skin Injury Risk Increased  Goal: Skin Health and Integrity  Outcome: Met

## 2022-09-16 NOTE — DISCHARGE SUMMARY
[facility] [address]  [facility] Medicine  Discharge Summary      Patient Name: Peri Johansen  MRN: [id number]  Patient Class: OP- Observation  Admission Date: 9/14/2022  Hospital Length of Stay: 0 days  Discharge Date and Time:  09/16/2022 4:14 PM  Attending Physician: Jennie Thomson MD   Discharging Provider: Jennie Thomson MD  Primary Care Provider: Annika Garland MD  [facility] Medicine Team: [facility]OS[facility] MED D Jennie Thomson MD    HPI:   Peri Johansen is a 88 y.o. female with PMHx of CHF, T2DM, HLD, HTN, DLBCL, GERD and hyperthyroidism presents to the ED with shortness of breath and chest pain x 1 day. Patient is A&Ox4. Patient states the symptoms have been persistent since yesterday evening. She experiences dyspnea upon exertion and had to constantly sit down and rest to recover. She also noticed edema on her lower extremities. Came to the ED via EMS, received 325 mg aspirin and 1 spray of SL nitro en route. She stated that the nitro did not help with her chest pain. She endorses mild nausea and abdominal discomfort. Denies fevers, chills, dysuria, hematuria, vomiting, diarrhea, cough, diaphoresis.     ED: /58, HR 76, SpO2 98% on RA. No supplemental O2 required. CBC wnl. Na 128, Cr 0.8, Glucose 179, albumin 2.9. Troponin elevated at baseline. BNP 1156. No EKG changes. CXR: Mild cardiomegaly with right greater than left pleural effusions and possible mild interstitial edema. CT C/A/P: Moderate right and mild left pleural effusions. Mild anasarca. Cardiomegaly and biatrial enlargement. Significant reflux of contrast into the hepatic veins suggests component of right heart failure. Cardiology consulted, do not recommend starting ACS protocol, suspect heart failure exacerbation. 40 mg IV Lasix administered and ad      * No surgery found *      Hospital Course:   IV diuresis initiated upon admission with symptomatic improvement.  Transitioned to p.o. Lasix 9/16.  Patient medically stable to  discharge 09/16.  Return precautions advised.  Patient and family in agreement with discharge plan. Home health services arranged.    Vitals:    09/16/22 0845 09/16/22 1130 09/16/22 1235 09/16/22 1545   BP: (!) 92/53  100/60    BP Location: Right arm  Right arm    Patient Position: Lying  Sitting    Pulse: 71 81 74 75   Resp: 16  18 17   Temp: 97.6 °F (36.4 °C)  97.7 °F (36.5 °C)    TempSrc: Oral  Oral    SpO2: 100%  98% 99%   Weight:       Height:         Physical Exam  Vitals and nursing note reviewed.   Constitutional:       General: She is not in acute distress.     Appearance: She is well-developed and normal weight.   HENT:      Head: Normocephalic and atraumatic.      Right Ear: External ear normal.      Left Ear: External ear normal.      Mouth/Throat:      Pharynx: No oropharyngeal exudate.   Eyes:      General: No scleral icterus.        Right eye: No discharge.         Left eye: No discharge.   Neck:      Vascular: JVD improved.   Cardiovascular:      Rate and Rhythm: Normal rate and regular rhythm.      Heart sounds: Normal heart sounds.   Pulmonary:      Effort: Pulmonary effort is normal. No respiratory distress.      Breath sounds: Rales present (improved). No wheezing.   Abdominal:      General: Bowel sounds are normal. There is no distension.      Palpations: Abdomen is soft.      Tenderness: There is no abdominal tenderness.   Musculoskeletal:         Right lower leg: Edema (1+) present, improved.      Left lower leg: Edema (1+) present, improved.   Lymphadenopathy:      Cervical: No cervical adenopathy.   Skin:     General: Skin is warm and dry.   Neurological:      Mental Status: She is alert and oriented to person, place, and time. Mental status is at baseline.   Psychiatric:         Behavior: Behavior normal.             Goals of Care Treatment Preferences:  Code Status: Full Code      Consults:   Consults (From admission, onward)        Status Ordering Provider     Inpatient consult to  Registered Dietitian/Nutritionist  Once        Provider:  (Not yet assigned)    Completed NICO AGUILAR          No new Assessment & Plan notes have been filed under this hospital service since the last note was generated.  Service: Hospital Medicine    Final Active Diagnoses:    Diagnosis Date Noted POA    PRINCIPAL PROBLEM:  Acute on chronic diastolic congestive heart failure [I50.33] 06/12/2019 Yes    Debility [R53.81] 09/15/2022 Yes    Chronic diastolic congestive heart failure [I50.32] 09/15/2022 Unknown    Hyponatremia [E87.1] 09/07/2022 Yes    Type 2 diabetes mellitus with hyperglycemia, without long-term current use of insulin [E11.65] 06/12/2019 Yes    Hyperthyroidism [E05.90] 04/15/2019 Yes    Gastroesophageal reflux disease without esophagitis [K21.9] 08/06/2015 Yes    Mixed hyperlipidemia [E78.2] 08/16/2012 Yes      Problems Resolved During this Admission:       Discharged Condition: stable    Disposition: Home-Health Care c    Follow Up:    Patient Instructions:      HOSPITAL BED FOR HOME USE   Order Comments: Cannot independently transfer from bed to wheelchair/standing d/t height. Mattress exacerbating pain.     Order Specific Question Answer Comments   Type: Semi-electric    Length of need (1-99 months): 99    Does patient have medical equipment at home? rollator(4 wheeled walker with seat    Does patient have medical equipment at home? shower chair    Does patient have medical equipment at home? cane, quad    Height: 4'11''    Weight: 52.2 kg (115 lb 1.3 oz)    Please check all that apply: Patient requires a bed height different than a fixed height hospital bed to permit transfers to chair, wheelchair, or standing.    Please check all that apply: Patient requires frequent changes in body position and/or has an immediate need for a change in body position.      COMMODE FOR HOME USE     Order Specific Question Answer Comments   Type: Standard bedside   Height: 4'11''    Weight: 52.2 kg (115  "lb 1.3 oz)    Does patient have medical equipment at home? cane, quad    Does patient have medical equipment at home? shower chair    Does patient have medical equipment at home? rollator(4 wheeled walker with seat    Length of need (1-99 months): 99      WHEELCHAIR FOR HOME USE     Order Specific Question Answer Comments   Hours in W/C per day: 2    Type of Wheelchair: Standard    Size(Width): 18"(STD adult)    Leg Support: STD footrests    Arm Height: Detachable    Lap Belt: Velcro    Accessories: Front brakes    Cushion: Basic    Height: 4'11''    Weight: 52.2 kg (115 lb 1.3 oz)    Does patient have medical equipment at home? cane, quad    Does patient have medical equipment at home? shower chair    Does patient have medical equipment at home? rollator(4 wheeled walker with seat    Length of need (1-99 months): 99    Please check all that apply: Caregiver is capable and willing to operate wheelchair safely.    Please check all that apply: Patient's upper body strength is sufficient for propulsion.    Please check all that apply: The patient requires the use of a w/c for activities of daily living within the Home.      Ambulatory referral/consult to Outpatient Case Management   Referral Priority: Routine Referral Type: Consultation   Referral Reason: Specialty Services Required   Number of Visits Requested: 1     Diet diabetic     Notify your health care provider if you experience any of the following:  temperature >100.4     Notify your health care provider if you experience any of the following:  persistent nausea and vomiting or diarrhea     Notify your health care provider if you experience any of the following:  severe uncontrolled pain     Notify your health care provider if you experience any of the following:  difficulty breathing or increased cough     Notify your health care provider if you experience any of the following:  persistent dizziness, light-headedness, or visual disturbances     Activity as " tolerated       Significant Diagnostic Studies: Labs:   CMP   Recent Labs   Lab 09/14/22  1742 09/15/22  0323 09/16/22  0321   * 136 135*   K 4.8 4.1 3.9    103 102   CO2 23 23 27   * 107 131*   BUN 24* 19 26*   CREATININE 0.8 0.8 0.9   CALCIUM 10.0 9.2 9.6   PROT 7.5  --   --    ALBUMIN 2.9*  --   --    BILITOT 1.4*  --   --    ALKPHOS 109  --   --    AST 45*  --   --    ALT 27  --   --    ANIONGAP 1* 10 6*    and CBC   Recent Labs   Lab 09/14/22  2032 09/15/22  0323 09/16/22  0321   WBC 3.93 4.39 4.26   HGB 13.5 14.1 13.7   HCT 39.6 39.8 40.4    170 220       Pending Diagnostic Studies:     None         Medications:  Reconciled Home Medications:      Medication List      CHANGE how you take these medications    furosemide 40 MG tablet  Commonly known as: LASIX  Take 1 tablet (40 mg total) by mouth 2 (two) times a day.  What changed:   · when to take this  · Another medication with the same name was removed. Continue taking this medication, and follow the directions you see here.     potassium chloride 10 MEQ Tbsr  Commonly known as: KLOR-CON  Take 2 tablets (20 mEq total) by mouth once daily.  What changed: how much to take        CONTINUE taking these medications    aspirin 81 MG Chew  Take 1 tablet (81 mg total) by mouth once daily.     atorvastatin 40 MG tablet  Commonly known as: LIPITOR  TAKE 1 TABLET BY MOUTH EVERY DAY     blood-glucose meter kit  Use as instructed.  ACCUCHECK or whatever is preferred by insurance     dextran 70-hypromellose ophthalmic solution  Commonly known as: ARTIFICIAL TEARS(RSDW81-SKSYM)  Place 1 drop into both eyes 4 (four) times daily as needed.     diclofenac sodium 1 % Gel  Commonly known as: VOLTAREN  APPLY 2 GRAMS EXTERNALLY TO THE AFFECTED AREA FOUR TIMES DAILY     ergocalciferol 50,000 unit Cap  Commonly known as: ERGOCALCIFEROL  Take 1 capsule (50,000 Units total) by mouth every 7 days.     fluticasone propionate 50 mcg/actuation nasal  spray  Commonly known as: FLONASE  1 spray (50 mcg total) by Each Nostril route once daily.     gabapentin 300 MG capsule  Commonly known as: NEURONTIN  TAKE 1 CAPSULE(300 MG) BY MOUTH TWICE DAILY     methIMAzole 5 MG Tab  Commonly known as: TAPAZOLE  TAKE 1/2 TABLET BY MOUTH DAILY     metoprolol succinate 25 MG 24 hr tablet  Commonly known as: TOPROL-XL  Take 1 tablet (25 mg total) by mouth once daily. Hold if SBP <120 or <55     * MICRO THIN LANCETS 33 gauge Misc  Generic drug: lancets  USE AS DIRECTED     * lancets Misc  Test 2 x daily     omeprazole 40 MG capsule  Commonly known as: PRILOSEC  Take 1 capsule (40 mg total) by mouth every morning.     ondansetron 4 MG Tbdl  Commonly known as: ZOFRAN-ODT  Take 1 tablet (4 mg total) by mouth every 8 (eight) hours as needed (Nausea or vomiting).     traMADoL 50 mg tablet  Commonly known as: ULTRAM  TAKE 1 TABLET(50 MG) BY MOUTH EVERY 12 HOURS AS NEEDED FOR PAIN     * TRUE METRIX GLUCOSE TEST STRIP Strp  Generic drug: blood sugar diagnostic  USE AS DIRECTED     * blood sugar diagnostic Strp  Test 2 times daily         * This list has 4 medication(s) that are the same as other medications prescribed for you. Read the directions carefully, and ask your doctor or other care provider to review them with you.                Indwelling Lines/Drains at time of discharge:   Lines/Drains/Airways     None                 Time spent on the discharge of patient: 35 minutes         Jennie Thomson MD  Department of Hospital Medicine  [facility] - Telemetry Stepdown ([place]

## 2022-09-16 NOTE — CONSULTS
Food & Nutrition  Education    Diet Education: High calorie, high protein, carbohydrate controlled diet education  Time Spent: 30 minutes   Learners: Pt and pt's caregivers       Comments: RD consulted for wounds, pt to discharge today. Spoke w/ pt and pt's caregivers. Discussed incorporating high calorie, high protein in regular diet. Carbohydrate controlled diet education provided per pt request. Answered all questions pertaining to pt. High calorie, high protein/carbohydrate handouts provided Pt and pt's caregiver v/u understanding. No other nutrition concerns noted at this time. LBAISLINN noted- 9/15/22      All questions and concerns answered. Dietitian's contact information provided.       Follow-Up: Yes     Please Re-consult as needed      Thanks!

## 2022-09-16 NOTE — NURSING
Pt AAOx4. No distress noted. Bed in lowest position. Purewick maintain. Side rails up x2. Call bell and personal belongs within reach. Telemetry maintained. Safety precautions maintained. Pt free of falls or injuries. Will continue to monitor.

## 2022-09-20 NOTE — TELEPHONE ENCOUNTER
----- Message from Margarette Erazo sent at 9/20/2022  1:47 PM CDT -----  Contact: [facility]/[place] [phone]  [facility] is requesting orders be entered for pt to get a . Please fax order to [phone].

## 2022-09-22 NOTE — PROGRESS NOTES
Outpatient Care Management  Plan of Care Follow Up Visit    Patient: Peri Johansen  MRN: [id number]  Date of Service: 09/22/2022  Completed by: Samantha Butts RN  Referral Date: 09/15/2022    Reason for Visit   Patient presents with    OPCM Chart Review     9/22/2022    OPCM RN First Follow-Up Attempt     9/22/2022  CM spoke to patient, she is vomiting at this time, wants call back tomorrow.    OPCM RN Second Follow-Up Attempt     9/26/2022  No answer, CM left voicemail messages for patient and granddaughter.  CM also sent follow up attempt letter through the portal.    Follow-up     9/29/2022    Update Plan Of Care     9/29/2022       Brief Summary: Patient was hospitalized at [facility] main campus for CHF from 9/14-9/16.  She was SOB with a lot of swelling and was diuresed prior to discharge.  She was sent home with [facility] for a nurse and PT.  Patient's granddaughter, Zoltan, states the patient has not been weighing herself everyday even though she got her a scale.  She states she doesn't know what her last weight is.  She states the patient only wears her compression stockings sometimes and doesn't prop her legs up like she should.  She still has some swelling in her legs.  Patient's granddaughter, Zoltan, states that the patient still does not always follow a low sodium diet.  She states she eats very little anyway and drinks Glucerna when Nilsl reminds her to drink them.  Patient just doesn't have much of an appetite.      CM encouraged Zoltan to encourage patient to weigh herself daily.  CM informed Zoltan it's important to know if she is gaining fluid weight before she goes into full blown CHF and has to go back to the hospital.  CM reminded Zoltan about the 3 lb/5 lb rule.  CM encouraged Zoltan to encourage patient to stay on low sodium to try to keep fluid in control and not have to be hospitalized again.     Next steps:   Follow up in four week  Follow up on daily weights and  log  Follow up on low Na diet and fluid restriction  Follow up if eating more and gaining weight    Follow up in about 5 weeks (around 10/28/2022).    Patient Summary     Involvement of Care:  Do I have permission to speak with other family members about your care?  Yes    Patient Reported Labs & Vitals:  1.  Any Patient Reported Labs & Vitals?  No  2.  Patient Reported Blood Pressure:     3.  Patient Reported Pulse:     4.  Patient Reported Weight (Kg):     5.  Patient Reported Blood Glucose (mg/dl):       Medical and social history was reviewed with patient and/or caregiver.     Clinical Assessment     Reviewed and provided basic information on available community resources for mental health, transportation, wellness resources, and palliative care programs with patient and/or caregiver.     Complex Care Plan     Care plan was discussed and completed today with input from patient and/or caregiver.    Patient Instructions     Instructions were provided via the "[employer] patient resources and are available for the patient to view on the patient portal.    Todays OPCM Self-Management Care Plan was developed with the patients/caregivers input and was based on identified barriers from todays assessment.  Goals were written today with the patient/caregiver and the patient has agreed to work towards these goals to improve his/her overall well-being. Patient verbalized understanding of the care plan, goals, and all of today's instructions. Encouraged patient/caregiver to communicate with his/her physician and health care team about health conditions and the treatment plan.  Provided my contact information today and encouraged patient/caregiver to call me with any questions as needed.

## 2022-10-03 NOTE — TELEPHONE ENCOUNTER
Care Due:                  Date            Visit Type   Department     Provider  --------------------------------------------------------------------------------                                EP -                              PRIMARY      [facility] INTER[facility]  Last Visit: 01-      CARE ([facility])   MEDICINE       Annika Garland  Next Visit: None Scheduled  None         None Found                                                            Last  Test          Frequency    Reason                     Performed    Due Date  --------------------------------------------------------------------------------    Lipid Panel.  12 months..  atorvastatin.............  10-   10-    [facility] Embedded Care Gaps. Reference number: [id number]. 10/03/2022   1:41:32 PM CDT

## 2022-10-04 NOTE — TELEPHONE ENCOUNTER
reviewed, controlled meds refilled    She will need an appointment to see me for any additional refills, please call her to schedule, okay to work in, thank you

## 2022-10-13 NOTE — TELEPHONE ENCOUNTER
----- Message from Jesusita Tanner sent at 10/13/2022  1:01 PM CDT -----  Contact: [phone]  Amarilys with [facility] called to advise that when the pt had therapy on Monday she weighed 104 and yesterday when she weighed her she was 109. She says she did notice the pt had some fluid in her legs. Pts pulse was also 74. FAX # [phone] Please Advise

## 2022-10-19 NOTE — TELEPHONE ENCOUNTER
I spoke to Amarilys with [facility] ([phone]), she provided an update for pt. She reports that pt's weight is back at 103lb, which is her base weight. She states that pt vitals are stable. HH nurse states that she instructed pt to take her Lasix in the mornings to help reduce fluid. She reports that pt was in good spirits.

## 2022-10-21 NOTE — TELEPHONE ENCOUNTER
Refill Decision Note   Peri Johansen  is requesting a refill authorization.  Brief Assessment and Rationale for Refill:  Approve     Medication Therapy Plan:       Medication Reconciliation Completed: No   Comments:     No Care Gaps recommended.     Note composed:6:15 PM 10/21/2022

## 2022-10-21 NOTE — TELEPHONE ENCOUNTER
No new care gaps identified.  [facility] Embedded Care Gaps. Reference number: [id number]. 10/21/2022   2:36:42 PM CDT

## 2022-11-01 NOTE — PROGRESS NOTES
Outpatient Care Management  Plan of Care Follow Up Visit    Patient: Peri Johansen  MRN: [id number]  Date of Service: 10/28/2022  Completed by: Samantha Butts RN  Referral Date: 09/15/2022    Reason for Visit   Patient presents with    OPCM Chart Review     10/28/2022    OPCM RN First Follow-Up Attempt     10/28/2022  No answer, LULÚ left voicemail message to patient and granddaughter, Zoltan, and sent follow up attempt letter through the portal.    Follow-up     11/1/2022    Update Plan Of Care     11/1/2022       Brief Summary: Patient states she weighs herself everyday.  She states she doesn't know the exact weight, Zoltan monitors that for her, but she states she is not swollen or coughing.  She states she was a little puffy the other day but not now.  She has no swelling at all.  She does have a little shortness of breath on exertion.  She has been taking her Lasix as prescribed.  She states she has a doctor appointment tomorrow and will tell them about it.  She states otherwise she is doing good.  Patient states she doesn't eat salt and her granddaughter, Zoltan, doesn't cook with salt.  She states she has been eating more lately and drinks a Glucerna everyday.  She has been eating pasta, oatmeal and grits.  She states she only drinks water about 2 times a day.      CM reviewed the importance of daily weights and the importance of notifying her PCP if she has a 3 lb weight gain in one day or a 5 lb weight gain in one week.  CM reiterated about a low sodium diet and fluid restriction to prevent the patient from going into CHF again.  CM discussed case closure with patient, she is in agreement.  CM tried to call Nilsny to let her know about case closure, no answer.  CM left voicemail message with CM's phone number for her to call if she ever needs anything.    Patient Summary     Involvement of Care:  Do I have permission to speak with other family members about your care?  Yes    Patient Reported Labs &  Vitals:  1.  Any Patient Reported Labs & Vitals?  No  2.  Patient Reported Blood Pressure:     3.  Patient Reported Pulse:     4.  Patient Reported Weight (Kg):     5.  Patient Reported Blood Glucose (mg/dl):       Medical and social history was reviewed with patient and/or caregiver.     Clinical Assessment     Reviewed and provided basic information on available community resources for mental health, transportation, wellness resources, and palliative care programs with patient and/or caregiver.     Complex Care Plan     Care plan was discussed and completed today with input from patient and/or caregiver.    Patient Instructions     Instructions were provided via the [employer] patient resources and are available for the patient to view on the patient portal.    Todays OPCM Self-Management Care Plan was developed with the patients/caregivers input and was based on identified barriers from todays assessment.  Goals were written today with the patient/caregiver and the patient has agreed to work towards these goals to improve his/her overall well-being. Patient verbalized understanding of the care plan, goals, and all of today's instructions. Encouraged patient/caregiver to communicate with his/her physician and health care team about health conditions and the treatment plan.  Provided my contact information today and encouraged patient/caregiver to call me with any questions as needed.

## 2022-11-02 PROBLEM — N30.00 ACUTE CYSTITIS WITHOUT HEMATURIA: Status: RESOLVED | Noted: 2022-01-01 | Resolved: 2022-01-01

## 2022-11-02 PROBLEM — E87.1 HYPONATREMIA: Status: RESOLVED | Noted: 2022-01-01 | Resolved: 2022-01-01

## 2022-11-02 PROBLEM — R79.89 ELEVATED LFTS: Status: RESOLVED | Noted: 2022-01-01 | Resolved: 2022-01-01

## 2022-11-02 PROBLEM — R07.9 CHEST PAIN: Status: RESOLVED | Noted: 2022-01-21 | Resolved: 2022-01-01

## 2022-11-02 PROBLEM — I50.33 ACUTE ON CHRONIC DIASTOLIC CONGESTIVE HEART FAILURE: Status: RESOLVED | Noted: 2019-06-12 | Resolved: 2022-01-01

## 2022-11-02 PROBLEM — R79.89 ELEVATED TROPONIN: Status: RESOLVED | Noted: 2019-06-12 | Resolved: 2022-01-01

## 2022-11-02 PROBLEM — R10.9 ABDOMINAL PAIN: Status: RESOLVED | Noted: 2022-01-01 | Resolved: 2022-01-01

## 2022-11-02 PROBLEM — R10.13 EPIGASTRIC ABDOMINAL PAIN: Status: RESOLVED | Noted: 2022-01-01 | Resolved: 2022-01-01

## 2022-11-02 NOTE — TELEPHONE ENCOUNTER
----- Message from Lucy Dixon sent at 11/2/2022  3:32 PM CDT -----  Regarding: Scheduling  Please contact patient for a follow-up appointment.  Thanks

## 2022-11-02 NOTE — PROGRESS NOTES
Patient ID: Peri Johansen is a 88 y.o. female.    Chief Complaint:  Follow-up of multiple medical issues    Assessment:       1. Chronic diastolic congestive heart failure    2. 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 Calculus of gallbladder with chronic cholecystitis without obstruction    4. Lytic lesion of bone on x-ray: outside CTA 4/21 T10- however PET CT and MRI June 2021 do not show lytic lesion    5. Debility    6. Weight loss    7. Cervical spine arthritis    8. Renal cyst: R side see ultrasound 6/16; stable 2018 and 2019, also 2022          Plan:           Peri was seen today for annual exam.    Diagnoses and all orders for this visit:    Chronic diastolic congestive heart failure:  Has been hospitalized various times.  Appears to be compliant with her regimen.  Low-sodium eating reviewed.  Medications discussed at length.  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  Ultrasound showed sludge; CT scan did not reveal any obstruction.  A possible liver lesion had been seen on the ultrasound but was not seen on the CT scan done a few weeks later this summer    Lytic lesion of bone on x-ray: outside CTA 4/21 T10- however PET CT and MRI June 2021 do not show lytic lesion    Debility:  PT, palliative care for assistance with fall prevention and ambulation    Weight loss:  Small, frequent  meals.  May need to consider panendoscopy, patient has declined.  Gastro consultation recommended, she will consider    Cervical spine arthritis:  Chronic and stable    Renal cyst: R side see ultrasound ; stable  and , also :  No alarm symptoms    Other orders  -     omeprazole (PRILOSEC) 40 MG capsule; Take 1 capsule (40 mg total) by mouth every morning.     Labs to be done today and reviewed  Discussed at length the patient and granddaughter that there are more studies that could be completed given her weakness, weight loss and generalized malaise, they do not want to pursue panendoscopy.  They are willing to consider the hematology oncology follow-up.  Other next steps would include a Gastroenterology consultation (she is deferring)  Eye exam recommended  COVID booster discussed, she declines  Panendoscopy recommended, she declines  Patient evaluated for over 60 minutes with this appoinment, including diagnostic testing and treatment.  All questions answered,  chart reviewed and care coordinated before, during and after her visit    Subjective:   Follow-up of multiple medical issues. Granddaughter with her.    She has had a few hospitalizations at outside institutions in the past several months, most recently in September for heart failure.  Studies at that time revealed the followin. No aortic aneurysm, dissection, or other acute aortic pathology.  Severe atherosclerosis of the major aortic branch vessels.  2. Moderate right and mild left pleural effusions.  Mild anasarca.  3. Cardiomegaly and biatrial enlargement.  Significant reflux of contrast into the hepatic veins suggests component of right heart failure.  4. Moderate rectal stool burden with circumferential anorectal wall thickening compatible with proctitis.  5. Sigmoid colonic diverticulosis.  6. Grossly stable enlarged subcarinal lymph node as seen on prior studies.  7. No hydronephrosis or acute renal pathology, though evaluation  is significantly limited by motion.  8. Severe bilateral hip DJD.  9. Motion and artifact limited study.    It appears she is now on amiodarone as well.    Recall hospitalized also last year with CHF, outside of [facility].  She was then seen in Cardiology in September 2021 and medications were adjusted; she is due for follow-up currently.  She denies PND or orthopnea.  No syncope, chest pain or tightness.  Minimal chronic edema.  No significant weight gain.     There was some concern about a lytic lesion; MRI of the thoracic spine did not reveal this.     She was supposed to be seen in Oncology, she usually follows on an annual basis.  Her last evaluation was July of 2020 so she is currently overdue.  She is aware of this.  This has been recommended multiple times.  She has not had any night sweats.  Weight has been stable.  There was some concern about a lytic lesion; MRI of the thoracic spine did not reveal this in 2021.     She was seen in Rheumatology in July of 2021, no further follow-up recommended.     History of hyperthyroidism, has been on medications; seen in Endocrinology presently 1/22  Last TSH was normal recently.  She was on methimazole and states that she is still taking this.     Weight loss.  Decreased appetite.  Some vomiting every other day.       She is due for flu shot, COVID booster and eye exam as well as urine.  She declines colonoscopy.     History of hyperthyroidism, has been on medications; overdue to see endocrinology presently.  Last TSH was 4.7 last fall.  She was on methimazole and states that she is still taking this.     Recent urinary infection, treated.  No current symptoms.     She is due for flu shot, COVID booster and eye exam as well as urine.  She declines colonoscopy.    Long discussion with patient and her granddaughter about her age and her comorbidities and the need for additional care, both at home and perhaps with regard to med [facility], hospice or palliative  care.     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     Mixed hyperlipidemia     Renal cyst: R side see ultrasound 6/16; stable 2018 and 2019, also 2022     Stromal cell tumor     Lipoma of back     Thyroid nodules: several see u/s 2017 FNA 2017 benign, stable 2019, also 1/21     Gastric nodule: 2013-m per GI no need for further follow up     Spondylosis without myelopathy     Gastroesophageal reflux disease without esophagitis     Colon polyp: hyperplastic 2015- repeat 2020     Primary insomnia     Aortic atherosclerosis: see CT scan 3/15     Diverticulosis of large intestine without hemorrhage: see CT scan 3/15     Thoracic and lumbosacral neuritis     Chronic bilateral low back pain with right-sided sciatica     Left carpal tunnel syndrome     Hearing loss of left ear     Hyperthyroidism     Cervical spine arthritis     Sacroiliac joint dysfunction of both sides     Type 2 diabetes mellitus with hyperglycemia, without long-term current use of insulin     Vitamin D deficiency     Osteopenia     Lytic lesion of bone on x-ray: outside CTA 4/21 T10- however PET CT and MRI June 2021 do not show lytic lesion     Cholelithiasis: sludge on u/s 2022     Hyperbilirubinemia     Debility     Chronic diastolic congestive heart failure             Review of Systems   Constitutional:  Positive for fatigue and unexpected weight change.   Respiratory:  Negative for cough and shortness of breath.    Cardiovascular:  Negative for chest pain and leg swelling.   Gastrointestinal:  Positive for abdominal pain, nausea and vomiting.   Genitourinary:  Negative for flank pain and frequency.   Musculoskeletal:  Positive for arthralgias and back pain.        Chronic   Neurological:  Negative for syncope and headaches.   Psychiatric/Behavioral:  Negative for hallucinations. The patient is  nervous/anxious.        Objective:      Physical Exam  Vitals and nursing note reviewed.   Constitutional:       Appearance: She is well-developed.      Comments: Needs assistance with ambulation, slightly unstable   HENT:      Head: Normocephalic and atraumatic.      Right Ear: External ear normal.      Left Ear: External ear normal.      Nose: Nose normal.      Mouth/Throat:      Pharynx: No oropharyngeal exudate.   Eyes:      General: No scleral icterus.     Extraocular Movements: Extraocular movements intact.      Conjunctiva/sclera: Conjunctivae normal.   Neck:      Thyroid: Thyromegaly present.      Vascular: No JVD.   Cardiovascular:      Rate and Rhythm: Normal rate and regular rhythm.      Heart sounds: Normal heart sounds. No murmur heard.    No gallop.   Pulmonary:      Effort: Pulmonary effort is normal. No respiratory distress.      Breath sounds: Normal breath sounds. No wheezing.   Abdominal:      General: Bowel sounds are normal. There is no distension.      Palpations: Abdomen is soft. There is no mass.      Tenderness: There is no abdominal tenderness. There is no guarding or rebound.   Musculoskeletal:         General: No tenderness. Normal range of motion.      Cervical back: Normal range of motion and neck supple.   Lymphadenopathy:      Cervical: No cervical adenopathy.   Skin:     General: Skin is warm.      Findings: No erythema or rash.   Neurological:      General: No focal deficit present.      Mental Status: She is alert and oriented to person, place, and time.      Cranial Nerves: No cranial nerve deficit.      Coordination: Coordination normal.      Gait: Gait abnormal.   Psychiatric:         Behavior: Behavior normal.         Thought Content: Thought content normal.         Judgment: Judgment normal.           Health Maintenance Due   Topic Date Due    COVID-19 Vaccine (3 - Booster for Pfizer series) 09/02/2021    Eye Exam  01/13/2022

## 2022-11-15 NOTE — TELEPHONE ENCOUNTER
[facility] the [facility]12   New findings as below  Wedge-shaped area of relative hypoenhancement in the left interpolar kidney, which may be seen with pyelonephritis less likely infarction.  No abscess seen..  Consider correlation with urinalysis.    I spoke with granddaughter Blanquita, she states she was admitted [facility] 2 days before the CT scan on 11/11 for URI She is currently on Levaquin. (See careeverywhere)    Granddaughter states pt denies any dysuria, fevers, back pain or cuurent abdominal pain, n/v at this time.     I will review with PCP Dr. Garland on next steps.

## 2022-11-17 PROBLEM — E11.9 DIABETES MELLITUS: Status: ACTIVE | Noted: 2019-06-12

## 2022-11-17 NOTE — TELEPHONE ENCOUNTER
Please call, she recently had a CT scan.  It showed what may be an infection in the kidney.  In addition, it showed that she is in volume overload from heart failure.  Is she having any urinary symptoms?  Any fever or abdominal pain?      t looks like she needs to be admitted to the hospital, I would recommend she return to the emergency room for this, is she willing to come to [facility]?    I see that she went to an outside ER 11/11 and they did a chest x-ray and gave her antibiotics, but I do not think that is going to be enough for all of her issues.

## 2022-11-17 NOTE — TELEPHONE ENCOUNTER
----- Message from Av Be sent at 11/17/2022  3:10 PM CST -----  Contact: [phone]  Pt missed a call and would like a call back.

## 2022-11-17 NOTE — TELEPHONE ENCOUNTER
Spoke to patient's granddaughter and advised of finding on her recent CT.    Granddaughter declined patient having any fever, abd pain, dysuria or polyuria.     Advised that pcp is recommending she present to ER (preferable [facility])  due to CT showing  what may be an infection in the kidney, and showed that she is in volume overload from heart failure.    Granddaughter verbalized understanding.     She will be calling transportation to bring her to a [facility] ER.

## 2022-11-18 PROBLEM — E87.79 VOLUME OVERLOAD STATE OF HEART: Status: ACTIVE | Noted: 2022-01-01

## 2022-11-18 PROBLEM — I50.33 ACUTE ON CHRONIC DIASTOLIC HEART FAILURE: Status: ACTIVE | Noted: 2022-01-01

## 2022-11-18 PROBLEM — E05.90 HYPERTHYROIDISM: Chronic | Status: ACTIVE | Noted: 2019-04-15

## 2022-11-18 PROBLEM — E11.9 DIABETES MELLITUS: Chronic | Status: ACTIVE | Noted: 2019-06-12

## 2022-11-18 NOTE — ASSESSMENT & PLAN NOTE
Patient's FSGs are controlled on current medication regimen.  Last A1c reviewed-   Lab Results   Component Value Date    HGBA1C 7.2 (H) 06/30/2022     Most recent fingerstick glucose reviewed- No results for input(s): POCTGLUCOSE in the last 24 hours.    Antihyperglycemics (From admission, onward)    None        · Hold Oral hypoglycemics while patient is in the hospital.  · Does not take insulin, DM diet only

## 2022-11-18 NOTE — NURSING
Patient arrived to room 625 from ED. AAAx3. Patient did not know what day of the week it was. Skin w/d. Resp even and unlabored. Telemetry monitor intact.+2 edema to ble. Foam dressing to sacral area. Nurse will continue to assess.

## 2022-11-18 NOTE — H&P
[facility] Emergency Dept  [facility]  History & Physical    Patient Name: Peri Johansen  MRN: [id number]  Patient Class: OP- Observation  Admission Date: 11/17/2022  Attending Physician: Rosa Isela Main MD   Primary Care Provider: Annika Garland MD         Patient information was obtained from patient, past medical records and ER records.     Subjective:     Principal Problem:Volume overload state of heart    Chief Complaint:   Chief Complaint   Patient presents with    Abnormal Lab     Dr. Referral to ED for abnormal lab. Pt does not know what lab is abnormal. Pt is a poor historian. Pt c/o generalized, pressured CP at 10/10 and SOB.    Fatigue        HPI:   Peri Johansen is a 88 y.o. female who has a past medical history of Aortic atherosclerosis, Arthritis, B-cell lymphoma, Diabetes mellitus, type II, Diverticulosis, GERD, Goiter, Hyperlipidemia, Hypertension, Joint pain, Leg pain, Osteoporosis, Renal cyst, Rickets, vitamin D deficiency, Thyroid nodule, and Vitamin D deficiency disease, presented to the ED with CC of Generalized fatigue.  Is a poor historian and unclear why she is at the hospital.  She states that someone told her to stop taking Lasix, but she does not remember who that was and how long ago that was, can not quantify it in days or weeks even.  She stated she did not have any chest pain at all, however appears that there was a comment of chest pain 10 on 10 in ED. troponin was mildly elevated at 0.171, however quickly reduced on next lab at 0.162.  BNP elevated at 925.  Denies any dysuria, but also a note in ED she has mild dysuria.  Denies hematuria but has +2 blood in urine, UA looks noninfectious.  CTA of the chest showed no interval detrimental change or new intra-abdominopelvic abnormality compared to most recent CT 11/11/2022, and no detrimental change in CTA of the aorta compared to most recent prior CTA aorta 09/14/2022.  Noting that she does have moderate to severe aortic  atherosclerosis involving the branch vessels and moderate bilateral pleural effusions, stable when compared to prior 11/11/2022.  COVID, flu, RSV negative.  Denies abdominal pain or diarrhea. Denies nausea or vomiting.       Past Medical History:   Diagnosis Date    Aortic atherosclerosis 1/7/2016    Arthritis     Colon polyp: hyperplastic 2015- repeat 2020 8/6/2015    Diabetes mellitus, type II 8/16/2012    Diverticulosis     Gastric nodule 8/7/2014    GERD (gastroesophageal reflux disease) 8/16/2012    Goiter 8/16/2012    Hyperlipidemia 8/16/2012    Hypertension     Joint pain     Leg pain 8/16/2012    Lymphoma 8/16/2012    Osteopenia 8/16/2012    Osteoporosis     Renal cyst 3/28/2013    Rickets, vitamin D deficiency 8/16/2012    Thyroid nodule 2/24/2014    Vitamin D deficiency disease 8/16/2012       Past Surgical History:   Procedure Laterality Date    CARPAL TUNNEL RELEASE      CATARACT EXTRACTION W/  INTRAOCULAR LENS IMPLANT Bilateral     HYSTERECTOMY      partial    lymphoma surgery      L tibia       Review of patient's allergies indicates:   Allergen Reactions    Latex, natural rubber Itching    Latex Hives       No current facility-administered medications on file prior to encounter.     Current Outpatient Medications on File Prior to Encounter   Medication Sig    amiodarone (PACERONE) 200 MG Tab Take 200 mg by mouth 2 (two) times daily.    aspirin 81 MG Chew Take 1 tablet (81 mg total) by mouth once daily.    atorvastatin (LIPITOR) 40 MG tablet TAKE 1 TABLET BY MOUTH EVERY DAY    blood sugar diagnostic (TRUE METRIX GLUCOSE TEST STRIP) Strp USE AS DIRECTED    blood sugar diagnostic Strp Test 2 times daily    blood-glucose meter kit Use as instructed.  ACCUCHECK or whatever is preferred by insurance    dextran 70-hypromellose (ARTIFICIAL TEARS,[id number],) ophthalmic solution Place 1 drop into both eyes 4 (four) times daily as needed.    diclofenac sodium (VOLTAREN) 1 %  Gel APPLY 2 GRAMS EXTERNALLY TO THE AFFECTED AREA FOUR TIMES DAILY    ergocalciferol (ERGOCALCIFEROL) 50,000 unit Cap Take 1 capsule (50,000 Units total) by mouth every 7 days.    fluticasone propionate (FLONASE) 50 mcg/actuation nasal spray 1 spray (50 mcg total) by Each Nostril route once daily.    furosemide (LASIX) 40 MG tablet Take 1 tablet (40 mg total) by mouth 2 (two) times a day.    gabapentin (NEURONTIN) 300 MG capsule TAKE 1 CAPSULE BY MOUTH TWICE DAILY    lancets Misc Test 2 x daily    losartan (COZAAR) 25 MG tablet Take 25 mg by mouth once daily.    magnesium oxide (MAG-OX) 400 mg (241.3 mg magnesium) tablet Take 1 tablet by mouth once daily.    methIMAzole (TAPAZOLE) 5 MG Tab TAKE 1/2 TABLET BY MOUTH DAILY    metoprolol succinate (TOPROL-XL) 25 MG 24 hr tablet Take 1 tablet (25 mg total) by mouth once daily. Hold if SBP <120 or <55    MICRO THIN LANCETS 33 gauge Misc USE AS DIRECTED    omeprazole (PRILOSEC) 40 MG capsule Take 1 capsule (40 mg total) by mouth every morning.    ondansetron (ZOFRAN-ODT) 4 MG TbDL Take 1 tablet (4 mg total) by mouth every 8 (eight) hours as needed (Nausea or vomiting).    potassium chloride (KLOR-CON) 10 MEQ TbSR Take 2 tablets (20 mEq total) by mouth once daily.    traMADoL (ULTRAM) 50 mg tablet Take 1 tablet (50 mg total) by mouth every 12 (twelve) hours as needed for Pain.     Family History       Problem Relation (Age of Onset)    Breast cancer Maternal Aunt    Cancer Brother    Heart disease Father    Hypertension Mother, Father    No Known Problems Maternal Uncle, Paternal Aunt, Paternal Uncle, Maternal Grandmother, Maternal Grandfather, Paternal Grandmother, Paternal Grandfather, Daughter, Son, Son, Daughter, Daughter          Tobacco Use    Smoking status: Never    Smokeless tobacco: Never   Substance and Sexual Activity    Alcohol use: No     Alcohol/week: 0.0 standard drinks    Drug use: No    Sexual activity: Not Currently     Review of  Systems   Constitutional:  Positive for activity change and fatigue. Negative for diaphoresis and fever.   HENT:  Negative for sore throat and trouble swallowing.    Eyes:  Negative for photophobia and visual disturbance.   Respiratory:  Positive for shortness of breath. Negative for chest tightness.    Cardiovascular:  Negative for chest pain, palpitations and leg swelling.   Gastrointestinal:  Negative for abdominal distention, abdominal pain, blood in stool, constipation, diarrhea, nausea and vomiting.   Endocrine: Negative for polydipsia and polyuria.   Genitourinary:  Positive for decreased urine volume. Negative for difficulty urinating, dysuria and frequency.   Musculoskeletal:  Negative for neck pain and neck stiffness.   Skin:  Negative for pallor and rash.   Allergic/Immunologic: Negative for immunocompromised state.   Neurological:  Negative for dizziness, seizures, syncope and facial asymmetry.   Psychiatric/Behavioral:  Negative for agitation, behavioral problems and confusion.    Objective:     Vital Signs (Most Recent):  Temp: 97.9 °F (36.6 °C) (11/18/22 0118)  Pulse: 86 (11/18/22 0332)  Resp: 17 (11/18/22 0140)  BP: (!) 103/56 (11/18/22 0332)  SpO2: 96 % (11/18/22 0332)   Vital Signs (24h Range):  Temp:  [97.9 °F (36.6 °C)-98.2 °F (36.8 °C)] 97.9 °F (36.6 °C)  Pulse:  [85-99] 86  Resp:  [16-24] 17  SpO2:  [94 %-97 %] 96 %  BP: (103-120)/(55-70) 103/56     Weight: 40.8 kg (90 lb)  Body mass index is 18.18 kg/m².    Physical Exam  Vitals and nursing note reviewed.   Constitutional:       General: She is not in acute distress.     Appearance: She is well-developed and normal weight. She is ill-appearing. She is not diaphoretic.   HENT:      Head: Normocephalic and atraumatic.      Nose: Nose normal. No congestion.   Eyes:      General: No scleral icterus.  Neck:      Thyroid: No thyromegaly.   Cardiovascular:      Rate and Rhythm: Normal rate and regular rhythm.      Heart sounds: Murmur heard.    Pulmonary:      Effort: Pulmonary effort is normal.      Breath sounds: No stridor. Rales present. No wheezing.   Abdominal:      General: There is no distension.      Palpations: Abdomen is soft. There is no mass.      Tenderness: There is no abdominal tenderness. There is no guarding.   Musculoskeletal:         General: No swelling or deformity. Normal range of motion.      Cervical back: Normal range of motion and neck supple. No rigidity.      Right lower leg: Edema present.      Left lower leg: Edema present.   Lymphadenopathy:      Cervical: No cervical adenopathy.   Skin:     General: Skin is warm and dry.      Capillary Refill: Capillary refill takes less than 2 seconds.      Coloration: Skin is not jaundiced.      Findings: No bruising.   Neurological:      Mental Status: She is alert. Mental status is at baseline.      Cranial Nerves: No cranial nerve deficit.      Motor: No weakness.   Psychiatric:         Mood and Affect: Mood normal.         Behavior: Behavior normal.               Recent Results (from the past 24 hour(s))   CBC Auto Differential    Collection Time: 11/17/22  8:10 PM   Result Value Ref Range    WBC 6.85 3.90 - 12.70 K/uL    RBC 5.12 4.00 - 5.40 M/uL    Hemoglobin 14.6 12.0 - 16.0 g/dL    Hematocrit 43.0 37.0 - 48.5 %    MCV 84 82 - 98 fL    MCH 28.5 27.0 - 31.0 pg    MCHC 34.0 32.0 - 36.0 g/dL    RDW 16.6 (H) 11.5 - 14.5 %    Platelets 187 150 - 450 K/uL    MPV 11.5 9.2 - 12.9 fL    Immature Granulocytes 0.1 0.0 - 0.5 %    Gran # (ANC) 4.5 1.8 - 7.7 K/uL    Immature Grans (Abs) 0.01 0.00 - 0.04 K/uL    Lymph # 1.4 1.0 - 4.8 K/uL    Mono # 0.9 0.3 - 1.0 K/uL    Eos # 0.1 0.0 - 0.5 K/uL    Baso # 0.01 0.00 - 0.20 K/uL    nRBC 0 0 /100 WBC    Gran % 65.2 38.0 - 73.0 %    Lymph % 20.6 18.0 - 48.0 %    Mono % 13.3 4.0 - 15.0 %    Eosinophil % 0.7 0.0 - 8.0 %    Basophil % 0.1 0.0 - 1.9 %    Differential Method Automated    BNP    Collection Time: 11/17/22  8:10 PM   Result Value Ref Range      (H) 0 - 99 pg/mL   Troponin I    Collection Time: 11/17/22  8:10 PM   Result Value Ref Range    Troponin I 0.171 (H) 0.000 - 0.026 ng/mL   Lactic acid, plasma    Collection Time: 11/17/22  9:09 PM   Result Value Ref Range    Lactate (Lactic Acid) 1.7 0.5 - 2.2 mmol/L   Blood culture #1 **CANNOT BE ORDERED STAT**    Collection Time: 11/17/22  9:09 PM    Specimen: Peripheral, Lower Arm, Right; Blood   Result Value Ref Range    Blood Culture, Routine No Growth to date    Blood culture #2 **CANNOT BE ORDERED STAT**    Collection Time: 11/17/22  9:09 PM    Specimen: Peripheral, Lower Arm, Left; Blood   Result Value Ref Range    Blood Culture, Routine No Growth to date    SARS-Cov2 (COVID) with FLU/RSV by PCR    Collection Time: 11/18/22 12:19 AM   Result Value Ref Range    SARS-CoV2 (COVID-19) Qualitative PCR Not Detected Not Detected    Influenza A, Molecular Not Detected Not Detected    Influenza B, Molecular Not Detected Not Detected    RSV Ag by Molecular Method Not Detected Not Detected   Comprehensive metabolic panel    Collection Time: 11/18/22 12:20 AM   Result Value Ref Range    Sodium 136 136 - 145 mmol/L    Potassium 3.8 3.5 - 5.1 mmol/L    Chloride 98 95 - 110 mmol/L    CO2 29 23 - 29 mmol/L    Glucose 125 (H) 70 - 110 mg/dL    BUN 10 8 - 23 mg/dL    Creatinine 0.6 0.5 - 1.4 mg/dL    Calcium 9.7 8.7 - 10.5 mg/dL    Total Protein 7.5 6.0 - 8.4 g/dL    Albumin 2.6 (L) 3.5 - 5.2 g/dL    Total Bilirubin 1.5 (H) 0.1 - 1.0 mg/dL    Alkaline Phosphatase 143 (H) 55 - 135 U/L    AST 50 (H) 10 - 40 U/L    ALT 38 10 - 44 U/L    Anion Gap 9 8 - 16 mmol/L    eGFR >60.0 >60 mL/min/1.73 m^2   Lipase    Collection Time: 11/18/22 12:20 AM   Result Value Ref Range    Lipase 8 4 - 60 U/L   Urinalysis, Reflex to Urine Culture Urine, Clean Catch    Collection Time: 11/18/22  1:18 AM    Specimen: Urine   Result Value Ref Range    Specimen UA Urine, Clean Catch     Color, UA Yellow Yellow, Straw, Patricia    Appearance, UA  Clear Clear    pH, UA 6.0 5.0 - 8.0    Specific Gravity, UA 1.010 1.005 - 1.030    Protein, UA Negative Negative    Glucose, UA Negative Negative    Ketones, UA Negative Negative    Bilirubin (UA) Negative Negative    Occult Blood UA 2+ (A) Negative    Nitrite, UA Negative Negative    Leukocytes, UA 1+ (A) Negative   Urinalysis Microscopic    Collection Time: 11/18/22  1:18 AM   Result Value Ref Range    RBC, UA 5 (H) 0 - 4 /hpf    WBC, UA 2 0 - 5 /hpf    Squam Epithel, UA 1 /hpf    Hyaline Casts, UA 1 0-1/lpf /lpf    Microscopic Comment SEE COMMENT    Troponin I    Collection Time: 11/18/22  2:01 AM   Result Value Ref Range    Troponin I 0.162 (H) 0.000 - 0.026 ng/mL       Microbiology Results (last 7 days)       Procedure Component Value Units Date/Time    Blood culture #1 **CANNOT BE ORDERED STAT** [[id number]] Collected: 11/17/22 2109    Order Status: Completed Specimen: Blood from Peripheral, Lower Arm, Right Updated: 11/18/22 0345     Blood Culture, Routine No Growth to date    Blood culture #2 **CANNOT BE ORDERED STAT** [[id number]] Collected: 11/17/22 2109    Order Status: Completed Specimen: Blood from Peripheral, Lower Arm, Left Updated: 11/18/22 0345     Blood Culture, Routine No Growth to date             Imaging Results               CTA Chest Abdomen Pelvis (Final result)  Result time 11/18/22 03:26:00      Final result by Dominick Valdivia MD (11/18/22 03:26:00)                   Impression:      1. No interval detrimental change or new intra-abdominopelvic abnormality compared to most recent CT 11/11/2022.  No significant interval detrimental change in CTA of the aorta compared to most recent prior CTA aorta 09/14/2022.  Moderate to severe aortic atherosclerosis involving the branch vessels again noted, similar to prior.  2. Moderate bilateral pleural effusions, stable when compared to prior 11/11/2022 but increased compared to 09/14/2022.  3. Stable wedge-shaped focus of relative hypoenhancement in  the left renal interpolar region which may be seen with pyelonephritis and, less likely, infarction.  No abscess.  4. Colonic diverticulosis without diverticulitis.  5. Rectal wall thickening similar to 11/11/2022.  Correlate clinically for proctitis.  6. Enlarged heterogeneous thyroid gland similar to 09/14/2022.  7. Additional findings as above.  This report was flagged in Epic as abnormal.    Electronically signed by resident: Franky Rudd  Date:    11/18/2022  Time:    02:38    Electronically signed by: Dominick Vladivia MD  Date:    11/18/2022  Time:    03:26               Narrative:    EXAMINATION:  CTA CHEST ABDOMEN PELVIS    CLINICAL HISTORY:  Thoracic aorta disease, post-op;Chest pain, shortness of breath, diffuse abdominal tenderness.  Renal infarct versus pyelo seen on prior CT;    TECHNIQUE:  CTA images were obtained from the thoracic inlet to the pubic symphysis.  Noncontrast, arterial, and delayed phase images were acquired. 100 mL of intravenous Omnipaque 350 was administered.  Oral contrast was not administered. Axial MIP, sagittal, and coronal reformats were obtained.    COMPARISON:  CT abdomen pelvis 11/11/2022, CTA chest abdomen pelvis 09/14/2022    FINDINGS:  THORACIC SOFT TISSUES:  No axillary or subpectoral lymphadenopathy. Enlarged heterogeneous thyroid gland, similar to 09/14/2022.    HEART & MEDIASTINUM:  No mediastinal or hilar lymphadenopathy. Pulmonary arteries are patent. Left three-vessel aortic arch. Visualized portions of arch vessels are patent.  No aortic aneurysm. Cardiomegaly with biatrial enlargement.  Trace pericardial fluid.    PLEURA:  Moderate bilateral pleural effusions, stable compared to most recent prior.  No pneumothorax.    LUNGS & AIRWAYS:  Airways are patent. Compressive atelectasis and interlobular septal thickening with subsegmental atelectasis.  No focal consolidation.    HEPATOBILIARY:  No focal hepatic lesions. No biliary ductal dilatation. Normal  gallbladder.    SPLEEN:  No splenomegaly.    PANCREAS: Fatty atrophy.  No focal masses or ductal dilatation.    ADRENALS:  No adrenal nodules.    KIDNEYS/URETERS: Stable wedge-shaped focus of relative hypoenhancement in the left interpolar region (axial series 4, image 485; coronal series 601, image 67).  Bilateral simple renal cysts.  No hydronephrosis, stones or solid mass lesions. Visualized ureters are unremarkable.    PELVIC ORGANS/BLADDER:  Bladder is moderately distended without focal wall thickening.  Hysterectomy.  No adnexal masses.    PERITONEUM / RETROPERITONEUM:  No free air. No free fluid.    LYMPH NODES:  No lymphadenopathy.    VESSELS: Left three-vessel aortic arch. Visualized portions of the arch vessels are patent.   There are moderate to severe calcified and noncalcified atherosclerotic plaques in the aorta and its major branches. There is no evidence of a flap within the aorta to suggest a dissection. No aortic aneurysm.  The celiac, superior mesenteric and inferior mesenteric arteries are patent.There are single renal arteries bilaterally. Renal arteries are patent without significant stenosis.  Overall CTA appearance of the aorta similar to 09/14/2022.    GI TRACT: Scattered colonic diverticulosis without diverticulitis.  Small bowel is unremarkable.  Rectum is distended with stool and air.  Mild to moderate rectal wall thickening not appreciably increased compared to prior.  Normal appendix.    BONES AND ABDOMINAL SOFT TISSUES: Soft tissues are unremarkable.  Osteopenia.  Degenerative changes of the hips and spine.  No acute fracture.  Osseous structures appear similar to recent prior studies.                                       X-Ray Chest AP Portable (Final result)  Result time 11/17/22 20:42:46      Final result by Dominick Valdivia MD (11/17/22 20:42:46)                   Impression:      Cardiomegaly with bilateral edema and bibasilar effusions.      Electronically signed by: Dominick  MD Shea  Date:    11/17/2022  Time:    20:42               Narrative:    EXAMINATION:  XR CHEST AP PORTABLE    CLINICAL HISTORY:  Shortness of breath    TECHNIQUE:  Single frontal view of the chest was performed.    COMPARISON:  09/14/2022.    FINDINGS:  Cardiomegaly with bilateral edema and bibasilar effusions.    Heart and lungs otherwise appear unchanged when allowing for differences in technique and positioning.                                          Assessment/Plan:     * Volume overload state of heart  · Patient stopped Taking her Lasix pill, and is now volume overloaded  · , with SOB MNOSON and mild peripheral edema, elevated JVD  · IV Lasix 40 mg b.i.d. scheduled  · Mild troponin elevation likely demand ischemia from volume overload state   · Echo ordered, if new WMA or hypokinesis would consult Cardiology, however if there is not, would just continue to diurese her and make sure patient takes her oral Lasix when she discharges    · Would consider home health CM or nurse for medication mismanagement assistance.      Acute on chronic diastolic heart failure  Patient is identified as having Diastolic (HFpEF) heart failure that is Acute on chronic. CHF is currently controlled. Latest ECHO performed and demonstrates- Results for orders placed during the hospital encounter of 09/06/22    Echo    Interpretation Summary  · The left ventricle is normal in size with concentric remodeling and normal systolic function.  · The estimated ejection fraction is 58%.  · Grade III left ventricular diastolic dysfunction.  · Normal right ventricular size with normal right ventricular systolic function.  · There is right ventricular hypertrophy.  · There is mild aortic valve stenosis.  · Aortic valve area is 1.60 cm2; peak velocity is 0.99 m/s; mean gradient is 2 mmHg.  · Mild-to-moderate mitral regurgitation.  · Mild to moderate tricuspid regurgitation.  · Normal central venous pressure (3 mmHg).  · The estimated PA  systolic pressure is 43 mmHg.  · There is MILD pulmonary hypertension.  · Trivial posterior pericardial effusion. And under the RA.  · There is a small right pleural effusion.  Low BP's hold Beta Blocker, ACE/ARB and continue IV Furosemide and monitor clinical status closely. Monitor on telemetry. Patient is on CHF pathway.  Monitor strict Is&Os and daily weights.  Place on fluid restriction of 1.5 L. Continue to stress to patient importance of self efficacy and  on diet for CHF. Last BNP reviewed- and noted below   Recent Labs   Lab 11/17/22 2010   *     · IV Lasix 40 mg q.12 hours until euvolemic, then switch to home oral diuretic  · Repeat echo    Chronic diastolic congestive heart failure  · See above      Diabetes mellitus  Patient's FSGs are controlled on current medication regimen.  Last A1c reviewed-   Lab Results   Component Value Date    HGBA1C 7.2 (H) 06/30/2022     Most recent fingerstick glucose reviewed- No results for input(s): POCTGLUCOSE in the last 24 hours.    Antihyperglycemics (From admission, onward)    None        · Hold Oral hypoglycemics while patient is in the hospital.  · Does not take insulin, DM diet only    Hyperthyroidism  · Continue home methimazole        VTE Risk Mitigation (From admission, onward)         Ordered     enoxaparin injection 40 mg  Daily         11/18/22 0404     IP VTE HIGH RISK PATIENT  Once         11/18/22 0404     Place sequential compression device  Until discontinued         11/18/22 0404                   Luis F Stephenson MD  Department of Hospital Medicine   [facility]

## 2022-11-18 NOTE — ED NOTES
Pt sitting up in bed and eating lunch tray without difficulty. Pt reports having dentures. Glasses at bedside / family assumes care of belongings at this time. Pt reports ambulating with walker and being continent when able to get to restroom at home. Pt requesting brief in hospital due to no restroom located in room / no walker available. Pt with small pressure injury to sacrum. Family at bedside.    Admit info / head to toe assessment done.

## 2022-11-18 NOTE — HPI
Peri Johansen is a 88 y.o. female who has a past medical history of Aortic atherosclerosis, Arthritis, B-cell lymphoma, Diabetes mellitus, type II, Diverticulosis, GERD, Goiter, Hyperlipidemia, Hypertension, Joint pain, Leg pain, Osteoporosis, Renal cyst, Rickets, vitamin D deficiency, Thyroid nodule, and Vitamin D deficiency disease, presented to the ED with CC of Generalized fatigue.  Is a poor historian and unclear why she is at the hospital.  She states that someone told her to stop taking Lasix, but she does not remember who that was and how long ago that was, can not quantify it in days or weeks even.  She stated she did not have any chest pain at all, however appears that there was a comment of chest pain 10 on 10 in ED. troponin was mildly elevated at 0.171, however quickly reduced on next lab at 0.162.  BNP elevated at 925.  Denies any dysuria, but also a note in ED she has mild dysuria.  Denies hematuria but has +2 blood in urine, UA looks noninfectious.  CTA of the chest showed no interval detrimental change or new intra-abdominopelvic abnormality compared to most recent CT 11/11/2022, and no detrimental change in CTA of the aorta compared to most recent prior CTA aorta 09/14/2022.  Noting that she does have moderate to severe aortic atherosclerosis involving the branch vessels and moderate bilateral pleural effusions, stable when compared to prior 11/11/2022.  COVID, flu, RSV negative.  Denies abdominal pain or diarrhea. Denies nausea or vomiting.

## 2022-11-18 NOTE — PHARMACY MED REC
"  Admission Medication History     The home medication history was taken by Jennifer Echavarria.    You may go to "Admission" then "Reconcile Home Medications" tabs to review and/or act upon these items.     The home medication list has been updated by the Pharmacy department.   Please read ALL comments highlighted in yellow.   Please address this information as you see fit.    Feel free to contact us if you have any questions or require assistance.      The medications listed below were removed from the home medication list. Please reorder if appropriate:  Patient reports no longer taking the following medication(s):  FLUTICASONE PROPIONATE 50 MCG/ACT NASAL SPRAY  METOPROLOL SUCCINATE 25 MG TAB    Medications listed below were obtained from: Zoltan pichardo  Current Outpatient Medications on File Prior to Encounter   Medication Sig    acetaminophen (TYLENOL) 500 MG tablet   Take 500 mg by mouth daily as needed for Pain.    albuterol (PROVENTIL/VENTOLIN HFA) 90 mcg/actuation inhaler   Inhale 2 puffs into the lungs every 6 (six) hours as needed.    amiodarone (PACERONE) 200 MG Tab   Take 200 mg by mouth 2 (two) times daily.    aspirin 81 MG Chew Take 1 tablet (81 mg total) by mouth once daily.      atorvastatin (LIPITOR) 40 MG tablet   TAKE 1 TABLET BY MOUTH EVERY DAY    dextran 70-hypromellose (ARTIFICIAL TEARS,[id number],) ophthalmic solution   Place 1 drop into both eyes 4 (four) times daily as needed.    dextromethorphan-guaiFENesin  mg/5 ml (ROBITUSSIN-DM)  mg/5 mL liquid   Take 10 mLs by mouth every 6 (six) hours as needed for Cough.    diclofenac sodium (VOLTAREN) 1 % Gel APPLY 2 GRAMS EXTERNALLY TO THE AFFECTED AREA FOUR TIMES DAILY      ergocalciferol, vitamin D2, 10 mcg (400 unit) Tab   Take 1 tablet by mouth once daily.    furosemide (LASIX) 40 MG tablet   Take 1 tablet (40 mg total) by mouth 2 (two) times a day.    gabapentin (NEURONTIN) 300 MG capsule   TAKE 1 CAPSULE BY MOUTH TWICE DAILY    " losartan (COZAAR) 25 MG tablet   Take 25 mg by mouth once daily. (Hold if SBP < 105 OR DBP < 60)    methIMAzole (TAPAZOLE) 5 MG Tab   TAKE 1/2 TABLET BY MOUTH DAILY    methyl salicylate/menthol (ICY HOT TOP)   Apply topically 2 (two) times daily as needed (Pain).    multivitamin (ONE DAILY MULTIVITAMIN) per tablet   Take 1 tablet by mouth once daily.    omeprazole (PRILOSEC) 40 MG capsule   Take 1 capsule (40 mg total) by mouth every morning.    ondansetron (ZOFRAN-ODT) 4 MG TbDL Take 1 tablet (4 mg total) by mouth every 8 (eight) hours as needed (Nausea or vomiting).      potassium chloride (KLOR-CON) 10 MEQ TbSR Take 2 tablets (20 mEq total) by mouth once daily.      traMADoL (ULTRAM) 50 mg tablet Take 1 tablet (50 mg total) by mouth every 12 (twelve) hours as needed for Pain.      blood sugar diagnostic (TRUE METRIX GLUCOSE TEST STRIP) Strp   USE AS DIRECTED    blood sugar diagnostic Strp   Test 2 times daily    blood-glucose meter kit Use as instructed.  ACCUCHECK or whatever is preferred by insurance      lancets Misc   Test 2 x daily    magnesium oxide (MAG-OX) 400 mg (241.3 mg magnesium) tablet   Take 1 tablet by mouth once daily.    MICRO THIN LANCETS 33 gauge Misc   USE AS DIRECTED       Potential issues to be addressed PRIOR TO DISCHARGE  Patient reported not taking the following medications: (PACERONE). This medication remain on the home medication list. Please address accordingly.     Jennifer Echavarria CPhT  EXT [id number]                .

## 2022-11-18 NOTE — ED TRIAGE NOTES
Pt came stating that her dr referred her to come to the ER b/c of abnormal labs. Pt reports having general weakness. Pt has hx of DM, HTN, Cancer, and Thyroid disease. Pt AAOx4.

## 2022-11-18 NOTE — SUBJECTIVE & OBJECTIVE
Past Medical History:   Diagnosis Date    Aortic atherosclerosis 1/7/2016    Arthritis     Colon polyp: hyperplastic 2015- repeat 2020 8/6/2015    Diabetes mellitus, type II 8/16/2012    Diverticulosis     Gastric nodule 8/7/2014    GERD (gastroesophageal reflux disease) 8/16/2012    Goiter 8/16/2012    Hyperlipidemia 8/16/2012    Hypertension     Joint pain     Leg pain 8/16/2012    Lymphoma 8/16/2012    Osteopenia 8/16/2012    Osteoporosis     Renal cyst 3/28/2013    Rickets, vitamin D deficiency 8/16/2012    Thyroid nodule 2/24/2014    Vitamin D deficiency disease 8/16/2012       Past Surgical History:   Procedure Laterality Date    CARPAL TUNNEL RELEASE      CATARACT EXTRACTION W/  INTRAOCULAR LENS IMPLANT Bilateral     HYSTERECTOMY      partial    lymphoma surgery      L tibia       Review of patient's allergies indicates:   Allergen Reactions    Latex, natural rubber Itching    Latex Hives       No current facility-administered medications on file prior to encounter.     Current Outpatient Medications on File Prior to Encounter   Medication Sig    amiodarone (PACERONE) 200 MG Tab Take 200 mg by mouth 2 (two) times daily.    aspirin 81 MG Chew Take 1 tablet (81 mg total) by mouth once daily.    atorvastatin (LIPITOR) 40 MG tablet TAKE 1 TABLET BY MOUTH EVERY DAY    blood sugar diagnostic (TRUE METRIX GLUCOSE TEST STRIP) Strp USE AS DIRECTED    blood sugar diagnostic Strp Test 2 times daily    blood-glucose meter kit Use as instructed.  ACCUCHECK or whatever is preferred by insurance    dextran 70-hypromellose (ARTIFICIAL TEARS,[id number],) ophthalmic solution Place 1 drop into both eyes 4 (four) times daily as needed.    diclofenac sodium (VOLTAREN) 1 % Gel APPLY 2 GRAMS EXTERNALLY TO THE AFFECTED AREA FOUR TIMES DAILY    ergocalciferol (ERGOCALCIFEROL) 50,000 unit Cap Take 1 capsule (50,000 Units total) by mouth every 7 days.    fluticasone propionate (FLONASE) 50 mcg/actuation nasal spray 1 spray (50 mcg  total) by Each Nostril route once daily.    furosemide (LASIX) 40 MG tablet Take 1 tablet (40 mg total) by mouth 2 (two) times a day.    gabapentin (NEURONTIN) 300 MG capsule TAKE 1 CAPSULE BY MOUTH TWICE DAILY    lancets Misc Test 2 x daily    losartan (COZAAR) 25 MG tablet Take 25 mg by mouth once daily.    magnesium oxide (MAG-OX) 400 mg (241.3 mg magnesium) tablet Take 1 tablet by mouth once daily.    methIMAzole (TAPAZOLE) 5 MG Tab TAKE 1/2 TABLET BY MOUTH DAILY    metoprolol succinate (TOPROL-XL) 25 MG 24 hr tablet Take 1 tablet (25 mg total) by mouth once daily. Hold if SBP <120 or <55    MICRO THIN LANCETS 33 gauge Misc USE AS DIRECTED    omeprazole (PRILOSEC) 40 MG capsule Take 1 capsule (40 mg total) by mouth every morning.    ondansetron (ZOFRAN-ODT) 4 MG TbDL Take 1 tablet (4 mg total) by mouth every 8 (eight) hours as needed (Nausea or vomiting).    potassium chloride (KLOR-CON) 10 MEQ TbSR Take 2 tablets (20 mEq total) by mouth once daily.    traMADoL (ULTRAM) 50 mg tablet Take 1 tablet (50 mg total) by mouth every 12 (twelve) hours as needed for Pain.     Family History       Problem Relation (Age of Onset)    Breast cancer Maternal Aunt    Cancer Brother    Heart disease Father    Hypertension Mother, Father    No Known Problems Maternal Uncle, Paternal Aunt, Paternal Uncle, Maternal Grandmother, Maternal Grandfather, Paternal Grandmother, Paternal Grandfather, Daughter, Son, Son, Daughter, Daughter          Tobacco Use    Smoking status: Never    Smokeless tobacco: Never   Substance and Sexual Activity    Alcohol use: No     Alcohol/week: 0.0 standard drinks    Drug use: No    Sexual activity: Not Currently     Review of Systems   Constitutional:  Positive for activity change and fatigue. Negative for diaphoresis and fever.   HENT:  Negative for sore throat and trouble swallowing.    Eyes:  Negative for photophobia and visual disturbance.   Respiratory:  Positive for shortness of breath. Negative  for chest tightness.    Cardiovascular:  Negative for chest pain, palpitations and leg swelling.   Gastrointestinal:  Negative for abdominal distention, abdominal pain, blood in stool, constipation, diarrhea, nausea and vomiting.   Endocrine: Negative for polydipsia and polyuria.   Genitourinary:  Positive for decreased urine volume. Negative for difficulty urinating, dysuria and frequency.   Musculoskeletal:  Negative for neck pain and neck stiffness.   Skin:  Negative for pallor and rash.   Allergic/Immunologic: Negative for immunocompromised state.   Neurological:  Negative for dizziness, seizures, syncope and facial asymmetry.   Psychiatric/Behavioral:  Negative for agitation, behavioral problems and confusion.    Objective:     Vital Signs (Most Recent):  Temp: 97.9 °F (36.6 °C) (11/18/22 0118)  Pulse: 86 (11/18/22 0332)  Resp: 17 (11/18/22 0140)  BP: (!) 103/56 (11/18/22 0332)  SpO2: 96 % (11/18/22 0332)   Vital Signs (24h Range):  Temp:  [97.9 °F (36.6 °C)-98.2 °F (36.8 °C)] 97.9 °F (36.6 °C)  Pulse:  [85-99] 86  Resp:  [16-24] 17  SpO2:  [94 %-97 %] 96 %  BP: (103-120)/(55-70) 103/56     Weight: 40.8 kg (90 lb)  Body mass index is 18.18 kg/m².    Physical Exam  Vitals and nursing note reviewed.   Constitutional:       General: She is not in acute distress.     Appearance: She is well-developed and normal weight. She is ill-appearing. She is not diaphoretic.   HENT:      Head: Normocephalic and atraumatic.      Nose: Nose normal. No congestion.   Eyes:      General: No scleral icterus.  Neck:      Thyroid: No thyromegaly.   Cardiovascular:      Rate and Rhythm: Normal rate and regular rhythm.      Heart sounds: Murmur heard.   Pulmonary:      Effort: Pulmonary effort is normal.      Breath sounds: No stridor. Rales present. No wheezing.   Abdominal:      General: There is no distension.      Palpations: Abdomen is soft. There is no mass.      Tenderness: There is no abdominal tenderness. There is no guarding.    Musculoskeletal:         General: No swelling or deformity. Normal range of motion.      Cervical back: Normal range of motion and neck supple. No rigidity.      Right lower leg: Edema present.      Left lower leg: Edema present.   Lymphadenopathy:      Cervical: No cervical adenopathy.   Skin:     General: Skin is warm and dry.      Capillary Refill: Capillary refill takes less than 2 seconds.      Coloration: Skin is not jaundiced.      Findings: No bruising.   Neurological:      Mental Status: She is alert. Mental status is at baseline.      Cranial Nerves: No cranial nerve deficit.      Motor: No weakness.   Psychiatric:         Mood and Affect: Mood normal.         Behavior: Behavior normal.               Recent Results (from the past 24 hour(s))   CBC Auto Differential    Collection Time: 11/17/22  8:10 PM   Result Value Ref Range    WBC 6.85 3.90 - 12.70 K/uL    RBC 5.12 4.00 - 5.40 M/uL    Hemoglobin 14.6 12.0 - 16.0 g/dL    Hematocrit 43.0 37.0 - 48.5 %    MCV 84 82 - 98 fL    MCH 28.5 27.0 - 31.0 pg    MCHC 34.0 32.0 - 36.0 g/dL    RDW 16.6 (H) 11.5 - 14.5 %    Platelets 187 150 - 450 K/uL    MPV 11.5 9.2 - 12.9 fL    Immature Granulocytes 0.1 0.0 - 0.5 %    Gran # (ANC) 4.5 1.8 - 7.7 K/uL    Immature Grans (Abs) 0.01 0.00 - 0.04 K/uL    Lymph # 1.4 1.0 - 4.8 K/uL    Mono # 0.9 0.3 - 1.0 K/uL    Eos # 0.1 0.0 - 0.5 K/uL    Baso # 0.01 0.00 - 0.20 K/uL    nRBC 0 0 /100 WBC    Gran % 65.2 38.0 - 73.0 %    Lymph % 20.6 18.0 - 48.0 %    Mono % 13.3 4.0 - 15.0 %    Eosinophil % 0.7 0.0 - 8.0 %    Basophil % 0.1 0.0 - 1.9 %    Differential Method Automated    BNP    Collection Time: 11/17/22  8:10 PM   Result Value Ref Range     (H) 0 - 99 pg/mL   Troponin I    Collection Time: 11/17/22  8:10 PM   Result Value Ref Range    Troponin I 0.171 (H) 0.000 - 0.026 ng/mL   Lactic acid, plasma    Collection Time: 11/17/22  9:09 PM   Result Value Ref Range    Lactate (Lactic Acid) 1.7 0.5 - 2.2 mmol/L   Blood  culture #1 **CANNOT BE ORDERED STAT**    Collection Time: 11/17/22  9:09 PM    Specimen: Peripheral, Lower Arm, Right; Blood   Result Value Ref Range    Blood Culture, Routine No Growth to date    Blood culture #2 **CANNOT BE ORDERED STAT**    Collection Time: 11/17/22  9:09 PM    Specimen: Peripheral, Lower Arm, Left; Blood   Result Value Ref Range    Blood Culture, Routine No Growth to date    SARS-Cov2 (COVID) with FLU/RSV by PCR    Collection Time: 11/18/22 12:19 AM   Result Value Ref Range    SARS-CoV2 (COVID-19) Qualitative PCR Not Detected Not Detected    Influenza A, Molecular Not Detected Not Detected    Influenza B, Molecular Not Detected Not Detected    RSV Ag by Molecular Method Not Detected Not Detected   Comprehensive metabolic panel    Collection Time: 11/18/22 12:20 AM   Result Value Ref Range    Sodium 136 136 - 145 mmol/L    Potassium 3.8 3.5 - 5.1 mmol/L    Chloride 98 95 - 110 mmol/L    CO2 29 23 - 29 mmol/L    Glucose 125 (H) 70 - 110 mg/dL    BUN 10 8 - 23 mg/dL    Creatinine 0.6 0.5 - 1.4 mg/dL    Calcium 9.7 8.7 - 10.5 mg/dL    Total Protein 7.5 6.0 - 8.4 g/dL    Albumin 2.6 (L) 3.5 - 5.2 g/dL    Total Bilirubin 1.5 (H) 0.1 - 1.0 mg/dL    Alkaline Phosphatase 143 (H) 55 - 135 U/L    AST 50 (H) 10 - 40 U/L    ALT 38 10 - 44 U/L    Anion Gap 9 8 - 16 mmol/L    eGFR >60.0 >60 mL/min/1.73 m^2   Lipase    Collection Time: 11/18/22 12:20 AM   Result Value Ref Range    Lipase 8 4 - 60 U/L   Urinalysis, Reflex to Urine Culture Urine, Clean Catch    Collection Time: 11/18/22  1:18 AM    Specimen: Urine   Result Value Ref Range    Specimen UA Urine, Clean Catch     Color, UA Yellow Yellow, Straw, Patricia    Appearance, UA Clear Clear    pH, UA 6.0 5.0 - 8.0    Specific Gravity, UA 1.010 1.005 - 1.030    Protein, UA Negative Negative    Glucose, UA Negative Negative    Ketones, UA Negative Negative    Bilirubin (UA) Negative Negative    Occult Blood UA 2+ (A) Negative    Nitrite, UA Negative Negative     Leukocytes, UA 1+ (A) Negative   Urinalysis Microscopic    Collection Time: 11/18/22  1:18 AM   Result Value Ref Range    RBC, UA 5 (H) 0 - 4 /hpf    WBC, UA 2 0 - 5 /hpf    Squam Epithel, UA 1 /hpf    Hyaline Casts, UA 1 0-1/lpf /lpf    Microscopic Comment SEE COMMENT    Troponin I    Collection Time: 11/18/22  2:01 AM   Result Value Ref Range    Troponin I 0.162 (H) 0.000 - 0.026 ng/mL       Microbiology Results (last 7 days)       Procedure Component Value Units Date/Time    Blood culture #1 **CANNOT BE ORDERED STAT** [[id number]] Collected: 11/17/22 2109    Order Status: Completed Specimen: Blood from Peripheral, Lower Arm, Right Updated: 11/18/22 0345     Blood Culture, Routine No Growth to date    Blood culture #2 **CANNOT BE ORDERED STAT** [[id number]] Collected: 11/17/22 2109    Order Status: Completed Specimen: Blood from Peripheral, Lower Arm, Left Updated: 11/18/22 0345     Blood Culture, Routine No Growth to date             Imaging Results               CTA Chest Abdomen Pelvis (Final result)  Result time 11/18/22 03:26:00      Final result by Dominick Valdivia MD (11/18/22 03:26:00)                   Impression:      1. No interval detrimental change or new intra-abdominopelvic abnormality compared to most recent CT 11/11/2022.  No significant interval detrimental change in CTA of the aorta compared to most recent prior CTA aorta 09/14/2022.  Moderate to severe aortic atherosclerosis involving the branch vessels again noted, similar to prior.  2. Moderate bilateral pleural effusions, stable when compared to prior 11/11/2022 but increased compared to 09/14/2022.  3. Stable wedge-shaped focus of relative hypoenhancement in the left renal interpolar region which may be seen with pyelonephritis and, less likely, infarction.  No abscess.  4. Colonic diverticulosis without diverticulitis.  5. Rectal wall thickening similar to 11/11/2022.  Correlate clinically for proctitis.  6. Enlarged heterogeneous thyroid  gland similar to 09/14/2022.  7. Additional findings as above.  This report was flagged in Epic as abnormal.    Electronically signed by resident: Franky Rudd  Date:    11/18/2022  Time:    02:38    Electronically signed by: Dominick Valdivia MD  Date:    11/18/2022  Time:    03:26               Narrative:    EXAMINATION:  CTA CHEST ABDOMEN PELVIS    CLINICAL HISTORY:  Thoracic aorta disease, post-op;Chest pain, shortness of breath, diffuse abdominal tenderness.  Renal infarct versus pyelo seen on prior CT;    TECHNIQUE:  CTA images were obtained from the thoracic inlet to the pubic symphysis.  Noncontrast, arterial, and delayed phase images were acquired. 100 mL of intravenous Omnipaque 350 was administered.  Oral contrast was not administered. Axial MIP, sagittal, and coronal reformats were obtained.    COMPARISON:  CT abdomen pelvis 11/11/2022, CTA chest abdomen pelvis 09/14/2022    FINDINGS:  THORACIC SOFT TISSUES:  No axillary or subpectoral lymphadenopathy. Enlarged heterogeneous thyroid gland, similar to 09/14/2022.    HEART & MEDIASTINUM:  No mediastinal or hilar lymphadenopathy. Pulmonary arteries are patent. Left three-vessel aortic arch. Visualized portions of arch vessels are patent.  No aortic aneurysm. Cardiomegaly with biatrial enlargement.  Trace pericardial fluid.    PLEURA:  Moderate bilateral pleural effusions, stable compared to most recent prior.  No pneumothorax.    LUNGS & AIRWAYS:  Airways are patent. Compressive atelectasis and interlobular septal thickening with subsegmental atelectasis.  No focal consolidation.    HEPATOBILIARY:  No focal hepatic lesions. No biliary ductal dilatation. Normal gallbladder.    SPLEEN:  No splenomegaly.    PANCREAS: Fatty atrophy.  No focal masses or ductal dilatation.    ADRENALS:  No adrenal nodules.    KIDNEYS/URETERS: Stable wedge-shaped focus of relative hypoenhancement in the left interpolar region (axial series 4, image 485; coronal series 601,  image 67).  Bilateral simple renal cysts.  No hydronephrosis, stones or solid mass lesions. Visualized ureters are unremarkable.    PELVIC ORGANS/BLADDER:  Bladder is moderately distended without focal wall thickening.  Hysterectomy.  No adnexal masses.    PERITONEUM / RETROPERITONEUM:  No free air. No free fluid.    LYMPH NODES:  No lymphadenopathy.    VESSELS: Left three-vessel aortic arch. Visualized portions of the arch vessels are patent.   There are moderate to severe calcified and noncalcified atherosclerotic plaques in the aorta and its major branches. There is no evidence of a flap within the aorta to suggest a dissection. No aortic aneurysm.  The celiac, superior mesenteric and inferior mesenteric arteries are patent.There are single renal arteries bilaterally. Renal arteries are patent without significant stenosis.  Overall CTA appearance of the aorta similar to 09/14/2022.    GI TRACT: Scattered colonic diverticulosis without diverticulitis.  Small bowel is unremarkable.  Rectum is distended with stool and air.  Mild to moderate rectal wall thickening not appreciably increased compared to prior.  Normal appendix.    BONES AND ABDOMINAL SOFT TISSUES: Soft tissues are unremarkable.  Osteopenia.  Degenerative changes of the hips and spine.  No acute fracture.  Osseous structures appear similar to recent prior studies.                                       X-Ray Chest AP Portable (Final result)  Result time 11/17/22 20:42:46      Final result by Dominick Valdivia MD (11/17/22 20:42:46)                   Impression:      Cardiomegaly with bilateral edema and bibasilar effusions.      Electronically signed by: Dominick Valdivia MD  Date:    11/17/2022  Time:    20:42               Narrative:    EXAMINATION:  XR CHEST AP PORTABLE    CLINICAL HISTORY:  Shortness of breath    TECHNIQUE:  Single frontal view of the chest was performed.    COMPARISON:  09/14/2022.    FINDINGS:  Cardiomegaly with bilateral edema and  bibasilar effusions.    Heart and lungs otherwise appear unchanged when allowing for differences in technique and positioning.

## 2022-11-18 NOTE — PROVIDER PROGRESS NOTES - EMERGENCY DEPT.
Signout Note    I received signout from the previous provider.     Chief complaint:  Abnormal Lab ( Referral to ED for abnormal lab. Pt does not know what lab is abnormal. Pt is a poor historian. Pt c/o generalized, pressured CP at 10/10 and SOB.) and Fatigue      Pertinent history &exam:  Peri Johansen is a 88 y.o. female with pertinent PMH of hyperthyroidism, B-cell lymphoma, GERD, diabetes who presented to emergency department with complaint of abnormal outpatient imaging showing left-sided pyelonephritis and fluid overload.  She was signed out pending remainder of her labs for Hospital Medicine admission.    Vitals:    11/20/22 2120   BP: (!) 109/54   Pulse: 68   Resp: 17   Temp: 98 °F (36.7 °C)       Imaging Studies:    CTA Chest Abdomen Pelvis   Final Result   Abnormal      1. No interval detrimental change or new intra-abdominopelvic abnormality compared to most recent CT 11/11/2022.  No significant interval detrimental change in CTA of the aorta compared to most recent prior CTA aorta 09/14/2022.  Moderate to severe aortic atherosclerosis involving the branch vessels again noted, similar to prior.   2. Moderate bilateral pleural effusions, stable when compared to prior 11/11/2022 but increased compared to 09/14/2022.   3. Stable wedge-shaped focus of relative hypoenhancement in the left renal interpolar region which may be seen with pyelonephritis and, less likely, infarction.  No abscess.   4. Colonic diverticulosis without diverticulitis.   5. Rectal wall thickening similar to 11/11/2022.  Correlate clinically for proctitis.   6. Enlarged heterogeneous thyroid gland similar to 09/14/2022.   7. Additional findings as above.   This report was flagged in Epic as abnormal.      Electronically signed by resident: Franky Rudd   Date:    11/18/2022   Time:    02:38      Electronically signed by: Dominick Valdivia MD   Date:    11/18/2022   Time:    03:26      X-Ray Chest AP Portable   Final Result       Cardiomegaly with bilateral edema and bibasilar effusions.         Electronically signed by: Dominick Valdivia MD   Date:    11/17/2022   Time:    20:42          Medications Given:  Medications   aspirin chewable tablet 81 mg (81 mg Oral Given 11/20/22 1024)   atorvastatin tablet 40 mg (40 mg Oral Given 11/20/22 1024)   fluticasone propionate 50 mcg/actuation nasal spray 50 mcg (50 mcg Each Nostril Given 11/20/22 1023)   gabapentin capsule 300 mg (300 mg Oral Given 11/20/22 2114)   methIMAzole split tablet 2.5 mg (2.5 mg Oral Given 11/20/22 1100)   traMADoL tablet 50 mg (50 mg Oral Given 11/19/22 1449)   sodium chloride 0.9% flush 10 mL (has no administration in time range)   melatonin tablet 6 mg (6 mg Oral Given 11/20/22 2114)   polyethylene glycol packet 17 g (has no administration in time range)   acetaminophen tablet 650 mg (has no administration in time range)   naloxone 0.4 mg/mL injection 0.02 mg (has no administration in time range)   glucose chewable tablet 16 g (has no administration in time range)   glucose chewable tablet 24 g (has no administration in time range)   glucagon (human recombinant) injection 1 mg (has no administration in time range)   heparin (porcine) injection 5,000 Units (5,000 Units Subcutaneous Given 11/20/22 2117)   furosemide injection 80 mg (80 mg Intravenous Given 11/20/22 1334)   insulin aspart U-100 pen 0-5 Units (1 Units Subcutaneous Given 11/20/22 2118)   ondansetron injection 8 mg (8 mg Intravenous Given 11/20/22 1025)   amiodarone tablet 200 mg (0 mg Oral Hold 11/20/22 1200)   bisacodyL suppository 10 mg (has no administration in time range)   cefTRIAXone (ROCEPHIN) 2 g/50 mL D5W IVPB (0 g Intravenous Stopped 11/17/22 2335)   benzonatate capsule 100 mg (100 mg Oral Given 11/17/22 2226)   acetaminophen tablet 1,000 mg (1,000 mg Oral Given 11/17/22 2226)   aspirin tablet 325 mg (325 mg Oral Given 11/17/22 2226)   furosemide injection 40 mg (40 mg Intravenous Given 11/17/22  2226)   iohexoL (OMNIPAQUE 350) injection 100 mL (100 mLs Intravenous Given 11/18/22 0100)   magnesium sulfate 2g in water 50mL IVPB (premix) (0 g Intravenous Stopped 11/19/22 1945)   bisacodyL suppository 10 mg (10 mg Rectal Given 11/20/22 1333)       Pending Items/ MDM:  88 y.o. female with above complaint.  We obtain labs and a CTA here in the emergency department.  Findings reviewed.  Urine does not look infected here.  CTA did not show any detrimental change, and no acute abnormality.  Admitted to Internal Medicine for fluid overload in stable condition.    Diagnostic Impression:    1. Chest pain    2. SOB (shortness of breath)    3. Elevated brain natriuretic peptide (BNP) level         ED Disposition Condition    Observation Stable             Kailee Resendiz MD  Emergency Medicine

## 2022-11-18 NOTE — ASSESSMENT & PLAN NOTE
Patient is identified as having Diastolic (HFpEF) heart failure that is Acute on chronic. CHF is currently controlled. Latest ECHO performed and demonstrates- Results for orders placed during the hospital encounter of 09/06/22    Echo    Interpretation Summary  · The left ventricle is normal in size with concentric remodeling and normal systolic function.  · The estimated ejection fraction is 58%.  · Grade III left ventricular diastolic dysfunction.  · Normal right ventricular size with normal right ventricular systolic function.  · There is right ventricular hypertrophy.  · There is mild aortic valve stenosis.  · Aortic valve area is 1.60 cm2; peak velocity is 0.99 m/s; mean gradient is 2 mmHg.  · Mild-to-moderate mitral regurgitation.  · Mild to moderate tricuspid regurgitation.  · Normal central venous pressure (3 mmHg).  · The estimated PA systolic pressure is 43 mmHg.  · There is MILD pulmonary hypertension.  · Trivial posterior pericardial effusion. And under the RA.  · There is a small right pleural effusion.  Low BP's hold Beta Blocker, ACE/ARB and continue IV Furosemide and monitor clinical status closely. Monitor on telemetry. Patient is on CHF pathway.  Monitor strict Is&Os and daily weights.  Place on fluid restriction of 1.5 L. Continue to stress to patient importance of self efficacy and  on diet for CHF. Last BNP reviewed- and noted below   Recent Labs   Lab 11/17/22 2010   *     · IV Lasix 40 mg q.12 hours until euvolemic, then switch to home oral diuretic  · Repeat echo

## 2022-11-18 NOTE — FIRST PROVIDER EVALUATION
Emergency Department TeleTriage Encounter Note      CHIEF COMPLAINT    Chief Complaint   Patient presents with    Abnormal Lab     DrBlaine Referral to ED for abnormal lab. Pt does not know what lab is abnormal. Pt is a poor historian. Pt c/o generalized, pressured CP at 10/10 and SOB.       VITAL SIGNS   Initial Vitals [11/17/22 1917]   BP Pulse Resp Temp SpO2   (!) 120/58 99 18 98.2 °F (36.8 °C) 96 %      MAP       --            ALLERGIES    Review of patient's allergies indicates:   Allergen Reactions    Latex, natural rubber Itching       PROVIDER TRIAGE NOTE  80-year-old female presents to the ER referred from PCP for abnormal labs and CT scan.  CT scan from today shows findings of possible pyelonephritis along with other findings noted in the report.  Patient's PCP was also concerned about volume overload and possible CHF exacerbation.  Patient is a poor historian and does not understand why she is in the ER.  She does not appear to be toxic or ill.  She does complain of shortness of breath and chest pain.  Her vital signs are normal.      ORDERS  Labs Reviewed   CBC W/ AUTO DIFFERENTIAL   COMPREHENSIVE METABOLIC PANEL   B-TYPE NATRIURETIC PEPTIDE   LIPASE   TROPONIN I   URINALYSIS, REFLEX TO URINE CULTURE       ED Orders (720h ago, onward)      Start Ordered     Status Ordering Provider    11/17/22 2000 11/17/22 1959  Saline lock IV  Once         Ordered JARRETT GARCÍA    11/17/22 2000 11/17/22 1959  CBC Auto Differential  STAT         Ordered JARRETT GARCÍA    11/17/22 2000 11/17/22 1959  Comprehensive Metabolic Panel  STAT         Ordered JARRETT GARCÍA    11/17/22 2000 11/17/22 1959  BNP  STAT         Ordered JARRETT GARCÍA    11/17/22 2000 11/17/22 1959  Lipase  STAT         Ordered JARRETT GARCÍA    11/17/22 2000 11/17/22 1959  Troponin I  STAT         Ordered JARRETT GARCÍA    11/17/22 2000 11/17/22 1959  Urinalysis, Reflex to Urine Culture  STAT         Ordered JARRETT GARCÍA     11/17/22 2000 11/17/22 1959  cefTRIAXone (ROCEPHIN) 2 g/50 mL D5W IVPB  ED 1 Time         Ordered JARRETT GARCÍA    11/17/22 2000 11/17/22 1959  X-Ray Chest AP Portable  1 time imaging         Ordered JARRETT GARCÍA    11/17/22 1919 11/17/22 1919  EKG 12-lead  Once         Completed by TASHI ESTRADA on 11/17/2022 at  7:55 PM YODIT SOTO              Virtual Visit Note: The provider triage portion of this emergency department evaluation and documentation was performed via [employer], a HIPAA-compliant telemedicine application, in concert with a tele-presenter in the room. A face to face patient evaluation with one of my colleagues will occur once the patient is placed in an emergency department room.      DISCLAIMER: This note was prepared with [employer] voice recognition transcription software. Garbled syntax, mangled pronouns, and other bizarre constructions may be attributed to that software system.

## 2022-11-18 NOTE — ASSESSMENT & PLAN NOTE
· Patient stopped Taking her Lasix pill, and is now volume overloaded  · , with SOB MONSON and mild peripheral edema, elevated JVD  · IV Lasix 40 mg b.i.d. scheduled  · Mild troponin elevation likely demand ischemia from volume overload state   · Echo ordered, if new WMA or hypokinesis would consult Cardiology, however if there is not, would just continue to diurese her and make sure patient takes her oral Lasix when she discharges    · Would consider home health CM or nurse for medication mismanagement assistance.

## 2022-11-18 NOTE — ED PROVIDER NOTES
Encounter Date: 11/17/2022       History     Chief Complaint   Patient presents with    Abnormal Lab      Referral to ED for abnormal lab. Pt does not know what lab is abnormal. Pt is a poor historian. Pt c/o generalized, pressured CP at 10/10 and SOB.    Fatigue     88-year-old female with dm 2, B-cell lymphoma, hypertension, rickets, CHF presents for abnormal imaging.  Patient reports that she has had some right sided chest pain for the past few days with nonproductive cough, shortness of breath and bilateral lower extremity swelling.  She denies any palliating or provoking factors.  She has mild dysuria, denies abdominal pain, nausea/vomiting, fevers/chills, flank pain or hematuria.  Noted to have what appears to be left-sided pyelonephritis on CT scan performed 11/11 as well as fluid overload.    The history is provided by the patient and medical records. No  was used.   Review of patient's allergies indicates:   Allergen Reactions    Latex, natural rubber Itching    Latex Hives     Past Medical History:   Diagnosis Date    Aortic atherosclerosis 1/7/2016    Arthritis     Chronic diastolic congestive heart failure 9/15/2022    Colon polyp: hyperplastic 2015- repeat 2020 8/6/2015    Diabetes mellitus, type II 8/16/2012    Diverticulosis     Gastric nodule 8/7/2014    GERD (gastroesophageal reflux disease) 8/16/2012    Goiter 8/16/2012    Hyperlipidemia 8/16/2012    Hypertension     Joint pain     Leg pain 8/16/2012    Lymphoma 8/16/2012    Osteopenia 8/16/2012    Osteoporosis     Renal cyst 3/28/2013    Rickets, vitamin D deficiency 8/16/2012    Thyroid nodule 2/24/2014    Vitamin D deficiency disease 8/16/2012     Past Surgical History:   Procedure Laterality Date    CARPAL TUNNEL RELEASE      CATARACT EXTRACTION W/  INTRAOCULAR LENS IMPLANT Bilateral     HYSTERECTOMY      partial    lymphoma surgery      L tibia     Family History   Problem Relation Age of Onset    Hypertension Mother      Hypertension Father     Heart disease Father     Breast cancer Maternal Aunt     No Known Problems Maternal Uncle     No Known Problems Paternal Aunt     No Known Problems Paternal Uncle     No Known Problems Maternal Grandmother     No Known Problems Maternal Grandfather     No Known Problems Paternal Grandmother     No Known Problems Paternal Grandfather     Cancer Brother         colon    No Known Problems Daughter     No Known Problems Son     No Known Problems Son     No Known Problems Daughter     No Known Problems Daughter     Diabetes Neg Hx     Glaucoma Neg Hx     Amblyopia Neg Hx     Blindness Neg Hx     Cataracts Neg Hx     Macular degeneration Neg Hx     Retinal detachment Neg Hx     Strabismus Neg Hx     Stroke Neg Hx     Thyroid disease Neg Hx     Ovarian cancer Neg Hx     Liver disease Neg Hx     Liver cancer Neg Hx     Cirrhosis Neg Hx     Colon polyps Neg Hx     Colon cancer Neg Hx     Melanoma Neg Hx      Social History     Tobacco Use    Smoking status: Never    Smokeless tobacco: Never   Substance Use Topics    Alcohol use: No     Alcohol/week: 0.0 standard drinks    Drug use: No     Review of Systems   Constitutional:  Negative for chills and fever.   HENT:  Negative for sore throat.    Respiratory:  Positive for cough and shortness of breath.    Cardiovascular:  Positive for chest pain and leg swelling. Negative for palpitations.   Gastrointestinal:  Negative for abdominal pain and nausea.   Genitourinary:  Positive for dysuria. Negative for frequency, hematuria and urgency.   Musculoskeletal:  Negative for back pain.   Skin:  Negative for rash.   Neurological:  Negative for weakness.   Hematological:  Does not bruise/bleed easily.     Physical Exam     Initial Vitals [11/17/22 1917]   BP Pulse Resp Temp SpO2   (!) 120/58 99 18 98.2 °F (36.8 °C) 96 %      MAP       --         Physical Exam    Nursing note and vitals reviewed.  Constitutional: She appears well-developed and well-nourished. She  is not diaphoretic. No distress.   HENT:   Head: Normocephalic and atraumatic.   Eyes: EOM are normal. Pupils are equal, round, and reactive to light.   Neck:   Normal range of motion.  Cardiovascular:  Normal rate, regular rhythm, normal heart sounds and intact distal pulses.     Exam reveals no gallop and no friction rub.       No murmur heard.  2+ pitting edema bilateral lower legs   Pulmonary/Chest: No respiratory distress. She has no wheezes. She has no rhonchi. She has rales. She exhibits no tenderness.   Abdominal: Abdomen is soft. Bowel sounds are normal. She exhibits no distension and no mass. There is abdominal tenderness.   Diffuse abdominal tenderness with voluntary guarding There is no rebound and no guarding.   Musculoskeletal:         General: Normal range of motion.      Cervical back: Normal range of motion.     Neurological: She is alert and oriented to person, place, and time.   Skin: Skin is warm and dry.   Psychiatric: She has a normal mood and affect.       ED Course   Procedures  Labs Reviewed   CBC W/ AUTO DIFFERENTIAL - Abnormal; Notable for the following components:       Result Value    RDW 16.6 (*)     All other components within normal limits   B-TYPE NATRIURETIC PEPTIDE - Abnormal; Notable for the following components:     (*)     All other components within normal limits   TROPONIN I - Abnormal; Notable for the following components:    Troponin I 0.171 (*)     All other components within normal limits   URINALYSIS, REFLEX TO URINE CULTURE - Abnormal; Notable for the following components:    Occult Blood UA 2+ (*)     Leukocytes, UA 1+ (*)     All other components within normal limits    Narrative:     Specimen Source->Urine   COMPREHENSIVE METABOLIC PANEL - Abnormal; Notable for the following components:    Glucose 125 (*)     Albumin 2.6 (*)     Total Bilirubin 1.5 (*)     Alkaline Phosphatase 143 (*)     AST 50 (*)     All other components within normal limits   TROPONIN I -  Abnormal; Notable for the following components:    Troponin I 0.162 (*)     All other components within normal limits   URINALYSIS MICROSCOPIC - Abnormal; Notable for the following components:    RBC, UA 5 (*)     All other components within normal limits    Narrative:     Specimen Source->Urine   PHOSPHORUS - Abnormal; Notable for the following components:    Phosphorus 2.3 (*)     All other components within normal limits    Narrative:     Add on MG and Phos per Dr. Stephenson @ 03:59 am to order # [id number]   CULTURE, BLOOD   CULTURE, BLOOD   SARS-COV2 (COVID) WITH FLU/RSV BY PCR   LACTIC ACID, PLASMA   LIPASE   MAGNESIUM   PHOSPHORUS   MAGNESIUM    Narrative:     Add on MG and Phos per Dr. Stephenson @ 03:59 am to order # [id number]     EKG Readings: (Independently Interpreted)   Initial Reading: No STEMI. Previous EKG: Compared with most recent EKG Previous EKG Date: 9/14/2022. Rhythm: Normal Sinus Rhythm. Heart Rate: 95. Ectopy: No Ectopy. Clinical Impression: Normal Sinus Rhythm     Imaging Results               CTA Chest Abdomen Pelvis (Final result)  Result time 11/18/22 03:26:00      Final result by Dominick Valdivia MD (11/18/22 03:26:00)                   Impression:      1. No interval detrimental change or new intra-abdominopelvic abnormality compared to most recent CT 11/11/2022.  No significant interval detrimental change in CTA of the aorta compared to most recent prior CTA aorta 09/14/2022.  Moderate to severe aortic atherosclerosis involving the branch vessels again noted, similar to prior.  2. Moderate bilateral pleural effusions, stable when compared to prior 11/11/2022 but increased compared to 09/14/2022.  3. Stable wedge-shaped focus of relative hypoenhancement in the left renal interpolar region which may be seen with pyelonephritis and, less likely, infarction.  No abscess.  4. Colonic diverticulosis without diverticulitis.  5. Rectal wall thickening similar to 11/11/2022.  Correlate clinically for  proctitis.  6. Enlarged heterogeneous thyroid gland similar to 09/14/2022.  7. Additional findings as above.  This report was flagged in Epic as abnormal.    Electronically signed by resident: Franky Rudd  Date:    11/18/2022  Time:    02:38    Electronically signed by: Dominick Valdivia MD  Date:    11/18/2022  Time:    03:26               Narrative:    EXAMINATION:  CTA CHEST ABDOMEN PELVIS    CLINICAL HISTORY:  Thoracic aorta disease, post-op;Chest pain, shortness of breath, diffuse abdominal tenderness.  Renal infarct versus pyelo seen on prior CT;    TECHNIQUE:  CTA images were obtained from the thoracic inlet to the pubic symphysis.  Noncontrast, arterial, and delayed phase images were acquired. 100 mL of intravenous Omnipaque 350 was administered.  Oral contrast was not administered. Axial MIP, sagittal, and coronal reformats were obtained.    COMPARISON:  CT abdomen pelvis 11/11/2022, CTA chest abdomen pelvis 09/14/2022    FINDINGS:  THORACIC SOFT TISSUES:  No axillary or subpectoral lymphadenopathy. Enlarged heterogeneous thyroid gland, similar to 09/14/2022.    HEART & MEDIASTINUM:  No mediastinal or hilar lymphadenopathy. Pulmonary arteries are patent. Left three-vessel aortic arch. Visualized portions of arch vessels are patent.  No aortic aneurysm. Cardiomegaly with biatrial enlargement.  Trace pericardial fluid.    PLEURA:  Moderate bilateral pleural effusions, stable compared to most recent prior.  No pneumothorax.    LUNGS & AIRWAYS:  Airways are patent. Compressive atelectasis and interlobular septal thickening with subsegmental atelectasis.  No focal consolidation.    HEPATOBILIARY:  No focal hepatic lesions. No biliary ductal dilatation. Normal gallbladder.    SPLEEN:  No splenomegaly.    PANCREAS: Fatty atrophy.  No focal masses or ductal dilatation.    ADRENALS:  No adrenal nodules.    KIDNEYS/URETERS: Stable wedge-shaped focus of relative hypoenhancement in the left interpolar region (axial  series 4, image 485; coronal series 601, image 67).  Bilateral simple renal cysts.  No hydronephrosis, stones or solid mass lesions. Visualized ureters are unremarkable.    PELVIC ORGANS/BLADDER:  Bladder is moderately distended without focal wall thickening.  Hysterectomy.  No adnexal masses.    PERITONEUM / RETROPERITONEUM:  No free air. No free fluid.    LYMPH NODES:  No lymphadenopathy.    VESSELS: Left three-vessel aortic arch. Visualized portions of the arch vessels are patent.   There are moderate to severe calcified and noncalcified atherosclerotic plaques in the aorta and its major branches. There is no evidence of a flap within the aorta to suggest a dissection. No aortic aneurysm.  The celiac, superior mesenteric and inferior mesenteric arteries are patent.There are single renal arteries bilaterally. Renal arteries are patent without significant stenosis.  Overall CTA appearance of the aorta similar to 09/14/2022.    GI TRACT: Scattered colonic diverticulosis without diverticulitis.  Small bowel is unremarkable.  Rectum is distended with stool and air.  Mild to moderate rectal wall thickening not appreciably increased compared to prior.  Normal appendix.    BONES AND ABDOMINAL SOFT TISSUES: Soft tissues are unremarkable.  Osteopenia.  Degenerative changes of the hips and spine.  No acute fracture.  Osseous structures appear similar to recent prior studies.                                       X-Ray Chest AP Portable (Final result)  Result time 11/17/22 20:42:46      Final result by Dominick Valdivia MD (11/17/22 20:42:46)                   Impression:      Cardiomegaly with bilateral edema and bibasilar effusions.      Electronically signed by: Dominick Valdivia MD  Date:    11/17/2022  Time:    20:42               Narrative:    EXAMINATION:  XR CHEST AP PORTABLE    CLINICAL HISTORY:  Shortness of breath    TECHNIQUE:  Single frontal view of the chest was  performed.    COMPARISON:  09/14/2022.    FINDINGS:  Cardiomegaly with bilateral edema and bibasilar effusions.    Heart and lungs otherwise appear unchanged when allowing for differences in technique and positioning.                                       Medications   furosemide injection 40 mg (40 mg Intravenous Given 11/18/22 0617)   amiodarone tablet 200 mg (has no administration in time range)   aspirin chewable tablet 81 mg (has no administration in time range)   atorvastatin tablet 40 mg (has no administration in time range)   fluticasone propionate 50 mcg/actuation nasal spray 50 mcg (has no administration in time range)   gabapentin capsule 300 mg (has no administration in time range)   methIMAzole split tablet 2.5 mg (has no administration in time range)   traMADoL tablet 50 mg (has no administration in time range)   sodium chloride 0.9% flush 10 mL (has no administration in time range)   melatonin tablet 6 mg (6 mg Oral Not Given 11/18/22 0415)   polyethylene glycol packet 17 g (has no administration in time range)   acetaminophen tablet 650 mg (has no administration in time range)   naloxone 0.4 mg/mL injection 0.02 mg (has no administration in time range)   glucose chewable tablet 16 g (has no administration in time range)   glucose chewable tablet 24 g (has no administration in time range)   glucagon (human recombinant) injection 1 mg (has no administration in time range)   enoxaparin injection 40 mg (has no administration in time range)   cefTRIAXone (ROCEPHIN) 2 g/50 mL D5W IVPB (0 g Intravenous Stopped 11/17/22 2335)   benzonatate capsule 100 mg (100 mg Oral Given 11/17/22 2226)   acetaminophen tablet 1,000 mg (1,000 mg Oral Given 11/17/22 2226)   aspirin tablet 325 mg (325 mg Oral Given 11/17/22 2226)   furosemide injection 40 mg (40 mg Intravenous Given 11/17/22 2226)   iohexoL (OMNIPAQUE 350) injection 100 mL (100 mLs Intravenous Given 11/18/22 0100)     Medical Decision Making:   History:   Old  Medical Records: I decided to obtain old medical records.  Old Records Summarized: records from clinic visits.       <> Summary of Records: Patient had CT abdomen pelvis performed 11/11 which showed wedge shaped hypodensity in the left kidney consistent with pyelonephritis versus renal infarct, anasarca  Initial Assessment:   88-year-old female presenting for abnormal CT showing renal infarct versus pyelonephritis as well as some chest pain and shortness of breath.  Her vitals are normal, she appears uncomfortable but nontoxic.  Differential Diagnosis:   Presentation is unclear, concerns include CHF exacerbation, ACS, intra-abdominal pathology.  She is denying any abdominal pain as significant abdominal tenderness.  Her presentation does not seem classic for pyelonephritis, vascular abnormality is also a concern.  Independently Interpreted Test(s):   I have ordered and independently interpreted X-rays - see summary below.       <> Summary of X-Ray Reading(s): Bilateral pulmonary edema  I have ordered and independently interpreted EKG Reading(s) - see prior notes  Clinical Tests:   Lab Tests: Ordered and Reviewed  Radiological Study: Ordered and Reviewed  Medical Tests: Ordered and Reviewed  ED Management:  Will check labs, chest x-ray, CTA chest/abdomen/pelvis, give antibiotics reassess.    Much of the lab workup is pending.  Notable for troponin chronically elevated but worse today, elevated BNP improved compared to prior, normal lactate.  Chest x-ray shows fluid overload.  CTA is pending.  Dispo pending remainder of workup.  I discussed this patient with my supervising physician and I am signing out to Kailee Resendiz MD.    10:31 PM  Inna Lynch PA-C    Other:   I have discussed this case with another health care provider.          Attending Attestation:     Physician Attestation Statement for NP/PA:   I have conducted a face to face encounter with this patient in addition to the NP/PA, due to NP/PA  Request    Other NP/PA Attestation Additions:      Medical Decision Makin yo female referred to the ED by PCP.  Reports chest pain and leg swelling.    Outpatient CT with pyelo vs infarction.  RRR, bibasilar rales, symmetric LE edema, mild diffuse abd tenderness    CTNI noted, could be demand ischemia in the setting of CHF exacerbation.  Given chest and abd pain, will r/o aortic emergency.  Some initial lab specimens hemolyzed and needed to be resent.  Signed out to Dr. Resendiz at p with remaining labs and imaging pending, anticipate admission for ACS r/o, diuresis.            ED Course as of 22 0856   Thu  Troponin I(!): 0.171  Significantly elevated, similar compared to prior [CC]    BNP(!): 925 [CC]   2142 Lactate, Jordon: 1.7 [CC]      ED Course User Index  [CC] Inna Lynch PA-C                 Clinical Impression:   Final diagnoses:  [R07.9] Chest pain  [R06.02] SOB (shortness of breath)        ED Disposition Condition    Observation Stable                Inna Lynch PA-C  22 [id number]       Mario Keith MD  22 0836

## 2022-11-20 NOTE — PROGRESS NOTES
[facility] Medicine  Progress note    Team: [facility] MED Z Rosa Isela Main MD  Admit Date: 11/17/2022    Principal Problem:  Volume overload state of heart    Interval hx:  Feeling somewhat improved    ROS   Respiratory: neg for cough neg for shortness of breath  Cardiovascular: neg for chest pain neg for palpitations  Gastrointestinal: neg for nausea neg for vomiting, neg for abdominal pain neg for diarrhea neg for constipation   Behavioral/Psych: neg for depression neg for anxiety    PEx  Temp:  [97.4 °F (36.3 °C)-98.2 °F (36.8 °C)]   Pulse:  [61-88]   Resp:  [16-20]   BP: ()/(49-66)   SpO2:  [91 %-98 %]   No intake or output data in the 24 hours ending 11/20/22 1439    General Appearance: no acute distress, WDWN  Heart: regular rate and rhythm, no heave, + JVD to angle of jaw, 1+ edema in thighs, RUE trace edema  Respiratory: Normal respiratory effort, symmetric excursion, bilateral vesicular breath sounds   Abdomen: Soft, non-tender; bowel sounds active  Skin: intact, no rash, no ulcers  Neurologic:  No focal numbness or weakness  Mental status: Alert, oriented x 4, affect appropriate     Recent Labs   Lab 11/17/22 2010 11/19/22 0514 11/20/22  0852   WBC 6.85 5.01 10.09   HGB 14.6 13.8 14.9   HCT 43.0 42.4 44.3    235 169       Recent Labs   Lab 11/18/22 0020 11/18/22 0201 11/19/22  0514 11/20/22  0852     --  134* 128*   K 3.8  --  4.0 5.2*   CL 98  --  96 92*   CO2 29  --  30* 20*   BUN 10  --  17 22   CREATININE 0.6  --  0.7 0.9   *  --  146* 147*   CALCIUM 9.7  --  9.0 9.1   MG  --  1.6  --  2.5   PHOS  --  2.3*  --  6.4*   LIPASE 8  --   --   --        Recent Labs   Lab 11/18/22 0020 11/19/22 0514 11/20/22  0852   ALKPHOS 143* 132 140*   ALT 38 31 26   AST 50* 40 36   ALBUMIN 2.6* 2.4* 2.5*   PROT 7.5 6.9 7.2   BILITOT 1.5* 1.3* 1.7*          Recent Labs   Lab 11/19/22  0811 11/19/22  1128 11/19/22  1620 11/19/22  2240 11/20/22  0721 11/20/22  1140   POCTGLUCOSE 139* 237* 201*  213* 189* 200*         Scheduled Meds:   amiodarone  200 mg Oral Daily    aspirin  81 mg Oral Daily    atorvastatin  40 mg Oral Daily    fluticasone propionate  1 spray Each Nostril Daily    furosemide (LASIX) injection  80 mg Intravenous BID    gabapentin  300 mg Oral BID    heparin (porcine)  5,000 Units Subcutaneous Q12H    melatonin  6 mg Oral Nightly    methIMAzole  2.5 mg Oral Daily     Continuous Infusions:  As Needed:  acetaminophen, bisacodyL, glucagon (human recombinant), glucose, glucose, insulin aspart U-100, naloxone, ondansetron, polyethylene glycol, sodium chloride 0.9%, traMADoL    Assessment and Plan  / Problems managed today    * Acute on chronic diastolic heart failure  Moderate left atrial enlargement  Right ventricular hypertrophy  Pulmonary hypertension  ECHO 11/19 showed EF 58%, Grade III DD. Moderate left atrial enlargement. Normal right ventricular size with low normal right ventricular systolic function. Moderate to severe tricuspid regurgitation. Bilateral pleural effusions. There may be ascites present and possible ascites.    Low BP's hold Beta Blocker, ACE/ARB and continue IV Furosemide and monitor clinical status closely. Monitor on telemetry. Patient is on CHF pathway.  Monitor strict Is&Os and daily weights.  Place on fluid restriction of 1.5 L. Continue to stress to patient importance of self efficacy and  on diet for CHF. Last BNP reviewed- and noted below   Recent Labs   Lab 11/17/22 2010   *     IV Lasix 40 mg q.12 hours until euvolemic, then switch to home oral diuretic --> 80 mg BID    Chronic diastolic congestive heart failure  See above      Diabetes mellitus  Patient's FSGs are controlled on current medication regimen.  Last A1c reviewed-   Lab Results   Component Value Date    HGBA1C 7.2 (H) 06/30/2022     Most recent fingerstick glucose reviewed- No results for input(s): POCTGLUCOSE in the last 24 hours.    Antihyperglycemics (From admission, onward)       None          Hold Oral hypoglycemics while patient is in the hospital.  Does not take insulin, DM diet only    Hyperthyroidism  Continue home methimazole        Discharge Planning   ENDY: 11/21/2022     Code Status: Full Code   Is the patient medically ready for discharge?: No    Reason for patient still in hospital (select all that apply): Patient trending condition and Treatment           Diet:  low sodium diet  GI PPx: not needed  DVT PPx:  heparin  Airways: room air  Wounds: none    Goals of Care:  Return to prior functional status     Time (minutes) spent in care of the patient (Greater than 1/2 spent in direct face-to-face contact and care coordination on unit)  35 min    Rosa Isela Main MD

## 2022-11-20 NOTE — PROGRESS NOTES
Hospital Medicine  Progress note    Team: [facility] [place] [facility]OSP MED Z Rosa Isela Main MD  Admit Date: 11/17/2022    Principal Problem:  Volume overload state of heart    Interval hx:  Feeling somewhat improved    ROS   Respiratory: neg for cough neg for shortness of breath  Cardiovascular: neg for chest pain neg for palpitations  Gastrointestinal: neg for nausea neg for vomiting, neg for abdominal pain neg for diarrhea neg for constipation   Behavioral/Psych: neg for depression neg for anxiety    PEx  Temp:  [96.1 °F (35.6 °C)-98 °F (36.7 °C)]   Pulse:  [72-88]   Resp:  [16-20]   BP: ()/(54-62)   SpO2:  [93 %-98 %]     Intake/Output Summary (Last 24 hours) at 11/19/2022 2256  Last data filed at 11/19/2022 0500  Gross per 24 hour   Intake --   Output 300 ml   Net -300 ml       General Appearance: no acute distress, WDWN  Heart: regular rate and rhythm, no heave, + JVD, pockets of 1+ edema in BLE  Respiratory: Normal respiratory effort, symmetric excursion, bilateral vesicular breath sounds   Abdomen: Soft, non-tender; bowel sounds active  Skin: intact, no rash, no ulcers  Neurologic:  No focal numbness or weakness  Mental status: Alert, oriented x 4, affect appropriate     Recent Labs   Lab 11/17/22 2010 11/19/22  0514   WBC 6.85 5.01   HGB 14.6 13.8   HCT 43.0 42.4    235     Recent Labs   Lab 11/18/22 0020 11/18/22  0201 11/19/22  0514     --  134*   K 3.8  --  4.0   CL 98  --  96   CO2 29  --  30*   BUN 10  --  17   CREATININE 0.6  --  0.7   *  --  146*   CALCIUM 9.7  --  9.0   MG  --  1.6  --    PHOS  --  2.3*  --    LIPASE 8  --   --      Recent Labs   Lab 11/18/22 0020 11/19/22  0514   ALKPHOS 143* 132   ALT 38 31   AST 50* 40   ALBUMIN 2.6* 2.4*   PROT 7.5 6.9   BILITOT 1.5* 1.3*        Recent Labs   Lab 11/18/22 2052 11/19/22  0811 11/19/22  1128 11/19/22  1620 11/19/22  2240   POCTGLUCOSE 240* 139* 237* 201* 213*       Scheduled Meds:   amiodarone  200 mg Oral BID    aspirin  81 mg Oral Daily     atorvastatin  40 mg Oral Daily    fluticasone propionate  1 spray Each Nostril Daily    furosemide (LASIX) injection  80 mg Intravenous BID    gabapentin  300 mg Oral BID    heparin (porcine)  5,000 Units Subcutaneous Q12H    melatonin  6 mg Oral Nightly    methIMAzole  2.5 mg Oral Daily    potassium, sodium phosphates  2 packet Oral QID (AC & HS)     Continuous Infusions:  As Needed:  acetaminophen, glucagon (human recombinant), glucose, glucose, insulin aspart U-100, naloxone, polyethylene glycol, sodium chloride 0.9%, traMADoL    Assessment and Plan  / Problems managed today    * Volume overload state of heart  Patient stopped Taking her Lasix pill, and is now volume overloaded  , with SOB MONSON and mild peripheral edema, elevated JVD  IV Lasix 40 mg b.i.d. scheduled  Mild troponin elevation likely demand ischemia from volume overload state   Echo ordered, if new WMA or hypokinesis would consult Cardiology, however if there is not, would just continue to diurese her and make sure patient takes her oral Lasix when she discharges    Would consider home health CM or nurse for medication mismanagement assistance.      Acute on chronic diastolic heart failure  Patient is identified as having Diastolic (HFpEF) heart failure that is Acute on chronic. CHF is currently controlled. Latest ECHO performed and demonstrates- Results for orders placed during the hospital encounter of 09/06/22    Echo    Interpretation Summary  · The left ventricle is normal in size with concentric remodeling and normal systolic function.  · The estimated ejection fraction is 58%.  · Grade III left ventricular diastolic dysfunction.  · Normal right ventricular size with normal right ventricular systolic function.  · There is right ventricular hypertrophy.  · There is mild aortic valve stenosis.  · Aortic valve area is 1.60 cm2; peak velocity is 0.99 m/s; mean gradient is 2 mmHg.  · Mild-to-moderate mitral regurgitation.  · Mild to  moderate tricuspid regurgitation.  · Normal central venous pressure (3 mmHg).  · The estimated PA systolic pressure is 43 mmHg.  · There is MILD pulmonary hypertension.  · Trivial posterior pericardial effusion. And under the RA.  · There is a small right pleural effusion.  Low BP's hold Beta Blocker, ACE/ARB and continue IV Furosemide and monitor clinical status closely. Monitor on telemetry. Patient is on CHF pathway.  Monitor strict Is&Os and daily weights.  Place on fluid restriction of 1.5 L. Continue to stress to patient importance of self efficacy and  on diet for CHF. Last BNP reviewed- and noted below   Recent Labs   Lab 11/17/22 2010   *     IV Lasix 40 mg q.12 hours until euvolemic, then switch to home oral diuretic --> 80 mg BID  Repeat echo    Chronic diastolic congestive heart failure  See above      Diabetes mellitus  Patient's FSGs are controlled on current medication regimen.  Last A1c reviewed-   Lab Results   Component Value Date    HGBA1C 7.2 (H) 06/30/2022     Most recent fingerstick glucose reviewed- No results for input(s): POCTGLUCOSE in the last 24 hours.    Antihyperglycemics (From admission, onward)      None          Hold Oral hypoglycemics while patient is in the hospital.  Does not take insulin, DM diet only    Hyperthyroidism  Continue home methimazole          Discharge Planning   ENDY: 11/21/2022     Code Status: Full Code   Is the patient medically ready for discharge?: No    Reason for patient still in hospital (select all that apply): Patient trending condition and Treatment           Diet:  low sodium diet  GI PPx: not needed  DVT PPx:  heparin  Airways: room air  Wounds: none    Goals of Care:  Return to prior functional status     Time (minutes) spent in care of the patient (Greater than 1/2 spent in direct face-to-face contact and care coordination on unit)  35 min    Rosa Isela Main MD

## 2022-11-20 NOTE — PLAN OF CARE
Problem: Fall Injury Risk  Goal: Absence of Fall and Fall-Related Injury  Outcome: Ongoing, Progressing     Problem: Skin Injury Risk Increased  Goal: Skin Health and Integrity  Outcome: Ongoing, Progressing     Problem: Diabetes Comorbidity  Goal: Blood Glucose Level Within Targeted Range  Outcome: Ongoing, Progressing     Problem: Impaired Wound Healing  Goal: Optimal Wound Healing  Outcome: Ongoing, Progressing     Problem: Activity Intolerance  Goal: Enhanced Capacity and Energy  Outcome: Ongoing, Progressing

## 2022-11-20 NOTE — ASSESSMENT & PLAN NOTE
Moderate left atrial enlargement  Right ventricular hypertrophy  Pulmonary hypertension  ECHO 11/19 showed EF 58%, Grade III DD. Moderate left atrial enlargement. Normal right ventricular size with low normal right ventricular systolic function. Moderate to severe tricuspid regurgitation. Bilateral pleural effusions. There may be ascites present and possible ascites.    Low BP's hold Beta Blocker, ACE/ARB and continue IV Furosemide and monitor clinical status closely. Monitor on telemetry. Patient is on CHF pathway.  Monitor strict Is&Os and daily weights.  Place on fluid restriction of 1.5 L. Continue to stress to patient importance of self efficacy and  on diet for CHF. Last BNP reviewed- and noted below   Recent Labs   Lab 11/17/22 2010   *     · IV Lasix 40 mg q.12 hours until euvolemic, then switch to home oral diuretic --> IV 80 mg BID  · Still above her considered baseline weight

## 2022-11-20 NOTE — PLAN OF CARE
Problem: Adult Inpatient Plan of Care  Goal: Plan of Care Review  Outcome: Ongoing, Progressing  Goal: Patient-Specific Goal (Individualized)  Outcome: Ongoing, Progressing  Goal: Absence of Hospital-Acquired Illness or Injury  Outcome: Ongoing, Progressing  Goal: Optimal Comfort and Wellbeing  Outcome: Ongoing, Progressing  Goal: Readiness for Transition of Care  Outcome: Ongoing, Progressing     Problem: Fall Injury Risk  Goal: Absence of Fall and Fall-Related Injury  Outcome: Ongoing, Progressing     Problem: Skin Injury Risk Increased  Goal: Skin Health and Integrity  Outcome: Ongoing, Progressing     Problem: Diabetes Comorbidity  Goal: Blood Glucose Level Within Targeted Range  Outcome: Ongoing, Progressing

## 2022-11-21 NOTE — PROGRESS NOTES
"[facility]) nurse navigator notified of HFTCC candidate in need of education and introduction to 4-6 week program.      PT ella ferrari 3, in chair, ANDREWS. Introduced self to pt as HFTCC nurse navigator.     Patient given "[facility] Home Care Guide" which includes "Daily weight and symptom tracker".  Encouraged pt to review information.      Reviewed the following key points of HFTCC program with pt and family:   1.) Take your medications as directed.    2.) Weight yourself daily   3.) Follow low salt and limited fluid diet.    4.) Stop smoking and start exercising   5.) Go to your appointments and call your team.      Pt reminded to follow Symptom tracker and to call at the onset of symptoms according to tracker.     Reviewed plan for follow up once discharged to include phone calls, in person and virtual visits to assist pt optimizing their heart failure medication regimen and encouraging healthy lifestyle modifications.  Reminded pt that program will assist them over the next 4-6 weeks and then patient will be transferred to long term care provider .  Reminded pt how to contact HFTCC navigator via phone and or via [employer].     Pt instructed appointment will be printed on hospital discharge paperwork. RN will contact pt family for enrollment d/t possibility for AMS.     Pt also reminded RN will call 48-72 hours after discharge to check on them.     Family verbalized read back of information given.  Encouraged pt and family to read over information often and contact team with any questions or concerns.    "

## 2022-11-21 NOTE — PLAN OF CARE
Problem: Fall Injury Risk  Goal: Absence of Fall and Fall-Related Injury  Outcome: Ongoing, Progressing: Patient remains free of falls during shift. Safety measures in place. Bed kept in lowest position, with bed alarm set, nonskid footwear in place when out of bed, side rails up x 2,      Problem: Skin Injury Risk Increased  Goal: Skin Health and Integrity  Outcome: Ongoing, Progressing: Skin kept clean and dry. Position changes adhered to every 2 hours or less. Hells protected. Foam dressing to sacrum.      Problem: Diabetes Comorbidity  Goal: Blood Glucose Level Within Targeted Range  Outcome: Ongoing, Progressing: Educated patient on dietary choices to keep blood glucose within parameters.      Problem: Activity Intolerance  Goal: Enhanced Capacity and Energy  Outcome: Ongoing, Progressing: Patient participates in hygiene care, ROM exercises while in bed, and is up to chair at bedside twice a day.

## 2022-11-21 NOTE — CONSULTS
Food & Nutrition  Education     Diet Education: Fluid and Salt restriction   Time Spent: 10 minutes   Learners: Patient      Handouts provided: Low Sodium Nutrition Therapy, Fluid-Restricted Nutrition Therapy      Comments: Discussed importance of a low sodium diet. Reviewed high sodium foods that should be avoided. Food labels, salt free seasonings, and recommended sodium intake reviewed. Encouraged healthy, fresh foods that are low in sodium that are good for consumption. Fluid intake and conversions discussed. Foods considered fluids were reviewed and encouraged to monitor. General healthy diet encouraged. Patient reports she is familiar with low Na diet, and agreed to read handout provided. PO intake documented as % of meals. All questions/concerns were addressed.     Follow-Up: Yes     Please Re-consult as needed.   Thanks!    Poornima Carrera, MS, RD, LDN

## 2022-11-21 NOTE — PLAN OF CARE
[facility] - Med Surg  Initial Discharge Assessment       Primary Care Provider: Annika Garland MD    Admission Diagnosis: SOB (shortness of breath) [R06.02]  Elevated brain natriuretic peptide (BNP) level [R79.89]  Chest pain [R07.9]    Admission Date: 11/17/2022  Expected Discharge Date: 11/22/2022    Discharge Barriers Identified: None    Payor: HU[employer]NA MANAGED MEDICARE / Plan: HUMANA SNP (SPECIAL NEEDS PLAN) / Product Type: Medicare Advantage /     Extended Emergency Contact Information  Primary Emergency Contact: heidi majanoindira   [facility]  Home Phone: [phone]  Work Phone: [phone]  Mobile Phone: [phone]  Relation: Grandchild  Secondary Emergency Contact: Glenda Majano  Address: [address] [place] [address] [facility]  Home Phone: [phone]  Relation: Daughter    Discharge Plan A: Home Health  Discharge Plan B: Skilled Nursing Facility      [id number] [address] AT [facility]  [address]  [facility] [address]  Phone: [phone] Fax: [phone]    [id number] [place], [place] - [address] [facility] [address]  [address]  [facility] [address]  Phone: [phone] Fax: [phone]    [facility] - [address]  [address]  [place] [address]  Phone: [phone] Fax: [phone]    [id number] [place], [place] - [address] AT [facility] [address]  [address]  [facility] [address]  Phone: [phone] Fax: [phone]      Initial Assessment (most recent)       Adult Discharge Assessment - 11/21/22 1306          Discharge Assessment    Assessment Type Discharge Planning Assessment     Confirmed/corrected address, phone number and insurance Yes     Confirmed Demographics Correct on Facesheet     Source of Information family     Does patient/caregiver  understand observation status Yes     Communicated ENDY with patient/caregiver Yes     Reason For Admission SOB     Lives With grandchild(raine)     Facility Arrived From: Home     Do you expect to return to your current living situation? Yes     Do you have help at home or someone to help you manage your care at home? Yes     Who are your caregiver(s) and their phone number(s)? Zoltan underwood     Prior to hospitilization cognitive status: Alert/Oriented;No Deficits     Current cognitive status: Alert/Oriented;No Deficits     Walking or Climbing Stairs Difficulty ambulation difficulty, requires equipment     Dressing/Bathing Difficulty bathing difficulty, requires equipment     Equipment Currently Used at Home walker, rolling     Readmission within 30 days? No     Patient currently being followed by outpatient case management? No     Do you currently have service(s) that help you manage your care at home? Yes     Name and Contact number of agency [facility]     Is the pt/caregiver preference to resume services with current agency Yes     Do you take prescription medications? Yes     Do you have prescription coverage? Yes     Do you have any problems affording any of your prescribed medications? No     Is the patient taking medications as prescribed? yes     Who is going to help you get home at discharge? Family     How do you get to doctors appointments? family or friend will provide     Are you on dialysis? No     Do you take coumadin? No     Discharge Plan A Home Health     Discharge Plan B Skilled Nursing Facility     DME Needed Upon Discharge  none     Discharge Plan discussed with: Caregiver     Name(s) and Number(s) [facility]     Discharge Barriers Identified None        Relationship/Environment    Name(s) of Who Lives With Patient Maria Guadalupe Majano [facility]

## 2022-11-22 NOTE — PLAN OF CARE
[facility] - [facility]  Discharge Reassessment    Primary Care Provider: Annika Garland MD    Expected Discharge Date: 11/24/2022    Reassessment (most recent)       Discharge Reassessment - 11/22/22 1528          Discharge Reassessment    Assessment Type Discharge Planning Reassessment     Did the patient's condition or plan change since previous assessment? No     Discharge Plan discussed with: Patient;Adult children     Discharge Plan A Home Health     Discharge Plan B Home with family     DME Needed Upon Discharge  none     Discharge Barriers Identified None     Why the patient remains in the hospital Requires continued medical care        Post-Acute Status    Hospital Resources/Appts/Education Provided Appointments scheduled and added to AVS;Post-Acute resouces added to AVS     Discharge Delays None known at this time

## 2022-11-22 NOTE — PLAN OF CARE
11/22/22 1528   Post-Acute Status   Post-Acute Authorization Home Health   Hospital Resources/Appts/Education Provided Appointments scheduled and added to AVS;Post-Acute resouces added to AVS   Discharge Delays None known at this time   Discharge Plan   Discharge Plan A Home Health;Home with family   Discharge Plan B Home with family

## 2022-11-22 NOTE — PROGRESS NOTES
Hospital Medicine  Progress note    Team: [facility] MED Z Rosa Isela Main MD  Admit Date: 11/17/2022    Principal Problem:  Acute on chronic diastolic heart failure    Interval hx:  She still has dyspnea, but seems to be related to dysphagia or feelings of food be suck in her chest. She vomited a few times later today.    ROS   Respiratory: neg for cough neg for shortness of breath  Cardiovascular: neg for chest pain neg for palpitations  Gastrointestinal: neg for nausea neg for vomiting, neg for abdominal pain neg for diarrhea neg for constipation   Behavioral/Psych: neg for depression neg for anxiety    PEx  Temp:  [97.5 °F (36.4 °C)-97.8 °F (36.6 °C)]   Pulse:  [64-88]   Resp:  [16-18]   BP: (101-114)/(54-59)   SpO2:  [90 %-93 %]     Intake/Output Summary (Last 24 hours) at 11/21/2022 2253  Last data filed at 11/21/2022 1240  Gross per 24 hour   Intake --   Output 500 ml   Net -500 ml       General Appearance: no acute distress, WDWN  Heart: regular rate and rhythm, no heave, + JVD to angle of jaw, minimal edema in thighs and buttocks  Respiratory: Normal respiratory effort, symmetric excursion, bilateral vesicular breath sounds   Abdomen: Soft, non-tender; bowel sounds active  Skin: intact, no rash, no ulcers  Neurologic:  No focal numbness or weakness  Mental status: Alert, oriented x 4, affect appropriate     Recent Labs   Lab 11/17/22 2010 11/19/22  0514 11/20/22  0852   WBC 6.85 5.01 10.09   HGB 14.6 13.8 14.9   HCT 43.0 42.4 44.3    235 169       Recent Labs   Lab 11/18/22  0020 11/18/22  0201 11/19/22  0514 11/20/22  0852 11/21/22  1828     --  134* 128* 129*   K 3.8  --  4.0 5.2* 4.7   CL 98  --  96 92* 93*   CO2 29  --  30* 20* 25   BUN 10  --  17 22 29*   CREATININE 0.6  --  0.7 0.9 0.8   *  --  146* 147* 96   CALCIUM 9.7  --  9.0 9.1 8.7   MG  --  1.6  --  2.5 2.3   PHOS  --  2.3*  --  6.4*  --    LIPASE 8  --   --   --   --        Recent Labs   Lab 11/19/22  0514 11/20/22  0804  11/21/22  1828   ALKPHOS 132 140* 177*   ALT 31 26 29   AST 40 36 54*   ALBUMIN 2.4* 2.5* 2.3*   PROT 6.9 7.2 7.1   BILITOT 1.3* 1.7* 2.7*          Recent Labs   Lab 11/20/22  1547 11/20/22  2033 11/21/22  0720 11/21/22  1119 11/21/22  1543 11/21/22 2031   POCTGLUCOSE 193* 227* 133* 165* 140* 122*         Scheduled Meds:   aluminum & magnesium hydroxide-simethicone  30 mL Oral TID AC    amiodarone  200 mg Oral Daily    aspirin  81 mg Oral Daily    atorvastatin  40 mg Oral Daily    fluticasone propionate  1 spray Each Nostril Daily    furosemide (LASIX) injection  80 mg Intravenous BID    gabapentin  300 mg Oral BID    heparin (porcine)  5,000 Units Subcutaneous Q12H    melatonin  6 mg Oral Nightly    methIMAzole  2.5 mg Oral Daily    metoclopramide HCl  10 mg Oral TID AC    pantoprazole  40 mg Oral Daily     Continuous Infusions:  As Needed:  acetaminophen, bisacodyL, glucagon (human recombinant), glucose, glucose, insulin aspart U-100, naloxone, ondansetron, polyethylene glycol, sodium chloride 0.9%, traMADoL    Assessment and Plan  / Problems managed today    * Acute on chronic diastolic heart failure  Moderate left atrial enlargement  Right ventricular hypertrophy  Pulmonary hypertension  ECHO 11/19 showed EF 58%, Grade III DD. Moderate left atrial enlargement. Normal right ventricular size with low normal right ventricular systolic function. Moderate to severe tricuspid regurgitation. Bilateral pleural effusions. There may be ascites present and possible ascites.    Low BP's hold Beta Blocker, ACE/ARB and continue IV Furosemide and monitor clinical status closely. Monitor on telemetry. Patient is on CHF pathway.  Monitor strict Is&Os and daily weights.  Place on fluid restriction of 1.5 L. Continue to stress to patient importance of self efficacy and  on diet for CHF. Last BNP reviewed- and noted below   Recent Labs   Lab 11/17/22 2010   *     IV Lasix 40 mg q.12 hours until euvolemic, then  switch to home oral diuretic --> 80 mg BID    Chronic diastolic congestive heart failure  See above      Diabetes mellitus  Patient's FSGs are controlled on current medication regimen.  Last A1c reviewed-   Lab Results   Component Value Date    HGBA1C 7.2 (H) 06/30/2022     Most recent fingerstick glucose reviewed- No results for input(s): POCTGLUCOSE in the last 24 hours.    Antihyperglycemics (From admission, onward)      None          Hold Oral hypoglycemics while patient is in the hospital.  Does not take insulin, DM diet only    Hyperthyroidism  Continue home methimazole          Discharge Planning   ENDY: 11/22/2022     Code Status: Full Code   Is the patient medically ready for discharge?: No    Reason for patient still in hospital (select all that apply): Patient trending condition and Treatment  Discharge Plan A: Home Health        Diet:  low sodium diet  GI PPx: not needed  DVT PPx:  heparin  Airways: room air  Wounds: none    Goals of Care:  Return to prior functional status     Time (minutes) spent in care of the patient (Greater than 1/2 spent in direct face-to-face contact and care coordination on unit)  35 min    Rosa Isela Main MD

## 2022-11-23 NOTE — ASSESSMENT & PLAN NOTE
Constipation  Esophageal dysphagia  Vomited a few times on one day  reglan before meals  protonix  Suppository with BM and some relief of abdominal pain  X-ray abdomen  US of pancreas and gallbladder  Consider outpatient EDG referral for esophageal dysphagia  Re-evaluate for pain when she is eating again - symptomatic cholelithiasis?

## 2022-11-24 PROBLEM — J90 PLEURAL EFFUSION: Status: ACTIVE | Noted: 2022-01-01

## 2022-11-24 NOTE — NURSING
Pt's IV is no longer patent. This RN didn't see anything to start a new PIV. Referred to [facility], and unsuccessful at attempt. Escalated it up to Charge RN, Jean, for further assistance.

## 2022-11-24 NOTE — HOSPITAL COURSE
Started on furosemide IV 40 mg BID. UOP not adequate. Escalated to 80 mg BID, but missed a few doses due to loss of IV access. Once she was restarted on consistent IV diuretic administration, stillv elbert hard to diurese. Volume exam slowly improving. Patient complaining of epiagastric abdominal discomfort with associated SOB. Started her on aggressive bowel regimen and reglan. Some relief with BM. X-ray of abdomen unremarkable for obstruction or ileus or constipation. US of abdomen showed adherent gallstone. Abdomen pain completely resolved during period of NPO. SOB improving but continues to have nonproductive cough.  On 11/27, patient noted to be satting in mid to upper 80's on RA which is new for her.  She was placed on supplemental O2.  Repeat CXR ordered which demonstrated worsening pulm edema.  Her UO was also starting to decrease as well.  Lasix adjusted. And metolazone added.  Azithromycin added as well.  The patient was finally weaned off of supplemental O2 and her cough has improved significantly. Develop hypoantremia and hypotension as approached euvolemia. Nephrology consulted for hyponatmremia. Went to ICU. Received pressure support. Differing opinions on wether she is hypervolemic and hypovolemic. Cardiology and nephrology recommending holding diuresis. However she remains on strict fluid restriction. If starting on oral diuresis. Need to watch for response as likely oral furosemide not working.  Interval History:  12/8- /58  Pulse 86   SpO2 98% Did not sleep well,, agitated and complaining of pain she described as someone sitting on her chest last night.   + 3 unmeasured.  Holding diuresis now.  Trop 0.6 ( chronically up), Na 129,     12/9- BNP  >1400, trop 0.6-> 0.4. /64   Pulse 74, SpO2 93% .  On cefepime empirically for pneumonia- stop day 12/12 -  Cr 0.7-> 1.1, Na 128 w volume overload, replace mag and phos. BNP > 1400. + jvd. Will resume low dose losartan. Lasix 40 IV given  with diuresis.  Repeating a Na level after diuresis to see if Na worse or improved.   12/10- na dropped with one dose of lasix, now Na 123. /66   Pulse 82 . Cr 1.0 on losartan. Discussed w , nephrology, will give  Tolvaptan x one dose.   12/11- Na 123. K 5.2. cr 0.8, Phos 2.5 (improving), Chatted w nephrology. On Fluid restriction 750, considering a second dose of tolvactan. /53 VSS, She is up in chair, talking and interactive. No SOB.   12/12 - na 130, improved after second dose of tolvactam, off diruetics, She has been getting up to chair, eating well. Phos 2.6 (rising, still low) on neutrophos. /55  Pulse 86   Temp 98.1 VSS.  One stool, good UO.  She wants to go home. possibly plan on tolvaptan 15 mg every other day after discharge. Appreciate Nephrology input and f/u.  Receiving tolvactan inpt today. Has small DU sacrum, being attended to by nursing, pt up in chair today. Discussed her care and condition, explained all her medical problems w Son and DTR who are at bedside.   12/13 - currently enrolled in the [facility]) program. /56   Pulse 76  Na 135. Will discuss w Nephrology. Need outpatient plan: Outpt SNF with tolvactan qod and weekly lab.     12/14- /57   Pulse 76 . Na 136. Plan to dc to SNF today.

## 2022-11-24 NOTE — SUBJECTIVE & OBJECTIVE
Interval History: The patient's main complaint is dry cough.  She could not sleep overnight secondary to cough.  Cough did not respond to benzonatate. Afebrile.     Review of Systems   Constitutional:  Positive for activity change and appetite change. Negative for fever.   Respiratory:  Positive for cough and shortness of breath. Negative for wheezing.    Cardiovascular:  Negative for chest pain and leg swelling.   Gastrointestinal:  Negative for abdominal pain and nausea.   Genitourinary:  Negative for difficulty urinating and dysuria.   Musculoskeletal:  Negative for joint swelling and myalgias.   Neurological:  Negative for headaches.   Psychiatric/Behavioral:  Negative for decreased concentration and sleep disturbance.    Objective:     Vital Signs (Most Recent):  Temp: 97.6 °F (36.4 °C) (11/24/22 1550)  Pulse: 82 (11/24/22 1550)  Resp: 18 (11/24/22 1550)  BP: (!) 116/56 (11/24/22 1550)  SpO2: 95 % (11/24/22 1550)   Vital Signs (24h Range):  Temp:  [96.6 °F (35.9 °C)-97.6 °F (36.4 °C)] 97.6 °F (36.4 °C)  Pulse:  [77-97] 82  Resp:  [16-19] 18  SpO2:  [92 %-95 %] 95 %  BP: (111-163)/(56-71) 116/56     Weight: 43.4 kg (95 lb 10.9 oz)  Body mass index is 19.32 kg/m².    Intake/Output Summary (Last 24 hours) at 11/24/2022 1637  Last data filed at 11/24/2022 0555  Gross per 24 hour   Intake 600 ml   Output 400 ml   Net 200 ml      Physical Exam  Vitals reviewed.   Constitutional:       General: She is awake.      Appearance: She is well-developed and underweight.   HENT:      Head: Normocephalic and atraumatic.   Eyes:      General: Lids are normal. No scleral icterus.     Conjunctiva/sclera: Conjunctivae normal.   Cardiovascular:      Rate and Rhythm: Normal rate and regular rhythm.      Heart sounds: No murmur heard.  Pulmonary:      Effort: Pulmonary effort is normal.      Breath sounds: No wheezing.      Comments: + diminished breath sounds in lung bases  Abdominal:      General: Bowel sounds are normal.       Palpations: Abdomen is soft.   Musculoskeletal:         General: No deformity.      Right lower leg: No edema.      Left lower leg: No edema.   Skin:     General: Skin is warm and dry.   Neurological:      General: No focal deficit present.      Mental Status: She is alert. Mental status is at baseline.   Psychiatric:         Attention and Perception: Attention normal.         Mood and Affect: Mood normal.         Behavior: Behavior normal. Behavior is cooperative.       Significant Labs: All pertinent labs within the past 24 hours have been reviewed.  BMP:   Recent Labs   Lab 11/23/22 0341   *   *   K 4.7   CL 93*   CO2 31*   BUN 25*   CREATININE 0.9   CALCIUM 9.9   MG 2.4     CBC:   Recent Labs   Lab 11/23/22 0341   WBC 5.44   HGB 12.7   HCT 39.9        CMP:   Recent Labs   Lab 11/23/22 0341   *   K 4.7   CL 93*   CO2 31*   *   BUN 25*   CREATININE 0.9   CALCIUM 9.9   PROT 6.4   ALBUMIN 2.2*   BILITOT 1.1*   ALKPHOS 231*   AST 48*   ALT 32   ANIONGAP 7*       Significant Imaging: I have reviewed all pertinent imaging results/findings within the past 24 hours.

## 2022-11-24 NOTE — ASSESSMENT & PLAN NOTE
- Not seen on imaging from earlier this year.  - Most likely related to HFpEF  - Cont diuresis and repeat imaging.

## 2022-11-24 NOTE — PROGRESS NOTES
Hospital Medicine  Progress note    Team: [facility] HOSP MED Z Rosa Isela Main MD  Admit Date: 11/17/2022    Principal Problem:  Acute on chronic diastolic heart failure    Interval hx:  Patient without abdominal pain today, but later found to be still NPO from ultrasound the night before.    ROS   Respiratory: neg for cough neg for shortness of breath  Cardiovascular: neg for chest pain neg for palpitations  Gastrointestinal: neg for nausea neg for vomiting, neg for abdominal pain neg for diarrhea neg for constipation   Behavioral/Psych: neg for depression neg for anxiety    PEx  Temp:  [97 °F (36.1 °C)-101.6 °F (38.7 °C)]   Pulse:  [72-91]   Resp:  [16-19]   BP: ()/(52-66)   SpO2:  [92 %-97 %]   No intake or output data in the 24 hours ending 11/23/22 1928    General Appearance: no acute distress, WDWN  Heart: regular rate and rhythm, no heave, + JVD to angle of jaw, no edema of thighs and buttocks  Respiratory: Normal respiratory effort, symmetric excursion, bilateral vesicular breath sounds   Abdomen: Soft, non-tender; bowel sounds active  Skin: intact, no rash, no ulcers  Neurologic:  No focal numbness or weakness  Mental status: Alert, oriented x 4, affect appropriate     Recent Labs   Lab 11/19/22  0514 11/20/22  0852 11/23/22  0341   WBC 5.01 10.09 5.44   HGB 13.8 14.9 12.7   HCT 42.4 44.3 39.9    169 173       Recent Labs   Lab   0000 11/18/22  0020 11/18/22  0201 11/19/22  0514 11/20/22  0852 11/21/22  1828 11/23/22  0341   NA  --  136  --    < > 128* 129* 131*   K  --  3.8  --    < > 5.2* 4.7 4.7   CL  --  98  --    < > 92* 93* 93*   CO2  --  29  --    < > 20* 25 31*   BUN  --  10  --    < > 22 29* 25*   CREATININE  --  0.6  --    < > 0.9 0.8 0.9   GLU  --  125*  --    < > 147* 96 175*   CALCIUM  --  9.7  --    < > 9.1 8.7 9.9   MG   < >  --  1.6  --  2.5 2.3 2.4   PHOS  --   --  2.3*  --  6.4*  --   --    LIPASE  --  8  --   --   --   --   --     < > = values in this interval not displayed.        Recent Labs   Lab 11/20/22  0852 11/21/22  1828 11/23/22  0341   ALKPHOS 140* 177* 231*   ALT 26 29 32   AST 36 54* 48*   ALBUMIN 2.5* 2.3* 2.2*   PROT 7.2 7.1 6.4   BILITOT 1.7* 2.7* 1.1*          Recent Labs   Lab 11/22/22  0702 11/22/22  1036 11/22/22  1640 11/22/22  1922 11/23/22  1033 11/23/22  1547   POCTGLUCOSE 79 160* 263* 298* 116* 177*         Scheduled Meds:   aluminum & magnesium hydroxide-simethicone  30 mL Oral TID AC    amiodarone  200 mg Oral Daily    aspirin  81 mg Oral Daily    atorvastatin  40 mg Oral Daily    fluticasone propionate  1 spray Each Nostril Daily    furosemide (LASIX) injection  80 mg Intravenous BID    gabapentin  300 mg Oral BID    heparin (porcine)  5,000 Units Subcutaneous Q12H    melatonin  6 mg Oral Nightly    methIMAzole  2.5 mg Oral Daily    pantoprazole  40 mg Oral Daily     Continuous Infusions:  As Needed:  acetaminophen, bisacodyL, glucagon (human recombinant), glucose, glucose, insulin aspart U-100, naloxone, ondansetron, polyethylene glycol, sodium chloride 0.9%, traMADoL    Assessment and Plan  / Problems managed today    * Acute on chronic diastolic heart failure  Moderate left atrial enlargement  Right ventricular hypertrophy  Pulmonary hypertension  ECHO 11/19 showed EF 58%, Grade III DD. Moderate left atrial enlargement. Normal right ventricular size with low normal right ventricular systolic function. Moderate to severe tricuspid regurgitation. Bilateral pleural effusions. There may be ascites present and possible ascites.    Low BP's hold Beta Blocker, ACE/ARB and continue IV Furosemide and monitor clinical status closely. Monitor on telemetry. Patient is on CHF pathway.  Monitor strict Is&Os and daily weights.  Place on fluid restriction of 1.5 L. Continue to stress to patient importance of self efficacy and  on diet for CHF. Last BNP reviewed- and noted below   Recent Labs   Lab 11/17/22 2010   *     IV Lasix 40 mg q.12 hours until  euvolemic, then switch to home oral diuretic --> IV 80 mg BID  Still above her considered baseline weight    Chronic diastolic congestive heart failure  See above      Epigastric pain  Constipation  Esophageal dysphagia  Vomited a few times on one day  reglan before meals  protonix  Suppository with BM and some relief of abdominal pain  X-ray abdomen  US of pancreas and gallbladder  Consider outpatient EDG referral for esophageal dysphagia  Re-evaluate for pain when she is eating again - symptomatic cholelithiasis?    Diabetes mellitus  Patient's FSGs are controlled on current medication regimen.  Last A1c reviewed-   Lab Results   Component Value Date    HGBA1C 7.2 (H) 06/30/2022     Most recent fingerstick glucose reviewed- No results for input(s): POCTGLUCOSE in the last 24 hours.    Antihyperglycemics (From admission, onward)      None          Hold Oral hypoglycemics while patient is in the hospital.  Does not take insulin, DM diet only    Hyperthyroidism  Continue home methimazole      Discharge Planning   ENDY: 11/24/2022     Code Status: Full Code   Is the patient medically ready for discharge?: No    Reason for patient still in hospital (select all that apply): Patient trending condition and Treatment  Discharge Plan A: Home Health, Home with family   Discharge Delays: None known at this time    Diet:  low sodium diet  GI PPx: not needed  DVT PPx:  heparin  Airways: room air  Wounds: none    Goals of Care:  Return to prior functional status     Time (minutes) spent in care of the patient (Greater than 1/2 spent in direct face-to-face contact and care coordination on unit)  35 min    Rosa Isela Main MD

## 2022-11-24 NOTE — NURSING
Tried paging on call Anesthesia as charge RN, Jean said to do. No answer. Calling an outsourcing company as instructed by charge. Aliyah, the woman I spoke to said she will post the need for the PIV, but it won't be done until after 7 pm. Jean, charge RN made aware.

## 2022-11-24 NOTE — ASSESSMENT & PLAN NOTE
Patient's FSGs are controlled on current medication regimen.  Last A1c reviewed-   Lab Results   Component Value Date    HGBA1C 7.2 (H) 06/30/2022     Most recent fingerstick glucose reviewed-   Recent Labs   Lab 11/23/22  1943 11/24/22  0719 11/24/22  1107   POCTGLUCOSE 173* 129* 144*       Antihyperglycemics (From admission, onward)    Start     Stop Route Frequency Ordered    11/19/22 2154  insulin aspart U-100 pen 0-5 Units         -- SubQ Before meals & nightly PRN 11/19/22 2054        · Hold Oral hypoglycemics while patient is in the hospital.  · Does not take insulin, DM diet only

## 2022-11-24 NOTE — PROGRESS NOTES
[facility] Medicine  Progress Note    Patient Name: Peri Johansen  MRN: [id number]  Patient Class: IP- Inpatient   Admission Date: 11/17/2022  Length of Stay: 4 days  Attending Physician: Allyn Severino MD  Primary Care Provider: Annika Garland MD        Subjective:     Principal Problem:Acute on chronic diastolic heart failure        HPI:    Peri Johansen is a 88 y.o. female who has a past medical history of Aortic atherosclerosis, Arthritis, B-cell lymphoma, Diabetes mellitus, type II, Diverticulosis, GERD, Goiter, Hyperlipidemia, Hypertension, Joint pain, Leg pain, Osteoporosis, Renal cyst, Rickets, vitamin D deficiency, Thyroid nodule, and Vitamin D deficiency disease, presented to the ED with CC of Generalized fatigue.  Is a poor historian and unclear why she is at the hospital.  She states that someone told her to stop taking Lasix, but she does not remember who that was and how long ago that was, can not quantify it in days or weeks even.  She stated she did not have any chest pain at all, however appears that there was a comment of chest pain 10 on 10 in ED. troponin was mildly elevated at 0.171, however quickly reduced on next lab at 0.162.  BNP elevated at 925.  Denies any dysuria, but also a note in ED she has mild dysuria.  Denies hematuria but has +2 blood in urine, UA looks noninfectious.  CTA of the chest showed no interval detrimental change or new intra-abdominopelvic abnormality compared to most recent CT 11/11/2022, and no detrimental change in CTA of the aorta compared to most recent prior CTA aorta 09/14/2022.  Noting that she does have moderate to severe aortic atherosclerosis involving the branch vessels and moderate bilateral pleural effusions, stable when compared to prior 11/11/2022.  COVID, flu, RSV negative.  Denies abdominal pain or diarrhea. Denies nausea or vomiting.       Overview/Hospital Course:  Started on furosemide IV 40 mg BID. UOP not adequate.  Escalated to 80 mg BID, but missed a few doses due to loss of IV access. Once she was restarted on consistent IV diuretic administration, she responded with 1+L with each dose. Volume exam slowly improving. Her stated weight 90 lb. She is still above it. Patient complaining of epiagastric abdominal discomfort with associated SOB. Started her on aggressive bowel regimen and reglan. Some relief with BM. X-ray of abdomen unremarkable for obstruction or ileus or constipation. US of abdomen showed adherent gallstone. Abdomen pain completely resolved during period of NPO.       Interval History: The patient's main complaint is dry cough.  She could not sleep overnight secondary to cough.  Cough did not respond to benzonatate. Afebrile.     Review of Systems   Constitutional:  Positive for activity change and appetite change. Negative for fever.   Respiratory:  Positive for cough and shortness of breath. Negative for wheezing.    Cardiovascular:  Negative for chest pain and leg swelling.   Gastrointestinal:  Negative for abdominal pain and nausea.   Genitourinary:  Negative for difficulty urinating and dysuria.   Musculoskeletal:  Negative for joint swelling and myalgias.   Neurological:  Negative for headaches.   Psychiatric/Behavioral:  Negative for decreased concentration and sleep disturbance.    Objective:     Vital Signs (Most Recent):  Temp: 97.6 °F (36.4 °C) (11/24/22 1550)  Pulse: 82 (11/24/22 1550)  Resp: 18 (11/24/22 1550)  BP: (!) 116/56 (11/24/22 1550)  SpO2: 95 % (11/24/22 1550)   Vital Signs (24h Range):  Temp:  [96.6 °F (35.9 °C)-97.6 °F (36.4 °C)] 97.6 °F (36.4 °C)  Pulse:  [77-97] 82  Resp:  [16-19] 18  SpO2:  [92 %-95 %] 95 %  BP: (111-163)/(56-71) 116/56     Weight: 43.4 kg (95 lb 10.9 oz)  Body mass index is 19.32 kg/m².    Intake/Output Summary (Last 24 hours) at 11/24/2022 1634  Last data filed at 11/24/2022 0501  Gross per 24 hour   Intake 600 ml   Output 400 ml   Net 200 ml      Physical  Exam  Vitals reviewed.   Constitutional:       General: She is awake.      Appearance: She is well-developed and underweight.   HENT:      Head: Normocephalic and atraumatic.   Eyes:      General: Lids are normal. No scleral icterus.     Conjunctiva/sclera: Conjunctivae normal.   Cardiovascular:      Rate and Rhythm: Normal rate and regular rhythm.      Heart sounds: No murmur heard.  Pulmonary:      Effort: Pulmonary effort is normal.      Breath sounds: No wheezing.      Comments: + diminished breath sounds in lung bases  Abdominal:      General: Bowel sounds are normal.      Palpations: Abdomen is soft.   Musculoskeletal:         General: No deformity.      Right lower leg: No edema.      Left lower leg: No edema.   Skin:     General: Skin is warm and dry.   Neurological:      General: No focal deficit present.      Mental Status: She is alert. Mental status is at baseline.   Psychiatric:         Attention and Perception: Attention normal.         Mood and Affect: Mood normal.         Behavior: Behavior normal. Behavior is cooperative.       Significant Labs: All pertinent labs within the past 24 hours have been reviewed.  BMP:   Recent Labs   Lab 11/23/22  0341   *   *   K 4.7   CL 93*   CO2 31*   BUN 25*   CREATININE 0.9   CALCIUM 9.9   MG 2.4     CBC:   Recent Labs   Lab 11/23/22 0341   WBC 5.44   HGB 12.7   HCT 39.9        CMP:   Recent Labs   Lab 11/23/22 0341   *   K 4.7   CL 93*   CO2 31*   *   BUN 25*   CREATININE 0.9   CALCIUM 9.9   PROT 6.4   ALBUMIN 2.2*   BILITOT 1.1*   ALKPHOS 231*   AST 48*   ALT 32   ANIONGAP 7*       Significant Imaging: I have reviewed all pertinent imaging results/findings within the past 24 hours.      Assessment/Plan:      * Acute on chronic diastolic heart failure  Moderate left atrial enlargement  Right ventricular hypertrophy  Pulmonary hypertension  ECHO 11/19 showed EF 58%, Grade III DD. Moderate left atrial enlargement. Normal right  ventricular size with low normal right ventricular systolic function. Moderate to severe tricuspid regurgitation. Bilateral pleural effusions. There may be ascites present and possible ascites.    Low BP's hold Beta Blocker, ACE/ARB and continue IV Furosemide and monitor clinical status closely. Monitor on telemetry. Patient is on CHF pathway.  Monitor strict Is&Os and daily weights.  Place on fluid restriction of 1.5 L. Continue to stress to patient importance of self efficacy and  on diet for CHF. Last BNP reviewed- and noted below   Recent Labs   Lab 11/17/22 2010   *     · IV Lasix 40 mg q.12 hours until euvolemic, then switch to home oral diuretic --> IV 80 mg BID  · Still above her considered baseline weight    Pleural effusion  - Not seen on imaging from earlier this year.  - Most likely related to HFpEF  - Cont diuresis and repeat imaging.       Epigastric pain  Constipation  Esophageal dysphagia  Vomited a few times on one day  reglan before meals  protonix  Suppository with BM and some relief of abdominal pain  X-ray abdomen  US of pancreas and gallbladder  Consider outpatient EDG referral for esophageal dysphagia  Re-evaluate for pain when she is eating again - symptomatic cholelithiasis?    Chronic diastolic congestive heart failure  · See above      Diabetes mellitus  Patient's FSGs are controlled on current medication regimen.  Last A1c reviewed-   Lab Results   Component Value Date    HGBA1C 7.2 (H) 06/30/2022     Most recent fingerstick glucose reviewed-   Recent Labs   Lab 11/23/22  1943 11/24/22  0719 11/24/22  1107   POCTGLUCOSE 173* 129* 144*       Antihyperglycemics (From admission, onward)    Start     Stop Route Frequency Ordered    11/19/22 2154  insulin aspart U-100 pen 0-5 Units         -- SubQ Before meals & nightly PRN 11/19/22 2054        · Hold Oral hypoglycemics while patient is in the hospital.  · Does not take insulin, DM diet only    Hyperthyroidism  · Continue home  methimazole        VTE Risk Mitigation (From admission, onward)         Ordered     heparin (porcine) injection 5,000 Units  Every 12 hours         11/18/22 1500     IP VTE HIGH RISK PATIENT  Once         11/18/22 0404     Place sequential compression device  Until discontinued         11/18/22 0404                Discharge Planning   ENDY: 11/25/2022     Code Status: Full Code   Is the patient medically ready for discharge?: No    Reason for patient still in hospital (select all that apply): Patient trending condition, Laboratory test, Treatment and Imaging  Discharge Plan A: Home Health, Home with family   Discharge Delays: None known at this time              Allyn Severino MD  Department of Hospital Medicine   [facility]

## 2022-11-25 NOTE — PLAN OF CARE
11/25/22 1325   Post-Acute Status   Post-Acute Authorization Home Health   Hospital Resources/Appts/Education Provided Post-Acute resouces added to AVS;Appointments scheduled and added to AVS   Discharge Delays None known at this time   Discharge Plan   Discharge Plan A Home Health   Discharge Plan B Home with family

## 2022-11-25 NOTE — PLAN OF CARE
[facility]  Discharge Reassessment    Primary Care Provider: Annika Garland MD    Expected Discharge Date: 11/28/2022    Reassessment (most recent)       Discharge Reassessment - 11/25/22 1326          Discharge Reassessment    Assessment Type Discharge Planning Reassessment     Did the patient's condition or plan change since previous assessment? Yes     Discharge Plan discussed with: Patient;Adult children     Communicated ENDY with patient/caregiver Yes     Discharge Plan A Home Health     Discharge Plan B Home with family     DME Needed Upon Discharge  none     Discharge Barriers Identified None     Why the patient remains in the hospital Requires continued medical care        Post-Acute Status    Post-Acute Authorization [facility] Resources/Appts/Education Provided Post-Acute resouces added to AVS;Appointments scheduled and added to AVS     Discharge Delays None known at this time

## 2022-11-26 PROBLEM — R05.9 COUGH IN ADULT: Status: ACTIVE | Noted: 2022-01-01

## 2022-11-26 NOTE — ASSESSMENT & PLAN NOTE
Moderate left atrial enlargement  Right ventricular hypertrophy  Pulmonary hypertension  ECHO 11/19 showed EF 58%, Grade III DD. Moderate left atrial enlargement. Normal right ventricular size with low normal right ventricular systolic function. Moderate to severe tricuspid regurgitation. Bilateral pleural effusions. There may be ascites present and possible ascites.    Low BP's hold Beta Blocker, ACE/ARB and continue IV Furosemide and monitor clinical status closely. Monitor on telemetry. Patient is on CHF pathway.  Monitor strict Is&Os and daily weights.  Place on fluid restriction of 1.5 L. Continue to stress to patient importance of self efficacy and  on diet for CHF. Last BNP reviewed- and noted below   Recent Labs   Lab 11/17/22 2010   *     · IV Lasix 80mg q.12 hours until euvolemic  · Still above her considered baseline weight

## 2022-11-26 NOTE — SUBJECTIVE & OBJECTIVE
Interval History: Continues to have productive cough.  Changed anti-tussive to dextromethorphan and guaifenesin.  Afebrile.  CXR from last night looks improved.  Able to see some of diaphragms as apposed to previous. No new areas of opacity or consolidation.  Patient wanting to go home.  She is getting frustrated, stating that she is being told differently things each day.  She also does not believe that I'm her doctor.      Review of Systems   Reason unable to perform ROS: limited due to patient's dementia.   Constitutional:  Positive for activity change and appetite change. Negative for fever.   Respiratory:  Positive for cough. Negative for shortness of breath.    Cardiovascular:  Negative for chest pain and leg swelling.   Psychiatric/Behavioral:  Positive for sleep disturbance.    Objective:     Vital Signs (Most Recent):  Temp: 98 °F (36.7 °C) (11/26/22 1139)  Pulse: (!) 59 (11/26/22 1139)  Resp: 18 (11/26/22 1139)  BP: (!) 101/53 (11/26/22 1139)  SpO2: (!) 92 % (11/26/22 1139)   Vital Signs (24h Range):  Temp:  [96.8 °F (36 °C)-98.5 °F (36.9 °C)] 98 °F (36.7 °C)  Pulse:  [59-85] 59  Resp:  [18] 18  SpO2:  [91 %-96 %] 92 %  BP: ()/(53-59) 101/53     Weight: 43.4 kg (95 lb 10.9 oz)  Body mass index is 19.32 kg/m².    Intake/Output Summary (Last 24 hours) at 11/26/2022 1316  Last data filed at 11/26/2022 0555  Gross per 24 hour   Intake --   Output 650 ml   Net -650 ml      Physical Exam  Vitals reviewed.   Constitutional:       General: She is awake.      Appearance: She is well-developed and underweight. She is ill-appearing.   Eyes:      General: Lids are normal. No scleral icterus.     Conjunctiva/sclera: Conjunctivae normal.   Cardiovascular:      Rate and Rhythm: Normal rate and regular rhythm.      Heart sounds: No murmur heard.  Pulmonary:      Effort: Pulmonary effort is normal.      Breath sounds: No wheezing.      Comments: + diminished breath sounds in lung bases  Abdominal:      General: Bowel  sounds are normal.      Palpations: Abdomen is soft.   Musculoskeletal:         General: No deformity.      Right lower leg: No edema.      Left lower leg: No edema.      Comments: + muscle wasting   Skin:     General: Skin is warm and dry.   Neurological:      General: No focal deficit present.      Comments: + patient is oriented to place and person  She is a little more confused today   Psychiatric:         Mood and Affect: Affect is angry.         Behavior: Behavior is agitated.         Thought Content: Thought content is paranoid.       Significant Labs: All pertinent labs within the past 24 hours have been reviewed.    Significant Imaging: I have reviewed all pertinent imaging results/findings within the past 24 hours.

## 2022-11-26 NOTE — ASSESSMENT & PLAN NOTE
- Not seen on imaging from earlier this year.  - Most likely related to HFpEF  - Cont diuresis   - Repeat imaging demonstrates significant improvement but not quite to baseline

## 2022-11-26 NOTE — SUBJECTIVE & OBJECTIVE
Interval History: Afebrile.  + nonproductive cough.    SOB improved.     Review of Systems   Constitutional:  Positive for activity change and appetite change. Negative for fever.   Respiratory:  Positive for cough. Negative for shortness of breath and wheezing.    Cardiovascular:  Negative for chest pain and leg swelling.   Gastrointestinal:  Negative for abdominal pain and nausea.   Genitourinary:  Negative for difficulty urinating and dysuria.   Musculoskeletal:  Negative for joint swelling and myalgias.   Neurological:  Negative for headaches.   Psychiatric/Behavioral:  Negative for decreased concentration and sleep disturbance.    Objective:     Vital Signs (Most Recent):  Temp: 98.5 °F (36.9 °C) (11/25/22 2029)  Pulse: 82 (11/25/22 2029)  Resp: 18 (11/25/22 2029)  BP: (!) 124/57 (11/25/22 2029)  SpO2: (!) 92 % (11/25/22 2029)   Vital Signs (24h Range):  Temp:  [96.5 °F (35.8 °C)-98.7 °F (37.1 °C)] 98.5 °F (36.9 °C)  Pulse:  [77-88] 82  Resp:  [18-19] 18  SpO2:  [92 %-96 %] 92 %  BP: (100-124)/(51-57) 124/57     Weight: 43.4 kg (95 lb 10.9 oz)  Body mass index is 19.32 kg/m².  No intake or output data in the 24 hours ending 11/25/22 2314   Physical Exam  Vitals reviewed.   Constitutional:       General: She is awake.      Appearance: She is well-developed and underweight. She is ill-appearing.   HENT:      Head: Normocephalic and atraumatic.   Eyes:      General: Lids are normal. No scleral icterus.     Conjunctiva/sclera: Conjunctivae normal.   Cardiovascular:      Rate and Rhythm: Normal rate and regular rhythm.      Heart sounds: No murmur heard.  Pulmonary:      Effort: Pulmonary effort is normal.      Breath sounds: No wheezing.      Comments: + diminished breath sounds in lung bases  Abdominal:      General: Bowel sounds are normal.      Palpations: Abdomen is soft.   Musculoskeletal:         General: No deformity.      Right lower leg: No edema.      Left lower leg: No edema.   Skin:     General: Skin is  warm and dry.   Neurological:      General: No focal deficit present.      Mental Status: She is alert. Mental status is at baseline.   Psychiatric:         Attention and Perception: Attention normal.         Mood and Affect: Mood normal.         Behavior: Behavior normal. Behavior is cooperative.       Significant Labs: All pertinent labs within the past 24 hours have been reviewed.    Significant Imaging: I have reviewed all pertinent imaging results/findings within the past 24 hours.

## 2022-11-26 NOTE — ASSESSMENT & PLAN NOTE
Constipation  Esophageal dysphagia  Vomited a few times on one day  reglan before meals  protonix  Suppository with BM and some relief of abdominal pain  X-ray abdomen  US of pancreas and gallbladder  Consider outpatient EDG referral for esophageal dysphagia  - resolved

## 2022-11-26 NOTE — PROGRESS NOTES
[facility]  Progress Note    Patient Name: Peri Johansen  MRN: [id number]  Patient Class: IP- Inpatient   Admission Date: 11/17/2022  Length of Stay: 6 days  Attending Physician: Allyn Severino MD  Primary Care Provider: Annika Garland MD        Subjective:     Principal Problem:Acute on chronic diastolic heart failure        HPI:    Peri Johansen is a 88 y.o. female who has a past medical history of Aortic atherosclerosis, Arthritis, B-cell lymphoma, Diabetes mellitus, type II, Diverticulosis, GERD, Goiter, Hyperlipidemia, Hypertension, Joint pain, Leg pain, Osteoporosis, Renal cyst, Rickets, vitamin D deficiency, Thyroid nodule, and Vitamin D deficiency disease, presented to the ED with CC of Generalized fatigue.  Is a poor historian and unclear why she is at the hospital.  She states that someone told her to stop taking Lasix, but she does not remember who that was and how long ago that was, can not quantify it in days or weeks even.  She stated she did not have any chest pain at all, however appears that there was a comment of chest pain 10 on 10 in ED. troponin was mildly elevated at 0.171, however quickly reduced on next lab at 0.162.  BNP elevated at 925.  Denies any dysuria, but also a note in ED she has mild dysuria.  Denies hematuria but has +2 blood in urine, UA looks noninfectious.  CTA of the chest showed no interval detrimental change or new intra-abdominopelvic abnormality compared to most recent CT 11/11/2022, and no detrimental change in CTA of the aorta compared to most recent prior CTA aorta 09/14/2022.  Noting that she does have moderate to severe aortic atherosclerosis involving the branch vessels and moderate bilateral pleural effusions, stable when compared to prior 11/11/2022.  COVID, flu, RSV negative.  Denies abdominal pain or diarrhea. Denies nausea or vomiting.       Overview/Hospital Course:  Started on furosemide IV 40 mg BID. UOP not adequate.  Escalated to 80 mg BID, but missed a few doses due to loss of IV access. Once she was restarted on consistent IV diuretic administration, she responded with 1+L with each dose. Volume exam slowly improving. Her stated weight 90 lb. She is still above it. Patient complaining of epiagastric abdominal discomfort with associated SOB. Started her on aggressive bowel regimen and reglan. Some relief with BM. X-ray of abdomen unremarkable for obstruction or ileus or constipation. US of abdomen showed adherent gallstone. Abdomen pain completely resolved during period of NPO.   SOB improving but continues to have nonproductive cough.  No evidence of developing infection on repeat CXR.  Afebrile.  Continue diuresis.       Interval History: Continues to have productive cough.  Changed anti-tussive to dextromethorphan and guaifenesin.  Afebrile.  CXR from last night looks improved.  Able to see some of diaphragms as apposed to previous. No new areas of opacity or consolidation.  Patient wanting to go home.  She is getting frustrated, stating that she is being told differently things each day.  She also does not believe that I'm her doctor.      Review of Systems   Reason unable to perform ROS: limited due to patient's dementia.   Constitutional:  Positive for activity change and appetite change. Negative for fever.   Respiratory:  Positive for cough. Negative for shortness of breath.    Cardiovascular:  Negative for chest pain and leg swelling.   Psychiatric/Behavioral:  Positive for sleep disturbance.    Objective:     Vital Signs (Most Recent):  Temp: 98 °F (36.7 °C) (11/26/22 1139)  Pulse: (!) 59 (11/26/22 1139)  Resp: 18 (11/26/22 1139)  BP: (!) 101/53 (11/26/22 1139)  SpO2: (!) 92 % (11/26/22 1139)   Vital Signs (24h Range):  Temp:  [96.8 °F (36 °C)-98.5 °F (36.9 °C)] 98 °F (36.7 °C)  Pulse:  [59-85] 59  Resp:  [18] 18  SpO2:  [91 %-96 %] 92 %  BP: ()/(53-59) 101/53     Weight: 43.4 kg (95 lb 10.9 oz)  Body mass index  is 19.32 kg/m².    Intake/Output Summary (Last 24 hours) at 11/26/2022 1316  Last data filed at 11/26/2022 0555  Gross per 24 hour   Intake --   Output 650 ml   Net -650 ml      Physical Exam  Vitals reviewed.   Constitutional:       General: She is awake.      Appearance: She is well-developed and underweight. She is ill-appearing.   Eyes:      General: Lids are normal. No scleral icterus.     Conjunctiva/sclera: Conjunctivae normal.   Cardiovascular:      Rate and Rhythm: Normal rate and regular rhythm.      Heart sounds: No murmur heard.  Pulmonary:      Effort: Pulmonary effort is normal.      Breath sounds: No wheezing.      Comments: + diminished breath sounds in lung bases  Abdominal:      General: Bowel sounds are normal.      Palpations: Abdomen is soft.   Musculoskeletal:         General: No deformity.      Right lower leg: No edema.      Left lower leg: No edema.      Comments: + muscle wasting   Skin:     General: Skin is warm and dry.   Neurological:      General: No focal deficit present.      Comments: + patient is oriented to place and person  She is a little more confused today   Psychiatric:         Mood and Affect: Affect is angry.         Behavior: Behavior is agitated.         Thought Content: Thought content is paranoid.       Significant Labs: All pertinent labs within the past 24 hours have been reviewed.    Significant Imaging: I have reviewed all pertinent imaging results/findings within the past 24 hours.      Assessment/Plan:      * Acute on chronic diastolic heart failure  Moderate left atrial enlargement  Right ventricular hypertrophy  Pulmonary hypertension  ECHO 11/19 showed EF 58%, Grade III DD. Moderate left atrial enlargement. Normal right ventricular size with low normal right ventricular systolic function. Moderate to severe tricuspid regurgitation. Bilateral pleural effusions. There may be ascites present and possible ascites.    Low BP's hold Beta Blocker, ACE/ARB and continue  IV Furosemide and monitor clinical status closely. Monitor on telemetry. Patient is on CHF pathway.  Monitor strict Is&Os and daily weights.  Place on fluid restriction of 1.5 L. Continue to stress to patient importance of self efficacy and  on diet for CHF. Last BNP reviewed- and noted below   Recent Labs   Lab 11/17/22 2010   *     · IV Lasix 80mg q.12 hours until euvolemic  · Still above her considered baseline weight    Pleural effusion  - Not seen on imaging from earlier this year.  - Most likely related to HFpEF  - Cont diuresis   - Repeat imaging demonstrates significant improvement but not quite to baseline      Epigastric pain  Constipation  Esophageal dysphagia  Vomited a few times on one day  reglan before meals  protonix  Suppository with BM and some relief of abdominal pain  X-ray abdomen  US of pancreas and gallbladder  Consider outpatient EDG referral for esophageal dysphagia  - resolved    Cough in adult  - no evidence of respiratory infection  - dextromethorphan and guaifenesin  - continue diuresing.      Chronic diastolic congestive heart failure  · See above      Physical deconditioning  - consult PT to see patient  - get patient up to chair with meals        Diabetes mellitus  Patient's FSGs are controlled on current medication regimen.  Last A1c reviewed-   Lab Results   Component Value Date    HGBA1C 7.2 (H) 06/30/2022     Most recent fingerstick glucose reviewed-   Recent Labs   Lab 11/25/22  1520 11/25/22 2013 11/26/22  0713 11/26/22  1137   POCTGLUCOSE 108 124* 88 137*       Antihyperglycemics (From admission, onward)    Start     Stop Route Frequency Ordered    11/19/22 2154  insulin aspart U-100 pen 0-5 Units         -- SubQ Before meals & nightly PRN 11/19/22 2054        · Hold Oral hypoglycemics while patient is in the hospital.  · Does not take insulin, DM diet only    Hyperthyroidism  · Continue home methimazole        VTE Risk Mitigation (From admission, onward)          Ordered     heparin (porcine) injection 5,000 Units  Every 12 hours         11/18/22 1500     IP VTE HIGH RISK PATIENT  Once         11/18/22 0404     Place sequential compression device  Until discontinued         11/18/22 0404                Discharge Planning   ENDY: 11/28/2022     Code Status: Full Code   Is the patient medically ready for discharge?: No    Reason for patient still in hospital (select all that apply): Treatment and Imaging  Discharge Plan A: Home Health   Discharge Delays: None known at this time              Allyn Severino MD  Department of Hospital Medicine   [facility]

## 2022-11-26 NOTE — ASSESSMENT & PLAN NOTE
- no evidence of respiratory infection  - dextromethorphan and guaifenesin  - continue diuresing.

## 2022-11-26 NOTE — ASSESSMENT & PLAN NOTE
Patient's FSGs are controlled on current medication regimen.  Last A1c reviewed-   Lab Results   Component Value Date    HGBA1C 7.2 (H) 06/30/2022     Most recent fingerstick glucose reviewed-   Recent Labs   Lab 11/25/22  1520 11/25/22 2013 11/26/22  0713 11/26/22  1137   POCTGLUCOSE 108 124* 88 137*       Antihyperglycemics (From admission, onward)    Start     Stop Route Frequency Ordered    11/19/22 2154  insulin aspart U-100 pen 0-5 Units         -- SubQ Before meals & nightly PRN 11/19/22 2054        · Hold Oral hypoglycemics while patient is in the hospital.  · Does not take insulin, DM diet only

## 2022-11-26 NOTE — PROGRESS NOTES
[facility]  Progress Note    Patient Name: Peri Johansen  MRN: [id number]  Patient Class: IP- Inpatient   Admission Date: 11/17/2022  Length of Stay: 6 days  Attending Physician: Allyn Severino MD  Primary Care Provider: Annika Garland MD        Subjective:     Principal Problem:Acute on chronic diastolic heart failure        HPI:    Peri Johansen is a 88 y.o. female who has a past medical history of Aortic atherosclerosis, Arthritis, B-cell lymphoma, Diabetes mellitus, type II, Diverticulosis, GERD, Goiter, Hyperlipidemia, Hypertension, Joint pain, Leg pain, Osteoporosis, Renal cyst, Rickets, vitamin D deficiency, Thyroid nodule, and Vitamin D deficiency disease, presented to the ED with CC of Generalized fatigue.  Is a poor historian and unclear why she is at the hospital.  She states that someone told her to stop taking Lasix, but she does not remember who that was and how long ago that was, can not quantify it in days or weeks even.  She stated she did not have any chest pain at all, however appears that there was a comment of chest pain 10 on 10 in ED. troponin was mildly elevated at 0.171, however quickly reduced on next lab at 0.162.  BNP elevated at 925.  Denies any dysuria, but also a note in ED she has mild dysuria.  Denies hematuria but has +2 blood in urine, UA looks noninfectious.  CTA of the chest showed no interval detrimental change or new intra-abdominopelvic abnormality compared to most recent CT 11/11/2022, and no detrimental change in CTA of the aorta compared to most recent prior CTA aorta 09/14/2022.  Noting that she does have moderate to severe aortic atherosclerosis involving the branch vessels and moderate bilateral pleural effusions, stable when compared to prior 11/11/2022.  COVID, flu, RSV negative.  Denies abdominal pain or diarrhea. Denies nausea or vomiting.       Overview/Hospital Course:  Started on furosemide IV 40 mg BID. UOP not adequate.  Escalated to 80 mg BID, but missed a few doses due to loss of IV access. Once she was restarted on consistent IV diuretic administration, she responded with 1+L with each dose. Volume exam slowly improving. Her stated weight 90 lb. She is still above it. Patient complaining of epiagastric abdominal discomfort with associated SOB. Started her on aggressive bowel regimen and reglan. Some relief with BM. X-ray of abdomen unremarkable for obstruction or ileus or constipation. US of abdomen showed adherent gallstone. Abdomen pain completely resolved during period of NPO.   SOB improving but continues to have nonproductive cough.  No evidence of developing infection on repeat CXR.  Afebrile.  Continue diuresis.       Interval History: Afebrile.  + nonproductive cough.    SOB improved.     Review of Systems   Constitutional:  Positive for activity change and appetite change. Negative for fever.   Respiratory:  Positive for cough. Negative for shortness of breath and wheezing.    Cardiovascular:  Negative for chest pain and leg swelling.   Gastrointestinal:  Negative for abdominal pain and nausea.   Genitourinary:  Negative for difficulty urinating and dysuria.   Musculoskeletal:  Negative for joint swelling and myalgias.   Neurological:  Negative for headaches.   Psychiatric/Behavioral:  Negative for decreased concentration and sleep disturbance.    Objective:     Vital Signs (Most Recent):  Temp: 98.5 °F (36.9 °C) (11/25/22 2029)  Pulse: 82 (11/25/22 2029)  Resp: 18 (11/25/22 2029)  BP: (!) 124/57 (11/25/22 2029)  SpO2: (!) 92 % (11/25/22 2029)   Vital Signs (24h Range):  Temp:  [96.5 °F (35.8 °C)-98.7 °F (37.1 °C)] 98.5 °F (36.9 °C)  Pulse:  [77-88] 82  Resp:  [18-19] 18  SpO2:  [92 %-96 %] 92 %  BP: (100-124)/(51-57) 124/57     Weight: 43.4 kg (95 lb 10.9 oz)  Body mass index is 19.32 kg/m².  No intake or output data in the 24 hours ending 11/25/22 2314   Physical Exam  Vitals reviewed.   Constitutional:       General:  She is awake.      Appearance: She is well-developed and underweight. She is ill-appearing.   HENT:      Head: Normocephalic and atraumatic.   Eyes:      General: Lids are normal. No scleral icterus.     Conjunctiva/sclera: Conjunctivae normal.   Cardiovascular:      Rate and Rhythm: Normal rate and regular rhythm.      Heart sounds: No murmur heard.  Pulmonary:      Effort: Pulmonary effort is normal.      Breath sounds: No wheezing.      Comments: + diminished breath sounds in lung bases  Abdominal:      General: Bowel sounds are normal.      Palpations: Abdomen is soft.   Musculoskeletal:         General: No deformity.      Right lower leg: No edema.      Left lower leg: No edema.   Skin:     General: Skin is warm and dry.   Neurological:      General: No focal deficit present.      Mental Status: She is alert. Mental status is at baseline.   Psychiatric:         Attention and Perception: Attention normal.         Mood and Affect: Mood normal.         Behavior: Behavior normal. Behavior is cooperative.       Significant Labs: All pertinent labs within the past 24 hours have been reviewed.    Significant Imaging: I have reviewed all pertinent imaging results/findings within the past 24 hours.      Assessment/Plan:      * Acute on chronic diastolic heart failure  Moderate left atrial enlargement  Right ventricular hypertrophy  Pulmonary hypertension  ECHO 11/19 showed EF 58%, Grade III DD. Moderate left atrial enlargement. Normal right ventricular size with low normal right ventricular systolic function. Moderate to severe tricuspid regurgitation. Bilateral pleural effusions. There may be ascites present and possible ascites.    Low BP's hold Beta Blocker, ACE/ARB and continue IV Furosemide and monitor clinical status closely. Monitor on telemetry. Patient is on CHF pathway.  Monitor strict Is&Os and daily weights.  Place on fluid restriction of 1.5 L. Continue to stress to patient importance of self efficacy and   on diet for CHF. Last BNP reviewed- and noted below   Recent Labs   Lab 11/17/22 2010   *     · IV Lasix 40 mg q.12 hours until euvolemic, then switch to home oral diuretic --> IV 80 mg BID  · Still above her considered baseline weight    Pleural effusion  - Not seen on imaging from earlier this year.  - Most likely related to HFpEF  - Cont diuresis and repeat imaging.       Epigastric pain  Constipation  Esophageal dysphagia  Vomited a few times on one day  reglan before meals  protonix  Suppository with BM and some relief of abdominal pain  X-ray abdomen  US of pancreas and gallbladder  Consider outpatient EDG referral for esophageal dysphagia  Re-evaluate for pain when she is eating again - symptomatic cholelithiasis?    Chronic diastolic congestive heart failure  · See above      Diabetes mellitus  Patient's FSGs are controlled on current medication regimen.  Last A1c reviewed-   Lab Results   Component Value Date    HGBA1C 7.2 (H) 06/30/2022     Most recent fingerstick glucose reviewed-   Recent Labs   Lab 11/23/22  1943 11/24/22  0719 11/24/22  1107   POCTGLUCOSE 173* 129* 144*       Antihyperglycemics (From admission, onward)    Start     Stop Route Frequency Ordered    11/19/22 2154  insulin aspart U-100 pen 0-5 Units         -- SubQ Before meals & nightly PRN 11/19/22 2054        · Hold Oral hypoglycemics while patient is in the hospital.  · Does not take insulin, DM diet only    Hyperthyroidism  · Continue home methimazole        VTE Risk Mitigation (From admission, onward)         Ordered     heparin (porcine) injection 5,000 Units  Every 12 hours         11/18/22 1500     IP VTE HIGH RISK PATIENT  Once         11/18/22 0404     Place sequential compression device  Until discontinued         11/18/22 0404                Discharge Planning   ENDY: 11/28/2022     Code Status: Full Code   Is the patient medically ready for discharge?: No    Reason for patient still in hospital (select all that  apply): Patient trending condition, Treatment and Imaging  Discharge Plan A: Home Health   Discharge Delays: None known at this time              Allyn Severino MD  Department of Hospital Medicine   [facility]

## 2022-11-26 NOTE — PLAN OF CARE
Pt had two watery/loose stools during day shift today. Pt continues to receive IV lasix twice daily. Nurse and PCT assisted pt into recliner chair today and she has stayed in chair throughout the day. Pt will be assisted back into bed at end of shift. Pt will continue to receive cough medicine every 4 hours as it has helped with her cough.   Problem: Adult Inpatient Plan of Care  Goal: Plan of Care Review  Outcome: Ongoing, Progressing  Goal: Patient-Specific Goal (Individualized)  Outcome: Ongoing, Progressing  Goal: Absence of Hospital-Acquired Illness or Injury  Outcome: Ongoing, Progressing  Goal: Optimal Comfort and Wellbeing  Outcome: Ongoing, Progressing  Goal: Readiness for Transition of Care  Outcome: Ongoing, Progressing     Problem: Fall Injury Risk  Goal: Absence of Fall and Fall-Related Injury  Outcome: Ongoing, Progressing     Problem: Skin Injury Risk Increased  Goal: Skin Health and Integrity  Outcome: Ongoing, Progressing     Problem: Diabetes Comorbidity  Goal: Blood Glucose Level Within Targeted Range  Outcome: Ongoing, Progressing     Problem: Impaired Wound Healing  Goal: Optimal Wound Healing  Outcome: Ongoing, Progressing     Problem: Activity Intolerance  Goal: Enhanced Capacity and Energy  Outcome: Ongoing, Progressing

## 2022-11-27 PROBLEM — R09.02 HYPOXEMIA: Status: ACTIVE | Noted: 2022-01-01

## 2022-11-27 NOTE — ASSESSMENT & PLAN NOTE
- Patient developed worsening hypoxemia on 11/27  - She is now on 2LNC  - CXR pending  - Furosemide increased

## 2022-11-27 NOTE — PROGRESS NOTES
[facility] Medicine  Progress Note    Patient Name: Peri Johansen  MRN: [id number]  Patient Class: IP- Inpatient   Admission Date: 11/17/2022  Length of Stay: 7 days  Attending Physician: Allyn Severino MD  Primary Care Provider: Annika Garland MD        Subjective:     Principal Problem:Acute on chronic diastolic heart failure        HPI:    Peri Johansen is a 88 y.o. female who has a past medical history of Aortic atherosclerosis, Arthritis, B-cell lymphoma, Diabetes mellitus, type II, Diverticulosis, GERD, Goiter, Hyperlipidemia, Hypertension, Joint pain, Leg pain, Osteoporosis, Renal cyst, Rickets, vitamin D deficiency, Thyroid nodule, and Vitamin D deficiency disease, presented to the ED with CC of Generalized fatigue.  Is a poor historian and unclear why she is at the hospital.  She states that someone told her to stop taking Lasix, but she does not remember who that was and how long ago that was, can not quantify it in days or weeks even.  She stated she did not have any chest pain at all, however appears that there was a comment of chest pain 10 on 10 in ED. troponin was mildly elevated at 0.171, however quickly reduced on next lab at 0.162.  BNP elevated at 925.  Denies any dysuria, but also a note in ED she has mild dysuria.  Denies hematuria but has +2 blood in urine, UA looks noninfectious.  CTA of the chest showed no interval detrimental change or new intra-abdominopelvic abnormality compared to most recent CT 11/11/2022, and no detrimental change in CTA of the aorta compared to most recent prior CTA aorta 09/14/2022.  Noting that she does have moderate to severe aortic atherosclerosis involving the branch vessels and moderate bilateral pleural effusions, stable when compared to prior 11/11/2022.  COVID, flu, RSV negative.  Denies abdominal pain or diarrhea. Denies nausea or vomiting.       Overview/Hospital Course:  Started on furosemide IV 40 mg BID. UOP not adequate.  Escalated to 80 mg BID, but missed a few doses due to loss of IV access. Once she was restarted on consistent IV diuretic administration, she responded with 1+L with each dose. Volume exam slowly improving. Her stated weight 90 lb. She is still above it. Patient complaining of epiagastric abdominal discomfort with associated SOB. Started her on aggressive bowel regimen and reglan. Some relief with BM. X-ray of abdomen unremarkable for obstruction or ileus or constipation. US of abdomen showed adherent gallstone. Abdomen pain completely resolved during period of NPO.   SOB improving but continues to have nonproductive cough.  On 11/27, patient noted to be satting in mid to upper 80's on RA which is new for her.  She was placed on supplemental O2.  Repeat CXR ordered.  Lasix adjusted.       Interval History: Patient complains of not being able to sleep at night secondary to productive cough.  She notes that phlegm is stuck in her throat. She is currently on 2LNC.  No lower ext edema.  Tolerating PO.  Afebrile.     Review of Systems   Reason unable to perform ROS: limited due to patient's dementia.   Constitutional:  Positive for activity change. Negative for fever.   HENT:  Negative for rhinorrhea.    Respiratory:  Positive for cough and shortness of breath.    Cardiovascular:  Negative for chest pain and leg swelling.   Psychiatric/Behavioral:  Positive for agitation and sleep disturbance.    Objective:     Vital Signs (Most Recent):  Temp: 97.1 °F (36.2 °C) (11/27/22 0758)  Pulse: 76 (11/27/22 0758)  Resp: 19 (11/27/22 0758)  BP: (!) 109/59 (11/27/22 0758)  SpO2: 97 % (11/27/22 0758)   Vital Signs (24h Range):  Temp:  [97.1 °F (36.2 °C)-98.1 °F (36.7 °C)] 97.1 °F (36.2 °C)  Pulse:  [59-77] 76  Resp:  [17-19] 19  SpO2:  [90 %-99 %] 97 %  BP: (101-117)/(53-59) 109/59     Weight: 43.4 kg (95 lb 10.9 oz)  Body mass index is 19.32 kg/m².    Intake/Output Summary (Last 24 hours) at 11/27/2022 1016  Last data filed at  11/27/2022 0405  Gross per 24 hour   Intake 500 ml   Output 750 ml   Net -250 ml      Physical Exam  Vitals reviewed.   Constitutional:       General: She is awake.      Appearance: She is well-developed and underweight. She is ill-appearing.   HENT:      Mouth/Throat:      Mouth: Mucous membranes are moist.   Eyes:      General: Lids are normal. No scleral icterus.     Conjunctiva/sclera: Conjunctivae normal.   Cardiovascular:      Rate and Rhythm: Normal rate and regular rhythm.      Heart sounds: No murmur heard.  Pulmonary:      Effort: Pulmonary effort is normal.      Breath sounds: No wheezing.      Comments: + diminished breath sounds in lung bases  + mild inspiratory crackle at bases  Abdominal:      General: Bowel sounds are normal.      Palpations: Abdomen is soft.   Musculoskeletal:         General: No deformity.      Right lower leg: No edema.      Left lower leg: No edema.      Comments: + muscle wasting   Skin:     General: Skin is warm and dry.   Neurological:      General: No focal deficit present.      Mental Status: Mental status is at baseline.      Comments: + patient is oriented to place and person  She is a little more confused today   Psychiatric:         Mood and Affect: Affect is angry.         Behavior: Behavior is agitated.         Thought Content: Thought content is paranoid.       Significant Labs: All pertinent labs within the past 24 hours have been reviewed.    Significant Imaging: I have reviewed all pertinent imaging results/findings within the past 24 hours.      Assessment/Plan:      * Acute on chronic diastolic heart failure  Moderate left atrial enlargement  Right ventricular hypertrophy  Pulmonary hypertension  ECHO 11/19 showed EF 58%, Grade III DD. Moderate left atrial enlargement. Normal right ventricular size with low normal right ventricular systolic function. Moderate to severe tricuspid regurgitation. Bilateral pleural effusions. There may be ascites present and possible  ascites.    Low BP's hold Beta Blocker, ACE/ARB and continue IV Furosemide and monitor clinical status closely. Monitor on telemetry. Patient is on CHF pathway.  Monitor strict Is&Os and daily weights.  Place on fluid restriction of 1.5 L. Continue to stress to patient importance of self efficacy and  on diet for CHF. Last BNP reviewed- and noted below   Recent Labs   Lab 11/17/22  2010   *     Continue diuresis     Pleural effusion  - Not seen on imaging from earlier this year.  - Most likely related to HFpEF  - Cont diuresis   - Repeat imaging demonstrates significant improvement but not quite to baseline      Epigastric pain  Constipation  Esophageal dysphagia  Vomited a few times on one day  reglan before meals  protonix  Suppository with BM and some relief of abdominal pain  X-ray abdomen  US of pancreas and gallbladder  Consider outpatient EDG referral for esophageal dysphagia  - resolved    Hypoxemia  - Patient developed worsening hypoxemia on 11/27  - She is now on 2LNC  - CXR pending  - Furosemide increased       Cough in adult  - no evidence of respiratory infection  - dextromethorphan and guaifenesin  - continue diuresing.      Chronic diastolic congestive heart failure  · See above      Physical deconditioning  - consult PT to see patient  - get patient up to chair with meals        Diabetes mellitus  Patient's FSGs are controlled on current medication regimen.  Last A1c reviewed-   Lab Results   Component Value Date    HGBA1C 7.2 (H) 06/30/2022     Most recent fingerstick glucose reviewed-   Recent Labs   Lab 11/25/22  1520 11/25/22 2013 11/26/22  0713 11/26/22  1137   POCTGLUCOSE 108 124* 88 137*       Antihyperglycemics (From admission, onward)    Start     Stop Route Frequency Ordered    11/19/22 2154  insulin aspart U-100 pen 0-5 Units         -- SubQ Before meals & nightly PRN 11/19/22 2054        · Hold Oral hypoglycemics while patient is in the hospital.  · Does not take insulin,  DM diet only    Hyperthyroidism  · Continue home methimazole        VTE Risk Mitigation (From admission, onward)         Ordered     heparin (porcine) injection 5,000 Units  Every 12 hours         11/18/22 1500     IP VTE HIGH RISK PATIENT  Once         11/18/22 0404     Place sequential compression device  Until discontinued         11/18/22 0404                Discharge Planning   ENDY: 11/28/2022     Code Status: Full Code   Is the patient medically ready for discharge?: No    Reason for patient still in hospital (select all that apply): Patient new problem, Patient trending condition, Laboratory test, Treatment and Imaging  Discharge Plan A: Home Health   Discharge Delays: None known at this time              Allyn Severino MD  Department of Hospital Medicine   [facility]

## 2022-11-27 NOTE — ASSESSMENT & PLAN NOTE
Moderate left atrial enlargement  Right ventricular hypertrophy  Pulmonary hypertension  ECHO 11/19 showed EF 58%, Grade III DD. Moderate left atrial enlargement. Normal right ventricular size with low normal right ventricular systolic function. Moderate to severe tricuspid regurgitation. Bilateral pleural effusions. There may be ascites present and possible ascites.    Low BP's hold Beta Blocker, ACE/ARB and continue IV Furosemide and monitor clinical status closely. Monitor on telemetry. Patient is on CHF pathway.  Monitor strict Is&Os and daily weights.  Place on fluid restriction of 1.5 L. Continue to stress to patient importance of self efficacy and  on diet for CHF. Last BNP reviewed- and noted below   Recent Labs   Lab 11/17/22 2010   *     Continue diuresis

## 2022-11-27 NOTE — NURSING
Pt's IV was inadvertently removed by pt. Pt requested that IV be placed by Ultrasound because she is only going to allow one stick as per pt.

## 2022-11-27 NOTE — PLAN OF CARE
Problem: Physical Therapy  Goal: Physical Therapy Goal  Description: Goals to met by 12/11/2022    1. Supine to sit with Modified [place]  2. Sit to supine with Modified [place]  3. Rolling to Left and Right with Modified [place].  4. Sit to stand transfer with Supervision  5. Bed to chair transfer with Supervision using Rolling Walker  6. Gait  x 75 feet with Stand-by Assistance using Rolling Walker   7. Lower extremity exercise program x15 reps per Instruction, with assistance as needed in order to facilitate improved strength, improved postural control, and improvement in functional independence    PT Eval: 11/27/2022

## 2022-11-27 NOTE — NURSING
Dynamic infusion therapy was called to place another line. As per patient, she stated that she will only allow for one IV stick via ultrasound. She currently does not have IV access at this time.

## 2022-11-27 NOTE — PT/OT/SLP EVAL
Physical Therapy Evaluation and Treatment    Patient Name:  Peri Johansen   MRN:  [id number]  Admit Date: 11/17/2022  Admitting Diagnosis:  Acute on chronic diastolic heart failure   Length of Stay: 7 days  Recent Surgery: * No surgery found *      Recommendations:     Discharge Recommendations:  nursing facility, skilled   Discharge Equipment Recommendations:  (TBD)   Barriers to discharge: Inaccessible home environment and Evolving Clinical Presentation    Assessment:     Peri Johansen is a 88 y.o. female admitted with a medical diagnosis of Acute on chronic diastolic heart failure.  She presents with the following impairments/functional limitations: weakness, impaired functional mobility, impaired endurance, impaired balance, impaired self care skills, decreased lower extremity function, impaired cognition, decreased safety awareness, impaired skin.     Pt agreeable to therapy, very pleasant, cooperative. Pt stands from bedside chair with min A, ambulates to bathroom with RW and CGA, min A for sit<>stand from toilet using grab bars, back into bedside chair with CGA. Continue training gait and functional mobility to maximize independence.     Rehab Prognosis: Fair; patient would benefit from acute skilled PT services to address these deficits and reach maximum level of function.      Treatment Tolerated: Fairly Well    Highest level of mobility achieved this visit: ambulates to bathroom and back to bedside chair with CGA and RW (small amount of assistance with RW in tight spaces)     Activity with RN/PCT: transfers with 1 person assist    Plan:     During this hospitalization, patient to be seen 4 x/week to address the identified rehab impairments via gait training, therapeutic activities, therapeutic exercises, neuromuscular re-education and progress towards the established goals.    Plan of Care Expires:  12/27/22    Subjective     JOSE Gomez notified prior to session. No family present upon PT entrance into  "room.    Chief Complaint: stiffness  Patient/Family Comments/goals: "I've never went to the bathroom with a man in with me!"  Pain/Comfort:  Pain Rating 1: 0/10 ("stiff as a board")    Social History:  Residence: lives with their daughter 1-story house with 0 DIEGO.  Support available: daughter(s)  Equipment Used: walker, rolling  Equipment Owned (not using): None  Prior level of function: independent (only began using walker recently)  Work: Retired.   Drive: no.       Objective:     Additional staff present: none    Patient found up in chair with: PureWick, oxygen     General Precautions: Standard, Cardiac fall   Orthopedic Precautions:N/A   Braces: N/A   Body mass index is 19.32 kg/m².  Oxygen Device: Nasal Cannula 3L      Vitals: /62 (BP Location: Right arm, Patient Position: Sitting)   Pulse 74   Temp 97.5 °F (36.4 °C) (Oral)   Resp 18   Ht 4' 11" (1.499 m)   Wt 43.4 kg (95 lb 10.9 oz)   SpO2 96%   Breastfeeding No   BMI 19.32 kg/m²     Exams:  Cognition:   Oriented X 3 (disoriented to time), Alert, and Cooperative  Command following: Follows one-step verbal commands  Fluency: clear/fluent  Hearing: Intact (listed as mildly [place] but I had no issue)  Vision:  Intact  Skin Integrity: Tear of sacral skin  Nursing aware    Physical Exam:   Edema - None noted  ROM - YONI LEs WFL  Strength - L hip 3/5, R hip 4/5, L knee and ankle 4/5, R knee and ankle 4+/5  Sensation - Intact to light touch  Coordination - No deficits noted    Outcome Measures:    AM-PAC 6 CLICK MOBILITY  Turning over in bed (including adjusting bedclothes, sheets and blankets)?: 3  Sitting down on and standing up from a chair with arms (e.g., wheelchair, bedside commode, etc.): 3  Moving from lying on back to sitting on the side of the bed?: 2  Moving to and from a bed to a chair (including a wheelchair)?: 3  Need to walk in hospital room?: 3  Climbing 3-5 steps with a railing?: 1  Basic Mobility Total Score: 15     Functional " Mobility:    Bed Mobility:   Pt found/returned to bedside chair    Transfers:   Sit <> Stand Transfer: minimum assistance with rolling walker  Stand <> Sit Transfer: minimum assistance with rolling walker  [id number] from EOB and [id number] from toliet    Standing Balance:  Assistance Level Required: Contact Guard Assistance  Patient used: rolling walker  Time: 5 min x 2  Postural deviations noted: slouched posture, rounded shoulders, and increased kyphosis  Encouraged: upright stance  Comments: narrow EDY      Gait:   Patient ambulated: 12ft x 2   Patient required: contact guard  Patient used:  rolling walker  Gait Pattern observed: swing-to gait  Gait Deviation(s): decreased step length, narrow base of support, decreased weight shift, and decreased radha  Impairments due to: impaired balance, decreased strength, and impaired postural control  Portable Supplemental O2 3L utilized  Gait belt utilized    Education:  Time provided for education, counseling and discussion of health disposition in regards to patient's current status  All questions answered within PT scope of practice and to patient's satisfaction  PT role in POC to address current functional deficits  Pt educated on proper body mechanics, safety techniques, and energy conservation with PT facilitation and cueing throughout session    Patient left up in chair with all lines intact, call button in reach, and RN present.    GOALS:   Multidisciplinary Problems       Physical Therapy Goals          Problem: Physical Therapy    Goal Priority Disciplines Outcome Goal Variances Interventions   Physical Therapy Goal     PT, PT/OT Ongoing, Progressing     Description: Goals to met by 12/11/2022    1. Supine to sit with Modified [place]  2. Sit to supine with Modified [place]  3. Rolling to Left and Right with Modified [place].  4. Sit to stand transfer with Supervision  5. Bed to chair transfer with Supervision using Rolling Walker  6. Gait  x 75  feet with Stand-by Assistance using Rolling Walker   7. Lower extremity exercise program x15 reps per Instruction, with assistance as needed in order to facilitate improved strength, improved postural control, and improvement in functional independence                         History:     Past Medical History:   Diagnosis Date    Aortic atherosclerosis 1/7/2016    Arthritis     Chronic diastolic congestive heart failure 9/15/2022    Colon polyp: hyperplastic 2015- repeat 2020 8/6/2015    Diabetes mellitus, type II 8/16/2012    Diverticulosis     Gastric nodule 8/7/2014    GERD (gastroesophageal reflux disease) 8/16/2012    Goiter 8/16/2012    Hyperlipidemia 8/16/2012    Hypertension     Joint pain     Leg pain 8/16/2012    Lymphoma 8/16/2012    Osteopenia 8/16/2012    Osteoporosis     Renal cyst 3/28/2013    Rickets, vitamin D deficiency 8/16/2012    Thyroid nodule 2/24/2014    Vitamin D deficiency disease 8/16/2012       Past Surgical History:   Procedure Laterality Date    CARPAL TUNNEL RELEASE      CATARACT EXTRACTION W/  INTRAOCULAR LENS IMPLANT Bilateral     HYSTERECTOMY      partial    lymphoma surgery      L tibia       Time Tracking:     PT Received On: 11/27/22  PT Start Time: 1049     PT Stop Time: 1125  PT Total Time (min): 36 min     Billable Minutes: Evaluation 1 procedure, Gait Training 10 min, and Therapeutic Activity 15 min    Nicanor Rice, PT, DPT  11/27/2022

## 2022-11-27 NOTE — SUBJECTIVE & OBJECTIVE
Interval History: Patient complains of not being able to sleep at night secondary to productive cough.  She notes that phlegm is stuck in her throat. She is currently on 2LNC.  No lower ext edema.  Tolerating PO.  Afebrile.     Review of Systems   Reason unable to perform ROS: limited due to patient's dementia.   Constitutional:  Positive for activity change. Negative for fever.   HENT:  Negative for rhinorrhea.    Respiratory:  Positive for cough and shortness of breath.    Cardiovascular:  Negative for chest pain and leg swelling.   Psychiatric/Behavioral:  Positive for agitation and sleep disturbance.    Objective:     Vital Signs (Most Recent):  Temp: 97.1 °F (36.2 °C) (11/27/22 0758)  Pulse: 76 (11/27/22 0758)  Resp: 19 (11/27/22 0758)  BP: (!) 109/59 (11/27/22 0758)  SpO2: 97 % (11/27/22 0758)   Vital Signs (24h Range):  Temp:  [97.1 °F (36.2 °C)-98.1 °F (36.7 °C)] 97.1 °F (36.2 °C)  Pulse:  [59-77] 76  Resp:  [17-19] 19  SpO2:  [90 %-99 %] 97 %  BP: (101-117)/(53-59) 109/59     Weight: 43.4 kg (95 lb 10.9 oz)  Body mass index is 19.32 kg/m².    Intake/Output Summary (Last 24 hours) at 11/27/2022 1016  Last data filed at 11/27/2022 0405  Gross per 24 hour   Intake 500 ml   Output 750 ml   Net -250 ml      Physical Exam  Vitals reviewed.   Constitutional:       General: She is awake.      Appearance: She is well-developed and underweight. She is ill-appearing.   HENT:      Mouth/Throat:      Mouth: Mucous membranes are moist.   Eyes:      General: Lids are normal. No scleral icterus.     Conjunctiva/sclera: Conjunctivae normal.   Cardiovascular:      Rate and Rhythm: Normal rate and regular rhythm.      Heart sounds: No murmur heard.  Pulmonary:      Effort: Pulmonary effort is normal.      Breath sounds: No wheezing.      Comments: + diminished breath sounds in lung bases  + mild inspiratory crackle at bases  Abdominal:      General: Bowel sounds are normal.      Palpations: Abdomen is soft.   Musculoskeletal:          General: No deformity.      Right lower leg: No edema.      Left lower leg: No edema.      Comments: + muscle wasting   Skin:     General: Skin is warm and dry.   Neurological:      General: No focal deficit present.      Mental Status: Mental status is at baseline.      Comments: + patient is oriented to place and person  She is a little more confused today   Psychiatric:         Mood and Affect: Affect is angry.         Behavior: Behavior is agitated.         Thought Content: Thought content is paranoid.       Significant Labs: All pertinent labs within the past 24 hours have been reviewed.    Significant Imaging: I have reviewed all pertinent imaging results/findings within the past 24 hours.

## 2022-11-28 NOTE — ASSESSMENT & PLAN NOTE
- no evidence of respiratory infection  - dextromethorphan and guaifenesin  - continue diuresing.  - add abx to cover respiratory pathogen

## 2022-11-28 NOTE — NURSING
Anaesthesia is at bedside to place a peripheral IV on patient. Will reach out to dynamic infusion therapy to cancel.

## 2022-11-28 NOTE — NURSING
Anaesthesia was on the floor this evening and they stated that they are in shift change at this time. They asked for nurse to reach out to PICC team first but informed them that they are not here today. They stated they will try to make it to the floor but we can give them a call back later to see if they are available to come. Not sure b/c of how busy they are tonight.

## 2022-11-28 NOTE — NURSING
BP 98/58 which is a drop in baseline of >110 SBP since yesterday.  Notified medical team.  Patient is stable and asymptomatic.  Patient coughs with drinks.  Requested speech eval.

## 2022-11-28 NOTE — PLAN OF CARE
Pt sat up in chair for entire shift and worked with PT today and ambulated to restroom with walker and assistance. Pt continues to require oxygen, incentive spirometer is in room and pt was educated and encouraged to utilize. Pt will continue to be diuresed every 6 hours with IV Lasix.   Problem: Adult Inpatient Plan of Care  Goal: Plan of Care Review  Outcome: Ongoing, Progressing  Goal: Patient-Specific Goal (Individualized)  Outcome: Ongoing, Progressing  Goal: Absence of Hospital-Acquired Illness or Injury  Outcome: Ongoing, Progressing  Goal: Optimal Comfort and Wellbeing  Outcome: Ongoing, Progressing  Goal: Readiness for Transition of Care  Outcome: Ongoing, Progressing     Problem: Fall Injury Risk  Goal: Absence of Fall and Fall-Related Injury  Outcome: Ongoing, Progressing     Problem: Skin Injury Risk Increased  Goal: Skin Health and Integrity  Outcome: Ongoing, Progressing     Problem: Diabetes Comorbidity  Goal: Blood Glucose Level Within Targeted Range  Outcome: Ongoing, Progressing     Problem: Impaired Wound Healing  Goal: Optimal Wound Healing  Outcome: Ongoing, Progressing     Problem: Activity Intolerance  Goal: Enhanced Capacity and Energy  Outcome: Ongoing, Progressing

## 2022-11-28 NOTE — ASSESSMENT & PLAN NOTE
Moderate left atrial enlargement  Right ventricular hypertrophy  Pulmonary hypertension  ECHO 11/19 showed EF 58%, Grade III DD. Moderate left atrial enlargement. Normal right ventricular size with low normal right ventricular systolic function. Moderate to severe tricuspid regurgitation. Bilateral pleural effusions. There may be ascites present and possible ascites.    Low BP's hold Beta Blocker, ACE/ARB and continue IV Furosemide and monitor clinical status closely. Monitor on telemetry. Patient is on CHF pathway.  Monitor strict Is&Os and daily weights.  Place on fluid restriction of 1.5 L. Continue to stress to patient importance of self efficacy and  on diet for CHF. Last BNP reviewed- and noted below   Recent Labs   Lab 11/17/22 2010   *     Continue diuresis   Increase lasix and add metolazone.
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
## 2022-11-28 NOTE — PLAN OF CARE
Problem: Occupational Therapy  Goal: Occupational Therapy Goal  Description: Goals to be met by: 12/12/22     Patient will increase functional independence with ADLs by performing:    UE Dressing with [place].  LE Dressing with Modified [place].  Grooming while standing at sink with Modified [place].  Toileting from toilet with Modified [place] for hygiene and clothing management.   Bathing from  shower chair/bench with Modified [place].  Toilet transfer to toilet with Modified [place].  Increased functional strength to WFL for B UE.  Upper extremity exercise program x15 reps per handout, with assistance as needed.    Outcome: Ongoing, Progressing

## 2022-11-28 NOTE — PT/OT/SLP EVAL
Occupational Therapy   Evaluation    Name: Prei Johansen  MRN: [id number]  Admitting Diagnosis:  Acute on chronic diastolic heart failure  Recent Surgery: * No surgery found *      Recommendations:     Discharge Recommendations: nursing facility, skilled  Discharge Equipment Recommendations:   (TBD)  Barriers to discharge:  None    Assessment:     Peri Johansen is a 88 y.o. female with a medical diagnosis of Acute on chronic diastolic heart failure.  She presents with acute on chronic diastolic HF.  Pt was able to perform supine/sit T/F c min A and sit/stand and bed/chair T/F c CGA and RW.  Able to perform LB dressing c max A.  She was Ox2- not situation or date.  B UE are WFL. Performance deficits affecting function: impaired self care skills, impaired functional mobility, weakness, impaired endurance.      Rehab Prognosis: Good; patient would benefit from acute skilled OT services to address these deficits and reach maximum level of function.       Plan:     Patient to be seen 3 x/week to address the above listed problems via self-care/home management, therapeutic activities, therapeutic exercises  Plan of Care Expires: 12/12/22  Plan of Care Reviewed with: patient    Subjective     Chief Complaint: Acute on chronic diastolic HF  Patient/Family Comments/goals: I'm stiff and I want to sit up.    Occupational Profile:  Living Environment: Pt lives in a one story house c no DIEGO.  Has a tub/shower combo.  Previous level of function: I PTA  Roles and Routines: Retired  Equipment Used at Home:  walker, rolling  Assistance upon Discharge: Pt lives c her     Pain/Comfort:  Pain Rating 1: 0/10    Patients cultural, spiritual, Amish conflicts given the current situation: no    Objective:     Communicated with: RN prior to session.  Patient found supine with PureWick, oxygen upon OT entry to room.    General Precautions: Standard, fall   Orthopedic Precautions:N/A   Braces: N/A  Respiratory Status: Room  air    Occupational Performance:    Bed Mobility:    Patient completed Supine to Sit with minimum assistance    Functional Mobility/Transfers:  Patient completed Sit <> Stand Transfer with contact guard assistance  with  rolling walker   Patient completed Bed <> Chair Transfer using Stand Pivot technique with contact guard assistance with rolling walker  Functional Mobility: Pt has fair static/dynamic standing balance at this time    Activities of Daily Living:  Upper Body Dressing: moderate assistance to don hospital gown.  Lower Body Dressing: maximal assistance to don/doff socks while sitting EOB.    Cognitive/Visual Perceptual:  Cognitive/Psychosocial Skills:     -       Oriented to: Person and Place   -       Follows Commands/attention:Follows one-step commands  -       Communication: clear/fluent  -       Memory: Impaired STM  -       Safety awareness/insight to disability: intact   -       Mood/Affect/Coping skills/emotional control:WFL    Physical Exam:  Upper Extremity Range of Motion:     -       Right Upper Extremity: WFL  -       Left Upper Extremity: WFL  Upper Extremity Strength:    -       Right Upper Extremity: WFL  -       Left Upper Extremity: WFL    AMPAC 6 Click ADL:  AMPAC Total Score: 16    Patient left up in chair with all lines intact, call button in reach, RN notified, and family present    GOALS:   Multidisciplinary Problems       Occupational Therapy Goals          Problem: Occupational Therapy    Goal Priority Disciplines Outcome Interventions   Occupational Therapy Goal     OT, PT/OT Ongoing, Progressing    Description: Goals to be met by: 12/12/22     Patient will increase functional independence with ADLs by performing:    UE Dressing with [place].  LE Dressing with Modified [place].  Grooming while standing at sink with Modified [place].  Toileting from toilet with Modified [place] for hygiene and clothing management.   Bathing from  shower chair/bench with  Modified [place].  Toilet transfer to toilet with Modified [place].  Increased functional strength to WFL for B UE.  Upper extremity exercise program x15 reps per handout, with assistance as needed.                         History:     Past Medical History:   Diagnosis Date    Aortic atherosclerosis 1/7/2016    Arthritis     Chronic diastolic congestive heart failure 9/15/2022    Colon polyp: hyperplastic 2015- repeat 2020 8/6/2015    Diabetes mellitus, type II 8/16/2012    Diverticulosis     Gastric nodule 8/7/2014    GERD (gastroesophageal reflux disease) 8/16/2012    Goiter 8/16/2012    Hyperlipidemia 8/16/2012    Hypertension     Joint pain     Leg pain 8/16/2012    Lymphoma 8/16/2012    Osteopenia 8/16/2012    Osteoporosis     Renal cyst 3/28/2013    Rickets, vitamin D deficiency 8/16/2012    Thyroid nodule 2/24/2014    Vitamin D deficiency disease 8/16/2012         Past Surgical History:   Procedure Laterality Date    CARPAL TUNNEL RELEASE      CATARACT EXTRACTION W/  INTRAOCULAR LENS IMPLANT Bilateral     HYSTERECTOMY      partial    lymphoma surgery      L tibia       Time Tracking:     OT Date of Treatment: 11/28/22  OT Start Time: 1123  OT Stop Time: 1143  OT Total Time (min): 20 min    Billable Minutes:Evaluation 10  Self Care/Home Management 10    11/28/2022

## 2022-11-28 NOTE — PROGRESS NOTES
11/28/22 0700   WOCN Assessment   WOCN Total Time (mins) 15   Visit Date 11/28/22   Visit Time 0700   Consult Type New   WOCN Speciality Wound   Intervention assessed;changed;applied;chart review;coordination of care;orders        Altered Skin Integrity 09/15/22 0811 midline Sacral spine #1 Ulceration Intact skin with non-blanchable redness of localized area   Date First Assessed/Time First Assessed: 09/15/22 0811   Altered Skin Integrity Present on Admission: yes  Orientation: midline  Location: Sacral spine  Wound Number: #1  Primary Wound Type: (c) Ulceration  Description of Altered Skin Integrity: Intac...   Wound Image    Dressing Appearance Open to air   Drainage Amount Scant   Red (%), Wound Tissue Color 100 %   Wound Edges Defined   Wound Length (cm) 12 cm   Wound Width (cm) 16.5 cm   Wound Depth (cm) 0.1 cm   Wound Volume (cm^3) 19.8 cm^3   Wound Surface Area (cm^2) 198 cm^2   [facility] Surg  Wound Care    Patient Name:  Peri Johansen   MRN:  [id number]  Date: 11/28/2022  Diagnosis: Acute on chronic diastolic heart failure    History:     Past Medical History:   Diagnosis Date    Aortic atherosclerosis 1/7/2016    Arthritis     Chronic diastolic congestive heart failure 9/15/2022    Colon polyp: hyperplastic 2015- repeat 2020 8/6/2015    Diabetes mellitus, type II 8/16/2012    Diverticulosis     Gastric nodule 8/7/2014    GERD (gastroesophageal reflux disease) 8/16/2012    Goiter 8/16/2012    Hyperlipidemia 8/16/2012    Hypertension     Joint pain     Leg pain 8/16/2012    Lymphoma 8/16/2012    Osteopenia 8/16/2012    Osteoporosis     Renal cyst 3/28/2013    Rickets, vitamin D deficiency 8/16/2012    Thyroid nodule 2/24/2014    Vitamin D deficiency disease 8/16/2012       Social History     Socioeconomic History    Marital status: Single   Tobacco Use    Smoking status: Never    Smokeless tobacco: Never   Substance and Sexual Activity    Alcohol use: No     Alcohol/week: 0.0 standard drinks    Drug  use: No    Sexual activity: Not Currently   Other Topics Concern    Are you pregnant or think you may be? No    Breast-feeding No     Social Determinants of Health     Financial Resource Strain: Low Risk     Difficulty of Paying Living Expenses: Not very hard   Food Insecurity: No Food Insecurity    Worried About Running Out of Food in the Last Year: Never true    Ran Out of Food in the Last Year: Never true   Transportation Needs: Unmet Transportation Needs    Lack of Transportation (Medical): Yes    Lack of Transportation (Non-Medical): No   Physical Activity: Inactive    Days of Exercise per Week: 0 days    Minutes of Exercise per Session: 0 min   Stress: No Stress Concern Present    Feeling of Stress : Not at all   Social Connections: Unknown    Frequency of Communication with Friends and Family: More than three times a week    Frequency of Social Gatherings with Friends and Family: Patient refused    Active Member of Clubs or Organizations: Yes    Attends Club or Organization Meetings: Never    Marital Status: Patient refused   Housing Stability: Low Risk     Unable to Pay for Housing in the Last Year: No    Number of Places Lived in the Last Year: 1    Unstable Housing in the Last Year: No       Precautions:     Allergies as of 11/17/2022 - Reviewed 11/17/2022   Allergen Reaction Noted    Latex, natural rubber Itching 05/14/2015    Latex Hives 09/08/2022       [facility] Assessment Details/Treatment   Patient consult for wound care to sacral area, that is a result of moisture. This site is cleanse with soap and water pat dry apply a foam sacral border change every two days for inspection of the sacral area. A waffle mattress is ordered. The patient is educated on lying on the left or right side with purple wedge in place for comfort, and to prevent pressure.    Recommendations made to primary team for the above Orders placed.     11/28/2022

## 2022-11-28 NOTE — PLAN OF CARE
SSC met with patient/family at bedside. Patient experience rounding completed and reviewed the following.     Do you know your discharge plan? Yes, Home Health    If you are discharging home, do you have help at home? Yes    Do you think you will need help at home at discharge? Yes    Have you discussed your needs and preferences with your SW/CM? Yes    Assigned SW/CM notified of any patient/family needs or concerns.

## 2022-11-28 NOTE — PROGRESS NOTES
[facility] Medicine  Progress Note    Patient Name: Peri Johansen  MRN: [id number]  Patient Class: IP- Inpatient   Admission Date: 11/17/2022  Length of Stay: 8 days  Attending Physician: Allyn Severino MD  Primary Care Provider: Annika Garland MD        Subjective:     Principal Problem:Acute on chronic diastolic heart failure        HPI:    Peri Johansen is a 88 y.o. female who has a past medical history of Aortic atherosclerosis, Arthritis, B-cell lymphoma, Diabetes mellitus, type II, Diverticulosis, GERD, Goiter, Hyperlipidemia, Hypertension, Joint pain, Leg pain, Osteoporosis, Renal cyst, Rickets, vitamin D deficiency, Thyroid nodule, and Vitamin D deficiency disease, presented to the ED with CC of Generalized fatigue.  Is a poor historian and unclear why she is at the hospital.  She states that someone told her to stop taking Lasix, but she does not remember who that was and how long ago that was, can not quantify it in days or weeks even.  She stated she did not have any chest pain at all, however appears that there was a comment of chest pain 10 on 10 in ED. troponin was mildly elevated at 0.171, however quickly reduced on next lab at 0.162.  BNP elevated at 925.  Denies any dysuria, but also a note in ED she has mild dysuria.  Denies hematuria but has +2 blood in urine, UA looks noninfectious.  CTA of the chest showed no interval detrimental change or new intra-abdominopelvic abnormality compared to most recent CT 11/11/2022, and no detrimental change in CTA of the aorta compared to most recent prior CTA aorta 09/14/2022.  Noting that she does have moderate to severe aortic atherosclerosis involving the branch vessels and moderate bilateral pleural effusions, stable when compared to prior 11/11/2022.  COVID, flu, RSV negative.  Denies abdominal pain or diarrhea. Denies nausea or vomiting.       Overview/Hospital Course:  Started on furosemide IV 40 mg BID. UOP not adequate.  Escalated to 80 mg BID, but missed a few doses due to loss of IV access. Once she was restarted on consistent IV diuretic administration, she responded with 1+L with each dose. Volume exam slowly improving. Her stated weight 90 lb. She is still above it. Patient complaining of epiagastric abdominal discomfort with associated SOB. Started her on aggressive bowel regimen and reglan. Some relief with BM. X-ray of abdomen unremarkable for obstruction or ileus or constipation. US of abdomen showed adherent gallstone. Abdomen pain completely resolved during period of NPO.   SOB improving but continues to have nonproductive cough.  On 11/27, patient noted to be satting in mid to upper 80's on RA which is new for her.  She was placed on supplemental O2.  Repeat CXR ordered which demonstrated worsening pulm edema.  Her UO was also starting to decrease as well.   Lasix adjusted. And metolazone added.        Interval History: Patient states that cough is better but still present. She is SOB today.  No CP.  Afebrile.      Review of Systems   Reason unable to perform ROS: limited due to patient's dementia.   Constitutional:  Positive for activity change. Negative for fever.   Respiratory:  Positive for cough and shortness of breath.    Cardiovascular:  Negative for chest pain and leg swelling.   Genitourinary:  Positive for decreased urine volume. Negative for difficulty urinating.   Psychiatric/Behavioral:  Positive for agitation. Negative for sleep disturbance.    Objective:     Vital Signs (Most Recent):  Temp: 97.5 °F (36.4 °C) (11/28/22 1120)  Pulse: 81 (11/28/22 1147)  Resp: 18 (11/28/22 1120)  BP: 122/61 (11/28/22 1120)  SpO2: 98 % (11/28/22 1120) Vital Signs (24h Range):  Temp:  [97.4 °F (36.3 °C)-98.6 °F (37 °C)] 97.5 °F (36.4 °C)  Pulse:  [62-81] 81  Resp:  [17-18] 18  SpO2:  [97 %-100 %] 98 %  BP: ()/(50-68) 122/61     Weight: 43.4 kg (95 lb 10.9 oz)  Body mass index is 19.32 kg/m².    Intake/Output Summary  (Last 24 hours) at 11/28/2022 1235  Last data filed at 11/28/2022 0920  Gross per 24 hour   Intake 240 ml   Output --   Net 240 ml      Physical Exam  Vitals reviewed.   Constitutional:       General: She is awake.      Appearance: She is well-developed and underweight. She is ill-appearing.   HENT:      Mouth/Throat:      Mouth: Mucous membranes are moist.   Eyes:      General: Lids are normal. No scleral icterus.     Conjunctiva/sclera: Conjunctivae normal.   Cardiovascular:      Rate and Rhythm: Normal rate and regular rhythm.      Heart sounds: No murmur heard.  Pulmonary:      Effort: Pulmonary effort is normal.      Breath sounds: No wheezing.      Comments: + inspiratory crackles at bases; improved from yesterday  Abdominal:      General: Bowel sounds are normal.      Palpations: Abdomen is soft.   Musculoskeletal:         General: No deformity.      Right lower leg: No edema.      Left lower leg: No edema.      Comments: + muscle wasting   Skin:     General: Skin is warm and dry.   Neurological:      General: No focal deficit present.      Mental Status: Mental status is at baseline.      Comments: + patient is oriented to place and person     Psychiatric:         Attention and Perception: Attention normal.         Behavior: Behavior is cooperative.       Significant Labs: All pertinent labs within the past 24 hours have been reviewed.    Significant Imaging: I have reviewed all pertinent imaging results/findings within the past 24 hours.      Assessment/Plan:      * Acute on chronic diastolic heart failure  Moderate left atrial enlargement  Right ventricular hypertrophy  Pulmonary hypertension  ECHO 11/19 showed EF 58%, Grade III DD. Moderate left atrial enlargement. Normal right ventricular size with low normal right ventricular systolic function. Moderate to severe tricuspid regurgitation. Bilateral pleural effusions. There may be ascites present and possible ascites.    Low BP's hold Beta Blocker,  ACE/ARB and continue IV Furosemide and monitor clinical status closely. Monitor on telemetry. Patient is on CHF pathway.  Monitor strict Is&Os and daily weights.  Place on fluid restriction of 1.5 L. Continue to stress to patient importance of self efficacy and  on diet for CHF. Last BNP reviewed- and noted below   Recent Labs   Lab 11/17/22 2010   *     Continue diuresis   Increase lasix and add metolazone.     Pleural effusion  - Not seen on imaging from earlier this year.  - Most likely related to HFpEF  - Cont diuresis       Cough in adult  - no evidence of respiratory infection  - dextromethorphan and guaifenesin  - continue diuresing.  - add abx to cover respiratory pathogen       Epigastric pain  Constipation  Esophageal dysphagia  Vomited a few times on one day  reglan before meals  protonix  Suppository with BM and some relief of abdominal pain  X-ray abdomen  US of pancreas and gallbladder  Consider outpatient EDG referral for esophageal dysphagia  - resolved    Hypoxemia  - Patient developed worsening hypoxemia on 11/27  - She is now on 2LNC  - CXR demonstrates worsening pulmonary edema  - Furosemide increased and metolazone started      Chronic diastolic congestive heart failure  · See above      Physical deconditioning  - consult PT to see patient  - get patient up to chair with meals        Diabetes mellitus  Patient's FSGs are controlled on current medication regimen.  Last A1c reviewed-   Lab Results   Component Value Date    HGBA1C 7.2 (H) 06/30/2022     Most recent fingerstick glucose reviewed-   Recent Labs   Lab 11/25/22  1520 11/25/22 2013 11/26/22  0713 11/26/22  1137   POCTGLUCOSE 108 124* 88 137*       Antihyperglycemics (From admission, onward)    Start     Stop Route Frequency Ordered    11/19/22 2154  insulin aspart U-100 pen 0-5 Units         -- SubQ Before meals & nightly PRN 11/19/22 2054        · Hold Oral hypoglycemics while patient is in the hospital.  · Does not take  insulin, DM diet only    Hyperthyroidism  · Continue home methimazole        VTE Risk Mitigation (From admission, onward)         Ordered     heparin (porcine) injection 5,000 Units  Every 12 hours         11/18/22 1500     IP VTE HIGH RISK PATIENT  Once         11/18/22 0404     Place sequential compression device  Until discontinued         11/18/22 0404                Discharge Planning   ENDY: 11/30/2022     Code Status: Full Code   Is the patient medically ready for discharge?: No    Reason for patient still in hospital (select all that apply): Patient trending condition, Laboratory test and Treatment  Discharge Plan A: Home Health   Discharge Delays: None known at this time              Allyn Severino MD  Department of Hospital Medicine   [facility]

## 2022-11-28 NOTE — PLAN OF CARE
"Lizzie spoke with grand daughter Zoltan at , offered the recs for SNF per therapy team. Zoltan informed Sw "she will taker her back home with home health." Asked Lizzie if he could re setup home health PT, Sw informed "absolutely and will provide nursing and an OT as well for maximum support at home." Sw to follow with medical workup and dc needs at dc.  "

## 2022-11-28 NOTE — PLAN OF CARE
Pt AAOx4. O2 at 2L NC.  Pt c/o cough, prn meds given. Assisted with turning in bed and weight shifting.  Purewick in place.  Call light within reach.  Bed in lowest position.      Problem: Adult Inpatient Plan of Care  Goal: Plan of Care Review  Outcome: Ongoing, Progressing  Goal: Patient-Specific Goal (Individualized)  Outcome: Ongoing, Progressing  Goal: Absence of Hospital-Acquired Illness or Injury  Outcome: Ongoing, Progressing  Goal: Optimal Comfort and Wellbeing  Outcome: Ongoing, Progressing  Goal: Readiness for Transition of Care  Outcome: Ongoing, Progressing     Problem: Fall Injury Risk  Goal: Absence of Fall and Fall-Related Injury  Outcome: Ongoing, Progressing     Problem: Skin Injury Risk Increased  Goal: Skin Health and Integrity  Outcome: Ongoing, Progressing     Problem: Diabetes Comorbidity  Goal: Blood Glucose Level Within Targeted Range  Outcome: Ongoing, Progressing     Problem: Impaired Wound Healing  Goal: Optimal Wound Healing  Outcome: Ongoing, Progressing     Problem: Activity Intolerance  Goal: Enhanced Capacity and Energy  Outcome: Ongoing, Progressing     Problem: Infection  Goal: Absence of Infection Signs and Symptoms  Outcome: Ongoing, Progressing

## 2022-11-28 NOTE — ASSESSMENT & PLAN NOTE
- Patient developed worsening hypoxemia on 11/27  - She is now on 2LNC  - CXR demonstrates worsening pulmonary edema  - Furosemide increased and metolazone started

## 2022-11-29 PROBLEM — L24.A2 IRRITANT CONTACT DERMATITIS DUE TO FECAL, URINARY OR DUAL INCONTINENCE: Status: ACTIVE | Noted: 2022-01-01

## 2022-11-29 NOTE — CONSULTS
[facility] Med [facility]  Skin Integrity [facility]  Consult Note    Patient Name: Peri Johansen  MRN: [id number]  Admission Date: 11/17/2022  Hospital Length of Stay: 9 days  Attending Physician: Allyn Severino MD  Primary Care Provider: Annika Garland MD     Consults  Subjective:     History of Present Illness:  Peri Johansen is an 88 year old female who has a past medical history of Aortic atherosclerosis, Arthritis, B-cell lymphoma, Diabetes mellitus, type II, Diverticulosis, GERD, Goiter, Hyperlipidemia, Hypertension, Joint pain, Leg pain, Osteoporosis, Renal cyst, Rickets, vitamin D deficiency, Thyroid nodule, and Vitamin D deficiency disease, presented to the ED with CC of Generalized fatigue.  Is a poor historian and unclear why she is at the hospital. She states that someone told her to stop taking Lasix, but she does not remember who that was and how long ago that was, can not quantify it in days or weeks even.  She stated she did not have any chest pain at all, however appears that there was a comment of chest pain 10 on 10 in ED. troponin was mildly elevated at 0.171, however quickly reduced on next lab at 0.162.  BNP elevated at 925.  Denies any dysuria, but also a note in ED she has mild dysuria.  Denies hematuria but has +2 blood in urine, UA looks noninfectious.  CTA of the chest showed no interval detrimental change or new intra-abdominopelvic abnormality compared to most recent CT 11/11/2022, and no detrimental change in CTA of the aorta compared to most recent prior CTA aorta 09/14/2022.  Noting that she does have moderate to severe aortic atherosclerosis involving the branch vessels and moderate bilateral pleural effusions, stable when compared to prior 11/11/2022.  COVID, flu, RSV negative.  Denies abdominal pain or diarrhea. Denies nausea or vomiting. Pt admitted to hospital medicine for further management. Skin integrity MARILIA consulted for evaluation of skin breakdown.      Scheduled Meds:    aluminum & magnesium hydroxide-simethicone  30 mL Oral TID AC    amiodarone  200 mg Oral Daily    aspirin  81 mg Oral Daily    atorvastatin  40 mg Oral Daily    cefTRIAXone (ROCEPHIN) IVPB  1 g Intravenous Q24H    doxycycline  100 mg Oral Q12H    ergocalciferol  50,000 Units Oral Q7 Days    fluticasone propionate  1 spray Each Nostril Daily    furosemide (LASIX) injection  80 mg Intravenous Q12H    gabapentin  300 mg Oral BID    heparin (porcine)  5,000 Units Subcutaneous Q12H    melatonin  6 mg Oral Nightly    methIMAzole  2.5 mg Oral Daily    metOLazone  5 mg Oral Daily    pantoprazole  40 mg Oral Daily     Continuous Infusions:  PRN Meds:acetaminophen, albuterol sulfate, benzonatate, bisacodyL, dextromethorphan-guaiFENesin  mg/5 ml, glucagon (human recombinant), glucose, glucose, hydrOXYzine pamoate, insulin aspart U-100, naloxone, ondansetron, polyethylene glycol, sodium chloride 0.9%, traMADoL    Review of patient's allergies indicates:   Allergen Reactions    Latex, natural rubber Itching    Latex Hives        Past Medical History:   Diagnosis Date    Aortic atherosclerosis 1/7/2016    Arthritis     Chronic diastolic congestive heart failure 9/15/2022    Colon polyp: hyperplastic 2015- repeat 2020 8/6/2015    Diabetes mellitus, type II 8/16/2012    Diverticulosis     Gastric nodule 8/7/2014    GERD (gastroesophageal reflux disease) 8/16/2012    Goiter 8/16/2012    Hyperlipidemia 8/16/2012    Hypertension     Joint pain     Leg pain 8/16/2012    Lymphoma 8/16/2012    Osteopenia 8/16/2012    Osteoporosis     Renal cyst 3/28/2013    Rickets, vitamin D deficiency 8/16/2012    Thyroid nodule 2/24/2014    Vitamin D deficiency disease 8/16/2012     Past Surgical History:   Procedure Laterality Date    CARPAL TUNNEL RELEASE      CATARACT EXTRACTION W/  INTRAOCULAR LENS IMPLANT Bilateral     HYSTERECTOMY      partial    lymphoma surgery      L tibia       Family History        Problem Relation (Age of Onset)    Breast cancer Maternal Aunt    Cancer Brother    Heart disease Father    Hypertension Mother, Father    No Known Problems Maternal Uncle, Paternal Aunt, Paternal Uncle, Maternal Grandmother, Maternal Grandfather, Paternal Grandmother, Paternal Grandfather, Daughter, Son, Son, Daughter, Daughter          Tobacco Use    Smoking status: Never    Smokeless tobacco: Never   Substance and Sexual Activity    Alcohol use: No     Alcohol/week: 0.0 standard drinks    Drug use: No    Sexual activity: Not Currently     Review of Systems   Skin:  Positive for wound.     Objective:     Vital Signs (Most Recent):  Temp: 97.9 °F (36.6 °C) (11/29/22 1119)  Pulse: 73 (11/29/22 1141)  Resp: 18 (11/29/22 1119)  BP: (!) 103/55 (11/29/22 1119)  SpO2: 99 % (11/29/22 1119)   Vital Signs (24h Range):  Temp:  [97.4 °F (36.3 °C)-97.9 °F (36.6 °C)] 97.9 °F (36.6 °C)  Pulse:  [65-79] 73  Resp:  [18] 18  SpO2:  [97 %-99 %] 99 %  BP: (103-117)/(55-61) 103/55     Weight: 43.4 kg (95 lb 10.9 oz)  Body mass index is 19.32 kg/m².  Physical Exam  Constitutional:       Appearance: Normal appearance.   Skin:     General: Skin is warm and dry.      Findings: Lesion present.   Neurological:      Mental Status: She is alert.       Laboratory:  All pertinent labs reviewed within the last 24 hours.    Diagnostic Results:  None        Assessment/Plan:         MARILIA Skin Integrity Evaluation    Skin Integrity MARILIA evaluation of patient as part of the comprehensive skin care team.   She has been admitted for 12 days. Skin injury was noted on 11/26/22. POA yes.       Sacrum            Irritant contact dermatitis due to fecal, urinary or dual incontinence  - consult received for evaluation of skin breakdown.  - pt presented to the ED with CC of Generalized fatigue.  - pt seen sitting up to chair wearing a brief and sacral foam.  - two small sacral areas of breakdown with pale pink wound beds likely related to moisture  from incontinence.  - appears to be healing from last wound assessment.  - continue barrier cream prn and foam dressing to area.  - wendy surface being used. Waffle overlay ordered to bedside.  - chair cushion ordered as well.  - encouraged q2h turns.  - nursing to maintain pressure injury prevention measures and continue wound care per orders.        Thank you for your consult. I will follow-up with patient. Please contact us if you have any additional questions.       Glenda Gibbons NP  Skin Integrity [facility]  [facility]

## 2022-11-29 NOTE — PT/OT/SLP PROGRESS
Physical Therapy Treatment    Patient Name:  Peri Johansen   MRN:  [id number]    Recommendations:     Discharge Recommendations:  nursing facility, skilled   Discharge Equipment Recommendations:  (TBD)   Barriers to discharge: None    Assessment:     ePri Johansen is a 88 y.o. female admitted with a medical diagnosis of Acute on chronic diastolic heart failure.  She presents with the following impairments/functional limitations:   (impaired self care skills; impaired functional mobility; weakness; impaired endurance)  Pt Progressing with PT Intervention. Pt Progressing with improving gait distance. Pt would continue to benefit from skilled PT to address overall functional mobility, goals , and to return to functional baseline.  Goals remain appropriate.    Rehab Prognosis: Good; patient would benefit from acute skilled PT services to address these deficits and reach maximum level of function.    Recent Surgery: * No surgery found *      Plan:     During this hospitalization, patient to be seen 4 x/week to address the identified rehab impairments via gait training, therapeutic activities, therapeutic exercises, neuromuscular re-education and progress toward the following goals:    Plan of Care Expires:  12/27/22    Subjective     Chief Complaint: fatigue  Patient/Family Comments/goals: I want to get up  Pain/Comfort:  Pain Rating 1: 0/10  Pain Rating Post-Intervention 1: 0/10      Objective:     Communicated with RN prior to session.  Patient found HOB elevated with oxygen, PureWick upon PT entry to room.     General Precautions: Standard, fall   Orthopedic Precautions:N/A   Braces: N/A  Respiratory Status: Nasal cannula, flow 2 L/min     Functional Mobility:  Bed Mobility:     Supine to Sit: minimum assistance  Transfers:     Sit to Stand:  contact guard assistance with rolling walker  Gait: pt amb with RW  30 ft with CGA  Pt demo decreased step length, narrow base of support, decreased weight shift, and decreased  radha    AM-PAC 6 CLICK MOBILITY  Turning over in bed (including adjusting bedclothes, sheets and blankets)?: 3  Sitting down on and standing up from a chair with arms (e.g., wheelchair, bedside commode, etc.): 3  Moving from lying on back to sitting on the side of the bed?: 2  Moving to and from a bed to a chair (including a wheelchair)?: 3  Need to walk in hospital room?: 3  Climbing 3-5 steps with a railing?: 1  Basic Mobility Total Score: 15       Treatment & Education:  Therapist provided instruction and educated of  patient on progress, safety,d/c,PT POC,   proper body mechanics, energy conservation, and fall prevention strategies during tasks listed above, on the effects of prolonged immobility and the importance of performing OOB activity and exercises to promote healing and reduce recovery time      Patient  facilitated therex  seated in bedside chair B LE AROM AP, LAQ, Hip Flexion Patient required skilled PTA for instruction of exercises and appropriate cues to perform exercises safely, sequencing and appropriately.   Exercises performed to develop and maintain pt's strength, endurance and flexibility.  Updated white board with appropriate PT mobility information for medical team notification     Donned an extra gown   Call nursing/pct to transfer to chair/use bathroom. Pt stated understanding      Bedside table in front of patient and area set up for function, convenience, and safety. RN aware of patient's mobility needs and status. Questions/concerns addressed within PTA scope of practice; patient  with no further questions. Time was provided for active listening, discussion of health disposition, and discussion of safe discharge. Pt?verbalized?agreement .    Patient left up in chair with all lines intact, call button in reach, and nsg notified..    GOALS:   Multidisciplinary Problems       Physical Therapy Goals          Problem: Physical Therapy    Goal Priority Disciplines Outcome Goal Variances  Interventions   Physical Therapy Goal     PT, PT/OT Ongoing, Progressing     Description: Goals to met by 12/11/2022    1. Supine to sit with Modified [place]  2. Sit to supine with Modified [place]  3. Rolling to Left and Right with Modified [place].  4. Sit to stand transfer with Supervision  5. Bed to chair transfer with Supervision using Rolling Walker  6. Gait  x 75 feet with Stand-by Assistance using Rolling Walker   7. Lower extremity exercise program x15 reps per Instruction, with assistance as needed in order to facilitate improved strength, improved postural control, and improvement in functional independence                         Time Tracking:     PT Received On: 11/29/22  PT Start Time: 0949     PT Stop Time: 1012  PT Total Time (min): 23 min     Billable Minutes: Gait Training 13 and Therapeutic Activity 10    Treatment Type: Treatment  PT/PTA: PTA     PTA Visit Number: 1     11/29/2022

## 2022-11-29 NOTE — SUBJECTIVE & OBJECTIVE
Interval History: Patient states that cough is better.  Her urine is not being collected.  The pure wick was removed and patient was placed in a diaper.  Bedside toilet ordered for patient so that she does not have to soil herself.  She is not incontinent. Afebrile.  Off O2, satting at 89%.     Review of Systems   Reason unable to perform ROS: limited due to patient's dementia.   Constitutional:  Positive for activity change. Negative for fever.   Respiratory:  Positive for cough and shortness of breath.    Cardiovascular:  Negative for chest pain and leg swelling.   Gastrointestinal:  Negative for abdominal pain.        + loose stool   Genitourinary:  Positive for decreased urine volume. Negative for difficulty urinating.   Neurological:  Positive for weakness. Negative for light-headedness.   Psychiatric/Behavioral:  Negative for sleep disturbance.    Objective:     Vital Signs (Most Recent):  Temp: 97.9 °F (36.6 °C) (11/29/22 1119)  Pulse: 73 (11/29/22 1141)  Resp: 18 (11/29/22 1119)  BP: (!) 103/55 (11/29/22 1119)  SpO2: 99 % (11/29/22 1119)   Vital Signs (24h Range):  Temp:  [97.4 °F (36.3 °C)-97.9 °F (36.6 °C)] 97.9 °F (36.6 °C)  Pulse:  [65-79] 73  Resp:  [18] 18  SpO2:  [97 %-99 %] 99 %  BP: (103-117)/(55-61) 103/55     Weight: 43.4 kg (95 lb 10.9 oz)  Body mass index is 19.32 kg/m².    Intake/Output Summary (Last 24 hours) at 11/29/2022 1254  Last data filed at 11/28/2022 1859  Gross per 24 hour   Intake 334.45 ml   Output 400 ml   Net -65.55 ml      Physical Exam  Vitals reviewed.   Constitutional:       General: She is awake.      Appearance: She is well-developed and underweight. She is ill-appearing.   HENT:      Mouth/Throat:      Mouth: Mucous membranes are moist.   Eyes:      General: Lids are normal. No scleral icterus.     Conjunctiva/sclera: Conjunctivae normal.   Cardiovascular:      Rate and Rhythm: Normal rate and regular rhythm.      Heart sounds: No murmur heard.  Pulmonary:      Effort:  Pulmonary effort is normal.      Breath sounds: Rhonchi present. No wheezing.      Comments: + inspiratory crackles at bases; improved from yesterday  Abdominal:      General: Bowel sounds are normal.      Palpations: Abdomen is soft.   Musculoskeletal:         General: No deformity.      Right lower leg: No edema.      Left lower leg: No edema.      Comments: + muscle wasting   Skin:     General: Skin is warm and dry.   Neurological:      General: No focal deficit present.      Mental Status: Mental status is at baseline.      Comments: + patient is oriented to place and person     Psychiatric:         Attention and Perception: Attention normal.         Behavior: Behavior is cooperative.       Significant Labs: All pertinent labs within the past 24 hours have been reviewed.    Significant Imaging: I have reviewed all pertinent imaging results/findings within the past 24 hours.

## 2022-11-29 NOTE — PLAN OF CARE
[facility]  Discharge Reassessment    Primary Care Provider: Annika Garland MD    Expected Discharge Date: 12/1/2022    Reassessment (most recent)       Discharge Reassessment - 11/29/22 1521          Discharge Reassessment    Assessment Type Discharge Planning Reassessment     Did the patient's condition or plan change since previous assessment? No     Discharge Plan discussed with: Patient;Caregiver     Communicated ENDY with patient/caregiver Yes     Discharge Plan A Home Health     Discharge Plan B Home with family     DME Needed Upon Discharge  none     Discharge Barriers Identified None     Why the patient remains in the hospital Requires continued medical care        Post-Acute Status    Post-Acute Authorization [facility] Resources/Appts/Education Provided Appointments scheduled and added to AVS;Post-Acute resouces added to AVS     Discharge Delays None known at this time

## 2022-11-29 NOTE — ASSESSMENT & PLAN NOTE
Moderate left atrial enlargement  Right ventricular hypertrophy  Pulmonary hypertension  ECHO 11/19 showed EF 58%, Grade III DD. Moderate left atrial enlargement. Normal right ventricular size with low normal right ventricular systolic function. Moderate to severe tricuspid regurgitation. Bilateral pleural effusions.  Low BP's hold Beta Blocker, ACE/ARB and continue IV Furosemide and monitor clinical status closely. Monitor on telemetry. Patient is on CHF pathway.  Monitor strict Is&Os and daily weights.  Place on fluid restriction of 1.5 L. Continue to stress to patient importance of self efficacy and  on diet for CHF. Last BNP reviewed- and noted below   Recent Labs   Lab 11/17/22 2010   *     Continue diuresis   Increase lasix and add metolazone.

## 2022-11-29 NOTE — ASSESSMENT & PLAN NOTE
- consult received for evaluation of skin breakdown.  - pt presented to the ED with CC of Generalized fatigue.  - pt seen sitting up to chair wearing a brief and sacral foam.  - two small sacral areas of breakdown with pale pink wound beds likely related to moisture from incontinence.  - appears to be healing from last wound assessment.  - continue barrier cream prn and foam dressing to area.  - wendy surface being used. Waffle overlay ordered to bedside.  - chair cushion ordered as well.  - encouraged q2h turns.  - nursing to maintain pressure injury prevention measures and continue wound care per orders.

## 2022-11-29 NOTE — PROGRESS NOTES
[facility]  Progress Note    Patient Name: Peri Johansen  MRN: [id number]  Patient Class: IP- Inpatient   Admission Date: 11/17/2022  Length of Stay: 9 days  Attending Physician: Allyn Severino MD  Primary Care Provider: Annika Garland MD        Subjective:     Principal Problem:Acute on chronic diastolic heart failure        HPI:    Peri Johansen is a 88 y.o. female who has a past medical history of Aortic atherosclerosis, Arthritis, B-cell lymphoma, Diabetes mellitus, type II, Diverticulosis, GERD, Goiter, Hyperlipidemia, Hypertension, Joint pain, Leg pain, Osteoporosis, Renal cyst, Rickets, vitamin D deficiency, Thyroid nodule, and Vitamin D deficiency disease, presented to the ED with CC of Generalized fatigue.  Is a poor historian and unclear why she is at the hospital.  She states that someone told her to stop taking Lasix, but she does not remember who that was and how long ago that was, can not quantify it in days or weeks even.  She stated she did not have any chest pain at all, however appears that there was a comment of chest pain 10 on 10 in ED. troponin was mildly elevated at 0.171, however quickly reduced on next lab at 0.162.  BNP elevated at 925.  Denies any dysuria, but also a note in ED she has mild dysuria.  Denies hematuria but has +2 blood in urine, UA looks noninfectious.  CTA of the chest showed no interval detrimental change or new intra-abdominopelvic abnormality compared to most recent CT 11/11/2022, and no detrimental change in CTA of the aorta compared to most recent prior CTA aorta 09/14/2022.  Noting that she does have moderate to severe aortic atherosclerosis involving the branch vessels and moderate bilateral pleural effusions, stable when compared to prior 11/11/2022.  COVID, flu, RSV negative.  Denies abdominal pain or diarrhea. Denies nausea or vomiting.       Overview/Hospital Course:  Started on furosemide IV 40 mg BID. UOP not adequate.  Escalated to 80 mg BID, but missed a few doses due to loss of IV access. Once she was restarted on consistent IV diuretic administration, she responded with 1+L with each dose. Volume exam slowly improving. Her stated weight 90 lb. She is still above it. Patient complaining of epiagastric abdominal discomfort with associated SOB. Started her on aggressive bowel regimen and reglan. Some relief with BM. X-ray of abdomen unremarkable for obstruction or ileus or constipation. US of abdomen showed adherent gallstone. Abdomen pain completely resolved during period of NPO.   SOB improving but continues to have nonproductive cough.  On 11/27, patient noted to be satting in mid to upper 80's on RA which is new for her.  She was placed on supplemental O2.  Repeat CXR ordered which demonstrated worsening pulm edema.  Her UO was also starting to decrease as well.   Lasix adjusted. And metolazone added.  Azithromycin added as well.        Interval History: Patient states that cough is better.  Her urine is not being collected.  The pure wick was removed and patient was placed in a diaper.  Bedside toilet ordered for patient so that she does not have to soil herself.  She is not incontinent. Afebrile.  Off O2, satting at 89%.     Review of Systems   Reason unable to perform ROS: limited due to patient's dementia.   Constitutional:  Positive for activity change. Negative for fever.   Respiratory:  Positive for cough and shortness of breath.    Cardiovascular:  Negative for chest pain and leg swelling.   Gastrointestinal:  Negative for abdominal pain.        + loose stool   Genitourinary:  Positive for decreased urine volume. Negative for difficulty urinating.   Neurological:  Positive for weakness. Negative for light-headedness.   Psychiatric/Behavioral:  Negative for sleep disturbance.    Objective:     Vital Signs (Most Recent):  Temp: 97.9 °F (36.6 °C) (11/29/22 1119)  Pulse: 73 (11/29/22 1141)  Resp: 18 (11/29/22 1119)  BP:  (!) 103/55 (11/29/22 1119)  SpO2: 99 % (11/29/22 1119)   Vital Signs (24h Range):  Temp:  [97.4 °F (36.3 °C)-97.9 °F (36.6 °C)] 97.9 °F (36.6 °C)  Pulse:  [65-79] 73  Resp:  [18] 18  SpO2:  [97 %-99 %] 99 %  BP: (103-117)/(55-61) 103/55     Weight: 43.4 kg (95 lb 10.9 oz)  Body mass index is 19.32 kg/m².    Intake/Output Summary (Last 24 hours) at 11/29/2022 1254  Last data filed at 11/28/2022 1859  Gross per 24 hour   Intake 334.45 ml   Output 400 ml   Net -65.55 ml      Physical Exam  Vitals reviewed.   Constitutional:       General: She is awake.      Appearance: She is well-developed and underweight. She is ill-appearing.   HENT:      Mouth/Throat:      Mouth: Mucous membranes are moist.   Eyes:      General: Lids are normal. No scleral icterus.     Conjunctiva/sclera: Conjunctivae normal.   Cardiovascular:      Rate and Rhythm: Normal rate and regular rhythm.      Heart sounds: No murmur heard.  Pulmonary:      Effort: Pulmonary effort is normal.      Breath sounds: Rhonchi present. No wheezing.      Comments: + inspiratory crackles at bases; improved from yesterday  Abdominal:      General: Bowel sounds are normal.      Palpations: Abdomen is soft.   Musculoskeletal:         General: No deformity.      Right lower leg: No edema.      Left lower leg: No edema.      Comments: + muscle wasting   Skin:     General: Skin is warm and dry.   Neurological:      General: No focal deficit present.      Mental Status: Mental status is at baseline.      Comments: + patient is oriented to place and person     Psychiatric:         Attention and Perception: Attention normal.         Behavior: Behavior is cooperative.       Significant Labs: All pertinent labs within the past 24 hours have been reviewed.    Significant Imaging: I have reviewed all pertinent imaging results/findings within the past 24 hours.      Assessment/Plan:      * Acute on chronic diastolic heart failure  Moderate left atrial enlargement  Right  ventricular hypertrophy  Pulmonary hypertension  ECHO 11/19 showed EF 58%, Grade III DD. Moderate left atrial enlargement. Normal right ventricular size with low normal right ventricular systolic function. Moderate to severe tricuspid regurgitation. Bilateral pleural effusions.  Low BP's hold Beta Blocker, ACE/ARB and continue IV Furosemide and monitor clinical status closely. Monitor on telemetry. Patient is on CHF pathway.  Monitor strict Is&Os and daily weights.  Place on fluid restriction of 1.5 L. Continue to stress to patient importance of self efficacy and  on diet for CHF. Last BNP reviewed- and noted below   Recent Labs   Lab 11/17/22 2010   *     Continue diuresis   Increase lasix and add metolazone.     Pleural effusion  - Not seen on imaging from earlier this year.  - Most likely related to HFpEF  - Cont diuresis       Cough in adult  - no evidence of respiratory infection  - dextromethorphan and guaifenesin  - continue diuresing.  - add abx to cover respiratory pathogen   - slowly improving       Epigastric pain  Constipation  Esophageal dysphagia  Vomited a few times on one day  reglan before meals  protonix  Suppository with BM and some relief of abdominal pain  X-ray abdomen  US of pancreas and gallbladder  Consider outpatient EDG referral for esophageal dysphagia  - resolved    Hypoxemia  - Patient developed worsening hypoxemia on 11/27  - She is now on 2LNC  - CXR demonstrates worsening pulmonary edema  - Furosemide increased and metolazone started      Chronic diastolic congestive heart failure  · See above      Physical deconditioning  - consult PT to see patient  - get patient up to chair with meals        Diabetes mellitus  Patient's FSGs are controlled on current medication regimen.  Last A1c reviewed-   Lab Results   Component Value Date    HGBA1C 7.2 (H) 06/30/2022     Most recent fingerstick glucose reviewed-   Recent Labs   Lab 11/25/22  1520 11/25/22 2013 11/26/22  0713  11/26/22  1137   POCTGLUCOSE 108 124* 88 137*       Antihyperglycemics (From admission, onward)    Start     Stop Route Frequency Ordered    11/19/22 2154  insulin aspart U-100 pen 0-5 Units         -- SubQ Before meals & nightly PRN 11/19/22 2054        · Hold Oral hypoglycemics while patient is in the hospital.  · Does not take insulin, DM diet only    Hyperthyroidism  · Continue home methimazole        VTE Risk Mitigation (From admission, onward)         Ordered     heparin (porcine) injection 5,000 Units  Every 12 hours         11/18/22 1500     IP VTE HIGH RISK PATIENT  Once         11/18/22 0404     Place sequential compression device  Until discontinued         11/18/22 0404                Discharge Planning   ENDY: 12/1/2022     Code Status: Full Code   Is the patient medically ready for discharge?: No    Reason for patient still in hospital (select all that apply): Patient trending condition and Treatment  Discharge Plan A: Home Health   Discharge Delays: None known at this time              Allyn Severino MD  Department of Hospital Medicine   [facility]

## 2022-11-29 NOTE — ASSESSMENT & PLAN NOTE
- no evidence of respiratory infection  - dextromethorphan and guaifenesin  - continue diuresing.  - add abx to cover respiratory pathogen   - slowly improving

## 2022-11-29 NOTE — HPI
Peri Johansen is an 88 year old female who has a past medical history of Aortic atherosclerosis, Arthritis, B-cell lymphoma, Diabetes mellitus, type II, Diverticulosis, GERD, Goiter, Hyperlipidemia, Hypertension, Joint pain, Leg pain, Osteoporosis, Renal cyst, Rickets, vitamin D deficiency, Thyroid nodule, and Vitamin D deficiency disease, presented to the ED with CC of Generalized fatigue.  Is a poor historian and unclear why she is at the hospital. She states that someone told her to stop taking Lasix, but she does not remember who that was and how long ago that was, can not quantify it in days or weeks even.  She stated she did not have any chest pain at all, however appears that there was a comment of chest pain 10 on 10 in ED. troponin was mildly elevated at 0.171, however quickly reduced on next lab at 0.162.  BNP elevated at 925.  Denies any dysuria, but also a note in ED she has mild dysuria.  Denies hematuria but has +2 blood in urine, UA looks noninfectious.  CTA of the chest showed no interval detrimental change or new intra-abdominopelvic abnormality compared to most recent CT 11/11/2022, and no detrimental change in CTA of the aorta compared to most recent prior CTA aorta 09/14/2022.  Noting that she does have moderate to severe aortic atherosclerosis involving the branch vessels and moderate bilateral pleural effusions, stable when compared to prior 11/11/2022.  COVID, flu, RSV negative.  Denies abdominal pain or diarrhea. Denies nausea or vomiting. Pt admitted to hospital medicine for further management. Skin integrity MARILIA consulted for evaluation of skin breakdown.

## 2022-11-29 NOTE — SUBJECTIVE & OBJECTIVE
Scheduled Meds:   aluminum & magnesium hydroxide-simethicone  30 mL Oral TID AC    amiodarone  200 mg Oral Daily    aspirin  81 mg Oral Daily    atorvastatin  40 mg Oral Daily    cefTRIAXone (ROCEPHIN) IVPB  1 g Intravenous Q24H    doxycycline  100 mg Oral Q12H    ergocalciferol  50,000 Units Oral Q7 Days    fluticasone propionate  1 spray Each Nostril Daily    furosemide (LASIX) injection  80 mg Intravenous Q12H    gabapentin  300 mg Oral BID    heparin (porcine)  5,000 Units Subcutaneous Q12H    melatonin  6 mg Oral Nightly    methIMAzole  2.5 mg Oral Daily    metOLazone  5 mg Oral Daily    pantoprazole  40 mg Oral Daily     Continuous Infusions:  PRN Meds:acetaminophen, albuterol sulfate, benzonatate, bisacodyL, dextromethorphan-guaiFENesin  mg/5 ml, glucagon (human recombinant), glucose, glucose, hydrOXYzine pamoate, insulin aspart U-100, naloxone, ondansetron, polyethylene glycol, sodium chloride 0.9%, traMADoL    Review of patient's allergies indicates:   Allergen Reactions    Latex, natural rubber Itching    Latex Hives        Past Medical History:   Diagnosis Date    Aortic atherosclerosis 1/7/2016    Arthritis     Chronic diastolic congestive heart failure 9/15/2022    Colon polyp: hyperplastic 2015- repeat 2020 8/6/2015    Diabetes mellitus, type II 8/16/2012    Diverticulosis     Gastric nodule 8/7/2014    GERD (gastroesophageal reflux disease) 8/16/2012    Goiter 8/16/2012    Hyperlipidemia 8/16/2012    Hypertension     Joint pain     Leg pain 8/16/2012    Lymphoma 8/16/2012    Osteopenia 8/16/2012    Osteoporosis     Renal cyst 3/28/2013    Rickets, vitamin D deficiency 8/16/2012    Thyroid nodule 2/24/2014    Vitamin D deficiency disease 8/16/2012     Past Surgical History:   Procedure Laterality Date    CARPAL TUNNEL RELEASE      CATARACT EXTRACTION W/  INTRAOCULAR LENS IMPLANT Bilateral     HYSTERECTOMY      partial    lymphoma surgery      L tibia       Family History       Problem Relation  (Age of Onset)    Breast cancer Maternal Aunt    Cancer Brother    Heart disease Father    Hypertension Mother, Father    No Known Problems Maternal Uncle, Paternal Aunt, Paternal Uncle, Maternal Grandmother, Maternal Grandfather, Paternal Grandmother, Paternal Grandfather, Daughter, Son, Son, Daughter, Daughter          Tobacco Use    Smoking status: Never    Smokeless tobacco: Never   Substance and Sexual Activity    Alcohol use: No     Alcohol/week: 0.0 standard drinks    Drug use: No    Sexual activity: Not Currently     Review of Systems   Skin:  Positive for wound.     Objective:     Vital Signs (Most Recent):  Temp: 97.9 °F (36.6 °C) (11/29/22 1119)  Pulse: 73 (11/29/22 1141)  Resp: 18 (11/29/22 1119)  BP: (!) 103/55 (11/29/22 1119)  SpO2: 99 % (11/29/22 1119)   Vital Signs (24h Range):  Temp:  [97.4 °F (36.3 °C)-97.9 °F (36.6 °C)] 97.9 °F (36.6 °C)  Pulse:  [65-79] 73  Resp:  [18] 18  SpO2:  [97 %-99 %] 99 %  BP: (103-117)/(55-61) 103/55     Weight: 43.4 kg (95 lb 10.9 oz)  Body mass index is 19.32 kg/m².  Physical Exam  Constitutional:       Appearance: Normal appearance.   Skin:     General: Skin is warm and dry.      Findings: Lesion present.   Neurological:      Mental Status: She is alert.       Laboratory:  All pertinent labs reviewed within the last 24 hours.    Diagnostic Results:  None

## 2022-11-30 NOTE — NURSING
Patients B/P 100/56 . MD Mere ordered to still administer lasix and amiodarone. Will continue to monitor patient.

## 2022-11-30 NOTE — NURSING
I have taken patient off of Oxygen and she her saturation is at 96% - 98% RA. MD and team notified. Will continue to monitor patient.

## 2022-11-30 NOTE — PROGRESS NOTES
[facility] Medicine  Progress Note    Patient Name: Peri Johansen  MRN: [id number]  Patient Class: IP- Inpatient   Admission Date: 11/17/2022  Length of Stay: 10 days  Attending Physician: Allyn Severino MD  Primary Care Provider: Annika Garland MD        Subjective:     Principal Problem:Acute on chronic diastolic heart failure        HPI:    Peri Johansen is a 88 y.o. female who has a past medical history of Aortic atherosclerosis, Arthritis, B-cell lymphoma, Diabetes mellitus, type II, Diverticulosis, GERD, Goiter, Hyperlipidemia, Hypertension, Joint pain, Leg pain, Osteoporosis, Renal cyst, Rickets, vitamin D deficiency, Thyroid nodule, and Vitamin D deficiency disease, presented to the ED with CC of Generalized fatigue.  Is a poor historian and unclear why she is at the hospital.  She states that someone told her to stop taking Lasix, but she does not remember who that was and how long ago that was, can not quantify it in days or weeks even.  She stated she did not have any chest pain at all, however appears that there was a comment of chest pain 10 on 10 in ED. troponin was mildly elevated at 0.171, however quickly reduced on next lab at 0.162.  BNP elevated at 925.  Denies any dysuria, but also a note in ED she has mild dysuria.  Denies hematuria but has +2 blood in urine, UA looks noninfectious.  CTA of the chest showed no interval detrimental change or new intra-abdominopelvic abnormality compared to most recent CT 11/11/2022, and no detrimental change in CTA of the aorta compared to most recent prior CTA aorta 09/14/2022.  Noting that she does have moderate to severe aortic atherosclerosis involving the branch vessels and moderate bilateral pleural effusions, stable when compared to prior 11/11/2022.  COVID, flu, RSV negative.  Denies abdominal pain or diarrhea. Denies nausea or vomiting.       Overview/Hospital Course:  Started on furosemide IV 40 mg BID. UOP not adequate.  Escalated to 80 mg BID, but missed a few doses due to loss of IV access. Once she was restarted on consistent IV diuretic administration, she responded with 1+L with each dose. Volume exam slowly improving. Her stated weight 90 lb. She is still above it. Patient complaining of epiagastric abdominal discomfort with associated SOB. Started her on aggressive bowel regimen and reglan. Some relief with BM. X-ray of abdomen unremarkable for obstruction or ileus or constipation. US of abdomen showed adherent gallstone. Abdomen pain completely resolved during period of NPO.   SOB improving but continues to have nonproductive cough.  On 11/27, patient noted to be satting in mid to upper 80's on RA which is new for her.  She was placed on supplemental O2.  Repeat CXR ordered which demonstrated worsening pulm edema.  Her UO was also starting to decrease as well.   Lasix adjusted. And metolazone added.  Azithromycin added as well.    The patient was finally weaned off of supplemental O2 and her cough has improved significantly.       Interval History: No acute events overnight.  She does complain of loose stools but is only having one per day.  No currently on any stool softeners or laxative. NO abdominal pain.      Review of Systems   Reason unable to perform ROS: limited due to patient's dementia.   Constitutional:  Positive for activity change. Negative for fever.   Respiratory:  Positive for cough. Negative for shortness of breath.         + cough much improved   Cardiovascular:  Negative for chest pain and leg swelling.   Gastrointestinal:  Negative for abdominal pain.        + loose stool   Genitourinary:  Negative for difficulty urinating.   Neurological:  Positive for weakness. Negative for light-headedness.   Psychiatric/Behavioral:  Negative for sleep disturbance.    Objective:     Vital Signs (Most Recent):  Temp: 97.9 °F (36.6 °C) (11/30/22 1119)  Pulse: 73 (11/30/22 1119)  Resp: 18 (11/30/22 1328)  BP: 102/62  (11/30/22 1119)  SpO2: 96 % (11/30/22 1119) Vital Signs (24h Range):  Temp:  [97.3 °F (36.3 °C)-98.8 °F (37.1 °C)] 97.9 °F (36.6 °C)  Pulse:  [67-73] 73  Resp:  [18-20] 18  SpO2:  [95 %-100 %] 96 %  BP: ()/(55-66) 102/62     Weight: 43.4 kg (95 lb 10.9 oz)  Body mass index is 19.32 kg/m².    Intake/Output Summary (Last 24 hours) at 11/30/2022 1403  Last data filed at 11/30/2022 0450  Gross per 24 hour   Intake --   Output 1000 ml   Net -1000 ml      Physical Exam  Vitals reviewed.   Constitutional:       General: She is awake.      Appearance: She is well-developed and underweight. She is ill-appearing.   Eyes:      General: Lids are normal. No scleral icterus.     Conjunctiva/sclera: Conjunctivae normal.   Cardiovascular:      Rate and Rhythm: Normal rate and regular rhythm.      Heart sounds: No murmur heard.  Pulmonary:      Effort: Pulmonary effort is normal.      Breath sounds: Rhonchi present. No wheezing.      Comments: + inspiratory crackles at bases; improved from yesterday  Abdominal:      General: Bowel sounds are normal.      Palpations: Abdomen is soft.   Musculoskeletal:         General: No deformity.      Right lower leg: No edema.      Left lower leg: No edema.      Comments: + muscle wasting   Skin:     General: Skin is warm and dry.   Neurological:      General: No focal deficit present.      Mental Status: Mental status is at baseline.   Psychiatric:         Attention and Perception: Attention normal.         Behavior: Behavior is cooperative.       Significant Labs: All pertinent labs within the past 24 hours have been reviewed.    Significant Imaging: I have reviewed all pertinent imaging results/findings within the past 24 hours.      Assessment/Plan:      * Acute on chronic diastolic heart failure  Moderate left atrial enlargement  Right ventricular hypertrophy  Pulmonary hypertension  ECHO 11/19 showed EF 58%, Grade III DD. Moderate left atrial enlargement. Normal right ventricular size  with low normal right ventricular systolic function. Moderate to severe tricuspid regurgitation. Bilateral pleural effusions.  Low BP's hold Beta Blocker, ACE/ARB and continue IV Furosemide and monitor clinical status closely. Monitor on telemetry. Patient is on CHF pathway.  Monitor strict Is&Os and daily weights.  Place on fluid restriction of 1.5 L. Continue to stress to patient importance of self efficacy and  on diet for CHF. Last BNP reviewed- and noted below   Recent Labs   Lab 11/17/22 2010   *     Continue diuresis   Increase lasix and add metolazone.     Pleural effusion  - Not seen on imaging from earlier this year.  - Most likely related to HFpEF  - Cont diuresis       Cough in adult  - no evidence of respiratory infection  - dextromethorphan and guaifenesin  - continue diuresing.  - add abx to cover respiratory pathogen   - slowly improving       Epigastric pain  Constipation  Esophageal dysphagia  Vomited a few times on one day  reglan before meals  protonix  Suppository with BM and some relief of abdominal pain  X-ray abdomen  US of pancreas and gallbladder  Consider outpatient EDG referral for esophageal dysphagia  - resolved    Hypoxemia  - Patient developed worsening hypoxemia on 11/27  - She is now on 2LNC  - CXR demonstrates worsening pulmonary edema  - Furosemide increased and metolazone started  - 11/30- finally weaned off supplemental O2      Chronic diastolic congestive heart failure  · See above      Physical deconditioning  - consult PT to see patient  - get patient up to chair with meals        Diabetes mellitus  Patient's FSGs are controlled on current medication regimen.  Last A1c reviewed-   Lab Results   Component Value Date    HGBA1C 7.2 (H) 06/30/2022     Most recent fingerstick glucose reviewed-   Recent Labs   Lab 11/25/22  1520 11/25/22 2013 11/26/22  0713 11/26/22  1137   POCTGLUCOSE 108 124* 88 137*       Antihyperglycemics (From admission, onward)    Start      Stop Route Frequency Ordered    11/19/22 2154  insulin aspart U-100 pen 0-5 Units         -- SubQ Before meals & nightly PRN 11/19/22 2054        · Hold Oral hypoglycemics while patient is in the hospital.  · Does not take insulin, DM diet only    Hyperthyroidism  · Continue home methimazole        VTE Risk Mitigation (From admission, onward)         Ordered     heparin (porcine) injection 5,000 Units  Every 12 hours         11/18/22 1500     IP VTE HIGH RISK PATIENT  Once         11/18/22 0404     Place sequential compression device  Until discontinued         11/18/22 0404                Discharge Planning   ENDY: 12/1/2022     Code Status: Full Code   Is the patient medically ready for discharge?: No    Reason for patient still in hospital (select all that apply): Patient trending condition and Treatment  Discharge Plan A: Home Health   Discharge Delays: None known at this time              Allyn Severino MD  Department of Hospital Medicine   [facility]

## 2022-11-30 NOTE — SUBJECTIVE & OBJECTIVE
Interval History: No acute events overnight.  She does complain of loose stools but is only having one per day.  No currently on any stool softeners or laxative. NO abdominal pain.      Review of Systems   Reason unable to perform ROS: limited due to patient's dementia.   Constitutional:  Positive for activity change. Negative for fever.   Respiratory:  Positive for cough. Negative for shortness of breath.         + cough much improved   Cardiovascular:  Negative for chest pain and leg swelling.   Gastrointestinal:  Negative for abdominal pain.        + loose stool   Genitourinary:  Negative for difficulty urinating.   Neurological:  Positive for weakness. Negative for light-headedness.   Psychiatric/Behavioral:  Negative for sleep disturbance.    Objective:     Vital Signs (Most Recent):  Temp: 97.9 °F (36.6 °C) (11/30/22 1119)  Pulse: 73 (11/30/22 1119)  Resp: 18 (11/30/22 1328)  BP: 102/62 (11/30/22 1119)  SpO2: 96 % (11/30/22 1119) Vital Signs (24h Range):  Temp:  [97.3 °F (36.3 °C)-98.8 °F (37.1 °C)] 97.9 °F (36.6 °C)  Pulse:  [67-73] 73  Resp:  [18-20] 18  SpO2:  [95 %-100 %] 96 %  BP: ()/(55-66) 102/62     Weight: 43.4 kg (95 lb 10.9 oz)  Body mass index is 19.32 kg/m².    Intake/Output Summary (Last 24 hours) at 11/30/2022 1403  Last data filed at 11/30/2022 0450  Gross per 24 hour   Intake --   Output 1000 ml   Net -1000 ml      Physical Exam  Vitals reviewed.   Constitutional:       General: She is awake.      Appearance: She is well-developed and underweight. She is ill-appearing.   Eyes:      General: Lids are normal. No scleral icterus.     Conjunctiva/sclera: Conjunctivae normal.   Cardiovascular:      Rate and Rhythm: Normal rate and regular rhythm.      Heart sounds: No murmur heard.  Pulmonary:      Effort: Pulmonary effort is normal.      Breath sounds: Rhonchi present. No wheezing.      Comments: + inspiratory crackles at bases; improved from yesterday  Abdominal:      General: Bowel sounds  are normal.      Palpations: Abdomen is soft.   Musculoskeletal:         General: No deformity.      Right lower leg: No edema.      Left lower leg: No edema.      Comments: + muscle wasting   Skin:     General: Skin is warm and dry.   Neurological:      General: No focal deficit present.      Mental Status: Mental status is at baseline.   Psychiatric:         Attention and Perception: Attention normal.         Behavior: Behavior is cooperative.       Significant Labs: All pertinent labs within the past 24 hours have been reviewed.    Significant Imaging: I have reviewed all pertinent imaging results/findings within the past 24 hours.

## 2022-11-30 NOTE — PLAN OF CARE
[facility] - Med Surg      HOME HEALTH ORDERS  FACE TO FACE ENCOUNTER    Patient Name: Peri Johansen  YOB: 1934    PCP: Annika Garland MD   PCP Address: [address] / [place] [address]  PCP Phone Number: [phone]  PCP Fax: [phone]    Encounter Date: 11/17/22    Admit to Home Health    Diagnoses:  Active Hospital Problems    Diagnosis  POA    *Acute on chronic diastolic heart failure [I50.33]  Yes     Priority: 1 - High    Pleural effusion [J90]  Yes     Priority: 2     Cough in adult [R05.9]  No     Priority: 3     Epigastric pain [R10.13]  Yes     Priority: 4     Irritant contact dermatitis due to fecal, urinary or dual incontinence [L24.A2]  Yes    Hypoxemia [R09.02]  No    Physical deconditioning [R53.81]  Yes    Diabetes mellitus [E11.9]  Yes     Chronic    Hyperthyroidism [E05.90]  Yes     Chronic      Resolved Hospital Problems   No resolved problems to display.       Follow Up Appointments:  Future Appointments   Date Time Provider Department Center   12/1/2022  9:00 AM Jarvis Lagos OD [facility] OPTOMTY [facility]   12/6/2022 10:00 AM LAB, APPOINTMENT [facility] LAB VNP [facility]   12/6/2022 10:30 AM Angelica Mariscal PA-C [facility]   12/6/2022 10:30 AM [facility] HEART FAILURE NURSE [facility]   1/12/2023  9:30 AM Chitra Wang MD [facility] CARDIO [facility]       Allergies:  Review of patient's allergies indicates:   Allergen Reactions    Latex, natural rubber Itching    Latex Hives       Medications: Review discharge medications with patient and family and provide education.    Current Facility-Administered Medications   Medication Dose Route Frequency Provider Last Rate Last Admin    acetaminophen tablet 650 mg  650 mg Oral Q8H PRN Luis F Stephenson MD   650 mg at 11/22/22 2111    albuterol sulfate nebulizer solution 2.5 mg  2.5 mg Nebulization Q4H PRN Allyn Severino MD   2.5 mg at 11/26/22 2018    aluminum & magnesium hydroxide-simethicone 400-400-40 mg/5 mL  suspension 30 mL  30 mL Oral TID AC Rosa Isela Main MD   30 mL at 11/30/22 0600    amiodarone tablet 200 mg  200 mg Oral Daily Rosa Isela Main MD   200 mg at 11/29/22 0850    aspirin chewable tablet 81 mg  81 mg Oral Daily Luis F Stephenson MD   81 mg at 11/29/22 0850    atorvastatin tablet 40 mg  40 mg Oral Daily Luis F Stephenson MD   40 mg at 11/29/22 0850    benzonatate capsule 100 mg  100 mg Oral TID PRN Andrews Baumann MD   100 mg at 11/28/22 1205    bisacodyL suppository 10 mg  10 mg Rectal Daily PRN Rosa Isela Main MD        cefTRIAXone (ROCEPHIN) 1 g/50 mL D5W IVPB  1 g Intravenous Q24H Allyn Severino MD   Stopped at 11/29/22 1228    dextromethorphan-guaiFENesin  mg/5 ml liquid 5 mL  5 mL Oral Q4H PRN Allyn Severino MD   5 mL at 11/27/22 2132    doxycycline tablet 100 mg  100 mg Oral Q12H Allyn Severino MD   100 mg at 11/29/22 2124    ergocalciferol capsule 50,000 Units  50,000 Units Oral Q7 Days Rosa Isela Main MD   50,000 Units at 11/24/22 0822    fluticasone propionate 50 mcg/actuation nasal spray 50 mcg  1 spray Each Nostril Daily Luis F Stephenson MD   50 mcg at 11/28/22 0936    furosemide injection 80 mg  80 mg Intravenous Q12H Allyn Severino MD   80 mg at 11/29/22 2123    gabapentin capsule 300 mg  300 mg Oral BID Luis F Stephenson MD   300 mg at 11/29/22 2123    glucagon (human recombinant) injection 1 mg  1 mg Intramuscular PRN Luis F Stephenson MD        glucose chewable tablet 16 g  16 g Oral PRN Luis F Stephenson MD        glucose chewable tablet 24 g  24 g Oral PRN Luis F Stephenson MD        heparin (porcine) injection 5,000 Units  5,000 Units Subcutaneous Q12H Rosa Isela Main MD   5,000 Units at 11/29/22 2124    hydrOXYzine pamoate capsule 25 mg  25 mg Oral Q6H PRN Dominick Montes De Oca MD   25 mg at 11/23/22 2036    insulin aspart U-100 pen 0-5 Units  0-5 Units Subcutaneous QID (AC + HS) PRN Dominick Montes De Oca MD   2 Units at 11/29/22 1738    melatonin tablet 6 mg  6 mg Oral Nightly Luis F Stephenson MD   6 mg at 11/29/22 212     methIMAzole split tablet 2.5 mg  2.5 mg Oral Daily Luis F Stephenson MD   2.5 mg at 11/29/22 0849    naloxone 0.4 mg/mL injection 0.02 mg  0.02 mg Intravenous PRN Luis F Stephenson MD        ondansetron injection 8 mg  8 mg Intravenous Q6H PRN Dominick Montes De Oca MD   8 mg at 11/28/22 0203    pantoprazole EC tablet 40 mg  40 mg Oral Daily Rosa Isela Main MD   40 mg at 11/29/22 0850    polyethylene glycol packet 17 g  17 g Oral BID PRN Luis F Stephenson MD        sodium chloride 0.9% flush 10 mL  10 mL Intravenous Q12H PRN Luis F Stephenson MD        traMADoL tablet 50 mg  50 mg Oral Q6H PRN Luis F Stephenson MD   50 mg at 11/29/22 0656     Current Discharge Medication List        START taking these medications    Details   cephALEXin (KEFLEX) 500 MG capsule Take 1 capsule (500 mg total) by mouth every 12 (twelve) hours. for 2 days  Qty: 4 capsule, Refills: 0      doxycycline (VIBRA-TABS) 100 MG tablet Take 1 tablet (100 mg total) by mouth every 12 (twelve) hours. for 2 days  Qty: 4 tablet, Refills: 0           CONTINUE these medications which have CHANGED    Details   furosemide (LASIX) 20 MG tablet Take 4 tablets (80 mg total) by mouth once daily AND 2 tablets (40 mg total) every evening.  Qty: 180 tablet, Refills: 11           CONTINUE these medications which have NOT CHANGED    Details   amiodarone (PACERONE) 200 MG Tab Take 200 mg by mouth 2 (two) times daily.      aspirin 81 MG Chew Take 1 tablet (81 mg total) by mouth once daily.    Associated Diagnoses: Aortic atherosclerosis      atorvastatin (LIPITOR) 40 MG tablet TAKE 1 TABLET BY MOUTH EVERY DAY  Qty: 90 tablet, Refills: 0      gabapentin (NEURONTIN) 300 MG capsule TAKE 1 CAPSULE BY MOUTH TWICE DAILY  Qty: 180 capsule, Refills: 1      losartan (COZAAR) 25 MG tablet Take 25 mg by mouth once daily. (Hold if SBP < 105 OR DBP < 60)      methIMAzole (TAPAZOLE) 5 MG Tab TAKE 1/2 TABLET BY MOUTH DAILY  Qty: 45 tablet, Refills: 0      multivitamin (THERAGRAN) per tablet Take 1 tablet by  mouth once daily.      traMADoL (ULTRAM) 50 mg tablet Take 1 tablet (50 mg total) by mouth every 12 (twelve) hours as needed for Pain.  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      albuterol (PROVENTIL/VENTOLIN HFA) 90 mcg/actuation inhaler Inhale 2 puffs into the lungs every 6 (six) hours as needed.      !! blood sugar diagnostic (TRUE METRIX GLUCOSE TEST STRIP) Strp USE AS DIRECTED  Qty: 150 strip, Refills: 12    Comments: **Patient requests 90 days supply**      !! blood sugar diagnostic Strp Test 2 times daily  Qty: 200 strip, Refills: 3    Associated Diagnoses: Type 2 diabetes mellitus with hyperglycemia, without long-term current use of insulin      blood-glucose meter kit Use as instructed.  ACCUCHECK or whatever is preferred by insurance  Qty: 1 each, Refills: 0      dextran 70-hypromellose (ARTIFICIAL TEARS,[id number],) ophthalmic solution Place 1 drop into both eyes 4 (four) times daily as needed.  Qty: 1 Bottle, Refills: 5      dextromethorphan-guaiFENesin  mg/5 ml (ROBITUSSIN-DM)  mg/5 mL liquid Take 10 mLs by mouth every 6 (six) hours as needed for Cough.      diclofenac sodium (VOLTAREN) 1 % Gel APPLY 2 GRAMS EXTERNALLY TO THE AFFECTED AREA FOUR TIMES DAILY  Qty: 100 g, Refills: 1    Associated Diagnoses: Lumbar degenerative disc disease      ergocalciferol, vitamin D2, 10 mcg (400 unit) Tab Take 1 tablet by mouth once daily.      !! lancets Misc Test 2 x daily  Qty: 200 each, Refills: 3    Associated Diagnoses: Type 2 diabetes mellitus with hyperglycemia, without long-term current use of insulin      magnesium oxide (MAG-OX) 400 mg (241.3 mg magnesium) tablet Take 1 tablet by mouth once daily.      methyl salicylate/menthol (ICY HOT TOP) Apply topically 2 (two) times daily as needed (Pain).      !! MICRO THIN LANCETS 33  gauge Misc USE AS DIRECTED  Refills: 0      omeprazole (PRILOSEC) 40 MG capsule Take 1 capsule (40 mg total) by mouth every morning.  Qty: 90 capsule, Refills: 1    Comments: **Patient requests 90 days supply**      ondansetron (ZOFRAN-ODT) 4 MG TbDL Take 1 tablet (4 mg total) by mouth every 8 (eight) hours as needed (Nausea or vomiting).  Qty: 12 tablet, Refills: 0      potassium chloride (KLOR-CON) 10 MEQ TbSR Take 2 tablets (20 mEq total) by mouth once daily.  Qty: 30 tablet, Refills: 11       !! - Potential duplicate medications found. Please discuss with provider.        STOP taking these medications       fluticasone propionate (FLONASE) 50 mcg/actuation nasal spray Comments:   Reason for Stopping:                 I have seen and examined this patient within the last 30 days. My clinical findings that support the need for the home health skilled services and home bound status are the following:no   Requiring assistive device to leave home due to unsteady gait caused by  Heart Failure.     Diet:   cardiac diet, diabetic diet 2200 calorie, fluid restriction, and 2 gram sodium diet  Increase protein  Fluid restriction 1800cc/24hr    Labs:  None needed    Referrals/ Consults  Physical Therapy to evaluate and treat. Evaluate for home safety and equipment needs; Establish/upgrade home exercise program. Perform / instruct on therapeutic exercises, gait training, transfer training, and Range of Motion.  Occupational Therapy to evaluate and treat. Evaluate home environment for safety and equipment needs. Perform/Instruct on transfers, ADL training, ROM, and therapeutic exercises.  Aide to provide assistance with personal care, ADLs, and vital signs.    Activities:   activity as tolerated and ambulate in house     Nursing:   Agency to admit patient within 24 hours of hospital discharge unless specified on physician order or at patient request    SN to complete comprehensive assessment including routine vital signs.  Instruct on disease process and s/s of complications to report to MD. Review/verify medication list sent home with the patient at time of discharge  and instruct patient/caregiver as needed. Frequency may be adjusted depending on start of care date.     Skilled nurse to perform up to 3 visits PRN for symptoms related to diagnosis    Notify MD if SBP > 160 or < 90; DBP > 90 or < 50; HR > 120 or < 50; Temp > 101; O2 < 88%; Other:       Ok to schedule additional visits based on staff availability and patient request on consecutive days within the home health episode.    When multiple disciplines ordered:    Start of Care occurs on Sunday - Wednesday schedule remaining discipline evaluations as ordered on separate consecutive days following the start of care.    Thursday SOC -schedule subsequent evaluations Friday and Monday the following week.     Friday - Saturday SOC - schedule subsequent discipline evaluations on consecutive days starting Monday of the following week.    For all post-discharge communication and subsequent orders please contact patient's primary care physician. If unable to reach primary care physician or do not receive response within 30 minutes, please contact on call for clinical staff order clarification    Miscellaneous   Routine Skin for Bedridden Patients: Instruct patient/caregiver to apply moisture barrier cream to all skin folds and wet areas in perineal area daily and after baths and all bowel movements.  Diabetic Care:   Fingerstick blood sugar a.m. and p.m.  Heart Failure:      SN to instruct on the following:    Instruct on the definition of CHF.   Instruct on the signs/sympoms of CHF to be reported.   Instruct on and monitor daily weights.   Instruct on factors that cause exacerbation.   Instruct on action, dose, schedule, and side effects of medications.   Instruct on diet as prescribed.   Instruct on activity allowed.   Instruct on life-style modifications for life long management of  CHF   SN to assess compliance with daily weights, diet, medications, fluid retention,    safety precautions, activities permitted and life-style modifications.   Additional 1-2 SN visits per week as needed for signs and symptoms     of CHF exacerbation.      For Weight Gain > 2-3 lbs in 1 day or 4-6 lbs over 1 week notify PCP:  CHF DIURETIC SLIDING SCALE     Skilled nurse visits daily to instruct and monitor medication adherence until target weight. Then resume prior order frequency.     If weight gain exceeds 5 lbs over target weight, call MD  If weight gain of 3-4 lbs over target weight, then:     Increase Furosemide - current dose (mg/day) to increase the afternoon dose to 80mg.  Keep the morning dose the same.  double dose and frequency of daily until target weight achieved or maximum 3 days.     If already on max oral daily dose, see IV diuretic instructions.    After 3 days of increased oral diuretic dose, get BMP. If patient is on increased oral diuretic dose greater than 5 days, repeat BMP at day 7 of increased dose.     Potassium supplementation   Scr > 1.5 mg/dl  Scr  1.5 mg/dl    K < 3.0 - NOTIFY MD and 40 mEq bid  40 mEq tid   K- 3.1-3.3  20 mEq bid  20 mEq tid    K 3.4-3.7  10 mEq bid  10 mEq tid      If target weight not reached after 5 days of increased oral diuretic, proceed to IV diuretic with daily patient contact to include face-to-face visit and telephone encounters.       Home Health Aide:  Nursing Twice weekly, Physical Therapy Twice weekly, Occupational Therapy Twice weekly, and Home Health Aide Three times weekly    Wound Care Orders  yes:  Pressure Ulcer(s) Stage I :   Location: sacrum  Apply Miconzazole:  Barrier ointment                       Frequency:  Twice Daily                             If incontinent of stool or urine, apply thin layer Barrier cream                   twice daily and PRN to wound         Pressure relief measure:  for pressure redistribution             I  certify that this patient is confined to her home and needs intermittent skilled nursing care, physical therapy, and occupational therapy.

## 2022-11-30 NOTE — ASSESSMENT & PLAN NOTE
- Patient developed worsening hypoxemia on 11/27  - She is now on 2LNC  - CXR demonstrates worsening pulmonary edema  - Furosemide increased and metolazone started  - 11/30- finally weaned off supplemental O2

## 2022-11-30 NOTE — PLAN OF CARE
Problem: Adult Inpatient Plan of Care  Goal: Plan of Care Review  Outcome: Ongoing, Progressing  Goal: Patient-Specific Goal (Individualized)  Outcome: Ongoing, Progressing  Goal: Absence of Hospital-Acquired Illness or Injury  Outcome: Ongoing, Progressing  Goal: Optimal Comfort and Wellbeing  Outcome: Ongoing, Progressing  Goal: Readiness for Transition of Care  Outcome: Ongoing, Progressing     Problem: Fall Injury Risk  Goal: Absence of Fall and Fall-Related Injury  Outcome: Ongoing, Progressing     Problem: Skin Injury Risk Increased  Goal: Skin Health and Integrity  Outcome: Ongoing, Progressing     Problem: Diabetes Comorbidity  Goal: Blood Glucose Level Within Targeted Range  Outcome: Ongoing, Progressing     Problem: Impaired Wound Healing  Goal: Optimal Wound Healing  Outcome: Ongoing, Progressing     Problem: Activity Intolerance  Goal: Enhanced Capacity and Energy  Outcome: Ongoing, Progressing     Problem: Infection  Goal: Absence of Infection Signs and Symptoms  Outcome: Ongoing, Progressing

## 2022-12-01 NOTE — ASSESSMENT & PLAN NOTE
- Patient developed worsening hypoxemia on 11/27  - She is now on 2LNC  - CXR demonstrates worsening pulmonary edema  - Furosemide increased and metolazone started  - Now on room air and not desatting on exercise

## 2022-12-01 NOTE — PROGRESS NOTES
[facility] Medicine  Progress note    Team: [facility] MED Z Rosa Isela Main MD  Admit Date: 11/17/2022    Principal Problem:  Acute on chronic diastolic heart failure    Interval hx:  Patient states she has abdominal discomfort again. She states she vomited this morning with breakfast. Nurse states she did not vomited.     ROS   Respiratory: neg for cough neg for shortness of breath  Cardiovascular: neg for chest pain neg for palpitations  Gastrointestinal: neg for nausea neg for vomiting, neg for abdominal pain neg for diarrhea neg for constipation   Behavioral/Psych: neg for depression neg for anxiety    PEx  Temp:  [97 °F (36.1 °C)-97.5 °F (36.4 °C)]   Pulse:  [67-74]   Resp:  [16-18]   BP: ()/(46-58)   SpO2:  [92 %-95 %]     Intake/Output Summary (Last 24 hours) at 12/1/2022 1206  Last data filed at 12/1/2022 0252  Gross per 24 hour   Intake --   Output 1400 ml   Net -1400 ml       General Appearance: no acute distress, WDWN  Heart: regular rate and rhythm, no heave, + JVD 3 cm while sitting up in chair, no edema of thighs and buttocks  Respiratory: Normal respiratory effort, symmetric excursion, bilateral vesicular breath sounds   Abdomen: Soft, non-tender; bowel sounds active  Skin: intact, no rash, no ulcers  Neurologic:  No focal numbness or weakness  Mental status: Alert, oriented x 4, affect appropriate     Recent Labs   Lab 11/28/22  0512 11/29/22  0413   WBC 3.56* 4.33   HGB 12.6 12.0   HCT 37.6 36.3*   * 139*       Recent Labs   Lab 11/26/22  0454 11/27/22  0424 11/28/22  0512 11/29/22  0413 11/30/22  0302   * 132* 130* 131* 129*   K 3.5 3.5 3.7 3.3* 3.7   CL 91* 88* 89* 87* 84*   CO2 32* 35* 33* 35* 38*   BUN 19 16 15 14 17   CREATININE 0.7 0.7 0.6 0.7 0.8   GLU 81 114* 100 97 137*   CALCIUM 8.6* 8.5* 8.2* 8.6* 9.0   MG 2.0  --  1.8 2.1 2.2   PHOS 2.1* 2.3*  --   --   --        Recent Labs   Lab 11/26/22  0454   ALKPHOS 139*   ALT 21   AST 37   ALBUMIN 2.0*   PROT 6.0   BILITOT 1.1*           Recent Labs   Lab 11/30/22  0712 11/30/22  1115 11/30/22  1621 11/30/22  2044 12/01/22  0723 12/01/22  1138   POCTGLUCOSE 102 125* 132* 247* 127* 181*         Scheduled Meds:   amiodarone  200 mg Oral Daily    aspirin  81 mg Oral Daily    atorvastatin  40 mg Oral Daily    bumetanide  2 mg Oral BID    cefTRIAXone (ROCEPHIN) IVPB  1 g Intravenous Q24H    doxycycline  100 mg Oral Q12H    ergocalciferol  50,000 Units Oral Q7 Days    fluticasone propionate  1 spray Each Nostril Daily    gabapentin  300 mg Oral BID    heparin (porcine)  5,000 Units Subcutaneous Q12H    melatonin  6 mg Oral Nightly    methIMAzole  2.5 mg Oral Daily    pantoprazole  40 mg Oral Daily     Continuous Infusions:  As Needed:  acetaminophen, albuterol sulfate, benzonatate, bisacodyL, dextromethorphan-guaiFENesin  mg/5 ml, glucagon (human recombinant), glucose, glucose, hydrOXYzine pamoate, insulin aspart U-100, naloxone, ondansetron, polyethylene glycol, sodium chloride 0.9%, traMADoL    Assessment and Plan  / Problems managed today    * Acute on chronic diastolic heart failure  Moderate left atrial enlargement  Right ventricular hypertrophy  Pulmonary hypertension  ECHO 11/19 showed EF 58%, Grade III DD. Moderate left atrial enlargement. Normal right ventricular size with low normal right ventricular systolic function. Moderate to severe tricuspid regurgitation. Bilateral pleural effusions.  Low BP's hold Beta Blocker, ACE/ARB and continue IV Furosemide and monitor clinical status closely. Monitor on telemetry. Patient is on CHF pathway.  Monitor strict Is&Os and daily weights.  Place on fluid restriction of 1.5 L. Continue to stress to patient importance of self efficacy and  on diet for CHF. Last BNP reviewed- and noted below   Recent Labs   Lab 11/17/22  2010   *     Continue diuresis   Increase lasix and add metolazone.     Hypoxemia  - Patient developed worsening hypoxemia on 11/27  - She is now on 2LNC  - CXR  demonstrates worsening pulmonary edema  - Furosemide increased and metolazone started  - 11/30- finally weaned off supplemental O2      Cough in adult  - no evidence of respiratory infection  - dextromethorphan and guaifenesin  - continue diuresing.  - add abx to cover respiratory pathogen   - slowly improving       Pleural effusion  - Not seen on imaging from earlier this year.  - Most likely related to HFpEF  - Cont diuresis       Chronic diastolic congestive heart failure  See above      Physical deconditioning  - consult PT to see patient  - get patient up to chair with meals        Epigastric pain  Constipation  Esophageal dysphagia  Vomited a few times on one day  reglan before meals  protonix  Suppository with BM and some relief of abdominal pain  X-ray abdomen  US of pancreas and gallbladder  Consider outpatient EDG referral for esophageal dysphagia  - resolved    Diabetes mellitus  Patient's FSGs are controlled on current medication regimen.  Last A1c reviewed-   Lab Results   Component Value Date    HGBA1C 7.2 (H) 06/30/2022     Most recent fingerstick glucose reviewed-   Recent Labs   Lab 11/25/22  1520 11/25/22 2013 11/26/22  0713 11/26/22  1137   POCTGLUCOSE 108 124* 88 137*       Antihyperglycemics (From admission, onward)      Start     Stop Route Frequency Ordered    11/19/22 2154  insulin aspart U-100 pen 0-5 Units         -- SubQ Before meals & nightly PRN 11/19/22 2054          Hold Oral hypoglycemics while patient is in the hospital.  Does not take insulin, DM diet only    Hyperthyroidism  Continue home methimazole        Discharge Planning   ENDY: 12/2/2022     Code Status: Full Code   Is the patient medically ready for discharge?: No    Reason for patient still in hospital (select all that apply): Patient trending condition and Treatment  Discharge Plan A: Home Health   Discharge Delays: None known at this time    Diet:  low sodium diet  GI PPx: not needed  DVT PPx:  heparin  Airways: room  air  Wounds: none    Goals of Care:  Return to prior functional status     Time (minutes) spent in care of the patient (Greater than 1/2 spent in direct face-to-face contact and care coordination on unit)  35 min    Rosa Isela Main MD

## 2022-12-01 NOTE — PLAN OF CARE
Recommendations  1. Continue low sodium, high calorie, high protein diet-fluid per MD   2. Continue Boost Plus TID for optimization of protein and calorie intake   3. RD following     Goals: Meet % of EEN/EPN by RD f/u date  Nutrition Goal Status: goal not met  Communication of RD Recs: other (POC)

## 2022-12-01 NOTE — PLAN OF CARE
12/01/22 0939   Post-Acute Status   Post-Acute Authorization Home Health   Home Health Status Referrals Sent   Hospital Resources/Appts/Education Provided Appointments scheduled and added to AVS;Post-Acute resouces added to AVS   Discharge Delays None known at this time   Discharge Plan   Discharge Plan A Home Health   Discharge Plan B Skilled Nursing Facility

## 2022-12-01 NOTE — ASSESSMENT & PLAN NOTE
Constipation  Esophageal dysphagia  Vomited a few times on one day  reglan before meals  protonix  Suppository with BM and some relief of abdominal pain  X-ray abdomen unremarkable  US of pancreas and gallbladder showed adherent gall bladder stone. Otherwise unremarkable for other biliary and pancreatic abnormalities.   Consider outpatient EDG referral for esophageal dysphagia  - resolved

## 2022-12-01 NOTE — ASSESSMENT & PLAN NOTE
Moderate left atrial enlargement  Right ventricular hypertrophy  Pulmonary hypertension  ECHO 11/19 showed EF 58%, Grade III DD. Moderate left atrial enlargement. Normal right ventricular size with low normal right ventricular systolic function. Moderate to severe tricuspid regurgitation. Bilateral pleural effusions.  Low BP's hold Beta Blocker, ACE/ARB and continue IV Furosemide and monitor clinical status closely. Monitor on telemetry. Patient is on CHF pathway.  Monitor strict Is&Os and daily weights.  Place on fluid restriction of 1.5 L. Continue to stress to patient importance of self efficacy and  on diet for CHF. Last BNP reviewed- and noted below   BNP  Recent Labs   Lab 12/03/22  0626   BNP 1,026*     Patient very hard to diurese. Furosemide increased and metolazone added. Stopped when hyponatremia worsen. Patient transitioned to bumex to 2 mg BID. On hold due to development of sodium 120 and hypotension. Cardiology consulted for further diuretic recommendations.     12/8- Naq 129. Lasix held. Euvoemic, and net neg fluid balance. Once sodium recovers, would advise HCTZ as primary diuretic with a loop diuretic as needed for weight gain. Today's weight was 44.4 kg, which we can consider the new dry weight.   - - Echo (11/19) showed grade III left ventricular diastolic dysfunction, mild-moderate pulmonary HTN, and ejection fraction of 58%. EF on most recent ECHO 35%   - patient and family educated on daily weights at home.  Add GDMT later on for HF as tolerated. Would hold off on ACEi/ARBs/diuretics/aldactone right now given electrolyte abnormalities.   - repeating BNP and trop levels

## 2022-12-01 NOTE — TELEPHONE ENCOUNTER
----- Message from Margarette Erazo sent at 12/1/2022 11:32 AM CST -----  Contact: [phone]  Pt is being d/c from the hospital 12/2/22 and needs a hosp f/u with Dr Garland by 12/9/22. Can you please call pt to assist? Thanks

## 2022-12-01 NOTE — PT/OT/SLP PROGRESS
Physical Therapy Treatment    Patient Name:  Peri Johansen   MRN:  [id number]    Recommendations:     Discharge Recommendations:  nursing facility, skilled   Discharge Equipment Recommendations:  (TBD)   Barriers to discharge: None    Assessment:     Peri Johansen is a 88 y.o. female admitted with a medical diagnosis of Acute on chronic diastolic heart failure.  She presents with the following impairments/functional limitations:  impaired cardiopulmonary response to activity (weakness; impaired endurance; impaired self care skills; impaired functional mobility; gait instability; impaired balance; decreased lower extremity function; decreased upper extremity function; decreased safety awareness;) .pt was motivated and cooperative with treatment session. Pt Progressing with PT Intervention. Pt Progressing with improving gait distance. Pt would continue to benefit from skilled PT to address overall functional mobility, goals , and to return to functional baseline.  Goals remain appropriate.    Rehab Prognosis: Good; patient would benefit from acute skilled PT services to address these deficits and reach maximum level of function.    Recent Surgery: * No surgery found *      Plan:     During this hospitalization, patient to be seen 4 x/week to address the identified rehab impairments via gait training, therapeutic activities, therapeutic exercises, neuromuscular re-education and progress toward the following goals:    Plan of Care Expires:  12/27/22    Subjective     Patient/Family Comments/goals: are you coming to walk with me?  Pain/Comfort:  Pain Rating 1: 0/10  Pain Rating Post-Intervention 1: 0/10      Objective:     Communicated with RN prior to session.  Patient found up in chair with  (oxygen; PureWick) upon PT entry to room.     General Precautions: Standard, fall   Orthopedic Precautions:N/A   Braces: N/A  Respiratory Status: Room air     Functional Mobility:  Transfers:     Sit to Stand:  contact guard  assistance with rolling walker  Gait: pt amb with RW  60 ft with CGA, standing rest breaks prn  Pt demo decreased step length, narrow base of support, decreased weight shift, and decreased radha   O2 sats from 91-98% on RA      AM-PAC 6 CLICK MOBILITY  Turning over in bed (including adjusting bedclothes, sheets and blankets)?: 3  Sitting down on and standing up from a chair with arms (e.g., wheelchair, bedside commode, etc.): 3  Moving from lying on back to sitting on the side of the bed?: 2  Moving to and from a bed to a chair (including a wheelchair)?: 3  Need to walk in hospital room?: 3  Climbing 3-5 steps with a railing?: 1  Basic Mobility Total Score: 15       Treatment & Education:  Therapist provided instruction and educated of  patient on progress, safety,d/c,PT POC,   proper body mechanics, energy conservation, and fall prevention strategies during tasks listed above, on the effects of prolonged immobility and the importance of performing OOB activity and exercises to promote healing and reduce recovery time        Patient  facilitated therex  seated in bedside chair B LE AROM AP, LAQ, Hip Flexion Patient required skilled PTA for instruction of exercises and appropriate cues to perform exercises safely, sequencing and appropriately.   Exercises performed to develop and maintain pt's strength, endurance and flexibility.  Updated white board with appropriate PT mobility information for medical team notification      Donned an extra gown   Call nursing/pct to transfer to chair/use bathroom. Pt stated understanding        Bedside table in front of patient and area set up for function, convenience, and safety. RN aware of patient's mobility needs and status. Questions/concerns addressed within PTA scope of practice; patient  with no further questions. Time was provided for active listening, discussion of health disposition, and discussion of safe discharge. Pt?verbalized?agreement .     Patient left up in  chair with all lines intact, call button in reach, and nsg notified..      GOALS:   Multidisciplinary Problems       Physical Therapy Goals          Problem: Physical Therapy    Goal Priority Disciplines Outcome Goal Variances Interventions   Physical Therapy Goal     PT, PT/OT Ongoing, Progressing     Description: Goals to met by 12/11/2022    1. Supine to sit with Modified [place]  2. Sit to supine with Modified [place]  3. Rolling to Left and Right with Modified [place].  4. Sit to stand transfer with Supervision  5. Bed to chair transfer with Supervision using Rolling Walker  6. Gait  x 75 feet with Stand-by Assistance using Rolling Walker   7. Lower extremity exercise program x15 reps per Instruction, with assistance as needed in order to facilitate improved strength, improved postural control, and improvement in functional independence                         Time Tracking:     PT Received On: 12/01/22  PT Start Time: 1330     PT Stop Time: 1355  PT Total Time (min): 25 min     Billable Minutes: Gait Training 15 and Therapeutic Activity 10    Treatment Type: Treatment  PT/PTA: PTA     PTA Visit Number: 2     12/01/2022

## 2022-12-01 NOTE — PROGRESS NOTES
[facility] - Med Surg  Adult Nutrition  Progress Note    SUMMARY       Recommendations  1. Continue low sodium, high calorie, high protein diet-fluid per MD   2. Continue Boost Plus TID for optimization of protein and calorie intake   3. RD following    Goals: Meet % of EEN/EPN by RD f/u date  Nutrition Goal Status: goal not met  Communication of RD Recs: other (POC)    Assessment and Plan    Nutrition Problem:  Severe Protein-Calorie Malnutrition  Malnutrition in the context of Chronic Illness/Injury    Related to (etiology):  Decreased ability to consume sufficient energy     Signs and Symptoms (as evidenced by):  Energy Intake: <75% of estimated energy requirement for 1 month  Body Fat Depletion: severe depletion of orbitals and triceps   Muscle Mass Depletion: severe depletion of temples, clavicle region, and lower extremities   Weight Loss: >10% in 6 months     Interventions(treatment strategy):  Collaboration of nutrition care w/ other providers    Nutrition Diagnosis Status:  New       Reason for Assessment    Reason For Assessment: RD follow-up  Diagnosis: cardiac disease  Relevant Medical History: Vitamin D deficiency  Interdisciplinary Rounds: did not attend  General Information Comments: Spoke w/ pt at bedside, was able to receive limited information d/t pt's altered mental status. Pt reports having a good appetite. Per RN documentation, pt consuming 75% at some meals.Per chart review, significant weight loss of 15% significant weight loss x 10 months. NFPE unable to be completed, however RD visually examined pt and pts meets criteria for severe protein calorie malnutrition in the context of chronic illness per ASPEN guidelines. LBM noted-12/1/22  Nutrition Discharge Planning: low sodium diet w/ high calorie/protein diet    Nutrition Risk Screen    Nutrition Risk Screen: no indicators present    Nutrition/Diet History    Spiritual, Cultural Beliefs, Jehovah's witness Practices, Values that Affect Care:  "no    Anthropometrics    Temp: 97 °F (36.1 °C)  Height Method: Stated  Height: 4' 11" (149.9 cm)  Height (inches): 59 in  Weight Method: Standard Scale  Weight: 43.4 kg (95 lb 10.9 oz)  Weight (lb): 95.68 lb  Ideal Body Weight (IBW), Female: 95 lb  % Ideal Body Weight, Female (lb): 104.43 %  BMI (Calculated): 19.3     Lab/Procedures/Meds    Pertinent Labs Reviewed: reviewed  Pertinent Labs Comments: Sodium 129, Glucose 137  Pertinent Medications Reviewed: reviewed  Pertinent Medications Comments: eergocalciferol    Estimated/Assessed Needs    Weight Used For Calorie Calculations: 43.4 kg (95 lb 10.9 oz)  Energy Calorie Requirements (kcal): 1756  Energy Need Method: Kcal/kg (40 kcal/kg)  Protein Requirements: 86 g (2.0 g/kg)  Weight Used For Protein Calculations: 43.4 kg (95 lb 10.9 oz)   RDA Method (mL): 1756         Nutrition Prescription Ordered    Current Diet Order: Low sodium, high calorie, high protein needs    Evaluation of Received Nutrient/Fluid Intake    I/O: -7.1 L since admit  Energy Calories Required: not meeting needs  Protein Required: not meeting needs  Fluid Required: not meeting needs  Total Fluid Intake (mL/kg): 1 ml or fluid per MD  Tolerance: tolerating  % Intake of Estimated Energy Needs: 50 - 75 %  % Meal Intake: 50 - 75 %    Nutrition Risk    Level of Risk/Frequency of Follow-up: low ((1 x/week))     Monitor and Evaluation    Food and Nutrient Intake: energy intake, food and beverage intake  Food and Nutrient Adminstration: diet order  Knowledge/Beliefs/Attitudes: food and nutrition knowledge/skill, beliefs and attitudes  Physical Activity and Function: nutrition-related ADLs and IADLs, factors affecting access to physical activity  Anthropometric Measurements: height/length, weight, weight change, body mass index, growth pattern indices/percentile ranks  Biochemical Data, Medical Tests and Procedures: electrolyte and renal panel, gastrointestinal profile, glucose/endocrine profile, " inflammatory profile, lipid profile  Nutrition-Focused Physical Findings: overall appearance, extremities, muscles and bones, head and eyes, skin     Nutrition Follow-Up    RD Follow-up?: Yes

## 2022-12-01 NOTE — PT/OT/SLP PROGRESS
"Occupational Therapy   Treatment    Name: Peri Johansen  MRN: [id number]  Admitting Diagnosis:  Acute on chronic diastolic heart failure     Pre-op Diagnosis: * No surgery found *  Recommendations:     Discharge Recommendations: nursing facility, skilled  Discharge Equipment Recommendations:   (TBD)  Barriers to discharge:  None    Assessment:     Peri Johansen is a 88 y.o. female with a medical diagnosis of Acute on chronic diastolic heart failure.  She presents with the following performance deficits affecting function are weakness, impaired endurance, impaired self care skills, impaired functional mobility, gait instability, impaired balance, decreased lower extremity function, decreased upper extremity function, decreased safety awareness, impaired cardiopulmonary response to activity. Patient agreeable to OT session and tolerated well. Patient participated in ADLs on room air and tolerated well, remaining asymptomatic and SpO2% 91-92 post activity. Patient NC donned and RN notified. Patient progressing with OT intervention. Continue OT POC.    Rehab Prognosis:  Good; patient would benefit from acute skilled OT services to address these deficits and reach maximum level of function.       Plan:     Patient to be seen 3 x/week to address the above listed problems via self-care/home management, therapeutic activities, therapeutic exercises  Plan of Care Expires: 12/12/22  Plan of Care Reviewed with: patient    Subjective   "My chest feels like it's rattling a bit."    Pain/Comfort:  Pain Rating 1: 0/10  Pain Rating Post-Intervention 1: 0/10    Objective:     Communicated with: RN and OTR prior to session.  Patient found up in chair with oxygen, PureWick upon OT entry to room.  A client care conference was completed by the OTR and the PETERS prior to treatment by the PETERS to discuss the patient's POC and current status.    General Precautions: Standard, fall   Orthopedic Precautions:N/A   Braces: N/A  Respiratory " Status: Nasal cannula, flow 1 L/min     Occupational Performance:     Bed Mobility:    Did not assess. Patient received OOB    Functional Mobility/Transfers:  Patient completed Sit <> Stand Transfer with minimum assistance  with  rolling walker   Patient completed Chair Transfer using Step Transfer technique with contact guard assistance with rolling walker  Patient completed Toilet Transfer Step Transfer technique with minimum assistance with  rolling walker and grab bars  Functional Mobility: to/from bathroom (~12' x2) using RW with Min(A) progressed to [facility]    Activities of Daily Living:  Grooming: contact guard assistance for decreased endurance and safety to complete facial and hand hygiene standing sink side  Toileting: contact guard assistance for thoroughness due to decreased endurance to complete perirectal hygiene and decreased balance to manage brief; increased time for mobility      [facility] 6 Click ADL: 17    Treatment & Education:  Education on OT POC and current progress  ADL re-training as indicated above  Addressed all patient questions/concerns within AMANDA scope of practice.     Patient left up in chair with all lines intact, call button in reach, and RN notified    GOALS:   Multidisciplinary Problems       Occupational Therapy Goals          Problem: Occupational Therapy    Goal Priority Disciplines Outcome Interventions   Occupational Therapy Goal     OT, PT/OT Ongoing, Progressing    Description: Goals to be met by: 12/12/22     Patient will increase functional independence with ADLs by performing:    UE Dressing with [place].  LE Dressing with Modified [place].  Grooming while standing at sink with Modified [place].  Toileting from toilet with Modified [place] for hygiene and clothing management.   Bathing from  shower chair/bench with Modified [place].  Toilet transfer to toilet with Modified [place].  Increased functional strength to WFL for B UE.  Upper extremity  exercise program x15 reps per handout, with assistance as needed.                         Time Tracking:     OT Date of Treatment: 12/01/22  OT Start Time: 1152  OT Stop Time: 1224  OT Total Time (min): 32 min    Billable Minutes:Self Care/Home Management 20  Therapeutic Activity 12    OT/AMANDA: AMANDA PATEL Visit Number: 1    12/1/2022

## 2022-12-02 NOTE — PLAN OF CARE
[facility]      HOME HEALTH ORDERS  FACE TO FACE ENCOUNTER    Patient Name: Peri Johansen  YOB: 1934    PCP: Annika Garland MD   PCP Address: [address] / [place] [address]  PCP Phone Number: [phone]  PCP Fax: [phone]    Encounter Date: 11/17/22    Admit to Home Health    Diagnoses:  Problem List Items Addressed This Visit       Irritant contact dermatitis due to fecal, urinary or dual incontinence - Primary     Other Visit Diagnoses       Chest pain        Relevant Orders    EKG 12-lead (Completed)    EKG 12-lead    SOB (shortness of breath)        Relevant Orders    X-Ray Chest AP Portable (Completed)    Elevated brain natriuretic peptide (BNP) level        Relevant Orders    Echo (Completed)            Follow Up Appointments:  Future Appointments   Date Time Provider Department Center   12/6/2022 10:00 AM LAB, APPOINTMENT [facility] LAB VNP [facility]   12/6/2022 10:30 AM Angelica Mariscal PA-C [facility]   12/6/2022 10:30 AM [facility] HEART FAILURE NURSE [facility]   12/9/2022 10:00 AM Lyn Banerjee NP [facility] IM [facility] PCW   1/12/2023  9:30 AM Chitra Wang MD [facility] CARDIO [facility]   3/16/2023  3:00 PM Jarvis Lagos OD [facility] OPTOMTY [facility]       Allergies:  Review of patient's allergies indicates:   Allergen Reactions    Latex, natural rubber Itching    Latex Hives       Medications: Review discharge medications with patient and family and provide education.  Current Discharge Medication List        START taking these medications    Details   bumetanide (BUMEX) 2 MG tablet Take 1 tablet (2 mg total) by mouth once daily.  Qty: 30 tablet, Refills: 11      ergocalciferol (ERGOCALCIFEROL) 50,000 unit Cap Take 1 capsule (50,000 Units total) by mouth every 7 days.  Qty: 12 capsule, Refills: 3           CONTINUE these medications which have NOT CHANGED    Details   amiodarone (PACERONE) 200 MG Tab Take 200 mg by mouth 2 (two) times  daily.      aspirin 81 MG Chew Take 1 tablet (81 mg total) by mouth once daily.    Associated Diagnoses: Aortic atherosclerosis      atorvastatin (LIPITOR) 40 MG tablet TAKE 1 TABLET BY MOUTH EVERY DAY  Qty: 90 tablet, Refills: 0      gabapentin (NEURONTIN) 300 MG capsule TAKE 1 CAPSULE BY MOUTH TWICE DAILY  Qty: 180 capsule, Refills: 1      losartan (COZAAR) 25 MG tablet Take 25 mg by mouth once daily. (Hold if SBP < 105 OR DBP < 60)      methIMAzole (TAPAZOLE) 5 MG Tab TAKE 1/2 TABLET BY MOUTH DAILY  Qty: 45 tablet, Refills: 0      multivitamin (THERAGRAN) per tablet Take 1 tablet by mouth once daily.      traMADoL (ULTRAM) 50 mg tablet Take 1 tablet (50 mg total) by mouth every 12 (twelve) hours as needed for Pain.  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      albuterol (PROVENTIL/VENTOLIN HFA) 90 mcg/actuation inhaler Inhale 2 puffs into the lungs every 6 (six) hours as needed.      !! blood sugar diagnostic (TRUE METRIX GLUCOSE TEST STRIP) Strp USE AS DIRECTED  Qty: 150 strip, Refills: 12    Comments: **Patient requests 90 days supply**      !! blood sugar diagnostic Strp Test 2 times daily  Qty: 200 strip, Refills: 3    Associated Diagnoses: Type 2 diabetes mellitus with hyperglycemia, without long-term current use of insulin      blood-glucose meter kit Use as instructed.  ACCUCHECK or whatever is preferred by insurance  Qty: 1 each, Refills: 0      dextran 70-hypromellose (ARTIFICIAL TEARS,NTIE22-BZQAR,) ophthalmic solution Place 1 drop into both eyes 4 (four) times daily as needed.  Qty: 1 Bottle, Refills: 5      dextromethorphan-guaiFENesin  mg/5 ml (ROBITUSSIN-DM)  mg/5 mL liquid Take 10 mLs by mouth every 6 (six) hours as needed for Cough.      diclofenac sodium (VOLTAREN) 1 % Gel APPLY 2 GRAMS EXTERNALLY TO THE AFFECTED AREA  FOUR TIMES DAILY  Qty: 100 g, Refills: 1    Associated Diagnoses: Lumbar degenerative disc disease      !! lancets Misc Test 2 x daily  Qty: 200 each, Refills: 3    Associated Diagnoses: Type 2 diabetes mellitus with hyperglycemia, without long-term current use of insulin      magnesium oxide (MAG-OX) 400 mg (241.3 mg magnesium) tablet Take 1 tablet by mouth once daily.      methyl salicylate/menthol (ICY HOT TOP) Apply topically 2 (two) times daily as needed (Pain).      !! MICRO THIN LANCETS 33 gauge Misc USE AS DIRECTED  Refills: 0      omeprazole (PRILOSEC) 40 MG capsule Take 1 capsule (40 mg total) by mouth every morning.  Qty: 90 capsule, Refills: 1    Comments: **Patient requests 90 days supply**      ondansetron (ZOFRAN-ODT) 4 MG TbDL Take 1 tablet (4 mg total) by mouth every 8 (eight) hours as needed (Nausea or vomiting).  Qty: 12 tablet, Refills: 0      potassium chloride (KLOR-CON) 10 MEQ TbSR Take 2 tablets (20 mEq total) by mouth once daily.  Qty: 30 tablet, Refills: 11       !! - Potential duplicate medications found. Please discuss with provider.        STOP taking these medications       furosemide (LASIX) 40 MG tablet Comments:   Reason for Stopping:         ergocalciferol, vitamin D2, 10 mcg (400 unit) Tab Comments:   Reason for Stopping:         fluticasone propionate (FLONASE) 50 mcg/actuation nasal spray Comments:   Reason for Stopping:                 I have seen and examined this patient within the last 30 days. My clinical findings that support the need for the home health skilled services and home bound status are the following:  Weakness/numbness causing balance and gait disturbance due to Heart Failure and Weakness/Debility making it taxing to leave home.  Requiring assistive device to leave home due to unsteady gait caused by  Heart Failure and Weakness/Debility.     Diet:   2 gram sodium diet    Referrals/ Consults  Physical Therapy to evaluate and treat. Evaluate for home safety and  equipment needs; Establish/upgrade home exercise program. Perform / instruct on therapeutic exercises, gait training, transfer training, and Range of Motion.  Occupational Therapy to evaluate and treat. Evaluate home environment for safety and equipment needs. Perform/Instruct on transfers, ADL training, ROM, and therapeutic exercises.    Activities:   activity as tolerated    Nursing:   Agency to admit patient within 24 hours of hospital discharge unless specified on physician order or at patient request    SN to complete comprehensive assessment including routine vital signs. Instruct on disease process and s/s of complications to report to MD. Review/verify medication list sent home with the patient at time of discharge  and instruct patient/caregiver as needed. Frequency may be adjusted depending on start of care date.     Skilled nurse to perform up to 3 visits PRN for symptoms related to diagnosis    Notify MD if SBP > 160 or < 90; DBP > 90 or < 50; HR > 120 or < 50; Temp > 101; O2 < 88%;     Ok to schedule additional visits based on staff availability and patient request on consecutive days within the home health episode.    Miscellaneous   Heart Failure:      SN to instruct on the following:    Instruct on the definition of CHF.   Instruct on the signs/sympoms of CHF to be reported.   Instruct on and monitor daily weights.   Instruct on factors that cause exacerbation.   Instruct on action, dose, schedule, and side effects of medications.   Instruct on diet as prescribed.   Instruct on activity allowed.   Instruct on life-style modifications for life long management of CHF   SN to assess compliance with daily weights, diet, medications, fluid retention,    safety precautions, activities permitted and life-style modifications.   Additional 1-2 SN visits per week as needed for signs and symptoms     of CHF exacerbation.      For Weight Gain > 2-3 lbs in 1 day or 4-6 lbs over 1 week notify PCP or cardiologist      Skilled nurse visits daily to instruct and monitor medication adherence until target weight. Then resume prior order frequency.     If weight gain exceeds 5 lbs over target weight (90 lb), call MD    After 3 days of increased oral diuretic dose, get BMP. If patient is on increased oral diuretic dose greater than 5 days, repeat BMP at day 7 of increased dose.     Potassium supplementation   Scr > 1.5 mg/dl  Scr  1.5 mg/dl    K < 3.0 - NOTIFY MD and 40 mEq bid  40 mEq tid   K- 3.1-3.3  20 mEq bid  20 mEq tid    K 3.4-3.7  10 mEq bid  10 mEq tid      If target weight not reached after 5 days of increased oral diuretic, proceed to IV diuretic with daily patient contact to include face-to-face visit and telephone encounters.       Wound Care Orders  no    I certify that this patient is confined to her home and needs intermittent skilled nursing care, physical therapy, and occupational therapy.

## 2022-12-02 NOTE — PLAN OF CARE
12/02/22 1027   Post-Acute Status   Post-Acute Authorization Home Health   Home Health Status Referrals Sent   Hospital Resources/Appts/Education Provided Appointments scheduled and added to AVS;Post-Acute resouces added to AVS   Discharge Delays None known at this time   Discharge Plan   Discharge Plan A Home Health   Discharge Plan B Skilled Nursing Facility

## 2022-12-02 NOTE — PLAN OF CARE
Problem: Adult Inpatient Plan of Care  Goal: Plan of Care Review  Outcome: Ongoing, Progressing     Problem: Adult Inpatient Plan of Care  Goal: Patient-Specific Goal (Individualized)  Outcome: Ongoing, Progressing     Problem: Adult Inpatient Plan of Care  Goal: Optimal Comfort and Wellbeing  Outcome: Ongoing, Progressing   POC reviewed with patient. VSS. Complains of dry cough and sacral pain, PRN cough syrup and tramadol given. Patient Aox4. No acute changes during shift. Patient turned Q2 to help alleviate sacral pain. Bed alarm set and call light within reach.

## 2022-12-03 NOTE — CONSULTS
[facility] - Med Surg  Nephrology  Consult Note    Patient Name: Peri Johansen  MRN: [id number]  Admission Date: 11/17/2022  Hospital Length of Stay: 13 days  Attending Provider: Rosa Isela Main MD   Primary Care Physician: Annika Garland MD  Principal Problem:Acute on chronic diastolic heart failure    Inpatient consult to Nephrology  Consult performed by: Pearl Morales DO  Consult ordered by: Rosa Isela Main MD        Subjective:     HPI: Ms. Johansen is an 88 year old female with extensive pmhx including heart failure, aortic atherosclerosis, B-cell lymphoma, T2DM, Diverticulosis, GERD, HLD, HTN, who presented with complaint of generalized fatigue. Patient is a poor historian. She continues to complain of fatigue and intermittent SOB. Patient denies urinary abnormalities, change in PO intake, and chest pain. During her admission, patient was found to have elevated BNP and CXR significant for pulmonary edema bilaterally. Patient was started on lasix and was noted to be responsive. She was transitioned to bumex. Today patient has not received any diuretics and had minimal urinary output per RN. Bumex was last given yesterday. BNP trending up to 1026. Patient developed hyponatremia shortly after being admitted. Sodium 136 at admission and today dropped down to 120. Serum osmolality low (266). Echo (11/19) showed grade III left ventricular diastolic dysfunction, mild-moderate pulmonary HTN, and ejection fraction of 58%. Nephrology consulted for hyponatremia.       Past Medical History:   Diagnosis Date    Aortic atherosclerosis 1/7/2016    Arthritis     Chronic diastolic congestive heart failure 9/15/2022    Colon polyp: hyperplastic 2015- repeat 2020 8/6/2015    Diabetes mellitus, type II 8/16/2012    Diverticulosis     Gastric nodule 8/7/2014    GERD (gastroesophageal reflux disease) 8/16/2012    Goiter 8/16/2012    Hyperlipidemia 8/16/2012    Hypertension     Joint pain     Leg pain 8/16/2012    Lymphoma 8/16/2012     Osteopenia 8/16/2012    Osteoporosis     Renal cyst 3/28/2013    Rickets, vitamin D deficiency 8/16/2012    Thyroid nodule 2/24/2014    Vitamin D deficiency disease 8/16/2012       Past Surgical History:   Procedure Laterality Date    CARPAL TUNNEL RELEASE      CATARACT EXTRACTION W/  INTRAOCULAR LENS IMPLANT Bilateral     HYSTERECTOMY      partial    lymphoma surgery      L tibia       Review of patient's allergies indicates:   Allergen Reactions    Latex, natural rubber Itching    Latex Hives     Current Facility-Administered Medications   Medication Frequency    acetaminophen tablet 650 mg Q8H PRN    albuterol sulfate nebulizer solution 2.5 mg Q4H PRN    amiodarone tablet 200 mg Daily    aspirin chewable tablet 81 mg Daily    atorvastatin tablet 40 mg Daily    benzonatate capsule 100 mg TID PRN    bisacodyL suppository 10 mg Daily PRN    dextromethorphan-guaiFENesin  mg/5 ml liquid 5 mL Q4H PRN    ergocalciferol capsule 50,000 Units Q7 Days    fluticasone propionate 50 mcg/actuation nasal spray 50 mcg Daily    gabapentin capsule 300 mg BID    glucagon (human recombinant) injection 1 mg PRN    glucose chewable tablet 16 g PRN    glucose chewable tablet 24 g PRN    heparin (porcine) injection 5,000 Units Q12H    hydrOXYzine pamoate capsule 25 mg Q6H PRN    insulin aspart U-100 pen 0-5 Units QID (AC + HS) PRN    melatonin tablet 6 mg Nightly    methIMAzole split tablet 2.5 mg Daily    naloxone 0.4 mg/mL injection 0.02 mg PRN    ondansetron injection 8 mg Q6H PRN    pantoprazole EC tablet 40 mg Daily    polyethylene glycol packet 17 g BID PRN    potassium, sodium phosphates 280-160-250 mg packet 2 packet QID (AC & HS)    sodium chloride 0.9% flush 10 mL Q12H PRN    traMADoL tablet 50 mg Q6H PRN     Family History       Problem Relation (Age of Onset)    Breast cancer Maternal Aunt    Cancer Brother    Heart disease Father    Hypertension Mother, Father    No Known Problems Maternal Uncle, Paternal Aunt,  Paternal Uncle, Maternal Grandmother, Maternal Grandfather, Paternal Grandmother, Paternal Grandfather, Daughter, Son, Son, Daughter, Daughter          Tobacco Use    Smoking status: Never    Smokeless tobacco: Never   Substance and Sexual Activity    Alcohol use: No     Alcohol/week: 0.0 standard drinks    Drug use: No    Sexual activity: Not Currently     Review of Systems   Reason unable to perform ROS: limited due to patient's dementia.   Constitutional:  Positive for activity change and appetite change. Negative for fever.   Respiratory:  Positive for cough. Negative for shortness of breath.    Cardiovascular:  Positive for leg swelling. Negative for chest pain.   Gastrointestinal:  Negative for abdominal pain.   Genitourinary:  Negative for difficulty urinating, dysuria and hematuria.   Neurological:  Positive for weakness. Negative for light-headedness.   Psychiatric/Behavioral:  Negative for sleep disturbance.    Objective:     Vital Signs (Most Recent):  Temp: 98.2 °F (36.8 °C) (12/03/22 1132)  Pulse: 66 (12/03/22 1132)  Resp: 18 (12/03/22 1132)  BP: (!) 111/55 (12/03/22 1132)  SpO2: (!) 93 % (12/03/22 1132)  O2 Device (Oxygen Therapy): room air (12/03/22 1000)   Vital Signs (24h Range):  Temp:  [96.9 °F (36.1 °C)-98.2 °F (36.8 °C)] 98.2 °F (36.8 °C)  Pulse:  [61-67] 66  Resp:  [16-18] 18  SpO2:  [92 %-96 %] 93 %  BP: (105-134)/(55-60) 111/55     Weight: 45.6 kg (100 lb 8.5 oz) (12/03/22 0700)  Body mass index is 20.3 kg/m².  Body surface area is 1.38 meters squared.    I/O last 3 completed shifts:  In: 550 [P.O.:550]  Out: 450 [Urine:450]    Physical Exam  Vitals reviewed.   Constitutional:       General: She is awake.      Appearance: She is well-developed and underweight. She is ill-appearing.   HENT:      Head: Normocephalic.      Nose: Nose normal.      Mouth/Throat:      Mouth: Mucous membranes are moist.   Eyes:      General: Lids are normal. No scleral icterus.     Conjunctiva/sclera: Conjunctivae  normal.      Pupils: Pupils are equal, round, and reactive to light.   Cardiovascular:      Rate and Rhythm: Normal rate and regular rhythm.      Heart sounds: No murmur heard.     Comments: Elevated JVP  Pulmonary:      Effort: Pulmonary effort is normal.      Breath sounds: Rhonchi present. Decreased breath sounds on right. No wheezing.   Abdominal:      General: Bowel sounds are normal.      Palpations: Abdomen is soft.      Tenderness: There is no abdominal tenderness.   Musculoskeletal:         General: No deformity.      Cervical back: Normal range of motion and neck supple.      Right lower leg: No edema.      Left lower leg: Edema present.   Skin:     General: Skin is warm and dry. Poor skin turgor  Neurological:      General: No focal deficit present.   Psychiatric:         Behavior: Behavior is cooperative.       Significant Labs:  CMP:   Recent Labs   Lab 12/03/22  0626      CALCIUM 9.5   ALBUMIN 2.0*   *   K 4.2   CO2 31*   CL 78*   BUN 18   CREATININE 0.8     All labs within the past 24 hours have been reviewed.    Significant Imaging:  Labs: Reviewed  X-Ray: Reviewed    Assessment/Plan:     Hyponatremia  Ms. Johansen is an 88 year old female with pmhx of heart failure (EF 58%) who presented with complaint of generalized fatigue. Patient is a poor historian. During her admission, patient was found to have elevated BNP and CXR significant for pulmonary edema bilaterally. Patient was started on lasix and was noted to be responsive. She was transitioned to bumex. BNP trending up to 1026. Patient developed hyponatremia shortly after being admitted. Sodium 136 at admission and dropped down to 120. Nephrology consulted for hyponatremia.     Likely secondary to hypovolemia secondary to diuresis vs. Heart failure vs SIADH    Plan:  - Ordered 1L NS  - Recommend repeat urine sodium, urine osm, and serum sodium after IVF  - Serum osm: 266. Consistent with hypotonic hyponatremia   - Echo (11/19) showed  grade III left ventricular diastolic dysfunction, mild-moderate pulmonary HTN, and ejection fraction of 58%.           Thank you for your consult. I will follow-up with patient. Please contact us if you have any additional questions.    Pearl Morales,   Nephrology  [facility]

## 2022-12-03 NOTE — SUBJECTIVE & OBJECTIVE
Past Medical History:   Diagnosis Date    Aortic atherosclerosis 1/7/2016    Arthritis     Chronic diastolic congestive heart failure 9/15/2022    Colon polyp: hyperplastic 2015- repeat 2020 8/6/2015    Diabetes mellitus, type II 8/16/2012    Diverticulosis     Gastric nodule 8/7/2014    GERD (gastroesophageal reflux disease) 8/16/2012    Goiter 8/16/2012    Hyperlipidemia 8/16/2012    Hypertension     Joint pain     Leg pain 8/16/2012    Lymphoma 8/16/2012    Osteopenia 8/16/2012    Osteoporosis     Renal cyst 3/28/2013    Rickets, vitamin D deficiency 8/16/2012    Thyroid nodule 2/24/2014    Vitamin D deficiency disease 8/16/2012       Past Surgical History:   Procedure Laterality Date    CARPAL TUNNEL RELEASE      CATARACT EXTRACTION W/  INTRAOCULAR LENS IMPLANT Bilateral     HYSTERECTOMY      partial    lymphoma surgery      L tibia       Review of patient's allergies indicates:   Allergen Reactions    Latex, natural rubber Itching    Latex Hives     Current Facility-Administered Medications   Medication Frequency    acetaminophen tablet 650 mg Q8H PRN    albuterol sulfate nebulizer solution 2.5 mg Q4H PRN    amiodarone tablet 200 mg Daily    aspirin chewable tablet 81 mg Daily    atorvastatin tablet 40 mg Daily    benzonatate capsule 100 mg TID PRN    bisacodyL suppository 10 mg Daily PRN    dextromethorphan-guaiFENesin  mg/5 ml liquid 5 mL Q4H PRN    ergocalciferol capsule 50,000 Units Q7 Days    fluticasone propionate 50 mcg/actuation nasal spray 50 mcg Daily    gabapentin capsule 300 mg BID    glucagon (human recombinant) injection 1 mg PRN    glucose chewable tablet 16 g PRN    glucose chewable tablet 24 g PRN    heparin (porcine) injection 5,000 Units Q12H    hydrOXYzine pamoate capsule 25 mg Q6H PRN    insulin aspart U-100 pen 0-5 Units QID (AC + HS) PRN    melatonin tablet 6 mg Nightly    methIMAzole split tablet 2.5 mg Daily    naloxone 0.4 mg/mL injection 0.02 mg PRN    ondansetron injection 8 mg  Q6H PRN    pantoprazole EC tablet 40 mg Daily    polyethylene glycol packet 17 g BID PRN    potassium, sodium phosphates 280-160-250 mg packet 2 packet QID (AC & HS)    sodium chloride 0.9% flush 10 mL Q12H PRN    traMADoL tablet 50 mg Q6H PRN     Family History       Problem Relation (Age of Onset)    Breast cancer Maternal Aunt    Cancer Brother    Heart disease Father    Hypertension Mother, Father    No Known Problems Maternal Uncle, Paternal Aunt, Paternal Uncle, Maternal Grandmother, Maternal Grandfather, Paternal Grandmother, Paternal Grandfather, Daughter, Son, Son, Daughter, Daughter          Tobacco Use    Smoking status: Never    Smokeless tobacco: Never   Substance and Sexual Activity    Alcohol use: No     Alcohol/week: 0.0 standard drinks    Drug use: No    Sexual activity: Not Currently     Review of Systems   Reason unable to perform ROS: limited due to patient's dementia.   Constitutional:  Positive for activity change and appetite change. Negative for fever.   Respiratory:  Positive for cough. Negative for shortness of breath.    Cardiovascular:  Positive for leg swelling. Negative for chest pain.   Gastrointestinal:  Negative for abdominal pain.   Genitourinary:  Negative for difficulty urinating, dysuria and hematuria.   Neurological:  Positive for weakness. Negative for light-headedness.   Psychiatric/Behavioral:  Negative for sleep disturbance.    Objective:     Vital Signs (Most Recent):  Temp: 98.2 °F (36.8 °C) (12/03/22 1132)  Pulse: 66 (12/03/22 1132)  Resp: 18 (12/03/22 1132)  BP: (!) 111/55 (12/03/22 1132)  SpO2: (!) 93 % (12/03/22 1132)  O2 Device (Oxygen Therapy): room air (12/03/22 1000)   Vital Signs (24h Range):  Temp:  [96.9 °F (36.1 °C)-98.2 °F (36.8 °C)] 98.2 °F (36.8 °C)  Pulse:  [61-67] 66  Resp:  [16-18] 18  SpO2:  [92 %-96 %] 93 %  BP: (105-134)/(55-60) 111/55     Weight: 45.6 kg (100 lb 8.5 oz) (12/03/22 0700)  Body mass index is 20.3 kg/m².  Body surface area is 1.38 meters  squared.    I/O last 3 completed shifts:  In: 550 [P.O.:550]  Out: 450 [Urine:450]    Physical Exam  Vitals reviewed.   Constitutional:       General: She is awake.      Appearance: She is well-developed and underweight. She is ill-appearing.   HENT:      Head: Normocephalic.      Nose: Nose normal.      Mouth/Throat:      Mouth: Mucous membranes are moist.   Eyes:      General: Lids are normal. No scleral icterus.     Conjunctiva/sclera: Conjunctivae normal.      Pupils: Pupils are equal, round, and reactive to light.   Cardiovascular:      Rate and Rhythm: Normal rate and regular rhythm.      Heart sounds: No murmur heard.     Comments: Elevated JVP  Pulmonary:      Effort: Pulmonary effort is normal.      Breath sounds: Rhonchi present. No wheezing.   Abdominal:      General: Bowel sounds are normal.      Palpations: Abdomen is soft.      Tenderness: There is no abdominal tenderness.   Musculoskeletal:         General: No deformity.      Cervical back: Normal range of motion and neck supple.      Right lower leg: No edema.      Left lower leg: Edema present.   Skin:     General: Skin is warm and dry.   Neurological:      General: No focal deficit present.   Psychiatric:         Behavior: Behavior is cooperative.       Significant Labs:  CMP:   Recent Labs   Lab 12/03/22  0626      CALCIUM 9.5   ALBUMIN 2.0*   *   K 4.2   CO2 31*   CL 78*   BUN 18   CREATININE 0.8     All labs within the past 24 hours have been reviewed.    Significant Imaging:  Labs: Reviewed  X-Ray: Reviewed

## 2022-12-03 NOTE — ASSESSMENT & PLAN NOTE
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 Urine sodium and osmolality afterward increased. Uric acid normal.  12/8- holding diuretics  - Continue 0.8 - 1 L fluid restrictions  - strict ins/outs

## 2022-12-03 NOTE — NURSING
0830 pt in bed A&O x4 reso even and unlabored, requesting to get OOB PCT to assist up to chair soon notified    0903 Pt sitting up in chair eating breakfast ,scheduled meds given, pt noted does not eat or drink a lot just sips and nibbles at food, urine output approximately 200 collected by anisa    1030 pt was coughing and spit up some emesis, per CN not very large amount    1049 Pt noted ambulating with therapy in hallway with walker will continue to monitor

## 2022-12-03 NOTE — HPI
Ms. Johansen is an 88 year old female with extensive pmhx including heart failure, aortic atherosclerosis, B-cell lymphoma, T2DM, Diverticulosis, GERD, HLD, HTN, who presented with complaint of generalized fatigue. Patient is a poor historian. She continues to complain of fatigue and intermittent SOB. Patient denies urinary abnormalities, change in PO intake, and chest pain. During her admission, patient was found to have elevated BNP and CXR significant for pulmonary edema bilaterally. Patient was started on lasix and was noted to be responsive. She was transitioned to bumex. Today patient has not received any diuretics and had minimal urinary output per RN. Bumex was last given yesterday. BNP trending up to 1026. Patient developed hyponatremia shortly after being admitted. Sodium 136 at admission and today dropped down to 120. Serum osmolality low (266). Echo (11/19) showed grade III left ventricular diastolic dysfunction, mild-moderate pulmonary HTN, and ejection fraction of 58%. Nephrology consulted for hyponatremia.

## 2022-12-03 NOTE — PROGRESS NOTES
Hospital Medicine  Progress note    Team: [facility] Rosa Isela Main MD  Admit Date: 11/17/2022    Principal Problem:  Acute on chronic diastolic heart failure    Interval hx:  No new complaints. Nursing had 43 kg weight    ROS   Respiratory: neg for cough neg for shortness of breath  Cardiovascular: neg for chest pain neg for palpitations  Gastrointestinal: neg for nausea neg for vomiting, neg for abdominal pain neg for diarrhea neg for constipation   Behavioral/Psych: neg for depression neg for anxiety    PEx  Temp:  [96.3 °F (35.7 °C)-97.9 °F (36.6 °C)]   Pulse:  [61-82]   Resp:  [16-18]   BP: (101-114)/(55-63)   SpO2:  [91 %-97 %]     Intake/Output Summary (Last 24 hours) at 12/2/2022 6469  Last data filed at 12/2/2022 0035  Gross per 24 hour   Intake 200 ml   Output --   Net 200 ml       General Appearance: no acute distress, WDWN  Heart: regular rate and rhythm, no heave, + JVD 3 cm while sitting up in chair, no edema of thighs and buttocks  Respiratory: Normal respiratory effort, symmetric excursion, bilateral vesicular breath sounds   Abdomen: Soft, non-tender; bowel sounds active  Skin: intact, no rash, no ulcers  Neurologic:  No focal numbness or weakness  Mental status: Alert, oriented x 4, affect appropriate     Recent Labs   Lab 11/28/22  0512 11/29/22  0413   WBC 3.56* 4.33   HGB 12.6 12.0   HCT 37.6 36.3*   * 139*       Recent Labs   Lab 11/26/22  0454 11/27/22  0424 11/28/22  0512 11/29/22  0413 11/30/22  0302 12/02/22  1601   * 132*   < > 131* 129* 124*   K 3.5 3.5   < > 3.3* 3.7 4.9   CL 91* 88*   < > 87* 84* 81*   CO2 32* 35*   < > 35* 38* 32*   BUN 19 16   < > 14 17 17   CREATININE 0.7 0.7   < > 0.7 0.8 0.9   GLU 81 114*   < > 97 137* 186*   CALCIUM 8.6* 8.5*   < > 8.6* 9.0 9.3   MG 2.0  --    < > 2.1 2.2 2.0   PHOS 2.1* 2.3*  --   --   --  2.2*    < > = values in this interval not displayed.       Recent Labs   Lab 11/26/22  0454 12/02/22  1601   ALKPHOS 139*  --    ALT 21  --     AST 37  --    ALBUMIN 2.0* 2.2*   PROT 6.0  --    BILITOT 1.1*  --           Recent Labs   Lab 12/01/22  1626 12/01/22  2117 12/02/22  0721 12/02/22  1121 12/02/22  1510 12/02/22 2052   POCTGLUCOSE 155* 143* 134* 132* 192* 197*         Scheduled Meds:   amiodarone  200 mg Oral Daily    aspirin  81 mg Oral Daily    atorvastatin  40 mg Oral Daily    ergocalciferol  50,000 Units Oral Q7 Days    fluticasone propionate  1 spray Each Nostril Daily    gabapentin  300 mg Oral BID    heparin (porcine)  5,000 Units Subcutaneous Q12H    melatonin  6 mg Oral Nightly    methIMAzole  2.5 mg Oral Daily    pantoprazole  40 mg Oral Daily    potassium, sodium phosphates  2 packet Oral QID (AC & HS)     Continuous Infusions:  As Needed:  acetaminophen, albuterol sulfate, benzonatate, bisacodyL, dextromethorphan-guaiFENesin  mg/5 ml, glucagon (human recombinant), glucose, glucose, hydrOXYzine pamoate, insulin aspart U-100, naloxone, ondansetron, polyethylene glycol, sodium chloride 0.9%, traMADoL    Assessment and Plan  / Problems managed today    * Acute on chronic diastolic heart failure  Moderate left atrial enlargement  Right ventricular hypertrophy  Pulmonary hypertension  ECHO 11/19 showed EF 58%, Grade III DD. Moderate left atrial enlargement. Normal right ventricular size with low normal right ventricular systolic function. Moderate to severe tricuspid regurgitation. Bilateral pleural effusions.  Low BP's hold Beta Blocker, ACE/ARB and continue IV Furosemide and monitor clinical status closely. Monitor on telemetry. Patient is on CHF pathway.  Monitor strict Is&Os and daily weights.  Place on fluid restriction of 1.5 L. Continue to stress to patient importance of self efficacy and  on diet for CHF. Last BNP reviewed- and noted below   Recent Labs   Lab 11/17/22  2010   *     Continue diuresis   Patient very hard to diurese. Furosemide increased and metolazone added. Now will tranistion to oral bumex 2 mg  BID. Discharge based on response.     Hypoxemia  - Patient developed worsening hypoxemia on 11/27  - She is now on 2LNC  - CXR demonstrates worsening pulmonary edema  - Furosemide increased and metolazone started  - 11/30- finally weaned off supplemental O2. Now back on 0.5 L. Will do walk test prior to discharge    Cough in adult  - no evidence of respiratory infection  - dextromethorphan and guaifenesin  - continue diuresing.  - add abx to cover respiratory pathogen   - slowly improving       Pleural effusion  - Not seen on imaging from earlier this year.  - Most likely related to HFpEF  - Cont diuresis       Chronic diastolic congestive heart failure  See above      Physical deconditioning  - consult PT to see patient  - get patient up to chair with meals        Epigastric pain  Constipation  Esophageal dysphagia  Vomited a few times on one day  reglan before meals  protonix  Suppository with BM and some relief of abdominal pain  X-ray abdomen unremarkable  US of pancreas and gallbladder showed adherent gall bladder stone. Otherwise unremarkable for other biliary and pancreatic abnormalities.   Consider outpatient EDG referral for esophageal dysphagia  - resolved    Diabetes mellitus  Patient's FSGs are controlled on current medication regimen.  Last A1c reviewed-   Lab Results   Component Value Date    HGBA1C 7.2 (H) 06/30/2022     Most recent fingerstick glucose reviewed-   Recent Labs   Lab 11/25/22  1520 11/25/22 2013 11/26/22  0713 11/26/22  1137   POCTGLUCOSE 108 124* 88 137*       Antihyperglycemics (From admission, onward)      Start     Stop Route Frequency Ordered    11/19/22 2154  insulin aspart U-100 pen 0-5 Units         -- SubQ Before meals & nightly PRN 11/19/22 2054          Hold Oral hypoglycemics while patient is in the hospital.  Does not take insulin, DM diet only    Hyperthyroidism  Continue home methimazole        Discharge Planning   ENDY: 12/3/2022     Code Status: Full Code   Is the  patient medically ready for discharge?: No    Reason for patient still in hospital (select all that apply): Patient trending condition and Treatment  Discharge Plan A: Home Health   Discharge Delays: None known at this time    Diet:  low sodium diet  GI PPx: not needed  DVT PPx:  heparin  Airways: room air  Wounds: none    Goals of Care:  Return to prior functional status     Time (minutes) spent in care of the patient (Greater than 1/2 spent in direct face-to-face contact and care coordination on unit)  35 min    Rosa Isela Main MD

## 2022-12-03 NOTE — ASSESSMENT & PLAN NOTE
Ms. Johansen is an 88 year old female with pmhx of heart failure (EF 58%) who presented with complaint of generalized fatigue. Patient is a poor historian. During her admission, patient was found to have elevated BNP and CXR significant for pulmonary edema bilaterally. Patient was started on lasix and was noted to be responsive. She was transitioned to bumex. BNP trending up to 1026. Patient developed hyponatremia shortly after being admitted. Sodium 136 at admission and dropped down to 120. Nephrology consulted for hyponatremia.     Likely secondary to heart failure. During volume assessment, patient had elevated JVP, rhonchi bilaterally, and RLE swelling.     Plan:  - F/u urine osm, urine Na   - Serum osm: 266. Consistent with hypotonic hyponatremia   - Recommend repeat CXR  - Recommend continuation of bumex  - Echo (11/19) showed grade III left ventricular diastolic dysfunction, mild-moderate pulmonary HTN, and ejection fraction of 58%.

## 2022-12-03 NOTE — PT/OT/SLP PROGRESS
Physical Therapy Treatment    Patient Name:  Peri Johansen   MRN:  [id number]    Recommendations:     Discharge Recommendations:  nursing facility, skilled   Discharge Equipment Recommendations:  (TBD)   Barriers to discharge: None    Assessment:     Peri Johansen is a 88 y.o. female admitted with a medical diagnosis of Acute on chronic diastolic heart failure.  She presents with the following impairments/functional limitations:  impaired cardiopulmonary response to activity (weakness; impaired endurance; impaired self care skills; impaired functional mobility; gait instability; impaired balance; decreased lower extremity function; decreased upper extremity function; decreased safety awareness;) .  Pt  tolerated treatment fairly well and is progressing slowly with mobility. pt limited due to fatigue. Patient remains appropriate for continued skilled services within the acute environment and goals remain appropriate.    Rehab Prognosis: Good; patient would benefit from acute skilled PT services to address these deficits and reach maximum level of function.    Recent Surgery: * No surgery found *      Plan:     During this hospitalization, patient to be seen 4 x/week to address the identified rehab impairments via gait training, therapeutic activities, therapeutic exercises and progress toward the following goals:    Plan of Care Expires:  12/27/22    Subjective     Chief Complaint: fatigue  Patient/Family Comments/goals: I feel like am going to the bathroom  Pain/Comfort:  Pain Rating 1: 0/10  Pain Rating Post-Intervention 1: 0/10      Objective:     Communicated with RN prior to session.  Patient found up in chair with PureWick upon PT entry to room.     General Precautions: Standard, fall   Orthopedic Precautions:N/A   Braces: N/A  Respiratory Status: Room air     Functional Mobility:  Transfers:     Sit to Stand:  contact guard assistance with rolling walker  Gait: pt amb with RW  55 ft with CGA, standing rest  breaks prn  Pt demo decreased step length, narrow base of support, decreased weight shift, and decreased radha   O2 sats from 92-98% on RA      AM-PAC 6 CLICK MOBILITY  Turning over in bed (including adjusting bedclothes, sheets and blankets)?: 3  Sitting down on and standing up from a chair with arms (e.g., wheelchair, bedside commode, etc.): 3  Moving from lying on back to sitting on the side of the bed?: 3  Moving to and from a bed to a chair (including a wheelchair)?: 3  Need to walk in hospital room?: 3  Climbing 3-5 steps with a railing?: 1  Basic Mobility Total Score: 16       Treatment & Education:  Therapist provided instruction and educated of  patient on progress, safety,d/c,PT POC,   proper body mechanics, energy conservation, and fall prevention strategies during tasks listed above, on the effects of prolonged immobility and the importance of performing OOB activity and exercises to promote healing and reduce recovery time       Updated white board with appropriate PT mobility information for medical team notification      Donned an extra gown   Call nursing/pct to transfer to chair/use bathroom. Pt stated understanding        Bedside table in front of patient and area set up for function, convenience, and safety. RN aware of patient's mobility needs and status. Questions/concerns addressed within PTA scope of practice; patient  with no further questions. Time was provided for active listening, discussion of health disposition, and discussion of safe discharge. Pt?verbalized?agreement .    Patient left up in chair with all lines intact, call button in reach, and nsg notified..    GOALS:   Multidisciplinary Problems       Physical Therapy Goals          Problem: Physical Therapy    Goal Priority Disciplines Outcome Goal Variances Interventions   Physical Therapy Goal     PT, PT/OT Ongoing, Progressing     Description: Goals to met by 12/11/2022    1. Supine to sit with Modified [place]  2. Sit to  supine with Modified [place]  3. Rolling to Left and Right with Modified [place].  4. Sit to stand transfer with Supervision  5. Bed to chair transfer with Supervision using Rolling Walker  6. Gait  x 75 feet with Stand-by Assistance using Rolling Walker   7. Lower extremity exercise program x15 reps per Instruction, with assistance as needed in order to facilitate improved strength, improved postural control, and improvement in functional independence                         Time Tracking:     PT Received On: 12/03/22  PT Start Time: 1040     PT Stop Time: 1104  PT Total Time (min): 24 min     Billable Minutes: Gait Training 15 and Therapeutic Activity 9    Treatment Type: Treatment  PT/PTA: PTA     PTA Visit Number: 3     12/03/2022

## 2022-12-04 PROBLEM — R57.9 SHOCK, UNSPECIFIED: Status: ACTIVE | Noted: 2022-01-01

## 2022-12-04 PROBLEM — R09.02 HYPOXEMIA: Status: RESOLVED | Noted: 2022-01-01 | Resolved: 2022-01-01

## 2022-12-04 NOTE — ASSESSMENT & PLAN NOTE
Ms. Johansen is an 88 year old female with pmhx of heart failure (EF 58%) who presented with complaint of generalized fatigue. Patient is a poor historian. During her admission, patient was found to have elevated BNP and CXR significant for pulmonary edema bilaterally. Patient was started on lasix and was noted to be responsive. She was transitioned to bumex. BNP trending up to 1026. Patient developed hyponatremia shortly after being admitted. Sodium 136 at admission and dropped down to 120. Nephrology consulted for hyponatremia.     Likely secondary to heart failure. During volume assessment, patient had elevated JVP, rhonchi bilaterally, and RLE swelling.     Recommendations:  - Serum osm: 266. Consistent with hypotonic hyponatremia   - Na 120 --> 130 w/ IVFs  - Follow up repeat CXR  - Can continue bumex  - Echo (11/19) showed grade III left ventricular diastolic dysfunction, mild-moderate pulmonary HTN, and ejection fraction of 58%.  - strict ins/outs

## 2022-12-04 NOTE — PROGRESS NOTES
[facility] - Med Surg  Nephrology  Progress Note    Patient Name: Peri Johansen  MRN: [id number]  Admission Date: 11/17/2022  Hospital Length of Stay: 14 days  Attending Provider: Rosa Isela Main MD   Primary Care Physician: Annkia Garland MD  Principal Problem:Acute on chronic diastolic heart failure    Subjective:     HPI: Ms. Johansen is an 88 year old female with extensive pmhx including heart failure, aortic atherosclerosis, B-cell lymphoma, T2DM, Diverticulosis, GERD, HLD, HTN, who presented with complaint of generalized fatigue. Patient is a poor historian. She continues to complain of fatigue and intermittent SOB. Patient denies urinary abnormalities, change in PO intake, and chest pain. During her admission, patient was found to have elevated BNP and CXR significant for pulmonary edema bilaterally. Patient was started on lasix and was noted to be responsive. She was transitioned to bumex. Today patient has not received any diuretics and had minimal urinary output per RN. Bumex was last given yesterday. BNP trending up to 1026. Patient developed hyponatremia shortly after being admitted. Sodium 136 at admission and today dropped down to 120. Serum osmolality low (266). Echo (11/19) showed grade III left ventricular diastolic dysfunction, mild-moderate pulmonary HTN, and ejection fraction of 58%. Nephrology consulted for hyponatremia.       Interval History: Patient evaluated at bedside. Did not voice any new concerns. Denies SOB, CP, cough. VSS. Na improved to 130 today after receiving IVFs.  + 2x (unmeasured).     Review of patient's allergies indicates:   Allergen Reactions    Latex, natural rubber Itching    Latex Hives     Current Facility-Administered Medications   Medication Frequency    acetaminophen tablet 650 mg Q8H PRN    albuterol sulfate nebulizer solution 2.5 mg Q4H PRN    amiodarone tablet 200 mg Daily    aspirin chewable tablet 81 mg Daily    atorvastatin tablet 40 mg Daily     benzonatate capsule 100 mg TID PRN    bisacodyL suppository 10 mg Daily PRN    dextromethorphan-guaiFENesin  mg/5 ml liquid 5 mL Q4H PRN    ergocalciferol capsule 50,000 Units Q7 Days    fluticasone propionate 50 mcg/actuation nasal spray 50 mcg Daily    gabapentin capsule 300 mg BID    glucagon (human recombinant) injection 1 mg PRN    glucose chewable tablet 16 g PRN    glucose chewable tablet 24 g PRN    heparin (porcine) injection 5,000 Units Q12H    hydrOXYzine pamoate capsule 25 mg Q6H PRN    insulin aspart U-100 pen 0-5 Units QID (AC + HS) PRN    melatonin tablet 6 mg Nightly    methIMAzole split tablet 2.5 mg Daily    naloxone 0.4 mg/mL injection 0.02 mg PRN    ondansetron injection 8 mg Q6H PRN    pantoprazole EC tablet 40 mg Daily    polyethylene glycol packet 17 g BID PRN    potassium, sodium phosphates 280-160-250 mg packet 2 packet QID (AC & HS)    sodium chloride 0.9% flush 10 mL Q12H PRN    traMADoL tablet 50 mg Q6H PRN       Objective:     Vital Signs (Most Recent):  Temp: 97.7 °F (36.5 °C) (12/04/22 0743)  Pulse: 68 (12/04/22 0743)  Resp: 18 (12/04/22 0743)  BP: (!) 102/55 (12/04/22 0743)  SpO2: (!) 91 % (12/04/22 0743)  O2 Device (Oxygen Therapy): room air (12/03/22 1000)   Vital Signs (24h Range):  Temp:  [97.5 °F (36.4 °C)-98.2 °F (36.8 °C)] 97.7 °F (36.5 °C)  Pulse:  [66-73] 68  Resp:  [16-18] 18  SpO2:  [91 %-93 %] 91 %  BP: (102-127)/(55-88) 102/55     Weight: 45.6 kg (100 lb 8.5 oz) (12/03/22 0700)  Body mass index is 20.3 kg/m².  Body surface area is 1.38 meters squared.    I/O last 3 completed shifts:  In: 770 [P.O.:770]  Out: 750 [Urine:750]    Physical Exam  Vitals reviewed.   Constitutional:       General: She is awake.      Appearance: She is well-developed and underweight. She is ill-appearing.   HENT:      Head: Normocephalic.      Nose: Nose normal.      Mouth/Throat:      Mouth: Mucous membranes are moist.   Eyes:      General: Lids are normal. No scleral  icterus.     Conjunctiva/sclera: Conjunctivae normal.      Pupils: Pupils are equal, round, and reactive to light.   Cardiovascular:      Rate and Rhythm: Normal rate and regular rhythm.      Heart sounds: No murmur heard.     Comments: Elevated JVP  Pulmonary:      Effort: Pulmonary effort is normal.      Breath sounds: Rhonchi present. No wheezing.   Abdominal:      General: Bowel sounds are normal.      Palpations: Abdomen is soft.      Tenderness: There is no abdominal tenderness.   Musculoskeletal:         General: No deformity.      Cervical back: Normal range of motion and neck supple.      Right lower leg: No edema.      Left lower leg: Edema present.   Skin:     General: Skin is warm and dry.   Neurological:      General: No focal deficit present.   Psychiatric:         Behavior: Behavior is cooperative.       Significant Labs:  CBC:   Recent Labs   Lab 11/29/22  0413   WBC 4.33   RBC 4.21   HGB 12.0   HCT 36.3*   *   MCV 86   MCH 28.5   MCHC 33.1     CMP:   Recent Labs   Lab 12/04/22  0521 12/04/22  0928   GLU 69*  --    CALCIUM 8.4*  --    ALBUMIN 2.0*  --    * 128*   K 3.9  --    CO2 33*  --    CL 82*  --    BUN 21  --    CREATININE 0.8  --      All labs within the past 24 hours have been reviewed.       Assessment/Plan:     Hyponatremia  Ms. Johansen is an 88 year old female with pmhx of heart failure (EF 58%) who presented with complaint of generalized fatigue. Patient is a poor historian. During her admission, patient was found to have elevated BNP and CXR significant for pulmonary edema bilaterally. Patient was started on lasix and was noted to be responsive. She was transitioned to bumex. BNP trending up to 1026. Patient developed hyponatremia shortly after being admitted. Sodium 136 at admission and dropped down to 120. Nephrology consulted for hyponatremia.     Likely secondary to heart failure. During volume assessment, patient had elevated JVP, rhonchi bilaterally, and RLE swelling.      Recommendations:  - Serum osm: 266. Consistent with hypotonic hyponatremia   - Na 120 --> 130 w/ IVFs  - Follow up repeat CXR  - Can continue bumex  - Echo (11/19) showed grade III left ventricular diastolic dysfunction, mild-moderate pulmonary HTN, and ejection fraction of 58%.  - strict ins/outs            Thank you for your consult. I will follow-up with patient. Please contact us if you have any additional questions.    Jennie Dixon MD  Nephrology  [facility] - Med Surg

## 2022-12-04 NOTE — SUBJECTIVE & OBJECTIVE
Interval History: Patient evaluated at bedside. Did not voice any new concerns. Denies SOB, CP, cough. VSS. Na improved to 130 today after receiving IVFs.  + 2x (unmeasured).     Review of patient's allergies indicates:   Allergen Reactions    Latex, natural rubber Itching    Latex Hives     Current Facility-Administered Medications   Medication Frequency    acetaminophen tablet 650 mg Q8H PRN    albuterol sulfate nebulizer solution 2.5 mg Q4H PRN    amiodarone tablet 200 mg Daily    aspirin chewable tablet 81 mg Daily    atorvastatin tablet 40 mg Daily    benzonatate capsule 100 mg TID PRN    bisacodyL suppository 10 mg Daily PRN    dextromethorphan-guaiFENesin  mg/5 ml liquid 5 mL Q4H PRN    ergocalciferol capsule 50,000 Units Q7 Days    fluticasone propionate 50 mcg/actuation nasal spray 50 mcg Daily    gabapentin capsule 300 mg BID    glucagon (human recombinant) injection 1 mg PRN    glucose chewable tablet 16 g PRN    glucose chewable tablet 24 g PRN    heparin (porcine) injection 5,000 Units Q12H    hydrOXYzine pamoate capsule 25 mg Q6H PRN    insulin aspart U-100 pen 0-5 Units QID (AC + HS) PRN    melatonin tablet 6 mg Nightly    methIMAzole split tablet 2.5 mg Daily    naloxone 0.4 mg/mL injection 0.02 mg PRN    ondansetron injection 8 mg Q6H PRN    pantoprazole EC tablet 40 mg Daily    polyethylene glycol packet 17 g BID PRN    potassium, sodium phosphates 280-160-250 mg packet 2 packet QID (AC & HS)    sodium chloride 0.9% flush 10 mL Q12H PRN    traMADoL tablet 50 mg Q6H PRN       Objective:     Vital Signs (Most Recent):  Temp: 97.7 °F (36.5 °C) (12/04/22 0743)  Pulse: 68 (12/04/22 0743)  Resp: 18 (12/04/22 0743)  BP: (!) 102/55 (12/04/22 0743)  SpO2: (!) 91 % (12/04/22 0743)  O2 Device (Oxygen Therapy): room air (12/03/22 1000)   Vital Signs (24h Range):  Temp:  [97.5 °F (36.4 °C)-98.2 °F (36.8 °C)] 97.7 °F (36.5 °C)  Pulse:  [66-73] 68  Resp:  [16-18] 18  SpO2:  [91 %-93 %] 91 %  BP:  (102-127)/(55-88) 102/55     Weight: 45.6 kg (100 lb 8.5 oz) (12/03/22 0700)  Body mass index is 20.3 kg/m².  Body surface area is 1.38 meters squared.    I/O last 3 completed shifts:  In: 770 [P.O.:770]  Out: 750 [Urine:750]    Physical Exam  Vitals reviewed.   Constitutional:       General: She is awake.      Appearance: She is well-developed and underweight. She is ill-appearing.   HENT:      Head: Normocephalic.      Nose: Nose normal.      Mouth/Throat:      Mouth: Mucous membranes are moist.   Eyes:      General: Lids are normal. No scleral icterus.     Conjunctiva/sclera: Conjunctivae normal.      Pupils: Pupils are equal, round, and reactive to light.   Cardiovascular:      Rate and Rhythm: Normal rate and regular rhythm.      Heart sounds: No murmur heard.     Comments: Elevated JVP  Pulmonary:      Effort: Pulmonary effort is normal.      Breath sounds: Rhonchi present. No wheezing.   Abdominal:      General: Bowel sounds are normal.      Palpations: Abdomen is soft.      Tenderness: There is no abdominal tenderness.   Musculoskeletal:         General: No deformity.      Cervical back: Normal range of motion and neck supple.      Right lower leg: No edema.      Left lower leg: Edema present.   Skin:     General: Skin is warm and dry.   Neurological:      General: No focal deficit present.   Psychiatric:         Behavior: Behavior is cooperative.       Significant Labs:  CBC:   Recent Labs   Lab 11/29/22  0413   WBC 4.33   RBC 4.21   HGB 12.0   HCT 36.3*   *   MCV 86   MCH 28.5   MCHC 33.1     CMP:   Recent Labs   Lab 12/04/22  0521 12/04/22  0928   GLU 69*  --    CALCIUM 8.4*  --    ALBUMIN 2.0*  --    * 128*   K 3.9  --    CO2 33*  --    CL 82*  --    BUN 21  --    CREATININE 0.8  --      All labs within the past 24 hours have been reviewed.

## 2022-12-04 NOTE — PROGRESS NOTES
Hospital Medicine  Progress note    Team: [facility] [place] MED Z Rosa Isela Main MD  Admit Date: 11/17/2022    Principal Problem:  Acute on chronic diastolic heart failure    Interval hx:  No new complaints.     ROS   Respiratory: neg for cough neg for shortness of breath  Cardiovascular: neg for chest pain neg for palpitations  Gastrointestinal: neg for nausea neg for vomiting, neg for abdominal pain neg for diarrhea neg for constipation   Behavioral/Psych: neg for depression neg for anxiety    PEx  Temp:  [96.9 °F (36.1 °C)-98.2 °F (36.8 °C)]   Pulse:  [65-73]   Resp:  [16-18]   BP: (105-134)/(55-88)   SpO2:  [92 %-96 %]     Intake/Output Summary (Last 24 hours) at 12/3/2022 1932  Last data filed at 12/3/2022 1634  Gross per 24 hour   Intake 770 ml   Output 450 ml   Net 320 ml       General Appearance: no acute distress, WDWN  Heart: regular rate and rhythm, no heave, + JVD 3 cm while sitting up in chair, no edema of thighs and buttocks  Respiratory: Normal respiratory effort, symmetric excursion, bilateral vesicular breath sounds   Abdomen: Soft, non-tender; bowel sounds active  Skin: intact, no rash, no ulcers  Neurologic:  No focal numbness or weakness  Mental status: Alert, oriented x 4, affect appropriate     Recent Labs   Lab 11/28/22  0512 11/29/22  0413   WBC 3.56* 4.33   HGB 12.6 12.0   HCT 37.6 36.3*   * 139*       Recent Labs   Lab 11/27/22  0424 11/28/22  0512 11/30/22  0302 12/02/22  1601 12/03/22  0626   *   < > 129* 124* 120*   K 3.5   < > 3.7 4.9 4.2   CL 88*   < > 84* 81* 78*   CO2 35*   < > 38* 32* 31*   BUN 16   < > 17 17 18   CREATININE 0.7   < > 0.8 0.9 0.8   *   < > 137* 186* 102   CALCIUM 8.5*   < > 9.0 9.3 9.5   MG  --    < > 2.2 2.0 1.6   PHOS 2.3*  --   --  2.2* 2.2*    < > = values in this interval not displayed.       Recent Labs   Lab 12/02/22  1601 12/03/22  0626   ALBUMIN 2.2* 2.0*          Recent Labs   Lab 12/02/22  1121 12/02/22  1510 12/02/22  2052 12/03/22  0737  12/03/22  1134 12/03/22  1531   POCTGLUCOSE 132* 192* 197* 118* 112* 106         Scheduled Meds:   amiodarone  200 mg Oral Daily    aspirin  81 mg Oral Daily    atorvastatin  40 mg Oral Daily    ergocalciferol  50,000 Units Oral Q7 Days    fluticasone propionate  1 spray Each Nostril Daily    gabapentin  300 mg Oral BID    heparin (porcine)  5,000 Units Subcutaneous Q12H    melatonin  6 mg Oral Nightly    methIMAzole  2.5 mg Oral Daily    pantoprazole  40 mg Oral Daily    potassium, sodium phosphates  2 packet Oral QID (AC & HS)     Continuous Infusions:   sodium chloride 0.9%       As Needed:  acetaminophen, albuterol sulfate, benzonatate, bisacodyL, dextromethorphan-guaiFENesin  mg/5 ml, glucagon (human recombinant), glucose, glucose, hydrOXYzine pamoate, insulin aspart U-100, naloxone, ondansetron, polyethylene glycol, sodium chloride 0.9%, traMADoL    Assessment and Plan  / Problems managed today    * Acute on chronic diastolic heart failure  Moderate left atrial enlargement  Right ventricular hypertrophy  Pulmonary hypertension  ECHO 11/19 showed EF 58%, Grade III DD. Moderate left atrial enlargement. Normal right ventricular size with low normal right ventricular systolic function. Moderate to severe tricuspid regurgitation. Bilateral pleural effusions.  Low BP's hold Beta Blocker, ACE/ARB and continue IV Furosemide and monitor clinical status closely. Monitor on telemetry. Patient is on CHF pathway.  Monitor strict Is&Os and daily weights.  Place on fluid restriction of 1.5 L. Continue to stress to patient importance of self efficacy and  on diet for CHF. Last BNP reviewed- and noted below   BNP  Recent Labs   Lab 12/03/22  0626   BNP 1,026*       Patient very hard to diurese. Furosemide increased and metolazone added. Now will tranistion to oral bumex 2 mg BID. Patient with decent UOP on bumex, but hyponatremia worsening. Will hold. CXR    Hyponatremia  Urine studies indicative of SIADH or salt  wasting  Nephrology consulted    Hypoxemia  - Patient developed worsening hypoxemia on 11/27  - She is now on 2LNC  - CXR demonstrates worsening pulmonary edema  - Furosemide increased and metolazone started  - Now on room air and not desatting on exercise    Cough in adult  - no evidence of respiratory infection  - dextromethorphan and guaifenesin  - continue diuresing.  - add abx to cover respiratory pathogen   - slowly improving    Pleural effusion  - Not seen on imaging from earlier this year.  - Most likely related to HFpEF  - Cont diuresis       Chronic diastolic congestive heart failure  See above      Physical deconditioning  - consult PT to see patient  - get patient up to chair with meals    Epigastric pain  Constipation  Esophageal dysphagia  Vomited a few times on one day  reglan before meals  protonix  Suppository with BM and some relief of abdominal pain  X-ray abdomen unremarkable  US of pancreas and gallbladder showed adherent gall bladder stone. Otherwise unremarkable for other biliary and pancreatic abnormalities.   Consider outpatient EDG referral for esophageal dysphagia  - resolved    Diabetes mellitus  Patient's FSGs are controlled on current medication regimen.  Last A1c reviewed-   Lab Results   Component Value Date    HGBA1C 7.2 (H) 06/30/2022     Most recent fingerstick glucose reviewed-   Recent Labs   Lab 11/25/22  1520 11/25/22 2013 11/26/22  0713 11/26/22  1137   POCTGLUCOSE 108 124* 88 137*       Antihyperglycemics (From admission, onward)      Start     Stop Route Frequency Ordered    11/19/22 2154  insulin aspart U-100 pen 0-5 Units         -- SubQ Before meals & nightly PRN 11/19/22 2054          Hold Oral hypoglycemics while patient is in the hospital.  Does not take insulin, DM diet only    Hyperthyroidism  Continue home methimazole      Discharge Planning   ENDY: 12/4/2022     Code Status: Full Code   Is the patient medically ready for discharge?: No    Reason for patient still  in hospital (select all that apply): Patient trending condition and Treatment  Discharge Plan A: Home Health   Discharge Delays: None known at this time    Diet:  low sodium diet  GI PPx: not needed  DVT PPx:  heparin  Airways: room air  Wounds: none    Goals of Care:  Return to prior functional status     Time (minutes) spent in care of the patient (Greater than 1/2 spent in direct face-to-face contact and care coordination on unit)  35 min    Rosa Isela Main MD

## 2022-12-04 NOTE — PROGRESS NOTES
Hospital Medicine  Progress note    Team: [facility] [place] [facility] MED Z Rosa Isela Main MD  Admit Date: 11/17/2022    Principal Problem:  Acute on chronic diastolic heart failure    Interval hx:  No new complaints.     ROS   Respiratory: neg for cough neg for shortness of breath  Cardiovascular: neg for chest pain neg for palpitations  Gastrointestinal: neg for nausea neg for vomiting, neg for abdominal pain neg for diarrhea neg for constipation   Behavioral/Psych: neg for depression neg for anxiety    PEx  Temp:  [97.5 °F (36.4 °C)-98.5 °F (36.9 °C)]   Pulse:  [68-89]   Resp:  [16-23]   BP: ()/(43-65)   SpO2:  [91 %-94 %]     Intake/Output Summary (Last 24 hours) at 12/4/2022 1720  Last data filed at 12/4/2022 0932  Gross per 24 hour   Intake --   Output 540 ml   Net -540 ml       General Appearance: no acute distress, WDWN  Heart: regular rate and rhythm, no heave, + JVD 4 cm, no edema of thighs and buttocks  Respiratory: Normal respiratory effort, symmetric excursion, bilateral vesicular breath sounds   Abdomen: Soft, non-tender; bowel sounds active  Skin: intact, no rash, no ulcers  Neurologic:  No focal numbness or weakness  Mental status: Alert, oriented x 4, affect appropriate     Recent Labs   Lab 11/28/22  0512 11/29/22  0413   WBC 3.56* 4.33   HGB 12.6 12.0   HCT 37.6 36.3*   * 139*       Recent Labs   Lab 12/02/22  1601 12/03/22  0626 12/04/22  0521 12/04/22  0928   * 120* 130* 128*   K 4.9 4.2 3.9  --    CL 81* 78* 82*  --    CO2 32* 31* 33*  --    BUN 17 18 21  --    CREATININE 0.9 0.8 0.8  --    * 102 69*  --    CALCIUM 9.3 9.5 8.4*  --    MG 2.0 1.6 1.4*  --    PHOS 2.2* 2.2* 5.4*  --        Recent Labs   Lab 12/02/22  1601 12/03/22  0626 12/04/22  0521   ALBUMIN 2.2* 2.0* 2.0*          Recent Labs   Lab 12/03/22  1531 12/03/22  2055 12/04/22  0745 12/04/22  1140 12/04/22  1149 12/04/22  1549   POCTGLUCOSE 106 105 87 422* 171* 180*         Scheduled Meds:   albuterol sulfate  2.5 mg  Nebulization Q4H WAKE    amiodarone  200 mg Oral Daily    aspirin  81 mg Oral Daily    atorvastatin  40 mg Oral Daily    bumetanide  2 mg Oral BID loop    dextromethorphan-guaiFENesin  mg  1 tablet Oral BID    ergocalciferol  50,000 Units Oral Q7 Days    fluticasone propionate  1 spray Each Nostril Daily    gabapentin  300 mg Oral BID    heparin (porcine)  5,000 Units Subcutaneous Q12H    losartan  25 mg Oral Daily    magnesium sulfate IVPB  2 g Intravenous Q2H    melatonin  6 mg Oral Nightly    methIMAzole  2.5 mg Oral Daily    pantoprazole  40 mg Oral Daily    potassium, sodium phosphates  2 packet Oral QID (AC & HS)     Continuous Infusions:    As Needed:  acetaminophen, albuterol sulfate, bisacodyL, dextromethorphan-guaiFENesin  mg/5 ml, glucagon (human recombinant), glucose, glucose, hydrOXYzine pamoate, insulin aspart U-100, naloxone, ondansetron, polyethylene glycol, sodium chloride 0.9%, traMADoL    Assessment and Plan  / Problems managed today    * Acute on chronic diastolic heart failure  Moderate left atrial enlargement  Right ventricular hypertrophy  Pulmonary hypertension  ECHO 11/19 showed EF 58%, Grade III DD. Moderate left atrial enlargement. Normal right ventricular size with low normal right ventricular systolic function. Moderate to severe tricuspid regurgitation. Bilateral pleural effusions.  Low BP's hold Beta Blocker, ACE/ARB and continue IV Furosemide and monitor clinical status closely. Monitor on telemetry. Patient is on CHF pathway.  Monitor strict Is&Os and daily weights.  Place on fluid restriction of 1.5 L. Continue to stress to patient importance of self efficacy and  on diet for CHF. Last BNP reviewed- and noted below   BNP  Recent Labs   Lab 12/03/22  0626   BNP 1,026*     Patient very hard to diurese. Furosemide increased and metolazone added. Now will tranistion to oral bumex 2 mg BID. Patient with decent UOP on bumex. CXR with persisting pulmonary edema. Continue  bumex 2 mg BID    Hyponatremia  Urine studies indicative of SIADH or salt wasting  Nephrology consulted- gave NS with improved of sodium 120-130. Urine sodium and osmolality afterward increased. Resume bumex at 2 mg BID    Hypoxemia  - Patient developed worsening hypoxemia on 11/27  - She is now on 2LNC  - CXR demonstrates worsening pulmonary edema  - Furosemide increased and metolazone started  - Now on room air and not desatting on exercise    Cough in adult  - no evidence of respiratory infection  - dextromethorphan and guaifenesin  - continue diuresing. (not worsened with 1L of fluids)  - add abx to cover respiratory pathogen   - albuterol QID   - slowly improving    Pleural effusion  - Not seen on imaging from earlier this year.  - Most likely related to HFpEF  - Cont diuresis     Physical deconditioning  - consult PT to see patient  - get patient up to chair with meals    Epigastric pain  Constipation  Esophageal dysphagia  Vomited a few times on one day  reglan before meals  protonix  Suppository with BM and some relief of abdominal pain  X-ray abdomen unremarkable  US of pancreas and gallbladder showed adherent gall bladder stone. Otherwise unremarkable for other biliary and pancreatic abnormalities.   Consider outpatient EDG referral for esophageal dysphagia  - resolved    Diabetes mellitus  Patient's FSGs are controlled on current medication regimen.  Last A1c reviewed-   Lab Results   Component Value Date    HGBA1C 7.2 (H) 06/30/2022     Most recent fingerstick glucose reviewed-   Recent Labs   Lab 11/25/22  1520 11/25/22 2013 11/26/22  0713 11/26/22  1137   POCTGLUCOSE 108 124* 88 137*       Antihyperglycemics (From admission, onward)      Start     Stop Route Frequency Ordered    11/19/22 2154  insulin aspart U-100 pen 0-5 Units         -- SubQ Before meals & nightly PRN 11/19/22 2054          Hold Oral hypoglycemics while patient is in the hospital.  Does not take insulin, DM diet  only    Hyperthyroidism  Continue home methimazole      Discharge Planning   ENDY: 12/6/2022     Code Status: Full Code   Is the patient medically ready for discharge?: No    Reason for patient still in hospital (select all that apply): Patient trending condition and Treatment  Discharge Plan A: Home Health   Discharge Delays: None known at this time    Diet:  low sodium diet  GI PPx: not needed  DVT PPx:  heparin  Airways: room air  Wounds: none    Goals of Care:  Return to prior functional status     Time (minutes) spent in care of the patient (Greater than 1/2 spent in direct face-to-face contact and care coordination on unit)  35 min    Rosa Isela Main MD

## 2022-12-04 NOTE — ASSESSMENT & PLAN NOTE
- no evidence of respiratory infection  - dextromethorphan and guaifenesin  - continue diuresing. (not worsened with 1L of fluids)  - add abx to cover respiratory pathogen   - albuterol QID   - slowly improving  12/8- due to effusion

## 2022-12-04 NOTE — ASSESSMENT & PLAN NOTE
- Not seen on imaging from earlier this year.  - Most likely related to HFpEF  - Treat HF. diuresis as tolerated.

## 2022-12-04 NOTE — NURSING
0708 Pt in bed resting with eyes closed, resp even and unlabored, no s/s distress/ discomfort, communication board updated, bed in low locked position, call light in reach    0915 phlebotomy in room drawing labs will return later    0932 Sitting up in bed finished breakfast, scheduled meds given, pt continues to shift weight herself , PCT assists with repositioning Q2 hours    0945 Urine collected and sent to lab per new orders    1140 PCT reported , had PCT go back clean hands well and recheck d/t such a drastic increase    1200 rechecked blood sugar 171    1240 Pt called nurse to room had vomited undigested food she had eaten, nausea resolved    1602 Pt c/o sacral pain prn Tramadol given    1755 Order to get Bumex noted B/P low @ 92/43, rechecked B/P  prior to giving Bumex 80/42 on Dynamap will do manual Pt A&O x3 per normal has problem with year , was repositioned and checked no changes    1807 B/P checked noted 78/38 manually, notified Charge nurse which called Rapid nurse while paging Med team Z    1850 ICU PA here to assess patient continues to be A&O per baseline, denies nausea or dizziness, B/P cycled 69/34 HR 67, to be transferred to ICU  1826 paged doctor again as no response, [facility] nurses in room with patient

## 2022-12-04 NOTE — PLAN OF CARE
Continues with K+ phosphorus packets, had Magnesium replacement, started on neb treatments and Mucinex for chest tightness and cough  Problem: Adult Inpatient Plan of Care  Goal: Plan of Care Review  Outcome: Ongoing, Progressing  Goal: Patient-Specific Goal (Individualized)  Outcome: Ongoing, Progressing  Goal: Absence of Hospital-Acquired Illness or Injury  Outcome: Ongoing, Progressing  Goal: Optimal Comfort and Wellbeing  Outcome: Ongoing, Progressing  Goal: Readiness for Transition of Care  Outcome: Ongoing, Progressing     Problem: Fall Injury Risk  Goal: Absence of Fall and Fall-Related Injury  Outcome: Ongoing, Progressing     Problem: Skin Injury Risk Increased  Goal: Skin Health and Integrity  Outcome: Ongoing, Progressing     Problem: Diabetes Comorbidity  Goal: Blood Glucose Level Within Targeted Range  Outcome: Ongoing, Progressing     Problem: Impaired Wound Healing  Goal: Optimal Wound Healing  Outcome: Ongoing, Progressing     Problem: Activity Intolerance  Goal: Enhanced Capacity and Energy  Outcome: Ongoing, Progressing     Problem: Infection  Goal: Absence of Infection Signs and Symptoms  Outcome: Ongoing, Progressing

## 2022-12-05 NOTE — PROGRESS NOTES
[facility]  Critical Care Medicine  Progress Note    Patient Name: Peri Johansen  MRN: [id number]  Admission Date: 11/17/2022  Hospital Length of Stay: 15 days  Code Status: Full Code  Attending Provider: Jono Best MD  Primary Care Provider: Annika Garland MD   Principal Problem: Shock, unspecified    Subjective:     HPI:  Ms. Peri Johansen is a 88 y.o. female with a past medical history of aortic atherosclerosis, arthritis, B-cell lymphoma, DM, diverticulosis, goiter, HLD, HTN, rickets, and vitamin D deficiency.  Presented to ED on 11/20 with complaints of generalized fatigue although patient is poor historian and unable to localize specific complaints.  Intermittent complaints of chest pain 10/10 while in ED with a trop peak at 0.171.  Found to have elevated BNP of 925, COVID, flu and RSV all negative.      Patient admitted to hospital medicine and started on IV Lasix, which required ot be uptitrated to 80 mg IV BID due to poor response.  Complaints of shortness of breath and epigastric discomfort while on the floor and persistent dry, nonproductive cough.  CXR repeated and demonstrated worseing pulmoanry edema and started on metolazone as well.      On 12/4 Rapid Response called due to hypotension with BP noted to be 78/38 despite 1 L IVF given today.  Transferred to MICU and Critical Care Medicine consulted for shock requiring vasopressors.        Hospital/ICU Course:  Admitted to MICU on 12/4 with acute shock requiring vasopressors.  On 0.12 levophed and able to wean significantly overnight to 0.03.  Infectious workup sent and started on abx.  Troponin elevated to 0.283, now downtrending.        Interval History/Significant Events: No acute events overnight.  Levophed weaned significantly to 0.03.  Remains on RA.      Review of Systems   Unable to perform ROS: Dementia   Objective:     Vital Signs (Most Recent):  Temp: 98.3 °F (36.8 °C) (12/04/22 1945)  Pulse: 61 (12/05/22  0423)  Resp: 19 (12/05/22 0423)  BP: (!) 89/54 (12/05/22 0423)  SpO2: 100 % (12/05/22 0423)   Vital Signs (24h Range):  Temp:  [97.7 °F (36.5 °C)-98.5 °F (36.9 °C)] 98.3 °F (36.8 °C)  Pulse:  [61-89] 61  Resp:  [12-35] 19  SpO2:  [91 %-100 %] 100 %  BP: ()/(36-87) 89/54   Weight: 45.5 kg (100 lb 4.8 oz)  Body mass index is 20.26 kg/m².      Intake/Output Summary (Last 24 hours) at 12/5/2022 0512  Last data filed at 12/5/2022 0400  Gross per 24 hour   Intake 583.42 ml   Output 615 ml   Net -31.58 ml       Physical Exam  Vitals and nursing note reviewed.   Constitutional:       General: She is not in acute distress.     Appearance: Normal appearance. She is ill-appearing.      Comments: Chronically ill appearing   HENT:      Head: Normocephalic.      Mouth/Throat:      Mouth: Mucous membranes are dry.      Pharynx: No oropharyngeal exudate.   Eyes:      Pupils: Pupils are equal, round, and reactive to light.   Cardiovascular:      Rate and Rhythm: Regular rhythm. Bradycardia present.      Pulses: Normal pulses.      Heart sounds:     No gallop.   Pulmonary:      Effort: Pulmonary effort is normal. No respiratory distress.      Breath sounds: Normal breath sounds. No wheezing.   Abdominal:      General: Abdomen is flat. There is no distension.      Palpations: Abdomen is soft.      Tenderness: There is no abdominal tenderness.   Musculoskeletal:      Cervical back: Normal range of motion.      Right lower leg: Edema present.      Left lower leg: Edema present.   Skin:     General: Skin is warm and dry.      Capillary Refill: Capillary refill takes less than 2 seconds.   Neurological:      Mental Status: She is alert.      GCS: GCS eye subscore is 4. GCS verbal subscore is 4. GCS motor subscore is 6.      Comments: Able to follow commands, confused responses   Psychiatric:         Behavior: Behavior is cooperative.       Vents:  Oxygen Concentration (%): 21 (12/04/22 1745)  Lines/Drains/Airways       Drain   Duration                  Urethral Catheter 12/04/22 2300 Silicone 15 Fr. <1 day              Peripheral Intravenous Line  Duration                  Midline Catheter Insertion/Assessment  - Single Lumen 11/27/22 2040 Left basilic vein (medial side of arm) other (see comments) 7 days         Peripheral IV - Single Lumen 12/04/22 1945 22 G Anterior;Right Forearm <1 day                  Significant Labs:    CBC/Anemia Profile:  Recent Labs   Lab 12/04/22 1939 12/05/22  0346   WBC 7.18 6.27   HGB 11.1* 11.7*   HCT 34.6* 35.0*    293   MCV 86 84   RDW 16.5* 16.6*        Chemistries:  Recent Labs   Lab 12/03/22  0626 12/04/22  0521 12/04/22 0928 12/04/22 1939 12/05/22  0346   * 130* 128* 129*  129* 127*   K 4.2 3.9  --  4.1  4.1 4.1   CL 78* 82*  --  84*  84* 83*   CO2 31* 33*  --  35*  35* 36*   BUN 18 21  --  23  23 22   CREATININE 0.8 0.8  --  1.0  1.0 0.9   CALCIUM 9.5 8.4*  --  7.9*  7.9* 7.9*   ALBUMIN 2.0* 2.0*  --  1.9*  --    PROT  --   --   --  5.9*  --    BILITOT  --   --   --  1.5*  --    ALKPHOS  --   --   --  106  --    ALT  --   --   --  21  --    AST  --   --   --  33  --    MG 1.6 1.4*  --  2.9* 2.7*   PHOS 2.2* 5.4*  --   --  5.6*       All pertinent labs within the past 24 hours have been reviewed.    Significant Imaging:  I have reviewed all pertinent imaging results/findings within the past 24 hours.      ABG  No results for input(s): PH, PO2, PCO2, HCO3, BE in the last 168 hours.  Assessment/Plan:     Pulmonary  Cough in adult  Persistent dry cough since admission.  No focal infiltrate noted on CXR, no sputum produced for culture.  COVID, RSV, flu neg on admission.  Reports some improvement in cough since admit.      -- PRN cough suppressant  -- Has been receiving aggressive diuresis    Pleural effusion  CXR on 12/4 with bilateral pleural effusions, R>L     - Patient on RA, will continue to monitor  - Had been diuresing with bumex, holding in setting of  hypotension    Cardiac/Vascular  * Shock, unspecified  Rapid response called on 12/4 due to hypotension.  Patient had received 1 L IVF, persistently hypotensive.  Mentating with no complaints of lightheadedness, SOB, or chest pain.  Transferred to MICU for shock and started on vasopressors.  CBC with normal WBC, CXR similar to prior with pulmonary edema, no focal infiltrates.  Less concerning for sepsis, although infectious workup repeated and started abx.  Bedside POCUS exam done, no septal bowing, no tachycardia or hypoxia/respiratory distress.  Low suspicion of PE.  Hgb stable on CBC.  Known history of CHF, admitted with exacerbation, possible component of cardiogenic shock.      -- Titrate vasopressors to maintain MAP >65  -- Follow up infectious workup  -- Started cefepime  -- Hold off on additional IVF, BNP elevated and IVC plump on bedside pocus  -- Trending trop peak at 0.283 now down trendign, formal ECHO pending  -- Stopped home antihypertensives, resume when appropriate  -- F/U TSH, cortisol    Acute on chronic diastolic heart failure  TTE on 11/18/22 with a EF 58%, GIII diastolic dysfunction, Pa systolic pressure of 48  CXR on 12/4 with bilateral edema and bilateral pleural effusions (R>L)    - FU repeat TTE   -   - Follow up trop  - Hold bumex in setting of hypotension  - Strict I/O     Renal/  Hyponatremia  Serum osm: 266. Consistent with hypotonic hyponatremia   Possibly due to heart failure exacerbation     - Nephrology following   - Hold bumex in setting of hypotension     Endocrine  Diabetes mellitus  HbA1c was 7.2 in June 2022     - LDSSI   - Goal glucose 140-180 while inpatient   - Hypoglycemia protocol in place    Hyperthyroidism  - 2/2 To Graves disease    - Continue home methimazole   - FU TSH    Other  Physical deconditioning  PT/OT when appropriate.         Critical Care Daily Checklist:    A: Awake: RASS Goal/Actual Goal:    Actual:     B: Spontaneous Breathing Trial Performed?      C: SAT & SBT Coordinated?  n/a                      D: Delirium: CAM-ICU     E: Early Mobility Performed? Yes   F: Feeding Goal: Goals: Meet % of EEN/EPN by RD f/u date  Status: Nutrition Goal Status: goal not met   Current Diet Order   Procedures    Diet Low Sodium, 2gm [facility]; High Protein/High Calorie; Fluid - 800mL     Order Specific Question:   Indicate patient location for additional diet options:     Answer:   [facility]     Order Specific Question:   Additional Diet Options:     Answer:   High Protein/High Calorie     Order Specific Question:   Fluid restriction:     Answer:   Fluid - 800mL      AS: Analgesia/Sedation none   T: Thromboembolic Prophylaxis Heparin subQ   H: HOB > 300 Yes   U: Stress Ulcer Prophylaxis (if needed)    G: Glucose Control BG goal 140-180   B: Bowel Function Stool Occurrence: 1   I: Indwelling Catheter (Lines & Gil) Necessity Gil, PIV   D: De-escalation of Antimicrobials/Pharmacotherapies Continue abx    Plan for the day/ETD Wean vasopressors, f/u echo and labs    Code Status:  Family/Goals of Care: Full Code       Critical Care Time: 40 minutes  Critical secondary to Patient has a condition that poses threat to life and bodily function: Shock requiring vasopressors    Patient to been seen by Dr. Best.  Attestation to follow.       Critical care was time spent personally by me on the following activities: development of treatment plan with patient or surrogate and bedside caregivers, discussions with consultants, evaluation of patient's response to treatment, examination of patient, ordering and performing treatments and interventions, ordering and review of laboratory studies, ordering and review of radiographic studies, pulse oximetry, re-evaluation of patient's condition. This critical care time did not overlap with that of any other provider or involve time for any procedures.     Marnie Klein NP  Critical Care Medicine  [facility] -  Cardiac Medical ICU

## 2022-12-05 NOTE — PT/OT/SLP DISCHARGE
Occupational Therapy Discharge Summary    Peri Johansen  MRN: [id number]   Principal Problem: Shock, unspecified      Patient Discharged from acute Occupational Therapy on 12/5/22.  Please refer to prior OT note dated 12/1/22 for functional status.    Assessment:      Patient appropriate for care in another setting.    Objective:     GOALS:   Multidisciplinary Problems       Occupational Therapy Goals          Problem: Occupational Therapy    Goal Priority Disciplines Outcome Interventions   Occupational Therapy Goal     OT, PT/OT Ongoing, Progressing    Description: Goals to be met by: 12/12/22     Patient will increase functional independence with ADLs by performing:    UE Dressing with [place].  LE Dressing with Modified [place].  Grooming while standing at sink with Modified [place].  Toileting from toilet with Modified [place] for hygiene and clothing management.   Bathing from  shower chair/bench with Modified [place].  Toilet transfer to toilet with Modified [place].  Increased functional strength to WFL for B UE.  Upper extremity exercise program x15 reps per handout, with assistance as needed.                         Reasons for Discontinuation of Therapy Services  Transfer to alternate level of care.      Plan:     Patient Discharged to:  MICU due to hypotension.    12/5/2022

## 2022-12-05 NOTE — PROVIDER TRANSFER
ICU Transfer of Care Note  Critical Care Medicine    Admit Date: 11/17/2022  LOS: 15    CC: Shock, unspecified    Code Status: Full Code         HPI and Hospital Course:     HPI:  Ms. Peri Johansen is a 88 y.o. female with a past medical history of aortic atherosclerosis, arthritis, B-cell lymphoma, DM, diverticulosis, goiter, HLD, HTN, rickets, and vitamin D deficiency.  Presented to ED on 11/20 with complaints of generalized fatigue although patient is poor historian and unable to localize specific complaints.  Intermittent complaints of chest pain 10/10 while in ED with a trop peak at 0.171.  Found to have elevated BNP of 925, COVID, flu and RSV all negative.      Patient admitted to hospital medicine and started on IV Lasix, which required ot be uptitrated to 80 mg IV BID due to poor response.  Complaints of shortness of breath and epigastric discomfort while on the floor and persistent dry, nonproductive cough.  CXR repeated and demonstrated worseing pulmoanry edema and started on metolazone as well.      On 12/4 Rapid Response called due to hypotension with BP noted to be 78/38 despite 1 L IVF given today.  Transferred to MICU and Critical Care Medicine consulted for shock requiring vasopressors.      Hospital/ICU Course:  Admitted to MICU on 12/4 with acute shock requiring vasopressors.  On 0.12 levophed and able to wean significantly overnight to 0.03.  Infectious workup sent and started on abx.  Troponin elevated to 0.283, now downtrending.  Weaned off vasopressors early 12/5. Received 500cc bolus. Patient stable for step down to hospital medicine.       To Follow Up:     Shock suspect 2/2 BP medications and hypovolemia  --can give additional volume if need be  --hold further diuresis  --can likely de-escalate abc in next day or two if cultures remain negative    PT/OT      Discharge Plan:     tbd    Call with questions.     Barby Bingham PA-C  Critical Care Medicine  12/5/2022   3:47 PM

## 2022-12-05 NOTE — CODE/ RAPID DOCUMENTATION
RAPID RESPONSE NURSE NOTE        Admit Date: 2022  LOS: 14  Code Status: Full Code   Date of Consult: 2022  : 1934  Age: 88 y.o.  Weight:   Wt Readings from Last 1 Encounters:   22 45.6 kg (100 lb 8.5 oz)     Sex: female  Race: Black or    Bed: [address] [place] [address] A:   MRN: [id number]  Time Rapid Response Team page Received:     Time Rapid Response Team at Bedside:   Time Rapid Response Team left Bedside:   Was the patient discharged from an ICU this admission? No   Was the patient discharged from a PACU within last 24 hours? No   Did the patient receive conscious sedation/general anesthesia in last 24 hours? No  Was the patient in the ED within the past 24 hours? No  Was the patient on NIPPV within the past 24 hours? No   Did this progress into an ARC or CPA: no  Attending Physician: Rosa Isela Main MD  Primary Service: [facility]       SITUATION  Patient AAO x3  sitting in bed responding appropriately. Manual blood pressure of 78/38. Primary doctor paged 3 times no answer. Critical care notified and came to bedside to evaluate.   Notified by bedside RN via phone call.  Reason for alert: hypotension  Called to evaluate the patient for Circulatory    BACKGROUND     Why is the patient in the hospital?: Acute on chronic diastolic heart failure    Patient has a past medical history of Aortic atherosclerosis, Arthritis, Chronic diastolic congestive heart failure, Colon polyp: hyperplastic - repeat , Diabetes mellitus, type II, Diverticulosis, Gastric nodule, GERD (gastroesophageal reflux disease), Goiter, Hyperlipidemia, Hypertension, Joint pain, Leg pain, Lymphoma, Osteopenia, Osteoporosis, Renal cyst, Rickets, vitamin D deficiency, Thyroid nodule, and Vitamin D deficiency disease.    Last Vitals:  Temp: 98.2 °F (36.8 °C) (1554)  Pulse: 81 ( 174)  Resp: 12 (1745)  BP: 78/38 (1815)  SpO2: 91 % (1554)    24 Hours Vitals Range:  Temp:  [97.5 °F  (36.4 °C)-98.5 °F (36.9 °C)]   Pulse:  [68-89]   Resp:  [12-23]   BP: ()/(38-65)   SpO2:  [91 %-94 %]     Labs:  No results for input(s): CBC, WBC, HGB, HCT, PLT in the last 72 hours.    Recent Labs     12/02/22  1601 12/03/22  0626 12/04/22  0521 12/04/22  0928   * 120* 130* 128*   K 4.9 4.2 3.9  --    CL 81* 78* 82*  --    CO2 32* 31* 33*  --    CREATININE 0.9 0.8 0.8  --    * 102 69*  --    PHOS 2.2* 2.2* 5.4*  --    MG 2.0 1.6 1.4*  --         No results for input(s): PH, PCO2, PO2, HCO3, POCSATURATED, BE in the last 72 hours.     ASSESSMENT    Physical Exam  Constitutional:       Appearance: Normal appearance.   Neck:      Vascular: JVD present.   Cardiovascular:      Rate and Rhythm: Normal rate and regular rhythm.   Pulmonary:      Effort: Pulmonary effort is normal.      Breath sounds: Normal breath sounds.   Neurological:      Mental Status: She is alert and oriented to person, place, and time.      GCS: GCS eye subscore is 4. GCS verbal subscore is 4. GCS motor subscore is 6.       INTERVENTIONS    The patient was seen for Cardiac problem. Staff concerns included hypotension. The following interventions were performed: cardiology consult and critical care consult.    RECOMMENDATIONS  Report handed off to night shift Lucina at bedside  We recommend:   -Critical care will take over care due to persistent hypotension   -cbc,bmp,lactic acid, chest xray, bnp.  PROVIDER ESCALATION    Orders received and case discussed with  JUAN Dior/ critical care.    Primary team arrival time: cant get into contact    Disposition: Tx in ICU bed tbd.    FOLLOW UP    bedside RNRamona  updated on plan of care. Instructed to call the Rapid Response Nurse, Abhishek Castillo RN at [phone] for additional questions or concerns.

## 2022-12-05 NOTE — ASSESSMENT & PLAN NOTE
TTE on 11/18/22 with a EF 58%, GIII diastolic dysfunction, Pa systolic pressure of 48  CXR on 12/4 with bilateral edema and bilateral pleural effusions (R>L)    - FU repeat TTE   -   - Follow up trop  - Hold bumex in setting of hypotension  - Strict I/O

## 2022-12-05 NOTE — SUBJECTIVE & OBJECTIVE
Interval History: Serum sodium improved with IVF's. Currently at 127 this am, not symptomatic.     Review of patient's allergies indicates:   Allergen Reactions    Latex, natural rubber Itching    Latex Hives     Current Facility-Administered Medications   Medication Frequency    acetaminophen tablet 650 mg Q8H PRN    albuterol sulfate nebulizer solution 2.5 mg Q4H PRN    albuterol sulfate nebulizer solution 2.5 mg Q4H WAKE    amiodarone tablet 200 mg Daily    aspirin chewable tablet 81 mg Daily    atorvastatin tablet 40 mg Daily    bisacodyL suppository 10 mg Daily PRN    cefepime in dextrose 5 % IVPB 2 g Q24H    dextromethorphan-guaiFENesin  mg/5 ml liquid 5 mL Q4H PRN    enoxaparin injection 30 mg Daily    ergocalciferol capsule 50,000 Units Q7 Days    glucagon (human recombinant) injection 1 mg PRN    glucose chewable tablet 16 g PRN    glucose chewable tablet 24 g PRN    hydrOXYzine pamoate capsule 25 mg Q6H PRN    insulin aspart U-100 pen 0-5 Units QID (AC + HS) PRN    lactated ringers bolus 500 mL Once    LIDOcaine 5 % patch 1 patch Daily PRN    melatonin tablet 6 mg Nightly PRN    methIMAzole split tablet 2.5 mg Daily    mupirocin 2 % ointment BID    naloxone 0.4 mg/mL injection 0.02 mg PRN    NORepinephrine bitartrate-D5W 4 mg/250 mL (16 mcg/mL) PERIPHERAL access infusion Continuous    ondansetron injection 8 mg Q6H PRN    pantoprazole EC tablet 40 mg Daily    polyethylene glycol packet 17 g BID PRN    sodium chloride 0.9% flush 10 mL Q12H PRN       Objective:     Vital Signs (Most Recent):  Temp: 98.5 °F (36.9 °C) (12/05/22 1030)  Pulse: 69 (12/05/22 1045)  Resp: (!) 37 (12/05/22 1045)  BP: (!) 87/53 (12/05/22 1045)  SpO2: 98 % (12/05/22 1045)  O2 Device (Oxygen Therapy): nasal cannula (12/05/22 0800)   Vital Signs (24h Range):  Temp:  [98.2 °F (36.8 °C)-98.5 °F (36.9 °C)] 98.5 °F (36.9 °C)  Pulse:  [61-89] 69  Resp:  [11-37] 37  SpO2:  [91 %-100 %] 98 %  BP: ()/(36-87) 87/53     Weight: 45.4  kg (100 lb) (12/05/22 0800)  Body mass index is 20.2 kg/m².  Body surface area is 1.37 meters squared.    I/O last 3 completed shifts:  In: 986.4 [P.O.:620; I.V.:319.5; IV Piggyback:46.9]  Out: 960 [Urine:960]    Physical Exam  Vitals reviewed.   Constitutional:       General: She is awake.      Appearance: She is well-developed and underweight. She is ill-appearing.   HENT:      Head: Normocephalic.      Nose: Nose normal.      Mouth/Throat:      Mouth: Mucous membranes are moist.   Eyes:      General: Lids are normal. No scleral icterus.     Conjunctiva/sclera: Conjunctivae normal.      Pupils: Pupils are equal, round, and reactive to light.   Cardiovascular:      Rate and Rhythm: Normal rate and regular rhythm.      Heart sounds: No murmur heard.     Comments: Elevated JVP  Pulmonary:      Effort: Pulmonary effort is normal.      Breath sounds: Rhonchi present. No wheezing.   Abdominal:      General: Bowel sounds are normal.      Palpations: Abdomen is soft.      Tenderness: There is no abdominal tenderness.   Musculoskeletal:         General: No deformity.      Cervical back: Normal range of motion and neck supple.      Right lower leg: No edema.      Left lower leg: Edema present.   Skin:     General: Skin is warm and dry.   Neurological:      General: No focal deficit present.   Psychiatric:         Behavior: Behavior is cooperative.       Significant Labs:  CBC:   Recent Labs   Lab 12/05/22  0346   WBC 6.27   RBC 4.19   HGB 11.7*   HCT 35.0*      MCV 84   MCH 27.9   MCHC 33.4       CMP:   Recent Labs   Lab 12/04/22  1939 12/05/22  0346     100 125*   CALCIUM 7.9*  7.9* 7.9*   ALBUMIN 1.9*  --    PROT 5.9*  --    *  129* 127*   K 4.1  4.1 4.1   CO2 35*  35* 36*   CL 84*  84* 83*   BUN 23  23 22   CREATININE 1.0  1.0 0.9   ALKPHOS 106  --    ALT 21  --    AST 33  --    BILITOT 1.5*  --        All labs within the past 24 hours have been reviewed.

## 2022-12-05 NOTE — ASSESSMENT & PLAN NOTE
Serum osm: 266. Consistent with hypotonic hyponatremia   Possibly due to heart failure exacerbation     - Nephrology following   - Hold bumex in setting of hypotension

## 2022-12-05 NOTE — PROGRESS NOTES
[facility]  Nephrology  Progress Note    Patient Name: Peri Johansen  MRN: [id number]  Admission Date: 11/17/2022  Hospital Length of Stay: 15 days  Attending Provider: Jono Best MD   Primary Care Physician: Annika Garland MD  Principal Problem:Shock, unspecified      Interval History: Serum sodium improved with IVF's. Currently at 127 this am, not symptomatic.     Review of patient's allergies indicates:   Allergen Reactions    Latex, natural rubber Itching    Latex Hives     Current Facility-Administered Medications   Medication Frequency    acetaminophen tablet 650 mg Q8H PRN    albuterol sulfate nebulizer solution 2.5 mg Q4H PRN    albuterol sulfate nebulizer solution 2.5 mg Q4H WAKE    amiodarone tablet 200 mg Daily    aspirin chewable tablet 81 mg Daily    atorvastatin tablet 40 mg Daily    bisacodyL suppository 10 mg Daily PRN    cefepime in dextrose 5 % IVPB 2 g Q24H    dextromethorphan-guaiFENesin  mg/5 ml liquid 5 mL Q4H PRN    enoxaparin injection 30 mg Daily    ergocalciferol capsule 50,000 Units Q7 Days    glucagon (human recombinant) injection 1 mg PRN    glucose chewable tablet 16 g PRN    glucose chewable tablet 24 g PRN    hydrOXYzine pamoate capsule 25 mg Q6H PRN    insulin aspart U-100 pen 0-5 Units QID (AC + HS) PRN    lactated ringers bolus 500 mL Once    LIDOcaine 5 % patch 1 patch Daily PRN    melatonin tablet 6 mg Nightly PRN    methIMAzole split tablet 2.5 mg Daily    mupirocin 2 % ointment BID    naloxone 0.4 mg/mL injection 0.02 mg PRN    NORepinephrine bitartrate-D5W 4 mg/250 mL (16 mcg/mL) PERIPHERAL access infusion Continuous    ondansetron injection 8 mg Q6H PRN    pantoprazole EC tablet 40 mg Daily    polyethylene glycol packet 17 g BID PRN    sodium chloride 0.9% flush 10 mL Q12H PRN       Objective:     Vital Signs (Most Recent):  Temp: 98.5 °F (36.9 °C) (12/05/22 1030)  Pulse: 69 (12/05/22 1045)  Resp: (!) 37 (12/05/22  1045)  BP: (!) 87/53 (12/05/22 1045)  SpO2: 98 % (12/05/22 1045)  O2 Device (Oxygen Therapy): nasal cannula (12/05/22 0800)   Vital Signs (24h Range):  Temp:  [98.2 °F (36.8 °C)-98.5 °F (36.9 °C)] 98.5 °F (36.9 °C)  Pulse:  [61-89] 69  Resp:  [11-37] 37  SpO2:  [91 %-100 %] 98 %  BP: ()/(36-87) 87/53     Weight: 45.4 kg (100 lb) (12/05/22 0800)  Body mass index is 20.2 kg/m².  Body surface area is 1.37 meters squared.    I/O last 3 completed shifts:  In: 986.4 [P.O.:620; I.V.:319.5; IV Piggyback:46.9]  Out: 960 [Urine:960]    Physical Exam  Vitals reviewed.   Constitutional:       General: She is awake.      Appearance: She is well-developed and underweight. She is ill-appearing.   HENT:      Head: Normocephalic.      Nose: Nose normal.      Mouth/Throat:      Mouth: Mucous membranes are moist.   Eyes:      General: Lids are normal. No scleral icterus.     Conjunctiva/sclera: Conjunctivae normal.      Pupils: Pupils are equal, round, and reactive to light.   Cardiovascular:      Rate and Rhythm: Normal rate and regular rhythm.      Heart sounds: No murmur heard.     Comments: Elevated JVP  Pulmonary:      Effort: Pulmonary effort is normal.      Breath sounds: Rhonchi present. No wheezing.   Abdominal:      General: Bowel sounds are normal.      Palpations: Abdomen is soft.      Tenderness: There is no abdominal tenderness.   Musculoskeletal:         General: No deformity.      Cervical back: Normal range of motion and neck supple.      Right lower leg: No edema.      Left lower leg: Edema present.   Skin:     General: Skin is warm and dry.   Neurological:      General: No focal deficit present.   Psychiatric:         Behavior: Behavior is cooperative.       Significant Labs:  CBC:   Recent Labs   Lab 12/05/22  0346   WBC 6.27   RBC 4.19   HGB 11.7*   HCT 35.0*      MCV 84   MCH 27.9   MCHC 33.4       CMP:   Recent Labs   Lab 12/04/22 1939 12/05/22  0346     100 125*   CALCIUM 7.9*  7.9* 7.9*    ALBUMIN 1.9*  --    PROT 5.9*  --    *  129* 127*   K 4.1  4.1 4.1   CO2 35*  35* 36*   CL 84*  84* 83*   BUN 23  23 22   CREATININE 1.0  1.0 0.9   ALKPHOS 106  --    ALT 21  --    AST 33  --    BILITOT 1.5*  --        All labs within the past 24 hours have been reviewed.       Assessment/Plan:     Hyponatremia  Ms. Johansen is an 88 year old female with pmhx of heart failure (EF 58%) who presented with complaint of generalized fatigue. Patient is a poor historian. During her admission, patient was found to have elevated BNP and CXR significant for pulmonary edema bilaterally. Patient was started on lasix and was noted to be responsive. She was transitioned to bumex. BNP trending up to 1026. Patient developed hyponatremia shortly after being admitted. Sodium 136 at admission and dropped down to 120. Nephrology consulted for hyponatremia.     Likely secondary to heart failure. During volume assessment, patient had elevated JVP, rhonchi bilaterally, and RLE swelling.     Recommendations:    - Despite high urine sodium, serum sodium increased significantly from 120 to 130 with NS, suggesting hypovolemic hyponatremia. Will repeat urine studies while off diuretics. Possible component of CHF further complicating clinical picture.   - Serum osm: 266. Consistent with hypotonic hyponatremia   - Na 120 --> 130 -->127 w/ IVFs  - Echo (11/19) showed grade III left ventricular diastolic dysfunction, mild-moderate pulmonary HTN, and ejection fraction of 58%. EF on most recent ECHO 35%   - strict ins/outs            Thank you for your consult. I will follow-up with patient. Please contact us if you have any additional questions.    Reinaldo Morfin, NP  Nephrology  [facility]

## 2022-12-05 NOTE — ASSESSMENT & PLAN NOTE
Ms. Johansen is an 88 year old female with pmhx of heart failure (EF 58%) who presented with complaint of generalized fatigue. Patient is a poor historian. During her admission, patient was found to have elevated BNP and CXR significant for pulmonary edema bilaterally. Patient was started on lasix and was noted to be responsive. She was transitioned to bumex. BNP trending up to 1026. Patient developed hyponatremia shortly after being admitted. Sodium 136 at admission and dropped down to 120. Nephrology consulted for hyponatremia.     Likely secondary to heart failure. During volume assessment, patient had elevated JVP, rhonchi bilaterally, and RLE swelling.     Recommendations:    - Despite high urine sodium, serum sodium increased significantly from 120 to 130 with NS, suggesting hypovolemic hyponatremia. Will repeat urine studies while off diuretics. Possible component of CHF further complicating clinical picture.   - Serum osm: 266. Consistent with hypotonic hyponatremia   - Na 120 --> 130 -->127 w/ IVFs  - Echo (11/19) showed grade III left ventricular diastolic dysfunction, mild-moderate pulmonary HTN, and ejection fraction of 58%. EF on most recent ECHO 35%   - strict ins/outs

## 2022-12-05 NOTE — PT/OT/SLP DISCHARGE
Physical Therapy Discharge Summary    Name: Peri Johansen  MRN: [id number]   Principal Problem: Shock, unspecified     Patient Discharged from acute Physical Therapy on 12/5/2022.  Please refer to prior PT noted date on 12/3/2022 for functional status.     Assessment:     MD discontinued PT orders.    Objective:     GOALS:   Multidisciplinary Problems       Physical Therapy Goals          Problem: Physical Therapy    Goal Priority Disciplines Outcome Goal Variances Interventions   Physical Therapy Goal     PT, PT/OT Ongoing, Progressing     Description: Goals to met by 12/11/2022    1. Supine to sit with Modified [place]  2. Sit to supine with Modified [place]  3. Rolling to Left and Right with Modified [place].  4. Sit to stand transfer with Supervision  5. Bed to chair transfer with Supervision using Rolling Walker  6. Gait  x 75 feet with Stand-by Assistance using Rolling Walker   7. Lower extremity exercise program x15 reps per Instruction, with assistance as needed in order to facilitate improved strength, improved postural control, and improvement in functional independence                         Reasons for Discontinuation of Therapy Services  MD discontinued PT orders.        Plan:     Patient Discharged to: in house.      12/5/2022

## 2022-12-05 NOTE — SUBJECTIVE & OBJECTIVE
Interval History/Significant Events: No acute events overnight.  Levophed weaned significantly to 0.03.  Remains on RA.      Review of Systems   Unable to perform ROS: Dementia   Objective:     Vital Signs (Most Recent):  Temp: 98.3 °F (36.8 °C) (12/04/22 1945)  Pulse: 61 (12/05/22 0423)  Resp: 19 (12/05/22 0423)  BP: (!) 89/54 (12/05/22 0423)  SpO2: 100 % (12/05/22 0423)   Vital Signs (24h Range):  Temp:  [97.7 °F (36.5 °C)-98.5 °F (36.9 °C)] 98.3 °F (36.8 °C)  Pulse:  [61-89] 61  Resp:  [12-35] 19  SpO2:  [91 %-100 %] 100 %  BP: ()/(36-87) 89/54   Weight: 45.5 kg (100 lb 4.8 oz)  Body mass index is 20.26 kg/m².      Intake/Output Summary (Last 24 hours) at 12/5/2022 0512  Last data filed at 12/5/2022 0400  Gross per 24 hour   Intake 583.42 ml   Output 615 ml   Net -31.58 ml       Physical Exam  Vitals and nursing note reviewed.   Constitutional:       General: She is not in acute distress.     Appearance: Normal appearance. She is ill-appearing.      Comments: Chronically ill appearing   HENT:      Head: Normocephalic.      Mouth/Throat:      Mouth: Mucous membranes are dry.      Pharynx: No oropharyngeal exudate.   Eyes:      Pupils: Pupils are equal, round, and reactive to light.   Cardiovascular:      Rate and Rhythm: Regular rhythm. Bradycardia present.      Pulses: Normal pulses.      Heart sounds:     No gallop.   Pulmonary:      Effort: Pulmonary effort is normal. No respiratory distress.      Breath sounds: Normal breath sounds. No wheezing.   Abdominal:      General: Abdomen is flat. There is no distension.      Palpations: Abdomen is soft.      Tenderness: There is no abdominal tenderness.   Musculoskeletal:      Cervical back: Normal range of motion.      Right lower leg: Edema present.      Left lower leg: Edema present.   Skin:     General: Skin is warm and dry.      Capillary Refill: Capillary refill takes less than 2 seconds.   Neurological:      Mental Status: She is alert.      GCS: GCS eye  subscore is 4. GCS verbal subscore is 4. GCS motor subscore is 6.      Comments: Able to follow commands, confused responses   Psychiatric:         Behavior: Behavior is cooperative.       Vents:  Oxygen Concentration (%): 21 (12/04/22 1745)  Lines/Drains/Airways       Drain  Duration                  Urethral Catheter 12/04/22 2300 Silicone 15 Fr. <1 day              Peripheral Intravenous Line  Duration                  Midline Catheter Insertion/Assessment  - Single Lumen 11/27/22 2040 Left basilic vein (medial side of arm) other (see comments) 7 days         Peripheral IV - Single Lumen 12/04/22 1945 22 G Anterior;Right Forearm <1 day                  Significant Labs:    CBC/Anemia Profile:  Recent Labs   Lab 12/04/22 1939 12/05/22  0346   WBC 7.18 6.27   HGB 11.1* 11.7*   HCT 34.6* 35.0*    293   MCV 86 84   RDW 16.5* 16.6*        Chemistries:  Recent Labs   Lab 12/03/22  0626 12/04/22  0521 12/04/22 0928 12/04/22 1939 12/05/22  0346   * 130* 128* 129*  129* 127*   K 4.2 3.9  --  4.1  4.1 4.1   CL 78* 82*  --  84*  84* 83*   CO2 31* 33*  --  35*  35* 36*   BUN 18 21  --  23  23 22   CREATININE 0.8 0.8  --  1.0  1.0 0.9   CALCIUM 9.5 8.4*  --  7.9*  7.9* 7.9*   ALBUMIN 2.0* 2.0*  --  1.9*  --    PROT  --   --   --  5.9*  --    BILITOT  --   --   --  1.5*  --    ALKPHOS  --   --   --  106  --    ALT  --   --   --  21  --    AST  --   --   --  33  --    MG 1.6 1.4*  --  2.9* 2.7*   PHOS 2.2* 5.4*  --   --  5.6*       All pertinent labs within the past 24 hours have been reviewed.    Significant Imaging:  I have reviewed all pertinent imaging results/findings within the past 24 hours.

## 2022-12-05 NOTE — ASSESSMENT & PLAN NOTE
Rapid response called on 12/4 due to hypotension.  Patient had received 1 L IVF, persistently hypotensive.  Mentating with no complaints of lightheadedness, SOB, or chest pain.  Transferred to MICU for shock and started on vasopressors.  CBC with normal WBC, CXR similar to prior with pulmonary edema, no focal infiltrates.  Less concerning for sepsis, although infectious workup repeated and started abx.  Bedside POCUS exam done, no septal bowing, no tachycardia or hypoxia/respiratory distress.  Low suspicion of PE.  Hgb stable on CBC.  Known history of CHF, admitted with exacerbation, possible component of cardiogenic shock.      -- Titrate vasopressors to maintain MAP >65  -- Follow up infectious workup  -- Started cefepime  -- Hold off on additional IVF, BNP pending and IVC plump on bedside pocus  -- F/U trop, formal ECHO  -- Stopped home antihypertensives, resume when appropriate  -- F/U TSH, cortisol

## 2022-12-05 NOTE — HPI
Ms. Peri Johansen is a 88 y.o. female with a past medical history of aortic atherosclerosis, arthritis, B-cell lymphoma, DM, diverticulosis, goiter, HLD, HTN, rickets, and vitamin D deficiency.  Presented to ED on 11/20 with complaints of generalized fatigue although patient is poor historian and unable to localize specific complaints.  Intermittent complaints of chest pain 10/10 while in ED with a trop peak at 0.171.  Found to have elevated BNP of 925, COVID, flu and RSV all negative.      Patient admitted to hospital medicine and started on IV Lasix, which required ot be uptitrated to 80 mg IV BID due to poor response.  Complaints of shortness of breath and epigastric discomfort while on the floor and persistent dry, nonproductive cough.  CXR repeated and demonstrated worseing pulmoanry edema and started on metolazone as well.      On 12/4 Rapid Response called due to hypotension with BP noted to be 78/38 despite 1 L IVF given today.  Transferred to MICU and Critical Care Medicine consulted for shock requiring vasopressors.

## 2022-12-05 NOTE — ASSESSMENT & PLAN NOTE
TTE on 11/18/22 with a EF 58%, GIII diastolic dysfunction, Pa systolic pressure of 48  CXR on 12/4 with bilateral edema and bilateral pleural effusions (R>L)    - FU repeat TTE   - FU BNP and troponin  - Hold bumex in setting of hypotension  - Strict I/O

## 2022-12-05 NOTE — ASSESSMENT & PLAN NOTE
Persistent dry cough since admission.  No focal infiltrate noted on CXR, no sputum produced for culture.  COVID, RSV, flu neg on admission.  Reports some improvement in cough since admit.      -- PRN cough suppressant  -- Has been receiving aggressive diuresis

## 2022-12-05 NOTE — CODE/ RAPID DOCUMENTATION
RAPID RESPONSE NURSE NOTE        Admit Date: 2022  LOS: 14  Code Status: Full Code   Date of Consult: 2022  : 1934  Age: 88 y.o.  Weight:   Wt Readings from Last 1 Encounters:   22 45.6 kg (100 lb 8.5 oz)     Sex: female  Race: Black or    Bed: [address] [place] [address] A:   MRN: [id number]  Time Rapid Response Team page Received: see prior RRN note  Time Rapid Response Team at Bedside: see prior RRN note  Time Rapid Response Team left Bedside:    Was the patient discharged from an ICU this admission? No   Was the patient discharged from a PACU within last 24 hours? No   Did the patient receive conscious sedation/general anesthesia in last 24 hours? No   Was the patient in the ED within the past 24 hours? No   Was the patient on NIPPV within the past 24 hours? No   Did this progress into an ARC or CPA:  no  Attending Physician: Rosa Isela Main MD  Primary Service: [facility]     SITUATION     Notified by previous RRN during handoff.  Reason for alert: hypotension  Called to evaluate the patient for Circulatory    Why is the patient in the hospital?: Acute on chronic diastolic heart failure    Patient has a past medical history of Aortic atherosclerosis, Arthritis, Chronic diastolic congestive heart failure, Colon polyp: hyperplastic - repeat , Diabetes mellitus, type II, Diverticulosis, Gastric nodule, GERD (gastroesophageal reflux disease), Goiter, Hyperlipidemia, Hypertension, Joint pain, Leg pain, Lymphoma, Osteopenia, Osteoporosis, Renal cyst, Rickets, vitamin D deficiency, Thyroid nodule, and Vitamin D deficiency disease.    Last Vitals:  Temp: 98.2 °F (36.8 °C) ( 155)  Pulse: 72 ( 1900)  Resp: 12 ( 174)  BP: 82/49 ( 1900)  SpO2: 91 % ( 1554)    24 Hours Vitals Range:  Temp:  [97.5 °F (36.4 °C)-98.5 °F (36.9 °C)]   Pulse:  [68-89]   Resp:  [12-23]   BP: ()/(36-65)   SpO2:  [91 %-94 %]     Labs:  Recent Labs     22   WBC 7.18    HGB 11.1*   HCT 34.6*          Recent Labs     12/02/22  1601 12/03/22  0626 12/04/22  0521 12/04/22  0928   * 120* 130* 128*   K 4.9 4.2 3.9  --    CL 81* 78* 82*  --    CO2 32* 31* 33*  --    CREATININE 0.9 0.8 0.8  --    * 102 69*  --    PHOS 2.2* 2.2* 5.4*  --    MG 2.0 1.6 1.4*  --         No results for input(s): PH, PCO2, PO2, HCO3, POCSATURATED, BE in the last 72 hours.     ASSESSMENT/INTERVENTIONS    Prior RRN interventions in addition to PIV placement, STAT lactic acid, BNP, CMP, CBC, Mag    RECOMMENDATIONS    We recommend:Tx to MICU for closer monitoring and interventions    Discussed plan of care with Charge Jacqueline GARCIA    PROVIDER ESCALATION    Orders received and case discussed with Dr. Aponte (Cardiology fellow) .    Disposition: Tx in ICU bed 6089    FOLLOW UP  Call the Rapid Response NurseSteve at x [phone] for additional questions or concerns.             RRN IV START       IV started during emergency event. 22g placed to right forearm.   Please call Rapid Response RNChad RN with any questions or concerns at [phone].

## 2022-12-05 NOTE — H&P
[facility] - Cardiac Medical ICU  Critical Care Medicine  History & Physical    Patient Name: Peri Johansen  MRN: [id number]  Admission Date: 11/17/2022  Hospital Length of Stay: 14 days  Code Status: Full Code  Attending Physician: Jono Best MD   Primary Care Provider: Annika Garland MD   Principal Problem: Shock, unspecified    Subjective:     HPI:  Ms. Peri Johansen is a 88 y.o. female with a past medical history of aortic atherosclerosis, arthritis, B-cell lymphoma, DM, diverticulosis, goiter, HLD, HTN, rickets, and vitamin D deficiency.  Presented to ED on 11/20 with complaints of generalized fatigue although patient is poor historian and unable to localize specific complaints.  Intermittent complaints of chest pain 10/10 while in ED with a trop peak at 0.171.  Found to have elevated BNP of 925, COVID, flu and RSV all negative.      Patient admitted to hospital medicine and started on IV Lasix, which required ot be uptitrated to 80 mg IV BID due to poor response.  Complaints of shortness of breath and epigastric discomfort while on the floor and persistent dry, nonproductive cough.  CXR repeated and demonstrated worseing pulmoanry edema and started on metolazone as well.      On 12/4 Rapid Response called due to hypotension with BP noted to be 78/38 despite 1 L IVF given today.  Transferred to MICU and Critical Care Medicine consulted for shock requiring vasopressors.        Hospital/ICU Course:  No notes on file     Past Medical History:   Diagnosis Date    Aortic atherosclerosis 1/7/2016    Arthritis     Chronic diastolic congestive heart failure 9/15/2022    Colon polyp: hyperplastic 2015- repeat 2020 8/6/2015    Diabetes mellitus, type II 8/16/2012    Diverticulosis     Gastric nodule 8/7/2014    GERD (gastroesophageal reflux disease) 8/16/2012    Goiter 8/16/2012    Hyperlipidemia 8/16/2012    Hypertension     Joint pain     Leg pain 8/16/2012    Lymphoma 8/16/2012    Osteopenia  8/16/2012    Osteoporosis     Renal cyst 3/28/2013    Rickets, vitamin D deficiency 8/16/2012    Thyroid nodule 2/24/2014    Vitamin D deficiency disease 8/16/2012       Past Surgical History:   Procedure Laterality Date    CARPAL TUNNEL RELEASE      CATARACT EXTRACTION W/  INTRAOCULAR LENS IMPLANT Bilateral     HYSTERECTOMY      partial    lymphoma surgery      L tibia       Review of patient's allergies indicates:   Allergen Reactions    Latex, natural rubber Itching    Latex Hives       Family History       Problem Relation (Age of Onset)    Breast cancer Maternal Aunt    Cancer Brother    Heart disease Father    Hypertension Mother, Father    No Known Problems Maternal Uncle, Paternal Aunt, Paternal Uncle, Maternal Grandmother, Maternal Grandfather, Paternal Grandmother, Paternal Grandfather, Daughter, Son, Son, Daughter, Daughter          Tobacco Use    Smoking status: Never    Smokeless tobacco: Never   Substance and Sexual Activity    Alcohol use: No     Alcohol/week: 0.0 standard drinks    Drug use: No    Sexual activity: Not Currently      Review of Systems   Unable to perform ROS: Dementia   Objective:     Vital Signs (Most Recent):  Temp: 98.3 °F (36.8 °C) (12/04/22 1945)  Pulse: 68 (12/04/22 1945)  Resp: (!) 28 (12/04/22 1945)  BP: (!) 149/87 (12/04/22 1945)  SpO2: (!) 94 % (12/04/22 1945)   Vital Signs (24h Range):  Temp:  [97.6 °F (36.4 °C)-98.5 °F (36.9 °C)] 98.3 °F (36.8 °C)  Pulse:  [68-89] 68  Resp:  [12-28] 28  SpO2:  [91 %-94 %] 94 %  BP: ()/(36-87) 149/87   Weight: 45.6 kg (100 lb 8.5 oz)  Body mass index is 20.3 kg/m².      Intake/Output Summary (Last 24 hours) at 12/4/2022 2103  Last data filed at 12/4/2022 1559  Gross per 24 hour   Intake 220 ml   Output 540 ml   Net -320 ml       Physical Exam  Vitals and nursing note reviewed.   Constitutional:       General: She is not in acute distress.     Appearance: Normal appearance. She is ill-appearing.      Comments:  Chronically ill appearing   HENT:      Head: Normocephalic.      Mouth/Throat:      Mouth: Mucous membranes are dry.      Pharynx: No oropharyngeal exudate.   Eyes:      Pupils: Pupils are equal, round, and reactive to light.   Cardiovascular:      Rate and Rhythm: Regular rhythm. Bradycardia present.      Pulses: Normal pulses.      Heart sounds:     No gallop.   Pulmonary:      Effort: Pulmonary effort is normal. No respiratory distress.      Breath sounds: Normal breath sounds. No wheezing.   Abdominal:      General: Abdomen is flat. There is no distension.      Palpations: Abdomen is soft.      Tenderness: There is no abdominal tenderness.   Musculoskeletal:      Cervical back: Normal range of motion.      Right lower leg: Edema present.      Left lower leg: Edema present.   Skin:     General: Skin is warm and dry.      Capillary Refill: Capillary refill takes less than 2 seconds.   Neurological:      Mental Status: She is alert.      GCS: GCS eye subscore is 4. GCS verbal subscore is 4. GCS motor subscore is 6.      Comments: Able to follow commands, confused responses   Psychiatric:         Behavior: Behavior is cooperative.       Vents:  Oxygen Concentration (%): 21 (12/04/22 1745)  Lines/Drains/Airways       Drain  Duration             Female External Urinary Catheter 11/26/22 1915 8 days              Peripheral Intravenous Line  Duration                  Midline Catheter Insertion/Assessment  - Single Lumen 11/27/22 2040 Left basilic vein (medial side of arm) other (see comments) 7 days                  Significant Labs:    CBC/Anemia Profile:  Recent Labs   Lab 12/04/22 1939   WBC 7.18   HGB 11.1*   HCT 34.6*      MCV 86   RDW 16.5*        Chemistries:  Recent Labs   Lab 12/03/22  0626 12/04/22  0521 12/04/22  0928 12/04/22 1939   * 130* 128* 129*  129*   K 4.2 3.9  --  4.1  4.1   CL 78* 82*  --  84*  84*   CO2 31* 33*  --  35*  35*   BUN 18 21  --  23  23   CREATININE 0.8 0.8  --  1.0   1.0   CALCIUM 9.5 8.4*  --  7.9*  7.9*   ALBUMIN 2.0* 2.0*  --  1.9*   PROT  --   --   --  5.9*   BILITOT  --   --   --  1.5*   ALKPHOS  --   --   --  106   ALT  --   --   --  21   AST  --   --   --  33   MG 1.6 1.4*  --  2.9*   PHOS 2.2* 5.4*  --   --        All pertinent labs within the past 24 hours have been reviewed.    Significant Imaging: I have reviewed all pertinent imaging results/findings within the past 24 hours.    Assessment/Plan:     Pulmonary  Cough in adult  Persistent dry cough since admission.  No focal infiltrate noted on CXR, no sputum produced for culture.  COVID, RSV, flu neg on admission.  Reports some improvement in cough since admit.      -- PRN cough suppressant  -- Has been receiving aggressive diuresis    Pleural effusion  CXR on 12/4 with bilateral pleural effusions, R>L     - Patient on RA, will continue to monitor    Cardiac/Vascular  * Shock, unspecified  Rapid response called on 12/4 due to hypotension.  Patient had received 1 L IVF, persistently hypotensive.  Mentating with no complaints of lightheadedness, SOB, or chest pain.  Transferred to MICU for shock and started on vasopressors.  CBC with normal WBC, CXR similar to prior with pulmonary edema, no focal infiltrates.  Less concerning for sepsis, although infectious workup repeated and started abx.  Bedside POCUS exam done, no septal bowing, no tachycardia or hypoxia/respiratory distress.  Low suspicion of PE.  Hgb stable on CBC.  Known history of CHF, admitted with exacerbation, possible component of cardiogenic shock.      -- Titrate vasopressors to maintain MAP >65  -- Follow up infectious workup  -- Started cefepime  -- Hold off on additional IVF, BNP pending and IVC plump on bedside pocus  -- F/U trop, formal ECHO  -- Stopped home antihypertensives, resume when appropriate  -- F/U TSH, cortisol    Acute on chronic diastolic heart failure  TTE on 11/18/22 with a EF 58%, GIII diastolic dysfunction, Pa systolic pressure of  48  CXR on 12/4 with bilateral edema and bilateral pleural effusions (R>L)    - FU repeat TTE   - FU BNP and troponin  - Hold bumex in setting of hypotension  - Strict I/O     Renal/  Hyponatremia  Serum osm: 266. Consistent with hypotonic hyponatremia   Possibly due to heart failure exacerbation     - Nephrology following   - Hold bumex in setting of hypotension     Endocrine  Diabetes mellitus  HbA1c was 7.2 in June 2022     - LDSSI   - Goal glucose 140-180 while inpatient     Hyperthyroidism  - 2/2 To Graves disease    - Continue home methimazole   - FU TSH    Other  Physical deconditioning  PT/OT when appropriate.          Critical Care Daily Checklist:    A: Awake: RASS Goal/Actual Goal:    Actual:     B: Spontaneous Breathing Trial Performed?     C: SAT & SBT Coordinated?  n/a                      D: Delirium: CAM-ICU     E: Early Mobility Performed? Yes   F: Feeding Goal: Goals: Meet % of EEN/EPN by RD f/u date  Status: Nutrition Goal Status: goal not met   Current Diet Order   Procedures    Diet Low Sodium, 2gm [facility]; High Protein/High Calorie; Fluid - 800mL     Order Specific Question:   Indicate patient location for additional diet options:     Answer:   [facility]     Order Specific Question:   Additional Diet Options:     Answer:   High Protein/High Calorie     Order Specific Question:   Fluid restriction:     Answer:   Fluid - 800mL      AS: Analgesia/Sedation none   T: Thromboembolic Prophylaxis Heparin subQ   H: HOB > 300 Yes   U: Stress Ulcer Prophylaxis (if needed) PPI   G: Glucose Control bg goal 140-180   B: Bowel Function Stool Occurrence: 1   I: Indwelling Catheter (Lines & Gil) Necessity PIV   D: De-escalation of Antimicrobials/Pharmacotherapies Continue Abx    Plan for the day/ETD Admit to MICU    Code Status:  Family/Goals of Care: Full Code       Critical Care Time: 45 minutes  Critical secondary to Patient has a condition that poses threat to life and bodily  function: Shock requiring vasopressors    Plan discussed with Dr. Joe.       Critical care was time spent personally by me on the following activities: development of treatment plan with patient or surrogate and bedside caregivers, discussions with consultants, evaluation of patient's response to treatment, examination of patient, ordering and performing treatments and interventions, ordering and review of laboratory studies, ordering and review of radiographic studies, pulse oximetry, re-evaluation of patient's condition. This critical care time did not overlap with that of any other provider or involve time for any procedures.    Spoke with son at bedside.  Updated on patient status and addressed questions and concerns.       Marnie Klein NP  Critical Care Medicine  [facility] - Cardiac Medical ICU

## 2022-12-05 NOTE — ASSESSMENT & PLAN NOTE
Rapid response called on 12/4 due to hypotension.  Patient had received 1 L IVF, persistently hypotensive.  Mentating with no complaints of lightheadedness, SOB, or chest pain.  Transferred to MICU for shock and started on vasopressors.  CBC with normal WBC, CXR similar to prior with pulmonary edema, no focal infiltrates.  Less concerning for sepsis, although infectious workup repeated and started abx.  Bedside POCUS exam done, no septal bowing, no tachycardia or hypoxia/respiratory distress.  Low suspicion of PE.  Hgb stable on CBC.  Known history of CHF, admitted with exacerbation, possible component of cardiogenic shock.      -- Titrate vasopressors to maintain MAP >65  -- Follow up infectious workup  -- Started cefepime  -- Hold off on additional IVF, BNP elevated and IVC plump on bedside pocus  -- Trending trop peak at 0.283 now down trendign, formal ECHO pending  -- Stopped home antihypertensives, resume when appropriate  -- F/U TSH, cortisol

## 2022-12-05 NOTE — SUBJECTIVE & OBJECTIVE
Past Medical History:   Diagnosis Date    Aortic atherosclerosis 1/7/2016    Arthritis     Chronic diastolic congestive heart failure 9/15/2022    Colon polyp: hyperplastic 2015- repeat 2020 8/6/2015    Diabetes mellitus, type II 8/16/2012    Diverticulosis     Gastric nodule 8/7/2014    GERD (gastroesophageal reflux disease) 8/16/2012    Goiter 8/16/2012    Hyperlipidemia 8/16/2012    Hypertension     Joint pain     Leg pain 8/16/2012    Lymphoma 8/16/2012    Osteopenia 8/16/2012    Osteoporosis     Renal cyst 3/28/2013    Rickets, vitamin D deficiency 8/16/2012    Thyroid nodule 2/24/2014    Vitamin D deficiency disease 8/16/2012       Past Surgical History:   Procedure Laterality Date    CARPAL TUNNEL RELEASE      CATARACT EXTRACTION W/  INTRAOCULAR LENS IMPLANT Bilateral     HYSTERECTOMY      partial    lymphoma surgery      L tibia       Review of patient's allergies indicates:   Allergen Reactions    Latex, natural rubber Itching    Latex Hives       Family History       Problem Relation (Age of Onset)    Breast cancer Maternal Aunt    Cancer Brother    Heart disease Father    Hypertension Mother, Father    No Known Problems Maternal Uncle, Paternal Aunt, Paternal Uncle, Maternal Grandmother, Maternal Grandfather, Paternal Grandmother, Paternal Grandfather, Daughter, Son, Son, Daughter, Daughter          Tobacco Use    Smoking status: Never    Smokeless tobacco: Never   Substance and Sexual Activity    Alcohol use: No     Alcohol/week: 0.0 standard drinks    Drug use: No    Sexual activity: Not Currently      Review of Systems   Unable to perform ROS: Dementia   Objective:     Vital Signs (Most Recent):  Temp: 98.3 °F (36.8 °C) (12/04/22 1945)  Pulse: 68 (12/04/22 1945)  Resp: (!) 28 (12/04/22 1945)  BP: (!) 149/87 (12/04/22 1945)  SpO2: (!) 94 % (12/04/22 1945)   Vital Signs (24h Range):  Temp:  [97.6 °F (36.4 °C)-98.5 °F (36.9 °C)] 98.3 °F (36.8 °C)  Pulse:  [68-89] 68  Resp:  [12-28] 28  SpO2:  [91 %-94 %]  94 %  BP: ()/(36-87) 149/87   Weight: 45.6 kg (100 lb 8.5 oz)  Body mass index is 20.3 kg/m².      Intake/Output Summary (Last 24 hours) at 12/4/2022 2103  Last data filed at 12/4/2022 1559  Gross per 24 hour   Intake 220 ml   Output 540 ml   Net -320 ml       Physical Exam  Vitals and nursing note reviewed.   Constitutional:       General: She is not in acute distress.     Appearance: Normal appearance. She is ill-appearing.      Comments: Chronically ill appearing   HENT:      Head: Normocephalic.      Mouth/Throat:      Mouth: Mucous membranes are dry.      Pharynx: No oropharyngeal exudate.   Eyes:      Pupils: Pupils are equal, round, and reactive to light.   Cardiovascular:      Rate and Rhythm: Regular rhythm. Bradycardia present.      Pulses: Normal pulses.      Heart sounds:     No gallop.   Pulmonary:      Effort: Pulmonary effort is normal. No respiratory distress.      Breath sounds: Normal breath sounds. No wheezing.   Abdominal:      General: Abdomen is flat. There is no distension.      Palpations: Abdomen is soft.      Tenderness: There is no abdominal tenderness.   Musculoskeletal:      Cervical back: Normal range of motion.      Right lower leg: Edema present.      Left lower leg: Edema present.   Skin:     General: Skin is warm and dry.      Capillary Refill: Capillary refill takes less than 2 seconds.   Neurological:      Mental Status: She is alert.      GCS: GCS eye subscore is 4. GCS verbal subscore is 4. GCS motor subscore is 6.      Comments: Able to follow commands, confused responses   Psychiatric:         Behavior: Behavior is cooperative.       Vents:  Oxygen Concentration (%): 21 (12/04/22 1745)  Lines/Drains/Airways       Drain  Duration             Female External Urinary Catheter 11/26/22 1915 8 days              Peripheral Intravenous Line  Duration                  Midline Catheter Insertion/Assessment  - Single Lumen 11/27/22 2040 Left basilic vein (medial side of arm) other  (see comments) 7 days                  Significant Labs:    CBC/Anemia Profile:  Recent Labs   Lab 12/04/22 1939   WBC 7.18   HGB 11.1*   HCT 34.6*      MCV 86   RDW 16.5*        Chemistries:  Recent Labs   Lab 12/03/22  0626 12/04/22  0521 12/04/22 0928 12/04/22 1939   * 130* 128* 129*  129*   K 4.2 3.9  --  4.1  4.1   CL 78* 82*  --  84*  84*   CO2 31* 33*  --  35*  35*   BUN 18 21  --  23  23   CREATININE 0.8 0.8  --  1.0  1.0   CALCIUM 9.5 8.4*  --  7.9*  7.9*   ALBUMIN 2.0* 2.0*  --  1.9*   PROT  --   --   --  5.9*   BILITOT  --   --   --  1.5*   ALKPHOS  --   --   --  106   ALT  --   --   --  21   AST  --   --   --  33   MG 1.6 1.4*  --  2.9*   PHOS 2.2* 5.4*  --   --        All pertinent labs within the past 24 hours have been reviewed.    Significant Imaging: I have reviewed all pertinent imaging results/findings within the past 24 hours.

## 2022-12-05 NOTE — ASSESSMENT & PLAN NOTE
CXR on 12/4 with bilateral pleural effusions, R>L     - Patient on RA, will continue to monitor  - Had been diuresing with bumex, holding in setting of hypotension

## 2022-12-05 NOTE — HOSPITAL COURSE
Admitted to MICU on 12/4 with acute shock requiring vasopressors.  On 0.12 levophed and able to wean significantly overnight to 0.03.  Infectious workup sent and started on abx.  Troponin elevated to 0.283, now downtrending.    12/6:  No acute events noted overnight.  Never needed vasopressor support.  Awaiting step-down from ICU.

## 2022-12-05 NOTE — ASSESSMENT & PLAN NOTE
HbA1c was 7.2 in June 2022     - LDSSI   - Goal glucose 140-180 while inpatient   - Hypoglycemia protocol in place

## 2022-12-06 NOTE — SUBJECTIVE & OBJECTIVE
Interval History: IVFs and diuretics held overnight. Na stable at 126. Repeat UA unremarkable and Urine osmolarity is 393 from 433. Remain asymptomatic.   Mentation appears to be intact. Decrease appetite.       Review of patient's allergies indicates:   Allergen Reactions    Latex, natural rubber Itching    Latex Hives     Current Facility-Administered Medications   Medication Frequency    acetaminophen tablet 650 mg Q8H PRN    amiodarone tablet 200 mg Daily    aspirin chewable tablet 81 mg Daily    atorvastatin tablet 40 mg Daily    benzonatate capsule 100 mg TID PRN    bisacodyL suppository 10 mg Daily PRN    cefepime in dextrose 5 % IVPB 2 g Q24H    dextromethorphan-guaiFENesin  mg/5 ml liquid 5 mL Q4H PRN    enoxaparin injection 30 mg Daily    ergocalciferol capsule 50,000 Units Q7 Days    glucagon (human recombinant) injection 1 mg PRN    glucose chewable tablet 16 g PRN    glucose chewable tablet 24 g PRN    hydrOXYzine pamoate capsule 25 mg Q6H PRN    insulin aspart U-100 pen 0-5 Units QID (AC + HS) PRN    LIDOcaine 5 % patch 1 patch Daily PRN    melatonin tablet 6 mg Nightly PRN    methIMAzole split tablet 2.5 mg Daily    mupirocin 2 % ointment BID    naloxone 0.4 mg/mL injection 0.02 mg PRN    ondansetron injection 8 mg Q6H PRN    pantoprazole EC tablet 40 mg Daily    polyethylene glycol packet 17 g BID PRN    sodium chloride 0.9% flush 10 mL Q12H PRN       Objective:     Vital Signs (Most Recent):  Temp: 97.5 °F (36.4 °C) (12/06/22 0330)  Pulse: 63 (12/06/22 0730)  Resp: 16 (12/06/22 0730)  BP: (!) 100/53 (12/06/22 0630)  SpO2: 100 % (12/06/22 0730)  O2 Device (Oxygen Therapy): nasal cannula (12/06/22 0730)   Vital Signs (24h Range):  Temp:  [97.5 °F (36.4 °C)-98.5 °F (36.9 °C)] 97.5 °F (36.4 °C)  Pulse:  [63-77] 63  Resp:  [12-39] 16  SpO2:  [90 %-100 %] 100 %  BP: ()/() 100/53     Weight: 45.4 kg (100 lb) (12/05/22 0800)  Body mass index is 20.2 kg/m².  Body surface area is 1.37 meters  squared.    I/O last 3 completed shifts:  In: 1436.2 [P.O.:520; I.V.:319.5; IV Piggyback:596.7]  Out: 1630 [Urine:1580; Emesis/NG output:50]    Physical Exam  Vitals and nursing note reviewed.   Constitutional:       General: She is awake.      Appearance: She is well-developed and underweight. She is ill-appearing.   HENT:      Head: Normocephalic.      Nose: Nose normal.      Mouth/Throat:      Mouth: Mucous membranes are moist.   Eyes:      General: Lids are normal. No scleral icterus.     Conjunctiva/sclera: Conjunctivae normal.      Pupils: Pupils are equal, round, and reactive to light.   Cardiovascular:      Rate and Rhythm: Normal rate and regular rhythm.      Heart sounds: No murmur heard.     Comments: Elevated JVP  Pulmonary:      Effort: Pulmonary effort is normal.      Breath sounds: Rhonchi present. No wheezing.   Abdominal:      General: Bowel sounds are normal.      Palpations: Abdomen is soft.      Tenderness: There is no abdominal tenderness.   Musculoskeletal:         General: No deformity.      Cervical back: Normal range of motion and neck supple.      Right lower leg: No edema.      Left lower leg: Edema present.   Skin:     General: Skin is warm and dry.   Neurological:      General: No focal deficit present.      Mental Status: She is alert.   Psychiatric:         Behavior: Behavior is cooperative.       Significant Labs:  CBC:   Recent Labs   Lab 12/06/22  0412   WBC 4.36   RBC 4.30   HGB 12.1   HCT 37.5      MCV 87   MCH 28.1   MCHC 32.3     CMP:   Recent Labs   Lab 12/04/22  1939 12/05/22  0346 12/06/22  0412     100   < > 105   CALCIUM 7.9*  7.9*   < > 8.5*   ALBUMIN 1.9*  --   --    PROT 5.9*  --   --    *  129*   < > 126*   K 4.1  4.1   < > 3.8   CO2 35*  35*   < > 31*   CL 84*  84*   < > 85*   BUN 23  23   < > 18   CREATININE 1.0  1.0   < > 0.7   ALKPHOS 106  --   --    ALT 21  --   --    AST 33  --   --    BILITOT 1.5*  --   --     < > = values in this  interval not displayed.     Recent Labs   Lab 12/05/22  0105   COLORU Yellow   SPECGRAV 1.015   PHUR 6.0   PROTEINUA Negative   NITRITE Negative   LEUKOCYTESUR Negative     All labs within the past 24 hours have been reviewed.

## 2022-12-06 NOTE — PT/OT/SLP RE-EVAL
Physical Therapy Re-evaluation    Patient Name:  Peri Johansen   MRN:  [id number]    Recommendations:     Discharge Recommendations: nursing facility, skilled  Discharge Equipment Recommendations:  (TBD at next level facility)   Barriers to discharge:  increased need for caregiver support    Assessment:     Peri Johansen is a 88 y.o. female admitted with a medical diagnosis of Shock, unspecified.  She presents with the following impairments/functional limitations: weakness, impaired endurance, impaired self care skills, impaired functional mobility, gait instability, impaired balance, decreased upper extremity function, decreased lower extremity function, decreased safety awareness, impaired cardiopulmonary response to activity. Pt tolerated session well. Pt ambulated 6ft fwd/bwd with RICK [place]. Pt would benefit from a skilled nursing facility to improve balance, endurance, and strength in order to maximize return to prior level of function.     Rehab Prognosis:  Good; patient would benefit from acute skilled PT services to address these deficits and reach maximum level of function.      Recent Surgery: * No surgery found *      Plan:     During this hospitalization, patient to be seen 3 x/week to address the above listed problems via gait training, therapeutic activities, therapeutic exercises, neuromuscular re-education  Plan of Care Expires:  01/05/23  Plan of Care Reviewed with: patient, family    Subjective     Communicated with RN prior to session.  Patient found up in chair with peripheral IV, geller catheter, oxygen, telemetry, pulse ox (continuous), blood pressure cuff upon PT entry to room, agreeable to evaluation.      Chief Complaint: no complaints verbalized  Patient comments/goals: get stronger to return to prior level of functioning  Pain/Comfort:  Pain Rating 1: 0/10  Pain Rating Post-Intervention 1: 0/10    Patients cultural, spiritual, Adventist conflicts given the current situation:  no      Objective:     Patient found with: peripheral IV, geller catheter, oxygen, telemetry, pulse ox (continuous), blood pressure cuff     General Precautions: Standard, fall  Orthopedic Precautions: N/A  Braces: N/A  Respiratory Status: Nasal cannula, flow 1 L/min    Exams:  Gross Motor Coordination:  WFL  Postural Exam:  Patient presented with the following abnormalities:    Rounded shoulders  Forward head  Slight kyphosis  Sensation: WFL for BLE light touch  RLE ROM: WFL  RLE Strength: hip flexion 4/5, knee flexion 4/5, knee extension 4/5, ankle DF 5/5  LLE ROM: WFL  LLE Strength: hip flexion 3+/5, knee flexion 4/5, knee extension 3+/5, ankle DF 4/5    Functional Mobility:  Bed Mobility:  ESPERANZA 2/2 pt found up in chair  Transfers:     Sit to Stand:  minimum assistance with rolling walker  VC for hand placement   Gait: 6ft fwd/bwd, Swati, RW  Shuffled steps, decreased speed, decreased foot clearance, decreased step length, forward flexed  VC for posture and walker management  Balance:   Static Sitting: SBA  Dynamic Sitting: SBA  Static Standing: CGA, RW  Slight forward flexed (VC to correct)  Dynamic Standing: Swati, RW  Shuffled steps, decreased speed, decreased foot clearance, decreased step length, forward flexed  VC for posture and walker management    AM-PAC 6 CLICK MOBILITY  Total Score:16       Treatment and Education:   Pt educated on role of therapy, goals of session, and benefits of out of bed mobility  Discussed PT plan of care during hospitalization.  Pt educated on need to call for assistance.  All questions were answered within PT scope of practice.    Patient left up in chair with all lines intact, call button in reach, and family present.    GOALS:   Multidisciplinary Problems       Physical Therapy Goals          Problem: Physical Therapy    Goal Priority Disciplines Outcome Goal Variances Interventions   Physical Therapy Goal     PT, PT/OT Ongoing, Progressing     Description: Goals to met by  12/20/2022    1. Supine to sit with Modified [place]  2. Sit to supine with Modified [place]  3. Rolling to Left and Right with Modified [place].  4. Sit to stand transfer with Supervision  5. Bed to chair transfer with Supervision using Rolling Walker  6. Gait  x 75 feet with Stand-by Assistance using Rolling Walker                          History:     Past Medical History:   Diagnosis Date    Aortic atherosclerosis 1/7/2016    Arthritis     Chronic diastolic congestive heart failure 9/15/2022    Colon polyp: hyperplastic 2015- repeat 2020 8/6/2015    Diabetes mellitus, type II 8/16/2012    Diverticulosis     Gastric nodule 8/7/2014    GERD (gastroesophageal reflux disease) 8/16/2012    Goiter 8/16/2012    Hyperlipidemia 8/16/2012    Hypertension     Joint pain     Leg pain 8/16/2012    Lymphoma 8/16/2012    Osteopenia 8/16/2012    Osteoporosis     Renal cyst 3/28/2013    Rickets, vitamin D deficiency 8/16/2012    Thyroid nodule 2/24/2014    Vitamin D deficiency disease 8/16/2012       Past Surgical History:   Procedure Laterality Date    CARPAL TUNNEL RELEASE      CATARACT EXTRACTION W/  INTRAOCULAR LENS IMPLANT Bilateral     HYSTERECTOMY      partial    lymphoma surgery      L tibia       Time Tracking:     PT Received On: 12/06/22  PT Start Time: 1012     PT Stop Time: 1043  PT Total Time (min): 31 min     Billable Minutes: Re-eval 8 and Therapeutic Activity 23      12/06/2022

## 2022-12-06 NOTE — PT/OT/SLP RE-EVAL
Occupational Therapy   Re-evaluation    Name: Peri Johansen  MRN: [id number]  Admitting Diagnosis:  Shock, unspecified  Recent Surgery: * No surgery found *      Recommendations:     Discharge Recommendations: nursing facility, skilled  Discharge Equipment Recommendations:  (TBD)  Barriers to discharge:       Assessment:     Peri Johansen is a 88 y.o. female with a medical diagnosis of Shock, unspecified.  She presents s/p t/f to ICU on 12/4 due to hypotension. Original admit 11/17/22. Performance deficits affecting function are weakness, impaired endurance, impaired self care skills, impaired functional mobility, gait instability, impaired balance, decreased coordination, decreased safety awareness.      Rehab Prognosis:  Good; patient would benefit from acute skilled OT services to address these deficits and reach maximum level of function.       Plan:     Patient to be seen 3 x/week to address the above listed problems via self-care/home management, therapeutic activities, therapeutic exercises, neuromuscular re-education  Plan of Care Expires: 01/05/23  Plan of Care Reviewed with: patient, son    Subjective     Communicated with: rn prior to session.  Pain/Comfort:  Pain Rating 1: 0/10    Objective:     Communicated with: rn prior to session.  Patient found supine with: geller catheter, oxygen, peripheral IV, pulse ox (continuous), telemetry upon OT entry to room.    General Precautions: Standard, fall  Orthopedic Precautions: N/A  Braces: N/A  Respiratory Status: Nasal cannula, flow 2 L/min    Occupational Performance:    Bed Mobility:    Patient completed Scooting/Bridging with maximal assistance  Patient completed Supine to Sit with moderate assistance    Functional Mobility/Transfers:  Patient completed Sit <> Stand Transfer with minimum assistance  with  no assistive device   Patient completed Bed <> Chair Transfer using Stand Pivot technique with minimum assistance with no assistive device    Activities of  Daily Living:  Feeding:  independence   Grooming: supervision seated    Cognitive/Visual Perceptual:  Cognitive/Psychosocial Skills:     -       Oriented to: Person, Place, Time, and Situation   -       Safety awareness/insight to disability: impaired     Physical Exam:  BUE AROM/MMT: WNL    AMPA 6 Click:  AMPA Total Score: 18    Treatment & Education:  UE ROM/MMT  Bed mobility training / assessment  Functional mobility assessment  Sit/standing balance assessment  Educated on importance of sitting OOB in bedside chair to promote increased strength, endurance & breathing.  Discussed OT POC / Post-acute plan    Patient left up in chair with all lines intact and call button in reach    GOALS:   Multidisciplinary Problems       Occupational Therapy Goals          Problem: Occupational Therapy    Goal Priority Disciplines Outcome Interventions   Occupational Therapy Goal     OT, PT/OT Ongoing, Progressing    Description: Goals to be met by: 12/13/22    Patient will increase functional independence with ADLs by performing:    LE Dressing with Stand-by Assistance.  Grooming while standing with Contact Guard Assistance.  Toileting from toilet with Contact Guard Assistance for hygiene and clothing management.   Supine to sit with Contact Guard Assistance.  Step transfer with Contact Guard Assistance  Toilet transfer to toilet with Contact Guard Assistance.                         History:     Past Medical History:   Diagnosis Date    Aortic atherosclerosis 1/7/2016    Arthritis     Chronic diastolic congestive heart failure 9/15/2022    Colon polyp: hyperplastic 2015- repeat 2020 8/6/2015    Diabetes mellitus, type II 8/16/2012    Diverticulosis     Gastric nodule 8/7/2014    GERD (gastroesophageal reflux disease) 8/16/2012    Goiter 8/16/2012    Hyperlipidemia 8/16/2012    Hypertension     Joint pain     Leg pain 8/16/2012    Lymphoma 8/16/2012    Osteopenia 8/16/2012    Osteoporosis     Renal cyst 3/28/2013    Rickets,  vitamin D deficiency 8/16/2012    Thyroid nodule 2/24/2014    Vitamin D deficiency disease 8/16/2012         Past Surgical History:   Procedure Laterality Date    CARPAL TUNNEL RELEASE      CATARACT EXTRACTION W/  INTRAOCULAR LENS IMPLANT Bilateral     HYSTERECTOMY      partial    lymphoma surgery      L tibia       Time Tracking:     OT Date of Treatment: 12/06/22  OT Start Time: 0956  OT Stop Time: 1015  OT Total Time (min): 19 min    Billable Minutes:Re-eval 9  Therapeutic Activity 10    12/6/2022

## 2022-12-06 NOTE — SUBJECTIVE & OBJECTIVE
Interval History/Significant Events:  No acute events noted overnight.  Sodium this morning 126 mmol/L.  Awaiting step-down bed.    Review of Systems   Unable to perform ROS: Dementia   Objective:     Vital Signs (Most Recent):  Temp: 97.5 °F (36.4 °C) (12/06/22 0330)  Pulse: 63 (12/06/22 0730)  Resp: 16 (12/06/22 0730)  BP: (!) 100/53 (12/06/22 0630)  SpO2: 100 % (12/06/22 0730)   Vital Signs (24h Range):  Temp:  [97.5 °F (36.4 °C)-98.5 °F (36.9 °C)] 97.5 °F (36.4 °C)  Pulse:  [63-77] 63  Resp:  [12-39] 16  SpO2:  [90 %-100 %] 100 %  BP: ()/() 100/53   Weight: 45.4 kg (100 lb)  Body mass index is 20.2 kg/m².      Intake/Output Summary (Last 24 hours) at 12/6/2022 0847  Last data filed at 12/6/2022 0600  Gross per 24 hour   Intake 669.79 ml   Output 1210 ml   Net -540.21 ml       Physical Exam  Vitals and nursing note reviewed.   Constitutional:       General: She is not in acute distress.     Appearance: Normal appearance. She is normal weight. She is not ill-appearing, toxic-appearing or diaphoretic.   HENT:      Head: Normocephalic and atraumatic.      Right Ear: External ear normal.      Left Ear: External ear normal.   Cardiovascular:      Rate and Rhythm: Normal rate and regular rhythm.      Pulses: Normal pulses.      Heart sounds: No murmur heard.    No friction rub. No gallop.   Pulmonary:      Effort: Pulmonary effort is normal. No respiratory distress.      Breath sounds: Normal breath sounds. No wheezing, rhonchi or rales.   Abdominal:      General: Abdomen is flat. Bowel sounds are normal. There is no distension.      Palpations: Abdomen is soft.      Tenderness: There is no abdominal tenderness. There is no guarding or rebound.   Musculoskeletal:         General: No swelling. Normal range of motion.   Skin:     General: Skin is warm and dry.   Neurological:      General: No focal deficit present.      Mental Status: She is alert. Mental status is at baseline. She is disoriented.    Psychiatric:         Mood and Affect: Mood normal.         Behavior: Behavior normal.         Thought Content: Thought content normal.         Judgment: Judgment normal.       Vents:  Oxygen Concentration (%): 21 (12/04/22 1745)  Lines/Drains/Airways       Drain  Duration                  Urethral Catheter 12/04/22 2300 Silicone 15 Fr. 1 day              Peripheral Intravenous Line  Duration                  Midline Catheter Insertion/Assessment  - Single Lumen 11/27/22 2040 Left basilic vein (medial side of arm) other (see comments) 8 days         Peripheral IV - Single Lumen 12/04/22 1945 22 G Anterior;Right Forearm 1 day                  Significant Labs:    CBC/Anemia Profile:  Recent Labs   Lab 12/04/22 1939 12/05/22 0346 12/06/22  0412   WBC 7.18 6.27 4.36   HGB 11.1* 11.7* 12.1   HCT 34.6* 35.0* 37.5    293 253   MCV 86 84 87   RDW 16.5* 16.6* 16.6*        Chemistries:  Recent Labs   Lab 12/04/22 1939 12/05/22 0346 12/06/22  0412   *  129* 127* 126*   K 4.1  4.1 4.1 3.8   CL 84*  84* 83* 85*   CO2 35*  35* 36* 31*   BUN 23  23 22 18   CREATININE 1.0  1.0 0.9 0.7   CALCIUM 7.9*  7.9* 7.9* 8.5*   ALBUMIN 1.9*  --   --    PROT 5.9*  --   --    BILITOT 1.5*  --   --    ALKPHOS 106  --   --    ALT 21  --   --    AST 33  --   --    MG 2.9* 2.7* 2.1   PHOS  --  5.6* 3.3       All pertinent labs within the past 24 hours have been reviewed.    Significant Imaging:  I have reviewed all pertinent imaging results/findings within the past 24 hours.

## 2022-12-06 NOTE — PLAN OF CARE
PT Re-Eval complete and POC established.    Problem: Physical Therapy  Goal: Physical Therapy Goal  Description: Goals to met by 12/20/2022    1. Supine to sit with Modified [place]  2. Sit to supine with Modified [place]  3. Rolling to Left and Right with Modified [place].  4. Sit to stand transfer with Supervision  5. Bed to chair transfer with Supervision using Rolling Walker  6. Gait  x 75 feet with Stand-by Assistance using Rolling Walker     Outcome: Ongoing, Progressing

## 2022-12-06 NOTE — PLAN OF CARE
[facility] DAILY GOALS       A: Awake    RASS: Goal - RASS Goal: 0-->alert and calm  Actual - RASS ([place] Agitation-Sedation Scale): 0-->alert and calm   Restraint necessity:    B: Breathe   SBT: Not intubated   C: Coordinate A & B, analgesics/sedatives   Pain: managed    SAT: Not intubated  D: Delirium   CAM-ICU: Overall CAM-ICU: Negative  E: Early(intubated/ Progressive (non-intubated) Mobility   MOVE Screen: Pass   Activity: Activity Management: Rolling - L1  FAS: Feeding/Nutrition   Diet order: Diet/Nutrition Received: consistent carb/diabetic diet, low saturated fat/low cholesterol, 2 gram sodium,    T: Thrombus   DVT prophylaxis: VTE Required Core Measure: Pharmacological prophylaxis initiated/maintained  H: HOB Elevation   Head of Bed (HOB) Positioning: HOB at 30-45 degrees  U: Ulcer Prophylaxis   GI: yes  G: Glucose control   managed Glycemic Management: blood glucose monitored  S: Skin   Bathing/Skin Care: incontinence care  Device Skin Pressure Protection: absorbent pad utilized/changed, adhesive use limited, positioning supports utilized, pressure points protected, skin-to-device areas padded, skin-to-skin areas padded  Pressure Reduction Devices: foam padding utilized, positioning supports utilized, pressure-redistributing mattress utilized, specialty bed utilized  Pressure Reduction Techniques: frequent weight shift encouraged, heels elevated off bed, pressure points protected, weight shift assistance provided  Skin Protection: adhesive use limited, incontinence pads utilized, silicone foam dressing in place, skin-to-device areas padded, skin-to-skin areas padded, tubing/devices free from skin contact  B: Bowel Function   no issues   I: Indwelling Catheters   Gil necessity:      Urethral Catheter 12/04/22 2300 Silicone 15 Fr.-Reason for Continuing Urinary Catheterization: Critically ill in ICU and requiring hourly monitoring of intake/output   CVC necessity: No  D: De-escalation  Antibiotics   Yes    Family/Goals of care/Code Status   Code Status: Full Code    24H Vital Sign Range  Temp:  [97.5 °F (36.4 °C)-98.5 °F (36.9 °C)]   Pulse:  [64-77]   Resp:  [12-39]   BP: ()/()   SpO2:  [90 %-100 %]      Shift Events   Coughing causing nausea and vomiting overnight, improved with PRN medications.    VS and assessment per flow sheet, patient progressing towards goals as tolerated, plan of care reviewed with  patient , all concerns addressed, will continue to monitor.    Clarisa Morton

## 2022-12-06 NOTE — ASSESSMENT & PLAN NOTE
Ms. Johansen is an 88 year old female with pmhx of heart failure (EF 58%) who presented with complaint of generalized fatigue. Patient is a poor historian. During her admission, patient was found to have elevated BNP and CXR significant for pulmonary edema bilaterally. Patient was started on lasix and was noted to be responsive. She was transitioned to bumex. BNP trending up to 1026. Patient developed hyponatremia shortly after being admitted. Sodium 136 at admission and dropped down to 120. Nephrology consulted for hyponatremia.     Likely secondary to heart failure. During volume assessment, patient had elevated JVP, rhonchi bilaterally, and RLE swelling.   Serum osm: 266. Consistent with hypotonic hyponatremia     Recommendations:    - Despite high urine sodium (repeat 61), serum sodium increased significantly from 120 to 130 with NS, suggesting hypovolemic hyponatremia. Na 120 --> 130 -->127 w/ IVFs. Na currently stable at 126.  - Repeat UA unremarkable.   - Repeat Urine Osmo 393.  - Echo (11/19) showed grade III left ventricular diastolic dysfunction, mild-moderate pulmonary HTN, and ejection fraction of 58%. EF on most recent ECHO 35%   - strict ins/outs    - Recommend holding diuretics  - Will consider IVFs as patient remains with poor oral intake and decrease appetite.   - Case to be discussed with staff further recs with attestation.

## 2022-12-06 NOTE — PROGRESS NOTES
[facility] - Cardiac Medical ICU  Critical Care Medicine  Progress Note    Patient Name: Peri Johansen  MRN: [id number]  Admission Date: 11/17/2022  Hospital Length of Stay: 16 days  Code Status: Full Code  Attending Provider: Jono Best MD  Primary Care Provider: Annika Garland MD   Principal Problem: Shock, unspecified    Subjective:     HPI:  Ms. Peri Johansen is a 88 y.o. female with a past medical history of aortic atherosclerosis, arthritis, B-cell lymphoma, DM, diverticulosis, goiter, HLD, HTN, rickets, and vitamin D deficiency.  Presented to ED on 11/20 with complaints of generalized fatigue although patient is poor historian and unable to localize specific complaints.  Intermittent complaints of chest pain 10/10 while in ED with a trop peak at 0.171.  Found to have elevated BNP of 925, COVID, flu and RSV all negative.      Patient admitted to hospital medicine and started on IV Lasix, which required ot be uptitrated to 80 mg IV BID due to poor response.  Complaints of shortness of breath and epigastric discomfort while on the floor and persistent dry, nonproductive cough.  CXR repeated and demonstrated worseing pulmoanry edema and started on metolazone as well.      On 12/4 Rapid Response called due to hypotension with BP noted to be 78/38 despite 1 L IVF given today.  Transferred to MICU and Critical Care Medicine consulted for shock requiring vasopressors.        Hospital/ICU Course:  Admitted to MICU on 12/4 with acute shock requiring vasopressors.  On 0.12 levophed and able to wean significantly overnight to 0.03.  Infectious workup sent and started on abx.  Troponin elevated to 0.283, now downtrending.    12/6:  No acute events noted overnight.  Never needed vasopressor support.  Awaiting step-down from ICU.      Interval History/Significant Events:  No acute events noted overnight.  Sodium this morning 126 mmol/L.  Awaiting step-down bed.    Review of Systems   Unable to perform ROS: Dementia    Objective:     Vital Signs (Most Recent):  Temp: 97.5 °F (36.4 °C) (12/06/22 0330)  Pulse: 63 (12/06/22 0730)  Resp: 16 (12/06/22 0730)  BP: (!) 100/53 (12/06/22 0630)  SpO2: 100 % (12/06/22 0730)   Vital Signs (24h Range):  Temp:  [97.5 °F (36.4 °C)-98.5 °F (36.9 °C)] 97.5 °F (36.4 °C)  Pulse:  [63-77] 63  Resp:  [12-39] 16  SpO2:  [90 %-100 %] 100 %  BP: ()/() 100/53   Weight: 45.4 kg (100 lb)  Body mass index is 20.2 kg/m².      Intake/Output Summary (Last 24 hours) at 12/6/2022 0847  Last data filed at 12/6/2022 0600  Gross per 24 hour   Intake 669.79 ml   Output 1210 ml   Net -540.21 ml       Physical Exam  Vitals and nursing note reviewed.   Constitutional:       General: She is not in acute distress.     Appearance: Normal appearance. She is normal weight. She is not ill-appearing, toxic-appearing or diaphoretic.   HENT:      Head: Normocephalic and atraumatic.      Right Ear: External ear normal.      Left Ear: External ear normal.   Cardiovascular:      Rate and Rhythm: Normal rate and regular rhythm.      Pulses: Normal pulses.      Heart sounds: No murmur heard.    No friction rub. No gallop.   Pulmonary:      Effort: Pulmonary effort is normal. No respiratory distress.      Breath sounds: Normal breath sounds. No wheezing, rhonchi or rales.   Abdominal:      General: Abdomen is flat. Bowel sounds are normal. There is no distension.      Palpations: Abdomen is soft.      Tenderness: There is no abdominal tenderness. There is no guarding or rebound.   Musculoskeletal:         General: No swelling. Normal range of motion.   Skin:     General: Skin is warm and dry.   Neurological:      General: No focal deficit present.      Mental Status: She is alert. Mental status is at baseline. She is disoriented.   Psychiatric:         Mood and Affect: Mood normal.         Behavior: Behavior normal.         Thought Content: Thought content normal.         Judgment: Judgment normal.       Vents:  Oxygen  Concentration (%): 21 (12/04/22 1745)  Lines/Drains/Airways       Drain  Duration                  Urethral Catheter 12/04/22 2300 Silicone 15 Fr. 1 day              Peripheral Intravenous Line  Duration                  Midline Catheter Insertion/Assessment  - Single Lumen 11/27/22 2040 Left basilic vein (medial side of arm) other (see comments) 8 days         Peripheral IV - Single Lumen 12/04/22 1945 22 G Anterior;Right Forearm 1 day                  Significant Labs:    CBC/Anemia Profile:  Recent Labs   Lab 12/04/22 1939 12/05/22 0346 12/06/22  0412   WBC 7.18 6.27 4.36   HGB 11.1* 11.7* 12.1   HCT 34.6* 35.0* 37.5    293 253   MCV 86 84 87   RDW 16.5* 16.6* 16.6*        Chemistries:  Recent Labs   Lab 12/04/22 1939 12/05/22 0346 12/06/22 0412   *  129* 127* 126*   K 4.1  4.1 4.1 3.8   CL 84*  84* 83* 85*   CO2 35*  35* 36* 31*   BUN 23  23 22 18   CREATININE 1.0  1.0 0.9 0.7   CALCIUM 7.9*  7.9* 7.9* 8.5*   ALBUMIN 1.9*  --   --    PROT 5.9*  --   --    BILITOT 1.5*  --   --    ALKPHOS 106  --   --    ALT 21  --   --    AST 33  --   --    MG 2.9* 2.7* 2.1   PHOS  --  5.6* 3.3       All pertinent labs within the past 24 hours have been reviewed.    Significant Imaging:  I have reviewed all pertinent imaging results/findings within the past 24 hours.    ABG  Recent Labs   Lab 12/05/22 1949   PH 7.426   PO2 22*   PCO2 66.8*   HCO3 43.9*   BE 20     Assessment/Plan:     Pulmonary  Cough in adult  Persistent dry cough since admission.  No focal infiltrate noted on CXR, no sputum produced for culture.  COVID, RSV, flu neg on admission.  Reports some improvement in cough since admit.      -- PRN cough suppressant  -- Has been receiving aggressive diuresis    Pleural effusion  CXR on 12/4 with bilateral pleural effusions, R>L     - Patient on RA, will continue to monitor  - Had been diuresing with bumex, holding in setting of hypotension    Cardiac/Vascular  * Shock,  unspecified  Resolved    Rapid response called on 12/4 due to hypotension.  Patient had received 1 L IVF, persistently hypotensive.  Mentating with no complaints of lightheadedness, SOB, or chest pain.  Transferred to MICU for shock and started on vasopressors.  CBC with normal WBC, CXR similar to prior with pulmonary edema, no focal infiltrates.  Less concerning for sepsis, although infectious workup repeated and started abx.  Bedside POCUS exam done, no septal bowing, no tachycardia or hypoxia/respiratory distress.  Low suspicion of PE.  Hgb stable on CBC.  Known history of CHF, admitted with exacerbation, possible component of cardiogenic shock.      -- Titrate vasopressors to maintain MAP >65--now off  -- Follow up infectious workup  -- Started cefepime  -- Hold off on additional IVF, BNP elevated and IVC plump on bedside pocus  -- Trending trop peak at 0.283 now down trendign, formal ECHO pending  -- Stopped home antihypertensives, resume when appropriate  -- F/U TSH, cortisol    Acute on chronic diastolic heart failure  TTE on 11/18/22 with a EF 58%, GIII diastolic dysfunction, Pa systolic pressure of 48  CXR on 12/4 with bilateral edema and bilateral pleural effusions (R>L)    - FU repeat TTE   -   - Follow up trop  - Hold bumex in setting of hypotension  - Strict I/O     Renal/  Hyponatremia  Serum osm: 266. Consistent with hypotonic hyponatremia   Possibly due to heart failure exacerbation     - Nephrology following   - Hold bumex in setting of hypotension     Endocrine  Diabetes mellitus  HbA1c was 7.2 in June 2022     - LDSSI   - Goal glucose 140-180 while inpatient   - Hypoglycemia protocol in place    Hyperthyroidism  - 2/2 To Graves disease    - Continue home methimazole   - FU TSH    Other  Physical deconditioning  PT/OT when appropriate.          Jnoo Best M.D.  Rapid Response/Critical Care  Department of Pulmonary Medicine  [facility]        N.B.: Portions of  this note was dictated using M*Modal Fluency Direct--there may be voice recognition errors occasionally missed on review.

## 2022-12-06 NOTE — ASSESSMENT & PLAN NOTE
Resolved    Rapid response called on 12/4 due to hypotension.  Patient had received 1 L IVF, persistently hypotensive.  Mentating with no complaints of lightheadedness, SOB, or chest pain.  Transferred to MICU for shock and started on vasopressors.  CBC with normal WBC, CXR similar to prior with pulmonary edema, no focal infiltrates.  Less concerning for sepsis, although infectious workup repeated and started abx.  Bedside POCUS exam done, no septal bowing, no tachycardia or hypoxia/respiratory distress.  Low suspicion of PE.  Hgb stable on CBC.  Known history of CHF, admitted with exacerbation, possible component of cardiogenic shock.      -- Titrate vasopressors to maintain MAP >65--now off  -- Follow up infectious workup  -- Started cefepime  -- Hold off on additional IVF, BNP elevated and IVC plump on bedside pocus  -- Trending trop peak at 0.283 now down trendign, formal ECHO pending  -- Stopped home antihypertensives, resume when appropriate  -- F/U TSH, cortisol

## 2022-12-06 NOTE — PROGRESS NOTES
[facility] - Card[facility] Medical [facility]  Nephrology  Progress Note    Patient Name: Peri Johansen  MRN: [id number]  Admission Date: 11/17/2022  Hospital Length of Stay: 16 days  Attending Provider: Jono Best MD   Primary Care Physician: Annika Garland MD  Principal Problem:Shock, unspecified    Subjective:     Interval History: IVFs and diuretics held overnight. Na stable at 126. Repeat UA unremarkable and Urine osmolarity is 393 from 433. Remain asymptomatic.   Mentation appears to be intact. Decrease appetite.       Review of patient's allergies indicates:   Allergen Reactions    Latex, natural rubber Itching    Latex Hives     Current Facility-Administered Medications   Medication Frequency    acetaminophen tablet 650 mg Q8H PRN    amiodarone tablet 200 mg Daily    aspirin chewable tablet 81 mg Daily    atorvastatin tablet 40 mg Daily    benzonatate capsule 100 mg TID PRN    bisacodyL suppository 10 mg Daily PRN    cefepime in dextrose 5 % IVPB 2 g Q24H    dextromethorphan-guaiFENesin  mg/5 ml liquid 5 mL Q4H PRN    enoxaparin injection 30 mg Daily    ergocalciferol capsule 50,000 Units Q7 Days    glucagon (human recombinant) injection 1 mg PRN    glucose chewable tablet 16 g PRN    glucose chewable tablet 24 g PRN    hydrOXYzine pamoate capsule 25 mg Q6H PRN    insulin aspart U-100 pen 0-5 Units QID (AC + HS) PRN    LIDOcaine 5 % patch 1 patch Daily PRN    melatonin tablet 6 mg Nightly PRN    methIMAzole split tablet 2.5 mg Daily    mupirocin 2 % ointment BID    naloxone 0.4 mg/mL injection 0.02 mg PRN    ondansetron injection 8 mg Q6H PRN    pantoprazole EC tablet 40 mg Daily    polyethylene glycol packet 17 g BID PRN    sodium chloride 0.9% flush 10 mL Q12H PRN       Objective:     Vital Signs (Most Recent):  Temp: 97.5 °F (36.4 °C) (12/06/22 0330)  Pulse: 63 (12/06/22 0730)  Resp: 16 (12/06/22 0730)  BP: (!) 100/53 (12/06/22 0630)  SpO2: 100 % (12/06/22 0730)  O2 Device (Oxygen Therapy): nasal cannula  (12/06/22 0730)   Vital Signs (24h Range):  Temp:  [97.5 °F (36.4 °C)-98.5 °F (36.9 °C)] 97.5 °F (36.4 °C)  Pulse:  [63-77] 63  Resp:  [12-39] 16  SpO2:  [90 %-100 %] 100 %  BP: ()/() 100/53     Weight: 45.4 kg (100 lb) (12/05/22 0800)  Body mass index is 20.2 kg/m².  Body surface area is 1.37 meters squared.    I/O last 3 completed shifts:  In: 1436.2 [P.O.:520; I.V.:319.5; IV Piggyback:596.7]  Out: 1630 [Urine:1580; Emesis/NG output:50]    Physical Exam  Vitals and nursing note reviewed.   Constitutional:       General: She is awake.      Appearance: She is well-developed and underweight. She is ill-appearing.   HENT:      Head: Normocephalic.      Nose: Nose normal.      Mouth/Throat:      Mouth: Mucous membranes are moist.   Eyes:      General: Lids are normal. No scleral icterus.     Conjunctiva/sclera: Conjunctivae normal.      Pupils: Pupils are equal, round, and reactive to light.   Cardiovascular:      Rate and Rhythm: Normal rate and regular rhythm.      Heart sounds: No murmur heard.     Comments: Elevated JVP  Pulmonary:      Effort: Pulmonary effort is normal.      Breath sounds: Rhonchi present. No wheezing.   Abdominal:      General: Bowel sounds are normal.      Palpations: Abdomen is soft.      Tenderness: There is no abdominal tenderness.   Musculoskeletal:         General: No deformity.      Cervical back: Normal range of motion and neck supple.      Right lower leg: No edema.      Left lower leg: Edema present.   Skin:     General: Skin is warm and dry.   Neurological:      General: No focal deficit present.      Mental Status: She is alert.   Psychiatric:         Behavior: Behavior is cooperative.       Significant Labs:  CBC:   Recent Labs   Lab 12/06/22  0412   WBC 4.36   RBC 4.30   HGB 12.1   HCT 37.5      MCV 87   MCH 28.1   MCHC 32.3     CMP:   Recent Labs   Lab 12/04/22  1939 12/05/22  0346 12/06/22  0412     100   < > 105   CALCIUM 7.9*  7.9*   < > 8.5*    ALBUMIN 1.9*  --   --    PROT 5.9*  --   --    *  129*   < > 126*   K 4.1  4.1   < > 3.8   CO2 35*  35*   < > 31*   CL 84*  84*   < > 85*   BUN 23  23   < > 18   CREATININE 1.0  1.0   < > 0.7   ALKPHOS 106  --   --    ALT 21  --   --    AST 33  --   --    BILITOT 1.5*  --   --     < > = values in this interval not displayed.     Recent Labs   Lab 12/05/22  0105   COLORU Yellow   SPECGRAV 1.015   PHUR 6.0   PROTEINUA Negative   NITRITE Negative   LEUKOCYTESUR Negative     All labs within the past 24 hours have been reviewed.       Assessment/Plan:     * Shock, unspecified  - per primary team     Hyponatremia  Ms. Johansen is an 88 year old female with pmhx of heart failure (EF 58%) who presented with complaint of generalized fatigue. Patient is a poor historian. During her admission, patient was found to have elevated BNP and CXR significant for pulmonary edema bilaterally. Patient was started on lasix and was noted to be responsive. She was transitioned to bumex. BNP trending up to 1026. Patient developed hyponatremia shortly after being admitted. Sodium 136 at admission and dropped down to 120. Nephrology consulted for hyponatremia.     Likely secondary to heart failure. During volume assessment, patient had elevated JVP, rhonchi bilaterally, and RLE swelling.   Serum osm: 266. Consistent with hypotonic hyponatremia     Recommendations:    - Despite high urine sodium (repeat 61), serum sodium increased significantly from 120 to 130 with NS, suggesting hypovolemic hyponatremia. Na 120 --> 130 -->127 w/ IVFs. Na currently at 126.  - Repeat UA unremarkable.   - Repeat Urine Osmo 393.  - Echo (11/19) showed grade III left ventricular diastolic dysfunction, mild-moderate pulmonary HTN, and ejection fraction of 58%. EF on most recent ECHO 35%   - strict ins/outs    - Will consider IVFs as patient remains with poor oral intake and decrease appetite.   - Case to be discussed with staff further recs with  attestation.         Thank you for your consult. I will follow-up with patient. Please contact us if you have any additional questions.  Queenie Hernandez DNP, FNP-C  Nephrology  [facility] - Cardiac Medical ICU  [facility]FF NOTE    I have seen the patient, reviewed the MARILIA's assessment, plan and progress/ consult note. I have personally interviewed and examined the patient at bedside and agree with the findings. I personally reviewed the patient's serial labs, imaging studies, pertinent clinical data and formulated the treatment plan form a renal standpoint, details as included in the note by the MARILIA who was under my direct supervision.     I agree with the assessment and plan of   Queenie Hernandez NP    Still unclear picture of hyponatremia, urine studies consistent with SIADH but patient hypotensive and responded to normal saline in the past. Can not give lots of NS because of significant heart failure (normal TSH and cortisol level), will get uric acid level.

## 2022-12-07 NOTE — PLAN OF CARE
9666  CHW completed LOCET for this patient.       Sagrario Candelario  Community Health Worker(CHW)  Case Management  Ext. [phone]

## 2022-12-07 NOTE — PROGRESS NOTES
[facility] - Cardiology Stepdown  Nephrology  Progress Note    Patient Name: Peri Johansen  MRN: [id number]  Admission Date: 11/17/2022  Hospital Length of Stay: 17 days  Attending Provider: Rosa Isela Main MD   Primary Care Physician: Annika Garland MD  Principal Problem:Shock, unspecified    Subjective:     Interval History: Na continues to improve without intervention. Na 130 today. Other electrolytes remain stable.   Appears comfortable on RA. Still endorses poor appetite.     Review of patient's allergies indicates:   Allergen Reactions    Latex, natural rubber Itching    Latex Hives     Current Facility-Administered Medications   Medication Frequency    acetaminophen tablet 650 mg Q8H PRN    amiodarone tablet 200 mg Daily    aspirin chewable tablet 81 mg Daily    atorvastatin tablet 40 mg Daily    benzonatate capsule 100 mg TID PRN    bisacodyL suppository 10 mg Daily PRN    ceFEPIme (MAXIPIME) 2 g in dextrose 5 % in water (D5W) 5 % 50 mL IVPB (MB+) Q24H    dextromethorphan-guaiFENesin  mg/5 ml liquid 5 mL Q4H PRN    enoxaparin injection 30 mg Daily    ergocalciferol capsule 50,000 Units Q7 Days    glucagon (human recombinant) injection 1 mg PRN    glucose chewable tablet 16 g PRN    glucose chewable tablet 24 g PRN    hydrOXYzine pamoate capsule 25 mg Q6H PRN    insulin aspart U-100 pen 0-5 Units QID (AC + HS) PRN    LIDOcaine 5 % patch 1 patch Daily PRN    melatonin tablet 6 mg Nightly PRN    methIMAzole split tablet 2.5 mg Daily    mupirocin 2 % ointment BID    naloxone 0.4 mg/mL injection 0.02 mg PRN    ondansetron injection 8 mg Q6H PRN    pantoprazole EC tablet 40 mg Daily    polyethylene glycol packet 17 g BID PRN    sodium chloride 0.9% flush 10 mL Q12H PRN       Objective:     Vital Signs (Most Recent):  Temp: 96.9 °F (36.1 °C) (12/07/22 1150)  Pulse: 90 (12/07/22 1150)  Resp: 18 (12/07/22 1150)  BP: 135/77 (12/07/22 1150)  SpO2: (!) 93 % (12/07/22 1150)  O2 Device  (Oxygen Therapy): room air (12/07/22 0130)   Vital Signs (24h Range):  Temp:  [96.9 °F (36.1 °C)-98.3 °F (36.8 °C)] 96.9 °F (36.1 °C)  Pulse:  [68-96] 90  Resp:  [16-37] 18  SpO2:  [91 %-100 %] 93 %  BP: (115-135)/(51-77) 135/77     Weight: 43.6 kg (96 lb 1.9 oz) (12/07/22 0726)  Body mass index is 19.41 kg/m².  Body surface area is 1.35 meters squared.    I/O last 3 completed shifts:  In: 649.8 [P.O.:600; IV Piggyback:49.8]  Out: 3030 [Urine:2980; Emesis/NG output:50]    Physical Exam  Vitals and nursing note reviewed.   Constitutional:       General: She is awake.      Appearance: She is well-developed and underweight. She is ill-appearing.   HENT:      Head: Normocephalic.      Nose: Nose normal.      Mouth/Throat:      Mouth: Mucous membranes are moist.   Eyes:      General: Lids are normal. No scleral icterus.     Conjunctiva/sclera: Conjunctivae normal.      Pupils: Pupils are equal, round, and reactive to light.   Cardiovascular:      Rate and Rhythm: Normal rate and regular rhythm.      Heart sounds: No murmur heard.     Comments: Elevated JVP  Pulmonary:      Effort: Pulmonary effort is normal.      Breath sounds: Rhonchi present. No wheezing.   Abdominal:      General: Bowel sounds are normal.      Palpations: Abdomen is soft.      Tenderness: There is no abdominal tenderness.   Musculoskeletal:         General: No deformity.      Cervical back: Normal range of motion and neck supple.      Right lower leg: No edema.      Left lower leg: Edema present.   Skin:     General: Skin is warm and dry.   Neurological:      General: No focal deficit present.      Mental Status: She is alert.   Psychiatric:         Behavior: Behavior is cooperative.       Significant Labs:  CBC:   Recent Labs   Lab 12/07/22  0910   WBC 4.02   RBC 4.99   HGB 13.9   HCT 42.2      MCV 85   MCH 27.9   MCHC 32.9     CMP:   Recent Labs   Lab 12/04/22  1939 12/05/22  0346 12/07/22  0910     100   < > 79   CALCIUM 7.9*  7.9*    < > 9.6   ALBUMIN 1.9*  --   --    PROT 5.9*  --   --    *  129*   < > 130*   K 4.1  4.1   < > 3.6   CO2 35*  35*   < > 32*   CL 84*  84*   < > 93*   BUN 23  23   < > 13   CREATININE 1.0  1.0   < > 0.9   ALKPHOS 106  --   --    ALT 21  --   --    AST 33  --   --    BILITOT 1.5*  --   --     < > = values in this interval not displayed.     All labs within the past 24 hours have been reviewed.     Assessment/Plan:     * Shock, unspecified  - per primary team     Hyponatremia  Ms. Johansen is an 88 year old female with pmhx of heart failure (EF 58%) who presented with complaint of generalized fatigue. Patient is a poor historian. During her admission, patient was found to have elevated BNP and CXR significant for pulmonary edema bilaterally. Patient was started on lasix and was noted to be responsive. She was transitioned to bumex. BNP trending up to 1026. Patient developed hyponatremia shortly after being admitted. Sodium 136 at admission and dropped down to 120. Nephrology consulted for hyponatremia.     Likely secondary to heart failure. During volume assessment, patient had elevated JVP, rhonchi bilaterally, and RLE swelling.   Serum osm: 266. Consistent with hypotonic hyponatremia   Serum sodium increased significantly from 120 to 130 with NS, suggesting hypovolemic hyponatremia. Na 120 --> 130 -->127 w/ IVFs.    Recommendations:  - Na level improved today. Na 130 from 126. Remain asymptomatic.   - Will repeat UA and urine lytes   - Recommend 1 L fluid restrictions  - Echo (11/19) showed grade III left ventricular diastolic dysfunction, mild-moderate pulmonary HTN, and ejection fraction of 58%. EF on most recent ECHO 35%   - strict ins/outs    - Recommend holding diuretics for now        Thank you for your consult. I will follow-up with patient. Please contact us if you have any additional questions.    Queenie Hernandez DNP, FNP-C  Nephrology  [facility] - Cardiology Stepdown

## 2022-12-07 NOTE — PROGRESS NOTES
Hospital Medicine  Progress note    Team: [facility] Rosa Isela Main MD  Admit Date: 11/17/2022    Principal Problem:  Shock, unspecified    Interval hx:  No new complaints.     ROS   Respiratory: neg for cough neg for shortness of breath  Cardiovascular: neg for chest pain neg for palpitations  Gastrointestinal: neg for nausea neg for vomiting, neg for abdominal pain neg for diarrhea neg for constipation   Behavioral/Psych: neg for depression neg for anxiety    PEx  Temp:  [96.9 °F (36.1 °C)-98.3 °F (36.8 °C)]   Pulse:  [68-96]   Resp:  [16-37]   BP: (115-135)/(51-77)   SpO2:  [91 %-100 %]     Intake/Output Summary (Last 24 hours) at 12/7/2022 1414  Last data filed at 12/7/2022 1412  Gross per 24 hour   Intake 720 ml   Output 1800 ml   Net -1080 ml       General Appearance: no acute distress, WDWN  Heart: regular rate and rhythm, no heave, minimal JVD, no edema of thighs and buttocks  Respiratory: Normal respiratory effort, symmetric excursion, bilateral vesicular breath sounds   Abdomen: Soft, non-tender; bowel sounds active  Skin: intact, no rash, no ulcers  Neurologic:  No focal numbness or weakness  Mental status: Alert, oriented x 4, affect appropriate     Recent Labs   Lab 12/06/22  0412 12/07/22  0442 12/07/22  0910   WBC 4.36 4.50 4.02   HGB 12.1 12.8 13.9   HCT 37.5 38.8 42.2    314 315       Recent Labs   Lab 12/05/22  0346 12/06/22  0412 12/07/22  0910   * 126* 130*   K 4.1 3.8 3.6   CL 83* 85* 93*   CO2 36* 31* 32*   BUN 22 18 13   CREATININE 0.9 0.7 0.9   * 105 79   CALCIUM 7.9* 8.5* 9.6   MG 2.7* 2.1 1.8   PHOS 5.6* 3.3 1.8*       Recent Labs   Lab 12/03/22  0626 12/04/22  0521 12/04/22  1939   ALKPHOS  --   --  106   ALT  --   --  21   AST  --   --  33   ALBUMIN 2.0* 2.0* 1.9*   PROT  --   --  5.9*   BILITOT  --   --  1.5*        Recent Labs   Lab 12/05/22  1949 12/05/22  2358 12/06/22  0841 12/06/22  1048 12/06/22  1547 12/06/22 2059   POCTGLUCOSE 132* 111* 97 131* 108 154*        Scheduled Meds:   amiodarone  200 mg Oral Daily    aspirin  81 mg Oral Daily    atorvastatin  40 mg Oral Daily    ceFEPime (MAXIPIME) IVPB  2 g Intravenous Q24H    enoxaparin  30 mg Subcutaneous Daily    ergocalciferol  50,000 Units Oral Q7 Days    methIMAzole  2.5 mg Oral Daily    mupirocin   Nasal BID    pantoprazole  40 mg Oral Daily     Continuous Infusions:  As Needed:  acetaminophen, benzonatate, bisacodyL, dextromethorphan-guaiFENesin  mg/5 ml, glucagon (human recombinant), glucose, glucose, hydrOXYzine pamoate, insulin aspart U-100, LIDOcaine, melatonin, naloxone, ondansetron, polyethylene glycol, sodium chloride 0.9%    Assessment and Plan  / Problems managed today    * Shock, unspecified  Transferred to ICU  Due to sepsis versus hypovolemia  Cultures negative  Patient was briefly on vasopressors  Not given fluids while in ICU  Infectious work up negative  Continue cefepime empirically for 7 days    Acute on chronic diastolic heart failure  Moderate left atrial enlargement  Right ventricular hypertrophy  Pulmonary hypertension  ECHO 11/19 showed EF 58%, Grade III DD. Moderate left atrial enlargement. Normal right ventricular size with low normal right ventricular systolic function. Moderate to severe tricuspid regurgitation. Bilateral pleural effusions.  Low BP's hold Beta Blocker, ACE/ARB and continue IV Furosemide and monitor clinical status closely. Monitor on telemetry. Patient is on CHF pathway.  Monitor strict Is&Os and daily weights.  Place on fluid restriction of 1.5 L. Continue to stress to patient importance of self efficacy and  on diet for CHF. Last BNP reviewed- and noted below   BNP  Recent Labs   Lab 12/03/22  0626   BNP 1,026*     Patient very hard to diurese. Furosemide increased and metolazone added. Stopped when hyponatremia worsen. Patient transitioned to bumex to 2 mg BID. On hold due to development of sodium 120 and hypotension. Cardiology consulted for further  diuretic recommendations.     Hyponatremia  Urine studies indicative of SIADH or salt wasting  Nephrology consulted- gave NS with improved of sodium 120-130. Urine sodium and osmolality afterward increased. Uric acid normal.    Cough in adult  - no evidence of respiratory infection  - dextromethorphan and guaifenesin  - continue diuresing. (not worsened with 1L of fluids)  - add abx to cover respiratory pathogen   - albuterol QID   - slowly improving    Pleural effusion  - Not seen on imaging from earlier this year.  - Most likely related to HFpEF  - Cont diuresis     Physical deconditioning  - consult PT to see patient  - get patient up to chair with meals    Epigastric pain  Constipation  Esophageal dysphagia  Vomited a few times on one day  reglan before meals  protonix  Suppository with BM and some relief of abdominal pain  X-ray abdomen unremarkable  US of pancreas and gallbladder showed adherent gall bladder stone. Otherwise unremarkable for other biliary and pancreatic abnormalities.   Consider outpatient EDG referral for esophageal dysphagia  - resolved    Diabetes mellitus  Patient's FSGs are controlled on current medication regimen.  Last A1c reviewed-   Lab Results   Component Value Date    HGBA1C 7.2 (H) 06/30/2022     Most recent fingerstick glucose reviewed-   Recent Labs   Lab 11/25/22  1520 11/25/22 2013 11/26/22  0713 11/26/22  1137   POCTGLUCOSE 108 124* 88 137*       Antihyperglycemics (From admission, onward)      Start     Stop Route Frequency Ordered    11/19/22 2154  insulin aspart U-100 pen 0-5 Units         -- SubQ Before meals & nightly PRN 11/19/22 2054          Hold Oral hypoglycemics while patient is in the hospital.  Does not take insulin, DM diet only    Hyperthyroidism  Continue home methimazole      Discharge Planning   ENDY: 12/9/2022     Code Status: Full Code   Is the patient medically ready for discharge?: No    Reason for patient still in hospital (select all that apply):  Patient trending condition and Treatment  Discharge Plan A: Home Health   Discharge Delays: None known at this time    Diet:  low sodium diet  GI PPx: not needed  DVT PPx:  heparin  Airways: room air  Wounds: none    Goals of Care:  Return to prior functional status     Time (minutes) spent in care of the patient (Greater than 1/2 spent in direct face-to-face contact and care coordination on unit)  35 min    Rosa Isela Main MD

## 2022-12-07 NOTE — PLAN OF CARE
[facility] - Cardiology Stepdown  Discharge Reassessment    Primary Care Provider: Annika Garland MD    Expected Discharge Date: 12/9/2022    Reassessment (most recent)       Discharge Reassessment - 12/07/22 1546          Discharge Reassessment    Assessment Type Discharge Planning Reassessment     Discharge Plan discussed with: Adult children;Patient     Name(s) and Number(s) Dylan Farrell [phone], daughter Racheal, dylan Shankar     Communicated ENDY with patient/caregiver Date not available/Unable to determine     Discharge Plan A Skilled Nursing Facility     Discharge Plan B Home Health     DME Needed Upon Discharge  none     Discharge Barriers Identified None     Why the patient remains in the hospital Requires continued medical care        Post-Acute Status    Post-Acute Authorization Placement     Post-Acute Placement Status Referrals Sent                   SNF referrals sent to the following in-network providers via [employer]:  [facility], [facility], [facility], [facility], [facility], and [facility].      SW met with pt, desmond Springer, dylan Shankar, and dylan Farrell to discuss discharge planning.  Pt and family voiced agreement with plan for SNF.  SW provided family with list of in-network providers explaining that some referrals have already been sent but that additional referrals can be sent up until the time pt is medically ready for discharge.  SW further explained that once pt is medically ready family will need to chose from one of the facilities that has already accepted since discharge cannot be delayed waiting for additional facilities to respond.  Pt and family voiced understanding.  Family also reported the main family contacts are niece Blanquita ([phone]), and nurse, and daughter Glenda ([phone]).  MIRIAN name and ext on whiteboard.  Will continue to follow.    Melissa aGo, JAMES  [facility]  [phone]

## 2022-12-07 NOTE — ASSESSMENT & PLAN NOTE
Ms. Johansen is an 88 year old female with pmhx of heart failure (EF 58%) who presented with complaint of generalized fatigue. Patient is a poor historian. During her admission, patient was found to have elevated BNP and CXR significant for pulmonary edema bilaterally. Patient was started on lasix and was noted to be responsive. She was transitioned to bumex. BNP trending up to 1026. Patient developed hyponatremia shortly after being admitted. Sodium 136 at admission and dropped down to 120. Nephrology consulted for hyponatremia.     Likely secondary to heart failure. During volume assessment, patient had elevated JVP, rhonchi bilaterally, and RLE swelling.   Serum osm: 266. Consistent with hypotonic hyponatremia   Serum sodium increased significantly from 120 to 130 with NS, suggesting hypovolemic hyponatremia. Na 120 --> 130 -->127 w/ IVFs.    Recommendations:  - Na level improved today. Na 130 from 126. Remain asymptomatic.   - Will repeat UA and urine lytes   - Recommend 1 L fluid restrictions  - Echo (11/19) showed grade III left ventricular diastolic dysfunction, mild-moderate pulmonary HTN, and ejection fraction of 58%. EF on most recent ECHO 35%   - strict ins/outs    - Recommend holding diuretics for now

## 2022-12-07 NOTE — PROGRESS NOTES
Patient admitted to CSU.  AAO X 4 and VSS.  Patient denies chest pain and SOB.  Patient oriented to room. SR on tele. Call light within reach, bed locked in lowest position, and side rails up x2.  Fall precautions are in place. Will continue to monitor.

## 2022-12-07 NOTE — NURSING TRANSFER
Nursing Transfer Note      12/7/2022     Reason patient is being transferred: Step down     Transfer To: [id number]    Transfer via bed    Transfer with cardiac monitoring    Transported by RN    Medicines sent: Yes    Any special needs or follow-up needed: n/a    Chart send with patient: Yes    Notified: daughter    Patient reassessed at: 12/7/22, 0115 (date, time)    Upon arrival to floor: cardiac monitor applied, patient oriented to room, call bell in reach, and bed in lowest position

## 2022-12-07 NOTE — ASSESSMENT & PLAN NOTE
Transferred to ICU  Due to sepsis versus hypovolemia  Cultures negative  Patient was briefly on vasopressors  Not given fluids while in ICU  Infectious work up negative  Continue cefepime empirically for 7 days    12/8 - BP  118/58, stable, on cefepine. Cultures neg so far. Trop 0.6 this am- repeat EKG and trop level

## 2022-12-07 NOTE — PLAN OF CARE
Patient free from falls and injuries throughout shift.  AAO X 4 and VSS. NSR on tele. Weight shifting assistance provided throughout shift.   Patient denies chest pain and SOB. No acute events overnight. Patient resting well at this time.  Plan of care discussed with patient.  Patient verbalizes understanding.  Will continue to monitor.

## 2022-12-07 NOTE — SUBJECTIVE & OBJECTIVE
Interval History: Na continues to improve without intervention. Na 130 today. Other electrolytes remain stable.   Appears comfortable on RA. Still endorses poor appetite.     Review of patient's allergies indicates:   Allergen Reactions    Latex, natural rubber Itching    Latex Hives     Current Facility-Administered Medications   Medication Frequency    acetaminophen tablet 650 mg Q8H PRN    amiodarone tablet 200 mg Daily    aspirin chewable tablet 81 mg Daily    atorvastatin tablet 40 mg Daily    benzonatate capsule 100 mg TID PRN    bisacodyL suppository 10 mg Daily PRN    ceFEPIme (MAXIPIME) 2 g in dextrose 5 % in water (D5W) 5 % 50 mL IVPB (MB+) Q24H    dextromethorphan-guaiFENesin  mg/5 ml liquid 5 mL Q4H PRN    enoxaparin injection 30 mg Daily    ergocalciferol capsule 50,000 Units Q7 Days    glucagon (human recombinant) injection 1 mg PRN    glucose chewable tablet 16 g PRN    glucose chewable tablet 24 g PRN    hydrOXYzine pamoate capsule 25 mg Q6H PRN    insulin aspart U-100 pen 0-5 Units QID (AC + HS) PRN    LIDOcaine 5 % patch 1 patch Daily PRN    melatonin tablet 6 mg Nightly PRN    methIMAzole split tablet 2.5 mg Daily    mupirocin 2 % ointment BID    naloxone 0.4 mg/mL injection 0.02 mg PRN    ondansetron injection 8 mg Q6H PRN    pantoprazole EC tablet 40 mg Daily    polyethylene glycol packet 17 g BID PRN    sodium chloride 0.9% flush 10 mL Q12H PRN       Objective:     Vital Signs (Most Recent):  Temp: 96.9 °F (36.1 °C) (12/07/22 1150)  Pulse: 90 (12/07/22 1150)  Resp: 18 (12/07/22 1150)  BP: 135/77 (12/07/22 1150)  SpO2: (!) 93 % (12/07/22 1150)  O2 Device (Oxygen Therapy): room air (12/07/22 0130)   Vital Signs (24h Range):  Temp:  [96.9 °F (36.1 °C)-98.3 °F (36.8 °C)] 96.9 °F (36.1 °C)  Pulse:  [68-96] 90  Resp:  [16-37] 18  SpO2:  [91 %-100 %] 93 %  BP: (115-135)/(51-77) 135/77     Weight: 43.6 kg (96 lb 1.9 oz) (12/07/22 0726)  Body mass index is 19.41 kg/m².  Body surface area is 1.35  meters squared.    I/O last 3 completed shifts:  In: 649.8 [P.O.:600; IV Piggyback:49.8]  Out: 3030 [Urine:2980; Emesis/NG output:50]    Physical Exam  Vitals and nursing note reviewed.   Constitutional:       General: She is awake.      Appearance: She is well-developed and underweight. She is ill-appearing.   HENT:      Head: Normocephalic.      Nose: Nose normal.      Mouth/Throat:      Mouth: Mucous membranes are moist.   Eyes:      General: Lids are normal. No scleral icterus.     Conjunctiva/sclera: Conjunctivae normal.      Pupils: Pupils are equal, round, and reactive to light.   Cardiovascular:      Rate and Rhythm: Normal rate and regular rhythm.      Heart sounds: No murmur heard.     Comments: Elevated JVP  Pulmonary:      Effort: Pulmonary effort is normal.      Breath sounds: Rhonchi present. No wheezing.   Abdominal:      General: Bowel sounds are normal.      Palpations: Abdomen is soft.      Tenderness: There is no abdominal tenderness.   Musculoskeletal:         General: No deformity.      Cervical back: Normal range of motion and neck supple.      Right lower leg: No edema.      Left lower leg: Edema present.   Skin:     General: Skin is warm and dry.   Neurological:      General: No focal deficit present.      Mental Status: She is alert.   Psychiatric:         Behavior: Behavior is cooperative.       Significant Labs:  CBC:   Recent Labs   Lab 12/07/22  0910   WBC 4.02   RBC 4.99   HGB 13.9   HCT 42.2      MCV 85   MCH 27.9   MCHC 32.9     CMP:   Recent Labs   Lab 12/04/22  1939 12/05/22  0346 12/07/22  0910     100   < > 79   CALCIUM 7.9*  7.9*   < > 9.6   ALBUMIN 1.9*  --   --    PROT 5.9*  --   --    *  129*   < > 130*   K 4.1  4.1   < > 3.6   CO2 35*  35*   < > 32*   CL 84*  84*   < > 93*   BUN 23  23   < > 13   CREATININE 1.0  1.0   < > 0.9   ALKPHOS 106  --   --    ALT 21  --   --    AST 33  --   --    BILITOT 1.5*  --   --     < > = values in this interval not  displayed.     All labs within the past 24 hours have been reviewed.

## 2022-12-08 NOTE — PLAN OF CARE
Patient is alert with confusion. Patient had aneta oriented most of the night until around midnight she started getting confused and speaking incoherent. She started to get agitated and complaining of pain she described as someone sitting on her chest. I ordered a stat EKG and Trops and I called Dr. Montes De Oca to inform him of the situation. He confirmed the stat EKG and Trops and he ordered some nitroglycerin subL. I administered the nitroglycerin and it seemed to help. Patient is still agitated and have not slept at all tonight. AM care was rendered.

## 2022-12-08 NOTE — CONSULTS
[facility]  Cardiology  Consult Note    Patient Name: Peri Johansen  MRN: [id number]  Admission Date: 11/17/2022  Hospital Length of Stay: 17 days  Code Status: Full Code   Attending Provider: Rosa Isela Main MD   Consulting Provider: Kaila Pearce MD  Primary Care Physician: Annika Garland MD  Principal Problem:Shock, unspecified    Patient information was obtained from patient, relative(s), past medical records, ER records and primary team.     Consults  Subjective:     Chief Complaint:  Volume overload, fatigue  HPI:   Ms. Peri Johansen is a 88 y.o. 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  Presented to ED on 11/20 with complaints of generalized fatigue. Per chart review, it appears that she was admitted after PCP Dr. Garland called her about a CT scan that showed possible pyelonephritis and concern for volume overload from heart failure and advised her to go to the ED.  Intermittent complaints of chest pain 10/10 while in ED with a trop peak at 0.171.  Found to have elevated BNP of 925, COVID, flu and RSV all negative. Patient was admitted to hospital medicine and started on IV Lasix, later given metolazone and bumex for volume overload. Her Chest xrays through her hospitalization demonstrated bilateral pulmonary edema and bibasilar effusions. She was transferred to MICU briefly for hypotension (BP 78/38) requiring levophed and then stepped back down to hospital medicine. Nephrology has been following for hyponatremia (etiology unclear, possibly SIADH vs diuresis vs heart failure) that reached 120 and has since improved to 130.     Cardiology is consulted for assessment of volume status and diuretic recommendations. Patient reports feeling well on day of evaluation. Reports persistent cough,  denies SOB, chest pain, LE edema. She is net negative 10 L for entire admission. Last diuretic (bumex) dose was on 12/2. Her weights have fluctuated from 90 lbs--> 99-->95-->100-->96-->97 (12/7). BNP levels drawn multiple times throughout admission have been 925 to 1026, with most recent one at 995.       Past Medical History:   Diagnosis Date    Aortic atherosclerosis 1/7/2016    Arthritis     Chronic diastolic congestive heart failure 9/15/2022    Colon polyp: hyperplastic 2015- repeat 2020 8/6/2015    Diabetes mellitus, type II 8/16/2012    Diverticulosis     Gastric nodule 8/7/2014    GERD (gastroesophageal reflux disease) 8/16/2012    Goiter 8/16/2012    Hyperlipidemia 8/16/2012    Hypertension     Joint pain     Leg pain 8/16/2012    Lymphoma 8/16/2012    Osteopenia 8/16/2012    Osteoporosis     Renal cyst 3/28/2013    Rickets, vitamin D deficiency 8/16/2012    Thyroid nodule 2/24/2014    Vitamin D deficiency disease 8/16/2012       Past Surgical History:   Procedure Laterality Date    CARPAL TUNNEL RELEASE      CATARACT EXTRACTION W/  INTRAOCULAR LENS IMPLANT Bilateral     HYSTERECTOMY      partial    lymphoma surgery      L tibia       Review of patient's allergies indicates:   Allergen Reactions    Latex, natural rubber Itching    Latex Hives       No current facility-administered medications on file prior to encounter.     Current Outpatient Medications on File Prior to Encounter   Medication Sig    aspirin 81 MG Chew Take 1 tablet (81 mg total) by mouth once daily.    atorvastatin (LIPITOR) 40 MG tablet TAKE 1 TABLET BY MOUTH EVERY DAY    gabapentin (NEURONTIN) 300 MG capsule TAKE 1 CAPSULE BY MOUTH TWICE DAILY    losartan (COZAAR) 25 MG tablet Take 25 mg by mouth once daily. (Hold if SBP < 105 OR DBP < 60)    methIMAzole (TAPAZOLE) 5 MG Tab TAKE 1/2 TABLET BY MOUTH DAILY    multivitamin (THERAGRAN) per tablet Take 1 tablet by mouth once daily.    traMADoL (ULTRAM) 50 mg  tablet Take 1 tablet (50 mg total) by mouth every 12 (twelve) hours as needed for Pain.    acetaminophen (TYLENOL) 500 MG tablet Take 500 mg by mouth daily as needed for Pain.    albuterol (PROVENTIL/VENTOLIN HFA) 90 mcg/actuation inhaler Inhale 2 puffs into the lungs every 6 (six) hours as needed.    blood sugar diagnostic (TRUE METRIX GLUCOSE TEST STRIP) Strp USE AS DIRECTED    blood sugar diagnostic Strp Test 2 times daily    blood-glucose meter kit Use as instructed.  ACCUCHECK or whatever is preferred by insurance    dextran 70-hypromellose (ARTIFICIAL TEARS,[id number],) ophthalmic solution Place 1 drop into both eyes 4 (four) times daily as needed.    dextromethorphan-guaiFENesin  mg/5 ml (ROBITUSSIN-DM)  mg/5 mL liquid Take 10 mLs by mouth every 6 (six) hours as needed for Cough.    diclofenac sodium (VOLTAREN) 1 % Gel APPLY 2 GRAMS EXTERNALLY TO THE AFFECTED AREA FOUR TIMES DAILY    lancets Misc Test 2 x daily    magnesium oxide (MAG-OX) 400 mg (241.3 mg magnesium) tablet Take 1 tablet by mouth once daily.    methyl salicylate/menthol (ICY HOT TOP) Apply topically 2 (two) times daily as needed (Pain).    MICRO THIN LANCETS 33 gauge Misc USE AS DIRECTED    omeprazole (PRILOSEC) 40 MG capsule Take 1 capsule (40 mg total) by mouth every morning.    ondansetron (ZOFRAN-ODT) 4 MG TbDL Take 1 tablet (4 mg total) by mouth every 8 (eight) hours as needed (Nausea or vomiting).    potassium chloride (KLOR-CON) 10 MEQ TbSR Take 2 tablets (20 mEq total) by mouth once daily.     Family History       Problem Relation (Age of Onset)    Breast cancer Maternal Aunt    Cancer Brother    Heart disease Father    Hypertension Mother, Father    No Known Problems Maternal Uncle, Paternal Aunt, Paternal Uncle, Maternal Grandmother, Maternal Grandfather, Paternal Grandmother, Paternal Grandfather, Daughter, Son, Son, Daughter, Daughter          Tobacco Use    Smoking status: Never    Smokeless tobacco:  Never   Substance and Sexual Activity    Alcohol use: No     Alcohol/week: 0.0 standard drinks    Drug use: No    Sexual activity: Not Currently     Review of Systems   Cardiovascular:  Negative for chest pain and leg swelling.   Respiratory:  Positive for cough. Negative for shortness of breath.    Musculoskeletal:  Positive for back pain.   Objective:     Vital Signs (Most Recent):  Temp: 97.1 °F (36.2 °C) (12/07/22 1449)  Pulse: 92 (12/07/22 1518)  Resp: 17 (12/07/22 1449)  BP: (!) 119/59 (12/07/22 1449)  SpO2: (!) 93 % (12/07/22 1449)   Vital Signs (24h Range):  Temp:  [96.9 °F (36.1 °C)-98.3 °F (36.8 °C)] 97.1 °F (36.2 °C)  Pulse:  [68-96] 92  Resp:  [16-37] 17  SpO2:  [91 %-100 %] 93 %  BP: (115-135)/(51-77) 119/59     Weight: 44.4 kg (97 lb 14.2 oz)  Body mass index is 19.77 kg/m².    SpO2: (!) 93 %  O2 Device (Oxygen Therapy): room air      Intake/Output Summary (Last 24 hours) at 12/7/2022 1826  Last data filed at 12/7/2022 1754  Gross per 24 hour   Intake 942 ml   Output 2100 ml   Net -1158 ml       Lines/Drains/Airways       Drain  Duration             Female External Urinary Catheter 12/07/22 0800 <1 day              Peripheral Intravenous Line  Duration                  Midline Catheter Insertion/Assessment  - Single Lumen 11/27/22 2040 Left basilic vein (medial side of arm) other (see comments) 9 days                    Physical Exam  Constitutional:       General: She is not in acute distress.     Appearance: Normal appearance. She is not ill-appearing.   HENT:      Head: Normocephalic and atraumatic.   Eyes:      General: No scleral icterus.  Cardiovascular:      Rate and Rhythm: Normal rate and regular rhythm.      Pulses: Normal pulses.      Heart sounds: S1 normal and S2 normal. Murmur heard.     Gallop present. S4 sounds present.      Comments: JVD to angle of mandible   Pulmonary:      Effort: Pulmonary effort is normal. No respiratory distress.      Breath sounds: Rales present. No wheezing.    Abdominal:      General: Abdomen is flat.      Palpations: Abdomen is soft.   Musculoskeletal:      Cervical back: Normal range of motion. No rigidity.      Right lower leg: No edema.      Left lower leg: No edema.   Skin:     General: Skin is warm and dry.      Coloration: Skin is not jaundiced.   Neurological:      General: No focal deficit present.      Mental Status: She is alert.   Psychiatric:         Mood and Affect: Mood normal.       Significant Labs: CMP   Recent Labs   Lab 12/06/22  0412 12/07/22  0910   * 130*   K 3.8 3.6   CL 85* 93*   CO2 31* 32*    79   BUN 18 13   CREATININE 0.7 0.9   CALCIUM 8.5* 9.6   ANIONGAP 10 5*    and CBC   Recent Labs   Lab 12/06/22  0412 12/07/22  0442 12/07/22  0910   WBC 4.36 4.50 4.02   HGB 12.1 12.8 13.9   HCT 37.5 38.8 42.2    314 315       Significant Imaging: Echocardiogram: Transthoracic echo (TTE) complete ([facility] Only):   Results for orders placed or performed during the hospital encounter of 11/17/22   Echo   Result Value Ref Range    BSA 1.37 m2    TDI SEPTAL 0.04 m/s    LV LATERAL E/E' RATIO 22.75 m/s    LV SEPTAL E/E' RATIO 22.75 m/s    LA WIDTH 3.30 cm    TDI LATERAL 0.04 m/s    LVIDd 3.96 3.5 - 6.0 cm    IVS 1.10 0.6 - 1.1 cm    Posterior Wall 1.10 0.6 - 1.1 cm    LVIDs 3.19 2.1 - 4.0 cm    FS 19 28 - 44 %    LA volume 52.40 cm3    Sinus 2.49 cm    STJ 2.07 cm    Ascending aorta 2.88 cm    LV mass 143.41 g    LA size 3.59 cm    RVDD 3.25 cm    TAPSE 1.27 cm    Left Ventricle Relative Wall Thickness 0.56 cm    AV mean gradient 3 mmHg    AV valve area 1.32 cm2    AV Velocity Ratio 0.52     AV index (prosthetic) 0.46     MV valve area p 1/2 method 5.35 cm2    E/A ratio 2.84     Mean e' 0.04 m/s    E wave deceleration time 141.80 msec    LVOT diameter 1.92 cm    LVOT area 2.9 cm2    LVOT peak jude 0.64 m/s    LVOT peak VTI 11.48 cm    Ao peak jude 1.23 m/s    Ao VTI 25.10 cm    Mr max jude 0.04 m/s    LVOT stroke volume 33.22 cm3    AV peak  gradient 6 mmHg    E/E' ratio 22.75 m/s    MV Peak E Russ 0.91 m/s    TR Max Russ 3.00 m/s    MV stenosis pressure 1/2 time 41.12 ms    MV Peak A Russ 0.32 m/s    LV Systolic Volume 40.72 mL    LV Systolic Volume Index 29.7 mL/m2    LV Diastolic Volume 68.20 mL    LV Diastolic Volume Index 49.78 mL/m2    LA Volume Index 38.3 mL/m2    LV Mass Index 105 g/m2    RA Major Axis 4.40 cm    Left Atrium Minor Axis 5.13 cm    Left Atrium Major Axis 5.28 cm    Triscuspid Valve Regurgitation Peak Gradient 36 mmHg    LA Volume Index (Mod) 41.6 mL/m2    LA volume (mod) 57.00 cm3    RA Width 3.06 cm    Right Atrial Pressure (from IVC) 3 mmHg    EF 35 %    TV rest pulmonary artery pressure 39 mmHg    Narrative    · The quantitatively derived ejection fraction is 35%. 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 Clinical correlation is   advised.  · The estimated ejection fraction is 30%. Significant decrease in LV   function cpared to the prior reported.  · The left ventricle is normal in size with concentric hypertrophy and   moderately decreased systolic function.  · Grade III left ventricular diastolic dysfunction.  · Normal right ventricular size with normal right ventricular systolic   function.  · Mild left atrial enlargement.  · Mild right atrial enlargement.  · Severe tricuspid regurgitation.  · Mild-to-moderate mitral regurgitation.  · The estimated PA systolic pressure is 39 mmHg.  · Normal central venous pressure (3 mmHg).  · There is a large left pleural effusion.  · Trivial circumferential pericardial effusion.        Assessment and Plan:     Acute on chronic diastolic heart failure  Ms. Peri Johansen is a 88 y.o. 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 Patient was admitted to hospital medicine  and started on IV Lasix, later given metolazone and bumex for volume overload. Her Chest xrays through her hospitalization demonstrated bilateral pulmonary edema and bibasilar effusions. She was transferred to MICU briefly for hypotension (BP 78/38) requiring levophed and then stepped back down to hospital medicine. Nephrology has been following for hyponatremia (etiology unclear, possibly SIADH vs diuresis vs heart failure) that reached 120 and has since improved to 130.     Cardiology is consulted for assessment of volume status and diuretic recommendations. Patient reports feeling well on day of evaluation. Reports persistent cough, denies SOB, chest pain, LE edema. She is net negative 10 L for entire admission. Last diuretic (bumex) dose was on 12/2. Her weights have fluctuated from 90 lbs--> 99-->95-->100-->96-->97 (12/7). BNP levels drawn multiple times throughout admission have been 925 to 1026, with most recent one at 995.     Upon discussing diet with the patient and family, it became apparent that dietary non-adherence was a factor in volume overload.     Plan:     - Advised low sodium diet (<1500 mg daily), to eliminate seasoning that includes salt, smoked meats, etc. Provide patient handout at discharge.   - Clinically, patient appears euvolemic, so would not add diuretics at this time  - Once sodium recovers, would advise HCTZ as primary diuretic with a loop diuretic as needed for weight gain. Today's weight was 44.4 kg, which we can consider the new dry weight.   - patient and family educated on daily weights at home  - Add GDMT later on for HF as tolerated. Would hold off on ACEi/ARBs/diuretics/aldactone right now given electrolyte abnormalities.   - Cardiology to sign off. Please call back if additional questions.         VTE Risk Mitigation (From admission, onward)         Ordered     enoxaparin injection 30 mg  Daily         12/04/22 2204     IP VTE HIGH RISK PATIENT  Once         11/18/22 4048      Place sequential compression device  Until discontinued         11/18/22 5332                Thank you for your consult. I will sign off. Please contact us if you have any additional questions.    Kaila Pearce MD  Cardiology   [facility]

## 2022-12-08 NOTE — PROGRESS NOTES
[facility] - Cardiology [facility] Medicine  Progress Note    Patient Name: Peri Johansen  MRN: [id number]  Patient Class: IP- Inpatient   Admission Date: 11/17/2022  Length of Stay: 18 days  Attending Physician: Ximena Ramsey MD  Primary Care Provider: Annika Garland MD        Subjective:     Principal Problem:Shock, unspecified      HPI:    Peri Johansen is a 88 y.o. female who has a past medical history of Aortic atherosclerosis, Arthritis, B-cell lymphoma, Diabetes mellitus, type II, Diverticulosis, GERD, Goiter, Hyperlipidemia, Hypertension, Joint pain, Leg pain, Osteoporosis, Renal cyst, Rickets, vitamin D deficiency, Thyroid nodule, and Vitamin D deficiency disease, presented to the ED with CC of Generalized fatigue.  Is a poor historian and unclear why she is at the hospital.  She states that someone told her to stop taking Lasix, but she does not remember who that was and how long ago that was, can not quantify it in days or weeks even.  She stated she did not have any chest pain at all, however appears that there was a comment of chest pain 10 on 10 in ED. troponin was mildly elevated at 0.171, however quickly reduced on next lab at 0.162.  BNP elevated at 925.  Denies any dysuria, but also a note in ED she has mild dysuria.  Denies hematuria but has +2 blood in urine, UA looks noninfectious.  CTA of the chest showed no interval detrimental change or new intra-abdominopelvic abnormality compared to most recent CT 11/11/2022, and no detrimental change in CTA of the aorta compared to most recent prior CTA aorta 09/14/2022.  Noting that she does have moderate to severe aortic atherosclerosis involving the branch vessels and moderate bilateral pleural effusions, stable when compared to prior 11/11/2022.  COVID, flu, RSV negative.  Denies abdominal pain or diarrhea. Denies nausea or vomiting.       Overview/Hospital Course:  Started on furosemide IV 40 mg BID. UOP not adequate. Escalated to 80 mg  BID, but missed a few doses due to loss of IV access. Once she was restarted on consistent IV diuretic administration, stillv elbert hard to diurese. Volume exam slowly improving. Patient complaining of epiagastric abdominal discomfort with associated SOB. Started her on aggressive bowel regimen and reglan. Some relief with BM. X-ray of abdomen unremarkable for obstruction or ileus or constipation. US of abdomen showed adherent gallstone. Abdomen pain completely resolved during period of NPO. SOB improving but continues to have nonproductive cough.  On 11/27, patient noted to be satting in mid to upper 80's on RA which is new for her.  She was placed on supplemental O2.  Repeat CXR ordered which demonstrated worsening pulm edema.  Her UO was also starting to decrease as well.  Lasix adjusted. And metolazone added.  Azithromycin added as well.  The patient was finally weaned off of supplemental O2 and her cough has improved significantly. Develop hypoantremia and hypotension as approached euvolemia. Nephrology consulted for hyponatmremia. Went to ICU. Received pressure support. Differing opinions on wether she is hypervolemic and hypovolemic. Cardiology and nephrology recommending holding diuresis. However she remains on strict fluid restriction. If starting on oral diuresis. Need to watch for response as likely oral furosemide not working.    12/8- /58  Pulse 86   SpO2 98% Did not sleep well,, agitated and complaining of pain she described as someone sitting on her chest last night.   + 3 unmeasured.  Holding diuresis now.  Trop 0.6 ( chronically up), Na 129      Interval History: see above    Review of Systems   Constitutional:  Positive for fatigue. Negative for activity change, chills and fever.   HENT:  Negative for congestion, nosebleeds and trouble swallowing.    Respiratory:  Negative for apnea, cough, choking, chest tightness and shortness of breath.    Cardiovascular:  Positive for chest pain and  leg swelling.   Gastrointestinal:  Negative for abdominal distention, abdominal pain, constipation, diarrhea, nausea and vomiting.   Genitourinary:  Negative for decreased urine volume, difficulty urinating, dysuria and frequency.   Musculoskeletal:  Negative for arthralgias, back pain, joint swelling, neck pain and neck stiffness.   Skin:  Negative for rash and wound.   Neurological:  Negative for dizziness, seizures, syncope, weakness, light-headedness, numbness and headaches.   Psychiatric/Behavioral:  Negative for agitation, behavioral problems, confusion, hallucinations, self-injury and sleep disturbance. The patient is not nervous/anxious.    Objective:     Vital Signs (Most Recent):  Temp: 98 °F (36.7 °C) (12/08/22 0427)  Pulse: 78 (12/08/22 0719)  Resp: 17 (12/08/22 0427)  BP: (!) 118/58 (12/08/22 0427)  SpO2: 98 % (12/08/22 0427)   Vital Signs (24h Range):  Temp:  [96.9 °F (36.1 °C)-98.1 °F (36.7 °C)] 98 °F (36.7 °C)  Pulse:  [75-94] 78  Resp:  [17-18] 17  SpO2:  [88 %-100 %] 98 %  BP: (118-191)/(58-80) 118/58     Weight: 43.8 kg (96 lb 9 oz)  Body mass index is 19.5 kg/m².    Intake/Output Summary (Last 24 hours) at 12/8/2022 0720  Last data filed at 12/8/2022 0400  Gross per 24 hour   Intake 462 ml   Output 850 ml   Net -388 ml      Physical Exam  Constitutional:       General: She is not in acute distress.     Appearance: Normal appearance. She is ill-appearing. She is not toxic-appearing or diaphoretic.      Comments: Elderly female   HENT:      Head: Normocephalic and atraumatic.      Nose: Nose normal.      Mouth/Throat:      Mouth: Mucous membranes are moist.   Eyes:      General: No scleral icterus.     Extraocular Movements: Extraocular movements intact.      Pupils: Pupils are equal, round, and reactive to light.   Cardiovascular:      Rate and Rhythm: Normal rate and regular rhythm.      Pulses: Normal pulses.      Heart sounds: Normal heart sounds. No murmur heard.     Comments: +  JVD  Pulmonary:      Effort: Pulmonary effort is normal. No respiratory distress.      Breath sounds: Normal breath sounds. No wheezing, rhonchi or rales.   Chest:      Chest wall: No tenderness.   Abdominal:      General: Abdomen is flat. Bowel sounds are normal. There is no distension.      Palpations: Abdomen is soft.      Tenderness: There is no abdominal tenderness. There is no right CVA tenderness, left CVA tenderness, guarding or rebound.   Musculoskeletal:         General: Swelling present. No tenderness, deformity or signs of injury. Normal range of motion.      Cervical back: Normal range of motion and neck supple. No rigidity or tenderness.      Right lower leg: Edema present.   Lymphadenopathy:      Cervical: No cervical adenopathy.   Skin:     General: Skin is warm and dry.      Coloration: Skin is not jaundiced or pale.      Findings: No erythema or rash.   Neurological:      General: No focal deficit present.      Mental Status: She is alert and oriented to person, place, and time. Mental status is at baseline.      Cranial Nerves: No cranial nerve deficit.      Motor: No weakness.   Psychiatric:         Mood and Affect: Mood normal.         Behavior: Behavior normal.         Thought Content: Thought content normal.      Comments: Slow thought processes       Significant Labs: All pertinent labs within the past 24 hours have been reviewed.  CBC:   Recent Labs   Lab 12/07/22  0442 12/07/22  0910 12/08/22  0456   WBC 4.50 4.02 4.95   HGB 12.8 13.9 12.2   HCT 38.8 42.2 35.9*    315 329     CMP:   Recent Labs   Lab 12/07/22  0910 12/08/22  0456   * 129*   K 3.6 3.4*   CL 93* 94*   CO2 32* 28   GLU 79 117*   BUN 13 15   CREATININE 0.9 0.7   CALCIUM 9.6 9.3   ANIONGAP 5* 7*       Significant Imaging: I have reviewed all pertinent imaging results/findings within the past 24 hours.      Assessment/Plan:      * Shock, unspecified  Transferred to ICU  Due to sepsis versus hypovolemia  Cultures  negative  Patient was briefly on vasopressors  Not given fluids while in ICU  Infectious work up negative  Continue cefepime empirically for 7 days    12/8 - BP  118/58, stable, on cefepine. Cultures neg so far. Trop 0.6 this am- repeat EKG and trop level    Pleural effusion  - Not seen on imaging from earlier this year.  - Most likely related to HFpEF  - Treat HF. diuresis as tolerated.    Acute on chronic diastolic heart failure  Moderate left atrial enlargement  Right ventricular hypertrophy  Pulmonary hypertension  ECHO 11/19 showed EF 58%, Grade III DD. Moderate left atrial enlargement. Normal right ventricular size with low normal right ventricular systolic function. Moderate to severe tricuspid regurgitation. Bilateral pleural effusions.  Low BP's hold Beta Blocker, ACE/ARB and continue IV Furosemide and monitor clinical status closely. Monitor on telemetry. Patient is on CHF pathway.  Monitor strict Is&Os and daily weights.  Place on fluid restriction of 1.5 L. Continue to stress to patient importance of self efficacy and  on diet for CHF. Last BNP reviewed- and noted below   BNP  Recent Labs   Lab 12/03/22  0626   BNP 1,026*     Patient very hard to diurese. Furosemide increased and metolazone added. Stopped when hyponatremia worsen. Patient transitioned to bumex to 2 mg BID. On hold due to development of sodium 120 and hypotension. Cardiology consulted for further diuretic recommendations.     12/8- Naq 129. Lasix held. Euvoemic, and net neg fluid balance. Once sodium recovers, would advise HCTZ as primary diuretic with a loop diuretic as needed for weight gain. Today's weight was 44.4 kg, which we can consider the new dry weight.   - - Echo (11/19) showed grade III left ventricular diastolic dysfunction, mild-moderate pulmonary HTN, and ejection fraction of 58%. EF on most recent ECHO 35%   - patient and family educated on daily weights at home.  Add GDMT later on for HF as tolerated. Would hold  off on ACEi/ARBs/diuretics/aldactone right now given electrolyte abnormalities.   - repeating BNP and trop levels    Hyponatremia  Urine studies indicative of SIADH or salt wasting  Nephrology consulted- gave NS with improved of sodium 120-130. Urine sodium and osmolality afterward increased. Uric acid normal.  12/8- holding diuretics  - Continue 0.8 - 1 L fluid restrictions  - strict ins/outs      Cough in adult  - no evidence of respiratory infection  - dextromethorphan and guaifenesin  - continue diuresing. (not worsened with 1L of fluids)  - add abx to cover respiratory pathogen   - albuterol QID   - slowly improving  12/8- due to effusion    Diabetes mellitus  Patient's FSGs are controlled on current medication regimen.  Last A1c reviewed-   Lab Results   Component Value Date    HGBA1C 7.2 (H) 06/30/2022     Most recent fingerstick glucose reviewed-   Recent Labs   Lab 12/07/22 1948   POCTGLUCOSE 239*       Antihyperglycemics (From admission, onward)    Start     Stop Route Frequency Ordered    11/19/22 2154  insulin aspart U-100 pen 0-5 Units         -- SubQ Before meals & nightly PRN 11/19/22 2054        · Hold Oral hypoglycemics while patient is in the hospital.  · Does not take insulin, DM diet only    Physical deconditioning  - consult PT to see patient  - get patient up to chair with meals    Hyperthyroidism  · Continue home methimazole      Epigastric pain  Constipation  Esophageal dysphagia  Vomited a few times on one day  reglan before meals  protonix  Suppository with BM and some relief of abdominal pain  X-ray abdomen unremarkable  US of pancreas and gallbladder showed adherent gall bladder stone. Otherwise unremarkable for other biliary and pancreatic abnormalities.   Consider outpatient EDG referral for esophageal dysphagia  - resolved    Chest pain  Am of 12/8, trop 0.6 but chronically up.   Repeat trop , EKG, BNP      VTE Risk Mitigation (From admission, onward)         Ordered     enoxaparin  injection 30 mg  Daily         12/04/22 2204     IP VTE HIGH RISK PATIENT  Once         11/18/22 0404     Place sequential compression device  Until discontinued         11/18/22 0404                Discharge Planning   ENDY: 12/9/2022     Code Status: Full Code   Is the patient medically ready for discharge?: No    Reason for patient still in hospital (select all that apply): Patient trending condition  Discharge Plan A: Skilled Nursing Facility   Discharge Delays: None known at this time        Ximena Ramsey MD  Department of Hospital Medicine   [facility] - Cardiology Stepdown

## 2022-12-08 NOTE — SUBJECTIVE & OBJECTIVE
Subjective:     HPI:  Peri Johansen is an 88 year old female who has a past medical history of Aortic atherosclerosis, Arthritis, B-cell lymphoma, Diabetes mellitus, type II, Diverticulosis, GERD, Goiter, Hyperlipidemia, Hypertension, Joint pain, Leg pain, Osteoporosis, Renal cyst, Rickets, vitamin D deficiency, Thyroid nodule, and Vitamin D deficiency disease, presented to the ED with CC of Generalized fatigue.  Is a poor historian and unclear why she is at the hospital. She states that someone told her to stop taking Lasix, but she does not remember who that was and how long ago that was, can not quantify it in days or weeks even.  She stated she did not have any chest pain at all, however appears that there was a comment of chest pain 10 on 10 in ED. troponin was mildly elevated at 0.171, however quickly reduced on next lab at 0.162.  BNP elevated at 925.  Denies any dysuria, but also a note in ED she has mild dysuria.  Denies hematuria but has +2 blood in urine, UA looks noninfectious.  CTA of the chest showed no interval detrimental change or new intra-abdominopelvic abnormality compared to most recent CT 11/11/2022, and no detrimental change in CTA of the aorta compared to most recent prior CTA aorta 09/14/2022.  Noting that she does have moderate to severe aortic atherosclerosis involving the branch vessels and moderate bilateral pleural effusions, stable when compared to prior 11/11/2022.  COVID, flu, RSV negative.  Denies abdominal pain or diarrhea. Denies nausea or vomiting. Pt admitted to hospital medicine for further management. Skin integrity MARILIA consulted for evaluation of skin breakdown.    Hospital Course:   No notes on file          Scheduled Meds:   amiodarone  200 mg Oral Daily    aspirin  81 mg Oral Daily    atorvastatin  40 mg Oral Daily    ceFEPime (MAXIPIME) IVPB  2 g Intravenous Q24H    enoxaparin  30 mg Subcutaneous Daily    ergocalciferol  50,000 Units Oral Q7 Days    methIMAzole  2.5  mg Oral Daily    mupirocin   Nasal BID    pantoprazole  40 mg Oral Daily     Continuous Infusions:  PRN Meds:acetaminophen, benzonatate, bisacodyL, dextromethorphan-guaiFENesin  mg/5 ml, glucagon (human recombinant), glucose, glucose, hydrOXYzine pamoate, insulin aspart U-100, LIDOcaine, melatonin, naloxone, nitroGLYCERIN, ondansetron, polyethylene glycol, sodium chloride 0.9%    Review of Systems   Skin:  Positive for wound.   Objective:     Vital Signs (Most Recent):  Temp: 97.1 °F (36.2 °C) (12/08/22 1218)  Pulse: 87 (12/08/22 1218)  Resp: 18 (12/08/22 1218)  BP: (!) 117/56 (12/08/22 1218)  SpO2: (!) 93 % (12/08/22 1218)   Vital Signs (24h Range):  Temp:  [97.1 °F (36.2 °C)-98.1 °F (36.7 °C)] 97.1 °F (36.2 °C)  Pulse:  [75-94] 87  Resp:  [17-18] 18  SpO2:  [88 %-98 %] 93 %  BP: (112-191)/(56-80) 117/56     Weight: 43.8 kg (96 lb 9 oz)  Body mass index is 19.5 kg/m².    Physical Exam  Constitutional:       Appearance: Normal appearance.   Skin:     General: Skin is warm and dry.      Findings: Lesion present.   Neurological:      Mental Status: She is alert.       Laboratory:  All pertinent labs reviewed within the last 24 hours.    Diagnostic Results:  None

## 2022-12-08 NOTE — HPI
Ms. Peri Johansen is a 88 y.o. 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  Presented to ED on 11/20 with complaints of generalized fatigue. Per chart review, it appears that she was admitted after PCP Dr. Garland called her about a CT scan that showed possible pyelonephritis and concern for volume overload from heart failure and advised her to go to the ED.  Intermittent complaints of chest pain 10/10 while in ED with a trop peak at 0.171.  Found to have elevated BNP of 925, COVID, flu and RSV all negative. Patient was admitted to hospital medicine and started on IV Lasix, later given metolazone and bumex for volume overload. Her Chest xrays through her hospitalization demonstrated bilateral pulmonary edema and bibasilar effusions. She was transferred to MICU briefly for hypotension (BP 78/38) requiring levophed and then stepped back down to hospital medicine. Nephrology has been following for hyponatremia (etiology unclear, possibly SIADH vs diuresis vs heart failure) that reached 120 and has since improved to 130.     Cardiology is consulted for assessment of volume status and diuretic recommendations. Patient reports feeling well on day of evaluation. Reports persistent cough, denies SOB, chest pain, LE edema. She is net negative 10 L for entire admission. Last diuretic (bumex) dose was on 12/2. Her weights have fluctuated from 90 lbs--> 99-->95-->100-->96-->97 (12/7). BNP levels drawn multiple times throughout admission have been 925 to 1026, with most recent one at 995.

## 2022-12-08 NOTE — PROGRESS NOTES
[facility] - Cardiology Stepdown  Nephrology  Progress Note    Patient Name: Peri Johansen  MRN: [id number]  Admission Date: 11/17/2022  Hospital Length of Stay: 18 days  Attending Provider: Ximena Ramsey MD   Primary Care Physician: Annika Garland MD  Principal Problem:Shock, unspecified    Subjective:     Interval History:   Na remain stable at 129. No symptoms. Other electrolytes stable.  mL/24 hrs.     Review of patient's allergies indicates:   Allergen Reactions    Latex, natural rubber Itching    Latex Hives     Current Facility-Administered Medications   Medication Frequency    acetaminophen tablet 650 mg Q8H PRN    amiodarone tablet 200 mg Daily    aspirin chewable tablet 81 mg Daily    atorvastatin tablet 40 mg Daily    benzonatate capsule 100 mg TID PRN    bisacodyL suppository 10 mg Daily PRN    ceFEPIme (MAXIPIME) 2 g in dextrose 5 % in water (D5W) 5 % 50 mL IVPB (MB+) Q24H    dextromethorphan-guaiFENesin  mg/5 ml liquid 5 mL Q4H PRN    enoxaparin injection 30 mg Daily    ergocalciferol capsule 50,000 Units Q7 Days    glucagon (human recombinant) injection 1 mg PRN    glucose chewable tablet 16 g PRN    glucose chewable tablet 24 g PRN    hydrOXYzine pamoate capsule 25 mg Q6H PRN    insulin aspart U-100 pen 0-5 Units QID (AC + HS) PRN    LIDOcaine 5 % patch 1 patch Daily PRN    melatonin tablet 6 mg Nightly PRN    methIMAzole split tablet 2.5 mg Daily    mupirocin 2 % ointment BID    naloxone 0.4 mg/mL injection 0.02 mg PRN    nitroGLYCERIN SL tablet 0.4 mg Q5 Min PRN    ondansetron injection 8 mg Q6H PRN    pantoprazole EC tablet 40 mg Daily    polyethylene glycol packet 17 g BID PRN    sodium chloride 0.9% flush 10 mL Q12H PRN       Objective:     Vital Signs (Most Recent):  Temp: 98.1 °F (36.7 °C) (12/08/22 0748)  Pulse: 89 (12/08/22 1210)  Resp: 17 (12/08/22 0748)  BP: (!) 112/56 (12/08/22 0748)  SpO2: 97 % (12/08/22 0748)  O2 Device (Oxygen Therapy): nasal  cannula (12/08/22 0748)   Vital Signs (24h Range):  Temp:  [97.1 °F (36.2 °C)-98.1 °F (36.7 °C)] 98.1 °F (36.7 °C)  Pulse:  [75-94] 89  Resp:  [17] 17  SpO2:  [88 %-98 %] 97 %  BP: (112-191)/(56-80) 112/56     Weight: 43.8 kg (96 lb 9 oz) (12/08/22 0400)  Body mass index is 19.5 kg/m².  Body surface area is 1.35 meters squared.    I/O last 3 completed shifts:  In: 942 [P.O.:942]  Out: 2650 [Urine:2650]    Physical Exam  Vitals and nursing note reviewed.   Constitutional:       General: She is awake.      Appearance: She is well-developed and underweight. She is ill-appearing.   HENT:      Head: Normocephalic.      Nose: Nose normal.      Mouth/Throat:      Mouth: Mucous membranes are moist.   Eyes:      General: Lids are normal. No scleral icterus.     Conjunctiva/sclera: Conjunctivae normal.      Pupils: Pupils are equal, round, and reactive to light.   Cardiovascular:      Rate and Rhythm: Normal rate and regular rhythm.      Heart sounds: No murmur heard.     Comments: Elevated JVP  Pulmonary:      Effort: Pulmonary effort is normal.      Breath sounds: Rhonchi present. No wheezing.   Abdominal:      General: Bowel sounds are normal.      Palpations: Abdomen is soft.      Tenderness: There is no abdominal tenderness.   Musculoskeletal:         General: No deformity.      Cervical back: Normal range of motion and neck supple.      Right lower leg: No edema.      Left lower leg: Edema present.   Skin:     General: Skin is warm and dry.   Neurological:      General: No focal deficit present.      Mental Status: She is alert.   Psychiatric:         Behavior: Behavior is cooperative.       Significant Labs:  CBC:   Recent Labs   Lab 12/08/22  0456   WBC 4.95   RBC 4.27   HGB 12.2   HCT 35.9*      MCV 84   MCH 28.6   MCHC 34.0     CMP:   Recent Labs   Lab 12/04/22  1939 12/05/22  0346 12/08/22  0456     100   < > 117*   CALCIUM 7.9*  7.9*   < > 9.3   ALBUMIN 1.9*  --   --    PROT 5.9*  --   --    NA  129*  129*   < > 129*   K 4.1  4.1   < > 3.4*   CO2 35*  35*   < > 28   CL 84*  84*   < > 94*   BUN 23  23   < > 15   CREATININE 1.0  1.0   < > 0.7   ALKPHOS 106  --   --    ALT 21  --   --    AST 33  --   --    BILITOT 1.5*  --   --     < > = values in this interval not displayed.     All labs within the past 24 hours have been reviewed.       Assessment/Plan:     * Shock, unspecified  - per primary team     Hyponatremia  Ms. Johansen is an 88 year old female with pmhx of heart failure (EF 58%) who presented with complaint of generalized fatigue. Patient is a poor historian. During her admission, patient was found to have elevated BNP and CXR significant for pulmonary edema bilaterally. Patient was started on lasix and was noted to be responsive. She was transitioned to bumex. BNP trending up to 1026. Patient developed hyponatremia shortly after being admitted. Sodium 136 at admission and dropped down to 120. Nephrology consulted for hyponatremia.     Likely secondary to heart failure. During volume assessment, patient had elevated JVP, rhonchi bilaterally, and RLE swelling.   Serum osm: 266. Consistent with hypotonic hyponatremia   Serum sodium increased significantly from 120 to 130 with NS, suggesting hypovolemic hyponatremia. Na 120 --> 130 -->127 w/ IVFs.    Recommendations:  - Na level stable at 129. Remain asymptomatic.   - Continue 0.8 - 1 L fluid restrictions  - Echo (11/19) showed grade III left ventricular diastolic dysfunction, mild-moderate pulmonary HTN, and ejection fraction of 58%. EF on most recent ECHO 35%   - strict ins/outs    - Continue holding diuretics for now        Thank you for your consult. I will follow-up with patient. Please contact us if you have any additional questions.    Queenie Hernandez, CATHY, FNP-C  Nephrology  [facility] - Cardiology Stepdown

## 2022-12-08 NOTE — PLAN OF CARE
[facility] unable to accept patient. They cant meet patient needs.     [facility] unable to accept patient until next week. No beds available.     [facility] Admissions rep unavailable.     [facility] Admission rep went home for the day.     [facility] Admissions department left for today.         Will follow up tomorrow. Patient expected to be discharged tomorrow.

## 2022-12-08 NOTE — PROGRESS NOTES
[facility] - Cardiology Stepdown  Skin Integrity MARILIA  Progress Note    Patient Name: Peri Johansen  MRN: [id number]  Admission Date: 11/17/2022  Hospital Length of Stay: 18 days  Attending Physician: Ximena Ramsey MD  Primary Care Provider: Annika Garland MD         Subjective:     HPI:  Peri Johansen is an 88 year old female who has a past medical history of Aortic atherosclerosis, Arthritis, B-cell lymphoma, Diabetes mellitus, type II, Diverticulosis, GERD, Goiter, Hyperlipidemia, Hypertension, Joint pain, Leg pain, Osteoporosis, Renal cyst, Rickets, vitamin D deficiency, Thyroid nodule, and Vitamin D deficiency disease, presented to the ED with CC of Generalized fatigue.  Is a poor historian and unclear why she is at the hospital. She states that someone told her to stop taking Lasix, but she does not remember who that was and how long ago that was, can not quantify it in days or weeks even.  She stated she did not have any chest pain at all, however appears that there was a comment of chest pain 10 on 10 in ED. troponin was mildly elevated at 0.171, however quickly reduced on next lab at 0.162.  BNP elevated at 925.  Denies any dysuria, but also a note in ED she has mild dysuria.  Denies hematuria but has +2 blood in urine, UA looks noninfectious.  CTA of the chest showed no interval detrimental change or new intra-abdominopelvic abnormality compared to most recent CT 11/11/2022, and no detrimental change in CTA of the aorta compared to most recent prior CTA aorta 09/14/2022.  Noting that she does have moderate to severe aortic atherosclerosis involving the branch vessels and moderate bilateral pleural effusions, stable when compared to prior 11/11/2022.  COVID, flu, RSV negative.  Denies abdominal pain or diarrhea. Denies nausea or vomiting. Pt admitted to hospital medicine for further management. Skin integrity MARILIA consulted for evaluation of skin breakdown.    Hospital Course:   No notes on  file          Scheduled Meds:   amiodarone  200 mg Oral Daily    aspirin  81 mg Oral Daily    atorvastatin  40 mg Oral Daily    ceFEPime (MAXIPIME) IVPB  2 g Intravenous Q24H    enoxaparin  30 mg Subcutaneous Daily    ergocalciferol  50,000 Units Oral Q7 Days    methIMAzole  2.5 mg Oral Daily    mupirocin   Nasal BID    pantoprazole  40 mg Oral Daily     Continuous Infusions:  PRN Meds:acetaminophen, benzonatate, bisacodyL, dextromethorphan-guaiFENesin  mg/5 ml, glucagon (human recombinant), glucose, glucose, hydrOXYzine pamoate, insulin aspart U-100, LIDOcaine, melatonin, naloxone, nitroGLYCERIN, ondansetron, polyethylene glycol, sodium chloride 0.9%    Review of Systems   Skin:  Positive for wound.   Objective:     Vital Signs (Most Recent):  Temp: 97.1 °F (36.2 °C) (12/08/22 1218)  Pulse: 87 (12/08/22 1218)  Resp: 18 (12/08/22 1218)  BP: (!) 117/56 (12/08/22 1218)  SpO2: (!) 93 % (12/08/22 1218)   Vital Signs (24h Range):  Temp:  [97.1 °F (36.2 °C)-98.1 °F (36.7 °C)] 97.1 °F (36.2 °C)  Pulse:  [75-94] 87  Resp:  [17-18] 18  SpO2:  [88 %-98 %] 93 %  BP: (112-191)/(56-80) 117/56     Weight: 43.8 kg (96 lb 9 oz)  Body mass index is 19.5 kg/m².    Physical Exam  Constitutional:       Appearance: Normal appearance.   Skin:     General: Skin is warm and dry.      Findings: Lesion present.   Neurological:      Mental Status: She is alert.       Laboratory:  All pertinent labs reviewed within the last 24 hours.    Diagnostic Results:  None      Assessment/Plan:         MARILIA Skin Integrity Evaluation    Skin Integrity MARILIA evaluation of patient as part of the comprehensive skin care team.   She has been admitted for 21 days. Skin injury was noted on 11/26/22. POA yes.    Sacrum            Irritant contact dermatitis due to fecal, urinary or dual incontinence  - follow up evaluation of skin breakdown to sacrum.  - pt presented to the ED with CC of Generalized fatigue.  - two small sacral areas of breakdown  with pale pink wound beds likely related to moisture from incontinence now healing.  - midline sacral full thickness wound with pink/yellow wound base and white macerated periwound also likely due to moisture from incontinence.  - start Triad bid/prn.  - wendy surface being used. Waffle overlay not placed to surface.  - will order Immerse mattress.  - chair cushion ordered as well.  - encouraged q2h turns.  - nursing to maintain pressure injury prevention measures and continue wound care per orders.        Glenda iGbbons NP  Skin Integrity [facility]  [facility] - Cardiology Stepdown

## 2022-12-08 NOTE — ASSESSMENT & PLAN NOTE
- follow up evaluation of skin breakdown to sacrum.  - pt presented to the ED with CC of Generalized fatigue.  - two small sacral areas of breakdown with pale pink wound beds likely related to moisture from incontinence now healing.  - midline sacral full thickness wound with pink/yellow wound base and white macerated periwound also likely due to moisture from incontinence.  - start Triad bid/prn.  - wendy surface being used. Waffle overlay not placed to surface.  - will order Immerse mattress.  - chair cushion ordered as well.  - encouraged q2h turns.  - nursing to maintain pressure injury prevention measures and continue wound care per orders.

## 2022-12-08 NOTE — ASSESSMENT & PLAN NOTE
Ms. Peri Johansen is a 88 y.o. 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 Patient was admitted to hospital medicine and started on IV Lasix, later given metolazone and bumex for volume overload. Her Chest xrays through her hospitalization demonstrated bilateral pulmonary edema and bibasilar effusions. She was transferred to MICU briefly for hypotension (BP 78/38) requiring levophed and then stepped back down to hospital medicine. Nephrology has been following for hyponatremia (etiology unclear, possibly SIADH vs diuresis vs heart failure) that reached 120 and has since improved to 130.     Cardiology is consulted for assessment of volume status and diuretic recommendations. Patient reports feeling well on day of evaluation. Reports persistent cough, denies SOB, chest pain, LE edema. She is net negative 10 L for entire admission. Last diuretic (bumex) dose was on 12/2. Her weights have fluctuated from 90 lbs--> 99-->95-->100-->96-->97 (12/7). BNP levels drawn multiple times throughout admission have been 925 to 1026, with most recent one at 995.     Upon discussing diet with the patient and family, it became apparent that dietary non-adherence was a factor in volume overload.     Plan:     - Advised low sodium diet (<1500 mg daily), to eliminate seasoning that includes salt, smoked meats, etc. Provide patient handout at discharge.   - Clinically, patient appears euvolemic, so would not add diuretics at this time  - Once sodium recovers, would advise HCTZ as primary diuretic with a loop diuretic as needed for weight gain. Today's weight was 44.4 kg, which we can consider the new dry weight.   - patient and family educated on daily weights at home  - Add GDMT later on for HF as tolerated. Would hold off on  ACEi/ARBs/diuretics/aldactone right now given electrolyte abnormalities.   - Cardiology to sign off. Please call back if additional questions.

## 2022-12-08 NOTE — PT/OT/SLP PROGRESS
Occupational Therapy   Treatment    Name: Peri Johansen  MRN: [id number]  Admitting Diagnosis:  Shock, unspecified       Recommendations:     Discharge Recommendations: nursing facility, skilled  Discharge Equipment Recommendations:  other (see comments) (TBD)  Barriers to discharge:  None    Assessment:     Peri Johansen is a 88 y.o. female with a medical diagnosis of Shock, unspecified.  She presents with agitation after not sleeping well night before. Performance deficits affecting function are weakness, impaired endurance, impaired self care skills, impaired balance, impaired cardiopulmonary response to activity, impaired functional mobility, pain.     Rehab Prognosis:  Good; patient would benefit from acute skilled OT services to address these deficits and reach maximum level of function.       Plan:     Patient to be seen 3 x/week to address the above listed problems via self-care/home management, therapeutic activities, therapeutic exercises  Plan of Care Expires: 01/05/23  Plan of Care Reviewed with: patient    Subjective     Pain/Comfort:  Pain Rating 1: other (see comments) (Pt reports pain in sacral area but did not rate)  Location 1: other (see comments) (sacrum)  Pain Addressed 1: Distraction    Objective:     Communicated with: RN and PT prior to session.  Patient found up in chair with telemetry, PureWick upon OT entry to room.    General Precautions: Standard, fall    Orthopedic Precautions:N/A  Braces: N/A  Respiratory Status: Nasal cannula, flow 2 L/min     Occupational Performance:     Bed Mobility:    Patient completed Scooting/Bridging with minimum assistance  Patient completed Sit to Supine with contact guard assistance     Functional Mobility/Transfers:  Patient completed Sit <> Stand Transfer with minimum assistance  with  rolling walker   Patient completed Bed <> Chair Transfer using Step Transfer technique with minimum assistance with rolling walker  Functional Mobility: pt ambulated ~4ft  this date c/ CGA/Fozia (RW guidance and VC hand placement)    Activities of Daily Living:  Feeding:  supervision assist opening containers but pt able to feed self   Grooming: supervision setup provided seated EOB      [facility] 6 Click ADL: 18    Treatment & Education:  Pt completed sit<>stand and chair to bed T/F this date c/ Fozia and RW use. Pt educated on safety c/ RW use. Pt sat EOB ~12min c/ SPV to complete G/H and beginning self feeding before T/F to supine in bed c/ bed in chair position.     Patient left with bed in chair position with all lines intact, call button in reach, bed alarm on, and nurse taking pt's blood glucose level present    GOALS:   Multidisciplinary Problems       Occupational Therapy Goals          Problem: Occupational Therapy    Goal Priority Disciplines Outcome Interventions   Occupational Therapy Goal     OT, PT/OT Ongoing, Progressing    Description: Goals to be met by: 12/13/22    Patient will increase functional independence with ADLs by performing:    LE Dressing with Stand-by Assistance.  Grooming while standing with Contact Guard Assistance.  Toileting from toilet with Contact Guard Assistance for hygiene and clothing management.   Supine to sit with Contact Guard Assistance.  Step transfer with Contact Guard Assistance  Toilet transfer to toilet with Contact Guard Assistance.                         Time Tracking:     OT Date of Treatment: 12/08/22  OT Start Time: 1139  OT Stop Time: 1210  OT Total Time (min): 31 min    Billable Minutes:Self Care/Home Management 16  Therapeutic Activity 15    OT/AMANDA: OT     AMANDA Visit Number: 1    12/8/2022

## 2022-12-08 NOTE — PT/OT/SLP PROGRESS
"Physical Therapy   Progress Note    Patient Name:  Peri Johansen  MRN: [id number]    Admit Date: 11/17/2022  Admitting Diagnosis:  Shock, unspecified  Length of Stay: 18 days  Recent Surgery: * No surgery found *      Recommendations:     Discharge Recommendations: Skilled Nursing Facility   Equipment recommendations:  TBD  Barriers to discharge: Increased level of skilled assistance required and Fall risk     Assessment:     Peri Johansen is a 88 y.o. [place] [facility] [place] with medical diagnosis of Shock, unspecified. Pt presents with weakness, impaired endurance, impaired functional mobility, gait instability, impaired balance, impaired cardiopulmonary response to activity. Pt is progressing towards goals, but has not yet reached prior level of function. Pt alert and demonstrated good participation during session. Peri Johansen would benefit from continued acute PT intervention to improve quality of life, focus on recovery of impairments, provide patient/caregiver education, reduce fall risk, and maximize (I) and safety with functional mobility. Once medically stable, recommending pt discharge to Skilled Nursing Facility .      Rehab Prognosis: Good    Plan:     During this hospitalization, patient to be seen 3 x/week to address the identified rehab impairments via gait training, therapeutic exercises, therapeutic activities, neuromuscular re-education and progress towards stated goals.     Plan of Care Expires:  01/05/22  Plan of Care reviewed with: patient    This plan of care has been discussed with the patient/caregiver, who was included in its development and is in agreement with the identified goals and treatment plan.     Subjective     Communicated with RN prior to session.  Patient found HOB elevated upon PT entry to room, agreeable to therapy session.      Patient/Family Comments/goals: "I'm so cold" "I want to get up, I'll try"     Pain/Comfort:  Pain Rating 1: 0/10  Pain Rating " Post-Intervention 1: 0/10    Patients cultural, spiritual, Islam conflicts given the current situation: None identified     Objective:     Patient found with: peripheral IV, telemetry, PureWick    General Precautions: Standard, fall   Orthopedic Precautions:N/A   Braces: N/A   Oxygen Device: nasal cannula    Cognition:  Pt is Alert and Cooperative during session.    Therapist provided skilled verbal and tactile cueing to facilitate the following functional mobility tasks. Listed tasks are focused on recovery of impairments and improving pt's independence and efficiency with bed mobility, transfers and ambulation as able.     Bed Mobility:  Supine > Sit: Moderate Assistance  Sit > Supine:  N/T    Transfers:   Sit <> Stand Transfer: Minimal Assistance from EOB with RW x 1 trial, from bedside chair x 2 trials                   Gait:  Distance: ~5 ft.   Assistance level: Minimal Assistance  Assistive Device: rolling walker  Gait Assessment: decreased step length , decreased gait speed, narrow base of support, and unsteady gait . Distance limited 2/2 pt fatigue.     Balance:  Dynamic Sitting: GOOD: Maintains balance through MODERATE excursions of active trunk movement  Standing:  Static: FAIR: Maintains without assist using RW for UE support but unable to take challenges   Dynamic: POOR+: Needs MIN (minimal ) assist during gait    Outcome Measure: AM-PAC 6 CLICK MOBILITY  Total Score:15     Patient/Caregiver Education and Additional Therapeutic Activities/Exercises     Therapeutic activities aimed to increase pt's independence, safety, and efficiency with bed mobility and functional transfers. See above for assistance levels.     TE facilitated to improve functional BLE strength and cardiopulmonary endurance. Pt tolerated seated LAQ and AP x 10 reps bilaterally. Functional activities also performed to encourage strength gains (I.e. transfers and gait).     Provided pt/caregiver education regarding:   PT POC and  goals for therapy   Safety with mobility and fall risk   Safe management of AD as needed   Energy conservation techniques   Instruction on use of call button and importance of calling nursing staff for assistance with mobility     Patient/caregiver able to verbalize understanding; will follow-up with pt/caregiver during current admit for additional questions/concerns within scope of practice.     White board updated.     Patient left up in chair with all lines intact, call button in reach, and RN notified. No posey chair alarm monitors present on unit- discussed with RN. RN requesting pt return to bed rather than sit up in chair until chair alarm obtained. Pt working with OT after PT session, so OT notified that pt will need to get back to bed after session with bed alarm.     Goals:     Multidisciplinary Problems       Physical Therapy Goals          Problem: Physical Therapy    Goal Priority Disciplines Outcome Goal Variances Interventions   Physical Therapy Goal     PT, PT/OT Ongoing, Progressing     Description: Goals to met by 12/20/2022    1. Supine to sit with Modified [place]  2. Sit to supine with Modified [place]  3. Rolling to Left and Right with Modified [place].  4. Sit to stand transfer with Supervision  5. Bed to chair transfer with Supervision using Rolling Walker  6. Gait  x 75 feet with Stand-by Assistance using Rolling Walker                          Time Tracking:     PT Received On: 12/08/22  PT Start Time: 1114     PT Stop Time: 1142  PT Total Time (min): 28 min     Billable Minutes: Therapeutic Activity 20 min and Therapeutic Exercise 8 min    12/08/2022

## 2022-12-08 NOTE — PLAN OF CARE
Plan of Care:  Goals remain appropriate. Continue current POC, progressing as tolerated.     Please continue Progressive Mobility Protocol as appropriate.  Pt to be seen 3x/week during acute hospitalization to address listed goals below.     12/8/2022    Physical Therapy Treatment Goals:  Multidisciplinary Problems       Physical Therapy Goals          Problem: Physical Therapy    Goal Priority Disciplines Outcome Goal Variances Interventions   Physical Therapy Goal     PT, PT/OT Ongoing, Progressing     Description: Goals to met by 12/20/2022    1. Supine to sit with Modified Bartholomew  2. Sit to supine with Modified Bartholomew  3. Rolling to Left and Right with Modified Bartholomew.  4. Sit to stand transfer with Supervision  5. Bed to chair transfer with Supervision using Rolling Walker  6. Gait  x 75 feet with Stand-by Assistance using Rolling Walker

## 2022-12-08 NOTE — ASSESSMENT & PLAN NOTE
Patient's FSGs are controlled on current medication regimen.  Last A1c reviewed-   Lab Results   Component Value Date    HGBA1C 7.2 (H) 06/30/2022     Most recent fingerstick glucose reviewed-   Recent Labs   Lab 12/07/22  1948   POCTGLUCOSE 239*       Antihyperglycemics (From admission, onward)    Start     Stop Route Frequency Ordered    11/19/22 2154  insulin aspart U-100 pen 0-5 Units         -- SubQ Before meals & nightly PRN 11/19/22 2054        · Hold Oral hypoglycemics while patient is in the hospital.  · Does not take insulin, DM diet only

## 2022-12-08 NOTE — SUBJECTIVE & OBJECTIVE
Interval History:   Na remain stable at 129. No symptoms. Other electrolytes stable.  mL/24 hrs.     Review of patient's allergies indicates:   Allergen Reactions    Latex, natural rubber Itching    Latex Hives     Current Facility-Administered Medications   Medication Frequency    acetaminophen tablet 650 mg Q8H PRN    amiodarone tablet 200 mg Daily    aspirin chewable tablet 81 mg Daily    atorvastatin tablet 40 mg Daily    benzonatate capsule 100 mg TID PRN    bisacodyL suppository 10 mg Daily PRN    ceFEPIme (MAXIPIME) 2 g in dextrose 5 % in water (D5W) 5 % 50 mL IVPB (MB+) Q24H    dextromethorphan-guaiFENesin  mg/5 ml liquid 5 mL Q4H PRN    enoxaparin injection 30 mg Daily    ergocalciferol capsule 50,000 Units Q7 Days    glucagon (human recombinant) injection 1 mg PRN    glucose chewable tablet 16 g PRN    glucose chewable tablet 24 g PRN    hydrOXYzine pamoate capsule 25 mg Q6H PRN    insulin aspart U-100 pen 0-5 Units QID (AC + HS) PRN    LIDOcaine 5 % patch 1 patch Daily PRN    melatonin tablet 6 mg Nightly PRN    methIMAzole split tablet 2.5 mg Daily    mupirocin 2 % ointment BID    naloxone 0.4 mg/mL injection 0.02 mg PRN    nitroGLYCERIN SL tablet 0.4 mg Q5 Min PRN    ondansetron injection 8 mg Q6H PRN    pantoprazole EC tablet 40 mg Daily    polyethylene glycol packet 17 g BID PRN    sodium chloride 0.9% flush 10 mL Q12H PRN       Objective:     Vital Signs (Most Recent):  Temp: 98.1 °F (36.7 °C) (12/08/22 0748)  Pulse: 89 (12/08/22 1210)  Resp: 17 (12/08/22 0748)  BP: (!) 112/56 (12/08/22 0748)  SpO2: 97 % (12/08/22 0748)  O2 Device (Oxygen Therapy): nasal cannula (12/08/22 0748)   Vital Signs (24h Range):  Temp:  [97.1 °F (36.2 °C)-98.1 °F (36.7 °C)] 98.1 °F (36.7 °C)  Pulse:  [75-94] 89  Resp:  [17] 17  SpO2:  [88 %-98 %] 97 %  BP: (112-191)/(56-80) 112/56     Weight: 43.8 kg (96 lb 9 oz) (12/08/22 0400)  Body mass index is 19.5 kg/m².  Body surface area is 1.35 meters squared.    I/O  last 3 completed shifts:  In: 942 [P.O.:942]  Out: 2650 [Urine:2650]    Physical Exam  Vitals and nursing note reviewed.   Constitutional:       General: She is awake.      Appearance: She is well-developed and underweight. She is ill-appearing.   HENT:      Head: Normocephalic.      Nose: Nose normal.      Mouth/Throat:      Mouth: Mucous membranes are moist.   Eyes:      General: Lids are normal. No scleral icterus.     Conjunctiva/sclera: Conjunctivae normal.      Pupils: Pupils are equal, round, and reactive to light.   Cardiovascular:      Rate and Rhythm: Normal rate and regular rhythm.      Heart sounds: No murmur heard.     Comments: Elevated JVP  Pulmonary:      Effort: Pulmonary effort is normal.      Breath sounds: Rhonchi present. No wheezing.   Abdominal:      General: Bowel sounds are normal.      Palpations: Abdomen is soft.      Tenderness: There is no abdominal tenderness.   Musculoskeletal:         General: No deformity.      Cervical back: Normal range of motion and neck supple.      Right lower leg: No edema.      Left lower leg: Edema present.   Skin:     General: Skin is warm and dry.   Neurological:      General: No focal deficit present.      Mental Status: She is alert.   Psychiatric:         Behavior: Behavior is cooperative.       Significant Labs:  CBC:   Recent Labs   Lab 12/08/22  0456   WBC 4.95   RBC 4.27   HGB 12.2   HCT 35.9*      MCV 84   MCH 28.6   MCHC 34.0     CMP:   Recent Labs   Lab 12/04/22  1939 12/05/22  0346 12/08/22  0456     100   < > 117*   CALCIUM 7.9*  7.9*   < > 9.3   ALBUMIN 1.9*  --   --    PROT 5.9*  --   --    *  129*   < > 129*   K 4.1  4.1   < > 3.4*   CO2 35*  35*   < > 28   CL 84*  84*   < > 94*   BUN 23  23   < > 15   CREATININE 1.0  1.0   < > 0.7   ALKPHOS 106  --   --    ALT 21  --   --    AST 33  --   --    BILITOT 1.5*  --   --     < > = values in this interval not displayed.     All labs within the past 24 hours have been  reviewed.

## 2022-12-08 NOTE — SUBJECTIVE & OBJECTIVE
Past Medical History:   Diagnosis Date    Aortic atherosclerosis 1/7/2016    Arthritis     Chronic diastolic congestive heart failure 9/15/2022    Colon polyp: hyperplastic 2015- repeat 2020 8/6/2015    Diabetes mellitus, type II 8/16/2012    Diverticulosis     Gastric nodule 8/7/2014    GERD (gastroesophageal reflux disease) 8/16/2012    Goiter 8/16/2012    Hyperlipidemia 8/16/2012    Hypertension     Joint pain     Leg pain 8/16/2012    Lymphoma 8/16/2012    Osteopenia 8/16/2012    Osteoporosis     Renal cyst 3/28/2013    Rickets, vitamin D deficiency 8/16/2012    Thyroid nodule 2/24/2014    Vitamin D deficiency disease 8/16/2012       Past Surgical History:   Procedure Laterality Date    CARPAL TUNNEL RELEASE      CATARACT EXTRACTION W/  INTRAOCULAR LENS IMPLANT Bilateral     HYSTERECTOMY      partial    lymphoma surgery      L tibia       Review of patient's allergies indicates:   Allergen Reactions    Latex, natural rubber Itching    Latex Hives       No current facility-administered medications on file prior to encounter.     Current Outpatient Medications on File Prior to Encounter   Medication Sig    aspirin 81 MG Chew Take 1 tablet (81 mg total) by mouth once daily.    atorvastatin (LIPITOR) 40 MG tablet TAKE 1 TABLET BY MOUTH EVERY DAY    gabapentin (NEURONTIN) 300 MG capsule TAKE 1 CAPSULE BY MOUTH TWICE DAILY    losartan (COZAAR) 25 MG tablet Take 25 mg by mouth once daily. (Hold if SBP < 105 OR DBP < 60)    methIMAzole (TAPAZOLE) 5 MG Tab TAKE 1/2 TABLET BY MOUTH DAILY    multivitamin (THERAGRAN) per tablet Take 1 tablet by mouth once daily.    traMADoL (ULTRAM) 50 mg tablet Take 1 tablet (50 mg total) by mouth every 12 (twelve) hours as needed for Pain.    acetaminophen (TYLENOL) 500 MG tablet Take 500 mg by mouth daily as needed for Pain.    albuterol (PROVENTIL/VENTOLIN HFA) 90 mcg/actuation inhaler Inhale 2 puffs into the lungs every 6 (six) hours as needed.    blood sugar diagnostic (TRUE METRIX  GLUCOSE TEST STRIP) Strp USE AS DIRECTED    blood sugar diagnostic Strp Test 2 times daily    blood-glucose meter kit Use as instructed.  ACCUCHECK or whatever is preferred by insurance    dextran 70-hypromellose (ARTIFICIAL TEARS,[id number],) ophthalmic solution Place 1 drop into both eyes 4 (four) times daily as needed.    dextromethorphan-guaiFENesin  mg/5 ml (ROBITUSSIN-DM)  mg/5 mL liquid Take 10 mLs by mouth every 6 (six) hours as needed for Cough.    diclofenac sodium (VOLTAREN) 1 % Gel APPLY 2 GRAMS EXTERNALLY TO THE AFFECTED AREA FOUR TIMES DAILY    lancets Misc Test 2 x daily    magnesium oxide (MAG-OX) 400 mg (241.3 mg magnesium) tablet Take 1 tablet by mouth once daily.    methyl salicylate/menthol (ICY HOT TOP) Apply topically 2 (two) times daily as needed (Pain).    MICRO THIN LANCETS 33 gauge Misc USE AS DIRECTED    omeprazole (PRILOSEC) 40 MG capsule Take 1 capsule (40 mg total) by mouth every morning.    ondansetron (ZOFRAN-ODT) 4 MG TbDL Take 1 tablet (4 mg total) by mouth every 8 (eight) hours as needed (Nausea or vomiting).    potassium chloride (KLOR-CON) 10 MEQ TbSR Take 2 tablets (20 mEq total) by mouth once daily.     Family History       Problem Relation (Age of Onset)    Breast cancer Maternal Aunt    Cancer Brother    Heart disease Father    Hypertension Mother, Father    No Known Problems Maternal Uncle, Paternal Aunt, Paternal Uncle, Maternal Grandmother, Maternal Grandfather, Paternal Grandmother, Paternal Grandfather, Daughter, Son, Son, Daughter, Daughter          Tobacco Use    Smoking status: Never    Smokeless tobacco: Never   Substance and Sexual Activity    Alcohol use: No     Alcohol/week: 0.0 standard drinks    Drug use: No    Sexual activity: Not Currently     Review of Systems   Cardiovascular:  Negative for chest pain and leg swelling.   Respiratory:  Positive for cough. Negative for shortness of breath.    Musculoskeletal:  Positive for back pain.   Objective:      Vital Signs (Most Recent):  Temp: 97.1 °F (36.2 °C) (12/07/22 1449)  Pulse: 92 (12/07/22 1518)  Resp: 17 (12/07/22 1449)  BP: (!) 119/59 (12/07/22 1449)  SpO2: (!) 93 % (12/07/22 1449)   Vital Signs (24h Range):  Temp:  [96.9 °F (36.1 °C)-98.3 °F (36.8 °C)] 97.1 °F (36.2 °C)  Pulse:  [68-96] 92  Resp:  [16-37] 17  SpO2:  [91 %-100 %] 93 %  BP: (115-135)/(51-77) 119/59     Weight: 44.4 kg (97 lb 14.2 oz)  Body mass index is 19.77 kg/m².    SpO2: (!) 93 %  O2 Device (Oxygen Therapy): room air      Intake/Output Summary (Last 24 hours) at 12/7/2022 1826  Last data filed at 12/7/2022 1754  Gross per 24 hour   Intake 942 ml   Output 2100 ml   Net -1158 ml       Lines/Drains/Airways       Drain  Duration             Female External Urinary Catheter 12/07/22 0800 <1 day              Peripheral Intravenous Line  Duration                  Midline Catheter Insertion/Assessment  - Single Lumen 11/27/22 2040 Left basilic vein (medial side of arm) other (see comments) 9 days                    Physical Exam  Constitutional:       General: She is not in acute distress.     Appearance: Normal appearance. She is not ill-appearing.   HENT:      Head: Normocephalic and atraumatic.   Eyes:      General: No scleral icterus.  Cardiovascular:      Rate and Rhythm: Normal rate and regular rhythm.      Pulses: Normal pulses.      Heart sounds: S1 normal and S2 normal. Murmur heard.     Gallop present. S4 sounds present.      Comments: JVD to angle of mandible   Pulmonary:      Effort: Pulmonary effort is normal. No respiratory distress.      Breath sounds: Rales present. No wheezing.   Abdominal:      General: Abdomen is flat.      Palpations: Abdomen is soft.   Musculoskeletal:      Cervical back: Normal range of motion. No rigidity.      Right lower leg: No edema.      Left lower leg: No edema.   Skin:     General: Skin is warm and dry.      Coloration: Skin is not jaundiced.   Neurological:      General: No focal deficit present.       Mental Status: She is alert.   Psychiatric:         Mood and Affect: Mood normal.       Significant Labs: CMP   Recent Labs   Lab 12/06/22  0412 12/07/22  0910   * 130*   K 3.8 3.6   CL 85* 93*   CO2 31* 32*    79   BUN 18 13   CREATININE 0.7 0.9   CALCIUM 8.5* 9.6   ANIONGAP 10 5*    and CBC   Recent Labs   Lab 12/06/22  0412 12/07/22  0442 12/07/22  0910   WBC 4.36 4.50 4.02   HGB 12.1 12.8 13.9   HCT 37.5 38.8 42.2    314 315       Significant Imaging: Echocardiogram: Transthoracic echo (TTE) complete ([facility]upid Only):   Results for orders placed or performed during the hospital encounter of 11/17/22   Echo   Result Value Ref Range    BSA 1.37 m2    TDI SEPTAL 0.04 m/s    LV LATERAL E/E' RATIO 22.75 m/s    LV SEPTAL E/E' RATIO 22.75 m/s    LA WIDTH 3.30 cm    TDI LATERAL 0.04 m/s    LVIDd 3.96 3.5 - 6.0 cm    IVS 1.10 0.6 - 1.1 cm    Posterior Wall 1.10 0.6 - 1.1 cm    LVIDs 3.19 2.1 - 4.0 cm    FS 19 28 - 44 %    LA volume 52.40 cm3    Sinus 2.49 cm    STJ 2.07 cm    Ascending aorta 2.88 cm    LV mass 143.41 g    LA size 3.59 cm    RVDD 3.25 cm    TAPSE 1.27 cm    Left Ventricle Relative Wall Thickness 0.56 cm    AV mean gradient 3 mmHg    AV valve area 1.32 cm2    AV Velocity Ratio 0.52     AV index (prosthetic) 0.46     MV valve area p 1/2 method 5.35 cm2    E/A ratio 2.84     Mean e' 0.04 m/s    E wave deceleration time 141.80 msec    LVOT diameter 1.92 cm    LVOT area 2.9 cm2    LVOT peak russ 0.64 m/s    LVOT peak VTI 11.48 cm    Ao peak russ 1.23 m/s    Ao VTI 25.10 cm    Mr max russ 0.04 m/s    LVOT stroke volume 33.22 cm3    AV peak gradient 6 mmHg    E/E' ratio 22.75 m/s    MV Peak E Russ 0.91 m/s    TR Max Russ 3.00 m/s    MV stenosis pressure 1/2 time 41.12 ms    MV Peak A Russ 0.32 m/s    LV Systolic Volume 40.72 mL    LV Systolic Volume Index 29.7 mL/m2    LV Diastolic Volume 68.20 mL    LV Diastolic Volume Index 49.78 mL/m2    LA Volume Index 38.3 mL/m2    LV Mass Index 105 g/m2    RA  Major Axis 4.40 cm    Left Atrium Minor Axis 5.13 cm    Left Atrium Major Axis 5.28 cm    Triscuspid Valve Regurgitation Peak Gradient 36 mmHg    LA Volume Index (Mod) 41.6 mL/m2    LA volume (mod) 57.00 cm3    RA Width 3.06 cm    Right Atrial Pressure (from IVC) 3 mmHg    EF 35 %    TV rest pulmonary artery pressure 39 mmHg    Narrative    · The quantitatively derived ejection fraction is 35%. 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 Clinical correlation is   advised.  · The estimated ejection fraction is 30%. Significant decrease in LV   function cpared to the prior reported.  · The left ventricle is normal in size with concentric hypertrophy and   moderately decreased systolic function.  · Grade III left ventricular diastolic dysfunction.  · Normal right ventricular size with normal right ventricular systolic   function.  · Mild left atrial enlargement.  · Mild right atrial enlargement.  · Severe tricuspid regurgitation.  · Mild-to-moderate mitral regurgitation.  · The estimated PA systolic pressure is 39 mmHg.  · Normal central venous pressure (3 mmHg).  · There is a large left pleural effusion.  · Trivial circumferential pericardial effusion.

## 2022-12-08 NOTE — ASSESSMENT & PLAN NOTE
Ms. Johansen is an 88 year old female with pmhx of heart failure (EF 58%) who presented with complaint of generalized fatigue. Patient is a poor historian. During her admission, patient was found to have elevated BNP and CXR significant for pulmonary edema bilaterally. Patient was started on lasix and was noted to be responsive. She was transitioned to bumex. BNP trending up to 1026. Patient developed hyponatremia shortly after being admitted. Sodium 136 at admission and dropped down to 120. Nephrology consulted for hyponatremia.     Likely secondary to heart failure. During volume assessment, patient had elevated JVP, rhonchi bilaterally, and RLE swelling.   Serum osm: 266. Consistent with hypotonic hyponatremia   Serum sodium increased significantly from 120 to 130 with NS, suggesting hypovolemic hyponatremia. Na 120 --> 130 -->127 w/ IVFs.    Recommendations:  - Na level stable at 129. Remain asymptomatic.   - Continue 0.8 - 1 L fluid restrictions  - Echo (11/19) showed grade III left ventricular diastolic dysfunction, mild-moderate pulmonary HTN, and ejection fraction of 58%. EF on most recent ECHO 35%   - strict ins/outs    - Continue holding diuretics for now

## 2022-12-08 NOTE — SUBJECTIVE & OBJECTIVE
Interval History: see above    Review of Systems   Constitutional:  Positive for fatigue. Negative for activity change, chills and fever.   HENT:  Negative for congestion, nosebleeds and trouble swallowing.    Respiratory:  Negative for apnea, cough, choking, chest tightness and shortness of breath.    Cardiovascular:  Positive for chest pain and leg swelling.   Gastrointestinal:  Negative for abdominal distention, abdominal pain, constipation, diarrhea, nausea and vomiting.   Genitourinary:  Negative for decreased urine volume, difficulty urinating, dysuria and frequency.   Musculoskeletal:  Negative for arthralgias, back pain, joint swelling, neck pain and neck stiffness.   Skin:  Negative for rash and wound.   Neurological:  Negative for dizziness, seizures, syncope, weakness, light-headedness, numbness and headaches.   Psychiatric/Behavioral:  Negative for agitation, behavioral problems, confusion, hallucinations, self-injury and sleep disturbance. The patient is not nervous/anxious.    Objective:     Vital Signs (Most Recent):  Temp: 98 °F (36.7 °C) (12/08/22 0427)  Pulse: 78 (12/08/22 0719)  Resp: 17 (12/08/22 0427)  BP: (!) 118/58 (12/08/22 0427)  SpO2: 98 % (12/08/22 0427)   Vital Signs (24h Range):  Temp:  [96.9 °F (36.1 °C)-98.1 °F (36.7 °C)] 98 °F (36.7 °C)  Pulse:  [75-94] 78  Resp:  [17-18] 17  SpO2:  [88 %-100 %] 98 %  BP: (118-191)/(58-80) 118/58     Weight: 43.8 kg (96 lb 9 oz)  Body mass index is 19.5 kg/m².    Intake/Output Summary (Last 24 hours) at 12/8/2022 0720  Last data filed at 12/8/2022 0400  Gross per 24 hour   Intake 462 ml   Output 850 ml   Net -388 ml      Physical Exam  Constitutional:       General: She is not in acute distress.     Appearance: Normal appearance. She is ill-appearing. She is not toxic-appearing or diaphoretic.      Comments: Elderly female   HENT:      Head: Normocephalic and atraumatic.      Nose: Nose normal.      Mouth/Throat:      Mouth: Mucous membranes are  moist.   Eyes:      General: No scleral icterus.     Extraocular Movements: Extraocular movements intact.      Pupils: Pupils are equal, round, and reactive to light.   Cardiovascular:      Rate and Rhythm: Normal rate and regular rhythm.      Pulses: Normal pulses.      Heart sounds: Normal heart sounds. No murmur heard.     Comments: + JVD  Pulmonary:      Effort: Pulmonary effort is normal. No respiratory distress.      Breath sounds: Normal breath sounds. No wheezing, rhonchi or rales.   Chest:      Chest wall: No tenderness.   Abdominal:      General: Abdomen is flat. Bowel sounds are normal. There is no distension.      Palpations: Abdomen is soft.      Tenderness: There is no abdominal tenderness. There is no right CVA tenderness, left CVA tenderness, guarding or rebound.   Musculoskeletal:         General: Swelling present. No tenderness, deformity or signs of injury. Normal range of motion.      Cervical back: Normal range of motion and neck supple. No rigidity or tenderness.      Right lower leg: Edema present.   Lymphadenopathy:      Cervical: No cervical adenopathy.   Skin:     General: Skin is warm and dry.      Coloration: Skin is not jaundiced or pale.      Findings: No erythema or rash.   Neurological:      General: No focal deficit present.      Mental Status: She is alert and oriented to person, place, and time. Mental status is at baseline.      Cranial Nerves: No cranial nerve deficit.      Motor: No weakness.   Psychiatric:         Mood and Affect: Mood normal.         Behavior: Behavior normal.         Thought Content: Thought content normal.      Comments: Slow thought processes       Significant Labs: All pertinent labs within the past 24 hours have been reviewed.  CBC:   Recent Labs   Lab 12/07/22  0442 12/07/22  0910 12/08/22  0456   WBC 4.50 4.02 4.95   HGB 12.8 13.9 12.2   HCT 38.8 42.2 35.9*    315 329     CMP:   Recent Labs   Lab 12/07/22  0910 12/08/22  0456   * 129*   K  3.6 3.4*   CL 93* 94*   CO2 32* 28   GLU 79 117*   BUN 13 15   CREATININE 0.9 0.7   CALCIUM 9.6 9.3   ANIONGAP 5* 7*       Significant Imaging: I have reviewed all pertinent imaging results/findings within the past 24 hours.

## 2022-12-09 PROBLEM — N17.9 AKI (ACUTE KIDNEY INJURY): Status: ACTIVE | Noted: 2022-01-01

## 2022-12-09 PROBLEM — E83.39 HYPOPHOSPHATEMIA: Status: ACTIVE | Noted: 2022-01-01

## 2022-12-09 PROBLEM — Z71.89 GOALS OF CARE, COUNSELING/DISCUSSION: Status: ACTIVE | Noted: 2022-01-01

## 2022-12-09 NOTE — CARE UPDATE
RAPID RESPONSE NURSE ROUND       Rounding completed with charge RNEdward reports no concerns at this time. Instructed to call [phone] for further concerns or assistance.

## 2022-12-09 NOTE — SUBJECTIVE & OBJECTIVE
Interval History: see above    Review of Systems   Constitutional:  Positive for fatigue. Negative for activity change, chills and fever.   HENT:  Negative for congestion, nosebleeds and trouble swallowing.    Respiratory:  Negative for apnea, cough, choking, chest tightness and shortness of breath.    Cardiovascular:  Positive for chest pain and leg swelling.   Gastrointestinal:  Negative for abdominal distention, abdominal pain, constipation, diarrhea, nausea and vomiting.   Genitourinary:  Negative for decreased urine volume, difficulty urinating, dysuria and frequency.   Musculoskeletal:  Negative for arthralgias, back pain, joint swelling, neck pain and neck stiffness.   Skin:  Negative for rash and wound.   Neurological:  Negative for dizziness, seizures, syncope, weakness, light-headedness, numbness and headaches.   Psychiatric/Behavioral:  Negative for agitation, behavioral problems, confusion, hallucinations, self-injury and sleep disturbance. The patient is not nervous/anxious.    Objective:     Vital Signs (Most Recent):  Temp: 96.5 °F (35.8 °C) (12/09/22 0735)  Pulse: 78 (12/09/22 0735)  Resp: (!) 30 (12/09/22 0735)  BP: (!) 164/77 (12/09/22 0735)  SpO2: (!) 91 % (12/09/22 0735)   Vital Signs (24h Range):  Temp:  [96.5 °F (35.8 °C)-98.1 °F (36.7 °C)] 96.5 °F (35.8 °C)  Pulse:  [70-89] 78  Resp:  [17-30] 30  SpO2:  [91 %-98 %] 91 %  BP: (112-164)/(56-77) 164/77     Weight: 47.2 kg (104 lb)  Body mass index is 21.01 kg/m².    Intake/Output Summary (Last 24 hours) at 12/9/2022 0738  Last data filed at 12/8/2022 1125  Gross per 24 hour   Intake --   Output 500 ml   Net -500 ml        Physical Exam  Constitutional:       General: She is not in acute distress.     Appearance: Normal appearance. She is ill-appearing. She is not toxic-appearing or diaphoretic.      Comments: Elderly female, thin , frail   HENT:      Head: Normocephalic and atraumatic.      Nose: Nose normal.      Mouth/Throat:      Mouth: Mucous  membranes are moist.   Eyes:      General: No scleral icterus.     Extraocular Movements: Extraocular movements intact.      Pupils: Pupils are equal, round, and reactive to light.   Cardiovascular:      Rate and Rhythm: Normal rate and regular rhythm.      Pulses: Normal pulses.      Heart sounds: Normal heart sounds. No murmur heard.     Comments: + JVD  Pulmonary:      Effort: Pulmonary effort is normal. No respiratory distress.      Breath sounds: Normal breath sounds. No wheezing, rhonchi or rales.      Comments: Decreased BS at bases  Chest:      Chest wall: No tenderness.   Abdominal:      General: Abdomen is flat. Bowel sounds are normal. There is no distension.      Palpations: Abdomen is soft.      Tenderness: There is no abdominal tenderness. There is no right CVA tenderness, left CVA tenderness, guarding or rebound.   Musculoskeletal:         General: Swelling present. No tenderness, deformity or signs of injury. Normal range of motion.      Cervical back: Normal range of motion and neck supple. No rigidity or tenderness.      Right lower leg: Edema present.   Lymphadenopathy:      Cervical: No cervical adenopathy.   Skin:     General: Skin is warm and dry.      Coloration: Skin is not jaundiced or pale.      Findings: No erythema or rash.   Neurological:      General: No focal deficit present.      Mental Status: She is alert and oriented to person, place, and time. Mental status is at baseline.      Cranial Nerves: No cranial nerve deficit.      Motor: No weakness.   Psychiatric:         Mood and Affect: Mood normal.         Behavior: Behavior normal.         Thought Content: Thought content normal.      Comments: Slow thought processes       Significant Labs: All pertinent labs within the past 24 hours have been reviewed.  CBC:   Recent Labs   Lab 12/07/22  0910 12/08/22 0456   WBC 4.02 4.95   HGB 13.9 12.2   HCT 42.2 35.9*    329       CMP:   Recent Labs   Lab 12/07/22  0910 12/08/22 0456    * 129*   K 3.6 3.4*   CL 93* 94*   CO2 32* 28   GLU 79 117*   BUN 13 15   CREATININE 0.9 0.7   CALCIUM 9.6 9.3   ANIONGAP 5* 7*         Significant Imaging: I have reviewed all pertinent imaging results/findings within the past 24 hours.

## 2022-12-09 NOTE — ASSESSMENT & PLAN NOTE
Patient with acute kidney injury likely due to cardiogenic PUJA is currently worsening. Labs reviewed- Renal function/electrolytes with Estimated Creatinine Clearance: 26.3 mL/min (based on SCr of 1.1 mg/dL). according to latest data. Monitor urine output and serial BMP and adjust therapy as needed. Avoid nephrotoxins and renally dose meds for GFR listed above.    12/9- Cr 0.7-> 1.1, volume overload, BNP > 1400. Add lasix IV 40 x one. She did diurese. Farhad w nephrology - They think  she is dry.  Repeat  BMP. .

## 2022-12-09 NOTE — ASSESSMENT & PLAN NOTE
Patient's FSGs are controlled on current medication regimen.  Last A1c reviewed-   Lab Results   Component Value Date    HGBA1C 7.2 (H) 06/30/2022     Most recent fingerstick glucose reviewed-   Recent Labs   Lab 12/08/22  0838 12/08/22  1213 12/08/22  2223 12/09/22  0634   POCTGLUCOSE 106 100 318* 162*       Antihyperglycemics (From admission, onward)    Start     Stop Route Frequency Ordered    11/19/22 2154  insulin aspart U-100 pen 0-5 Units         -- SubQ Before meals & nightly PRN 11/19/22 2054        · Hold Oral hypoglycemics while patient is in the hospital.  · Does not take insulin, DM diet only

## 2022-12-09 NOTE — PROGRESS NOTES
[facility] - [facility]iology Stepdown  Nephrology  Progress Note    Patient Name: Peri Johansen  MRN: [id number]  Admission Date: 11/17/2022  Hospital Length of Stay: 19 days  Attending Provider: Ximena Ramsey MD   Primary Care Physician: Annika Garland MD  Principal Problem:Shock, unspecified    Subjective:     Interval History:   Renal function remain stable.   Na 128 from 129.  Remain euvolemic on exam.   Lasix given this AM      Review of patient's allergies indicates:   Allergen Reactions    Latex, natural rubber Itching    Latex Hives     Current Facility-Administered Medications   Medication Frequency    acetaminophen tablet 650 mg Q8H PRN    amiodarone tablet 200 mg Daily    aspirin chewable tablet 81 mg Daily    atorvastatin tablet 40 mg Daily    benzonatate capsule 100 mg TID PRN    bisacodyL suppository 10 mg Daily PRN    ceFEPIme (MAXIPIME) 2 g in dextrose 5 % in water (D5W) 5 % 50 mL IVPB (MB+) Q24H    dextromethorphan-guaiFENesin  mg/5 ml liquid 5 mL Q4H PRN    enoxaparin injection 30 mg Daily    ergocalciferol capsule 50,000 Units Q7 Days    furosemide injection 40 mg Q12H    glucagon (human recombinant) injection 1 mg PRN    glucose chewable tablet 16 g PRN    glucose chewable tablet 24 g PRN    hydrOXYzine pamoate capsule 25 mg Q6H PRN    insulin aspart U-100 pen 0-5 Units QID (AC + HS) PRN    LIDOcaine 5 % patch 1 patch Daily PRN    melatonin tablet 6 mg Nightly PRN    methIMAzole split tablet 2.5 mg Daily    mupirocin 2 % ointment BID    naloxone 0.4 mg/mL injection 0.02 mg PRN    nitroGLYCERIN SL tablet 0.4 mg Q5 Min PRN    ondansetron injection 8 mg Q6H PRN    pantoprazole EC tablet 40 mg Daily    polyethylene glycol packet 17 g BID PRN    potassium, sodium phosphates 280-160-250 mg packet 1 packet QID (AC & HS)    sodium chloride 0.9% flush 10 mL Q12H PRN       Objective:     Vital Signs (Most Recent):  Temp: 98.2 °F (36.8 °C) (12/09/22 1150)  Pulse: 76 (12/09/22  1200)  Resp: 20 (12/09/22 1150)  BP: (!) 160/65 (12/09/22 1150)  SpO2: 100 % (12/09/22 1150)  (RETIRED) O2 Device (Oxygen Therapy): nasal cannula (12/08/22 1628)   Vital Signs (24h Range):  Temp:  [96.5 °F (35.8 °C)-98.2 °F (36.8 °C)] 98.2 °F (36.8 °C)  Pulse:  [70-80] 76  Resp:  [18-30] 20  SpO2:  [91 %-100 %] 100 %  BP: (128-164)/(62-77) 160/65     Weight: 47.2 kg (104 lb) (12/09/22 0400)  Body mass index is 21.01 kg/m².  Body surface area is 1.4 meters squared.    I/O last 3 completed shifts:  In: -   Out: 1050 [Urine:1050]    Physical Exam  Vitals and nursing note reviewed.   Constitutional:       General: She is awake.      Appearance: She is well-developed and underweight. She is ill-appearing.   HENT:      Head: Normocephalic.      Nose: Nose normal.      Mouth/Throat:      Mouth: Mucous membranes are moist.   Eyes:      General: Lids are normal. No scleral icterus.     Conjunctiva/sclera: Conjunctivae normal.      Pupils: Pupils are equal, round, and reactive to light.   Cardiovascular:      Rate and Rhythm: Normal rate and regular rhythm.      Heart sounds: No murmur heard.     Comments: Elevated JVP  Pulmonary:      Effort: Pulmonary effort is normal.      Breath sounds: Rhonchi present. No wheezing.   Abdominal:      General: Bowel sounds are normal.      Palpations: Abdomen is soft.      Tenderness: There is no abdominal tenderness.   Musculoskeletal:         General: No deformity.      Cervical back: Normal range of motion and neck supple.      Right lower leg: No edema.      Left lower leg: Edema present.   Skin:     General: Skin is warm and dry.   Neurological:      General: No focal deficit present.      Mental Status: She is alert.   Psychiatric:         Behavior: Behavior is cooperative.       Significant Labs:  CBC:   Recent Labs   Lab 12/09/22  0421   WBC 11.22   RBC 4.87   HGB 13.6   HCT 42.8      MCV 88   MCH 27.9   MCHC 31.8*     CMP:   Recent Labs   Lab 12/04/22  1939 12/05/22  0346  12/09/22  0421     100   < > 170*   CALCIUM 7.9*  7.9*   < > 10.0   ALBUMIN 1.9*  --   --    PROT 5.9*  --   --    *  129*   < > 128*   K 4.1  4.1   < > 5.1   CO2 35*  35*   < > 27   CL 84*  84*   < > 90*   BUN 23  23   < > 17   CREATININE 1.0  1.0   < > 1.1   ALKPHOS 106  --   --    ALT 21  --   --    AST 33  --   --    BILITOT 1.5*  --   --     < > = values in this interval not displayed.     All labs within the past 24 hours have been reviewed.       Assessment/Plan:     * Shock, unspecified  - per primary team     Hyponatremia  Ms. Johansen is an 88 year old female with pmhx of heart failure (EF 58%) who presented with complaint of generalized fatigue. Patient is a poor historian. During her admission, patient was found to have elevated BNP and CXR significant for pulmonary edema bilaterally. Patient was started on lasix and was noted to be responsive. She was transitioned to bumex. BNP trending up to 1026. Patient developed hyponatremia shortly after being admitted. Sodium 136 at admission and dropped down to 120. Nephrology consulted for hyponatremia.     Likely secondary to heart failure. During volume assessment, patient had elevated JVP, rhonchi bilaterally, and RLE swelling.   Serum osm: 266. Consistent with hypotonic hyponatremia   Serum sodium increased significantly from 120 to 130 with NS, suggesting hypovolemic hyponatremia. Na 120 --> 130 -->127 w/ IVFs.    Recommendations:  - Na level stable at 128. Remain asymptomatic. Euvolemic on exam.   - Continue 0.8 - 1 L fluid restrictions  - Recommend dc Lasix   - Echo (11/19) showed grade III left ventricular diastolic dysfunction, mild-moderate pulmonary HTN, and ejection fraction of 58%. EF on most recent ECHO 35%   - strict ins/outs          Thank you for your consult. I will follow-up with patient. Please contact us if you have any additional questions.    Queenie Hernandez DNP, FNP-C  Nephrology  [facility] - [facility] Stepdown

## 2022-12-09 NOTE — ASSESSMENT & PLAN NOTE
Advance Care Planning   Prophylaxis:     Does patient have Capacity? yes  Contact: Romario Majano  Goals/Wishes: wants to go home  Code Status- FULL  Pain management- no pain  Prognosis-  guarded  Family discussions- 12/9- updated Maria Guadalupe   Functional Status - poor mobility, high FALL risk    Post acute care plan:  home  Time spent on Goals of Care:  10 mins

## 2022-12-09 NOTE — ASSESSMENT & PLAN NOTE
Moderate left atrial enlargement  Right ventricular hypertrophy  Pulmonary hypertension  ECHO 11/19 showed EF 58%, Grade III DD. Moderate left atrial enlargement. Normal right ventricular size with low normal right ventricular systolic function. Moderate to severe tricuspid regurgitation. Bilateral pleural effusions.  Low BP's hold Beta Blocker, ACE/ARB and continue IV Furosemide and monitor clinical status closely. Monitor on telemetry. Patient is on CHF pathway.  Monitor strict Is&Os and daily weights.  Place on fluid restriction of 1.5 L. Continue to stress to patient importance of self efficacy and  on diet for CHF. Last BNP reviewed- and noted below   BNP  Recent Labs   Lab 12/08/22  1555   BNP 1,488*     Patient very hard to diurese. Furosemide increased and metolazone added. Stopped when hyponatremia worsen. Patient transitioned to bumex to 2 mg BID. On hold due to development of sodium 120 and hypotension. Cardiology consulted for further diuretic recommendations.     12/8- Naq 129. Lasix held. Euvoemic, and net neg fluid balance. Once sodium recovers, would advise HCTZ as primary diuretic with a loop diuretic as needed for weight gain. Today's weight was 44.4 kg, which we can consider the new dry weight.   - - Echo (11/19) showed grade III left ventricular diastolic dysfunction, mild-moderate pulmonary HTN, and ejection fraction of 58%. EF on most recent ECHO 35%   - patient and family educated on daily weights at home.  Add GDMT later on for HF as tolerated. Would hold off on ACEi/ARBs/diuretics/aldactone right now given electrolyte abnormalities.   - repeating BNP > 1400,  and trop levels 0.2-> 0.6 -> 0.4

## 2022-12-09 NOTE — ASSESSMENT & PLAN NOTE
Transferred to ICU, and back to [facility]  Due to sepsis versus hypovolemia  Cultures negative  Patient was briefly on vasopressors  Not given fluids while in ICU  Infectious work up negative  Continue cefepime empirically for 7 days    12/8 - BP  118/58, stable, on cefepine. Cultures neg so far. Trop 0.6 this am- repeat EKG and trop level  12/9 - trop chronically up but declining. BNP high, diuretics being held. BP is improving.

## 2022-12-09 NOTE — ASSESSMENT & PLAN NOTE
Ms. Johansen is an 88 year old female with pmhx of heart failure (EF 58%) who presented with complaint of generalized fatigue. Patient is a poor historian. During her admission, patient was found to have elevated BNP and CXR significant for pulmonary edema bilaterally. Patient was started on lasix and was noted to be responsive. She was transitioned to bumex. BNP trending up to 1026. Patient developed hyponatremia shortly after being admitted. Sodium 136 at admission and dropped down to 120. Nephrology consulted for hyponatremia.     Likely secondary to heart failure. During volume assessment, patient had elevated JVP, rhonchi bilaterally, and RLE swelling.   Serum osm: 266. Consistent with hypotonic hyponatremia   Serum sodium increased significantly from 120 to 130 with NS, suggesting hypovolemic hyponatremia. Na 120 --> 130 -->127 w/ IVFs.    Recommendations:  - Na level stable at 128. Remain asymptomatic. Euvolemic on exam.   - Continue 0.8 - 1 L fluid restrictions  - Recommend dc Lasix   - Echo (11/19) showed grade III left ventricular diastolic dysfunction, mild-moderate pulmonary HTN, and ejection fraction of 58%. EF on most recent ECHO 35%   - strict ins/outs

## 2022-12-09 NOTE — PROGRESS NOTES
[facility] - Cardiology [facility] Medicine  Progress Note    Patient Name: Peri Johansen  MRN: [id number]  Patient Class: IP- Inpatient   Admission Date: 11/17/2022  Length of Stay: 19 days  Attending Physician: Ximena Ramsey MD  Primary Care Provider: Annika Garland MD        Subjective:     Principal Problem:Shock, unspecified        HPI:    Peri Johansen is a 88 y.o. female who has a past medical history of Aortic atherosclerosis, Arthritis, B-cell lymphoma, Diabetes mellitus, type II, Diverticulosis, GERD, Goiter, Hyperlipidemia, Hypertension, Joint pain, Leg pain, Osteoporosis, Renal cyst, Rickets, vitamin D deficiency, Thyroid nodule, and Vitamin D deficiency disease, presented to the ED with CC of Generalized fatigue.  Is a poor historian and unclear why she is at the hospital.  She states that someone told her to stop taking Lasix, but she does not remember who that was and how long ago that was, can not quantify it in days or weeks even.  She stated she did not have any chest pain at all, however appears that there was a comment of chest pain 10 on 10 in ED. troponin was mildly elevated at 0.171, however quickly reduced on next lab at 0.162.  BNP elevated at 925.  Denies any dysuria, but also a note in ED she has mild dysuria.  Denies hematuria but has +2 blood in urine, UA looks noninfectious.  CTA of the chest showed no interval detrimental change or new intra-abdominopelvic abnormality compared to most recent CT 11/11/2022, and no detrimental change in CTA of the aorta compared to most recent prior CTA aorta 09/14/2022.  Noting that she does have moderate to severe aortic atherosclerosis involving the branch vessels and moderate bilateral pleural effusions, stable when compared to prior 11/11/2022.  COVID, flu, RSV negative.  Denies abdominal pain or diarrhea. Denies nausea or vomiting.       Overview/Hospital Course:  Started on furosemide IV 40 mg BID. UOP not adequate. Escalated to 80 mg  BID, but missed a few doses due to loss of IV access. Once she was restarted on consistent IV diuretic administration, stillv elbert hard to diurese. Volume exam slowly improving. Patient complaining of epiagastric abdominal discomfort with associated SOB. Started her on aggressive bowel regimen and reglan. Some relief with BM. X-ray of abdomen unremarkable for obstruction or ileus or constipation. US of abdomen showed adherent gallstone. Abdomen pain completely resolved during period of NPO. SOB improving but continues to have nonproductive cough.  On 11/27, patient noted to be satting in mid to upper 80's on RA which is new for her.  She was placed on supplemental O2.  Repeat CXR ordered which demonstrated worsening pulm edema.  Her UO was also starting to decrease as well.  Lasix adjusted. And metolazone added.  Azithromycin added as well.  The patient was finally weaned off of supplemental O2 and her cough has improved significantly. Develop hypoantremia and hypotension as approached euvolemia. Nephrology consulted for hyponatmremia. Went to ICU. Received pressure support. Differing opinions on wether she is hypervolemic and hypovolemic. Cardiology and nephrology recommending holding diuresis. However she remains on strict fluid restriction. If starting on oral diuresis. Need to watch for response as likely oral furosemide not working.  Interval History:  12/8- /58  Pulse 86   SpO2 98% Did not sleep well,, agitated and complaining of pain she described as someone sitting on her chest last night.   + 3 unmeasured.  Holding diuresis now.  Trop 0.6 ( chronically up), Na 129,     12/9- BNP  >1400, trop 0.6-> 0.4. /64   Pulse 74, SpO2 93% .  On cefepime empirically for pneumonia- stop day 12/12 -  Cr 0.7-> 1.1, Na 128 w volume overload, replace mag and phos. BNP > 1400. + jvd. Will resume low dose losartan. Lasix 40 IV given with diuresis.  Repeating a Na level after diuresis to see if Na worse or  improved.       Interval History: see above    Review of Systems   Constitutional:  Positive for fatigue. Negative for activity change, chills and fever.   HENT:  Negative for congestion, nosebleeds and trouble swallowing.    Respiratory:  Negative for apnea, cough, choking, chest tightness and shortness of breath.    Cardiovascular:  Positive for chest pain and leg swelling.   Gastrointestinal:  Negative for abdominal distention, abdominal pain, constipation, diarrhea, nausea and vomiting.   Genitourinary:  Negative for decreased urine volume, difficulty urinating, dysuria and frequency.   Musculoskeletal:  Negative for arthralgias, back pain, joint swelling, neck pain and neck stiffness.   Skin:  Negative for rash and wound.   Neurological:  Negative for dizziness, seizures, syncope, weakness, light-headedness, numbness and headaches.   Psychiatric/Behavioral:  Negative for agitation, behavioral problems, confusion, hallucinations, self-injury and sleep disturbance. The patient is not nervous/anxious.    Objective:     Vital Signs (Most Recent):  Temp: 96.5 °F (35.8 °C) (12/09/22 0735)  Pulse: 78 (12/09/22 0735)  Resp: (!) 30 (12/09/22 0735)  BP: (!) 164/77 (12/09/22 0735)  SpO2: (!) 91 % (12/09/22 0735)   Vital Signs (24h Range):  Temp:  [96.5 °F (35.8 °C)-98.1 °F (36.7 °C)] 96.5 °F (35.8 °C)  Pulse:  [70-89] 78  Resp:  [17-30] 30  SpO2:  [91 %-98 %] 91 %  BP: (112-164)/(56-77) 164/77     Weight: 47.2 kg (104 lb)  Body mass index is 21.01 kg/m².    Intake/Output Summary (Last 24 hours) at 12/9/2022 0738  Last data filed at 12/8/2022 1125  Gross per 24 hour   Intake --   Output 500 ml   Net -500 ml        Physical Exam  Constitutional:       General: She is not in acute distress.     Appearance: Normal appearance. She is ill-appearing. She is not toxic-appearing or diaphoretic.      Comments: Elderly female, thin , frail   HENT:      Head: Normocephalic and atraumatic.      Nose: Nose normal.      Mouth/Throat:       Mouth: Mucous membranes are moist.   Eyes:      General: No scleral icterus.     Extraocular Movements: Extraocular movements intact.      Pupils: Pupils are equal, round, and reactive to light.   Cardiovascular:      Rate and Rhythm: Normal rate and regular rhythm.      Pulses: Normal pulses.      Heart sounds: Normal heart sounds. No murmur heard.     Comments: + JVD  Pulmonary:      Effort: Pulmonary effort is normal. No respiratory distress.      Breath sounds: Normal breath sounds. No wheezing, rhonchi or rales.      Comments: Decreased BS at bases  Chest:      Chest wall: No tenderness.   Abdominal:      General: Abdomen is flat. Bowel sounds are normal. There is no distension.      Palpations: Abdomen is soft.      Tenderness: There is no abdominal tenderness. There is no right CVA tenderness, left CVA tenderness, guarding or rebound.   Musculoskeletal:         General: Swelling present. No tenderness, deformity or signs of injury. Normal range of motion.      Cervical back: Normal range of motion and neck supple. No rigidity or tenderness.      Right lower leg: Edema present.   Lymphadenopathy:      Cervical: No cervical adenopathy.   Skin:     General: Skin is warm and dry.      Coloration: Skin is not jaundiced or pale.      Findings: No erythema or rash.   Neurological:      General: No focal deficit present.      Mental Status: She is alert and oriented to person, place, and time. Mental status is at baseline.      Cranial Nerves: No cranial nerve deficit.      Motor: No weakness.   Psychiatric:         Mood and Affect: Mood normal.         Behavior: Behavior normal.         Thought Content: Thought content normal.      Comments: Slow thought processes       Significant Labs: All pertinent labs within the past 24 hours have been reviewed.  CBC:   Recent Labs   Lab 12/07/22  0910 12/08/22  0456   WBC 4.02 4.95   HGB 13.9 12.2   HCT 42.2 35.9*    329       CMP:   Recent Labs   Lab 12/07/22  0910  12/08/22  0456   * 129*   K 3.6 3.4*   CL 93* 94*   CO2 32* 28   GLU 79 117*   BUN 13 15   CREATININE 0.9 0.7   CALCIUM 9.6 9.3   ANIONGAP 5* 7*         Significant Imaging: I have reviewed all pertinent imaging results/findings within the past 24 hours.      Assessment/Plan:      * Shock, unspecified  Transferred to ICU, and back to [facility] Med  Due to sepsis versus hypovolemia  Cultures negative  Patient was briefly on vasopressors  Not given fluids while in ICU  Infectious work up negative  Continue cefepime empirically for 7 days    12/8 - BP  118/58, stable, on cefepine. Cultures neg so far. Trop 0.6 this am- repeat EKG and trop level  12/9 - trop chronically up but declining. BNP high, diuretics being held. BP is improving.     PUJA (acute kidney injury)  Patient with acute kidney injury likely due to cardiogenic PUJA is currently worsening. Labs reviewed- Renal function/electrolytes with Estimated Creatinine Clearance: 26.3 mL/min (based on SCr of 1.1 mg/dL). according to latest data. Monitor urine output and serial BMP and adjust therapy as needed. Avoid nephrotoxins and renally dose meds for GFR listed above.    12/9- Cr 0.7-> 1.1, volume overload, BNP > 1400. Add lasix IV 40 x one. She did diurese. Chat w nephrology - They think  she is dry.  Repeat  BMP. .        Pleural effusion  - Not seen on imaging from earlier this year.  - Most likely related to HFpEF  - Treat HF. diuresis as tolerated.    Acute on chronic diastolic heart failure  Moderate left atrial enlargement  Right ventricular hypertrophy  Pulmonary hypertension  ECHO 11/19 showed EF 58%, Grade III DD. Moderate left atrial enlargement. Normal right ventricular size with low normal right ventricular systolic function. Moderate to severe tricuspid regurgitation. Bilateral pleural effusions.  Low BP's hold Beta Blocker, ACE/ARB and continue IV Furosemide and monitor clinical status closely. Monitor on telemetry. Patient is on CHF pathway.  Monitor  strict Is&Os and daily weights.  Place on fluid restriction of 1.5 L. Continue to stress to patient importance of self efficacy and  on diet for CHF. Last BNP reviewed- and noted below   BNP  Recent Labs   Lab 12/08/22  1555   BNP 1,488*     Patient very hard to diurese. Furosemide increased and metolazone added. Stopped when hyponatremia worsen. Patient transitioned to bumex to 2 mg BID. On hold due to development of sodium 120 and hypotension. Cardiology consulted for further diuretic recommendations.     12/8- Naq 129. Lasix held. Euvoemic, and net neg fluid balance. Once sodium recovers, would advise HCTZ as primary diuretic with a loop diuretic as needed for weight gain. Today's weight was 44.4 kg, which we can consider the new dry weight.   - - Echo (11/19) showed grade III left ventricular diastolic dysfunction, mild-moderate pulmonary HTN, and ejection fraction of 58%. EF on most recent ECHO 35%   - patient and family educated on daily weights at home.  Add GDMT later on for HF as tolerated. Would hold off on ACEi/ARBs/diuretics/aldactone right now given electrolyte abnormalities.   - repeating BNP > 1400,  and trop levels 0.2-> 0.6 -> 0.4    Hyponatremia  Urine studies indicative of SIADH or salt wasting  Nephrology consulted- gave NS with improved of sodium 120-130. Urine sodium and osmolality afterward increased. Uric acid normal.  12/8- holding diuretics  - Continue 0.8 - 1 L fluid restrictions  - strict ins/outs   12/9- lab pending     Cough in adult  - no evidence of respiratory infection  - dextromethorphan and guaifenesin  - continue diuresing. (not worsened with 1L of fluids)  - add abx to cover respiratory pathogen   - albuterol QID   - slowly improving  12/8- due to effusion    Diabetes mellitus  Patient's FSGs are controlled on current medication regimen.  Last A1c reviewed-   Lab Results   Component Value Date    HGBA1C 7.2 (H) 06/30/2022     Most recent fingerstick glucose reviewed-    Recent Labs   Lab 12/08/22  0838 12/08/22  1213 12/08/22  2223 12/09/22  0634   POCTGLUCOSE 106 100 318* 162*       Antihyperglycemics (From admission, onward)    Start     Stop Route Frequency Ordered    11/19/22 2154  insulin aspart U-100 pen 0-5 Units         -- SubQ Before meals & nightly PRN 11/19/22 2054        · Hold Oral hypoglycemics while patient is in the hospital.  · Does not take insulin, DM diet only    Hypophosphatemia  12/9- neutraphos x 10 days      Physical deconditioning  - consult PT to see patient  - get patient up to chair with meals    Hyperthyroidism  · Continue home methimazole      Goals of care, counseling/discussion  Advance Care Planning   Prophylaxis:     Does patient have Capacity? yes  Contact: Romario Majano  Goals/Wishes: wants to go home  Code Status- FULL  Pain management- no pain  Prognosis-  guarded  Family discussions- 12/9- updated Maria Guadalupe   Functional Status - poor mobility, high FALL risk    Post acute care plan:  home  Time spent on Goals of Care:  10 mins          Irritant contact dermatitis due to fecal, urinary or dual incontinence        Epigastric pain  Constipation  Esophageal dysphagia  Vomited a few times on one day  reglan before meals  protonix  Suppository with BM and some relief of abdominal pain  X-ray abdomen unremarkable  US of pancreas and gallbladder showed adherent gall bladder stone. Otherwise unremarkable for other biliary and pancreatic abnormalities.   Consider outpatient EDG referral for esophageal dysphagia  - resolved    Chest pain  Am of 12/8, trop 0.6 but chronically up.   Repeat trop , EKG, BNP      VTE Risk Mitigation (From admission, onward)         Ordered     enoxaparin injection 30 mg  Daily         12/04/22 2204     IP VTE HIGH RISK PATIENT  Once         11/18/22 0404     Place sequential compression device  Until discontinued         11/18/22 0404                Discharge Planning   ENDY: 12/12/2022     Code Status: Full Code   Is the  patient medically ready for discharge?: No    Reason for patient still in hospital (select all that apply): Patient trending condition  Discharge Plan A: Skilled Nursing Facility   Discharge Delays: None known at this time            Ximena Ramsey MD  Senior Hospitalist  Department of [facility] Medicine  [facility]  [id number], [phone]    [facility] - Cardiology Stepdown

## 2022-12-09 NOTE — ASSESSMENT & PLAN NOTE
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 Urine sodium and osmolality afterward increased. Uric acid normal.  12/8- holding diuretics  - Continue 0.8 - 1 L fluid restrictions  - strict ins/outs   12/9- lab pending

## 2022-12-09 NOTE — SUBJECTIVE & OBJECTIVE
Interval History:   Renal function remain stable.   Na 128 from 129.  Remain euvolemic on exam.   Lasix given this AM      Review of patient's allergies indicates:   Allergen Reactions    Latex, natural rubber Itching    Latex Hives     Current Facility-Administered Medications   Medication Frequency    acetaminophen tablet 650 mg Q8H PRN    amiodarone tablet 200 mg Daily    aspirin chewable tablet 81 mg Daily    atorvastatin tablet 40 mg Daily    benzonatate capsule 100 mg TID PRN    bisacodyL suppository 10 mg Daily PRN    ceFEPIme (MAXIPIME) 2 g in dextrose 5 % in water (D5W) 5 % 50 mL IVPB (MB+) Q24H    dextromethorphan-guaiFENesin  mg/5 ml liquid 5 mL Q4H PRN    enoxaparin injection 30 mg Daily    ergocalciferol capsule 50,000 Units Q7 Days    furosemide injection 40 mg Q12H    glucagon (human recombinant) injection 1 mg PRN    glucose chewable tablet 16 g PRN    glucose chewable tablet 24 g PRN    hydrOXYzine pamoate capsule 25 mg Q6H PRN    insulin aspart U-100 pen 0-5 Units QID (AC + HS) PRN    LIDOcaine 5 % patch 1 patch Daily PRN    melatonin tablet 6 mg Nightly PRN    methIMAzole split tablet 2.5 mg Daily    mupirocin 2 % ointment BID    naloxone 0.4 mg/mL injection 0.02 mg PRN    nitroGLYCERIN SL tablet 0.4 mg Q5 Min PRN    ondansetron injection 8 mg Q6H PRN    pantoprazole EC tablet 40 mg Daily    polyethylene glycol packet 17 g BID PRN    potassium, sodium phosphates 280-160-250 mg packet 1 packet QID (AC & HS)    sodium chloride 0.9% flush 10 mL Q12H PRN       Objective:     Vital Signs (Most Recent):  Temp: 98.2 °F (36.8 °C) (12/09/22 1150)  Pulse: 76 (12/09/22 1200)  Resp: 20 (12/09/22 1150)  BP: (!) 160/65 (12/09/22 1150)  SpO2: 100 % (12/09/22 1150)  (RETIRED) O2 Device (Oxygen Therapy): nasal cannula (12/08/22 1628)   Vital Signs (24h Range):  Temp:  [96.5 °F (35.8 °C)-98.2 °F (36.8 °C)] 98.2 °F (36.8 °C)  Pulse:  [70-80] 76  Resp:  [18-30] 20  SpO2:  [91 %-100 %] 100 %  BP:  (128-164)/(62-77) 160/65     Weight: 47.2 kg (104 lb) (12/09/22 0400)  Body mass index is 21.01 kg/m².  Body surface area is 1.4 meters squared.    I/O last 3 completed shifts:  In: -   Out: 1050 [Urine:1050]    Physical Exam  Vitals and nursing note reviewed.   Constitutional:       General: She is awake.      Appearance: She is well-developed and underweight. She is ill-appearing.   HENT:      Head: Normocephalic.      Nose: Nose normal.      Mouth/Throat:      Mouth: Mucous membranes are moist.   Eyes:      General: Lids are normal. No scleral icterus.     Conjunctiva/sclera: Conjunctivae normal.      Pupils: Pupils are equal, round, and reactive to light.   Cardiovascular:      Rate and Rhythm: Normal rate and regular rhythm.      Heart sounds: No murmur heard.     Comments: Elevated JVP  Pulmonary:      Effort: Pulmonary effort is normal.      Breath sounds: Rhonchi present. No wheezing.   Abdominal:      General: Bowel sounds are normal.      Palpations: Abdomen is soft.      Tenderness: There is no abdominal tenderness.   Musculoskeletal:         General: No deformity.      Cervical back: Normal range of motion and neck supple.      Right lower leg: No edema.      Left lower leg: Edema present.   Skin:     General: Skin is warm and dry.   Neurological:      General: No focal deficit present.      Mental Status: She is alert.   Psychiatric:         Behavior: Behavior is cooperative.       Significant Labs:  CBC:   Recent Labs   Lab 12/09/22  0421   WBC 11.22   RBC 4.87   HGB 13.6   HCT 42.8      MCV 88   MCH 27.9   MCHC 31.8*     CMP:   Recent Labs   Lab 12/04/22  1939 12/05/22  0346 12/09/22  0421     100   < > 170*   CALCIUM 7.9*  7.9*   < > 10.0   ALBUMIN 1.9*  --   --    PROT 5.9*  --   --    *  129*   < > 128*   K 4.1  4.1   < > 5.1   CO2 35*  35*   < > 27   CL 84*  84*   < > 90*   BUN 23  23   < > 17   CREATININE 1.0  1.0   < > 1.1   ALKPHOS 106  --   --    ALT 21  --   --     AST 33  --   --    BILITOT 1.5*  --   --     < > = values in this interval not displayed.     All labs within the past 24 hours have been reviewed.

## 2022-12-10 PROBLEM — N18.4 CKD (CHRONIC KIDNEY DISEASE), STAGE IV: Status: ACTIVE | Noted: 2022-01-01

## 2022-12-10 NOTE — SUBJECTIVE & OBJECTIVE
Interval History: no acute events overnight. Remains hyponatremic    Review of patient's allergies indicates:   Allergen Reactions    Latex, natural rubber Itching    Latex Hives     Current Facility-Administered Medications   Medication Frequency    acetaminophen tablet 650 mg Q8H PRN    amiodarone tablet 200 mg Daily    aspirin chewable tablet 81 mg Daily    atorvastatin tablet 40 mg Daily    benzonatate capsule 100 mg TID PRN    bisacodyL suppository 10 mg Daily PRN    dextromethorphan-guaiFENesin  mg/5 ml liquid 5 mL Q4H PRN    enoxaparin injection 30 mg Daily    ergocalciferol capsule 50,000 Units Q7 Days    glucagon (human recombinant) injection 1 mg PRN    glucose chewable tablet 16 g PRN    glucose chewable tablet 24 g PRN    hydrOXYzine pamoate capsule 25 mg Q6H PRN    insulin aspart U-100 pen 0-5 Units QID (AC + HS) PRN    LIDOcaine 5 % patch 1 patch Daily PRN    losartan split tablet 12.5 mg Daily    melatonin tablet 6 mg Nightly PRN    methIMAzole split tablet 2.5 mg Daily    naloxone 0.4 mg/mL injection 0.02 mg PRN    nitroGLYCERIN SL tablet 0.4 mg Q5 Min PRN    ondansetron injection 8 mg Q6H PRN    pantoprazole EC tablet 40 mg Daily    polyethylene glycol packet 17 g BID PRN    potassium, sodium phosphates 280-160-250 mg packet 1 packet QID (AC & HS)    sodium chloride 0.9% flush 10 mL Q12H PRN       Objective:     Vital Signs (Most Recent):  Temp: 96.9 °F (36.1 °C) (12/10/22 1142)  Pulse: 77 (12/10/22 1451)  Resp: 17 (12/10/22 1142)  BP: (!) 122/59 (12/10/22 1142)  SpO2: 96 % (12/10/22 1142)  (RETIRED) O2 Device (Oxygen Therapy): nasal cannula (12/08/22 1628)   Vital Signs (24h Range):  Temp:  [96.9 °F (36.1 °C)-98.4 °F (36.9 °C)] 96.9 °F (36.1 °C)  Pulse:  [75-89] 77  Resp:  [17-20] 17  SpO2:  [93 %-96 %] 96 %  BP: (119-132)/(59-74) 122/59     Weight: 47.1 kg (103 lb 13.4 oz) (12/10/22 0400)  Body mass index is 20.97 kg/m².  Body surface area is 1.4 meters squared.    I/O last 3 completed  shifts:  In: 500 [P.O.:500]  Out: 181 [Urine:180; Stool:1]    Physical Exam  Vitals and nursing note reviewed.   Constitutional:       General: She is awake.      Appearance: She is well-developed and underweight. She is ill-appearing.   HENT:      Head: Normocephalic.      Nose: Nose normal.      Mouth/Throat:      Mouth: Mucous membranes are moist.   Eyes:      General: Lids are normal. No scleral icterus.     Conjunctiva/sclera: Conjunctivae normal.      Pupils: Pupils are equal, round, and reactive to light.   Cardiovascular:      Rate and Rhythm: Normal rate and regular rhythm.      Heart sounds: No murmur heard.     Comments: Elevated JVP  Pulmonary:      Effort: Pulmonary effort is normal.      Breath sounds: Rhonchi present. No wheezing.   Abdominal:      General: Bowel sounds are normal.      Palpations: Abdomen is soft.      Tenderness: There is no abdominal tenderness.   Musculoskeletal:         General: No deformity.      Cervical back: Normal range of motion and neck supple.      Right lower leg: No edema.      Left lower leg: Edema present.   Skin:     General: Skin is warm and dry.   Neurological:      General: No focal deficit present.      Mental Status: She is alert.   Psychiatric:         Behavior: Behavior is cooperative.       Significant Labs:  All labs within the past 24 hours have been reviewed.     Significant Imaging:  Labs: Reviewed

## 2022-12-10 NOTE — SUBJECTIVE & OBJECTIVE
Interval History: see above    Review of Systems   Constitutional:  Positive for fatigue. Negative for activity change, chills and fever.   HENT:  Negative for congestion, nosebleeds and trouble swallowing.    Respiratory:  Negative for apnea, cough, choking, chest tightness and shortness of breath.    Cardiovascular:  Positive for chest pain and leg swelling.   Gastrointestinal:  Negative for abdominal distention, abdominal pain, constipation, diarrhea, nausea and vomiting.   Genitourinary:  Negative for decreased urine volume, difficulty urinating, dysuria and frequency.   Musculoskeletal:  Negative for arthralgias, back pain, joint swelling, neck pain and neck stiffness.   Skin:  Negative for rash and wound.   Neurological:  Negative for dizziness, seizures, syncope, weakness, light-headedness, numbness and headaches.   Psychiatric/Behavioral:  Negative for agitation, behavioral problems, confusion, hallucinations, self-injury and sleep disturbance. The patient is not nervous/anxious.    Objective:     Vital Signs (Most Recent):  Temp: 98 °F (36.7 °C) (12/10/22 0734)  Pulse: 82 (12/10/22 0734)  Resp: 18 (12/10/22 0734)  BP: 129/66 (12/10/22 0734)  SpO2: 95 % (12/10/22 0734)   Vital Signs (24h Range):  Temp:  [97.3 °F (36.3 °C)-98.4 °F (36.9 °C)] 98 °F (36.7 °C)  Pulse:  [73-87] 82  Resp:  [17-20] 18  SpO2:  [93 %-100 %] 95 %  BP: (119-160)/(62-74) 129/66     Weight: 47.1 kg (103 lb 13.4 oz)  Body mass index is 20.97 kg/m².    Intake/Output Summary (Last 24 hours) at 12/10/2022 0841  Last data filed at 12/9/2022 1837  Gross per 24 hour   Intake 500 ml   Output 181 ml   Net 319 ml        Physical Exam  Constitutional:       General: She is not in acute distress.     Appearance: Normal appearance. She is ill-appearing. She is not toxic-appearing or diaphoretic.      Comments: Elderly female, thin , frail   HENT:      Head: Normocephalic and atraumatic.      Nose: Nose normal.      Mouth/Throat:      Mouth: Mucous  membranes are moist.   Eyes:      General: No scleral icterus.     Extraocular Movements: Extraocular movements intact.      Pupils: Pupils are equal, round, and reactive to light.   Cardiovascular:      Rate and Rhythm: Normal rate and regular rhythm.      Pulses: Normal pulses.      Heart sounds: Normal heart sounds. No murmur heard.     Comments: + JVD  Pulmonary:      Effort: Pulmonary effort is normal. No respiratory distress.      Breath sounds: Normal breath sounds. No wheezing, rhonchi or rales.      Comments: Decreased BS at bases  Chest:      Chest wall: No tenderness.   Abdominal:      General: Abdomen is flat. Bowel sounds are normal. There is no distension.      Palpations: Abdomen is soft.      Tenderness: There is no abdominal tenderness. There is no right CVA tenderness, left CVA tenderness, guarding or rebound.   Musculoskeletal:         General: Swelling present. No tenderness, deformity or signs of injury. Normal range of motion.      Cervical back: Normal range of motion and neck supple. No rigidity or tenderness.      Right lower leg: Edema present.   Lymphadenopathy:      Cervical: No cervical adenopathy.   Skin:     General: Skin is warm and dry.      Coloration: Skin is not jaundiced or pale.      Findings: No erythema or rash.   Neurological:      General: No focal deficit present.      Mental Status: She is alert and oriented to person, place, and time. Mental status is at baseline.      Cranial Nerves: No cranial nerve deficit.      Motor: No weakness.   Psychiatric:         Mood and Affect: Mood normal.         Behavior: Behavior normal.         Thought Content: Thought content normal.      Comments: Slow thought processes       Significant Labs: All pertinent labs within the past 24 hours have been reviewed.  CBC:   Recent Labs   Lab 12/09/22  0421 12/10/22  0334   WBC 11.22 8.31   HGB 13.6 12.7   HCT 42.8 37.3    297       CMP:   Recent Labs   Lab 12/09/22  0421 12/09/22  1535  12/10/22  0334   * 122*  122* 123*   K 5.1 5.1  5.1 4.7   CL 90* 91*  91* 91*   CO2 27 23  23 24   * 204*  204* 164*   BUN 17 24*  24* 25*   CREATININE 1.1 1.0  1.0 1.0   CALCIUM 10.0 9.5  9.5 9.7   PROT  --  7.1  --    ALBUMIN  --  2.3*  --    BILITOT  --  1.4*  --    ALKPHOS  --  118  --    AST  --  33  --    ALT  --  22  --    ANIONGAP 11 8  8 8         Significant Imaging: I have reviewed all pertinent imaging results/findings within the past 24 hours.

## 2022-12-10 NOTE — ASSESSMENT & PLAN NOTE
Patient's FSGs are controlled on current medication regimen.  Last A1c reviewed-   Lab Results   Component Value Date    HGBA1C 7.2 (H) 06/30/2022     Most recent fingerstick glucose reviewed-   No results for input(s): POCTGLUCOSE in the last 24 hours.    Antihyperglycemics (From admission, onward)    Start     Stop Route Frequency Ordered    11/19/22 2154  insulin aspart U-100 pen 0-5 Units         -- SubQ Before meals & nightly PRN 11/19/22 2054        · Hold Oral hypoglycemics while patient is in the hospital.  · Does not take insulin, DM diet only    Recent Labs     12/07/22  1948 12/08/22  0838 12/08/22  1213 12/08/22  2223 12/09/22  0634   POCTGLUCOSE 239* 106 100 318* 162*

## 2022-12-10 NOTE — PROGRESS NOTES
[facility]  Nephrology  Progress Note    Patient Name: Peri Johansen  MRN: [id number]  Admission Date: 11/17/2022  Hospital Length of Stay: 20 days  Attending Provider: Ximena Ramsey MD   Primary Care Physician: Annika Garland MD  Principal Problem:Shock, unspecified    Subjective:     HPI: Ms. Johansen is an 88 year old female with extensive pmhx including heart failure, aortic atherosclerosis, B-cell lymphoma, T2DM, Diverticulosis, GERD, HLD, HTN, who presented with complaint of generalized fatigue. Patient is a poor historian. She continues to complain of fatigue and intermittent SOB. Patient denies urinary abnormalities, change in PO intake, and chest pain. During her admission, patient was found to have elevated BNP and CXR significant for pulmonary edema bilaterally. Patient was started on lasix and was noted to be responsive. She was transitioned to bumex. Today patient has not received any diuretics and had minimal urinary output per RN. Bumex was last given yesterday. BNP trending up to 1026. Patient developed hyponatremia shortly after being admitted. Sodium 136 at admission and today dropped down to 120. Serum osmolality low (266). Echo (11/19) showed grade III left ventricular diastolic dysfunction, mild-moderate pulmonary HTN, and ejection fraction of 58%. Nephrology consulted for hyponatremia.       Interval History: no acute events overnight. Remains hyponatremic    Review of patient's allergies indicates:   Allergen Reactions    Latex, natural rubber Itching    Latex Hives     Current Facility-Administered Medications   Medication Frequency    acetaminophen tablet 650 mg Q8H PRN    amiodarone tablet 200 mg Daily    aspirin chewable tablet 81 mg Daily    atorvastatin tablet 40 mg Daily    benzonatate capsule 100 mg TID PRN    bisacodyL suppository 10 mg Daily PRN    dextromethorphan-guaiFENesin  mg/5 ml liquid 5 mL Q4H PRN    enoxaparin injection 30 mg Daily     ergocalciferol capsule 50,000 Units Q7 Days    glucagon (human recombinant) injection 1 mg PRN    glucose chewable tablet 16 g PRN    glucose chewable tablet 24 g PRN    hydrOXYzine pamoate capsule 25 mg Q6H PRN    insulin aspart U-100 pen 0-5 Units QID (AC + HS) PRN    LIDOcaine 5 % patch 1 patch Daily PRN    losartan split tablet 12.5 mg Daily    melatonin tablet 6 mg Nightly PRN    methIMAzole split tablet 2.5 mg Daily    naloxone 0.4 mg/mL injection 0.02 mg PRN    nitroGLYCERIN SL tablet 0.4 mg Q5 Min PRN    ondansetron injection 8 mg Q6H PRN    pantoprazole EC tablet 40 mg Daily    polyethylene glycol packet 17 g BID PRN    potassium, sodium phosphates 280-160-250 mg packet 1 packet QID (AC & HS)    sodium chloride 0.9% flush 10 mL Q12H PRN       Objective:     Vital Signs (Most Recent):  Temp: 96.9 °F (36.1 °C) (12/10/22 1142)  Pulse: 77 (12/10/22 1451)  Resp: 17 (12/10/22 1142)  BP: (!) 122/59 (12/10/22 1142)  SpO2: 96 % (12/10/22 1142)  (RETIRED) O2 Device (Oxygen Therapy): nasal cannula (12/08/22 1628)   Vital Signs (24h Range):  Temp:  [96.9 °F (36.1 °C)-98.4 °F (36.9 °C)] 96.9 °F (36.1 °C)  Pulse:  [75-89] 77  Resp:  [17-20] 17  SpO2:  [93 %-96 %] 96 %  BP: (119-132)/(59-74) 122/59     Weight: 47.1 kg (103 lb 13.4 oz) (12/10/22 0400)  Body mass index is 20.97 kg/m².  Body surface area is 1.4 meters squared.    I/O last 3 completed shifts:  In: 500 [P.O.:500]  Out: 181 [Urine:180; Stool:1]    Physical Exam  Vitals and nursing note reviewed.   Constitutional:       General: She is awake.      Appearance: She is well-developed and underweight. She is ill-appearing.   HENT:      Head: Normocephalic.      Nose: Nose normal.      Mouth/Throat:      Mouth: Mucous membranes are moist.   Eyes:      General: Lids are normal. No scleral icterus.     Conjunctiva/sclera: Conjunctivae normal.      Pupils: Pupils are equal, round, and reactive to light.   Cardiovascular:      Rate and Rhythm: Normal rate  and regular rhythm.      Heart sounds: No murmur heard.     Comments: Elevated JVP  Pulmonary:      Effort: Pulmonary effort is normal.      Breath sounds: Rhonchi present. No wheezing.   Abdominal:      General: Bowel sounds are normal.      Palpations: Abdomen is soft.      Tenderness: There is no abdominal tenderness.   Musculoskeletal:         General: No deformity.      Cervical back: Normal range of motion and neck supple.      Right lower leg: No edema.      Left lower leg: Edema present.   Skin:     General: Skin is warm and dry.   Neurological:      General: No focal deficit present.      Mental Status: She is alert.   Psychiatric:         Behavior: Behavior is cooperative.       Significant Labs:  All labs within the past 24 hours have been reviewed.     Significant Imaging:  Labs: Reviewed    Assessment/Plan:     Hyponatremia  Ms. Johansen is an 88 year old female with pmhx of heart failure (EF 58%) who presented with complaint of generalized fatigue. Patient is a poor historian. During her admission, patient was found to have elevated BNP and CXR significant for pulmonary edema bilaterally. Patient was started on lasix and was noted to be responsive. She was transitioned to bumex. BNP trending up to 1026. Patient developed hyponatremia shortly after being admitted. Sodium 136 at admission and dropped down to 120. Nephrology consulted for hyponatremia.     Likely secondary to heart failure. During volume assessment, patient had elevated JVP, rhonchi bilaterally, and RLE swelling.   Serum osm: 266. Consistent with hypotonic hyponatremia     Recommendations:  - tolvaptan 15 mg ordered  - remove free water restriction  -Echo (11/19) showed grade III left ventricular diastolic dysfunction, mild-moderate pulmonary HTN, and ejection fraction of 58%. EF on most recent ECHO 35%   - strict ins/outs          Thank you for your consult. I will follow-up with patient. Please contact us if you have any additional  questions.    Dio Graff MD  Nephrology  [facility] - [facility]

## 2022-12-10 NOTE — ASSESSMENT & PLAN NOTE
Ms. Johansen is an 88 year old female with pmhx of heart failure (EF 58%) who presented with complaint of generalized fatigue. Patient is a poor historian. During her admission, patient was found to have elevated BNP and CXR significant for pulmonary edema bilaterally. Patient was started on lasix and was noted to be responsive. She was transitioned to bumex. BNP trending up to 1026. Patient developed hyponatremia shortly after being admitted. Sodium 136 at admission and dropped down to 120. Nephrology consulted for hyponatremia.     Likely secondary to heart failure. During volume assessment, patient had elevated JVP, rhonchi bilaterally, and RLE swelling.   Serum osm: 266. Consistent with hypotonic hyponatremia   Serum sodium increased significantly from 120 to 130 with NS, suggesting hypovolemic hyponatremia. Na 120 --> 130 -->127 w/ IVFs.    Recommendations:  - tolvaptan 15 mg ordered  - remove free water restriction  -Echo (11/19) showed grade III left ventricular diastolic dysfunction, mild-moderate pulmonary HTN, and ejection fraction of 58%. EF on most recent ECHO 35%   - strict ins/outs

## 2022-12-10 NOTE — ASSESSMENT & PLAN NOTE
- no evidence of respiratory infection  - dextromethorphan and guaifenesin  - continue diuresing. (not worsened with 1L of fluids)  - add abx to cover respiratory pathogen   - albuterol QID   - slowly improving  12/8- due to effusion  - stable

## 2022-12-10 NOTE — ASSESSMENT & PLAN NOTE
Transferred to ICU, and back to [facility]  Due to sepsis versus hypovolemia  Cultures negative  Patient was briefly on vasopressors  Not given fluids while in ICU  Infectious work up negative  Continue cefepime empirically for 7 days    12/8 - BP  118/58, stable, on cefepine. Cultures neg so far. Trop 0.6 this am- repeat EKG and trop level  12/9 - trop chronically up but declining. BNP high, diuretics being held. BP is improving.   12/10 - BP stable on losartan, unable to tolerate lasix, na 123

## 2022-12-10 NOTE — PLAN OF CARE
Plan of care reviewed with patient. Patient is AOX4 and VS stable. Patient remained free of falls and trauma, fall precautions are in place. Patient has no complaints of pain. Patient received IVPB magnesium this AM. Patient has no questions at this time. Wheels are locked and the bed is in lowest position. The call bell is within reach. Telemetry is on. Will continue to monitor.

## 2022-12-10 NOTE — ASSESSMENT & PLAN NOTE
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 Urine sodium and osmolality afterward increased. Uric acid normal.  12/8- holding diuretics  - Continue 0.8 - 1 L fluid restrictions  - strict ins/outs   12/9- lab - Na dropped w one dose of lasix.   12/10- tolvaptan x one dose, trial. Close Na monitoring

## 2022-12-10 NOTE — ASSESSMENT & PLAN NOTE
Advance Care Planning   Does patient have Capacity? yes  Contact: Romario Majano  Goals/Wishes: wants to go home  Code Status- FULL  Pain management- no pain  Prognosis-  Guarded and poor: unable to tolerate lasix due to hyponatremia in setting of CHF.   Family discussions- 12/9- Called and updated Maria Guadalupe   Functional Status - poor mobility, high FALL risk    Post acute care plan:  home  Time spent on Goals of Care:  10 mins on 12/9.

## 2022-12-10 NOTE — PLAN OF CARE
Problem: Adult Inpatient Plan of Care  Goal: Patient-Specific Goal (Individualized)  Outcome: Ongoing, Progressing  Flowsheets (Taken 12/10/2022 1748)  Anxieties, Fears or Concerns: SOB  Individualized Care Needs: monitor Na levels  Patient-Specific Goals (Include Timeframe): Na >124 at next lab draw.     Problem: Fall Injury Risk  Goal: Absence of Fall and Fall-Related Injury  Outcome: Ongoing, Progressing  Intervention: Identify and Manage Contributors  Flowsheets (Taken 12/10/2022 1748)  Self-Care Promotion: independence encouraged  Medication Review/Management: medications reviewed     Problem: Diabetes Comorbidity  Goal: Blood Glucose Level Within Targeted Range  Outcome: Ongoing, Progressing  Intervention: Monitor and Manage Glycemia  Flowsheets (Taken 12/10/2022 1748)  Glycemic Management:   blood glucose monitored   supplemental insulin given       Plan of care discussed with patient. Patient is free of fall/trauma/injury. Denies CP, SOB, or pain/discomfort. Tolvaptan administered. Pt encouraged to drink to thirst. BG monitored, supplemental insulin administered. All questions addressed.

## 2022-12-10 NOTE — PROGRESS NOTES
[facility] - Cardiology [facility] Medicine  Progress Note    Patient Name: Peri Johansen  MRN: [id number]  Patient Class: IP- Inpatient   Admission Date: 11/17/2022  Length of Stay: 20 days  Attending Physician: Ximena Ramsey MD  Primary Care Provider: Annika Garland MD        Subjective:     Principal Problem:Shock, unspecified        HPI:    Peri Johansen is a 88 y.o. female who has a past medical history of Aortic atherosclerosis, Arthritis, B-cell lymphoma, Diabetes mellitus, type II, Diverticulosis, GERD, Goiter, Hyperlipidemia, Hypertension, Joint pain, Leg pain, Osteoporosis, Renal cyst, Rickets, vitamin D deficiency, Thyroid nodule, and Vitamin D deficiency disease, presented to the ED with CC of Generalized fatigue.  Is a poor historian and unclear why she is at the hospital.  She states that someone told her to stop taking Lasix, but she does not remember who that was and how long ago that was, can not quantify it in days or weeks even.  She stated she did not have any chest pain at all, however appears that there was a comment of chest pain 10 on 10 in ED. troponin was mildly elevated at 0.171, however quickly reduced on next lab at 0.162.  BNP elevated at 925.  Denies any dysuria, but also a note in ED she has mild dysuria.  Denies hematuria but has +2 blood in urine, UA looks noninfectious.  CTA of the chest showed no interval detrimental change or new intra-abdominopelvic abnormality compared to most recent CT 11/11/2022, and no detrimental change in CTA of the aorta compared to most recent prior CTA aorta 09/14/2022.  Noting that she does have moderate to severe aortic atherosclerosis involving the branch vessels and moderate bilateral pleural effusions, stable when compared to prior 11/11/2022.  COVID, flu, RSV negative.  Denies abdominal pain or diarrhea. Denies nausea or vomiting.       Overview/Hospital Course:  Started on furosemide IV 40 mg BID. UOP not adequate. Escalated to 80 mg  BID, but missed a few doses due to loss of IV access. Once she was restarted on consistent IV diuretic administration, stillv elbert hard to diurese. Volume exam slowly improving. Patient complaining of epiagastric abdominal discomfort with associated SOB. Started her on aggressive bowel regimen and reglan. Some relief with BM. X-ray of abdomen unremarkable for obstruction or ileus or constipation. US of abdomen showed adherent gallstone. Abdomen pain completely resolved during period of NPO. SOB improving but continues to have nonproductive cough.  On 11/27, patient noted to be satting in mid to upper 80's on RA which is new for her.  She was placed on supplemental O2.  Repeat CXR ordered which demonstrated worsening pulm edema.  Her UO was also starting to decrease as well.  Lasix adjusted. And metolazone added.  Azithromycin added as well.  The patient was finally weaned off of supplemental O2 and her cough has improved significantly. Develop hypoantremia and hypotension as approached euvolemia. Nephrology consulted for hyponatmremia. Went to ICU. Received pressure support. Differing opinions on wether she is hypervolemic and hypovolemic. Cardiology and nephrology recommending holding diuresis. However she remains on strict fluid restriction. If starting on oral diuresis. Need to watch for response as likely oral furosemide not working.  Interval History:  12/8- /58  Pulse 86   SpO2 98% Did not sleep well,, agitated and complaining of pain she described as someone sitting on her chest last night.   + 3 unmeasured.  Holding diuresis now.  Trop 0.6 ( chronically up), Na 129,     12/9- BNP  >1400, trop 0.6-> 0.4. /64   Pulse 74, SpO2 93% .  On cefepime empirically for pneumonia- stop day 12/12 -  Cr 0.7-> 1.1, Na 128 w volume overload, replace mag and phos. BNP > 1400. + jvd. Will resume low dose losartan. Lasix 40 IV given with diuresis.  Repeating a Na level after diuresis to see if Na worse or  improved.     12/10- na dropped with one dose of lasix, now Na 123. /66   Pulse 82 . Cr 1.0 on losartan.      Interval History: see above    Review of Systems   Constitutional:  Positive for fatigue. Negative for activity change, chills and fever.   HENT:  Negative for congestion, nosebleeds and trouble swallowing.    Respiratory:  Negative for apnea, cough, choking, chest tightness and shortness of breath.    Cardiovascular:  Positive for chest pain and leg swelling.   Gastrointestinal:  Negative for abdominal distention, abdominal pain, constipation, diarrhea, nausea and vomiting.   Genitourinary:  Negative for decreased urine volume, difficulty urinating, dysuria and frequency.   Musculoskeletal:  Negative for arthralgias, back pain, joint swelling, neck pain and neck stiffness.   Skin:  Negative for rash and wound.   Neurological:  Negative for dizziness, seizures, syncope, weakness, light-headedness, numbness and headaches.   Psychiatric/Behavioral:  Negative for agitation, behavioral problems, confusion, hallucinations, self-injury and sleep disturbance. The patient is not nervous/anxious.    Objective:     Vital Signs (Most Recent):  Temp: 98 °F (36.7 °C) (12/10/22 0734)  Pulse: 82 (12/10/22 0734)  Resp: 18 (12/10/22 0734)  BP: 129/66 (12/10/22 0734)  SpO2: 95 % (12/10/22 0734)   Vital Signs (24h Range):  Temp:  [97.3 °F (36.3 °C)-98.4 °F (36.9 °C)] 98 °F (36.7 °C)  Pulse:  [73-87] 82  Resp:  [17-20] 18  SpO2:  [93 %-100 %] 95 %  BP: (119-160)/(62-74) 129/66     Weight: 47.1 kg (103 lb 13.4 oz)  Body mass index is 20.97 kg/m².    Intake/Output Summary (Last 24 hours) at 12/10/2022 0841  Last data filed at 12/9/2022 1837  Gross per 24 hour   Intake 500 ml   Output 181 ml   Net 319 ml        Physical Exam  Constitutional:       General: She is not in acute distress.     Appearance: Normal appearance. She is ill-appearing. She is not toxic-appearing or diaphoretic.      Comments: Elderly female, thin ,  frail   HENT:      Head: Normocephalic and atraumatic.      Nose: Nose normal.      Mouth/Throat:      Mouth: Mucous membranes are moist.   Eyes:      General: No scleral icterus.     Extraocular Movements: Extraocular movements intact.      Pupils: Pupils are equal, round, and reactive to light.   Cardiovascular:      Rate and Rhythm: Normal rate and regular rhythm.      Pulses: Normal pulses.      Heart sounds: Normal heart sounds. No murmur heard.     Comments: + JVD  Pulmonary:      Effort: Pulmonary effort is normal. No respiratory distress.      Breath sounds: Normal breath sounds. No wheezing, rhonchi or rales.      Comments: Decreased BS at bases  Chest:      Chest wall: No tenderness.   Abdominal:      General: Abdomen is flat. Bowel sounds are normal. There is no distension.      Palpations: Abdomen is soft.      Tenderness: There is no abdominal tenderness. There is no right CVA tenderness, left CVA tenderness, guarding or rebound.   Musculoskeletal:         General: Swelling present. No tenderness, deformity or signs of injury. Normal range of motion.      Cervical back: Normal range of motion and neck supple. No rigidity or tenderness.      Right lower leg: Edema present.   Lymphadenopathy:      Cervical: No cervical adenopathy.   Skin:     General: Skin is warm and dry.      Coloration: Skin is not jaundiced or pale.      Findings: No erythema or rash.   Neurological:      General: No focal deficit present.      Mental Status: She is alert and oriented to person, place, and time. Mental status is at baseline.      Cranial Nerves: No cranial nerve deficit.      Motor: No weakness.   Psychiatric:         Mood and Affect: Mood normal.         Behavior: Behavior normal.         Thought Content: Thought content normal.      Comments: Slow thought processes       Significant Labs: All pertinent labs within the past 24 hours have been reviewed.  CBC:   Recent Labs   Lab 12/09/22  0421 12/10/22  0334   WBC  11.22 8.31   HGB 13.6 12.7   HCT 42.8 37.3    297       CMP:   Recent Labs   Lab 12/09/22  0421 12/09/22  1535 12/10/22  0334   * 122*  122* 123*   K 5.1 5.1  5.1 4.7   CL 90* 91*  91* 91*   CO2 27 23  23 24   * 204*  204* 164*   BUN 17 24*  24* 25*   CREATININE 1.1 1.0  1.0 1.0   CALCIUM 10.0 9.5  9.5 9.7   PROT  --  7.1  --    ALBUMIN  --  2.3*  --    BILITOT  --  1.4*  --    ALKPHOS  --  118  --    AST  --  33  --    ALT  --  22  --    ANIONGAP 11 8  8 8         Significant Imaging: I have reviewed all pertinent imaging results/findings within the past 24 hours.      Assessment/Plan:      * Shock, unspecified  Transferred to ICU, and back to [facility]  Due to sepsis versus hypovolemia  Cultures negative  Patient was briefly on vasopressors  Not given fluids while in ICU  Infectious work up negative  Continue cefepime empirically for 7 days    12/8 - BP  118/58, stable, on cefepine. Cultures neg so far. Trop 0.6 this am- repeat EKG and trop level  12/9 - trop chronically up but declining. BNP high, diuretics being held. BP is improving.   12/10 - BP stable on losartan, unable to tolerate lasix, na 123    CKD (chronic kidney disease), stage IV  Patient with acute kidney injury likely due to cardiogenic PUJA is currently worsening. Labs reviewed- Renal function/electrolytes with Estimated Creatinine Clearance: 28.9 mL/min (based on SCr of 1 mg/dL). according to latest data. Monitor urine output and serial BMP and adjust therapy as needed. Avoid nephrotoxins and renally dose meds for GFR listed above.    12/9- Cr 0.7-> 1.1, volume overload, BNP > 1400. Add lasix IV 40 x one. She did diurese. Chat w nephrology - They think  she is dry.  Repeat  BMP.     12/10- cr 1.0,stable  CKD per US 2019.  Unable to tolerate lasix due to hyponatremia.  If pt gets SOB may need volume removal.  Discuss w Nephrology.       Pleural effusion  - Not seen on imaging from earlier this year.  - Most likely related  to HFpEF  - Treat HF. diuresis as tolerated.    Acute on chronic diastolic heart failure  Moderate left atrial enlargement  Right ventricular hypertrophy  Pulmonary hypertension  ECHO 11/19 showed EF 58%, Grade III DD. Moderate left atrial enlargement. Normal right ventricular size with low normal right ventricular systolic function. Moderate to severe tricuspid regurgitation. Bilateral pleural effusions.  Low BP's hold Beta Blocker, ACE/ARB and continue IV Furosemide and monitor clinical status closely. Monitor on telemetry. Patient is on CHF pathway.  Monitor strict Is&Os and daily weights.  Place on fluid restriction of 1.5 L. Continue to stress to patient importance of self efficacy and  on diet for CHF. Last BNP reviewed- and noted below   BNP  Recent Labs   Lab 12/08/22  1555   BNP 1,488*     Patient very hard to diurese. Furosemide increased and metolazone added. Stopped when hyponatremia worsen. Patient transitioned to bumex to 2 mg BID. On hold due to development of sodium 120 and hypotension. Cardiology consulted for further diuretic recommendations.     12/8- Naq 129. Lasix held. Euvoemic, and net neg fluid balance. Once sodium recovers, would advise HCTZ as primary diuretic with a loop diuretic as needed for weight gain. Today's weight was 44.4 kg, which we can consider the new dry weight.   - - Echo (11/19) showed grade III left ventricular diastolic dysfunction, mild-moderate pulmonary HTN, and ejection fraction of 58%. EF on most recent ECHO 35%   - patient and family educated on daily weights at home.  Add GDMT later on for HF as tolerated. Would hold off on ACEi/ARBs/diuretics/aldactone right now given electrolyte abnormalities.   - repeating BNP > 1400,  and trop levels 0.2-> 0.6 -> 0.4    12/10- not able to tolerate lasix due to hyponatremia    Hyponatremia  Urine studies indicative of SIADH or salt wasting  Nephrology consulted- gave NS with improved of sodium 120-130. Urine sodium and  osmolality afterward increased. Uric acid normal.  12/8- holding diuretics  - Continue 0.8 - 1 L fluid restrictions  - strict ins/outs   12/9- lab - Na dropped w one dose of lasix.     Cough in adult  - no evidence of respiratory infection  - dextromethorphan and guaifenesin  - continue diuresing. (not worsened with 1L of fluids)  - add abx to cover respiratory pathogen   - albuterol QID   - slowly improving  12/8- due to effusion  - stable    Diabetes mellitus  Patient's FSGs are controlled on current medication regimen.  Last A1c reviewed-   Lab Results   Component Value Date    HGBA1C 7.2 (H) 06/30/2022     Most recent fingerstick glucose reviewed-   No results for input(s): POCTGLUCOSE in the last 24 hours.    Antihyperglycemics (From admission, onward)    Start     Stop Route Frequency Ordered    11/19/22 2154  insulin aspart U-100 pen 0-5 Units         -- SubQ Before meals & nightly PRN 11/19/22 2054        · Hold Oral hypoglycemics while patient is in the hospital.  · Does not take insulin, DM diet only    Recent Labs     12/07/22  1948 12/08/22  0838 12/08/22  1213 12/08/22  2223 12/09/22  0634   POCTGLUCOSE 239* 106 100 318* 162*         Hypophosphatemia  12/9- neutraphos x 10 days  12/10- phos 1.6-> 2.4      Physical deconditioning  - consult PT to see patient  - get patient up to chair with meals    Hyperthyroidism  · Continue home methimazole      Goals of care, counseling/discussion  Advance Care Planning   Does patient have Capacity? yes  Contact: Romario Majano  Goals/Wishes: wants to go home  Code Status- FULL  Pain management- no pain  Prognosis-  Guarded and poor: unable to tolerate lasix due to hyponatremia in setting of CHF.   Family discussions- 12/9- Called and updated Maria Guadalupe   Functional Status - poor mobility, high FALL risk    Post acute care plan:  home  Time spent on Goals of Care:  10 mins on 12/9.          Irritant contact dermatitis due to fecal, urinary or dual incontinence  Moisture  barrier cream      Epigastric pain  Constipation  Esophageal dysphagia  Vomited a few times on one day  reglan before meals  protonix  Suppository with BM and some relief of abdominal pain  X-ray abdomen unremarkable  US of pancreas and gallbladder showed adherent gall bladder stone. Otherwise unremarkable for other biliary and pancreatic abnormalities.   Consider outpatient EDG referral for esophageal dysphagia  - resolved    Chest pain  Am of 12/8, trop 0.6 but chronically up.   Repeat trop , EKG, BNP  - see above        VTE Risk Mitigation (From admission, onward)         Ordered     enoxaparin injection 30 mg  Daily         12/04/22 2204     IP VTE HIGH RISK PATIENT  Once         11/18/22 0404     Place sequential compression device  Until discontinued         11/18/22 0404                Discharge Planning   ENDY: 12/12/2022     Code Status: Full Code   Is the patient medically ready for discharge?: No    Reason for patient still in hospital (select all that apply): Patient trending condition  Discharge Plan A: Skilled Nursing Facility   Discharge Delays: None known at this time        Ximena Ramsey MD  Senior Hospitalist  Department of [facility] Medicine  [facility]  [id number], [phone]    [facility] - Cardiology Stepdown

## 2022-12-10 NOTE — ASSESSMENT & PLAN NOTE
Moderate left atrial enlargement  Right ventricular hypertrophy  Pulmonary hypertension  ECHO 11/19 showed EF 58%, Grade III DD. Moderate left atrial enlargement. Normal right ventricular size with low normal right ventricular systolic function. Moderate to severe tricuspid regurgitation. Bilateral pleural effusions.  Low BP's hold Beta Blocker, ACE/ARB and continue IV Furosemide and monitor clinical status closely. Monitor on telemetry. Patient is on CHF pathway.  Monitor strict Is&Os and daily weights.  Place on fluid restriction of 1.5 L. Continue to stress to patient importance of self efficacy and  on diet for CHF. Last BNP reviewed- and noted below   BNP  Recent Labs   Lab 12/08/22  1555   BNP 1,488*     Patient very hard to diurese. Furosemide increased and metolazone added. Stopped when hyponatremia worsen. Patient transitioned to bumex to 2 mg BID. On hold due to development of sodium 120 and hypotension. Cardiology consulted for further diuretic recommendations.     12/8- Naq 129. Lasix held. Euvoemic, and net neg fluid balance. Once sodium recovers, would advise HCTZ as primary diuretic with a loop diuretic as needed for weight gain. Today's weight was 44.4 kg, which we can consider the new dry weight.   - - Echo (11/19) showed grade III left ventricular diastolic dysfunction, mild-moderate pulmonary HTN, and ejection fraction of 58%. EF on most recent ECHO 35%   - patient and family educated on daily weights at home.  Add GDMT later on for HF as tolerated. Would hold off on ACEi/ARBs/diuretics/aldactone right now given electrolyte abnormalities.   - repeating BNP > 1400,  and trop levels 0.2-> 0.6 -> 0.4    12/10- not able to tolerate lasix due to hyponatremia

## 2022-12-10 NOTE — ASSESSMENT & PLAN NOTE
Patient with acute kidney injury likely due to cardiogenic PUJA is currently worsening. Labs reviewed- Renal function/electrolytes with Estimated Creatinine Clearance: 28.9 mL/min (based on SCr of 1 mg/dL). according to latest data. Monitor urine output and serial BMP and adjust therapy as needed. Avoid nephrotoxins and renally dose meds for GFR listed above.    12/9- Cr 0.7-> 1.1, volume overload, BNP > 1400. Add lasix IV 40 x one. She did diurese. Chat w nephrology - They think  she is dry.  Repeat  BMP.     12/10- cr 1.0,stable  CKD per US 2019.  Unable to tolerate lasix due to hyponatremia.  If pt gets SOB may need volume removal.  Discuss w Nephrology.

## 2022-12-11 NOTE — PROGRESS NOTES
[facility] - Cardiology Stepdown  Nephrology  Progress Note    Patient Name: Peri Johansen  MRN: [id number]  Admission Date: 11/17/2022  Hospital Length of Stay: 21 days  Attending Provider: Ximena Ramsey MD   Primary Care Physician: Annika Garland MD  Principal Problem:Shock, unspecified    Subjective:     HPI: Ms. Johansen is an 88 year old female with extensive pmhx including heart failure, aortic atherosclerosis, B-cell lymphoma, T2DM, Diverticulosis, GERD, HLD, HTN, who presented with complaint of generalized fatigue. Patient is a poor historian. She continues to complain of fatigue and intermittent SOB. Patient denies urinary abnormalities, change in PO intake, and chest pain. During her admission, patient was found to have elevated BNP and CXR significant for pulmonary edema bilaterally. Patient was started on lasix and was noted to be responsive. She was transitioned to bumex. Today patient has not received any diuretics and had minimal urinary output per RN. Bumex was last given yesterday. BNP trending up to 1026. Patient developed hyponatremia shortly after being admitted. Sodium 136 at admission and today dropped down to 120. Serum osmolality low (266). Echo (11/19) showed grade III left ventricular diastolic dysfunction, mild-moderate pulmonary HTN, and ejection fraction of 58%. Nephrology consulted for hyponatremia.       Interval History: improvement of sodium to 129 after administration of tolvaptan      Review of patient's allergies indicates:   Allergen Reactions    Latex, natural rubber Itching    Latex Hives     Current Facility-Administered Medications   Medication Frequency    acetaminophen tablet 650 mg Q8H PRN    amiodarone tablet 200 mg Daily    aspirin chewable tablet 81 mg Daily    atorvastatin tablet 40 mg Daily    benzonatate capsule 100 mg TID PRN    bisacodyL suppository 10 mg Daily PRN    dextromethorphan-guaiFENesin  mg/5 ml liquid 5 mL Q4H PRN    enoxaparin injection  30 mg Daily    ergocalciferol capsule 50,000 Units Q7 Days    glucagon (human recombinant) injection 1 mg PRN    glucose chewable tablet 16 g PRN    glucose chewable tablet 24 g PRN    hydrOXYzine pamoate capsule 25 mg Q6H PRN    insulin aspart U-100 pen 0-5 Units QID (AC + HS) PRN    LIDOcaine 5 % patch 1 patch Daily PRN    losartan split tablet 12.5 mg Daily    melatonin tablet 6 mg Nightly PRN    methIMAzole split tablet 2.5 mg Daily    naloxone 0.4 mg/mL injection 0.02 mg PRN    nitroGLYCERIN SL tablet 0.4 mg Q5 Min PRN    ondansetron injection 8 mg Q6H PRN    pantoprazole EC tablet 40 mg Daily    polyethylene glycol packet 17 g BID PRN    potassium, sodium phosphates 280-160-250 mg packet 1 packet QID (AC & HS)    sodium chloride 0.9% flush 10 mL Q12H PRN       Objective:     Vital Signs (Most Recent):  Temp: 97 °F (36.1 °C) (12/11/22 1135)  Pulse: 65 (12/11/22 1135)  Resp: 17 (12/11/22 1135)  BP: (!) 98/51 (12/11/22 1135)  SpO2: 98 % (12/11/22 1135)  (RETIRED) O2 Device (Oxygen Therapy): nasal cannula (12/08/22 1628)   Vital Signs (24h Range):  Temp:  [97 °F (36.1 °C)-98.7 °F (37.1 °C)] 97 °F (36.1 °C)  Pulse:  [65-88] 65  Resp:  [15-18] 17  SpO2:  [94 %-98 %] 98 %  BP: ()/(51-68) 98/51     Weight: 47.1 kg (103 lb 13.4 oz) (12/10/22 0400)  Body mass index is 20.97 kg/m².  Body surface area is 1.4 meters squared.    I/O last 3 completed shifts:  In: 1532 [P.O.:1532]  Out: 1850 [Urine:1850]    Physical Exam  Vitals and nursing note reviewed.   Constitutional:       General: She is awake.      Appearance: She is well-developed and underweight. She is ill-appearing.   HENT:      Head: Normocephalic.      Nose: Nose normal.      Mouth/Throat:      Mouth: Mucous membranes are moist.   Eyes:      General: Lids are normal. No scleral icterus.     Conjunctiva/sclera: Conjunctivae normal.      Pupils: Pupils are equal, round, and reactive to light.   Cardiovascular:      Rate and Rhythm: Normal  rate and regular rhythm.      Heart sounds: No murmur heard.     Comments: Elevated JVP  Pulmonary:      Effort: Pulmonary effort is normal.      Breath sounds: Rhonchi present. No wheezing.   Abdominal:      General: Bowel sounds are normal.      Palpations: Abdomen is soft.      Tenderness: There is no abdominal tenderness.   Musculoskeletal:         General: No deformity.      Cervical back: Normal range of motion and neck supple.      Right lower leg: No edema.      Left lower leg: Edema present.   Skin:     General: Skin is warm and dry.   Neurological:      General: No focal deficit present.      Mental Status: She is alert.   Psychiatric:         Behavior: Behavior is cooperative.       Significant Labs:  All labs within the past 24 hours have been reviewed.     Significant Imaging:  Labs: Reviewed    Assessment/Plan:     Hyponatremia  Ms. Johansen is an 88 year old female with pmhx of heart failure (EF 58%) who presented with complaint of generalized fatigue. Patient is a poor historian. During her admission, patient was found to have elevated BNP and CXR significant for pulmonary edema bilaterally. Patient was started on lasix and was noted to be responsive. She was transitioned to bumex. BNP trending up to 1026. Patient developed hyponatremia shortly after being admitted. Sodium 136 at admission and dropped down to 120. Nephrology consulted for hyponatremia.     Likely secondary to heart failure. During volume assessment, patient had elevated JVP, rhonchi bilaterally, and RLE swelling.   Serum osm: 266. Consistent with hypotonic hyponatremia   Serum sodium increased significantly from 120 to 130 with NS, suggesting hypovolemic hyponatremia. Na 120 --> 130 -->127 w/ IVFs.    Recommendations:  - tolvaptan 15 mg 12/11  - 1 liter fluid restriction  - will continue to monitor sodium with BMP in afternoon  - possibly plan on tolvaptan 15 mg every other day after discharge  -Echo (11/19) showed grade III left  ventricular diastolic dysfunction, mild-moderate pulmonary HTN, and ejection fraction of 58%. EF on most recent ECHO 35%   - strict ins/outs          Thank you for your consult. I will follow-up with patient. Please contact us if you have any additional questions.    Dio Graff MD  Nephrology  [facility] - Cardiology Stepdown

## 2022-12-11 NOTE — PLAN OF CARE
Pt rested well. Granddaughter at bedside. Increased o2 to 3 liters. Pt was sating 89 on 1 liter at 4 am vitals.

## 2022-12-11 NOTE — SUBJECTIVE & OBJECTIVE
Interval History: improvement of sodium to 129 after administration of tolvaptan      Review of patient's allergies indicates:   Allergen Reactions    Latex, natural rubber Itching    Latex Hives     Current Facility-Administered Medications   Medication Frequency    acetaminophen tablet 650 mg Q8H PRN    amiodarone tablet 200 mg Daily    aspirin chewable tablet 81 mg Daily    atorvastatin tablet 40 mg Daily    benzonatate capsule 100 mg TID PRN    bisacodyL suppository 10 mg Daily PRN    dextromethorphan-guaiFENesin  mg/5 ml liquid 5 mL Q4H PRN    enoxaparin injection 30 mg Daily    ergocalciferol capsule 50,000 Units Q7 Days    glucagon (human recombinant) injection 1 mg PRN    glucose chewable tablet 16 g PRN    glucose chewable tablet 24 g PRN    hydrOXYzine pamoate capsule 25 mg Q6H PRN    insulin aspart U-100 pen 0-5 Units QID (AC + HS) PRN    LIDOcaine 5 % patch 1 patch Daily PRN    losartan split tablet 12.5 mg Daily    melatonin tablet 6 mg Nightly PRN    methIMAzole split tablet 2.5 mg Daily    naloxone 0.4 mg/mL injection 0.02 mg PRN    nitroGLYCERIN SL tablet 0.4 mg Q5 Min PRN    ondansetron injection 8 mg Q6H PRN    pantoprazole EC tablet 40 mg Daily    polyethylene glycol packet 17 g BID PRN    potassium, sodium phosphates 280-160-250 mg packet 1 packet QID (AC & HS)    sodium chloride 0.9% flush 10 mL Q12H PRN       Objective:     Vital Signs (Most Recent):  Temp: 97 °F (36.1 °C) (12/11/22 1135)  Pulse: 65 (12/11/22 1135)  Resp: 17 (12/11/22 1135)  BP: (!) 98/51 (12/11/22 1135)  SpO2: 98 % (12/11/22 1135)  (RETIRED) O2 Device (Oxygen Therapy): nasal cannula (12/08/22 1628)   Vital Signs (24h Range):  Temp:  [97 °F (36.1 °C)-98.7 °F (37.1 °C)] 97 °F (36.1 °C)  Pulse:  [65-88] 65  Resp:  [15-18] 17  SpO2:  [94 %-98 %] 98 %  BP: ()/(51-68) 98/51     Weight: 47.1 kg (103 lb 13.4 oz) (12/10/22 0400)  Body mass index is 20.97 kg/m².  Body surface area is 1.4 meters squared.    I/O last 3  completed shifts:  In: 1532 [P.O.:1532]  Out: 1850 [Urine:1850]    Physical Exam  Vitals and nursing note reviewed.   Constitutional:       General: She is awake.      Appearance: She is well-developed and underweight. She is ill-appearing.   HENT:      Head: Normocephalic.      Nose: Nose normal.      Mouth/Throat:      Mouth: Mucous membranes are moist.   Eyes:      General: Lids are normal. No scleral icterus.     Conjunctiva/sclera: Conjunctivae normal.      Pupils: Pupils are equal, round, and reactive to light.   Cardiovascular:      Rate and Rhythm: Normal rate and regular rhythm.      Heart sounds: No murmur heard.     Comments: Elevated JVP  Pulmonary:      Effort: Pulmonary effort is normal.      Breath sounds: Rhonchi present. No wheezing.   Abdominal:      General: Bowel sounds are normal.      Palpations: Abdomen is soft.      Tenderness: There is no abdominal tenderness.   Musculoskeletal:         General: No deformity.      Cervical back: Normal range of motion and neck supple.      Right lower leg: No edema.      Left lower leg: Edema present.   Skin:     General: Skin is warm and dry.   Neurological:      General: No focal deficit present.      Mental Status: She is alert.   Psychiatric:         Behavior: Behavior is cooperative.       Significant Labs:  All labs within the past 24 hours have been reviewed.     Significant Imaging:  Labs: Reviewed

## 2022-12-11 NOTE — PLAN OF CARE
AAOX3,VSS,O2 sats>92% on 2L NC. Plan of care discussed with patient and granddaughter. Patient has no complaints of pain/SOB. Wound care given for wound on buttock. Continuing to monitor neuro status and sodium levels. Discussed medications and care. Patient has no questions at this time.Pt visualised and stable.Call light within reach. Pt up in chair. Pt free from all trauma and falls.     Problem: Adult Inpatient Plan of Care  Goal: Plan of Care Review  Outcome: Ongoing, Progressing  Goal: Absence of Hospital-Acquired Illness or Injury  Outcome: Ongoing, Progressing  Goal: Optimal Comfort and Wellbeing  Outcome: Ongoing, Progressing     Problem: Fall Injury Risk  Goal: Absence of Fall and Fall-Related Injury  Outcome: Ongoing, Progressing     Problem: Skin Injury Risk Increased  Goal: Skin Health and Integrity  Outcome: Ongoing, Progressing     Problem: Diabetes Comorbidity  Goal: Blood Glucose Level Within Targeted Range  Outcome: Ongoing, Progressing     Problem: Impaired Wound Healing  Goal: Optimal Wound Healing  Outcome: Ongoing, Progressing     Problem: Activity Intolerance  Goal: Enhanced Capacity and Energy  Outcome: Ongoing, Progressing

## 2022-12-11 NOTE — ASSESSMENT & PLAN NOTE
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 Urine sodium and osmolality afterward increased. Uric acid normal.  12/8- holding diuretics  - Continue 0.8 - 1 L fluid restrictions  - strict ins/outs   12/9- lab - Na dropped w one dose of lasix.   12/10- tolvaptan x one dose, trial. Close Na monitoring  12/11 - na 123, will discuss w Nephrology.

## 2022-12-11 NOTE — PROGRESS NOTES
[facility] - Cardiology [facility] Medicine  Progress Note    Patient Name: Peri Johansen  MRN: [id number]  Patient Class: IP- Inpatient   Admission Date: 11/17/2022  Length of Stay: 21 days  Attending Physician: Ximena Ramsey MD  Primary Care Provider: Annika Garland MD        Subjective:     Principal Problem:Shock, unspecified        HPI:    Peri Johansen is a 88 y.o. female who has a past medical history of Aortic atherosclerosis, Arthritis, B-cell lymphoma, Diabetes mellitus, type II, Diverticulosis, GERD, Goiter, Hyperlipidemia, Hypertension, Joint pain, Leg pain, Osteoporosis, Renal cyst, Rickets, vitamin D deficiency, Thyroid nodule, and Vitamin D deficiency disease, presented to the ED with CC of Generalized fatigue.  Is a poor historian and unclear why she is at the hospital.  She states that someone told her to stop taking Lasix, but she does not remember who that was and how long ago that was, can not quantify it in days or weeks even.  She stated she did not have any chest pain at all, however appears that there was a comment of chest pain 10 on 10 in ED. troponin was mildly elevated at 0.171, however quickly reduced on next lab at 0.162.  BNP elevated at 925.  Denies any dysuria, but also a note in ED she has mild dysuria.  Denies hematuria but has +2 blood in urine, UA looks noninfectious.  CTA of the chest showed no interval detrimental change or new intra-abdominopelvic abnormality compared to most recent CT 11/11/2022, and no detrimental change in CTA of the aorta compared to most recent prior CTA aorta 09/14/2022.  Noting that she does have moderate to severe aortic atherosclerosis involving the branch vessels and moderate bilateral pleural effusions, stable when compared to prior 11/11/2022.  COVID, flu, RSV negative.  Denies abdominal pain or diarrhea. Denies nausea or vomiting.       Overview/Hospital Course:  Started on furosemide IV 40 mg BID. UOP not adequate. Escalated to 80 mg  BID, but missed a few doses due to loss of IV access. Once she was restarted on consistent IV diuretic administration, stillv elbert hard to diurese. Volume exam slowly improving. Patient complaining of epiagastric abdominal discomfort with associated SOB. Started her on aggressive bowel regimen and reglan. Some relief with BM. X-ray of abdomen unremarkable for obstruction or ileus or constipation. US of abdomen showed adherent gallstone. Abdomen pain completely resolved during period of NPO. SOB improving but continues to have nonproductive cough.  On 11/27, patient noted to be satting in mid to upper 80's on RA which is new for her.  She was placed on supplemental O2.  Repeat CXR ordered which demonstrated worsening pulm edema.  Her UO was also starting to decrease as well.  Lasix adjusted. And metolazone added.  Azithromycin added as well.  The patient was finally weaned off of supplemental O2 and her cough has improved significantly. Develop hypoantremia and hypotension as approached euvolemia. Nephrology consulted for hyponatmremia. Went to ICU. Received pressure support. Differing opinions on wether she is hypervolemic and hypovolemic. Cardiology and nephrology recommending holding diuresis. However she remains on strict fluid restriction. If starting on oral diuresis. Need to watch for response as likely oral furosemide not working.  Interval History:  12/8- /58  Pulse 86   SpO2 98% Did not sleep well,, agitated and complaining of pain she described as someone sitting on her chest last night.   + 3 unmeasured.  Holding diuresis now.  Trop 0.6 ( chronically up), Na 129,     12/9- BNP  >1400, trop 0.6-> 0.4. /64   Pulse 74, SpO2 93% .  On cefepime empirically for pneumonia- stop day 12/12 -  Cr 0.7-> 1.1, Na 128 w volume overload, replace mag and phos. BNP > 1400. + jvd. Will resume low dose losartan. Lasix 40 IV given with diuresis.  Repeating a Na level after diuresis to see if Na worse or  improved.   12/10- na dropped with one dose of lasix, now Na 123. /66   Pulse 82 . Cr 1.0 on losartan. Discussed w , nephrology, will give  Tolvaptan x one dose.     12/11- Na 123. K 5.2. cr 0.8, Phos 2.5 (improving), Chatted w nephrology. On Fluid restriction 750, considering a second dose of tolvactan. /53 VSS, She is up in chair, talking and interactive. No SOB.       Interval History: see above    Review of Systems   Constitutional:  Positive for fatigue. Negative for activity change, chills and fever.   HENT:  Negative for congestion, nosebleeds and trouble swallowing.    Respiratory:  Negative for apnea, cough, choking, chest tightness and shortness of breath.    Cardiovascular:  Positive for chest pain and leg swelling.   Gastrointestinal:  Negative for abdominal distention, abdominal pain, constipation, diarrhea, nausea and vomiting.   Genitourinary:  Negative for decreased urine volume, difficulty urinating, dysuria and frequency.   Musculoskeletal:  Negative for arthralgias, back pain, joint swelling, neck pain and neck stiffness.   Skin:  Negative for rash and wound.   Neurological:  Negative for dizziness, seizures, syncope, weakness, light-headedness, numbness and headaches.   Psychiatric/Behavioral:  Negative for agitation, behavioral problems, confusion, hallucinations, self-injury and sleep disturbance. The patient is not nervous/anxious.    Objective:     Vital Signs (Most Recent):  Temp: 98.3 °F (36.8 °C) (12/11/22 0522)  Pulse: 87 (12/11/22 0717)  Resp: 17 (12/11/22 0522)  BP: (!) 106/53 (12/11/22 0522)  SpO2: 95 % (12/11/22 0522)   Vital Signs (24h Range):  Temp:  [96.9 °F (36.1 °C)-98.7 °F (37.1 °C)] 98.3 °F (36.8 °C)  Pulse:  [70-89] 87  Resp:  [15-18] 17  SpO2:  [94 %-98 %] 95 %  BP: (106-133)/(53-68) 106/53     Weight: 47.1 kg (103 lb 13.4 oz)  Body mass index is 20.97 kg/m².    Intake/Output Summary (Last 24 hours) at 12/11/2022 0732  Last data filed at 12/11/2022  0540  Gross per 24 hour   Intake 1532 ml   Output 1850 ml   Net -318 ml        Physical Exam  Constitutional:       General: She is not in acute distress.     Appearance: Normal appearance. She is ill-appearing. She is not toxic-appearing or diaphoretic.      Comments: Elderly female, thin , frail   HENT:      Head: Normocephalic and atraumatic.      Nose: Nose normal.      Mouth/Throat:      Mouth: Mucous membranes are moist.   Eyes:      General: No scleral icterus.     Extraocular Movements: Extraocular movements intact.      Pupils: Pupils are equal, round, and reactive to light.   Cardiovascular:      Rate and Rhythm: Normal rate and regular rhythm.      Pulses: Normal pulses.      Heart sounds: Normal heart sounds. No murmur heard.     Comments: + JVD  Pulmonary:      Effort: Pulmonary effort is normal. No respiratory distress.      Breath sounds: Normal breath sounds. No wheezing, rhonchi or rales.      Comments: Decreased BS at bases  Chest:      Chest wall: No tenderness.   Abdominal:      General: Abdomen is flat. Bowel sounds are normal. There is no distension.      Palpations: Abdomen is soft.      Tenderness: There is no abdominal tenderness. There is no right CVA tenderness, left CVA tenderness, guarding or rebound.   Musculoskeletal:         General: Swelling present. No tenderness, deformity or signs of injury. Normal range of motion.      Cervical back: Normal range of motion and neck supple. No rigidity or tenderness.      Right lower leg: Edema present.   Lymphadenopathy:      Cervical: No cervical adenopathy.   Skin:     General: Skin is warm and dry.      Coloration: Skin is not jaundiced or pale.      Findings: No erythema or rash.   Neurological:      General: No focal deficit present.      Mental Status: She is alert and oriented to person, place, and time. Mental status is at baseline.      Cranial Nerves: No cranial nerve deficit.      Motor: No weakness.   Psychiatric:         Mood and  Affect: Mood normal.         Behavior: Behavior normal.         Thought Content: Thought content normal.      Comments: Slow thought processes       Significant Labs: All pertinent labs within the past 24 hours have been reviewed.  CBC:   Recent Labs   Lab 12/10/22  0334 12/11/22  0332   WBC 8.31 8.43   HGB 12.7 12.5   HCT 37.3 35.5*    253       CMP:   Recent Labs   Lab 12/09/22  1535 12/10/22  0334 12/10/22  1339 12/10/22  1842 12/10/22  2207 12/11/22 0332   *  122* 123*   < > 122* 123* 123*   K 5.1  5.1 4.7  --   --  5.4* 5.2*   CL 91*  91* 91*  --   --  90* 91*   CO2 23  23 24  --   --  24 23   *  204* 164*  --   --  161* 189*   BUN 24*  24* 25*  --   --  25* 25*   CREATININE 1.0  1.0 1.0  --   --  0.9 0.8   CALCIUM 9.5  9.5 9.7  --   --  9.3 9.2   PROT 7.1  --   --   --   --   --    ALBUMIN 2.3*  --   --   --   --   --    BILITOT 1.4*  --   --   --   --   --    ALKPHOS 118  --   --   --   --   --    AST 33  --   --   --   --   --    ALT 22  --   --   --   --   --    ANIONGAP 8  8 8  --   --  9 9    < > = values in this interval not displayed.         Significant Imaging: I have reviewed all pertinent imaging results/findings within the past 24 hours.      Assessment/Plan:      * Shock, unspecified  Transferred to ICU, and back to [facility]  Due to sepsis versus hypovolemia  Cultures negative  Patient was briefly on vasopressors  Not given fluids while in ICU  Infectious work up negative  Continue cefepime empirically for 7 days    12/8 - BP  118/58, stable, on cefepine. Cultures neg so far. Trop 0.6 this am- repeat EKG and trop level  12/9 - trop chronically up but declining. BNP high, diuretics being held. BP is improving.   12/10 - BP stable on losartan, unable to tolerate lasix, na 123  12/11- stable    CKD (chronic kidney disease), stage IV  Patient with acute kidney injury likely due to cardiogenic PUJA is currently worsening. Labs reviewed- Renal function/electrolytes with  Estimated Creatinine Clearance: 36.1 mL/min (based on SCr of 0.8 mg/dL). according to latest data. Monitor urine output and serial BMP and adjust therapy as needed. Avoid nephrotoxins and renally dose meds for GFR listed above.    12/9- Cr 0.7-> 1.1, volume overload, BNP > 1400. Add lasix IV 40 x one. She did diurese. Chat w nephrology - They think  she is dry.  Repeat  BMP.     12/10- cr 1.0,stable  CKD per US 2019.  Unable to tolerate lasix due to hyponatremia.  If pt gets SOB may need volume removal.  Discuss w Nephrology.       Pleural effusion  - Not seen on imaging from earlier this year.  - Most likely related to HFpEF  - Treat HF. diuresis if tolerated. If becomes SOB may need volume removal, by thoracentesis or HD.     Acute on chronic diastolic heart failure  Moderate left atrial enlargement  Right ventricular hypertrophy  Pulmonary hypertension  ECHO 11/19 showed EF 58%, Grade III DD. Moderate left atrial enlargement. Normal right ventricular size with low normal right ventricular systolic function. Moderate to severe tricuspid regurgitation. Bilateral pleural effusions.  Low BP's hold Beta Blocker, ACE/ARB and continue IV Furosemide and monitor clinical status closely. Monitor on telemetry. Patient is on CHF pathway.  Monitor strict Is&Os and daily weights.  Place on fluid restriction of 1.5 L. Continue to stress to patient importance of self efficacy and  on diet for CHF. Last BNP reviewed- and noted below   BNP  Recent Labs   Lab 12/08/22  1555   BNP 1,488*     Patient very hard to diurese. Furosemide increased and metolazone added. Stopped when hyponatremia worsen. Patient transitioned to bumex to 2 mg BID. On hold due to development of sodium 120 and hypotension. Cardiology consulted for further diuretic recommendations.     12/8- Naq 129. Lasix held. Euvoemic, and net neg fluid balance. Once sodium recovers, would advise HCTZ as primary diuretic with a loop diuretic as needed for weight gain.  Today's weight was 44.4 kg, which we can consider the new dry weight.   - - Echo (11/19) showed grade III left ventricular diastolic dysfunction, mild-moderate pulmonary HTN, and ejection fraction of 58%. EF on most recent ECHO 35%   - patient and family educated on daily weights at home.  Add GDMT later on for HF as tolerated. Would hold off on ACEi/ARBs/diuretics/aldactone right now given electrolyte abnormalities.   - repeating BNP > 1400,  and trop levels 0.2-> 0.6 -> 0.4    12/10- not able to tolerate lasix due to hyponatremia  12/11- symptoms controlled, was up in a chair much of the day yesterday    Hyponatremia  Urine studies indicative of SIADH or salt wasting  Nephrology consulted- gave NS with improved of sodium 120-130. Urine sodium and osmolality afterward increased. Uric acid normal.  12/8- holding diuretics  - Continue 0.8 - 1 L fluid restrictions  - strict ins/outs   12/9- lab - Na dropped w one dose of lasix.   12/10- tolvaptan x one dose, trial. Close Na monitoring  12/11 - na 123, will discuss w Nephrology.    Cough in adult  - no evidence of respiratory infection  - dextromethorphan and guaifenesin  - continue diuresing. (not worsened with 1L of fluids)  - add abx to cover respiratory pathogen   - albuterol QID   - slowly improving  12/8- due to effusion  - stable    Diabetes mellitus  Patient's FSGs are controlled on current medication regimen.  Last A1c reviewed-   Lab Results   Component Value Date    HGBA1C 7.2 (H) 06/30/2022     Most recent fingerstick glucose reviewed-   Recent Labs   Lab 12/10/22  0740 12/10/22  1147 12/10/22  1655 12/10/22  1950   POCTGLUCOSE 135* 250* 202* 157*       Antihyperglycemics (From admission, onward)    Start     Stop Route Frequency Ordered    11/19/22 2154  insulin aspart U-100 pen 0-5 Units         -- SubQ Before meals & nightly PRN 11/19/22 2054        · Hold Oral hypoglycemics while patient is in the hospital.  · Does not take insulin, DM diet  only    Recent Labs     12/08/22  2223 12/09/22  0634 12/10/22  0740 12/10/22  1147 12/10/22  1655 12/10/22  1950   POCTGLUCOSE 318* 162* 135* 250* 202* 157*         Hypophosphatemia  12/9- neutraphos x 10 days  12/11- phos 1.6-> 2.4 -> 2.5      Physical deconditioning  - consult PT to see patient  - get patient up to chair with meals    Hyperthyroidism  · Continue home methimazole      Goals of care, counseling/discussion  Advance Care Planning   Does patient have Capacity? yes  Contact: Romario Majano  Goals/Wishes: wants to go home  Code Status- FULL  Pain management- no pain  Prognosis-  Guarded and poor: unable to tolerate lasix due to hyponatremia in setting of CHF.   Family discussions- 12/9- Called and updated Maria Guadalupe   Functional Status - poor mobility, high FALL risk    Post acute care plan:  home  Time spent on Goals of Care:  10 mins on 12/9.          Irritant contact dermatitis due to fecal, urinary or dual incontinence  Moisture barrier cream      Epigastric pain  Constipation  Esophageal dysphagia  Vomited a few times on one day  reglan before meals  protonix  Suppository with BM and some relief of abdominal pain  X-ray abdomen unremarkable  US of pancreas and gallbladder showed adherent gall bladder stone. Otherwise unremarkable for other biliary and pancreatic abnormalities.   Consider outpatient EDG referral for esophageal dysphagia  - resolved    Chest pain  Am of 12/8, trop 0.6 but chronically up.   Repeat trop , EKG, BNP  - see above      VTE Risk Mitigation (From admission, onward)         Ordered     enoxaparin injection 30 mg  Daily         12/04/22 2204     IP VTE HIGH RISK PATIENT  Once         11/18/22 0404     Place sequential compression device  Until discontinued         11/18/22 0404                Discharge Planning   ENDY: 12/13/2022     Code Status: Full Code   Is the patient medically ready for discharge?: No    Reason for patient still in hospital (select all that apply): Patient  trending condition  Discharge Plan A: Skilled Nursing Facility   Discharge Delays: None known at this time      Ximena Ramsey MD  Senior Hospitalist  Department of [facility]  [facility]  [id number], [phone]     [facility] - Cardiology Stepdown

## 2022-12-11 NOTE — ASSESSMENT & PLAN NOTE
Patient with acute kidney injury likely due to cardiogenic PUJA is currently worsening. Labs reviewed- Renal function/electrolytes with Estimated Creatinine Clearance: 36.1 mL/min (based on SCr of 0.8 mg/dL). according to latest data. Monitor urine output and serial BMP and adjust therapy as needed. Avoid nephrotoxins and renally dose meds for GFR listed above.    12/9- Cr 0.7-> 1.1, volume overload, BNP > 1400. Add lasix IV 40 x one. She did diurese. Chat w nephrology - They think  she is dry.  Repeat  BMP.     12/10- cr 1.0,stable  CKD per US 2019.  Unable to tolerate lasix due to hyponatremia.  If pt gets SOB may need volume removal.  Discuss w Nephrology.

## 2022-12-11 NOTE — ASSESSMENT & PLAN NOTE
Transferred to ICU, and back to [facility]  Due to sepsis versus hypovolemia  Cultures negative  Patient was briefly on vasopressors  Not given fluids while in ICU  Infectious work up negative  Continue cefepime empirically for 7 days    12/8 - BP  118/58, stable, on cefepine. Cultures neg so far. Trop 0.6 this am- repeat EKG and trop level  12/9 - trop chronically up but declining. BNP high, diuretics being held. BP is improving.   12/10 - BP stable on losartan, unable to tolerate lasix, na 123  12/11- stable

## 2022-12-11 NOTE — SUBJECTIVE & OBJECTIVE
Interval History: see above    Review of Systems   Constitutional:  Positive for fatigue. Negative for activity change, chills and fever.   HENT:  Negative for congestion, nosebleeds and trouble swallowing.    Respiratory:  Negative for apnea, cough, choking, chest tightness and shortness of breath.    Cardiovascular:  Positive for chest pain and leg swelling.   Gastrointestinal:  Negative for abdominal distention, abdominal pain, constipation, diarrhea, nausea and vomiting.   Genitourinary:  Negative for decreased urine volume, difficulty urinating, dysuria and frequency.   Musculoskeletal:  Negative for arthralgias, back pain, joint swelling, neck pain and neck stiffness.   Skin:  Negative for rash and wound.   Neurological:  Negative for dizziness, seizures, syncope, weakness, light-headedness, numbness and headaches.   Psychiatric/Behavioral:  Negative for agitation, behavioral problems, confusion, hallucinations, self-injury and sleep disturbance. The patient is not nervous/anxious.    Objective:     Vital Signs (Most Recent):  Temp: 98.3 °F (36.8 °C) (12/11/22 0522)  Pulse: 87 (12/11/22 0717)  Resp: 17 (12/11/22 0522)  BP: (!) 106/53 (12/11/22 0522)  SpO2: 95 % (12/11/22 0522)   Vital Signs (24h Range):  Temp:  [96.9 °F (36.1 °C)-98.7 °F (37.1 °C)] 98.3 °F (36.8 °C)  Pulse:  [70-89] 87  Resp:  [15-18] 17  SpO2:  [94 %-98 %] 95 %  BP: (106-133)/(53-68) 106/53     Weight: 47.1 kg (103 lb 13.4 oz)  Body mass index is 20.97 kg/m².    Intake/Output Summary (Last 24 hours) at 12/11/2022 0732  Last data filed at 12/11/2022 0540  Gross per 24 hour   Intake 1532 ml   Output 1850 ml   Net -318 ml        Physical Exam  Constitutional:       General: She is not in acute distress.     Appearance: Normal appearance. She is ill-appearing. She is not toxic-appearing or diaphoretic.      Comments: Elderly female, thin , frail   HENT:      Head: Normocephalic and atraumatic.      Nose: Nose normal.      Mouth/Throat:       Mouth: Mucous membranes are moist.   Eyes:      General: No scleral icterus.     Extraocular Movements: Extraocular movements intact.      Pupils: Pupils are equal, round, and reactive to light.   Cardiovascular:      Rate and Rhythm: Normal rate and regular rhythm.      Pulses: Normal pulses.      Heart sounds: Normal heart sounds. No murmur heard.     Comments: + JVD  Pulmonary:      Effort: Pulmonary effort is normal. No respiratory distress.      Breath sounds: Normal breath sounds. No wheezing, rhonchi or rales.      Comments: Decreased BS at bases  Chest:      Chest wall: No tenderness.   Abdominal:      General: Abdomen is flat. Bowel sounds are normal. There is no distension.      Palpations: Abdomen is soft.      Tenderness: There is no abdominal tenderness. There is no right CVA tenderness, left CVA tenderness, guarding or rebound.   Musculoskeletal:         General: Swelling present. No tenderness, deformity or signs of injury. Normal range of motion.      Cervical back: Normal range of motion and neck supple. No rigidity or tenderness.      Right lower leg: Edema present.   Lymphadenopathy:      Cervical: No cervical adenopathy.   Skin:     General: Skin is warm and dry.      Coloration: Skin is not jaundiced or pale.      Findings: No erythema or rash.   Neurological:      General: No focal deficit present.      Mental Status: She is alert and oriented to person, place, and time. Mental status is at baseline.      Cranial Nerves: No cranial nerve deficit.      Motor: No weakness.   Psychiatric:         Mood and Affect: Mood normal.         Behavior: Behavior normal.         Thought Content: Thought content normal.      Comments: Slow thought processes       Significant Labs: All pertinent labs within the past 24 hours have been reviewed.  CBC:   Recent Labs   Lab 12/10/22  0334 12/11/22  0332   WBC 8.31 8.43   HGB 12.7 12.5   HCT 37.3 35.5*    253       CMP:   Recent Labs   Lab 12/09/22  1535  12/10/22  0334 12/10/22  1339 12/10/22  1842 12/10/22  2207 12/11/22 0332   *  122* 123*   < > 122* 123* 123*   K 5.1  5.1 4.7  --   --  5.4* 5.2*   CL 91*  91* 91*  --   --  90* 91*   CO2 23  23 24  --   --  24 23   *  204* 164*  --   --  161* 189*   BUN 24*  24* 25*  --   --  25* 25*   CREATININE 1.0  1.0 1.0  --   --  0.9 0.8   CALCIUM 9.5  9.5 9.7  --   --  9.3 9.2   PROT 7.1  --   --   --   --   --    ALBUMIN 2.3*  --   --   --   --   --    BILITOT 1.4*  --   --   --   --   --    ALKPHOS 118  --   --   --   --   --    AST 33  --   --   --   --   --    ALT 22  --   --   --   --   --    ANIONGAP 8  8 8  --   --  9 9    < > = values in this interval not displayed.         Significant Imaging: I have reviewed all pertinent imaging results/findings within the past 24 hours.

## 2022-12-11 NOTE — ASSESSMENT & PLAN NOTE
Patient's FSGs are controlled on current medication regimen.  Last A1c reviewed-   Lab Results   Component Value Date    HGBA1C 7.2 (H) 06/30/2022     Most recent fingerstick glucose reviewed-   Recent Labs   Lab 12/10/22  0740 12/10/22  1147 12/10/22  1655 12/10/22  1950   POCTGLUCOSE 135* 250* 202* 157*       Antihyperglycemics (From admission, onward)    Start     Stop Route Frequency Ordered    11/19/22 2154  insulin aspart U-100 pen 0-5 Units         -- SubQ Before meals & nightly PRN 11/19/22 2054        · Hold Oral hypoglycemics while patient is in the hospital.  · Does not take insulin, DM diet only    Recent Labs     12/08/22 2223 12/09/22  0634 12/10/22  0740 12/10/22  1147 12/10/22  1655 12/10/22  1950   POCTGLUCOSE 318* 162* 135* 250* 202* 157*

## 2022-12-11 NOTE — ASSESSMENT & PLAN NOTE
Moderate left atrial enlargement  Right ventricular hypertrophy  Pulmonary hypertension  ECHO 11/19 showed EF 58%, Grade III DD. Moderate left atrial enlargement. Normal right ventricular size with low normal right ventricular systolic function. Moderate to severe tricuspid regurgitation. Bilateral pleural effusions.  Low BP's hold Beta Blocker, ACE/ARB and continue IV Furosemide and monitor clinical status closely. Monitor on telemetry. Patient is on CHF pathway.  Monitor strict Is&Os and daily weights.  Place on fluid restriction of 1.5 L. Continue to stress to patient importance of self efficacy and  on diet for CHF. Last BNP reviewed- and noted below   BNP  Recent Labs   Lab 12/08/22  1555   BNP 1,488*     Patient very hard to diurese. Furosemide increased and metolazone added. Stopped when hyponatremia worsen. Patient transitioned to bumex to 2 mg BID. On hold due to development of sodium 120 and hypotension. Cardiology consulted for further diuretic recommendations.     12/8- Naq 129. Lasix held. Euvoemic, and net neg fluid balance. Once sodium recovers, would advise HCTZ as primary diuretic with a loop diuretic as needed for weight gain. Today's weight was 44.4 kg, which we can consider the new dry weight.   - - Echo (11/19) showed grade III left ventricular diastolic dysfunction, mild-moderate pulmonary HTN, and ejection fraction of 58%. EF on most recent ECHO 35%   - patient and family educated on daily weights at home.  Add GDMT later on for HF as tolerated. Would hold off on ACEi/ARBs/diuretics/aldactone right now given electrolyte abnormalities.   - repeating BNP > 1400,  and trop levels 0.2-> 0.6 -> 0.4    12/10- not able to tolerate lasix due to hyponatremia  12/11- symptoms controlled, was up in a chair much of the day yesterday

## 2022-12-11 NOTE — ASSESSMENT & PLAN NOTE
- Not seen on imaging from earlier this year.  - Most likely related to HFpEF  - Treat HF. diuresis if tolerated. If becomes SOB may need volume removal, by thoracentesis or HD.

## 2022-12-12 NOTE — ASSESSMENT & PLAN NOTE
Patient with acute kidney injury likely due to cardiogenic PUJA is currently worsening. Labs reviewed- Renal function/electrolytes with Estimated Creatinine Clearance: 28.9 mL/min (based on SCr of 1 mg/dL). according to latest data. Monitor urine output and serial BMP and adjust therapy as needed. Avoid nephrotoxins and renally dose meds for GFR listed above.    12/9- Cr 0.7-> 1.1, volume overload, BNP > 1400. Add lasix IV 40 x one. She did diurese. Chat w nephrology - They think  she is dry.  Repeat  BMP.     12/10- cr 1.0,stable  CKD per US 2019.  Unable to tolerate lasix due to hyponatremia.  If pt gets SOB may need volume removal.  Discuss w Nephrology.   12/12- stable

## 2022-12-12 NOTE — PROGRESS NOTES
[facility] - Cardiology Stepdown  Adult Nutrition  Progress Note    SUMMARY       Recommendations  Continue low sodium, high calorie, high protein diet-fluid per MD  2.  Continue Boost Plus TID for optimization of protein and calorie intake   3. RD following    Goals: Meet % of EEN/EPN by RD f/u date  Nutrition Goal Status: goal not met  Communication of RD Recs: other (POC)    Assessment and Plan    Nutrition Problem:  Severe Protein-Calorie Malnutrition  Malnutrition in the context of Chronic Illness/Injury     Related to (etiology):  Decreased ability to consume sufficient energy      Signs and Symptoms (as evidenced by):  Energy Intake: <75% of estimated energy requirement for 1 month  Body Fat Depletion: severe depletion of orbitals and triceps   Muscle Mass Depletion: severe depletion of temples, clavicle region, and lower extremities   Weight Loss: >10% in 6 months      Interventions(treatment strategy):  Collaboration of nutrition care w/ other providers     Nutrition Diagnosis Status:  Continues       Reason for Assessment    Reason For Assessment: RD follow-up  Diagnosis: cardiac disease  Relevant Medical History: Vitamin D deficiency  Interdisciplinary Rounds: did not attend  General Information Comments: Spoke w/ pt at bedside, pt w/ AMS, however was able to provide some nutrition hx. Pt reports a good appetite currently. Per RN documentation, pt consuming % meal consumption. Pt reports consuming ~1 can of boost per day, or taking small sips of ONS provided at times. Pt denies any issues w/ chewing/swallowing at this time. No n/v/d/c. Pt continues to meet criteria for severe protein calorie malnutrition in the context of chronic illness. LBM noted- 12/11/22  Nutrition Discharge Planning: low sodium diet w/ high calorie/protein diet    Nutrition Risk Screen    Nutrition Risk Screen: no indicators present    Nutrition/Diet History    Spiritual, Cultural Beliefs, Orthodox Practices, Values that  "Affect Care: no    Anthropometrics    Temp: 98.1 °F (36.7 °C)  Height Method: Stated  Height: 4' 11" (149.9 cm)  Height (inches): 59 in  Weight Method: Bed Scale  Weight: 47.1 kg (103 lb 13.4 oz)  Weight (lb): 103.84 lb  Ideal Body Weight (IBW), Female: 95 lb  % Ideal Body Weight, Female (lb): 105.26 %  BMI (Calculated): 21       Lab/Procedures/Meds    Pertinent Labs Reviewed: reviewed  Pertinent Labs Comments: Sodium 130, BUN 29, GFR 54.2, Glucose 186, Phoshorus 2.6  Pertinent Medications Reviewed: reviewed  Pertinent Medications Comments: -      Estimated/Assessed Needs    Weight Used For Calorie Calculations: 43.4 kg (95 lb 10.9 oz)  Energy Calorie Requirements (kcal): 1756  Energy Need Method: Kcal/kg (40 kcal/kg)  Protein Requirements: 86 g (2.0 g/kg)  Weight Used For Protein Calculations: 43.4 kg (95 lb 10.9 oz)   RDA Method (mL): 1756     Nutrition Prescription Ordered    Current Diet Order: low sodium, high protein/high calorie diet  Oral Nutrition Supplement: Boost    Evaluation of Received Nutrient/Fluid Intake    I/O: -6.1 L since 11/28/22  Energy Calories Required: not meeting needs  Protein Required: not meeting needs  Fluid Required: not meeting needs  Total Fluid Intake (mL/kg): 1 ml or fluid per MD  Tolerance: tolerating  % Intake of Estimated Energy Needs: 75 - 100 %  % Meal Intake: 75 - 100 %    Nutrition Risk    Level of Risk/Frequency of Follow-up: low ((1 x/week))     Monitor and Evaluation    Food and Nutrient Intake: energy intake, food and beverage intake  Food and Nutrient Adminstration: diet order  Knowledge/Beliefs/Attitudes: food and nutrition knowledge/skill, beliefs and attitudes  Physical Activity and Function: nutrition-related ADLs and IADLs, factors affecting access to physical activity  Anthropometric Measurements: height/length, weight, weight change, body mass index, growth pattern indices/percentile ranks  Biochemical Data, Medical Tests and Procedures: electrolyte and renal " panel, gastrointestinal profile, glucose/endocrine profile, inflammatory profile, lipid profile  Nutrition-Focused Physical Findings: overall appearance, extremities, muscles and bones, head and eyes, skin     Nutrition Follow-Up    RD Follow-up?: Yes  By Adelita Matos, Registration Eligible, Provitional LDN

## 2022-12-12 NOTE — ASSESSMENT & PLAN NOTE
Patient's FSGs are controlled on current medication regimen.  Last A1c reviewed-   Lab Results   Component Value Date    HGBA1C 7.2 (H) 06/30/2022     Most recent fingerstick glucose reviewed-   Recent Labs   Lab 12/11/22  0822 12/11/22  1303 12/11/22  1659 12/11/22 2029   POCTGLUCOSE 141* 125* 159* 228*       Antihyperglycemics (From admission, onward)    Start     Stop Route Frequency Ordered    11/19/22 2154  insulin aspart U-100 pen 0-5 Units         -- SubQ Before meals & nightly PRN 11/19/22 2054        · Hold Oral hypoglycemics while patient is in the hospital.  · Does not take insulin, DM diet only    Recent Labs     12/10/22  1655 12/10/22  1950 12/11/22  0822 12/11/22  1303 12/11/22  1659 12/11/22 2029   POCTGLUCOSE 202* 157* 141* 125* 159* 228*

## 2022-12-12 NOTE — SUBJECTIVE & OBJECTIVE
Interval History: Na continue to improve with Tolvaptan. Na 130. Other electrolytes stable. Adequate UOP.   Hgb 11.0    Review of patient's allergies indicates:   Allergen Reactions    Latex, natural rubber Itching    Latex Hives     Current Facility-Administered Medications   Medication Frequency    acetaminophen tablet 650 mg Q6H PRN    amiodarone tablet 200 mg Daily    aspirin chewable tablet 81 mg Daily    atorvastatin tablet 40 mg Daily    benzonatate capsule 100 mg TID PRN    bisacodyL suppository 10 mg Daily PRN    dextromethorphan-guaiFENesin  mg/5 ml liquid 5 mL Q4H PRN    enoxaparin injection 30 mg Daily    ergocalciferol capsule 50,000 Units Q7 Days    glucagon (human recombinant) injection 1 mg PRN    glucose chewable tablet 16 g PRN    glucose chewable tablet 24 g PRN    hydrOXYzine pamoate capsule 25 mg Q6H PRN    insulin aspart U-100 pen 0-5 Units QID (AC + HS) PRN    LIDOcaine 5 % patch 1 patch Daily PRN    losartan split tablet 12.5 mg Daily    melatonin tablet 6 mg Nightly PRN    methIMAzole split tablet 2.5 mg Daily    naloxone 0.4 mg/mL injection 0.02 mg PRN    nitroGLYCERIN SL tablet 0.4 mg Q5 Min PRN    ondansetron injection 8 mg Q6H PRN    pantoprazole EC tablet 40 mg Daily    polyethylene glycol packet 17 g BID PRN    sodium chloride 0.9% flush 10 mL Q12H PRN       Objective:     Vital Signs (Most Recent):  Temp: 97.8 °F (36.6 °C) (12/12/22 1143)  Pulse: 86 (12/12/22 1143)  Resp: 18 (12/12/22 1143)  BP: (!) 97/54 (12/12/22 1143)  SpO2: 98 % (12/12/22 1143)  (RETIRED) O2 Device (Oxygen Therapy): nasal cannula (12/08/22 1628)   Vital Signs (24h Range):  Temp:  [97 °F (36.1 °C)-98.4 °F (36.9 °C)] 97.8 °F (36.6 °C)  Pulse:  [74-91] 86  Resp:  [16-20] 18  SpO2:  [92 %-98 %] 98 %  BP: ()/(51-61) 97/54     Weight: 47.1 kg (103 lb 13.4 oz) (12/10/22 0400)  Body mass index is 20.97 kg/m².  Body surface area is 1.4 meters squared.    I/O last 3 completed shifts:  In: 1040 [P.O.:1040]  Out:  2100 [Urine:2100]    Physical Exam  Vitals and nursing note reviewed.   Constitutional:       General: She is awake.      Appearance: She is well-developed and underweight. She is ill-appearing.   HENT:      Head: Normocephalic.      Nose: Nose normal.      Mouth/Throat:      Mouth: Mucous membranes are moist.   Eyes:      General: Lids are normal. No scleral icterus.     Conjunctiva/sclera: Conjunctivae normal.      Pupils: Pupils are equal, round, and reactive to light.   Cardiovascular:      Rate and Rhythm: Normal rate and regular rhythm.      Heart sounds: No murmur heard.  Pulmonary:      Effort: Pulmonary effort is normal.      Breath sounds: Rhonchi present. No wheezing.   Abdominal:      General: Bowel sounds are normal.      Palpations: Abdomen is soft.      Tenderness: There is no abdominal tenderness.   Musculoskeletal:         General: No deformity.      Cervical back: Normal range of motion and neck supple.      Right lower leg: No edema.      Left lower leg: No edema.   Skin:     General: Skin is warm and dry.   Neurological:      General: No focal deficit present.      Mental Status: She is alert.   Psychiatric:         Behavior: Behavior is cooperative.       Significant Labs:  CBC:   Recent Labs   Lab 12/12/22  0218   WBC 6.78   RBC 3.89*   HGB 11.0*   HCT 32.6*      MCV 84   MCH 28.3   MCHC 33.7     CMP:   Recent Labs   Lab 12/09/22  1535 12/10/22  0334 12/12/22  0218   *  204*   < > 186*   CALCIUM 9.5  9.5   < > 9.5   ALBUMIN 2.3*  --   --    PROT 7.1  --   --    *  122*   < > 130*   K 5.1  5.1   < > 4.8   CO2 23  23   < > 26   CL 91*  91*   < > 96   BUN 24*  24*   < > 29*   CREATININE 1.0  1.0   < > 1.0   ALKPHOS 118  --   --    ALT 22  --   --    AST 33  --   --    BILITOT 1.4*  --   --     < > = values in this interval not displayed.     All labs within the past 24 hours have been reviewed.

## 2022-12-12 NOTE — ASSESSMENT & PLAN NOTE
Moderate left atrial enlargement  Right ventricular hypertrophy  Pulmonary hypertension  ECHO 11/19 showed EF 58%, Grade III DD. Moderate left atrial enlargement. Normal right ventricular size with low normal right ventricular systolic function. Moderate to severe tricuspid regurgitation. Bilateral pleural effusions.  Low BP's hold Beta Blocker, ACE/ARB and continue IV Furosemide and monitor clinical status closely. Monitor on telemetry. Patient is on CHF pathway.  Monitor strict Is&Os and daily weights.  Place on fluid restriction of 1.5 L. Continue to stress to patient importance of self efficacy and  on diet for CHF. Last BNP reviewed- and noted below   BNP  Recent Labs   Lab 12/08/22  1555   BNP 1,488*     Patient very hard to diurese. Furosemide increased and metolazone added. Stopped when hyponatremia worsen. Patient transitioned to bumex to 2 mg BID. On hold due to development of sodium 120 and hypotension. Cardiology consulted for further diuretic recommendations.     12/8- Naq 129. Lasix held. Euvoemic, and net neg fluid balance. Once sodium recovers, would advise HCTZ as primary diuretic with a loop diuretic as needed for weight gain. Today's weight was 44.4 kg, which we can consider the new dry weight.   - - Echo (11/19) showed grade III left ventricular diastolic dysfunction, mild-moderate pulmonary HTN, and ejection fraction of 58%. EF on most recent ECHO 35%   - patient and family educated on daily weights at home.  Add GDMT later on for HF as tolerated. Would hold off on ACEi/ARBs/diuretics/aldactone right now given electrolyte abnormalities.   - repeating BNP > 1400,  and trop levels 0.2-> 0.6 -> 0.4    12/10- not able to tolerate lasix due to hyponatremia  12/11- symptoms controlled, was up in a chair much of the day yesterday  12/12- this is as good as we can do. CHF + CKD + pulm HTN. Will have chronic edema, pleural effusions

## 2022-12-12 NOTE — PLAN OF CARE
CM spoke with [facility].   [facility] will have beds Wed, Th, and Fri. Pita at [facility] checking to see if they can take her one of those days. CM to follow for acceptance.    Already have two denials for not being able to meet patient's needs.      Queenie Alcocer RN     [phone]

## 2022-12-12 NOTE — PT/OT/SLP PROGRESS
"Occupational Therapy   Treatment    Name: Peri Johansen  MRN: [id number]  Admitting Diagnosis:  Shock, unspecified       Recommendations:     Discharge Recommendations: nursing facility, skilled  Discharge Equipment Recommendations:  other (see comments) (TBD)  Barriers to discharge:  None    Assessment:     Peri Johansen is a 88 y.o. female with a medical diagnosis of Shock, unspecified.  Performance deficits affecting function are weakness, impaired endurance, impaired self care skills, impaired cardiopulmonary response to activity, impaired functional mobility, pain.     Pt tolerated Tx session well. Pt pleasant and participatory throughout session. PT requiring assistance with funct'l mobility and self care tasks, 2* generalized deconditioning and fear of falls. Pt demo'd good activity tolerance. Pt will likely cont to benefit from skilled OT services to maximize funct'l indep, increase safety with funct'l mobility and decrease burden of care on caregiver(s).    Rehab Prognosis:  Good; patient would benefit from acute skilled OT services to address these deficits and reach maximum level of function.       Plan:     Patient to be seen 3 x/week to address the above listed problems via self-care/home management, therapeutic exercises, therapeutic activities  Plan of Care Expires: 01/05/23  Plan of Care Reviewed with: patient, daughter, son    Subjective   "Yes, please baby." "That feels better now." (Re: participation in ADLs and t/f OOB>UIC)  Pain/Comfort:  Pain Rating 1: other (see comments) (no numerical value provided)  Location - Orientation 1: generalized  Location 1: other (see comments) (sacrum)  Pain Addressed 1: Reposition, Nurse notified, Distraction  Pain Rating Post-Intervention 1: 0/10    Objective:     Communicated with: Nurse Portillo prior to session.  Patient found HOB elevated with telemetry, [employer], and daughter present upon OT entry to room.    A client care conference was completed by the " OTR and the PETERS prior to treatment by the PETERS to discuss the patient's POC and current status.      General Precautions: Standard, fall    Orthopedic Precautions:N/A  Braces: N/A  Respiratory Status: Nasal cannula, flow 1.5 L/min     Occupational Performance:     Bed Mobility:    Patient completed Supine to Sit with moderate assistance for trunk and verbal cues to sequence BLE > R-side EOB    Functional Mobility/Transfers:  Patient completed Sit <> Stand Transfer with minimum assistance  with  hand-held assist while RN present to change out Mepilex bandage  Pt performed ~6 small/shuffling side steps bi-laterally along EOB with ModA/Fozia HHA  Patient completed Bed <> Chair Transfer using Step Transfer technique with moderate assistance with hand-held assist    Activities of Daily Living:  Grooming: SBA with SetupA for simple facial g/h and Fozia for underarm wash and application of DO  Upper Body Dressing: Fozia, 2* O2 line and decreased ROM      AMPAC 6 Click ADL: 18    Treatment & Education:  -Pt and family re ed role of OT per POC  -Pt and family re ed on safety    Patient left up in chair with all lines intact, call button in reach, family present, BLE elevated,and RN present    GOALS:   Multidisciplinary Problems       Occupational Therapy Goals          Problem: Occupational Therapy    Goal Priority Disciplines Outcome Interventions   Occupational Therapy Goal     OT, PT/OT Ongoing, Progressing    Description: Goals to be met by: 12/13/22    Patient will increase functional independence with ADLs by performing:    LE Dressing with Stand-by Assistance.  Grooming while standing with Contact Guard Assistance.  Toileting from toilet with Contact Guard Assistance for hygiene and clothing management.   Supine to sit with Contact Guard Assistance.  Step transfer with Contact Guard Assistance  Toilet transfer to toilet with Contact Guard Assistance.                         Time Tracking:     OT Date of Treatment:  12/12/22  OT Start Time: 1058  OT Stop Time: 1141  OT Total Time (min): 43 min    Billable Minutes:Self Care/Home Management 19  Therapeutic Activity 24    OT/AMANDA: AMANDA     AMANDA Visit Number: 1    12/12/2022

## 2022-12-12 NOTE — PT/OT/SLP PROGRESS
Physical Therapy  Treatment    Patient Name:  Peri Johansen   MRN:  [id number]    Recommendations:     Discharge Recommendations:  nursing facility, skilled   Discharge Equipment Recommendations:  (TBD)   Barriers to discharge: decreased functional mobility and fall risk    Assessment:     Peri Johansen is a 88 y.o. female admitted with a medical diagnosis of Shock, unspecified.  Pt demonstrates the below listed impairments with decreased tolerance to functional mobility, weakness, and gait instability being the most limiting.  Pt demonstrates fair tolerance to out of bed mobility and is willing to ambulate in the room.  Pt has decreased gait speed this day, slow to command follow.  Pt is not safe for home discharge at this time due to patient's status as: a fall risk and patient has increased pain and requires skilled PT.      Impairments and functional limitations:  weakness, impaired endurance, impaired self care skills, impaired functional mobility, gait instability, impaired balance, impaired cognition, decreased coordination, decreased upper extremity function, decreased lower extremity function, pain, impaired cardiopulmonary response to activity.  These deficits affect their roles and responsibilities in which they were able to complete prior to admit.  Rehab Prognosis:   Good ; patient would benefit from acute skilled PT services 3 x/week to address these deficits, improve quality of life, focus on recovery of impairments, provide patient/caregiver education, reduce fall risk, and reach maximum level of function.  Pt is highly  motivated to participated in skilled PT.    Recent Surgery:   * No surgery found *      Plan:     During this hospitalization, patient to be seen 3 x/week to address the identified rehab impairments via gait training, therapeutic activities, therapeutic exercises, neuromuscular re-education and progress toward the following goals:    Plan of Care Expires:  01/05/22    Subjective      Chief Complaint: decreased tolerance to functional mobility  Patient/Family Comments/Goals: Progress to SNF  Pain/Comfort:  Pain Rating 1: 0/10  Location 1: gluteal  Pain Addressed 1: Reposition, Distraction  Pain Rating Post-Intervention 1:  (not rated)    Objective:     Communicated with RN prior to session.  Patient found up in chair with PureWick, telemetry, oxygen upon PT entry to room.     General Precautions: Standard, fall   Orthopedic Precautions:N/A   Braces: N/A  Oxygen Device:      Functional Mobility:  Bed Mobility:  Seated in chair at start of session and returned to chair  Head of bed position: n/a    Transfers:  Sit to Stand: Mod A with RW  Pt almost Minimum assist  Requires maximal cues for hand placement, technique, and foot placement for transfer     Gait: Patient ambulated 30' with RW and Min A. Patient demonstrates occasional unsteady gait, decreased step length, narrow base of support, decreased weight shift, decreased arm swing, flexed posture, and decreased radha. All lines remained intact throughout ambulation trial.  Cues for posture, assist with walker management  Large turn radius, hits objects with her walker   Pt on RA for ambulation, SpO2 90%, 92% on 1L    Balance:   Position Score Time   Static Sitting GOOD: Takes MODERATE challenges n/a   Dynamic Sitting GOOD-: Maintains balance through MODERATE excursions of active trunk motion, but inconstantly  n/a   Static Standing FAIR+: Takes MINIMAL challenges n/a   Dynamic Standing FAIR: Maintains without assist, but is unable to take any challenges n/a       AM-PAC 6 CLICK MOBILITY  Turning over in bed (including adjusting bedclothes, sheets and blankets)?: 3  Sitting down on and standing up from a chair with arms (e.g., wheelchair, bedside commode, etc.): 2  Moving from lying on back to sitting on the side of the bed?: 2  Moving to and from a bed to a chair (including a wheelchair)?: 3  Need to walk in hospital room?: 3  Climbing 3-5  steps with a railing?: 1  Basic Mobility Total Score: 14     Therapeutic Activities:  Patient educated on role of acute care PT and PT POC, safety while in hospital including calling nurse for mobility, call light usage, benefits of out of bed mobility, walker management, home exercise program , breathing technique, fall risk, assistive device use, transfers, gait technique, positioning, posture, risks of prolonged bed rest, possible discharge disposition , and benefits of continued PT by explanation and demonstration.    Patient demonstrates good understanding of education provided this day.   Whiteboard updated    Therapeutic Exercises:  Patient participated in: Ankle Pumps, LAQs, and Hip marches with 1 sets, 15 reps with cues for technique.    Patient left up in chair with all lines intact, call button in reach, and RN notified, family present.     GOALS:   Multidisciplinary Problems       Physical Therapy Goals          Problem: Physical Therapy    Goal Priority Disciplines Outcome Goal Variances Interventions   Physical Therapy Goal     PT, PT/OT Ongoing, Progressing     Description: Goals to met by 12/20/2022    1. Supine to sit with Modified [place]  2. Sit to supine with Modified [place]  3. Rolling to Left and Right with Modified [place].  4. Sit to stand transfer with Supervision  5. Bed to chair transfer with Supervision using Rolling Walker  6. Gait  x 75 feet with Stand-by Assistance using Rolling Walker                          Time Tracking:     PT Received On: 12/12/22  PT Start Time: 1402     PT Stop Time: 1432  PT Total Time (min): 30 min     Billable Minutes: Gait Training 20 and Therapeutic Exercise 10    Treatment Type: Treatment  PT/PTA: PT     PTA Visit Number: 0     12/12/2022

## 2022-12-12 NOTE — ASSESSMENT & PLAN NOTE
Ms. Johansen is an 88 year old female with pmhx of heart failure (EF 58%) who presented with complaint of generalized fatigue. Patient is a poor historian. During her admission, patient was found to have elevated BNP and CXR significant for pulmonary edema bilaterally. Patient was started on lasix and was noted to be responsive. She was transitioned to bumex. BNP trending up to 1026. Patient developed hyponatremia shortly after being admitted. Sodium 136 at admission and dropped down to 120. Nephrology consulted for hyponatremia.     Likely secondary to heart failure. During volume assessment, patient had elevated JVP, rhonchi bilaterally, and RLE swelling.   Serum osm: 266. Consistent with hypotonic hyponatremia   Serum sodium increased significantly from 120 to 130 with NS, suggesting hypovolemic hyponatremia. Na 120 --> 130 -->127 w/ IVFs.    Recommendations:  - Tolvaptan 15 mg given 12/10 and 12/11  - Will plan for another dose of Tolvaptan 12 mg PO   - Continue 1 liter fluid restriction  - Recommend repeat BMP this evening for close monitoring. -Echo (11/19) showed grade III left ventricular diastolic dysfunction, mild-moderate pulmonary HTN, and ejection fraction of 58%. EF on most recent ECHO 35%   - strict ins/outs

## 2022-12-12 NOTE — ASSESSMENT & PLAN NOTE
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 Urine sodium and osmolality afterward increased. Uric acid normal.  12/8- holding diuretics  - Continue 0.8 - 1 L fluid restrictions  - strict ins/outs   12/9- lab - Na dropped w one dose of lasix.   12/10- tolvaptan x one dose, trial. Close Na monitoring  12/11 - na 123, will discuss w Nephrology.  12/12- Na, 130; possibly plan on tolvaptan 15 mg every other day after discharge.  Repeating tolvactan 15 mg today.

## 2022-12-12 NOTE — PROGRESS NOTES
[facility] - Cardiology [facility] Medicine  Progress Note    Patient Name: Peri Johansen  MRN: [id number]  Patient Class: IP- Inpatient   Admission Date: 11/17/2022  Length of Stay: 22 days  Attending Physician: Ximena Ramsey MD  Primary Care Provider: Annika Garland MD        Subjective:     Principal Problem:Shock, unspecified        HPI:    Peri Johansen is a 88 y.o. female who has a past medical history of Aortic atherosclerosis, Arthritis, B-cell lymphoma, Diabetes mellitus, type II, Diverticulosis, GERD, Goiter, Hyperlipidemia, Hypertension, Joint pain, Leg pain, Osteoporosis, Renal cyst, Rickets, vitamin D deficiency, Thyroid nodule, and Vitamin D deficiency disease, presented to the ED with CC of Generalized fatigue.  Is a poor historian and unclear why she is at the hospital.  She states that someone told her to stop taking Lasix, but she does not remember who that was and how long ago that was, can not quantify it in days or weeks even.  She stated she did not have any chest pain at all, however appears that there was a comment of chest pain 10 on 10 in ED. troponin was mildly elevated at 0.171, however quickly reduced on next lab at 0.162.  BNP elevated at 925.  Denies any dysuria, but also a note in ED she has mild dysuria.  Denies hematuria but has +2 blood in urine, UA looks noninfectious.  CTA of the chest showed no interval detrimental change or new intra-abdominopelvic abnormality compared to most recent CT 11/11/2022, and no detrimental change in CTA of the aorta compared to most recent prior CTA aorta 09/14/2022.  Noting that she does have moderate to severe aortic atherosclerosis involving the branch vessels and moderate bilateral pleural effusions, stable when compared to prior 11/11/2022.  COVID, flu, RSV negative.  Denies abdominal pain or diarrhea. Denies nausea or vomiting.       Overview/Hospital Course:  Started on furosemide IV 40 mg BID. UOP not adequate. Escalated to 80 mg  BID, but missed a few doses due to loss of IV access. Once she was restarted on consistent IV diuretic administration, stillv elbert hard to diurese. Volume exam slowly improving. Patient complaining of epiagastric abdominal discomfort with associated SOB. Started her on aggressive bowel regimen and reglan. Some relief with BM. X-ray of abdomen unremarkable for obstruction or ileus or constipation. US of abdomen showed adherent gallstone. Abdomen pain completely resolved during period of NPO. SOB improving but continues to have nonproductive cough.  On 11/27, patient noted to be satting in mid to upper 80's on RA which is new for her.  She was placed on supplemental O2.  Repeat CXR ordered which demonstrated worsening pulm edema.  Her UO was also starting to decrease as well.  Lasix adjusted. And metolazone added.  Azithromycin added as well.  The patient was finally weaned off of supplemental O2 and her cough has improved significantly. Develop hypoantremia and hypotension as approached euvolemia. Nephrology consulted for hyponatmremia. Went to ICU. Received pressure support. Differing opinions on wether she is hypervolemic and hypovolemic. Cardiology and nephrology recommending holding diuresis. However she remains on strict fluid restriction. If starting on oral diuresis. Need to watch for response as likely oral furosemide not working.  Interval History:  12/8- /58  Pulse 86   SpO2 98% Did not sleep well,, agitated and complaining of pain she described as someone sitting on her chest last night.   + 3 unmeasured.  Holding diuresis now.  Trop 0.6 ( chronically up), Na 129,     12/9- BNP  >1400, trop 0.6-> 0.4. /64   Pulse 74, SpO2 93% .  On cefepime empirically for pneumonia- stop day 12/12 -  Cr 0.7-> 1.1, Na 128 w volume overload, replace mag and phos. BNP > 1400. + jvd. Will resume low dose losartan. Lasix 40 IV given with diuresis.  Repeating a Na level after diuresis to see if Na worse or  improved.   12/10- na dropped with one dose of lasix, now Na 123. /66   Pulse 82 . Cr 1.0 on losartan. Discussed w , nephrology, will give  Tolvaptan x one dose.     12/11- Na 123. K 5.2. cr 0.8, Phos 2.5 (improving), Chatted w nephrology. On Fluid restriction 750, considering a second dose of tolvactan. /53 VSS, She is up in chair, talking and interactive. No SOB.     12/12 - na 130, improved after second dose of tolvactam, off diruetics, She has been getting up to chair, eating well. Phos 2.6 (rising, still low) on neutrophos. /55  Pulse 86   Temp 98.1 VSS.  One stool, good UO.  She wants to go home. possibly plan on tolvaptan 15 mg every other day after discharge. Appreciate Nephrology input and f/u.  Receiving tolvactan inpt today. Has small DU sacrum, being attended to by nursing, pt up in chair today. Discussed her care and condition, explained all her medical problems w Son and DTR who are at bedside.         Interval History: see above    Review of Systems   Constitutional:  Positive for fatigue. Negative for activity change, chills and fever.   HENT:  Negative for congestion, nosebleeds and trouble swallowing.    Respiratory:  Negative for apnea, cough, choking, chest tightness and shortness of breath.    Cardiovascular:  Positive for leg swelling (chronic).   Gastrointestinal:  Negative for abdominal distention, abdominal pain, constipation, diarrhea, nausea and vomiting.   Genitourinary:  Negative for decreased urine volume, difficulty urinating, dysuria and frequency.   Musculoskeletal:  Negative for arthralgias, back pain, joint swelling, neck pain and neck stiffness.   Skin:  Negative for rash and wound.   Neurological:  Negative for dizziness, seizures, syncope, weakness, light-headedness, numbness and headaches.   Psychiatric/Behavioral:  Negative for agitation, behavioral problems, confusion, hallucinations, self-injury and sleep disturbance. The patient is not  nervous/anxious.    Objective:     Vital Signs (Most Recent):  Temp: 98.1 °F (36.7 °C) (12/12/22 0731)  Pulse: 86 (12/12/22 0738)  Resp: 16 (12/12/22 0731)  BP: (!) 109/55 (12/12/22 0731)  SpO2: 98 % (12/12/22 0731)   Vital Signs (24h Range):  Temp:  [97 °F (36.1 °C)-98.4 °F (36.9 °C)] 98.1 °F (36.7 °C)  Pulse:  [65-91] 86  Resp:  [16-20] 16  SpO2:  [92 %-98 %] 98 %  BP: ()/(51-61) 109/55     Weight: 47.1 kg (103 lb 13.4 oz)  Body mass index is 20.97 kg/m².    Intake/Output Summary (Last 24 hours) at 12/12/2022 0753  Last data filed at 12/11/2022 2047  Gross per 24 hour   Intake 690 ml   Output 900 ml   Net -210 ml        Physical Exam  Constitutional:       General: She is not in acute distress.     Appearance: Normal appearance. She is ill-appearing. She is not toxic-appearing or diaphoretic.      Comments: Elderly female, thin , frail   HENT:      Head: Normocephalic and atraumatic.      Nose: Nose normal.      Mouth/Throat:      Mouth: Mucous membranes are moist.   Eyes:      General: No scleral icterus.     Extraocular Movements: Extraocular movements intact.      Pupils: Pupils are equal, round, and reactive to light.   Cardiovascular:      Rate and Rhythm: Normal rate and regular rhythm.      Pulses: Normal pulses.      Heart sounds: Normal heart sounds. No murmur heard.     Comments: + JVD  Pulmonary:      Effort: Pulmonary effort is normal. No respiratory distress.      Breath sounds: Normal breath sounds. No wheezing, rhonchi or rales.      Comments: Decreased BS at bases  Chest:      Chest wall: No tenderness.   Abdominal:      General: Abdomen is flat. Bowel sounds are normal. There is no distension.      Palpations: Abdomen is soft.      Tenderness: There is no abdominal tenderness. There is no right CVA tenderness, left CVA tenderness, guarding or rebound.   Musculoskeletal:         General: Swelling present. No tenderness, deformity or signs of injury. Normal range of motion.      Cervical back:  Normal range of motion and neck supple. No rigidity or tenderness.      Right lower leg: Edema present.   Lymphadenopathy:      Cervical: No cervical adenopathy.   Skin:     General: Skin is warm and dry.      Coloration: Skin is not jaundiced or pale.      Findings: No erythema or rash.   Neurological:      General: No focal deficit present.      Mental Status: She is alert and oriented to person, place, and time. Mental status is at baseline.      Cranial Nerves: No cranial nerve deficit.      Motor: No weakness.   Psychiatric:         Mood and Affect: Mood normal.         Behavior: Behavior normal.         Thought Content: Thought content normal.      Comments: Slow thought processes       Significant Labs: All pertinent labs within the past 24 hours have been reviewed.  CBC:   Recent Labs   Lab 12/11/22  0332 12/12/22  0218   WBC 8.43 6.78   HGB 12.5 11.0*   HCT 35.5* 32.6*    269       CMP:   Recent Labs   Lab 12/10/22  2207 12/11/22  0332 12/11/22  0829 12/11/22  1442 12/11/22  1859 12/12/22  0218   * 123*   < > 128* 128* 130*   K 5.4* 5.2*  --   --   --  4.8   CL 90* 91*  --   --   --  96   CO2 24 23  --   --   --  26   * 189*  --   --   --  186*   BUN 25* 25*  --   --   --  29*   CREATININE 0.9 0.8  --   --   --  1.0   CALCIUM 9.3 9.2  --   --   --  9.5   ANIONGAP 9 9  --   --   --  8    < > = values in this interval not displayed.         Significant Imaging: I have reviewed all pertinent imaging results/findings within the past 24 hours.      Assessment/Plan:      * Shock, unspecified  Transferred to ICU, and back to [facility]  Due to sepsis versus hypovolemia  Cultures negative  Patient was briefly on vasopressors  Not given fluids while in ICU  Infectious work up negative  Continue cefepime empirically for 7 days    12/8 - BP  118/58, stable, on cefepine. Cultures neg so far. Trop 0.6 this am- repeat EKG and trop level  12/9 - trop chronically up but declining. BNP high, diuretics being  held. BP is improving.   12/10 - BP stable on losartan, unable to tolerate lasix, na 123  12/12- stable    CKD (chronic kidney disease), stage IV  Patient with acute kidney injury likely due to cardiogenic PUJA is currently worsening. Labs reviewed- Renal function/electrolytes with Estimated Creatinine Clearance: 28.9 mL/min (based on SCr of 1 mg/dL). according to latest data. Monitor urine output and serial BMP and adjust therapy as needed. Avoid nephrotoxins and renally dose meds for GFR listed above.    12/9- Cr 0.7-> 1.1, volume overload, BNP > 1400. Add lasix IV 40 x one. She did diurese. Chat w nephrology - They think  she is dry.  Repeat  BMP.     12/10- cr 1.0,stable  CKD per US 2019.  Unable to tolerate lasix due to hyponatremia.  If pt gets SOB may need volume removal.  Discuss w Nephrology.   12/12- stable      Hyponatremia  Urine studies indicative of SIADH or salt wasting  Nephrology consulted- gave NS with improved of sodium 120-130. Urine sodium and osmolality afterward increased. Uric acid normal.  12/8- holding diuretics  - Continue 0.8 - 1 L fluid restrictions  - strict ins/outs   12/9- lab - Na dropped w one dose of lasix.   12/10- tolvaptan x one dose, trial. Close Na monitoring  12/11 - na 123, will discuss w Nephrology.  12/12- Na, 130; possibly plan on tolvaptan 15 mg every other day after discharge.  Repeating tolvactan 15 mg today.     Pleural effusion  - Not seen on imaging from earlier this year.  - Most likely related to HFpEF  - Treat HF. diuresis if tolerated. If becomes SOB may need volume removal, by thoracentesis or HD.     Acute on chronic diastolic heart failure  Moderate left atrial enlargement  Right ventricular hypertrophy  Pulmonary hypertension  ECHO 11/19 showed EF 58%, Grade III DD. Moderate left atrial enlargement. Normal right ventricular size with low normal right ventricular systolic function. Moderate to severe tricuspid regurgitation. Bilateral pleural effusions.  Low  BP's hold Beta Blocker, ACE/ARB and continue IV Furosemide and monitor clinical status closely. Monitor on telemetry. Patient is on CHF pathway.  Monitor strict Is&Os and daily weights.  Place on fluid restriction of 1.5 L. Continue to stress to patient importance of self efficacy and  on diet for CHF. Last BNP reviewed- and noted below   BNP  Recent Labs   Lab 12/08/22  1555   BNP 1,488*     Patient very hard to diurese. Furosemide increased and metolazone added. Stopped when hyponatremia worsen. Patient transitioned to bumex to 2 mg BID. On hold due to development of sodium 120 and hypotension. Cardiology consulted for further diuretic recommendations.     12/8- Naq 129. Lasix held. Euvoemic, and net neg fluid balance. Once sodium recovers, would advise HCTZ as primary diuretic with a loop diuretic as needed for weight gain. Today's weight was 44.4 kg, which we can consider the new dry weight.   - - Echo (11/19) showed grade III left ventricular diastolic dysfunction, mild-moderate pulmonary HTN, and ejection fraction of 58%. EF on most recent ECHO 35%   - patient and family educated on daily weights at home.  Add GDMT later on for HF as tolerated. Would hold off on ACEi/ARBs/diuretics/aldactone right now given electrolyte abnormalities.   - repeating BNP > 1400,  and trop levels 0.2-> 0.6 -> 0.4    12/10- not able to tolerate lasix due to hyponatremia  12/11- symptoms controlled, was up in a chair much of the day yesterday  12/12- this is as good as we can do. CHF + CKD + pulm HTN. Will have chronic edema, pleural effusions    Cough in adult  - no evidence of respiratory infection  - dextromethorphan and guaifenesin  - continue diuresing. (not worsened with 1L of fluids)  - add abx to cover respiratory pathogen   - albuterol QID   - slowly improving  12/8- due to effusion  - stable    Diabetes mellitus  Patient's FSGs are controlled on current medication regimen.  Last A1c reviewed-   Lab Results    Component Value Date    HGBA1C 7.2 (H) 06/30/2022     Most recent fingerstick glucose reviewed-   Recent Labs   Lab 12/11/22  0822 12/11/22  1303 12/11/22  1659 12/11/22 2029   POCTGLUCOSE 141* 125* 159* 228*       Antihyperglycemics (From admission, onward)    Start     Stop Route Frequency Ordered    11/19/22 2154  insulin aspart U-100 pen 0-5 Units         -- SubQ Before meals & nightly PRN 11/19/22 2054        · Hold Oral hypoglycemics while patient is in the hospital.  · Does not take insulin, DM diet only    Recent Labs     12/10/22  1655 12/10/22  1950 12/11/22  0822 12/11/22  1303 12/11/22  1659 12/11/22 2029   POCTGLUCOSE 202* 157* 141* 125* 159* 228*         Hypophosphatemia  12/9- neutraphos x 10 days  12/12- phos 1.6-> 2.4 -> 2.5 -> 2.6, refeeding      Physical deconditioning  - consult PT to see patient  - get patient up to chair with meals    Hyperthyroidism  · Continue home methimazole      Goals of care, counseling/discussion  Advance Care Planning   Does patient have Capacity? yes  Contact: Romario Majano  Goals/Wishes: wants to go home  Code Status- FULL  Pain management- no pain  Prognosis-  Guarded and poor: unable to tolerate lasix due to hyponatremia in setting of CHF.   Family discussions- 12/9- Called and updated Maria Guadalupe   Functional Status - poor mobility, high FALL risk    Post acute care plan:  home  Time spent on Goals of Care:  10 mins on 12/9.          Irritant contact dermatitis due to fecal, urinary or dual incontinence  Moisture barrier cream      Epigastric pain  Constipation  Esophageal dysphagia  Vomited a few times on one day  reglan before meals  protonix  Suppository with BM and some relief of abdominal pain  X-ray abdomen unremarkable  US of pancreas and gallbladder showed adherent gall bladder stone. Otherwise unremarkable for other biliary and pancreatic abnormalities.   Consider outpatient EDG referral for esophageal dysphagia  - resolved    Chest pain  Am of 12/8, trop  0.6 but chronically up.   Repeat trop , EKG, BNP  - see above      VTE Risk Mitigation (From admission, onward)         Ordered     enoxaparin injection 30 mg  Daily         12/04/22 2204     IP VTE HIGH RISK PATIENT  Once         11/18/22 0404     Place sequential compression device  Until discontinued         11/18/22 0404                Discharge Planning   ENDY: 12/16/2022     Code Status: Full Code   Is the patient medically ready for discharge?: No    Reason for patient still in hospital (select all that apply): Patient trending condition  Discharge Plan A: Skilled Nursing Facility   Discharge Delays: None known at this time              Ximena Ramsey MD  Department of Hospital Medicine   [facility] - Cardiology Stepdown

## 2022-12-12 NOTE — ASSESSMENT & PLAN NOTE
Transferred to ICU, and back to [facility]  Due to sepsis versus hypovolemia  Cultures negative  Patient was briefly on vasopressors  Not given fluids while in ICU  Infectious work up negative  Continue cefepime empirically for 7 days    12/8 - BP  118/58, stable, on cefepine. Cultures neg so far. Trop 0.6 this am- repeat EKG and trop level  12/9 - trop chronically up but declining. BNP high, diuretics being held. BP is improving.   12/10 - BP stable on losartan, unable to tolerate lasix, na 123  12/12- stable

## 2022-12-12 NOTE — PLAN OF CARE
Recommendations  Continue low sodium, high calorie, high protein diet-fluid per MD  2.  Continue Boost Plus TID for optimization of protein and calorie intake   3. RD following     Goals: Meet % of EEN/EPN by RD f/u date  Nutrition Goal Status: progressing towards goal  Communication of RD Recs: other (POC)

## 2022-12-12 NOTE — SUBJECTIVE & OBJECTIVE
Interval History: see above    Review of Systems   Constitutional:  Positive for fatigue. Negative for activity change, chills and fever.   HENT:  Negative for congestion, nosebleeds and trouble swallowing.    Respiratory:  Negative for apnea, cough, choking, chest tightness and shortness of breath.    Cardiovascular:  Positive for leg swelling (chronic).   Gastrointestinal:  Negative for abdominal distention, abdominal pain, constipation, diarrhea, nausea and vomiting.   Genitourinary:  Negative for decreased urine volume, difficulty urinating, dysuria and frequency.   Musculoskeletal:  Negative for arthralgias, back pain, joint swelling, neck pain and neck stiffness.   Skin:  Negative for rash and wound.   Neurological:  Negative for dizziness, seizures, syncope, weakness, light-headedness, numbness and headaches.   Psychiatric/Behavioral:  Negative for agitation, behavioral problems, confusion, hallucinations, self-injury and sleep disturbance. The patient is not nervous/anxious.    Objective:     Vital Signs (Most Recent):  Temp: 98.1 °F (36.7 °C) (12/12/22 0731)  Pulse: 86 (12/12/22 0738)  Resp: 16 (12/12/22 0731)  BP: (!) 109/55 (12/12/22 0731)  SpO2: 98 % (12/12/22 0731)   Vital Signs (24h Range):  Temp:  [97 °F (36.1 °C)-98.4 °F (36.9 °C)] 98.1 °F (36.7 °C)  Pulse:  [65-91] 86  Resp:  [16-20] 16  SpO2:  [92 %-98 %] 98 %  BP: ()/(51-61) 109/55     Weight: 47.1 kg (103 lb 13.4 oz)  Body mass index is 20.97 kg/m².    Intake/Output Summary (Last 24 hours) at 12/12/2022 0753  Last data filed at 12/11/2022 2047  Gross per 24 hour   Intake 690 ml   Output 900 ml   Net -210 ml        Physical Exam  Constitutional:       General: She is not in acute distress.     Appearance: Normal appearance. She is ill-appearing. She is not toxic-appearing or diaphoretic.      Comments: Elderly female, thin , frail   HENT:      Head: Normocephalic and atraumatic.      Nose: Nose normal.      Mouth/Throat:      Mouth: Mucous  membranes are moist.   Eyes:      General: No scleral icterus.     Extraocular Movements: Extraocular movements intact.      Pupils: Pupils are equal, round, and reactive to light.   Cardiovascular:      Rate and Rhythm: Normal rate and regular rhythm.      Pulses: Normal pulses.      Heart sounds: Normal heart sounds. No murmur heard.     Comments: + JVD  Pulmonary:      Effort: Pulmonary effort is normal. No respiratory distress.      Breath sounds: Normal breath sounds. No wheezing, rhonchi or rales.      Comments: Decreased BS at bases  Chest:      Chest wall: No tenderness.   Abdominal:      General: Abdomen is flat. Bowel sounds are normal. There is no distension.      Palpations: Abdomen is soft.      Tenderness: There is no abdominal tenderness. There is no right CVA tenderness, left CVA tenderness, guarding or rebound.   Musculoskeletal:         General: Swelling present. No tenderness, deformity or signs of injury. Normal range of motion.      Cervical back: Normal range of motion and neck supple. No rigidity or tenderness.      Right lower leg: Edema present.   Lymphadenopathy:      Cervical: No cervical adenopathy.   Skin:     General: Skin is warm and dry.      Coloration: Skin is not jaundiced or pale.      Findings: No erythema or rash.   Neurological:      General: No focal deficit present.      Mental Status: She is alert and oriented to person, place, and time. Mental status is at baseline.      Cranial Nerves: No cranial nerve deficit.      Motor: No weakness.   Psychiatric:         Mood and Affect: Mood normal.         Behavior: Behavior normal.         Thought Content: Thought content normal.      Comments: Slow thought processes       Significant Labs: All pertinent labs within the past 24 hours have been reviewed.  CBC:   Recent Labs   Lab 12/11/22  0332 12/12/22  0218   WBC 8.43 6.78   HGB 12.5 11.0*   HCT 35.5* 32.6*    269       CMP:   Recent Labs   Lab 12/10/22  2207 12/11/22 0332  12/11/22  0829 12/11/22  1442 12/11/22  1859 12/12/22  0218   * 123*   < > 128* 128* 130*   K 5.4* 5.2*  --   --   --  4.8   CL 90* 91*  --   --   --  96   CO2 24 23  --   --   --  26   * 189*  --   --   --  186*   BUN 25* 25*  --   --   --  29*   CREATININE 0.9 0.8  --   --   --  1.0   CALCIUM 9.3 9.2  --   --   --  9.5   ANIONGAP 9 9  --   --   --  8    < > = values in this interval not displayed.         Significant Imaging: I have reviewed all pertinent imaging results/findings within the past 24 hours.

## 2022-12-12 NOTE — PLAN OF CARE
LULÚ notified BRENDAN Chauhan on patient's pending discharge tomorrow, and need for hospital follow up appointment.      Queenie Alcocer RN     [phone]

## 2022-12-13 NOTE — SUBJECTIVE & OBJECTIVE
Interval History: see above    Review of Systems   Constitutional:  Positive for fatigue. Negative for activity change, chills and fever.   HENT:  Negative for congestion, nosebleeds and trouble swallowing.    Respiratory:  Negative for apnea, cough, choking, chest tightness and shortness of breath.    Cardiovascular:  Positive for leg swelling (chronic).   Gastrointestinal:  Negative for abdominal distention, abdominal pain, constipation, diarrhea, nausea and vomiting.   Genitourinary:  Negative for decreased urine volume, difficulty urinating, dysuria and frequency.   Musculoskeletal:  Negative for arthralgias, back pain, joint swelling, neck pain and neck stiffness.   Skin:  Negative for rash and wound.   Neurological:  Negative for dizziness, seizures, syncope, weakness, light-headedness, numbness and headaches.   Psychiatric/Behavioral:  Negative for agitation, behavioral problems, confusion, hallucinations, self-injury and sleep disturbance. The patient is not nervous/anxious.    Objective:     Vital Signs (Most Recent):  Temp: 97.2 °F (36.2 °C) (12/13/22 0808)  Pulse: 76 (12/13/22 0808)  Resp: 18 (12/13/22 0808)  BP: (!) 107/56 (12/13/22 0858)  SpO2: 97 % (12/13/22 0808)   Vital Signs (24h Range):  Temp:  [97.2 °F (36.2 °C)-98.6 °F (37 °C)] 97.2 °F (36.2 °C)  Pulse:  [75-86] 76  Resp:  [18] 18  SpO2:  [96 %-99 %] 97 %  BP: ()/(50-60) 107/56     Weight: 47.1 kg (103 lb 13.4 oz)  Body mass index is 20.97 kg/m².    Intake/Output Summary (Last 24 hours) at 12/13/2022 1042  Last data filed at 12/13/2022 0608  Gross per 24 hour   Intake 100 ml   Output 1200 ml   Net -1100 ml        Physical Exam  Constitutional:       General: She is not in acute distress.     Appearance: Normal appearance. She is ill-appearing. She is not toxic-appearing or diaphoretic.      Comments: Elderly female, thin , frail   HENT:      Head: Normocephalic and atraumatic.      Nose: Nose normal.      Mouth/Throat:      Mouth: Mucous  membranes are moist.   Eyes:      General: No scleral icterus.     Extraocular Movements: Extraocular movements intact.      Pupils: Pupils are equal, round, and reactive to light.   Cardiovascular:      Rate and Rhythm: Normal rate and regular rhythm.      Pulses: Normal pulses.      Heart sounds: Normal heart sounds. No murmur heard.     Comments: + JVD  Pulmonary:      Effort: Pulmonary effort is normal. No respiratory distress.      Breath sounds: Normal breath sounds. No wheezing, rhonchi or rales.      Comments: Decreased BS at bases  Chest:      Chest wall: No tenderness.   Abdominal:      General: Abdomen is flat. Bowel sounds are normal. There is no distension.      Palpations: Abdomen is soft.      Tenderness: There is no abdominal tenderness. There is no right CVA tenderness, left CVA tenderness, guarding or rebound.   Musculoskeletal:         General: Swelling present. No tenderness, deformity or signs of injury. Normal range of motion.      Cervical back: Normal range of motion and neck supple. No rigidity or tenderness.      Right lower leg: Edema present.   Lymphadenopathy:      Cervical: No cervical adenopathy.   Skin:     General: Skin is warm and dry.      Coloration: Skin is not jaundiced or pale.      Findings: No erythema or rash.   Neurological:      General: No focal deficit present.      Mental Status: She is alert and oriented to person, place, and time. Mental status is at baseline.      Cranial Nerves: No cranial nerve deficit.      Motor: No weakness.   Psychiatric:         Mood and Affect: Mood normal.         Behavior: Behavior normal.         Thought Content: Thought content normal.      Comments: Slow thought processes       Significant Labs: All pertinent labs within the past 24 hours have been reviewed.  CBC:   Recent Labs   Lab 12/12/22 0218 12/13/22  0514   WBC 6.78 5.00   HGB 11.0* 11.1*   HCT 32.6* 33.9*    265       CMP:   Recent Labs   Lab 12/11/22  1859 12/12/22 0218  12/13/22  0514   * 130* 135*   K  --  4.8 4.1   CL  --  96 105   CO2  --  26 23   GLU  --  186* 139*   BUN  --  29* 23   CREATININE  --  1.0 0.7   CALCIUM  --  9.5 9.5   ANIONGAP  --  8 7*         Significant Imaging: I have reviewed all pertinent imaging results/findings within the past 24 hours.

## 2022-12-13 NOTE — ASSESSMENT & PLAN NOTE
Patient with acute kidney injury likely due to cardiogenic PUJA is currently worsening. Labs reviewed- Renal function/electrolytes with Estimated Creatinine Clearance: 41.3 mL/min (based on SCr of 0.7 mg/dL). according to latest data. Monitor urine output and serial BMP and adjust therapy as needed. Avoid nephrotoxins and renally dose meds for GFR listed above.    12/9- Cr 0.7-> 1.1, volume overload, BNP > 1400. Add lasix IV 40 x one. She did diurese. Chat w nephrology - They think  she is dry.  Repeat  BMP.     12/10- cr 1.0,stable  CKD per US 2019.  Unable to tolerate lasix due to hyponatremia.  If pt gets SOB may need volume removal.  Discuss w Nephrology.   12/12- stable

## 2022-12-13 NOTE — TELEPHONE ENCOUNTER
Tolvaptan Rx received. [facility] Medicare LIS-2 - Non-formulary product. PA submitted as Urgent via [facility]- Key: [id number] - PA Case ID: [id number]

## 2022-12-13 NOTE — ASSESSMENT & PLAN NOTE
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 Urine sodium and osmolality afterward increased. Uric acid normal.  12/8- holding diuretics  - Continue 0.8 - 1 L fluid restrictions  - strict ins/outs   12/9- lab - Na dropped w one dose of lasix.   12/10- tolvaptan x one dose, trial. Close Na monitoring  12/11 - na 123, will discuss w Nephrology.  12/12- Na, 130; possibly plan on tolvaptan 15 mg every other day after discharge.  Repeating tolvactan 15 mg today.   12/13- improved, Na 135, discuss outpt plan w Nephrology

## 2022-12-13 NOTE — ASSESSMENT & PLAN NOTE
Transferred to ICU, and back to [facility]  Due to sepsis versus hypovolemia  Cultures negative  Patient was briefly on vasopressors  Not given fluids while in ICU  Infectious work up negative  Continue cefepime empirically for 7 days    12/8 - BP  118/58, stable, on cefepine. Cultures neg so far. Trop 0.6 this am- repeat EKG and trop level  12/9 - trop chronically up but declining. BNP high, diuretics being held. BP is improving.   12/10 - BP stable on losartan, unable to tolerate lasix, na 123  12/12- stable

## 2022-12-13 NOTE — ASSESSMENT & PLAN NOTE
Patient's FSGs are controlled on current medication regimen.  Last A1c reviewed-   Lab Results   Component Value Date    HGBA1C 7.2 (H) 06/30/2022     Most recent fingerstick glucose reviewed-   Recent Labs   Lab 12/12/22  1138 12/12/22  1640 12/12/22 2024 12/13/22  0805   POCTGLUCOSE 173* 167* 224* 119*       Antihyperglycemics (From admission, onward)    Start     Stop Route Frequency Ordered    11/19/22 2154  insulin aspart U-100 pen 0-5 Units         -- SubQ Before meals & nightly PRN 11/19/22 2054        · Hold Oral hypoglycemics while patient is in the hospital.  · Does not take insulin, DM diet only    Recent Labs     12/11/22 2029 12/12/22 0817 12/12/22  1138 12/12/22  1640 12/12/22 2024 12/13/22  0805   POCTGLUCOSE 228* 147* 173* 167* 224* 119*

## 2022-12-13 NOTE — PT/OT/SLP PROGRESS
Physical Therapy  Treatment    Patient Name:  Peri Johansen   MRN:  [id number]    Recommendations:     Discharge Recommendations:  nursing facility, skilled   Discharge Equipment Recommendations:  (TBD)   Barriers to discharge: decreased functional mobility, fall risk, and decreased caregiver support    Assessment:     Peri Johansen is a 88 y.o. female admitted with a medical diagnosis of Shock, unspecified.  Pt demonstrates the below listed impairments with decreased tolerance to functional mobility, weakness, and impaired endurance being the most limiting.  Pt demonstrates fair tolerance to edge of bed mobility and is willing to transfer to the chair.  Pt is drowsy this session and requires increased time to become alert enough to take part in the session.  Poor walker management and decreased tolerance to standing noted.  Pt is not safe for home discharge at this time due to patient's status as: a fall risk and cognitively impaired and requires skilled PT.      Impairments and functional limitations:  weakness, impaired endurance, impaired self care skills, impaired functional mobility, gait instability, impaired balance, impaired cognition, decreased coordination, decreased upper extremity function, decreased lower extremity function, impaired cardiopulmonary response to activity.  These deficits affect their roles and responsibilities in which they were able to complete prior to admit.  Rehab Prognosis:   Fair; patient would benefit from acute skilled PT services 3 x/week to address these deficits, improve quality of life, focus on recovery of impairments, provide patient/caregiver education, reduce fall risk, and reach maximum level of function.  Pt is moderately motivated to participated in skilled PT.    Recent Surgery:   * No surgery found *      Plan:     During this hospitalization, patient to be seen 3 x/week to address the identified rehab impairments via gait training, therapeutic activities,  "therapeutic exercises, neuromuscular re-education and progress toward the following goals:    Plan of Care Expires:  01/05/23    Subjective     Chief Complaint: decreased tolerance to functional mobility  Patient/Family Comments/Goals: Progress to SNF  Pain/Comfort:  Pain Rating 1: 0/10    Objective:     Communicated with RN prior to session.  Patient found HOB elevated with oxygen, PureWick, telemetry upon PT entry to room.     General Precautions: Standard, fall   Orthopedic Precautions:N/A   Braces: N/A  Oxygen Device:      Functional Mobility:  Bed Mobility:  Rolling Left: Max A  Scooting: Max A  Supine to Sit: Max A  Head of bed position: HOB elevated  EOB for 8 min with CGA     Transfers:  Sit to Stand: Mod A with RW and with cues for hand placement and foot placement  Bed to Chair: Min A with RW and with cues for hand placement and foot placement using Step Transfer to Left   Pt terminates several transfer trials stating "wait a minute" before attempting  She is drowsy, prolonged seated breaks     Gait: Patient ambulated x3 side steps to L with RW and Min A. Patient demonstrates occasional unsteady gait, decreased step length, narrow base of support, decreased weight shift, flexed posture, and decreased radha. All lines remained intact throughout ambulation trial.  Tolerates standing for ~30sec before a seated rest break    Balance:   Position Score Time   Static Sitting FAIR+: Takes MINIMAL challenges 6 minute(s)   Dynamic Sitting POOR+: MINIMAL assist to maintain 4 minute(s)   Static Standing POOR+: MINIMAL assist to maintain ~30 seconds   Dynamic Standing POOR+: MINIMAL assist to maintain n/a       AM-PAC 6 CLICK MOBILITY  Turning over in bed (including adjusting bedclothes, sheets and blankets)?: 2  Sitting down on and standing up from a chair with arms (e.g., wheelchair, bedside commode, etc.): 2  Moving from lying on back to sitting on the side of the bed?: 2  Moving to and from a bed to a chair " (including a wheelchair)?: 3  Need to walk in hospital room?: 3  Climbing 3-5 steps with a railing?: 1  Basic Mobility Total Score: 13     Therapeutic Activities:  Patient educated on role of acute care PT and PT POC, safety while in hospital including calling nurse for mobility, call light usage, benefits of out of bed mobility, walker management, breathing technique, fall risk, assistive device use, bed mobility , transfers, gait technique, positioning, posture, risks of prolonged bed rest, possible discharge disposition , and benefits of continued PT by explanation and demonstration.    Patient demonstrates poor understanding of education provided this day.   Whiteboard updated    Therapeutic Exercises:  n/a    Patient left up in chair with all lines intact, call button in reach, and family present.    GOALS:   Multidisciplinary Problems       Physical Therapy Goals          Problem: Physical Therapy    Goal Priority Disciplines Outcome Goal Variances Interventions   Physical Therapy Goal     PT, PT/OT Ongoing, Progressing     Description: Goals to met by 12/20/2022    1. Supine to sit with Modified [place]  2. Sit to supine with Modified [place]  3. Rolling to Left and Right with Modified [place].  4. Sit to stand transfer with Supervision  5. Bed to chair transfer with Supervision using Rolling Walker  6. Gait  x 75 feet with Stand-by Assistance using Rolling Walker                          Time Tracking:     PT Received On: 12/13/22  PT Start Time: 1245     PT Stop Time: 1310  PT Total Time (min): 25 min     Billable Minutes: Therapeutic Activity 23    Treatment Type: Treatment  PT/PTA: PT     PTA Visit Number: 0     12/13/2022

## 2022-12-13 NOTE — PROGRESS NOTES
[facility] Step[facility]  Nephrology  Progress Note    Patient Name: Peri Johansen  MRN: [id number]  Admission Date: 11/17/2022  Hospital Length of Stay: 23 days  Attending Provider: Ximena Ramsey MD   Primary Care Physician: Annika Garland MD  Principal Problem:Shock, unspecified    Subjective:     Interval History:   Na much improved to 135 after Tolvaptan. Other electrolytes stable. UOP 1.6 L/24 hrs. Pending SNF placement.     Review of patient's allergies indicates:   Allergen Reactions    Latex, natural rubber Itching    Latex Hives     Current Facility-Administered Medications   Medication Frequency    acetaminophen tablet 650 mg Q6H PRN    amiodarone tablet 200 mg Daily    aspirin chewable tablet 81 mg Daily    atorvastatin tablet 40 mg Daily    benzonatate capsule 100 mg TID PRN    bisacodyL suppository 10 mg Daily PRN    dextromethorphan-guaiFENesin  mg/5 ml liquid 5 mL Q4H PRN    enoxaparin injection 30 mg Daily    ergocalciferol capsule 50,000 Units Q7 Days    glucagon (human recombinant) injection 1 mg PRN    glucose chewable tablet 16 g PRN    glucose chewable tablet 24 g PRN    hydrOXYzine pamoate capsule 25 mg Q6H PRN    insulin aspart U-100 pen 0-5 Units QID (AC + HS) PRN    LIDOcaine 5 % patch 1 patch Daily PRN    losartan split tablet 12.5 mg Daily    melatonin tablet 6 mg Nightly PRN    methIMAzole split tablet 2.5 mg Daily    naloxone 0.4 mg/mL injection 0.02 mg PRN    nitroGLYCERIN SL tablet 0.4 mg Q5 Min PRN    ondansetron injection 8 mg Q6H PRN    pantoprazole EC tablet 40 mg Daily    polyethylene glycol packet 17 g BID PRN    sodium chloride 0.9% flush 10 mL Q12H PRN    tolvaptan tablet 15 mg Daily       Objective:     Vital Signs (Most Recent):  Temp: 96.2 °F (35.7 °C) (12/13/22 1103)  Pulse: 70 (12/13/22 1227)  Resp: 18 (12/13/22 1103)  BP: (!) 110/54 (12/13/22 1103)  SpO2: (!) 93 % (12/13/22 1103)  (RETIRED) O2 Device (Oxygen Therapy): nasal cannula (12/08/22 3943)   Vital  Signs (24h Range):  Temp:  [96.2 °F (35.7 °C)-98.6 °F (37 °C)] 96.2 °F (35.7 °C)  Pulse:  [70-84] 70  Resp:  [18] 18  SpO2:  [93 %-99 %] 93 %  BP: ()/(50-60) 110/54     Weight: 47.1 kg (103 lb 13.4 oz) (12/10/22 0400)  Body mass index is 20.97 kg/m².  Body surface area is 1.4 meters squared.    I/O last 3 completed shifts:  In: 320 [P.O.:320]  Out: 1800 [Urine:1800]    Physical Exam  Vitals and nursing note reviewed.   Constitutional:       General: She is awake.      Appearance: She is well-developed and underweight. She is ill-appearing.   HENT:      Head: Normocephalic.      Nose: Nose normal.      Mouth/Throat:      Mouth: Mucous membranes are moist.   Eyes:      General: Lids are normal. No scleral icterus.     Conjunctiva/sclera: Conjunctivae normal.      Pupils: Pupils are equal, round, and reactive to light.   Cardiovascular:      Rate and Rhythm: Normal rate and regular rhythm.      Heart sounds: No murmur heard.  Pulmonary:      Effort: Pulmonary effort is normal.      Breath sounds: Rhonchi present. No wheezing.   Abdominal:      General: Bowel sounds are normal.      Palpations: Abdomen is soft.      Tenderness: There is no abdominal tenderness.   Musculoskeletal:         General: No deformity.      Cervical back: Normal range of motion and neck supple.      Right lower leg: No edema.      Left lower leg: No edema.   Skin:     General: Skin is warm and dry.   Neurological:      General: No focal deficit present.      Mental Status: She is alert.   Psychiatric:         Behavior: Behavior is cooperative.       Significant Labs:  CBC:   Recent Labs   Lab 12/13/22  0514   WBC 5.00   RBC 3.93*   HGB 11.1*   HCT 33.9*      MCV 86   MCH 28.2   MCHC 32.7     CMP:   Recent Labs   Lab 12/09/22  1535 12/10/22  0334 12/13/22  0514   *  204*   < > 139*   CALCIUM 9.5  9.5   < > 9.5   ALBUMIN 2.3*  --   --    PROT 7.1  --   --    *  122*   < > 135*   K 5.1  5.1   < > 4.1   CO2 23  23   <  > 23   CL 91*  91*   < > 105   BUN 24*  24*   < > 23   CREATININE 1.0  1.0   < > 0.7   ALKPHOS 118  --   --    ALT 22  --   --    AST 33  --   --    BILITOT 1.4*  --   --     < > = values in this interval not displayed.     All labs within the past 24 hours have been reviewed.       Assessment/Plan:     * Shock, unspecified  - per primary team     Hyponatremia  Ms. Johansen is an 88 year old female with pmhx of heart failure (EF 58%) who presented with complaint of generalized fatigue. Patient is a poor historian. During her admission, patient was found to have elevated BNP and CXR significant for pulmonary edema bilaterally. Patient was started on lasix and was noted to be responsive. She was transitioned to bumex. BNP trending up to 1026. Patient developed hyponatremia shortly after being admitted. Sodium 136 at admission and dropped down to 120. Nephrology consulted for hyponatremia.     Likely secondary to heart failure. During volume assessment, patient had elevated JVP, rhonchi bilaterally, and RLE swelling.   Serum osm: 266. Consistent with hypotonic hyponatremia   Serum sodium increased significantly from 120 to 130 with NS, suggesting hypovolemic hyponatremia. Na 120 --> 130 -->127 w/ IVFs.    Recommendations:  - Tolvaptan 15 mg given 12/10, 12/11, 12/12, and 12/13.  - Please hold further Tolvaptan administration today.   - Will plan for Tolvaptan 15 mg PO q other day. Next dose 12/15.  - Continue 1 liter fluid restriction  - Okay with dc'd per Nephrology standpoint with plans for close electrolyte monitoring at [facility]   - Echo (11/19) showed grade III left ventricular diastolic dysfunction, mild-moderate pulmonary HTN, and ejection fraction of 58%. EF on most recent ECHO 35%   - strict ins/outs      Thank you for your consult. I will follow-up with patient. Please contact us if you have any additional questions.    Queenie Hernandez DNP, FNP-C  Nephrology  [facility]

## 2022-12-13 NOTE — ASSESSMENT & PLAN NOTE
Ms. Johansen is an 88 year old female with pmhx of heart failure (EF 58%) who presented with complaint of generalized fatigue. Patient is a poor historian. During her admission, patient was found to have elevated BNP and CXR significant for pulmonary edema bilaterally. Patient was started on lasix and was noted to be responsive. She was transitioned to bumex. BNP trending up to 1026. Patient developed hyponatremia shortly after being admitted. Sodium 136 at admission and dropped down to 120. Nephrology consulted for hyponatremia.     Likely secondary to heart failure. During volume assessment, patient had elevated JVP, rhonchi bilaterally, and RLE swelling.   Serum osm: 266. Consistent with hypotonic hyponatremia   Serum sodium increased significantly from 120 to 130 with NS, suggesting hypovolemic hyponatremia. Na 120 --> 130 -->127 w/ IVFs.    Recommendations:  - Tolvaptan 15 mg given 12/10, 12/11, 12/12, and 12/13.  - Please hold further Tolvaptan administration today.   - Will plan for Tolvaptan 15 mg PO q other day. Next dose 12/15.  - Continue 1 liter fluid restriction  - Echo (11/19) showed grade III left ventricular diastolic dysfunction, mild-moderate pulmonary HTN, and ejection fraction of 58%. EF on most recent ECHO 35%   - strict ins/outs

## 2022-12-13 NOTE — PLAN OF CARE
[facility] Step[facility]  Facility Transfer Orders        Admit to: SNF  12/14/2022    Diagnoses:   Active Hospital Problems    Diagnosis  POA    *Shock, unspecified [R57.9]  Yes     Priority: 1 - High    CKD (chronic kidney disease), stage IV [N18.4]  Yes     Priority: 1 - High    Hyponatremia [E87.1]  Yes     Priority: 1 - High    Pleural effusion [J90]  Yes     Priority: 2     Acute on chronic diastolic heart failure [I50.33]  Yes     Priority: 2     Cough in adult [R05.9]  No     Priority: 4     Diabetes mellitus [E11.9]  Yes     Priority: 4      Chronic    Hypophosphatemia [E83.39]  No     Priority: 5     Physical deconditioning [R53.81]  Yes     Priority: 8     Hyperthyroidism [E05.90]  Yes     Priority: 9      Chronic    Goals of care, counseling/discussion [Z71.89]  Not Applicable    Irritant contact dermatitis due to fecal, urinary or dual incontinence [L24.A2]  Yes    Chest pain [R07.9]  Yes      Resolved Hospital Problems    Diagnosis Date Resolved POA    Hypoxemia [R09.02] 12/04/2022 No     Allergies:   Review of patient's allergies indicates:   Allergen Reactions    Latex, natural rubber Itching    Latex Hives       Code Status: FULL    Vitals: Routine       Diet: cardiac diet  One liter fluid restriction    Activity: Up in a chair each morning as tolerated    Nursing Precautions: Aspiration , Fall, and Pressure ulcer prevention    Bed/Surface: Low Air Loss    Consults: PT to evaluate and treat- 3 times a week and OT to evaluate and treat- 3 times a week    Oxygen: room air    Dialysis: Patient is not on dialysis.     Labs:              BMP  every Monday and Thursday  Pending Diagnostic Studies:       Procedure Component Value Units Date/Time    CBC Auto Differential [[id number]] Collected: 11/24/22 0352    Order Status: Sent Lab Status: In process Updated: 11/24/22 0352    Specimen: Blood     CBC Auto Differential [[id number]] Collected: 11/21/22 0641    Order Status: Sent Lab Status: In process  Updated: 11/21/22 0641    Specimen: Blood     Narrative:      Collection has been rescheduled by [email or web address] at 11/21/2022 06:15 Reason:   Unable to collect/attempted finger stick w/ no success/notified nurse   [place]    Comprehensive Metabolic Panel [[id number]] Collected: 11/25/22 0308    Order Status: Sent Lab Status: In process Updated: 11/25/22 0308    Specimen: Blood     Comprehensive Metabolic Panel [[id number]] Collected: 11/24/22 0352    Order Status: Sent Lab Status: In process Updated: 11/24/22 0352    Specimen: Blood     Comprehensive Metabolic Panel [[id number]] Collected: 11/21/22 0641    Order Status: Sent Lab Status: In process Updated: 11/21/22 0641    Specimen: Blood     Narrative:      Collection has been rescheduled by [email or web address] at 11/21/2022 06:15 Reason:   Unable to collect/attempted finger stick w/ no success/notified nurse   Terrionne    Cortisol [[id number]] Collected: 12/04/22 2132    Order Status: Sent Lab Status: In process Updated: 12/04/22 2133    Specimen: Blood     Hemoglobin A1C [[id number]] Collected: 11/25/22 0308    Order Status: Sent Lab Status: In process Updated: 11/25/22 0308    Specimen: Blood     Magnesium [[id number]] Collected: 12/09/22 0421    Order Status: Sent Lab Status: In process Updated: 12/09/22 0422    Specimen: Blood     Magnesium [[id number]] Collected: 11/25/22 0308    Order Status: Sent Lab Status: In process Updated: 11/25/22 0308    Specimen: Blood     Magnesium [[id number]] Collected: 11/24/22 0352    Order Status: Sent Lab Status: In process Updated: 11/24/22 0352    Specimen: Blood     Magnesium [[id number]] Collected: 11/21/22 0641    Order Status: Sent Lab Status: In process Updated: 11/21/22 0641    Specimen: Blood     Narrative:      Collection has been rescheduled by [email or web address] at 11/21/2022 06:15 Reason:   Unable to collect/attempted finger stick w/ no success/notified nurse   Terrionne    Phosphorus [[id number]] Collected: 11/25/22 0308    Order Status: Sent Lab Status: In  process Updated: 11/25/22 0308    Specimen: Blood     Phosphorus [[id number]] Collected: 11/24/22 0352    Order Status: Sent Lab Status: In process Updated: 11/24/22 0352    Specimen: Blood     Phosphorus [[id number]] Collected: 11/21/22 0641    Order Status: Sent Lab Status: In process Updated: 11/21/22 0641    Specimen: Blood     Narrative:      Collection has been rescheduled by [email or web address] at 11/21/2022 06:15 Reason:   Unable to collect/attempted finger stick w/ no success/notified nurse   Jameslaz    Vitamin D [[id number]] Collected: 11/24/22 0352    Order Status: Sent Lab Status: In process Updated: 11/24/22 0352    Specimen: Blood               Miscellaneous Care:   Routine Skin for Bedridden Patients:  Apply moisture barrier cream to all    Sacral DU - Floor nurse to: cleanse sacral area with soap and water pat dry apply Triad bid/prn.         Medications: Discontinue all previous medication orders, if any. See new list below.  Current Discharge Medication List        START taking these medications    Details   ergocalciferol (ERGOCALCIFEROL) 50,000 unit Cap Take 1 capsule (50,000 Units total) by mouth every 7 days.  Qty: 12 capsule, Refills: 3      nitroGLYCERIN (NITROSTAT) 0.4 MG SL tablet Place 1 tablet (0.4 mg total) under the tongue every 5 (five) minutes as needed for Chest pain.  Qty: 25 tablet, Refills: 0      tolvaptan (SAMSCA) 15 mg Tab Take 1 tablet (15 mg total) by mouth every other day.  Qty: 15 tablet, Refills: 0           CONTINUE these medications which have CHANGED    Details   amiodarone (PACERONE) 200 MG Tab Take 1 tablet (200 mg total) by mouth once daily.  Qty: 30 tablet, Refills: 5      losartan (COZAAR) 25 MG tablet Take 0.5 tablets (12.5 mg total) by mouth once daily.  Qty: 45 tablet, Refills: 3    Comments: .           CONTINUE these medications which have NOT CHANGED    Details   aspirin 81 MG Chew Take 1 tablet (81 mg total) by mouth once daily.    Associated Diagnoses: Aortic  atherosclerosis      atorvastatin (LIPITOR) 40 MG tablet TAKE 1 TABLET BY MOUTH EVERY DAY  Qty: 90 tablet, Refills: 0      gabapentin (NEURONTIN) 300 MG capsule TAKE 1 CAPSULE BY MOUTH TWICE DAILY  Qty: 180 capsule, Refills: 1      methIMAzole (TAPAZOLE) 5 MG Tab TAKE 1/2 TABLET BY MOUTH DAILY  Qty: 45 tablet, Refills: 0      multivitamin (THERAGRAN) per tablet Take 1 tablet by mouth once daily.      traMADoL (ULTRAM) 50 mg tablet Take 1 tablet (50 mg total) by mouth every 12 (twelve) hours as needed for Pain.  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      albuterol (PROVENTIL/VENTOLIN HFA) 90 mcg/actuation inhaler Inhale 2 puffs into the lungs every 6 (six) hours as needed.      !! blood sugar diagnostic (TRUE METRIX GLUCOSE TEST STRIP) Strp USE AS DIRECTED  Qty: 150 strip, Refills: 12    Comments: **Patient requests 90 days supply**      !! blood sugar diagnostic Strp Test 2 times daily  Qty: 200 strip, Refills: 3    Associated Diagnoses: Type 2 diabetes mellitus with hyperglycemia, without long-term current use of insulin      blood-glucose meter kit Use as instructed.  ACCUCHECK or whatever is preferred by insurance  Qty: 1 each, Refills: 0      dextran 70-hypromellose (ARTIFICIAL TEARS,[id number],) ophthalmic solution Place 1 drop into both eyes 4 (four) times daily as needed.  Qty: 1 Bottle, Refills: 5      dextromethorphan-guaiFENesin  mg/5 ml (ROBITUSSIN-DM)  mg/5 mL liquid Take 10 mLs by mouth every 6 (six) hours as needed for Cough.      diclofenac sodium (VOLTAREN) 1 % Gel APPLY 2 GRAMS EXTERNALLY TO THE AFFECTED AREA FOUR TIMES DAILY  Qty: 100 g, Refills: 1    Associated Diagnoses: Lumbar degenerative disc disease      !! lancets Misc Test 2 x daily  Qty: 200 each, Refills: 3    Associated Diagnoses: Type 2 diabetes mellitus  with hyperglycemia, without long-term current use of insulin      magnesium oxide (MAG-OX) 400 mg (241.3 mg magnesium) tablet Take 1 tablet by mouth once daily.      methyl salicylate/menthol (ICY HOT TOP) Apply topically 2 (two) times daily as needed (Pain).      !! MICRO THIN LANCETS 33 gauge Misc USE AS DIRECTED  Refills: 0      omeprazole (PRILOSEC) 40 MG capsule Take 1 capsule (40 mg total) by mouth every morning.  Qty: 90 capsule, Refills: 1    Comments: **Patient requests 90 days supply**      ondansetron (ZOFRAN-ODT) 4 MG TbDL Take 1 tablet (4 mg total) by mouth every 8 (eight) hours as needed (Nausea or vomiting).  Qty: 12 tablet, Refills: 0      potassium chloride (KLOR-CON) 10 MEQ TbSR Take 2 tablets (20 mEq total) by mouth once daily.  Qty: 30 tablet, Refills: 11       !! - Potential duplicate medications found. Please discuss with provider.        STOP taking these medications       furosemide (LASIX) 40 MG tablet Comments:   Reason for Stopping:         ergocalciferol, vitamin D2, 10 mcg (400 unit) Tab Comments:   Reason for Stopping:             Follow up:    Follow-up Information       Annika Garland MD Follow up.    Specialty: Internal Medicine  Contact information:  [address]  [facility] [address] [phone]                               Immunizations Administered as of 12/14/2022       Name Date Dose VIS Date Route Exp Date    COVID-19, MRNA, LN-S, PF (Pfizer) (Purple Cap) 7/8/2021 0.3 mL -- Intramuscular --    Site: Right deltoid     : Pf[employer] Inc     Lot: [id number]     COVID-19, MRNA, LN-S, PF (Pfizer) (Purple Cap) 6/16/2021 -- -- Intramuscular --    Site: Left deltoid     : Pf[employer] Inc     Lot: [id number]             This patient has had both covid vaccinations    Some patients may experience side effects after vaccination.  These may include fever, headache, muscle or joint aches.  Most symptoms resolve with 24-48 hours and do not require urgent medical evaluation  unless they persist for more than 72 hours or symptoms are concerning for an unrelated medical condition.          Ximena Ramsey MD

## 2022-12-13 NOTE — PLAN OF CARE
Patient cooperative and pleasant with routine care and procedures.  Assisted patient from chair to bed with 2 person assist.  Sacral wound assessed and turned q2 hours.  Foam dressing in place.  Fall precautions reviewed and patient verbalized understanding and with no attempts to get up unassisted.  Resting quietly and without complaints.  Will continue to monitor.

## 2022-12-13 NOTE — PROGRESS NOTES
Parth Bolanos - Cardiology [facility] Medicine  Progress Note    Patient Name: Peri Johansen  MRN: [id number]  Patient Class: IP- Inpatient   Admission Date: 11/17/2022  Length of Stay: 23 days  Attending Physician: Ximena Ramsey MD  Primary Care Provider: Annika Garland MD        Subjective:     Principal Problem:Shock, unspecified        HPI:    Peri Johansen is a 88 y.o. female who has a past medical history of Aortic atherosclerosis, Arthritis, B-cell lymphoma, Diabetes mellitus, type II, Diverticulosis, GERD, Goiter, Hyperlipidemia, Hypertension, Joint pain, Leg pain, Osteoporosis, Renal cyst, Rickets, vitamin D deficiency, Thyroid nodule, and Vitamin D deficiency disease, presented to the ED with CC of Generalized fatigue.  Is a poor historian and unclear why she is at the hospital.  She states that someone told her to stop taking Lasix, but she does not remember who that was and how long ago that was, can not quantify it in days or weeks even.  She stated she did not have any chest pain at all, however appears that there was a comment of chest pain 10 on 10 in ED. troponin was mildly elevated at 0.171, however quickly reduced on next lab at 0.162.  BNP elevated at 925.  Denies any dysuria, but also a note in ED she has mild dysuria.  Denies hematuria but has +2 blood in urine, UA looks noninfectious.  CTA of the chest showed no interval detrimental change or new intra-abdominopelvic abnormality compared to most recent CT 11/11/2022, and no detrimental change in CTA of the aorta compared to most recent prior CTA aorta 09/14/2022.  Noting that she does have moderate to severe aortic atherosclerosis involving the branch vessels and moderate bilateral pleural effusions, stable when compared to prior 11/11/2022.  COVID, flu, RSV negative.  Denies abdominal pain or diarrhea. Denies nausea or vomiting.       Overview/Hospital Course:  Started on furosemide IV 40 mg BID. UOP not adequate. Escalated to 80 mg  BID, but missed a few doses due to loss of IV access. Once she was restarted on consistent IV diuretic administration, stillv elbert hard to diurese. Volume exam slowly improving. Patient complaining of epiagastric abdominal discomfort with associated SOB. Started her on aggressive bowel regimen and reglan. Some relief with BM. X-ray of abdomen unremarkable for obstruction or ileus or constipation. US of abdomen showed adherent gallstone. Abdomen pain completely resolved during period of NPO. SOB improving but continues to have nonproductive cough.  On 11/27, patient noted to be satting in mid to upper 80's on RA which is new for her.  She was placed on supplemental O2.  Repeat CXR ordered which demonstrated worsening pulm edema.  Her UO was also starting to decrease as well.  Lasix adjusted. And metolazone added.  Azithromycin added as well.  The patient was finally weaned off of supplemental O2 and her cough has improved significantly. Develop hypoantremia and hypotension as approached euvolemia. Nephrology consulted for hyponatmremia. Went to ICU. Received pressure support. Differing opinions on wether she is hypervolemic and hypovolemic. Cardiology and nephrology recommending holding diuresis. However she remains on strict fluid restriction. If starting on oral diuresis. Need to watch for response as likely oral furosemide not working.  Interval History:  12/8- /58  Pulse 86   SpO2 98% Did not sleep well,, agitated and complaining of pain she described as someone sitting on her chest last night.   + 3 unmeasured.  Holding diuresis now.  Trop 0.6 ( chronically up), Na 129,     12/9- BNP  >1400, trop 0.6-> 0.4. /64   Pulse 74, SpO2 93% .  On cefepime empirically for pneumonia- stop day 12/12 -  Cr 0.7-> 1.1, Na 128 w volume overload, replace mag and phos. BNP > 1400. + jvd. Will resume low dose losartan. Lasix 40 IV given with diuresis.  Repeating a Na level after diuresis to see if Na worse or  improved.   12/10- na dropped with one dose of lasix, now Na 123. /66   Pulse 82 . Cr 1.0 on losartan. Discussed w , nephrology, will give  Tolvaptan x one dose.     12/11- Na 123. K 5.2. cr 0.8, Phos 2.5 (improving), Chatted w nephrology. On Fluid restriction 750, considering a second dose of tolvactan. /53 VSS, She is up in chair, talking and interactive. No SOB.     12/12 - na 130, improved after second dose of tolvactam, off diruetics, She has been getting up to chair, eating well. Phos 2.6 (rising, still low) on neutrophos. /55  Pulse 86   Temp 98.1 VSS.  One stool, good UO.  She wants to go home. possibly plan on tolvaptan 15 mg every other day after discharge. Appreciate Nephrology input and f/u.  Receiving tolvactan inpt today. Has small DU sacrum, being attended to by nursing, pt up in chair today. Discussed her care and condition, explained all her medical problems w Son and DTR who are at bedside.     12/130 - currently enrolled in the [facility] Failure Transitional Care(HFTCC) program. /56   Pulse 76  Na 135. Will discuss w Nephrology. Need outpatient plan: Outpt SNF with tolvactan qod and weekly lab.         Interval History: see above    Review of Systems   Constitutional:  Positive for fatigue. Negative for activity change, chills and fever.   HENT:  Negative for congestion, nosebleeds and trouble swallowing.    Respiratory:  Negative for apnea, cough, choking, chest tightness and shortness of breath.    Cardiovascular:  Positive for leg swelling (chronic).   Gastrointestinal:  Negative for abdominal distention, abdominal pain, constipation, diarrhea, nausea and vomiting.   Genitourinary:  Negative for decreased urine volume, difficulty urinating, dysuria and frequency.   Musculoskeletal:  Negative for arthralgias, back pain, joint swelling, neck pain and neck stiffness.   Skin:  Negative for rash and wound.   Neurological:  Negative for dizziness, seizures,  syncope, weakness, light-headedness, numbness and headaches.   Psychiatric/Behavioral:  Negative for agitation, behavioral problems, confusion, hallucinations, self-injury and sleep disturbance. The patient is not nervous/anxious.    Objective:     Vital Signs (Most Recent):  Temp: 97.2 °F (36.2 °C) (12/13/22 0808)  Pulse: 76 (12/13/22 0808)  Resp: 18 (12/13/22 0808)  BP: (!) 107/56 (12/13/22 0858)  SpO2: 97 % (12/13/22 0808)   Vital Signs (24h Range):  Temp:  [97.2 °F (36.2 °C)-98.6 °F (37 °C)] 97.2 °F (36.2 °C)  Pulse:  [75-86] 76  Resp:  [18] 18  SpO2:  [96 %-99 %] 97 %  BP: ()/(50-60) 107/56     Weight: 47.1 kg (103 lb 13.4 oz)  Body mass index is 20.97 kg/m².    Intake/Output Summary (Last 24 hours) at 12/13/2022 1042  Last data filed at 12/13/2022 0608  Gross per 24 hour   Intake 100 ml   Output 1200 ml   Net -1100 ml        Physical Exam  Constitutional:       General: She is not in acute distress.     Appearance: Normal appearance. She is ill-appearing. She is not toxic-appearing or diaphoretic.      Comments: Elderly female, thin , frail   HENT:      Head: Normocephalic and atraumatic.      Nose: Nose normal.      Mouth/Throat:      Mouth: Mucous membranes are moist.   Eyes:      General: No scleral icterus.     Extraocular Movements: Extraocular movements intact.      Pupils: Pupils are equal, round, and reactive to light.   Cardiovascular:      Rate and Rhythm: Normal rate and regular rhythm.      Pulses: Normal pulses.      Heart sounds: Normal heart sounds. No murmur heard.     Comments: + JVD  Pulmonary:      Effort: Pulmonary effort is normal. No respiratory distress.      Breath sounds: Normal breath sounds. No wheezing, rhonchi or rales.      Comments: Decreased BS at bases  Chest:      Chest wall: No tenderness.   Abdominal:      General: Abdomen is flat. Bowel sounds are normal. There is no distension.      Palpations: Abdomen is soft.      Tenderness: There is no abdominal tenderness. There  is no right CVA tenderness, left CVA tenderness, guarding or rebound.   Musculoskeletal:         General: Swelling present. No tenderness, deformity or signs of injury. Normal range of motion.      Cervical back: Normal range of motion and neck supple. No rigidity or tenderness.      Right lower leg: Edema present.   Lymphadenopathy:      Cervical: No cervical adenopathy.   Skin:     General: Skin is warm and dry.      Coloration: Skin is not jaundiced or pale.      Findings: No erythema or rash.   Neurological:      General: No focal deficit present.      Mental Status: She is alert and oriented to person, place, and time. Mental status is at baseline.      Cranial Nerves: No cranial nerve deficit.      Motor: No weakness.   Psychiatric:         Mood and Affect: Mood normal.         Behavior: Behavior normal.         Thought Content: Thought content normal.      Comments: Slow thought processes       Significant Labs: All pertinent labs within the past 24 hours have been reviewed.  CBC:   Recent Labs   Lab 12/12/22 0218 12/13/22  0514   WBC 6.78 5.00   HGB 11.0* 11.1*   HCT 32.6* 33.9*    265       CMP:   Recent Labs   Lab 12/11/22  1859 12/12/22 0218 12/13/22  0514   * 130* 135*   K  --  4.8 4.1   CL  --  96 105   CO2  --  26 23   GLU  --  186* 139*   BUN  --  29* 23   CREATININE  --  1.0 0.7   CALCIUM  --  9.5 9.5   ANIONGAP  --  8 7*         Significant Imaging: I have reviewed all pertinent imaging results/findings within the past 24 hours.      Assessment/Plan:      * Shock, unspecified  Transferred to ICU, and back to [facility]  Due to sepsis versus hypovolemia  Cultures negative  Patient was briefly on vasopressors  Not given fluids while in ICU  Infectious work up negative  Continue cefepime empirically for 7 days    12/8 - BP  118/58, stable, on cefepine. Cultures neg so far. Trop 0.6 this am- repeat EKG and trop level  12/9 - trop chronically up but declining. BNP high, diuretics being held.  BP is improving.   12/10 - BP stable on losartan, unable to tolerate lasix, na 123  12/12- stable    CKD (chronic kidney disease), stage IV  Patient with acute kidney injury likely due to cardiogenic PUJA is currently worsening. Labs reviewed- Renal function/electrolytes with Estimated Creatinine Clearance: 41.3 mL/min (based on SCr of 0.7 mg/dL). according to latest data. Monitor urine output and serial BMP and adjust therapy as needed. Avoid nephrotoxins and renally dose meds for GFR listed above.    12/9- Cr 0.7-> 1.1, volume overload, BNP > 1400. Add lasix IV 40 x one. She did diurese. Chat w nephrology - They think  she is dry.  Repeat  BMP.     12/10- cr 1.0,stable  CKD per US 2019.  Unable to tolerate lasix due to hyponatremia.  If pt gets SOB may need volume removal.  Discuss w Nephrology.   12/12- stable      Hyponatremia  Urine studies indicative of SIADH or salt wasting  Nephrology consulted- gave NS with improved of sodium 120-130. Urine sodium and osmolality afterward increased. Uric acid normal.  12/8- holding diuretics  - Continue 0.8 - 1 L fluid restrictions  - strict ins/outs   12/9- lab - Na dropped w one dose of lasix.   12/10- tolvaptan x one dose, trial. Close Na monitoring  12/11 - na 123, will discuss w Nephrology.  12/12- Na, 130; possibly plan on tolvaptan 15 mg every other day after discharge.  Repeating tolvactan 15 mg today.   12/13- improved, Na 135, discuss outpt plan w Nephrology    Pleural effusion  - Not seen on imaging from earlier this year.  - Most likely related to HFpEF  - Treat HF. diuresis if tolerated. If becomes SOB may need volume removal, by thoracentesis or HD.     Acute on chronic diastolic heart failure  Moderate left atrial enlargement  Right ventricular hypertrophy  Pulmonary hypertension  ECHO 11/19 showed EF 58%, Grade III DD. Moderate left atrial enlargement. Normal right ventricular size with low normal right ventricular systolic function. Moderate to severe  tricuspid regurgitation. Bilateral pleural effusions.  Low BP's hold Beta Blocker, ACE/ARB and continue IV Furosemide and monitor clinical status closely. Monitor on telemetry. Patient is on CHF pathway.  Monitor strict Is&Os and daily weights.  Place on fluid restriction of 1.5 L. Continue to stress to patient importance of self efficacy and  on diet for CHF. Last BNP reviewed- and noted below   BNP  Recent Labs   Lab 12/08/22  1555   BNP 1,488*     Patient very hard to diurese. Furosemide increased and metolazone added. Stopped when hyponatremia worsen. Patient transitioned to bumex to 2 mg BID. On hold due to development of sodium 120 and hypotension. Cardiology consulted for further diuretic recommendations.     12/8- Naq 129. Lasix held. Euvoemic, and net neg fluid balance. Once sodium recovers, would advise HCTZ as primary diuretic with a loop diuretic as needed for weight gain. Today's weight was 44.4 kg, which we can consider the new dry weight.   - - Echo (11/19) showed grade III left ventricular diastolic dysfunction, mild-moderate pulmonary HTN, and ejection fraction of 58%. EF on most recent ECHO 35%   - patient and family educated on daily weights at home.  Add GDMT later on for HF as tolerated. Would hold off on ACEi/ARBs/diuretics/aldactone right now given electrolyte abnormalities.   - repeating BNP > 1400,  and trop levels 0.2-> 0.6 -> 0.4    12/10- not able to tolerate lasix due to hyponatremia  12/11- symptoms controlled, was up in a chair much of the day yesterday  12/12- this is as good as we can do. CHF + CKD + pulm HTN. Will have chronic edema, pleural effusions    Cough in adult  - no evidence of respiratory infection  - dextromethorphan and guaifenesin  - continue diuresing. (not worsened with 1L of fluids)  - add abx to cover respiratory pathogen   - albuterol QID   - slowly improving  12/8- due to effusion  - stable    Diabetes mellitus  Patient's FSGs are controlled on current  medication regimen.  Last A1c reviewed-   Lab Results   Component Value Date    HGBA1C 7.2 (H) 06/30/2022     Most recent fingerstick glucose reviewed-   Recent Labs   Lab 12/12/22  1138 12/12/22  1640 12/12/22 2024 12/13/22  0805   POCTGLUCOSE 173* 167* 224* 119*       Antihyperglycemics (From admission, onward)    Start     Stop Route Frequency Ordered    11/19/22 2154  insulin aspart U-100 pen 0-5 Units         -- SubQ Before meals & nightly PRN 11/19/22 2054        · Hold Oral hypoglycemics while patient is in the hospital.  · Does not take insulin, DM diet only    Recent Labs     12/11/22 2029 12/12/22  0817 12/12/22 1138 12/12/22  1640 12/12/22 2024 12/13/22  0805   POCTGLUCOSE 228* 147* 173* 167* 224* 119*         Hypophosphatemia  12/9- neutraphos x 10 days  12/12- phos 1.6-> 2.4 -> 2.5 -> 2.6, refeeding      Physical deconditioning  - consult PT to see patient  - get patient up to chair with meals    Hyperthyroidism  · Continue home methimazole      Goals of care, counseling/discussion  Advance Care Planning   Does patient have Capacity? yes  Contact: Romario Majano  Goals/Wishes: wants to go home  Code Status- FULL  Pain management- no pain  Prognosis-  Guarded and poor: unable to tolerate lasix due to hyponatremia in setting of CHF.   Family discussions- 12/9- Called and updated Maria Guadalupe   Functional Status - poor mobility, high FALL risk    Post acute care plan:  home  Time spent on Goals of Care:  10 mins on 12/9.          Irritant contact dermatitis due to fecal, urinary or dual incontinence  Moisture barrier cream      Epigastric pain  Constipation  Esophageal dysphagia  Vomited a few times on one day  reglan before meals  protonix  Suppository with BM and some relief of abdominal pain  X-ray abdomen unremarkable  US of pancreas and gallbladder showed adherent gall bladder stone. Otherwise unremarkable for other biliary and pancreatic abnormalities.   Consider outpatient EDG referral for esophageal  dysphagia  - resolved    Chest pain  Am of 12/8, trop 0.6 but chronically up.   Repeat trop , EKG, BNP  - see above      VTE Risk Mitigation (From admission, onward)         Ordered     enoxaparin injection 30 mg  Daily         12/04/22 2204     IP VTE HIGH RISK PATIENT  Once         11/18/22 0404     Place sequential compression device  Until discontinued         11/18/22 0404                Discharge Planning   ENDY: 12/16/2022     Code Status: Full Code   Is the patient medically ready for discharge?: No    Reason for patient still in hospital (select all that apply): Patient trending condition  Discharge Plan A: Skilled Nursing Facility   Discharge Delays: None known at this time        Ximena Ramsey MD  Senior Hospitalist  Department of Hospital Medicine  [facility]  [id number], [phone]    [facility] - [facility] Stepdown

## 2022-12-13 NOTE — SUBJECTIVE & OBJECTIVE
Interval History:   Na much improved to 135 after Tolvaptan. Other electrolytes stable. UOP 1.6 L/24 hrs. Pending SNF placement.     Review of patient's allergies indicates:   Allergen Reactions    Latex, natural rubber Itching    Latex Hives     Current Facility-Administered Medications   Medication Frequency    acetaminophen tablet 650 mg Q6H PRN    amiodarone tablet 200 mg Daily    aspirin chewable tablet 81 mg Daily    atorvastatin tablet 40 mg Daily    benzonatate capsule 100 mg TID PRN    bisacodyL suppository 10 mg Daily PRN    dextromethorphan-guaiFENesin  mg/5 ml liquid 5 mL Q4H PRN    enoxaparin injection 30 mg Daily    ergocalciferol capsule 50,000 Units Q7 Days    glucagon (human recombinant) injection 1 mg PRN    glucose chewable tablet 16 g PRN    glucose chewable tablet 24 g PRN    hydrOXYzine pamoate capsule 25 mg Q6H PRN    insulin aspart U-100 pen 0-5 Units QID (AC + HS) PRN    LIDOcaine 5 % patch 1 patch Daily PRN    losartan split tablet 12.5 mg Daily    melatonin tablet 6 mg Nightly PRN    methIMAzole split tablet 2.5 mg Daily    naloxone 0.4 mg/mL injection 0.02 mg PRN    nitroGLYCERIN SL tablet 0.4 mg Q5 Min PRN    ondansetron injection 8 mg Q6H PRN    pantoprazole EC tablet 40 mg Daily    polyethylene glycol packet 17 g BID PRN    sodium chloride 0.9% flush 10 mL Q12H PRN    tolvaptan tablet 15 mg Daily       Objective:     Vital Signs (Most Recent):  Temp: 96.2 °F (35.7 °C) (12/13/22 1103)  Pulse: 70 (12/13/22 1227)  Resp: 18 (12/13/22 1103)  BP: (!) 110/54 (12/13/22 1103)  SpO2: (!) 93 % (12/13/22 1103)  (RETIRED) O2 Device (Oxygen Therapy): nasal cannula (12/08/22 1628)   Vital Signs (24h Range):  Temp:  [96.2 °F (35.7 °C)-98.6 °F (37 °C)] 96.2 °F (35.7 °C)  Pulse:  [70-84] 70  Resp:  [18] 18  SpO2:  [93 %-99 %] 93 %  BP: ()/(50-60) 110/54     Weight: 47.1 kg (103 lb 13.4 oz) (12/10/22 0400)  Body mass index is 20.97 kg/m².  Body surface area is 1.4 meters squared.    I/O last  3 completed shifts:  In: 320 [P.O.:320]  Out: 1800 [Urine:1800]    Physical Exam  Vitals and nursing note reviewed.   Constitutional:       General: She is awake.      Appearance: She is well-developed and underweight. She is ill-appearing.   HENT:      Head: Normocephalic.      Nose: Nose normal.      Mouth/Throat:      Mouth: Mucous membranes are moist.   Eyes:      General: Lids are normal. No scleral icterus.     Conjunctiva/sclera: Conjunctivae normal.      Pupils: Pupils are equal, round, and reactive to light.   Cardiovascular:      Rate and Rhythm: Normal rate and regular rhythm.      Heart sounds: No murmur heard.  Pulmonary:      Effort: Pulmonary effort is normal.      Breath sounds: Rhonchi present. No wheezing.   Abdominal:      General: Bowel sounds are normal.      Palpations: Abdomen is soft.      Tenderness: There is no abdominal tenderness.   Musculoskeletal:         General: No deformity.      Cervical back: Normal range of motion and neck supple.      Right lower leg: No edema.      Left lower leg: No edema.   Skin:     General: Skin is warm and dry.   Neurological:      General: No focal deficit present.      Mental Status: She is alert.   Psychiatric:         Behavior: Behavior is cooperative.       Significant Labs:  CBC:   Recent Labs   Lab 12/13/22  0514   WBC 5.00   RBC 3.93*   HGB 11.1*   HCT 33.9*      MCV 86   MCH 28.2   MCHC 32.7     CMP:   Recent Labs   Lab 12/09/22  1535 12/10/22  0334 12/13/22  0514   *  204*   < > 139*   CALCIUM 9.5  9.5   < > 9.5   ALBUMIN 2.3*  --   --    PROT 7.1  --   --    *  122*   < > 135*   K 5.1  5.1   < > 4.1   CO2 23  23   < > 23   CL 91*  91*   < > 105   BUN 24*  24*   < > 23   CREATININE 1.0  1.0   < > 0.7   ALKPHOS 118  --   --    ALT 22  --   --    AST 33  --   --    BILITOT 1.4*  --   --     < > = values in this interval not displayed.     All labs within the past 24 hours have been reviewed.

## 2022-12-13 NOTE — PLAN OF CARE
Per Pita with [facility], will have a bed available for pt tomorrow.  Will continue to follow for d/c needs.    James Lay RN CM  Case Management  [phone]

## 2022-12-14 NOTE — PLAN OF CARE
Patient discharged to home rather than SNF per Pt and family preference.  S/he was transported by family.  Home Health was referred to [facility] per Pt request.       Mary Mcguire, [facility]  Case Management Department  [email or web address]        12/14/22 1528   Final Note   Assessment Type Final Discharge Note   Anticipated Discharge Disposition [facility] Resources/Appts/Education Provided Provided patient/caregiver with written discharge plan information;Appointments scheduled and added to AVS;Appointments scheduled by Navigator/Coordinator   Post-Acute Status   Post-Acute Authorization Home Health   Home Health Status Set-up Complete/Auth obtained   Discharge Delays None known at this time       Future Appointments   Date Time Provider Department Center   12/15/2022  8:55 AM LAB, APPOINTMENT [facility]   12/15/2022  9:30 AM Angelica Mariscal PA-C [facility]   12/15/2022  9:30 AM [facility] HEART FAILURE NURSE [facility]   12/20/2022 10:00 AM Annika Garland MD [facility] IM [facility] PC[facility]   12/29/2022  1:30 PM José Miguel Alvarez MD [facility] NEPHRO [facility]   1/12/2023  9:30 AM Chitra Wang MD [facility] CARDIO [facility]   3/16/2023  3:00 PM Jarvis Lagos OD [facility] OPTOMTY [facility]

## 2022-12-14 NOTE — NURSING
Patient is ready for discharge. Patient stable alert and oriented. IVs and TELE removed. No complaints of pain @ this moment. Discussed discharge plan. Reviewed medications and side effects, appointments, and answered questions with patient and family. RX given to patient @ bedside. Pt's home O2 was delivered @ bedside. Pt left via wheelchair with son and daughter.

## 2022-12-14 NOTE — NURSING
Home Oxygen Evaluation    Date Performed: 2022    1) Patient's Home O2 Sat on room air, while at rest: 98%            2) Patient's O2 Sat on room air while exercisin%    Pt was placed on 2 L via nasal cannula O2 98%

## 2022-12-14 NOTE — ASSESSMENT & PLAN NOTE
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 Urine sodium and osmolality afterward increased. Uric acid normal.  12/8- holding diuretics  - Continue 0.8 - 1 L fluid restrictions  - strict ins/outs   12/9- lab - Na dropped w one dose of lasix.   12/10- tolvaptan x one dose, trial. Close Na monitoring  12/11 - na 123, will discuss w Nephrology.  12/12- Na, 130; possibly plan on tolvaptan 15 mg every other day after discharge.  Repeating tolvactan 15 mg today.   12/13- improved, Na 135, discuss outpt plan w Nephrology  12/14- na 136, dc to SNF

## 2022-12-14 NOTE — ASSESSMENT & PLAN NOTE
Patient's FSGs are controlled on current medication regimen.  Last A1c reviewed-   Lab Results   Component Value Date    HGBA1C 7.2 (H) 06/30/2022     Most recent fingerstick glucose reviewed-   Recent Labs   Lab 12/13/22  0805 12/13/22  1102 12/13/22 2049   POCTGLUCOSE 119* 145* 185*       Antihyperglycemics (From admission, onward)    Start     Stop Route Frequency Ordered    11/19/22 2154  insulin aspart U-100 pen 0-5 Units         -- SubQ Before meals & nightly PRN 11/19/22 2054        · Hold Oral hypoglycemics while patient is in the hospital.  · Does not take insulin, DM diet only    Recent Labs     12/12/22  1138 12/12/22  1640 12/12/22 2024 12/13/22  0805 12/13/22  1102 12/13/22 2049   POCTGLUCOSE 173* 167* 224* 119* 145* 185*

## 2022-12-14 NOTE — PLAN OF CARE
Problem: Adult Inpatient Plan of Care  Goal: Plan of Care Review  Outcome: Ongoing, Progressing  Goal: Patient-Specific Goal (Individualized)  Outcome: Ongoing, Progressing  Goal: Absence of Hospital-Acquired Illness or Injury  Outcome: Ongoing, Progressing  Goal: Optimal Comfort and Wellbeing  Outcome: Ongoing, Progressing  Goal: Readiness for Transition of Care  Outcome: Ongoing, Progressing     Problem: Fall Injury Risk  Goal: Absence of Fall and Fall-Related Injury  Outcome: Ongoing, Progressing     Problem: Skin Injury Risk Increased  Goal: Skin Health and Integrity  Outcome: Ongoing, Progressing     Problem: Diabetes Comorbidity  Goal: Blood Glucose Level Within Targeted Range  Outcome: Ongoing, Progressing     Problem: Impaired Wound Healing  Goal: Optimal Wound Healing  Outcome: Ongoing, Progressing     Problem: Activity Intolerance  Goal: Enhanced Capacity and Energy  Outcome: Ongoing, Progressing     Problem: Infection  Goal: Absence of Infection Signs and Symptoms  Outcome: Ongoing, Progressing     Problem: Fluid and Electrolyte Imbalance (Acute Kidney Injury/Impairment)  Goal: Fluid and Electrolyte Balance  Outcome: Ongoing, Progressing     Problem: Oral Intake Inadequate (Acute Kidney Injury/Impairment)  Goal: Optimal Nutrition Intake  Outcome: Ongoing, Progressing     Problem: Renal Function Impairment (Acute Kidney Injury/Impairment)  Goal: Effective Renal Function  Outcome: Ongoing, Progressing

## 2022-12-14 NOTE — NURSING
Report received pt care assumed. Pt is aox4 on 1L NC showing no signs of distress. Pt has had stable VS throughout shift with complaints of pain to her cocyx. Pt medicated per MAR. Pt remains in chair with legs locked, call bell and personal items within reach, daughter at bedside. will continue to follow care plan as outlined.

## 2022-12-14 NOTE — TELEPHONE ENCOUNTER
----- Message from Phi Bowen sent at 12/14/2022  8:48 AM CST -----  Contact: Katelynn [phone]  Katelynn jean  requesting a call to set up hosp f/u discharge today.    Please call and advise

## 2022-12-14 NOTE — DISCHARGE SUMMARY
Parth Bolanos - Cardiology [facility] Medicine  Discharge Summary      Patient Name: Peri Johansen  MRN: [id number]  LILY: [id number]  Patient Class: IP- Inpatient  Admission Date: 11/17/2022  Hospital Length of Stay: 24 days  Discharge Date and Time: No discharge date for patient encounter.  Attending Physician: Ximena Ramsey MD   Discharging Provider: Ximena Ramsey MD  Primary Care Provider: Annika Garland MD  Hospital Medicine Team: [facility]  Ximena Ramsey MD  Primary Care Team: [facility]     HPI:     Peri Johansen is a 88 y.o. female who has a past medical history of Aortic atherosclerosis, Arthritis, B-cell lymphoma, Diabetes mellitus, type II, Diverticulosis, GERD, Goiter, Hyperlipidemia, Hypertension, Joint pain, Leg pain, Osteoporosis, Renal cyst, Rickets, vitamin D deficiency, Thyroid nodule, and Vitamin D deficiency disease, presented to the ED with CC of Generalized fatigue.  Is a poor historian and unclear why she is at the hospital.  She states that someone told her to stop taking Lasix, but she does not remember who that was and how long ago that was, can not quantify it in days or weeks even.  She stated she did not have any chest pain at all, however appears that there was a comment of chest pain 10 on 10 in ED. troponin was mildly elevated at 0.171, however quickly reduced on next lab at 0.162.  BNP elevated at 925.  Denies any dysuria, but also a note in ED she has mild dysuria.  Denies hematuria but has +2 blood in urine, UA looks noninfectious.  CTA of the chest showed no interval detrimental change or new intra-abdominopelvic abnormality compared to most recent CT 11/11/2022, and no detrimental change in CTA of the aorta compared to most recent prior CTA aorta 09/14/2022.  Noting that she does have moderate to severe aortic atherosclerosis involving the branch vessels and moderate bilateral pleural effusions, stable when compared to prior 11/11/2022.  COVID, flu, RSV  negative.  Denies abdominal pain or diarrhea. Denies nausea or vomiting.       * No surgery found *      Hospital Course:   Started on furosemide IV 40 mg BID. UOP not adequate. Escalated to 80 mg BID, but missed a few doses due to loss of IV access. Once she was restarted on consistent IV diuretic administration, stillv elbert hard to diurese. Volume exam slowly improving. Patient complaining of epiagastric abdominal discomfort with associated SOB. Started her on aggressive bowel regimen and reglan. Some relief with BM. X-ray of abdomen unremarkable for obstruction or ileus or constipation. US of abdomen showed adherent gallstone. Abdomen pain completely resolved during period of NPO. SOB improving but continues to have nonproductive cough.  On 11/27, patient noted to be satting in mid to upper 80's on RA which is new for her.  She was placed on supplemental O2.  Repeat CXR ordered which demonstrated worsening pulm edema.  Her UO was also starting to decrease as well.  Lasix adjusted. And metolazone added.  Azithromycin added as well.  The patient was finally weaned off of supplemental O2 and her cough has improved significantly. Develop hypoantremia and hypotension as approached euvolemia. Nephrology consulted for hyponatmremia. Went to ICU. Received pressure support. Differing opinions on wether she is hypervolemic and hypovolemic. Cardiology and nephrology recommending holding diuresis. However she remains on strict fluid restriction. If starting on oral diuresis. Need to watch for response as likely oral furosemide not working.  Interval History:  12/8- /58  Pulse 86   SpO2 98% Did not sleep well,, agitated and complaining of pain she described as someone sitting on her chest last night.   + 3 unmeasured.  Holding diuresis now.  Trop 0.6 ( chronically up), Na 129,     12/9- BNP  >1400, trop 0.6-> 0.4. /64   Pulse 74, SpO2 93% .  On cefepime empirically for pneumonia- stop day 12/12 -  Cr 0.7->  1.1, Na 128 w volume overload, replace mag and phos. BNP > 1400. + jvd. Will resume low dose losartan. Lasix 40 IV given with diuresis.  Repeating a Na level after diuresis to see if Na worse or improved.   12/10- na dropped with one dose of lasix, now Na 123. /66   Pulse 82 . Cr 1.0 on losartan. Discussed w , nephrology, will give  Tolvaptan x one dose.   12/11- Na 123. K 5.2. cr 0.8, Phos 2.5 (improving), Chatted w nephrology. On Fluid restriction 750, considering a second dose of tolvactan. /53 VSS, She is up in chair, talking and interactive. No SOB.   12/12 - na 130, improved after second dose of tolvactam, off diruetics, She has been getting up to chair, eating well. Phos 2.6 (rising, still low) on neutrophos. /55  Pulse 86   Temp 98.1 VSS.  One stool, good UO.  She wants to go home. possibly plan on tolvaptan 15 mg every other day after discharge. Appreciate Nephrology input and f/u.  Receiving tolvactan inpt today. Has small DU sacrum, being attended to by nursing, pt up in chair today. Discussed her care and condition, explained all her medical problems w Son and DTR who are at bedside.   12/13 - currently enrolled in the [facility](HFTCC) program. /56   Pulse 76  Na 135. Will discuss w Nephrology. Need outpatient plan: Outpt SNF with tolvactan qod and weekly lab.     12/14- /57   Pulse 76 . Na 136. Plan to dc to SNF today.              Goals of Care Treatment Preferences:  Code Status: Full Code      Consults:   Consults (From admission, onward)        Status Ordering Provider     Inpatient consult to Nephrology  Once        Provider:  (Not yet assigned)    Completed MARGE SHARP     Inpatient consult to Midline team  Once        Provider:  (Not yet assigned)    Completed VICKIE STAFFORD     Inpatient consult to Midline team  Once        Provider:  (Not yet assigned)    Completed MARGE SHARP     Inpatient consult to Social  Work/Case Management  Once        Provider:  (Not yet assigned)    Acknowledged MARGE SHARP     Inpatient consult to Registered Dietitian/Nutritionist  Once        Provider:  (Not yet assigned)    Completed MARGE SHARP.          * Shock, unspecified  Transferred to ICU, and back to [facility]  Due to sepsis versus hypovolemia  Cultures negative  Patient was briefly on vasopressors  Not given fluids while in ICU  Infectious work up negative  Continue cefepime empirically for 7 days    12/8 - BP  118/58, stable, on cefepine. Cultures neg so far. Trop 0.6 this am- repeat EKG and trop level  12/9 - trop chronically up but declining. BNP high, diuretics being held. BP is improving.   12/10 - BP stable on losartan, unable to tolerate lasix, na 123  12/12- stable    CKD (chronic kidney disease), stage IV  Patient with acute kidney injury likely due to cardiogenic PUJA is currently worsening. Labs reviewed- Renal function/electrolytes with Estimated Creatinine Clearance: 32.1 mL/min (based on SCr of 0.9 mg/dL). according to latest data. Monitor urine output and serial BMP and adjust therapy as needed. Avoid nephrotoxins and renally dose meds for GFR listed above.    12/9- Cr 0.7-> 1.1, volume overload, BNP > 1400. Add lasix IV 40 x one. She did diurese. Chat w nephrology - They think  she is dry.  Repeat  BMP.     12/10- cr 1.0,stable  CKD per US 2019.  Unable to tolerate lasix due to hyponatremia.  If pt gets SOB may need volume removal.  Discuss w Nephrology.   12/12- stable      Hyponatremia  Urine studies indicative of SIADH or salt wasting  Nephrology consulted- gave NS with improved of sodium 120-130. Urine sodium and osmolality afterward increased. Uric acid normal.  12/8- holding diuretics  - Continue 0.8 - 1 L fluid restrictions  - strict ins/outs   12/9- lab - Na dropped w one dose of lasix.   12/10- tolvaptan x one dose, trial. Close Na monitoring  12/11 - na 123, will discuss w Nephrology.  12/12- Na, 130;  possibly plan on tolvaptan 15 mg every other day after discharge.  Repeating tolvactan 15 mg today.   12/13- improved, Na 135, discuss outpt plan w Nephrology  12/14- na 136, dc to SNF    Pleural effusion  - Not seen on imaging from earlier this year.  - Most likely related to HFpEF  - Treat HF. diuresis if tolerated. If becomes SOB may need volume removal, by thoracentesis or HD.     Acute on chronic diastolic heart failure  Moderate left atrial enlargement  Right ventricular hypertrophy  Pulmonary hypertension  ECHO 11/19 showed EF 58%, Grade III DD. Moderate left atrial enlargement. Normal right ventricular size with low normal right ventricular systolic function. Moderate to severe tricuspid regurgitation. Bilateral pleural effusions.  Low BP's hold Beta Blocker, ACE/ARB and continue IV Furosemide and monitor clinical status closely. Monitor on telemetry. Patient is on CHF pathway.  Monitor strict Is&Os and daily weights.  Place on fluid restriction of 1.5 L. Continue to stress to patient importance of self efficacy and  on diet for CHF. Last BNP reviewed- and noted below   BNP  Recent Labs   Lab 12/08/22  1555   BNP 1,488*     Patient very hard to diurese. Furosemide increased and metolazone added. Stopped when hyponatremia worsen. Patient transitioned to bumex to 2 mg BID. On hold due to development of sodium 120 and hypotension. Cardiology consulted for further diuretic recommendations.     12/8- Naq 129. Lasix held. Euvoemic, and net neg fluid balance. Once sodium recovers, would advise HCTZ as primary diuretic with a loop diuretic as needed for weight gain. Today's weight was 44.4 kg, which we can consider the new dry weight.   - - Echo (11/19) showed grade III left ventricular diastolic dysfunction, mild-moderate pulmonary HTN, and ejection fraction of 58%. EF on most recent ECHO 35%   - patient and family educated on daily weights at home.  Add GDMT later on for HF as tolerated. Would hold off on  ACEi/ARBs/diuretics/aldactone right now given electrolyte abnormalities.   - repeating BNP > 1400,  and trop levels 0.2-> 0.6 -> 0.4    12/10- not able to tolerate lasix due to hyponatremia  12/11- symptoms controlled, was up in a chair much of the day yesterday  12/12- this is as good as we can do. CHF + CKD + pulm HTN. Will have chronic edema, pleural effusions    Cough in adult  - no evidence of respiratory infection  - dextromethorphan and guaifenesin  - continue diuresing. (not worsened with 1L of fluids)  - add abx to cover respiratory pathogen   - albuterol QID   - slowly improving  12/8- due to effusion  - stable    Diabetes mellitus  Patient's FSGs are controlled on current medication regimen.  Last A1c reviewed-   Lab Results   Component Value Date    HGBA1C 7.2 (H) 06/30/2022     Most recent fingerstick glucose reviewed-   Recent Labs   Lab 12/13/22  0805 12/13/22  1102 12/13/22 2049   POCTGLUCOSE 119* 145* 185*       Antihyperglycemics (From admission, onward)    Start     Stop Route Frequency Ordered    11/19/22 2154  insulin aspart U-100 pen 0-5 Units         -- SubQ Before meals & nightly PRN 11/19/22 2054        · Hold Oral hypoglycemics while patient is in the hospital.  · Does not take insulin, DM diet only    Recent Labs     12/12/22  1138 12/12/22  1640 12/12/22  2024 12/13/22  0805 12/13/22  1102 12/13/22 2049   POCTGLUCOSE 173* 167* 224* 119* 145* 185*         Hypophosphatemia  12/9- neutraphos x 10 days  12/12- phos 1.6-> 2.4 -> 2.5 -> 2.6, refeeding      Physical deconditioning  - consult PT to see patient  - get patient up to chair with meals    Hyperthyroidism  · Continue home methimazole      Goals of care, counseling/discussion  Advance Care Planning   Does patient have Capacity? yes  Contact: Romario Majano  Goals/Wishes: wants to go home  Code Status- FULL  Pain management- no pain  Prognosis-  Guarded and poor: unable to tolerate lasix due to hyponatremia in setting of CHF.   Family  discussions- 12/9- Called and updated [facility]   Functional Status - poor mobility, high FALL risk    Post acute care plan:  home  Time spent on Goals of Care:  10 mins on 12/9.          Irritant contact dermatitis due to fecal, urinary or dual incontinence  Moisture barrier cream      Chest pain  Am of 12/8, trop 0.6 but chronically up.   Repeat trop , EKG, BNP  - see above        Final Active Diagnoses:    Diagnosis Date Noted POA    PRINCIPAL PROBLEM:  Shock, unspecified [R57.9] 12/04/2022 Yes    CKD (chronic kidney disease), stage IV [N18.4] 12/09/2022 Yes    Hyponatremia [E87.1] 09/07/2022 Yes    Pleural effusion [J90] 11/24/2022 Yes    Acute on chronic diastolic heart failure [I50.33] 11/18/2022 Yes    Cough in adult [R05.9] 11/26/2022 No    Diabetes mellitus [E11.9] 06/12/2019 Yes     Chronic    Hypophosphatemia [E83.39] 12/09/2022 No    Physical deconditioning [R53.81] 09/15/2022 Yes    Hyperthyroidism [E05.90] 04/15/2019 Yes     Chronic    Goals of care, counseling/discussion [Z71.89] 12/09/2022 Not Applicable    Irritant contact dermatitis due to fecal, urinary or dual incontinence [L24.A2] 11/29/2022 Yes    Chest pain [R07.9] 01/21/2022 Yes      Problems Resolved During this Admission:    Diagnosis Date Noted Date Resolved POA    Hypoxemia [R09.02] 11/27/2022 12/04/2022 No       Discharged Condition: good    Disposition: Home or Self Care    Follow Up:   Follow-up Information     Annika Garland MD Follow up.    Specialty: Internal Medicine  Contact information:  [address]  [facility] [address]  [phone]                       Patient Instructions:      Ambulatory referral/consult to Nephrology   Standing Status: Future   Referral Priority: Routine Referral Type: Consultation   Referral Reason: Specialty Services Required   Requested Specialty: Nephrology   Number of Visits Requested: 1       Significant Diagnostic Studies: Labs:   CMP   Recent Labs   Lab 12/13/22  0514  12/14/22 0334   * 135*   K 4.1 4.5    104   CO2 23 25   * 174*   BUN 23 24*   CREATININE 0.7 0.9   CALCIUM 9.5 9.3   ANIONGAP 7* 6*    and CBC   Recent Labs   Lab 12/13/22 0514 12/14/22 0334   WBC 5.00 5.86   HGB 11.1* 11.4*   HCT 33.9* 34.6*    279       Pending Diagnostic Studies:     Procedure Component Value Units Date/Time    CBC Auto Differential [[id number]] Collected: 11/24/22 0352    Order Status: Sent Lab Status: In process Updated: 11/24/22 0352    Specimen: Blood     CBC Auto Differential [[id number]] Collected: 11/21/22 0641    Order Status: Sent Lab Status: In process Updated: 11/21/22 0641    Specimen: Blood     Narrative:      Collection has been rescheduled by [email or web address] at 11/21/2022 06:15 Reason:   Unable to collect/attempted finger stick w/ no success/notified nurse   Jamesionne    Comprehensive Metabolic Panel [[id number]] Collected: 11/25/22 0308    Order Status: Sent Lab Status: In process Updated: 11/25/22 0308    Specimen: Blood     Comprehensive Metabolic Panel [[id number]] Collected: 11/24/22 0352    Order Status: Sent Lab Status: In process Updated: 11/24/22 0352    Specimen: Blood     Comprehensive Metabolic Panel [[id number]] Collected: 11/21/22 0641    Order Status: Sent Lab Status: In process Updated: 11/21/22 0641    Specimen: Blood     Narrative:      Collection has been rescheduled by [email or web address] at 11/21/2022 06:15 Reason:   Unable to collect/attempted finger stick w/ no success/notified nurse   Terrionne    Cortisol [[id number]] Collected: 12/04/22 2132    Order Status: Sent Lab Status: In process Updated: 12/04/22 2133    Specimen: Blood     Hemoglobin A1C [[id number]] Collected: 11/25/22 0308    Order Status: Sent Lab Status: In process Updated: 11/25/22 0308    Specimen: Blood     Magnesium [[id number]] Collected: 12/09/22 0421    Order Status: Sent Lab Status: In process Updated: 12/09/22 0422    Specimen: Blood     Magnesium [[id number]] Collected: 11/25/22 0308     Order Status: Sent Lab Status: In process Updated: 11/25/22 0308    Specimen: Blood     Magnesium [[id number]] Collected: 11/24/22 0352    Order Status: Sent Lab Status: In process Updated: 11/24/22 0352    Specimen: Blood     Magnesium [[id number]] Collected: 11/21/22 0641    Order Status: Sent Lab Status: In process Updated: 11/21/22 0641    Specimen: Blood     Narrative:      Collection has been rescheduled by [email or web address] at 11/21/2022 06:15 Reason:   Unable to collect/attempted finger stick w/ no success/notified nurse   Sommer    Phosphorus [[id number]] Collected: 11/25/22 0308    Order Status: Sent Lab Status: In process Updated: 11/25/22 0308    Specimen: Blood     Phosphorus [[id number]] Collected: 11/24/22 0352    Order Status: Sent Lab Status: In process Updated: 11/24/22 0352    Specimen: Blood     Phosphorus [[id number]] Collected: 11/21/22 0641    Order Status: Sent Lab Status: In process Updated: 11/21/22 0641    Specimen: Blood     Narrative:      Collection has been rescheduled by [email or web address] at 11/21/2022 06:15 Reason:   Unable to collect/attempted finger stick w/ no success/notified nurse   Sommer    Vitamin D [[id number]] Collected: 11/24/22 0352    Order Status: Sent Lab Status: In process Updated: 11/24/22 0352    Specimen: Blood          Medications:  Reconciled Home Medications:      Medication List      START taking these medications    ergocalciferol 50,000 unit Cap  Commonly known as: ERGOCALCIFEROL  Take 1 capsule (50,000 Units total) by mouth every 7 days.  Replaces: ergocalciferol (vitamin D2) 10 mcg (400 unit) Tab     nitroGLYCERIN 0.4 MG SL tablet  Commonly known as: NITROSTAT  Place 1 tablet (0.4 mg total) under the tongue every 5 (five) minutes as needed for Chest pain.     tolvaptan 15 mg Tab  Commonly known as: SAMSCA  Take 1 tablet (15 mg total) by mouth every other day.  Start taking on: December 15, 2022        CHANGE how you take these medications    amiodarone 200 MG Tab  Commonly known as:  PACERONE  Take 1 tablet (200 mg total) by mouth once daily.  What changed: when to take this     losartan 25 MG tablet  Commonly known as: COZAAR  Take 0.5 tablets (12.5 mg total) by mouth once daily.  What changed:   · how much to take  · additional instructions        CONTINUE taking these medications    acetaminophen 500 MG tablet  Commonly known as: TYLENOL  Take 500 mg by mouth daily as needed for Pain.     albuterol 90 mcg/actuation inhaler  Commonly known as: PROVENTIL/VENTOLIN HFA  Inhale 2 puffs into the lungs every 6 (six) hours as needed.     aspirin 81 MG Chew  Take 1 tablet (81 mg total) by mouth once daily.     atorvastatin 40 MG tablet  Commonly known as: LIPITOR  TAKE 1 TABLET BY MOUTH EVERY DAY     blood-glucose meter kit  Use as instructed.  ACCUCHECK or whatever is preferred by insurance     dextran 70-hypromellose ophthalmic solution  Commonly known as: ARTIFICIAL TEARS([id number])  Place 1 drop into both eyes 4 (four) times daily as needed.     dextromethorphan-guaiFENesin  mg/5 ml  mg/5 mL liquid  Commonly known as: ROBITUSSIN-DM  Take 10 mLs by mouth every 6 (six) hours as needed for Cough.     diclofenac sodium 1 % Gel  Commonly known as: VOLTAREN  APPLY 2 GRAMS EXTERNALLY TO THE AFFECTED AREA FOUR TIMES DAILY     gabapentin 300 MG capsule  Commonly known as: NEURONTIN  TAKE 1 CAPSULE BY MOUTH TWICE DAILY     ICY HOT TOP  Apply topically 2 (two) times daily as needed (Pain).     magnesium oxide 400 mg (241.3 mg magnesium) tablet  Commonly known as: MAG-OX  Take 1 tablet by mouth once daily.     methIMAzole 5 MG Tab  Commonly known as: TAPAZOLE  TAKE 1/2 TABLET BY MOUTH DAILY     * MICRO THIN LANCETS 33 gauge Misc  Generic drug: lancets  USE AS DIRECTED     * lancets Misc  Test 2 x daily     multivitamin per tablet  Commonly known as: THERAGRAN  Take 1 tablet by mouth once daily.     omeprazole 40 MG capsule  Commonly known as: PRILOSEC  Take 1 capsule (40 mg total) by mouth  every morning.     ondansetron 4 MG Tbdl  Commonly known as: ZOFRAN-ODT  Take 1 tablet (4 mg total) by mouth every 8 (eight) hours as needed (Nausea or vomiting).     potassium chloride 10 MEQ Tbsr  Commonly known as: KLOR-CON  Take 2 tablets (20 mEq total) by mouth once daily.     traMADoL 50 mg tablet  Commonly known as: ULTRAM  Take 1 tablet (50 mg total) by mouth every 12 (twelve) hours as needed for Pain.     * TRUE METRIX GLUCOSE TEST STRIP Strp  Generic drug: blood sugar diagnostic  USE AS DIRECTED     * blood sugar diagnostic Strp  Test 2 times daily         * This list has 4 medication(s) that are the same as other medications prescribed for you. Read the directions carefully, and ask your doctor or other care provider to review them with you.            STOP taking these medications    ergocalciferol (vitamin D2) 10 mcg (400 unit) Tab  Replaced by: ergocalciferol 50,000 unit Cap     furosemide 40 MG tablet  Commonly known as: LASIX            Indwelling Lines/Drains at time of discharge:   Lines/Drains/Airways     Drain  Duration           Female External Urinary Catheter 12/07/22 0800 6 days                Time spent on the discharge of patient: 35 minutes         Ximena Ramsey MD  Senior Hospitalist  Department of Hospital Medicine  [facility]  [id number], [phone]    Parth Bolanos - Cardiology Stepdown

## 2022-12-14 NOTE — ASSESSMENT & PLAN NOTE
Patient with acute kidney injury likely due to cardiogenic PUJA is currently worsening. Labs reviewed- Renal function/electrolytes with Estimated Creatinine Clearance: 32.1 mL/min (based on SCr of 0.9 mg/dL). according to latest data. Monitor urine output and serial BMP and adjust therapy as needed. Avoid nephrotoxins and renally dose meds for GFR listed above.    12/9- Cr 0.7-> 1.1, volume overload, BNP > 1400. Add lasix IV 40 x one. She did diurese. Chat w nephrology - They think  she is dry.  Repeat  BMP.     12/10- cr 1.0,stable  CKD per US 2019.  Unable to tolerate lasix due to hyponatremia.  If pt gets SOB may need volume removal.  Discuss w Nephrology.   12/12- stable

## 2022-12-14 NOTE — TELEPHONE ENCOUNTER
Specialty Pharmacy - Initial Clinical Assessment    Specialty Medication Orders Linked to Encounter      Flowsheet Row Most Recent Value   Medication #1 tolvaptan (SAMSCA) 15 mg Tab (Order#[id number], Rx#[id number])          Patient Diagnosis   E87.1 - Hyponatremia    Subjective    Peri Johansen is a 88 y.o. female, who is followed by the specialty pharmacy service for management and education.    Recent Encounters       Date Type Provider Description    12/14/2022 Specialty Pharmacy Axel Trivedi PharmD Initial Clinical Assessment    12/13/2022 Specialty Pharmacy Axel Trivedi PharmD Referral Authorization          Clinical call attempts since last clinical assessment   No call attempts found.     Current Outpatient Medications   Medication Sig    amiodarone (PACERONE) 200 MG Tab Take 1 tablet (200 mg total) by mouth once daily.    ergocalciferol (ERGOCALCIFEROL) 50,000 unit Cap Take 1 capsule (50,000 Units total) by mouth every 7 days.    losartan (COZAAR) 25 MG tablet Take 0.5 tablets (12.5 mg total) by mouth once daily.    nitroGLYCERIN (NITROSTAT) 0.4 MG SL tablet Place 1 tablet (0.4 mg total) under the tongue every 5 (five) minutes as needed for Chest pain.    [START ON 12/15/2022] tolvaptan (SAMSCA) 15 mg Tab Take 1 tablet (15 mg total) by mouth every other day.   Last reviewed on 11/18/2022  1:45 PM by Ese Quinonez, RN    Review of patient's allergies indicates:   Allergen Reactions    Latex, natural rubber Itching    Latex Hives   Last reviewed on  11/18/2022 1:43 PM by Ese Quinonez    Drug Interactions    Drug interactions evaluated: yes  Clinically relevant drug interactions identified: no  Provided the patient with educational material regarding drug interactions: not applicable           Assessment Questions - Documented Responses      Flowsheet Row Most Recent Value   Assessment    Medication Reconciliation completed for patient No   During the past 4 weeks, has patient missed any activities due to  condition or medication? No   During the past 4 weeks, did patient have any of the following urgent care visits? Hospital Admission   Goals of Therapy Status Partially achieving   Status of the patients ability to self-administer: Caregiver to administer  [with family member Zoltan]   All education points have been covered with patient? No, patient declined- printed education provided  [continuation upon discharge]   Welcome packet contents reviewed and discussed with patient? Yes   Assesment completed? Yes   Plan Therapy continued   Do you need to open a clinical intervention (i-vent)? No   Do you want to schedule first shipment? Yes   Medication #1 Assessment Info    Patient status Existing medication, New to [facility]   Is this medication appropriate for the patient? Yes   Is this medication effective? Not yet started  [continuation upon discharge]          Refill Questions - Documented Responses      Flowsheet Row Most Recent Value   Patient Availability and HIPAA Verification    Does patient want to proceed with activity? Yes   HIPAA/medical authority confirmed? Yes   Relationship to patient of person spoken to? Family Member   Refill Screening Questions    When does the patient need to receive the medication? 12/15/22   Refill Delivery Questions    How will the patient receive the medication? Laura  [[facility]]   When does the patient need to receive the medication? 12/15/22   Expected Copay ($) 1.95   Is the patient able to afford the medication copay? Yes   Payment Method new CC added to file   Days supply of Refill 30   Supplies needed? No supplies needed   Refill activity completed? Yes   Refill activity plan Refill scheduled   Shipment/Pickup Date: 12/14/22            Objective    She has a past medical history of Aortic atherosclerosis (1/7/2016), Arthritis, Chronic diastolic congestive heart failure (9/15/2022), Colon polyp: hyperplastic 2015- repeat 2020 (8/6/2015), Diabetes mellitus, type II  "(8/16/2012), Diverticulosis, Gastric nodule (8/7/2014), GERD (gastroesophageal reflux disease) (8/16/2012), Goiter (8/16/2012), Hyperlipidemia (8/16/2012), Hypertension, Joint pain, Leg pain (8/16/2012), Lymphoma (8/16/2012), Osteopenia (8/16/2012), Osteoporosis, Renal cyst (3/28/2013), Rickets, vitamin D deficiency (8/16/2012), Thyroid nodule (2/24/2014), and Vitamin D deficiency disease (8/16/2012).    Tried/failed medications: Continuation upon discharge    BP Readings from Last 4 Encounters:   12/14/22 (!) 110/53   11/02/22 110/60   09/16/22 100/60   09/07/22 (!) 102/59     Ht Readings from Last 4 Encounters:   12/12/22 4' 11" (1.499 m)   11/02/22 4' 11" (1.499 m)   09/15/22 (P) 4' 11" (1.499 m)   09/07/22 4' 11" (1.499 m)     Wt Readings from Last 4 Encounters:   12/10/22 47.1 kg (103 lb 13.4 oz)   11/02/22 44.9 kg (99 lb)   09/15/22 52.2 kg (115 lb 1.3 oz)   09/07/22 52.2 kg (115 lb)       The goals of prescribed drug therapy management include:  Supporting patient to meet the prescriber's medical treatment objectives  Improving or maintaining quality of life  Maintaining optimal therapy adherence  Minimizing and managing side effects      Goals of Therapy Status: Partially achieving    Assessment/Plan  Patient plans to continue therapy without changes      Indication, dosage, appropriateness, effectiveness, safety and convenience of her specialty medication(s) were reviewed today.     Patient Education   Pharmacist offer to  patient was declined. Printed educational materials will be provided with medication.  Patient did accept verbal education on the following topics:  · Expectations and possible outcomes of therapy  · Duration of therapy  · Contraindications and safety precautions    Discuss continuation of therapy upon discharge with family member Zoltan.     Tasks added this encounter   No tasks added.   Tasks due within next 3 months   12/14/2022 - Clinical - Initial Assessment (Manual Add) "     Axel Trivedi, PharmD  [facility]  [address] [facility] [facility] [address]  Phone: [phone]  Fax: [phone]

## 2022-12-14 NOTE — ASSESSMENT & PLAN NOTE
Transferred to ICU, and back to [facility]  Due to sepsis versus hypovolemia  Cultures negative  Patient was briefly on vasopressors  Not given fluids while in ICU  Infectious work up negative  Continue cefepime empirically for 7 days    12/8 - BP  118/58, stable, on cefepine. Cultures neg so far. Trop 0.6 this am- repeat EKG and trop level  12/9 - trop chronically up but declining. BNP high, diuretics being held. BP is improving.   12/10 - BP stable on losartan, unable to tolerate lasix, na 123  12/12- stable

## 2022-12-14 NOTE — PT/OT/SLP PROGRESS
Occupational Therapy      Patient Name:  Peri Johansen   MRN:  [id number]    Per chart review, pt to benefit from cont OT services. Pt able to reach POC in subsequent OT visits. OT to check status at later date.     12/14/2022

## 2022-12-14 NOTE — PLAN OF CARE
[facility] Cardiology Stepdown      HOME HEALTH ORDERS  FACE TO FACE ENCOUNTER    Patient Name: Peri Johansen  YOB: 1934    PCP: Annika Garland MD   PCP Address: [address] [facility] [place] [address]  PCP Phone Number: [phone]  PCP Fax: [phone]    Encounter Date: 11/17/22    Admit to Home Health    Diagnoses:  Active Hospital Problems    Diagnosis  POA    *Shock, unspecified [R57.9]  Yes     Priority: 1 - High    CKD (chronic kidney disease), stage IV [N18.4]  Yes     Priority: 1 - High    Hyponatremia [E87.1]  Yes     Priority: 1 - High    Pleural effusion [J90]  Yes     Priority: 2     Acute on chronic diastolic heart failure [I50.33]  Yes     Priority: 2     Cough in adult [R05.9]  No     Priority: 4     Diabetes mellitus [E11.9]  Yes     Priority: 4      Chronic    Hypophosphatemia [E83.39]  No     Priority: 5     Physical deconditioning [R53.81]  Yes     Priority: 8     Hyperthyroidism [E05.90]  Yes     Priority: 9      Chronic    Goals of care, counseling/discussion [Z71.89]  Not Applicable    Irritant contact dermatitis due to fecal, urinary or dual incontinence [L24.A2]  Yes    Chest pain [R07.9]  Yes      Resolved Hospital Problems    Diagnosis Date Resolved POA    Hypoxemia [R09.02] 12/04/2022 No       Follow Up Appointments:  Future Appointments   Date Time Provider Department Center   12/15/2022  8:55 AM LAB, APPOINTMENT [facility] LAB VNP [facility]   12/15/2022  9:30 AM Angelica Mariscal PA-C [facility]   12/15/2022  9:30 AM [facility] HEART FAILURE NURSE [facility]   12/20/2022 10:00 AM Annika Garland MD [facility] IM [facility]   12/29/2022  1:30 PM José Miguel Avlarez MD [facility] NEPHRO [facility]   1/12/2023  9:30 AM Chitra Wang MD [facility] CARDIO [facility]   3/16/2023  3:00 PM Jarvis Lagos OD [facility] OPTOMTY [facility]       Allergies:  Review of patient's allergies indicates:   Allergen Reactions    Latex, natural rubber Itching    Latex Hives       Medications:  Review discharge medications with patient and family and provide education.    Current Facility-Administered Medications   Medication Dose Route Frequency Provider Last Rate Last Admin    acetaminophen tablet 650 mg  650 mg Oral Q6H PRN Ximena Ramsey MD   650 mg at 12/14/22 0112    amiodarone tablet 200 mg  200 mg Oral Daily Rosa Isela Main MD   200 mg at 12/14/22 0833    aspirin chewable tablet 81 mg  81 mg Oral Daily Luis F Stephenson MD   81 mg at 12/14/22 0834    atorvastatin tablet 40 mg  40 mg Oral Daily Luis F Stephenson MD   40 mg at 12/14/22 0834    benzonatate capsule 100 mg  100 mg Oral TID PRN Marnie Klein NP   100 mg at 12/09/22 2134    bisacodyL suppository 10 mg  10 mg Rectal Daily PRN Rosa Isela Main MD        dextromethorphan-guaiFENesin  mg/5 ml liquid 5 mL  5 mL Oral Q4H PRN Allyn Severino MD   5 mL at 12/05/22 2125    enoxaparin injection 30 mg  30 mg Subcutaneous Daily Pj Joe MD   30 mg at 12/13/22 1651    ergocalciferol capsule 50,000 Units  50,000 Units Oral Q7 Days Ros aIsela Main MD   50,000 Units at 12/08/22 0839    glucagon (human recombinant) injection 1 mg  1 mg Intramuscular PRN Luis F Stephenson MD        glucose chewable tablet 16 g  16 g Oral PRN Luis F Stephenson MD        glucose chewable tablet 24 g  24 g Oral PRN Luis F Stephenson MD        hydrOXYzine pamoate capsule 25 mg  25 mg Oral Q6H PRN Dominick Montes De Oca MD   25 mg at 12/08/22 0229    insulin aspart U-100 pen 0-5 Units  0-5 Units Subcutaneous QID (AC + HS) PRN Dominick Montes De Oca MD   1 Units at 12/12/22 2152    LIDOcaine 5 % patch 1 patch  1 patch Transdermal Daily PRN Marnie Klein NP   1 patch at 12/05/22 2126    losartan split tablet 12.5 mg  12.5 mg Oral Daily Ximena Ramsey MD   12.5 mg at 12/14/22 0833    melatonin tablet 6 mg  6 mg Oral Nightly PRN Barby Bingham PA-C   6 mg at 12/14/22 0112    methIMAzole split tablet 2.5 mg  2.5 mg Oral Daily Luis F Stephenson MD   2.5 mg at 12/14/22 0898     naloxone 0.4 mg/mL injection 0.02 mg  0.02 mg Intravenous PRN Luis F Stephenson MD        nitroGLYCERIN SL tablet 0.4 mg  0.4 mg Sublingual Q5 Min PRN Dominick Montes De Oca MD   0.4 mg at 12/08/22 0238    ondansetron injection 8 mg  8 mg Intravenous Q6H PRN Dominick Montes De Oca MD   8 mg at 12/09/22 1231    pantoprazole EC tablet 40 mg  40 mg Oral Daily Rosa Isela Main MD   40 mg at 12/14/22 0834    polyethylene glycol packet 17 g  17 g Oral BID PRN Luis F Stephenson MD   17 g at 12/08/22 1649    sodium chloride 0.9% flush 10 mL  10 mL Intravenous Q12H PRN Luis F Stephenson MD         Current Discharge Medication List        START taking these medications    Details   ergocalciferol (ERGOCALCIFEROL) 50,000 unit Cap Take 1 capsule (50,000 Units total) by mouth every 7 days.  Qty: 12 capsule, Refills: 3      nitroGLYCERIN (NITROSTAT) 0.4 MG SL tablet Place 1 tablet (0.4 mg total) under the tongue every 5 (five) minutes as needed for Chest pain.  Qty: 25 tablet, Refills: 0      tolvaptan (SAMSCA) 15 mg Tab Take 1 tablet (15 mg total) by mouth every other day.  Qty: 15 tablet, Refills: 0           CONTINUE these medications which have CHANGED    Details   amiodarone (PACERONE) 200 MG Tab Take 1 tablet (200 mg total) by mouth once daily.  Qty: 30 tablet, Refills: 5      losartan (COZAAR) 25 MG tablet Take 0.5 tablets (12.5 mg total) by mouth once daily.  Qty: 45 tablet, Refills: 3    Comments: .           CONTINUE these medications which have NOT CHANGED    Details   aspirin 81 MG Chew Take 1 tablet (81 mg total) by mouth once daily.    Associated Diagnoses: Aortic atherosclerosis      atorvastatin (LIPITOR) 40 MG tablet TAKE 1 TABLET BY MOUTH EVERY DAY  Qty: 90 tablet, Refills: 0      gabapentin (NEURONTIN) 300 MG capsule TAKE 1 CAPSULE BY MOUTH TWICE DAILY  Qty: 180 capsule, Refills: 1      methIMAzole (TAPAZOLE) 5 MG Tab TAKE 1/2 TABLET BY MOUTH DAILY  Qty: 45 tablet, Refills: 0      multivitamin (THERAGRAN) per tablet Take 1 tablet by  mouth once daily.      traMADoL (ULTRAM) 50 mg tablet Take 1 tablet (50 mg total) by mouth every 12 (twelve) hours as needed for Pain.  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      albuterol (PROVENTIL/VENTOLIN HFA) 90 mcg/actuation inhaler Inhale 2 puffs into the lungs every 6 (six) hours as needed.      !! blood sugar diagnostic (TRUE METRIX GLUCOSE TEST STRIP) Strp USE AS DIRECTED  Qty: 150 strip, Refills: 12    Comments: **Patient requests 90 days supply**      !! blood sugar diagnostic Strp Test 2 times daily  Qty: 200 strip, Refills: 3    Associated Diagnoses: Type 2 diabetes mellitus with hyperglycemia, without long-term current use of insulin      blood-glucose meter kit Use as instructed.  ACCUCHECK or whatever is preferred by insurance  Qty: 1 each, Refills: 0      dextran 70-hypromellose (ARTIFICIAL TEARS,[id number],) ophthalmic solution Place 1 drop into both eyes 4 (four) times daily as needed.  Qty: 1 Bottle, Refills: 5      dextromethorphan-guaiFENesin  mg/5 ml (ROBITUSSIN-DM)  mg/5 mL liquid Take 10 mLs by mouth every 6 (six) hours as needed for Cough.      diclofenac sodium (VOLTAREN) 1 % Gel APPLY 2 GRAMS EXTERNALLY TO THE AFFECTED AREA FOUR TIMES DAILY  Qty: 100 g, Refills: 1    Associated Diagnoses: Lumbar degenerative disc disease      !! lancets Misc Test 2 x daily  Qty: 200 each, Refills: 3    Associated Diagnoses: Type 2 diabetes mellitus with hyperglycemia, without long-term current use of insulin      magnesium oxide (MAG-OX) 400 mg (241.3 mg magnesium) tablet Take 1 tablet by mouth once daily.      methyl salicylate/menthol (ICY HOT TOP) Apply topically 2 (two) times daily as needed (Pain).      !! MICRO THIN LANCETS 33 gauge Misc USE AS DIRECTED  Refills: 0      omeprazole (PRILOSEC) 40 MG capsule Take 1 capsule  (40 mg total) by mouth every morning.  Qty: 90 capsule, Refills: 1    Comments: **Patient requests 90 days supply**      ondansetron (ZOFRAN-ODT) 4 MG TbDL Take 1 tablet (4 mg total) by mouth every 8 (eight) hours as needed (Nausea or vomiting).  Qty: 12 tablet, Refills: 0      potassium chloride (KLOR-CON) 10 MEQ TbSR Take 2 tablets (20 mEq total) by mouth once daily.  Qty: 30 tablet, Refills: 11       !! - Potential duplicate medications found. Please discuss with provider.        STOP taking these medications       furosemide (LASIX) 40 MG tablet Comments:   Reason for Stopping:         ergocalciferol, vitamin D2, 10 mcg (400 unit) Tab Comments:   Reason for Stopping:                 I have seen and examined this patient within the last 30 days. My clinical findings that support the need for the home health skilled services and home bound status are the following:no   Weakness/numbness causing balance and gait disturbance due to Heart Failure and CKD, Hyponatremia,  making it taxing to leave home.  Patient with medication mismanagement issues requiring home bound status as evidenced by  strict management required with na monitoring.     Diet:   renal diet    Labs:  SN to perform labs:  BMP: Weekly; 8 week(s). Then get new plan from MD.     F/u lab with PCP and/or  nephrologist    Referrals/ Consults  Physical Therapy to evaluate and treat. Evaluate for home safety and equipment needs; Establish/upgrade home exercise program. Perform / instruct on therapeutic exercises, gait training, transfer training, and Range of Motion.  Occupational Therapy to evaluate and treat. Evaluate home environment for safety and equipment needs. Perform/Instruct on transfers, ADL training, ROM, and therapeutic exercises.   to evaluate for community resources/long-range planning.  Aide to provide assistance with personal care, ADLs, and vital signs.    Activities:   ambulate in house with assistance    Nursing:   Agency to  admit patient within 24 hours of hospital discharge unless specified on physician order or at patient request    SN to complete comprehensive assessment including routine vital signs. Instruct on disease process and s/s of complications to report to MD. Review/verify medication list sent home with the patient at time of discharge  and instruct patient/caregiver as needed. Frequency may be adjusted depending on start of care date.     Skilled nurse to perform up to 3 visits PRN for symptoms related to diagnosis    Notify MD if SBP > 160 or < 90; DBP > 90 or < 50; HR > 120 or < 50; Temp > 101; O2 < 88%; Other:       Ok to schedule additional visits based on staff availability and patient request on consecutive days within the home health episode.    When multiple disciplines ordered:    Start of Care occurs on Sunday - Wednesday schedule remaining discipline evaluations as ordered on separate consecutive days following the start of care.    Thursday SOC -schedule subsequent evaluations Friday and Monday the following week.     Friday - Saturday SOC - schedule subsequent discipline evaluations on consecutive days starting Monday of the following week.    For all post-discharge communication and subsequent orders please contact patient's primary care physician.   Miscellaneous   Routine Skin for Bedridden Patients: Instruct patient/caregiver to apply moisture barrier cream to all skin folds and wet areas in perineal area daily and after baths and all bowel movements.    Home Health Aide:  Nursing Three times weekly, Physical Therapy Three times weekly, Occupational Therapy Three times weekly, and Home Health Aide Three times weekly    Wound Care Orders  yes:  Pressure Ulcer(s) Stage II :   Location: sacrum  cleanse sacral area with soap and water pat dry apply Triad bid/prn.      Apply Miconzazole:  2% powder                       Frequency:  Daily                             If incontinent of stool or urine, apply thin  layer Barrier cream                   twice daily and PRN to wound         Pressure relief measure:  for pressure redistribution             I certify that this patient is confined to her home and needs intermittent skilled nursing care, physical therapy, and occupational therapy.

## 2022-12-14 NOTE — TELEPHONE ENCOUNTER
Specialty Pharmacy - Initial Clinical Assessment    Specialty Medication Orders Linked to Encounter      Flowsheet Row Most Recent Value   Medication #1 tolvaptan (SAMSCA) 15 mg Tab (Order#[id number], Rx#[id number])          Patient Diagnosis   E87.1 - Hyponatremia    Subjective    Peri Johansen is a 88 y.o. female, who is followed by the specialty pharmacy service for management and education.    Recent Encounters       Date Type Provider Description    12/14/2022 Specialty Pharmacy Axel Trivedi PharmD Initial Clinical Assessment    12/13/2022 Specialty Pharmacy Axel Trivedi PharmD Referral Authorization          Clinical call attempts since last clinical assessment   No call attempts found.     Current Outpatient Medications   Medication Sig    amiodarone (PACERONE) 200 MG Tab Take 1 tablet (200 mg total) by mouth once daily.    ergocalciferol (ERGOCALCIFEROL) 50,000 unit Cap Take 1 capsule (50,000 Units total) by mouth every 7 days.    losartan (COZAAR) 25 MG tablet Take 0.5 tablets (12.5 mg total) by mouth once daily.    nitroGLYCERIN (NITROSTAT) 0.4 MG SL tablet Place 1 tablet (0.4 mg total) under the tongue every 5 (five) minutes as needed for Chest pain.    [START ON 12/15/2022] tolvaptan (SAMSCA) 15 mg Tab Take 1 tablet (15 mg total) by mouth every other day.   Last reviewed on 11/18/2022  1:45 PM by Ese Quinonez, RN    Review of patient's allergies indicates:   Allergen Reactions    Latex, natural rubber Itching    Latex Hives   Last reviewed on  11/18/2022 1:43 PM by Ese Quinonez    Drug Interactions    Drug interactions evaluated: yes  Clinically relevant drug interactions identified: no  Provided the patient with educational material regarding drug interactions: not applicable           Assessment Questions - Documented Responses      Flowsheet Row Most Recent Value   Assessment    Medication Reconciliation completed for patient No   During the past 4 weeks, has patient missed any activities due to  condition or medication? No   During the past 4 weeks, did patient have any of the following urgent care visits? Hospital Admission   Goals of Therapy Status Partially achieving   Status of the patients ability to self-administer: Caregiver to administer  [with family member Zoltan]   All education points have been covered with patient? No, patient declined- printed education provided  [continuation upon discharge]   Welcome packet contents reviewed and discussed with patient? Yes   Assesment completed? Yes   Plan Therapy continued   Do you need to open a clinical intervention (i-vent)? No   Do you want to schedule first shipment? Yes   Medication #1 Assessment Info    Patient status Existing medication, New to [facility]   Is this medication appropriate for the patient? Yes   Is this medication effective? Not yet started  [continuation upon discharge]          Refill Questions - Documented Responses      Flowsheet Row Most Recent Value   Patient Availability and HIPAA Verification    Does patient want to proceed with activity? Yes   HIPAA/medical authority confirmed? Yes   Relationship to patient of person spoken to? Family Member   Refill Screening Questions    When does the patient need to receive the medication? 12/15/22   Refill Delivery Questions    How will the patient receive the medication? Laura  [[facility]]   When does the patient need to receive the medication? 12/15/22   Expected Copay ($) 1.95   Is the patient able to afford the medication copay? Yes   Payment Method new CC added to file   Days supply of Refill 30   Supplies needed? No supplies needed   Refill activity completed? Yes   Refill activity plan Refill scheduled   Shipment/Pickup Date: 12/14/22            Objective    She has a past medical history of Aortic atherosclerosis (1/7/2016), Arthritis, Chronic diastolic congestive heart failure (9/15/2022), Colon polyp: hyperplastic 2015- repeat 2020 (8/6/2015), Diabetes mellitus, type II  "(8/16/2012), Diverticulosis, Gastric nodule (8/7/2014), GERD (gastroesophageal reflux disease) (8/16/2012), Goiter (8/16/2012), Hyperlipidemia (8/16/2012), Hypertension, Joint pain, Leg pain (8/16/2012), Lymphoma (8/16/2012), Osteopenia (8/16/2012), Osteoporosis, Renal cyst (3/28/2013), Rickets, vitamin D deficiency (8/16/2012), Thyroid nodule (2/24/2014), and Vitamin D deficiency disease (8/16/2012).    Tried/failed medications: ***    BP Readings from Last 4 Encounters:   12/14/22 (!) 110/53   11/02/22 110/60   09/16/22 100/60   09/07/22 (!) 102/59     Ht Readings from Last 4 Encounters:   12/12/22 4' 11" (1.499 m)   11/02/22 4' 11" (1.499 m)   09/15/22 (P) 4' 11" (1.499 m)   09/07/22 4' 11" (1.499 m)     Wt Readings from Last 4 Encounters:   12/10/22 47.1 kg (103 lb 13.4 oz)   11/02/22 44.9 kg (99 lb)   09/15/22 52.2 kg (115 lb 1.3 oz)   09/07/22 52.2 kg (115 lb)       The goals of prescribed drug therapy management include:  Supporting patient to meet the prescriber's medical treatment objectives  Improving or maintaining quality of life  Maintaining optimal therapy adherence  Minimizing and managing side effects      Goals of Therapy Status: Partially achieving    Assessment/Plan  Patient plans to {RX SPEC ASSESSMENT PLAN:45774}      Indication, dosage, appropriateness, effectiveness, safety and convenience of her specialty medication(s) were reviewed today.     Patient Education   Pharmacist offer to  patient was declined. Printed educational materials will be provided with medication.  {Accepted Education topics (Optional):[id number]::"Patient did accept verbal education on the following topics:"}    ***    Tasks added this encounter   No tasks added.   Tasks due within next 3 months   12/14/2022 - Clinical - Initial Assessment (Manual Add)     Axel Trivedi, PharmD  [facility]  [address] [facility] [place] [address]  Phone: [phone]  Fax: [phone] "

## 2022-12-15 NOTE — TELEPHONE ENCOUNTER
No new care gaps identified.  [facility] Embedded Care Gaps. Reference number: [id number]. 12/15/2022   4:34:48 PM CST

## 2022-12-16 NOTE — PROGRESS NOTES
C3 nurse spoke with Peri Johansen (granddaughter) for a TCC post hospital discharge follow up call. The patient has a scheduled HOSFU appointment with Annika Garland MD  on 12/20/2022 @ 10:00 am.  Patient granddaughter is requesting information for in home care(sitters).

## 2022-12-16 NOTE — TELEPHONE ENCOUNTER
RN to reschedule pt for intial [facility] appt. RN left general message for pt to call back [facility] clinic for appt.

## 2022-12-20 NOTE — TELEPHONE ENCOUNTER
----- Message from Xenia Masterson sent at 12/20/2022 10:35 AM CST -----  Contact: [phone]/ Paty  Patient grand daughter is on her way with the patient and she know that she is late, very late however it was a time trying to get the patient ready and in the car , please call and advise. They are still on there way in this weather and would like to be fit in today even if they have to wait.

## 2022-12-20 NOTE — TELEPHONE ENCOUNTER
I had to order her labs in a telephone encounter because somebody else was in her chart during the time that I was trying to start her visit

## 2022-12-20 NOTE — PATIENT INSTRUCTIONS
Kayexalate sent to pharmacy for elevated potassium    Take lasix 40mg tonight    Hold potassium supplement tomorrow with repeat labs per  to reassess K

## 2022-12-20 NOTE — PROGRESS NOTES
[facility] Provider Note (Initial Clinic) Consult Note    Date of original referral: 11/17/22  Age: 88 y.o.  Gender: female  Ethnicity: Black or   Number of admissions for CHF within the preceding year: 4-5   Duration of CHF: unsure, couple years  Type of Congestive Heart Failure: Combined   Etiology: suspected Restrictive/infiltrative , strain pattern consistent for amyloid on TTE  Enrolled in Infusion suite: no    Diagnostic Labs:   EKG - 12/09/2022  CXR - 12/04/2022  ECHO - 12/05/2022  Stress test -   Stress echo - 02/18/2022  Pharmacologic stress -   Cardiac catheterization -    Cardiac MRI -     Lab Results   Component Value Date     (L) 12/14/2022     (L) 12/13/2022    K 4.5 12/14/2022    K 4.1 12/13/2022     12/14/2022     12/13/2022    CO2 25 12/14/2022    CO2 23 12/13/2022     (H) 12/14/2022     (H) 12/13/2022    BUN 24 (H) 12/14/2022    BUN 23 12/13/2022    CREATININE 0.9 12/14/2022    CREATININE 0.7 12/13/2022    CALCIUM 9.3 12/14/2022    CALCIUM 9.5 12/13/2022    PROT 7.1 12/09/2022    PROT 5.9 (L) 12/04/2022    ALBUMIN 2.3 (L) 12/09/2022    ALBUMIN 1.9 (L) 12/04/2022    BILITOT 1.4 (H) 12/09/2022    BILITOT 1.5 (H) 12/04/2022    ALKPHOS 118 12/09/2022    ALKPHOS 106 12/04/2022    AST 33 12/09/2022    AST 33 12/04/2022    ALT 22 12/09/2022    ALT 21 12/04/2022    ANIONGAP 6 (L) 12/14/2022    ANIONGAP 7 (L) 12/13/2022    ESTGFRAFRICA >60 07/01/2022    ESTGFRAFRICA >60.0 06/30/2022    EGFRNONAA >60 07/01/2022    EGFRNONAA >60.0 06/30/2022       Lab Results   Component Value Date    WBC 5.86 12/14/2022    WBC 5.00 12/13/2022    RBC 3.92 (L) 12/14/2022    RBC 3.93 (L) 12/13/2022    HGB 11.4 (L) 12/14/2022    HGB 11.1 (L) 12/13/2022    HCT 34.6 (L) 12/14/2022    HCT 33.9 (L) 12/13/2022    MCV 88 12/14/2022    MCV 86 12/13/2022    MCH 29.1 12/14/2022    MCH 28.2 12/13/2022    MCHC 32.9 12/14/2022    MCHC 32.7 12/13/2022    RDW 18.4 (H) 12/14/2022    RDW 17.8  (H) 12/13/2022     12/14/2022     12/13/2022    MPV 11.2 12/14/2022    MPV 10.8 12/13/2022    IMMGR 0.2 12/14/2022    IMMGR 0.2 12/13/2022    IGABS 0.01 12/14/2022    IGABS 0.01 12/13/2022    LYMPH 1.2 12/14/2022    LYMPH 19.8 12/14/2022    MONO 0.7 12/14/2022    MONO 12.5 12/14/2022    EOS 0.0 12/14/2022    EOS 0.0 12/13/2022    BASO 0.03 12/14/2022    BASO 0.02 12/13/2022    NRBC 0 12/14/2022    NRBC 0 12/13/2022    GRAN 3.9 12/14/2022    GRAN 66.5 12/14/2022    EOSINOPHIL 0.5 12/14/2022    EOSINOPHIL 0.4 12/13/2022    BASOPHIL 0.5 12/14/2022    BASOPHIL 0.4 12/13/2022    PLTEST Appears normal 12/05/2022    PLTEST Appears normal 09/15/2022    ANISO Slight 12/05/2022    ANISO Slight 09/15/2022    HYPO Occasional 09/15/2022    HYPO sl 04/24/2008       Lab Results   Component Value Date    BNP 1,488 (H) 12/08/2022     (H) 12/04/2022    MG 1.6 12/14/2022    MG 1.5 (L) 12/13/2022    PHOS 2.5 (L) 12/14/2022    PHOS 2.3 (L) 12/13/2022    NTPROBNP 5166 (H) 11/28/2022    TROPONINI 0.488 (H) 12/08/2022    TROPONINI 0.649 (H) 12/08/2022    HGBA1C 7.2 (H) 06/30/2022    HGBA1C 7.4 (H) 01/20/2022    TSH 2.847 12/04/2022    TSH 2.918 11/02/2022    FREET4 1.26 06/30/2022    FREET4 0.94 10/21/2021    I3JJOEX 7.7 09/21/2010    T2JEKKA 7.8 06/03/2010       Lab Results   Component Value Date    IRON 71 09/21/2010    TIBC 287 09/21/2010    FERRITIN 209 09/21/2010    CHOL 141 11/02/2022    TRIG 58 11/02/2022    HDL 44 11/02/2022    LDLCALC 85.4 11/02/2022    CHOLHDL 31.2 11/02/2022    TOTALCHOLEST 3.2 11/02/2022    NONHDLCHOL 97 11/02/2022    COLORU Yellow 12/07/2022    APPEARANCEUA Hazy (A) 12/07/2022    PHUR >8.0 (A) 12/07/2022    SPECGRAV 1.010 12/07/2022    PROTEINUA 1+ (A) 12/07/2022    GLUCUA Negative 12/07/2022    KETONESU Negative 12/07/2022    BILIRUBINUA Negative 12/07/2022    OCCULTUA 2+ (A) 12/07/2022    NITRITE Negative 12/07/2022    LEUKOCYTESUR 2+ (A) 12/07/2022       No implanted cardiac  devices    Current Outpatient Medications on File Prior to Visit   Medication Sig Dispense Refill    acetaminophen (TYLENOL) 500 MG tablet Take 500 mg by mouth daily as needed for Pain.      albuterol (PROVENTIL/VENTOLIN HFA) 90 mcg/actuation inhaler Inhale 2 puffs into the lungs every 6 (six) hours as needed.      amiodarone (PACERONE) 200 MG Tab Take 1 tablet (200 mg total) by mouth once daily. 30 tablet 5    aspirin 81 MG Chew Take 1 tablet (81 mg total) by mouth once daily.      atorvastatin (LIPITOR) 40 MG tablet TAKE 1 TABLET BY MOUTH EVERY DAY 90 tablet 0    blood sugar diagnostic (TRUE METRIX GLUCOSE TEST STRIP) Strp USE AS DIRECTED 150 strip 12    blood sugar diagnostic Strp Test 2 times daily 200 strip 3    blood-glucose meter kit Use as instructed.  ACCUCHECK or whatever is preferred by insurance 1 each 0    dextran 70-hypromellose (ARTIFICIAL TEARS,[id number],) ophthalmic solution Place 1 drop into both eyes 4 (four) times daily as needed. 1 Bottle 5    dextromethorphan-guaiFENesin  mg/5 ml (ROBITUSSIN-DM)  mg/5 mL liquid Take 10 mLs by mouth every 6 (six) hours as needed for Cough.      diclofenac sodium (VOLTAREN) 1 % Gel APPLY 2 GRAMS EXTERNALLY TO THE AFFECTED AREA FOUR TIMES DAILY 100 g 1    ergocalciferol (ERGOCALCIFEROL) 50,000 unit Cap Take 1 capsule (50,000 Units total) by mouth every 7 days. 12 capsule 3    gabapentin (NEURONTIN) 300 MG capsule TAKE 1 CAPSULE BY MOUTH TWICE DAILY 180 capsule 1    lancets Misc Test 2 x daily 200 each 3    losartan (COZAAR) 25 MG tablet Take 0.5 tablets (12.5 mg total) by mouth once daily. 45 tablet 3    magnesium oxide (MAG-OX) 400 mg (241.3 mg magnesium) tablet Take 1 tablet by mouth once daily.      methIMAzole (TAPAZOLE) 5 MG Tab TAKE 1/2 TABLET BY MOUTH DAILY 45 tablet 0    methyl salicylate/menthol (ICY HOT TOP) Apply topically 2 (two) times daily as needed (Pain).      MICRO THIN LANCETS 33 gauge Misc USE AS DIRECTED  0    multivitamin  (THERAGRAN) per tablet Take 1 tablet by mouth once daily.      nitroGLYCERIN (NITROSTAT) 0.4 MG SL tablet Place 1 tablet (0.4 mg total) under the tongue every 5 (five) minutes as needed for Chest pain. 25 tablet 0    omeprazole (PRILOSEC) 40 MG capsule Take 1 capsule (40 mg total) by mouth every morning. 90 capsule 1    ondansetron (ZOFRAN-ODT) 4 MG TbDL Take 1 tablet (4 mg total) by mouth every 8 (eight) hours as needed (Nausea or vomiting). 12 tablet 0    potassium chloride (KLOR-CON) 10 MEQ TbSR Take 2 tablets (20 mEq total) by mouth once daily. 30 tablet 11    tolvaptan (SAMSCA) 15 mg Tab Take 1 tablet (15 mg total) by mouth every other day. 15 tablet 0    traMADoL (ULTRAM) 50 mg tablet TAKE 1 TABLET(50 MG) BY MOUTH EVERY 12 HOURS AS NEEDED FOR PAIN 60 tablet 0     No current facility-administered medications on file prior to visit.         HPI:  Patient endorses SOB on minimal exertion, I.e standing from sitting and pace slow. Presents to clinic in wheelchair. Endorses intermittent SOB at rest   Patient sleeps on 4 number of pillows at BL for positioning due to decubitus ulcers   Patient wakes up SOB, has to get out of bed, associated cough- denies   Palpitations - denies    Dizzy, light-headed, pre-syncope or syncope- denies   Since discharge frequency of performing weights, home weight and weight change- not performing daily weights    Other information felt pertinent to HPI: Ms. Peri Johansen is a 87 yo female with a PMHx of new HFrEF (EF reduced 58->35% on most recent TTE with apical sparing pattern concerning for cardiac amyloid), diffuse large B cell lymphoma, T2DM, hyperlipidemia, hyponatremia [facility] for [facility]. She was initially started on furosemide IV 40 mg BID with inadequate UOP. Escalated to 80 mg BID. Patient with complaints of epigastric abdominal discomfort. Some relief with BM. X-ray of abdomen unremarkable for obstruction or ileus or  constipation. US of abdomen showed adherent gallstone. Abdomen pain resolved during period of NPO. On 11/27, patient noted to be satting in mid to upper 80's on RA. She was placed on supplemental O2 and repeat CXR ordered which demonstrated worsening pulm edema. Metolazone and azithromycin started with improvement in cough. Weaned off supplemental O2. Hospital course complicated by hyponatremia and hypotension as approached euvolemia, requiring pressor support. Nephrology consulted for hyponatremia with recommendations to hold loop diuretic held and start tolvaptan. Discharged on tolvaptan 15mg every other day.    12/20/22: Today she reports continued SOB on minimal exertion and BLE edema near baseline. Not performing daily weights due to debility. 10lb weight gain on clinic scale. Per chart review, previous work up negative for AL amyloid. No prior PYP.      PHYSICAL:   Vitals:    12/20/22 1315   BP: (!) 102/54   Pulse: 67      Wt Readings from Last 3 Encounters:   12/20/22 50.8 kg (112 lb 1.6 oz)   12/10/22 47.1 kg (103 lb 13.4 oz)   11/02/22 44.9 kg (99 lb)       JVD: yes, measured midneck sitting   Heart rhythm: regular  Cardiac murmur: No    S3: yes  S4: no  Lungs: clear, diminished breath sounds in posterior lung bases  Hepatojugular reflux: yes  Edema: yes, 2+ BLE edema      Echo 12/5/22:  The quantitatively derived ejection fraction is 35%. Apical sparing strain pattern is concerning for cardiac amyloid. Clinical correlation is advised.  Decrease in LV function cpared to the prior reported.  The left ventricle is normal in size with concentric hypertrophy and moderately decreased systolic function.  Grade III left ventricular diastolic dysfunction.  Normal right ventricular size with normal right ventricular systolic function.  Mild left atrial enlargement.  Mild right atrial enlargement.  Severe tricuspid regurgitation.  Mild-to-moderate mitral regurgitation.  The estimated PA systolic pressure is 39  mmHg.  Normal central venous pressure (3 mmHg).  There is a large left pleural effusion.  Trivial circumferential pericardial effusion.    ASSESSMENT: Chronic combined systolic and diastolic HF    PLAN:      Patient Instructions:   Instruct the patient to notify this clinic if HH, a physician or an advanced care provider wants to change medication one of their HF medications   Activity and Diet restrictions:   Recommend 2-3 gram sodium restriction and 1500cc- 2000cc fluid restriction.  Encourage physical activity with graded exercise program.  Requested patient to weigh themselves daily, and to notify us if their weight increases by more than 3 lbs in 1 day or 5 lbs in 3 days.    Assigned dry weight on home scale: unsure, not performing daily weights  Medication changes (include current dose and changed dose): NYHA Class III-IV symptoms. Volume up on exam, 10lb weight gain on clinic scales. K 5.7. kayexalate ordered. Stop K supplement. Instructed to take dose of 40mg lasix today. Discussed with Nephrology for loop diuretic recommendations with patient being on tolvaptan 15mg every other day. Start lasix 20mg BID per Nephrology recs with repeat labs per HH.    Amyloid labs ordered. Pending AL lab results, plan for PYP to assess for ATTR amyloid.    Upcoming labs and date anticipated: repeat labs s/p kayexalate per HH. RTC in 1 week for repeat labs and close follow up    Other diagnostic tests ordered: given multiple hospital admissions in the last year, plan for Palliative Care referral as amenable for further goals of care discussions.     Angelica Mariscal PA-C

## 2022-12-20 NOTE — PROGRESS NOTES
Transitional Care Note  Subjective:       Patient ID: Peri Johansen is a 88 y.o. female.  Chief Complaint: Hospital follow up    Family and/or Caretaker present at visit?  Yes.  Diagnostic tests reviewed/disposition: I have reviewed all completed as well as pending diagnostic tests at the time of discharge.  Disease/illness education: yes  Home health/community services discussion/referrals: Patient has home health established at home .   Establishment or re-establishment of referral orders for community resources: No other necessary community resources.   Discussion with other health care providers: No discussion with other health care providers necessary.     She states that someone told her to stop taking Lasix, but she does not remember who that was and how long ago that was, can not quantify it in days or weeks even.  She stated she did not have any chest pain at all, however appears that there was a comment of chest pain 10 on 10 in ED. troponin was mildly elevated at 0.171, however quickly reduced on next lab at 0.162.  BNP elevated at 925.  Denies any dysuria, but also a note in ED she has mild dysuria.  Denies hematuria but has +2 blood in urine, UA looks noninfectious.  CTA of the chest showed no interval detrimental change or new intra-abdominopelvic abnormality compared to most recent CT 11/11/2022, and no detrimental change in CTA of the aorta compared to most recent prior CTA aorta 09/14/2022.  Noting that she does have moderate to severe aortic atherosclerosis involving the branch vessels and moderate bilateral pleural effusions, stable when compared to prior 11/11/2022.  COVID, flu, RSV negative.  Denies abdominal pain or diarrhea. Denies nausea or vomiting.         * No surgery found *       Hospital Course:   Started on furosemide IV 40 mg BID. UOP not adequate. Escalated to 80 mg BID, but missed a few doses due to loss of IV access. Once she was restarted on consistent IV diuretic  administration, stillv elbert hard to diurese. Volume exam slowly improving. Patient complaining of epiagastric abdominal discomfort with associated SOB. Started her on aggressive bowel regimen and reglan. Some relief with BM. X-ray of abdomen unremarkable for obstruction or ileus or constipation. US of abdomen showed adherent gallstone. Abdomen pain completely resolved during period of NPO. SOB improving but continues to have nonproductive cough.  On 11/27, patient noted to be satting in mid to upper 80's on RA which is new for her.  She was placed on supplemental O2.  Repeat CXR ordered which demonstrated worsening pulm edema.  Her UO was also starting to decrease as well.  Lasix adjusted. And metolazone added.  Azithromycin added as well.  The patient was finally weaned off of supplemental O2 and her cough has improved significantly. Develop hypoantremia and hypotension as approached euvolemia. Nephrology consulted for hyponatmremia. Went to ICU. Received pressure support. Differing opinions on wether she is hypervolemic and hypovolemic. Cardiology and nephrology recommending holding diuresis. However she remains on strict fluid restriction. If starting on oral diuresis. Need to watch for response as likely oral furosemide not working.  Interval History:  12/8- /58  Pulse 86   SpO2 98% Did not sleep well,, agitated and complaining of pain she described as someone sitting on her chest last night.   + 3 unmeasured.  Holding diuresis now.  Trop 0.6 ( chronically up), Na 129,      12/9- BNP  >1400, trop 0.6-> 0.4. /64   Pulse 74, SpO2 93% .  On cefepime empirically for pneumonia- stop day 12/12 -  Cr 0.7-> 1.1, Na 128 w volume overload, replace mag and phos. BNP > 1400. + jvd. Will resume low dose losartan. Lasix 40 IV given with diuresis.  Repeating a Na level after diuresis to see if Na worse or improved.   12/10- na dropped with one dose of lasix, now Na 123. /66   Pulse 82 . Cr 1.0 on  losartan. Discussed w , nephrology, will give  Tolvaptan x one dose.   12/11- Na 123. K 5.2. cr 0.8, Phos 2.5 (improving), Chatted w nephrology. On Fluid restriction 750, considering a second dose of tolvactan. /53 VSS, She is up in chair, talking and interactive. No SOB.   12/12 - na 130, improved after second dose of tolvactam, off diruetics, She has been getting up to chair, eating well. Phos 2.6 (rising, still low) on neutrophos. /55  Pulse 86   Temp 98.1 VSS.  One stool, good UO.  She wants to go home. possibly plan on tolvaptan 15 mg every other day after discharge. Appreciate Nephrology input and f/u.  Receiving tolvactan inpt today. Has small DU sacrum, being attended to by nursing, pt up in chair today. Discussed her care and condition, explained all her medical problems w Son and DTR who are at bedside.   12/13 - currently enrolled in the [facility] program. /56   Pulse 76  Na 135. Will discuss w Nephrology. Need outpatient plan: Outpt SNF with tolvactan qod and weekly lab.      12/14- /57   Pulse 76 . Na 136. Plan to dc to SNF today.     Was seen today in Cardiology, those notes are not available to me.    Granddaughter states that she is stable at home, eating and drinking well, no vomiting or abdominal pain.  Has home health and is stable with this.  However, is interested in the [facility] clinic as it is extremely difficult to bring her into office visits.    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    High cholesterol    Renal cyst: R side see ultrasound 6/16; stable 2018 and 2019, also 2022    Stromal cell tumor    Lipoma of back    Thyroid nodules: several see u/s 2017 FNA 2017 benign, stable 2019, also 1/21    Gastric nodule: 2013-m per GI no need for further follow up    Spondylosis without myelopathy    Gastroesophageal reflux disease without esophagitis    Colon  polyp: hyperplastic 2015- repeat 2020    Primary insomnia    Aortic atherosclerosis: see CT scan 3/15    Diverticulosis of large intestine without hemorrhage: see CT scan 3/15    Thoracic and lumbosacral neuritis    Chronic bilateral low back pain with right-sided sciatica    Left carpal tunnel syndrome    Hearing loss of left ear    Hyperthyroidism    Cervical spine arthritis    Sacroiliac joint dysfunction of both sides    Diabetes mellitus    Vitamin D deficiency    Osteopenia    Lytic lesion of bone on x-ray: outside CTA 4/21 T10- however PET CT and MRI June 2021 do not show lytic lesion    Epigastric pain    Cholelithiasis: sludge on u/s 2022    Hyponatremia    Hyperbilirubinemia    Physical deconditioning    Acute on chronic diastolic heart failure    Pleural effusion    Irritant contact dermatitis due to fecal, urinary or dual incontinence    Hypophosphatemia    Goals of care, counseling/discussion               HPI  Review of Systems   Constitutional:  Positive for fatigue. Negative for chills and fever.   HENT:  Positive for ear pain. Negative for congestion and postnasal drip.    Eyes: Negative.    Respiratory:  Negative for cough, chest tightness, shortness of breath and wheezing.    Cardiovascular: Negative.    Gastrointestinal: Negative.    Musculoskeletal:  Positive for arthralgias and back pain.        Chronic stable symptoms     Objective:      Physical Exam  Vitals and nursing note reviewed.   Constitutional:       Appearance: She is well-developed.   HENT:      Head: Normocephalic and atraumatic.      Right Ear: External ear normal.      Left Ear: External ear normal.      Nose: Nose normal.      Mouth/Throat:      Pharynx: No oropharyngeal exudate.   Eyes:      General: No scleral icterus.     Extraocular Movements: Extraocular movements intact.      Conjunctiva/sclera: Conjunctivae normal.   Neck:      Thyroid: Thyromegaly present.      Vascular: No JVD.   Cardiovascular:      Rate and Rhythm:  Normal rate and regular rhythm.      Heart sounds: Normal heart sounds. No murmur heard.    No gallop.   Pulmonary:      Effort: Pulmonary effort is normal. No respiratory distress.      Breath sounds: Normal breath sounds. No wheezing.   Abdominal:      General: Bowel sounds are normal. There is no distension.      Palpations: Abdomen is soft. There is no mass.      Tenderness: There is no abdominal tenderness. There is no guarding or rebound.   Musculoskeletal:         General: No tenderness. Normal range of motion.      Cervical back: Normal range of motion and neck supple.   Lymphadenopathy:      Cervical: No cervical adenopathy.   Skin:     General: Skin is warm.      Findings: No erythema or rash.   Neurological:      General: No focal deficit present.      Mental Status: She is alert and oriented to person, place, and time.      Cranial Nerves: No cranial nerve deficit.      Coordination: Coordination normal.   Psychiatric:         Behavior: Behavior normal.         Thought Content: Thought content normal.         Judgment: Judgment normal.       Assessment:       1. Acute on chronic diastolic heart failure    2. Type 2 diabetes mellitus without complication, without long-term current use of insulin    3. 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 Calculus of gallbladder with chronic cholecystitis without obstruction    5. Renal insufficiency        Plan:       1. Acute on chronic diastolic heart failure    2. Type 2 diabetes mellitus without complication, without long-term current use of insulin    3. 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 Calculus of gallbladder with chronic cholecystitis without obstruction    5. Renal insufficiency       She will continue on her current medical regimen  In the Heart failure Clinic, also with home health  Recent labs acceptable, additional labs to be done  today  Does not have evidence of CKD 4, this may have been dehydration; will monitor closely  They would like to get established in the [facility] clinic- will see about making arrangements, otherwise return to see me within the next month, sooner with problems in the interim  Sodium and fluid restriction, daily weights and alarm symptoms and ED cautions reviewed at length

## 2022-12-21 PROBLEM — R57.9 SHOCK, UNSPECIFIED: Status: RESOLVED | Noted: 2022-01-01 | Resolved: 2022-01-01

## 2022-12-21 PROBLEM — R07.9 CHEST PAIN: Status: RESOLVED | Noted: 2022-01-21 | Resolved: 2022-01-01

## 2022-12-21 PROBLEM — N18.4 CKD (CHRONIC KIDNEY DISEASE), STAGE IV: Status: RESOLVED | Noted: 2022-01-01 | Resolved: 2022-01-01

## 2022-12-21 PROBLEM — R05.9 COUGH IN ADULT: Status: RESOLVED | Noted: 2022-01-01 | Resolved: 2022-01-01

## 2022-12-21 NOTE — PROGRESS NOTES
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    High cholesterol    Renal cyst: R side see ultrasound 6/16; stable 2018 and 2019, also 2022    Stromal cell tumor    Lipoma of back    Thyroid nodules: several see u/s 2017 FNA 2017 benign, stable 2019, also 1/21    Gastric nodule: 2013-m per GI no need for further follow up    Spondylosis without myelopathy    Gastroesophageal reflux disease without esophagitis    Colon polyp: hyperplastic 2015- repeat 2020    Primary insomnia    Aortic atherosclerosis: see CT scan 3/15    Diverticulosis of large intestine without hemorrhage: see CT scan 3/15    Thoracic and lumbosacral neuritis    Chronic bilateral low back pain with right-sided sciatica    Left carpal tunnel syndrome    Hearing loss of left ear    Hyperthyroidism    Cervical spine arthritis    Sacroiliac joint dysfunction of both sides    Diabetes mellitus    Vitamin D deficiency    Osteopenia    Lytic lesion of bone on x-ray: outside CTA 4/21 T10- however PET CT and MRI June 2021 do not show lytic lesion    Chest pain    Epigastric pain    Cholelithiasis: sludge on u/s 2022    Hyponatremia    Hyperbilirubinemia    Physical deconditioning    Acute on chronic diastolic heart failure    Pleural effusion    Cough in adult    Irritant contact dermatitis due to fecal, urinary or dual incontinence    Shock, unspecified    CKD (chronic kidney disease), stage IV    Hypophosphatemia    Goals of care, counseling/discussion       Patient is homebound due to: CHF, debility  Diet: Low Sodium  Acitivities: As Tolerated    LABS:  SN to perform labs: CMP, BNP weekly as needed      Send follow up questions to [facility] [phone] or fax: [phone]    Angelica  MAVIS Mariscal

## 2022-12-22 NOTE — TELEPHONE ENCOUNTER
"Rec'd call for Ms Johansen' daughter re inability to draw blood for labs. I stress the importance of checking K because it affects the heart and the daughter stops me saying "I am an RN, I know how important potassium is to the heart." She states that her Mother wants to try herself. I explain that the lab is open till 5pm if she can't draw it and if not tonight them first thing tomorrow morning she can got to the lab to have it drawn.  "

## 2022-12-22 NOTE — TELEPHONE ENCOUNTER
Spoke to Maria Guadalupe again re need for labs to check her mother's K level after taking the kayexalate. 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 I explain that it is very important because Potassium affects the heart.

## 2022-12-23 NOTE — TELEPHONE ENCOUNTER
Lab called to tell us that the sample they rec'd from this Pt yesterday was clotted. I leave a VM on the peferred line and on the Granddaughter Zoltan's Mobile phone. I reach Zoltan by calling the home line and inform her of the  clotted sample and reiterate how important it is that we get the labs drawn because she took the Kayexalate. I ask if the lab rosette the sample and Zoltan states that her Grandmother rosette it herself. I apologize and caution Zoltan to call the satellite clinic if she goes there for a redraw and to call the main campus re lab hours if she goes there.

## 2022-12-24 NOTE — ED TRIAGE NOTES
Peri Johansen, a 88 y.o. female presents to the ED w/ complaint of Abnormal labs    Triage note:  Chief Complaint   Patient presents with    Abnormal Lab     Arrives from home via EMS; had labs drawn yesterday, showed hyperkalemia, referred to ED for further work up. Pt complains of chest pain this morning but has since resolved.      Review of patient's allergies indicates:   Allergen Reactions    Latex, natural rubber Itching    Latex Hives     Past Medical History:   Diagnosis Date    Aortic atherosclerosis 1/7/2016    Arthritis     Chronic diastolic congestive heart failure 9/15/2022    Colon polyp: hyperplastic 2015- repeat 2020 8/6/2015    Diabetes mellitus, type II 8/16/2012    Diverticulosis     Gastric nodule 8/7/2014    GERD (gastroesophageal reflux disease) 8/16/2012    Goiter 8/16/2012    Hyperlipidemia 8/16/2012    Hypertension     Joint pain     Leg pain 8/16/2012    Lymphoma 8/16/2012    Osteopenia 8/16/2012    Osteoporosis     Renal cyst 3/28/2013    Rickets, vitamin D deficiency 8/16/2012    Thyroid nodule 2/24/2014    Vitamin D deficiency disease 8/16/2012

## 2022-12-24 NOTE — TELEPHONE ENCOUNTER
[facility] RN contacted patient's granddaughter Zoltan Majano (who is primary caretaker) regarding repeat lab results s/p kayexalate. Confirmed that patient stopped potassium supplement 12/20. Repeat K 5.9, although hemolysis noted on labs. Recommend presenting to the ED for validation and management of hyperkalemia. Granddaughter voiced understanding and in agreement.

## 2022-12-24 NOTE — DISCHARGE INSTRUCTIONS
Diagnosis:   1. Abnormal laboratory test      Home Care Instructions:  - Medications: Continue taking your home medications as prescribed    Follow-Up Plan:  - Follow-up with: Primary care doctor within 3  days  - Additional testing and/or evaluation will be directed by your primary doctor    Return to the Emergency Department for symptoms including but not limited to: worsening symptoms, severe back pain, shortness of breath or chest pain, vomiting with inability to hold down fluids, blood in vomit or poop, fevers greater than 100.4°F, passing out/fainting/unconsciousness, or other concerning symptoms.     If you do not have a primary care doctor, you may contact the one listed on your discharge paperwork or you may also call the [facility] Appointment Desk at [phone] to schedule an appointment and establish care with one. It is important to your health that you have a primary care doctor.    Please take all medications as directed. All medications may potentially have side-effects and it is impossible to predict which medications may give you side-effects or what side-effects (if any) they will give you. If you feel that you are having a negative effect or side-effect of any medication you should immediately stop taking them and seek medical attention. If you feel that you are having a life-threatening reaction call [phone].

## 2022-12-24 NOTE — ED PROVIDER NOTES
Encounter Date: 12/24/2022       History     Chief Complaint   Patient presents with    Abnormal Lab     Arrives from home via EMS; had labs drawn yesterday, showed hyperkalemia, referred to ED for further work up. Pt complains of chest pain this morning but has since resolved.      89yo F with PMH of DM2, HTN, HLD, lymphoma, RA, CHF, amyloidosis, vitamin D deficiency presenting to ED due to an abnormal outpatient lab drawn yesterday demonstrating hyperkalemia. Patient denies any symptoms at present and states she feels well.     Review of patient's allergies indicates:   Allergen Reactions    Latex, natural rubber Itching    Latex Hives     Past Medical History:   Diagnosis Date    Aortic atherosclerosis 1/7/2016    Arthritis     Chronic diastolic congestive heart failure 9/15/2022    Colon polyp: hyperplastic 2015- repeat 2020 8/6/2015    Diabetes mellitus, type II 8/16/2012    Diverticulosis     Gastric nodule 8/7/2014    GERD (gastroesophageal reflux disease) 8/16/2012    Goiter 8/16/2012    Hyperlipidemia 8/16/2012    Hypertension     Joint pain     Leg pain 8/16/2012    Lymphoma 8/16/2012    Osteopenia 8/16/2012    Osteoporosis     Renal cyst 3/28/2013    Rickets, vitamin D deficiency 8/16/2012    Thyroid nodule 2/24/2014    Vitamin D deficiency disease 8/16/2012     Past Surgical History:   Procedure Laterality Date    CARPAL TUNNEL RELEASE      CATARACT EXTRACTION W/  INTRAOCULAR LENS IMPLANT Bilateral     HYSTERECTOMY      partial    lymphoma surgery      L tibia     Family History   Problem Relation Age of Onset    Hypertension Mother     Hypertension Father     Heart disease Father     Breast cancer Maternal Aunt     No Known Problems Maternal Uncle     No Known Problems Paternal Aunt     No Known Problems Paternal Uncle     No Known Problems Maternal Grandmother     No Known Problems Maternal Grandfather     No Known Problems Paternal Grandmother     No Known Problems Paternal Grandfather     Cancer  Brother         colon    No Known Problems Daughter     No Known Problems Son     No Known Problems Son     No Known Problems Daughter     No Known Problems Daughter     Diabetes Neg Hx     Glaucoma Neg Hx     Amblyopia Neg Hx     Blindness Neg Hx     Cataracts Neg Hx     Macular degeneration Neg Hx     Retinal detachment Neg Hx     Strabismus Neg Hx     Stroke Neg Hx     Thyroid disease Neg Hx     Ovarian cancer Neg Hx     Liver disease Neg Hx     Liver cancer Neg Hx     Cirrhosis Neg Hx     Colon polyps Neg Hx     Colon cancer Neg Hx     Melanoma Neg Hx      Social History     Tobacco Use    Smoking status: Never    Smokeless tobacco: Never   Substance Use Topics    Alcohol use: No     Alcohol/week: 0.0 standard drinks    Drug use: No     Review of Systems   Constitutional:  Negative for chills and fever.   HENT:  Negative for congestion and rhinorrhea.    Eyes:  Negative for pain and visual disturbance.   Respiratory:  Negative for cough and shortness of breath.    Cardiovascular:  Negative for chest pain and palpitations.   Gastrointestinal:  Negative for abdominal pain and vomiting.   Genitourinary:  Negative for difficulty urinating and dysuria.   Musculoskeletal:  Negative for gait problem and joint swelling.   Skin:  Negative for rash and wound.   Neurological:  Negative for numbness and headaches.     Physical Exam     Initial Vitals [12/24/22 1526]   BP Pulse Resp Temp SpO2   137/70 83 18 98.6 °F (37 °C) 96 %      MAP       --         Physical Exam    Nursing note and vitals reviewed.  Constitutional: She is not diaphoretic. No distress.   HENT:   Head: Normocephalic and atraumatic.   Mouth/Throat: Oropharynx is clear and moist.   Eyes: Conjunctivae and EOM are normal.   Neck: Neck supple.   Normal range of motion.  Cardiovascular:  Normal rate, regular rhythm and normal heart sounds.           Pulmonary/Chest: Breath sounds normal. She has no wheezes. She has no rhonchi. She has no rales.   Abdominal:  Abdomen is soft. She exhibits no distension. There is no abdominal tenderness.   Musculoskeletal:         General: No edema. Normal range of motion.      Cervical back: Normal range of motion and neck supple.     Neurological: She is alert. No cranial nerve deficit.   Oriented x2 (baseline)   Skin: Skin is warm and dry. Capillary refill takes less than 2 seconds.       ED Course   Procedures  Labs Reviewed   CBC W/ AUTO DIFFERENTIAL - Abnormal; Notable for the following components:       Result Value    RDW 20.9 (*)     Gran % 78.2 (*)     Lymph % 10.9 (*)     All other components within normal limits   BASIC METABOLIC PANEL - Abnormal; Notable for the following components:    Glucose 156 (*)     BUN 29 (*)     Anion Gap 7 (*)     All other components within normal limits   MAGNESIUM   TSH        ECG Results              EKG 12-lead (Final result)  Result time 12/25/22 07:55:22      Final result by Interface, Lab In [facility] (12/25/22 07:55:22)                   Narrative:    Test Reason : E87.5,    Vent. Rate : 060 BPM     Atrial Rate : 060 BPM     P-R Int : 182 ms          QRS Dur : 076 ms      QT Int : 440 ms       P-R-T Axes : 056 124 027 degrees     QTc Int : 440 ms    Normal sinus rhythm with sinus arrhythmia  Low voltage QRS  Probably septal and  Anterolateral infarct (cited on or before  ST-T abn  Abnormal ECG  When compared with ECG of 08-DEC-2022 15:51,  Questionable change in initial forces of Lateral leads  Confirmed by Cedrick JEREZ MD ([id number]) on 12/25/2022 7:55:10 AM    Referred By: [email or web address]   SELF           Confirmed By:Cedrick JERZE MD                                  Imaging Results    None          Medications - No data to display  Medical Decision Making:   History:   Old Medical Records: I decided to obtain old medical records.  Differential Diagnosis:   Hyperkalemia, laboratory error, hemolysis, PUJA  Clinical Tests:   Lab Tests: Ordered and Reviewed       <> Summary of Lab: Patient is hemodynamically  stable and well-appearing. Labs drawn to verify electrolyte levels. CBC, BMP, Mg and TSH are within normal limits. K is 5.1 with no hemolysis noted by lab. EKG shows normal sinus rhythm. Patient reassured and discharged home with return precautions. Her granddaughter was called and results were shared. All questions answered.           Attending Attestation:   Physician Attestation Statement for Resident:  As the supervising MD   Physician Attestation Statement: I have personally seen and examined this patient.   I agree with the above history.  -: Referred for hyperkalemia on outpatient labs ordered by [facility].  Potassium 5.9 but hemolyzed yesterday.  Repeat potassium today is 5.1.  No emergent indication for treatment at this time.  Advised follow-up and return precautions.   As the supervising MD I agree with the above PE.     As the supervising MD I agree with the above treatment, course, plan, and disposition.                               Clinical Impression:   Final diagnoses:  [R89.9] Abnormal laboratory test (Primary)        ED Disposition Condition    Discharge Stable          ED Prescriptions    None       Follow-up Information       Follow up With Specialties Details Why Contact Info    Annika Garland MD Internal Medicine Schedule an appointment as soon as possible for a visit   [address]  [place] [address]  [phone]      [facility] - Emergency Dept Emergency Medicine  As needed, If symptoms worsen [address] [facility] [id number] [phone]             Taya Quach MD  Resident  12/24/22 2130       Chao Sahu MD  12/25/22 104

## 2022-12-28 NOTE — TELEPHONE ENCOUNTER
"Rn spoke with pt's granddaughter, Zoltan r/t recent hospitalization for elevated K+. Pt is home, pt's granddaughter denies SOB, weight gain. Pt BP WNL for pt baseline, no dizziness reported.  Zoltan states BLE edema is still present but has "gone down since starting Lasix." MA to schedule hospital f/u with pt.    "

## 2023-01-01 ENCOUNTER — TELEPHONE (OUTPATIENT)
Dept: TRANSPLANT | Facility: CLINIC | Age: 88
End: 2023-01-01
Payer: MEDICARE

## 2023-01-01 ENCOUNTER — TELEPHONE (OUTPATIENT)
Dept: CARDIOLOGY | Facility: CLINIC | Age: 88
End: 2023-01-01
Payer: MEDICARE

## 2023-01-01 ENCOUNTER — OFFICE VISIT (OUTPATIENT)
Dept: NEPHROLOGY | Facility: CLINIC | Age: 88
End: 2023-01-01
Payer: MEDICARE

## 2023-01-01 ENCOUNTER — LAB VISIT (OUTPATIENT)
Dept: LAB | Facility: HOSPITAL | Age: 88
End: 2023-01-01
Payer: MEDICARE

## 2023-01-01 ENCOUNTER — PATIENT MESSAGE (OUTPATIENT)
Dept: CARDIOLOGY | Facility: CLINIC | Age: 88
End: 2023-01-01
Payer: MEDICARE

## 2023-01-01 ENCOUNTER — DOCUMENTATION ONLY (OUTPATIENT)
Dept: CARDIOLOGY | Facility: CLINIC | Age: 88
End: 2023-01-01
Payer: MEDICARE

## 2023-01-01 ENCOUNTER — EXTERNAL HOME HEALTH (OUTPATIENT)
Dept: HOME HEALTH SERVICES | Facility: HOSPITAL | Age: 88
End: 2023-01-01
Payer: MEDICARE

## 2023-01-01 ENCOUNTER — TELEPHONE (OUTPATIENT)
Dept: INTERNAL MEDICINE | Facility: CLINIC | Age: 88
End: 2023-01-01

## 2023-01-01 ENCOUNTER — OFFICE VISIT (OUTPATIENT)
Dept: CARDIOLOGY | Facility: CLINIC | Age: 88
End: 2023-01-01
Payer: MEDICARE

## 2023-01-01 ENCOUNTER — HOSPITAL ENCOUNTER (OUTPATIENT)
Facility: HOSPITAL | Age: 88
Discharge: HOME | End: 2023-01-23
Attending: EMERGENCY MEDICINE | Admitting: HOSPITALIST
Payer: MEDICARE

## 2023-01-01 VITALS
OXYGEN SATURATION: 98 % | HEIGHT: 59 IN | BODY MASS INDEX: 20.56 KG/M2 | RESPIRATION RATE: 20 BRPM | TEMPERATURE: 98 F | SYSTOLIC BLOOD PRESSURE: 118 MMHG | HEART RATE: 65 BPM | WEIGHT: 102 LBS | DIASTOLIC BLOOD PRESSURE: 56 MMHG

## 2023-01-01 VITALS
SYSTOLIC BLOOD PRESSURE: 138 MMHG | HEIGHT: 59 IN | WEIGHT: 114.63 LBS | DIASTOLIC BLOOD PRESSURE: 70 MMHG | HEART RATE: 70 BPM | BODY MASS INDEX: 23.11 KG/M2

## 2023-01-01 VITALS
WEIGHT: 101.88 LBS | BODY MASS INDEX: 20.54 KG/M2 | DIASTOLIC BLOOD PRESSURE: 50 MMHG | OXYGEN SATURATION: 97 % | HEIGHT: 59 IN | SYSTOLIC BLOOD PRESSURE: 100 MMHG | HEART RATE: 80 BPM

## 2023-01-01 VITALS
HEART RATE: 74 BPM | SYSTOLIC BLOOD PRESSURE: 111 MMHG | HEIGHT: 59 IN | BODY MASS INDEX: 23.11 KG/M2 | WEIGHT: 114.63 LBS | DIASTOLIC BLOOD PRESSURE: 78 MMHG | OXYGEN SATURATION: 91 %

## 2023-01-01 DIAGNOSIS — E11.9 TYPE 2 DIABETES MELLITUS WITHOUT COMPLICATION, WITHOUT LONG-TERM CURRENT USE OF INSULIN: ICD-10-CM

## 2023-01-01 DIAGNOSIS — I70.0 AORTIC ATHEROSCLEROSIS: ICD-10-CM

## 2023-01-01 DIAGNOSIS — E78.2 MIXED HYPERLIPIDEMIA: ICD-10-CM

## 2023-01-01 DIAGNOSIS — I10 PRIMARY HYPERTENSION: ICD-10-CM

## 2023-01-01 DIAGNOSIS — I50.9 CONGESTIVE HEART FAILURE, UNSPECIFIED HF CHRONICITY, UNSPECIFIED HEART FAILURE TYPE: Primary | ICD-10-CM

## 2023-01-01 DIAGNOSIS — R76.8 ELEVATED SERUM IMMUNOGLOBULIN FREE LIGHT CHAINS: ICD-10-CM

## 2023-01-01 DIAGNOSIS — I31.39 PERICARDIAL EFFUSION: Primary | ICD-10-CM

## 2023-01-01 DIAGNOSIS — R07.9 CHEST PAIN: ICD-10-CM

## 2023-01-01 DIAGNOSIS — R77.8 ABNORMAL SERUM PROTEIN ELECTROPHORESIS: ICD-10-CM

## 2023-01-01 DIAGNOSIS — E08.00 DIABETES MELLITUS DUE TO UNDERLYING CONDITION WITH HYPEROSMOLARITY WITHOUT COMA, WITHOUT LONG-TERM CURRENT USE OF INSULIN: Chronic | ICD-10-CM

## 2023-01-01 DIAGNOSIS — I50.33 ACUTE ON CHRONIC DIASTOLIC HEART FAILURE: ICD-10-CM

## 2023-01-01 DIAGNOSIS — I50.33 ACUTE ON CHRONIC DIASTOLIC HEART FAILURE: Primary | ICD-10-CM

## 2023-01-01 DIAGNOSIS — I50.9 ACUTE ON CHRONIC HEART FAILURE: ICD-10-CM

## 2023-01-01 DIAGNOSIS — Z00.00 ENCOUNTER FOR MEDICARE ANNUAL WELLNESS EXAM: ICD-10-CM

## 2023-01-01 DIAGNOSIS — C83.35 DIFFUSE LARGE B-CELL LYMPHOMA OF LYMPH NODES OF LOWER EXTREMITY: Chronic | ICD-10-CM

## 2023-01-01 DIAGNOSIS — E87.1 HYPONATREMIA: Primary | ICD-10-CM

## 2023-01-01 DIAGNOSIS — R06.02 SOB (SHORTNESS OF BREATH): ICD-10-CM

## 2023-01-01 DIAGNOSIS — E05.90 HYPERTHYROIDISM: Chronic | ICD-10-CM

## 2023-01-01 DIAGNOSIS — I50.42 CHRONIC COMBINED SYSTOLIC AND DIASTOLIC HEART FAILURE: Primary | ICD-10-CM

## 2023-01-01 DIAGNOSIS — I50.9 CHF (CONGESTIVE HEART FAILURE): ICD-10-CM

## 2023-01-01 DIAGNOSIS — R06.00 DYSPNEA: ICD-10-CM

## 2023-01-01 DIAGNOSIS — I50.9 ACUTE ON CHRONIC HEART FAILURE, UNSPECIFIED HEART FAILURE TYPE: ICD-10-CM

## 2023-01-01 DIAGNOSIS — I20.89 OTHER FORMS OF ANGINA PECTORIS: ICD-10-CM

## 2023-01-01 LAB
ALBUMIN SERPL BCP-MCNC: 2.3 G/DL (ref 3.5–5.2)
ALBUMIN SERPL BCP-MCNC: 2.4 G/DL (ref 3.5–5.2)
ALBUMIN SERPL BCP-MCNC: 2.5 G/DL (ref 3.5–5.2)
ALBUMIN SERPL BCP-MCNC: 2.6 G/DL (ref 3.5–5.2)
ALP SERPL-CCNC: 129 U/L (ref 55–135)
ALP SERPL-CCNC: 142 U/L (ref 55–135)
ALP SERPL-CCNC: 146 U/L (ref 55–135)
ALP SERPL-CCNC: 147 U/L (ref 55–135)
ALT SERPL W/O P-5'-P-CCNC: 21 U/L (ref 10–44)
ALT SERPL W/O P-5'-P-CCNC: 22 U/L (ref 10–44)
ALT SERPL W/O P-5'-P-CCNC: 22 U/L (ref 10–44)
ALT SERPL W/O P-5'-P-CCNC: 25 U/L (ref 10–44)
ANION GAP SERPL CALC-SCNC: 7 MMOL/L (ref 8–16)
ANION GAP SERPL CALC-SCNC: 8 MMOL/L (ref 8–16)
ANION GAP SERPL CALC-SCNC: 8 MMOL/L (ref 8–16)
ANION GAP SERPL CALC-SCNC: 9 MMOL/L (ref 8–16)
ASCENDING AORTA: 1.97 CM
AST SERPL-CCNC: 32 U/L (ref 10–40)
AST SERPL-CCNC: 36 U/L (ref 10–40)
AST SERPL-CCNC: 37 U/L (ref 10–40)
AST SERPL-CCNC: 42 U/L (ref 10–40)
AV INDEX (PROSTH): 0.72
AV MEAN GRADIENT: 2 MMHG
AV PEAK GRADIENT: 5 MMHG
AV VALVE AREA: 1.63 CM2
AV VELOCITY RATIO: 0.59
BASOPHILS # BLD AUTO: 0.01 K/UL (ref 0–0.2)
BASOPHILS # BLD AUTO: 0.01 K/UL (ref 0–0.2)
BASOPHILS NFR BLD: 0.2 % (ref 0–1.9)
BASOPHILS NFR BLD: 0.3 % (ref 0–1.9)
BILIRUB SERPL-MCNC: 0.9 MG/DL (ref 0.1–1)
BILIRUB SERPL-MCNC: 1 MG/DL (ref 0.1–1)
BILIRUB SERPL-MCNC: 1.3 MG/DL (ref 0.1–1)
BILIRUB SERPL-MCNC: 1.8 MG/DL (ref 0.1–1)
BNP SERPL-MCNC: 1361 PG/ML (ref 0–99)
BNP SERPL-MCNC: 1366 PG/ML (ref 0–99)
BNP SERPL-MCNC: 1540 PG/ML (ref 0–99)
BSA FOR ECHO PROCEDURE: 1.39 M2
BUN SERPL-MCNC: 11 MG/DL (ref 8–23)
BUN SERPL-MCNC: 13 MG/DL (ref 8–23)
BUN SERPL-MCNC: 14 MG/DL (ref 8–23)
BUN SERPL-MCNC: 14 MG/DL (ref 8–23)
CALCIUM SERPL-MCNC: 10 MG/DL (ref 8.7–10.5)
CALCIUM SERPL-MCNC: 10 MG/DL (ref 8.7–10.5)
CALCIUM SERPL-MCNC: 9 MG/DL (ref 8.7–10.5)
CALCIUM SERPL-MCNC: 9.2 MG/DL (ref 8.7–10.5)
CALCIUM SERPL-MCNC: 9.4 MG/DL (ref 8.7–10.5)
CALCIUM SERPL-MCNC: 9.5 MG/DL (ref 8.7–10.5)
CHLORIDE SERPL-SCNC: 105 MMOL/L (ref 95–110)
CHLORIDE SERPL-SCNC: 105 MMOL/L (ref 95–110)
CHLORIDE SERPL-SCNC: 95 MMOL/L (ref 95–110)
CHLORIDE SERPL-SCNC: 96 MMOL/L (ref 95–110)
CHLORIDE SERPL-SCNC: 98 MMOL/L (ref 95–110)
CHLORIDE SERPL-SCNC: 99 MMOL/L (ref 95–110)
CO2 SERPL-SCNC: 30 MMOL/L (ref 23–29)
CO2 SERPL-SCNC: 31 MMOL/L (ref 23–29)
CO2 SERPL-SCNC: 32 MMOL/L (ref 23–29)
CO2 SERPL-SCNC: 32 MMOL/L (ref 23–29)
CO2 SERPL-SCNC: 34 MMOL/L (ref 23–29)
CO2 SERPL-SCNC: 34 MMOL/L (ref 23–29)
CREAT SERPL-MCNC: 0.8 MG/DL (ref 0.5–1.4)
CREAT SERPL-MCNC: 0.9 MG/DL (ref 0.5–1.4)
CV ECHO LV RWT: 0.77 CM
DIFFERENTIAL METHOD: ABNORMAL
DIFFERENTIAL METHOD: ABNORMAL
DOP CALC AO PEAK VEL: 1.08 M/S
DOP CALC AO VTI: 17.9 CM
DOP CALC LVOT AREA: 2.3 CM2
DOP CALC LVOT DIAMETER: 1.7 CM
DOP CALC LVOT PEAK VEL: 0.64 M/S
DOP CALC LVOT STROKE VOLUME: 29.27 CM3
DOP CALCLVOT PEAK VEL VTI: 12.9 CM
E WAVE DECELERATION TIME: 152.34 MSEC
E/A RATIO: 1.63
E/E' RATIO: 13.4 M/S
ECHO LV POSTERIOR WALL: 1.3 CM (ref 0.6–1.1)
EJECTION FRACTION: 35 %
EOSINOPHIL # BLD AUTO: 0.1 K/UL (ref 0–0.5)
EOSINOPHIL # BLD AUTO: 0.1 K/UL (ref 0–0.5)
EOSINOPHIL NFR BLD: 1.5 % (ref 0–8)
EOSINOPHIL NFR BLD: 1.6 % (ref 0–8)
ERYTHROCYTE [DISTWIDTH] IN BLOOD BY AUTOMATED COUNT: 18.7 % (ref 11.5–14.5)
ERYTHROCYTE [DISTWIDTH] IN BLOOD BY AUTOMATED COUNT: 19 % (ref 11.5–14.5)
EST. GFR  (NO RACE VARIABLE): >60 ML/MIN/1.73 M^2
ESTIMATED AVG GLUCOSE: 157 MG/DL (ref 68–131)
FRACTIONAL SHORTENING: 9 % (ref 28–44)
GLUCOSE SERPL-MCNC: 104 MG/DL (ref 70–110)
GLUCOSE SERPL-MCNC: 104 MG/DL (ref 70–110)
GLUCOSE SERPL-MCNC: 139 MG/DL (ref 70–110)
GLUCOSE SERPL-MCNC: 178 MG/DL (ref 70–110)
GLUCOSE SERPL-MCNC: 60 MG/DL (ref 70–110)
GLUCOSE SERPL-MCNC: 86 MG/DL (ref 70–110)
HBA1C MFR BLD: 7.1 % (ref 4–5.6)
HCT VFR BLD AUTO: 42.8 % (ref 37–48.5)
HCT VFR BLD AUTO: 42.9 % (ref 37–48.5)
HGB BLD-MCNC: 13.7 G/DL (ref 12–16)
HGB BLD-MCNC: 13.8 G/DL (ref 12–16)
IMM GRANULOCYTES # BLD AUTO: 0.01 K/UL (ref 0–0.04)
IMM GRANULOCYTES # BLD AUTO: 0.01 K/UL (ref 0–0.04)
IMM GRANULOCYTES NFR BLD AUTO: 0.2 % (ref 0–0.5)
IMM GRANULOCYTES NFR BLD AUTO: 0.3 % (ref 0–0.5)
INTERVENTRICULAR SEPTUM: 1.46 CM (ref 0.6–1.1)
IVC DIAMETER: 2.12 CM
IVRT: 99.9 MSEC
LA MAJOR: 5.61 CM
LA MINOR: 5.25 CM
LA WIDTH: 4.7 CM
LEFT ATRIUM SIZE: 3.62 CM
LEFT ATRIUM VOLUME INDEX: 56.4 ML/M2
LEFT ATRIUM VOLUME: 78.44 CM3
LEFT INTERNAL DIMENSION IN SYSTOLE: 3.09 CM (ref 2.1–4)
LEFT VENTRICLE DIASTOLIC VOLUME INDEX: 33.8 ML/M2
LEFT VENTRICLE DIASTOLIC VOLUME: 46.98 ML
LEFT VENTRICLE MASS INDEX: 116 G/M2
LEFT VENTRICLE SYSTOLIC VOLUME INDEX: 27.1 ML/M2
LEFT VENTRICLE SYSTOLIC VOLUME: 37.62 ML
LEFT VENTRICULAR INTERNAL DIMENSION IN DIASTOLE: 3.39 CM (ref 3.5–6)
LEFT VENTRICULAR MASS: 161.72 G
LV LATERAL E/E' RATIO: 13.4 M/S
LV SEPTAL E/E' RATIO: 13.4 M/S
LVOT MG: 0.87 MMHG
LVOT MV: 0.41 CM/S
LYMPHOCYTES # BLD AUTO: 1.6 K/UL (ref 1–4.8)
LYMPHOCYTES # BLD AUTO: 1.7 K/UL (ref 1–4.8)
LYMPHOCYTES NFR BLD: 41.8 % (ref 18–48)
LYMPHOCYTES NFR BLD: 48.9 % (ref 18–48)
MAGNESIUM SERPL-MCNC: 1.8 MG/DL (ref 1.6–2.6)
MAGNESIUM SERPL-MCNC: 1.9 MG/DL (ref 1.6–2.6)
MCH RBC QN AUTO: 27.8 PG (ref 27–31)
MCH RBC QN AUTO: 28.4 PG (ref 27–31)
MCHC RBC AUTO-ENTMCNC: 31.9 G/DL (ref 32–36)
MCHC RBC AUTO-ENTMCNC: 32.2 G/DL (ref 32–36)
MCV RBC AUTO: 87 FL (ref 82–98)
MCV RBC AUTO: 88 FL (ref 82–98)
MONOCYTES # BLD AUTO: 0.3 K/UL (ref 0.3–1)
MONOCYTES # BLD AUTO: 0.5 K/UL (ref 0.3–1)
MONOCYTES NFR BLD: 10.7 % (ref 4–15)
MONOCYTES NFR BLD: 11.5 % (ref 4–15)
MV PEAK A VEL: 0.41 M/S
MV PEAK E VEL: 0.67 M/S
MV STENOSIS PRESSURE HALF TIME: 44.18 MS
MV VALVE AREA P 1/2 METHOD: 4.98 CM2
NEUTROPHILS # BLD AUTO: 1.2 K/UL (ref 1.8–7.7)
NEUTROPHILS # BLD AUTO: 1.8 K/UL (ref 1.8–7.7)
NEUTROPHILS NFR BLD: 38.2 % (ref 38–73)
NEUTROPHILS NFR BLD: 44.8 % (ref 38–73)
NRBC BLD-RTO: 0 /100 WBC
NRBC BLD-RTO: 0 /100 WBC
PHOSPHATE SERPL-MCNC: 1.9 MG/DL (ref 2.7–4.5)
PHOSPHATE SERPL-MCNC: 2.5 MG/DL (ref 2.7–4.5)
PISA TR MAX VEL: 2.79 M/S
PLATELET # BLD AUTO: 176 K/UL (ref 150–450)
PLATELET # BLD AUTO: 192 K/UL (ref 150–450)
PMV BLD AUTO: 11.9 FL (ref 9.2–12.9)
PMV BLD AUTO: 12.6 FL (ref 9.2–12.9)
POCT GLUCOSE: 133 MG/DL (ref 70–110)
POCT GLUCOSE: 220 MG/DL (ref 70–110)
POCT GLUCOSE: 53 MG/DL (ref 70–110)
POCT GLUCOSE: 60 MG/DL (ref 70–110)
POCT GLUCOSE: 63 MG/DL (ref 70–110)
POCT GLUCOSE: 91 MG/DL (ref 70–110)
POCT GLUCOSE: 91 MG/DL (ref 70–110)
POTASSIUM SERPL-SCNC: 3.7 MMOL/L (ref 3.5–5.1)
POTASSIUM SERPL-SCNC: 4.2 MMOL/L (ref 3.5–5.1)
POTASSIUM SERPL-SCNC: 4.4 MMOL/L (ref 3.5–5.1)
POTASSIUM SERPL-SCNC: 4.5 MMOL/L (ref 3.5–5.1)
PROT SERPL-MCNC: 7 G/DL (ref 6–8.4)
PROT SERPL-MCNC: 7.5 G/DL (ref 6–8.4)
PROT SERPL-MCNC: 7.5 G/DL (ref 6–8.4)
PROT SERPL-MCNC: 7.9 G/DL (ref 6–8.4)
PV PEAK VELOCITY: 0.77 CM/S
RA MAJOR: 5.38 CM
RA PRESSURE: 8 MMHG
RA WIDTH: 4.5 CM
RBC # BLD AUTO: 4.86 M/UL (ref 4–5.4)
RBC # BLD AUTO: 4.92 M/UL (ref 4–5.4)
RIGHT VENTRICULAR END-DIASTOLIC DIMENSION: 3.76 CM
RV TISSUE DOPPLER FREE WALL SYSTOLIC VELOCITY 1 (APICAL 4 CHAMBER VIEW): 0.01 CM/S
SINUS: 2.77 CM
SODIUM SERPL-SCNC: 135 MMOL/L (ref 136–145)
SODIUM SERPL-SCNC: 135 MMOL/L (ref 136–145)
SODIUM SERPL-SCNC: 137 MMOL/L (ref 136–145)
SODIUM SERPL-SCNC: 138 MMOL/L (ref 136–145)
SODIUM SERPL-SCNC: 148 MMOL/L (ref 136–145)
SODIUM SERPL-SCNC: 148 MMOL/L (ref 136–145)
STJ: 1.56 CM
TDI LATERAL: 0.05 M/S
TDI SEPTAL: 0.05 M/S
TDI: 0.05 M/S
TR MAX PG: 31 MMHG
TRICUSPID ANNULAR PLANE SYSTOLIC EXCURSION: 1.32 CM
TROPONIN I SERPL DL<=0.01 NG/ML-MCNC: 0.15 NG/ML (ref 0–0.03)
TROPONIN I SERPL DL<=0.01 NG/ML-MCNC: 0.17 NG/ML (ref 0–0.03)
TROPONIN I SERPL DL<=0.01 NG/ML-MCNC: 0.18 NG/ML (ref 0–0.03)
TV REST PULMONARY ARTERY PRESSURE: 39 MMHG
WBC # BLD AUTO: 3.19 K/UL (ref 3.9–12.7)
WBC # BLD AUTO: 4.07 K/UL (ref 3.9–12.7)

## 2023-01-01 PROCEDURE — 96376 TX/PRO/DX INJ SAME DRUG ADON: CPT

## 2023-01-01 PROCEDURE — G0378 HOSPITAL OBSERVATION PER HR: HCPCS | Mod: HCNC

## 2023-01-01 PROCEDURE — 84484 ASSAY OF TROPONIN QUANT: CPT | Mod: HCNC | Performed by: EMERGENCY MEDICINE

## 2023-01-01 PROCEDURE — 36415 COLL VENOUS BLD VENIPUNCTURE: CPT | Mod: HCNC

## 2023-01-01 PROCEDURE — 96375 TX/PRO/DX INJ NEW DRUG ADDON: CPT | Mod: HCNC

## 2023-01-01 PROCEDURE — 99999 PR PBB SHADOW E&M-EST. PATIENT-LVL IV: CPT | Mod: PBBFAC,HCNC,GC, | Performed by: INTERNAL MEDICINE

## 2023-01-01 PROCEDURE — 3288F PR FALLS RISK ASSESSMENT DOCUMENTED: ICD-10-PCS | Mod: HCNC,CPTII,S$GLB, | Performed by: INTERNAL MEDICINE

## 2023-01-01 PROCEDURE — 80053 COMPREHEN METABOLIC PANEL: CPT | Mod: HCNC

## 2023-01-01 PROCEDURE — 99223 PR INITIAL HOSPITAL CARE,LEVL III: ICD-10-PCS | Mod: 25,HCNC,, | Performed by: INTERNAL MEDICINE

## 2023-01-01 PROCEDURE — 83735 ASSAY OF MAGNESIUM: CPT | Mod: HCNC

## 2023-01-01 PROCEDURE — 85025 COMPLETE CBC W/AUTO DIFF WBC: CPT | Mod: HCNC | Performed by: NURSE PRACTITIONER

## 2023-01-01 PROCEDURE — 1125F AMNT PAIN NOTED PAIN PRSNT: CPT | Mod: HCNC,CPTII,S$GLB, | Performed by: INTERNAL MEDICINE

## 2023-01-01 PROCEDURE — 25000003 PHARM REV CODE 250: Mod: HCNC | Performed by: NURSE PRACTITIONER

## 2023-01-01 PROCEDURE — 1101F PR PT FALLS ASSESS DOC 0-1 FALLS W/OUT INJ PAST YR: ICD-10-PCS | Mod: HCNC,CPTII,S$GLB, | Performed by: INTERNAL MEDICINE

## 2023-01-01 PROCEDURE — 83880 ASSAY OF NATRIURETIC PEPTIDE: CPT | Mod: HCNC

## 2023-01-01 PROCEDURE — 99223 1ST HOSP IP/OBS HIGH 75: CPT | Mod: 25,HCNC,, | Performed by: INTERNAL MEDICINE

## 2023-01-01 PROCEDURE — 85025 COMPLETE CBC W/AUTO DIFF WBC: CPT | Mod: HCNC | Performed by: EMERGENCY MEDICINE

## 2023-01-01 PROCEDURE — 1159F PR MEDICATION LIST DOCUMENTED IN MEDICAL RECORD: ICD-10-PCS | Mod: HCNC,CPTII,S$GLB, | Performed by: INTERNAL MEDICINE

## 2023-01-01 PROCEDURE — 1159F PR MEDICATION LIST DOCUMENTED IN MEDICAL RECORD: ICD-10-PCS | Mod: HCNC,CPTII,S$GLB,

## 2023-01-01 PROCEDURE — 25000003 PHARM REV CODE 250: Mod: HCNC | Performed by: EMERGENCY MEDICINE

## 2023-01-01 PROCEDURE — 3288F FALL RISK ASSESSMENT DOCD: CPT | Mod: HCNC,CPTII,S$GLB,

## 2023-01-01 PROCEDURE — 1159F MED LIST DOCD IN RCRD: CPT | Mod: HCNC,CPTII,S$GLB,

## 2023-01-01 PROCEDURE — 99214 OFFICE O/P EST MOD 30 MIN: CPT | Mod: HCNC,S$GLB,, | Performed by: INTERNAL MEDICINE

## 2023-01-01 PROCEDURE — 1160F RVW MEDS BY RX/DR IN RCRD: CPT | Mod: HCNC,CPTII,S$GLB,

## 2023-01-01 PROCEDURE — 25000003 PHARM REV CODE 250: Mod: HCNC | Performed by: HOSPITALIST

## 2023-01-01 PROCEDURE — 99999 PR PBB SHADOW E&M-EST. PATIENT-LVL IV: ICD-10-PCS | Mod: PBBFAC,HCNC,,

## 2023-01-01 PROCEDURE — 99285 EMERGENCY DEPT VISIT HI MDM: CPT | Mod: 25,HCNC

## 2023-01-01 PROCEDURE — 93010 ELECTROCARDIOGRAM REPORT: CPT | Mod: HCNC,,, | Performed by: INTERNAL MEDICINE

## 2023-01-01 PROCEDURE — 99214 OFFICE O/P EST MOD 30 MIN: CPT | Mod: HCNC,S$GLB,,

## 2023-01-01 PROCEDURE — 63600175 PHARM REV CODE 636 W HCPCS: Mod: HCNC | Performed by: HOSPITALIST

## 2023-01-01 PROCEDURE — 84484 ASSAY OF TROPONIN QUANT: CPT | Mod: 91,HCNC | Performed by: NURSE PRACTITIONER

## 2023-01-01 PROCEDURE — 99999 PR PBB SHADOW E&M-EST. PATIENT-LVL III: ICD-10-PCS | Mod: PBBFAC,HCNC,, | Performed by: INTERNAL MEDICINE

## 2023-01-01 PROCEDURE — 1101F PR PT FALLS ASSESS DOC 0-1 FALLS W/OUT INJ PAST YR: ICD-10-PCS | Mod: HCNC,CPTII,S$GLB,

## 2023-01-01 PROCEDURE — 83735 ASSAY OF MAGNESIUM: CPT | Mod: HCNC | Performed by: NURSE PRACTITIONER

## 2023-01-01 PROCEDURE — 96374 THER/PROPH/DIAG INJ IV PUSH: CPT | Mod: HCNC,59

## 2023-01-01 PROCEDURE — 3288F PR FALLS RISK ASSESSMENT DOCUMENTED: ICD-10-PCS | Mod: HCNC,CPTII,S$GLB,

## 2023-01-01 PROCEDURE — 96374 THER/PROPH/DIAG INJ IV PUSH: CPT | Mod: 59

## 2023-01-01 PROCEDURE — 84100 ASSAY OF PHOSPHORUS: CPT | Mod: HCNC | Performed by: NURSE PRACTITIONER

## 2023-01-01 PROCEDURE — 80053 COMPREHEN METABOLIC PANEL: CPT | Mod: HCNC | Performed by: NURSE PRACTITIONER

## 2023-01-01 PROCEDURE — 99214 PR OFFICE/OUTPT VISIT, EST, LEVL IV, 30-39 MIN: ICD-10-PCS | Mod: HCNC,S$GLB,,

## 2023-01-01 PROCEDURE — 1111F DSCHRG MED/CURRENT MED MERGE: CPT | Mod: HCNC,CPTII,S$GLB,

## 2023-01-01 PROCEDURE — 93010 EKG 12-LEAD: ICD-10-PCS | Mod: HCNC,,, | Performed by: INTERNAL MEDICINE

## 2023-01-01 PROCEDURE — 99999 PR PBB SHADOW E&M-EST. PATIENT-LVL IV: ICD-10-PCS | Mod: PBBFAC,HCNC,GC, | Performed by: INTERNAL MEDICINE

## 2023-01-01 PROCEDURE — 3288F FALL RISK ASSESSMENT DOCD: CPT | Mod: HCNC,CPTII,S$GLB, | Performed by: INTERNAL MEDICINE

## 2023-01-01 PROCEDURE — 25000242 PHARM REV CODE 250 ALT 637 W/ HCPCS: Mod: HCNC | Performed by: NURSE PRACTITIONER

## 2023-01-01 PROCEDURE — 1159F MED LIST DOCD IN RCRD: CPT | Mod: HCNC,CPTII,S$GLB, | Performed by: INTERNAL MEDICINE

## 2023-01-01 PROCEDURE — 1160F RVW MEDS BY RX/DR IN RCRD: CPT | Mod: HCNC,CPTII,S$GLB, | Performed by: INTERNAL MEDICINE

## 2023-01-01 PROCEDURE — 84100 ASSAY OF PHOSPHORUS: CPT | Mod: HCNC

## 2023-01-01 PROCEDURE — 93005 ELECTROCARDIOGRAM TRACING: CPT | Mod: HCNC

## 2023-01-01 PROCEDURE — 1126F PR PAIN SEVERITY QUANTIFIED, NO PAIN PRESENT: ICD-10-PCS | Mod: HCNC,CPTII,S$GLB,

## 2023-01-01 PROCEDURE — 63600175 PHARM REV CODE 636 W HCPCS: Mod: HCNC | Performed by: EMERGENCY MEDICINE

## 2023-01-01 PROCEDURE — 99999 PR PBB SHADOW E&M-EST. PATIENT-LVL IV: CPT | Mod: PBBFAC,HCNC,,

## 2023-01-01 PROCEDURE — 82962 GLUCOSE BLOOD TEST: CPT | Mod: 91,HCNC

## 2023-01-01 PROCEDURE — 96372 THER/PROPH/DIAG INJ SC/IM: CPT | Performed by: HOSPITALIST

## 2023-01-01 PROCEDURE — 1125F PR PAIN SEVERITY QUANTIFIED, PAIN PRESENT: ICD-10-PCS | Mod: HCNC,CPTII,S$GLB, | Performed by: INTERNAL MEDICINE

## 2023-01-01 PROCEDURE — 83880 ASSAY OF NATRIURETIC PEPTIDE: CPT | Mod: HCNC | Performed by: EMERGENCY MEDICINE

## 2023-01-01 PROCEDURE — 83036 HEMOGLOBIN GLYCOSYLATED A1C: CPT | Mod: HCNC | Performed by: NURSE PRACTITIONER

## 2023-01-01 PROCEDURE — 1111F PR DISCHARGE MEDS RECONCILED W/ CURRENT OUTPATIENT MED LIST: ICD-10-PCS | Mod: HCNC,CPTII,S$GLB,

## 2023-01-01 PROCEDURE — 82962 GLUCOSE BLOOD TEST: CPT | Mod: HCNC

## 2023-01-01 PROCEDURE — 1111F PR DISCHARGE MEDS RECONCILED W/ CURRENT OUTPATIENT MED LIST: ICD-10-PCS | Mod: HCNC,CPTII,S$GLB, | Performed by: INTERNAL MEDICINE

## 2023-01-01 PROCEDURE — 1160F PR REVIEW ALL MEDS BY PRESCRIBER/CLIN PHARMACIST DOCUMENTED: ICD-10-PCS | Mod: HCNC,CPTII,S$GLB, | Performed by: INTERNAL MEDICINE

## 2023-01-01 PROCEDURE — 1101F PT FALLS ASSESS-DOCD LE1/YR: CPT | Mod: HCNC,CPTII,S$GLB,

## 2023-01-01 PROCEDURE — 80048 BASIC METABOLIC PNL TOTAL CA: CPT | Mod: HCNC | Performed by: NURSE PRACTITIONER

## 2023-01-01 PROCEDURE — 96376 TX/PRO/DX INJ SAME DRUG ADON: CPT | Mod: HCNC

## 2023-01-01 PROCEDURE — 99214 PR OFFICE/OUTPT VISIT, EST, LEVL IV, 30-39 MIN: ICD-10-PCS | Mod: HCNC,S$GLB,, | Performed by: INTERNAL MEDICINE

## 2023-01-01 PROCEDURE — 1126F AMNT PAIN NOTED NONE PRSNT: CPT | Mod: HCNC,CPTII,S$GLB,

## 2023-01-01 PROCEDURE — 99999 PR PBB SHADOW E&M-EST. PATIENT-LVL III: CPT | Mod: PBBFAC,HCNC,, | Performed by: INTERNAL MEDICINE

## 2023-01-01 PROCEDURE — 1160F PR REVIEW ALL MEDS BY PRESCRIBER/CLIN PHARMACIST DOCUMENTED: ICD-10-PCS | Mod: HCNC,CPTII,S$GLB,

## 2023-01-01 PROCEDURE — 1101F PT FALLS ASSESS-DOCD LE1/YR: CPT | Mod: HCNC,CPTII,S$GLB, | Performed by: INTERNAL MEDICINE

## 2023-01-01 PROCEDURE — 1111F DSCHRG MED/CURRENT MED MERGE: CPT | Mod: HCNC,CPTII,S$GLB, | Performed by: INTERNAL MEDICINE

## 2023-01-01 PROCEDURE — 36415 COLL VENOUS BLD VENIPUNCTURE: CPT | Mod: HCNC | Performed by: NURSE PRACTITIONER

## 2023-01-01 PROCEDURE — 80053 COMPREHEN METABOLIC PANEL: CPT | Mod: HCNC | Performed by: EMERGENCY MEDICINE

## 2023-01-01 PROCEDURE — 96374 THER/PROPH/DIAG INJ IV PUSH: CPT | Mod: HCNC

## 2023-01-01 PROCEDURE — 63600175 PHARM REV CODE 636 W HCPCS: Mod: HCNC | Performed by: NURSE PRACTITIONER

## 2023-01-01 RX ORDER — ONDANSETRON 2 MG/ML
4 INJECTION INTRAMUSCULAR; INTRAVENOUS EVERY 6 HOURS PRN
Status: DISCONTINUED | OUTPATIENT
Start: 2023-01-01 | End: 2023-01-01 | Stop reason: HOSPADM

## 2023-01-01 RX ORDER — AMIODARONE HYDROCHLORIDE 200 MG/1
200 TABLET ORAL DAILY
Status: DISCONTINUED | OUTPATIENT
Start: 2023-01-01 | End: 2023-01-01 | Stop reason: HOSPADM

## 2023-01-01 RX ORDER — TRAMADOL HYDROCHLORIDE 50 MG/1
50 TABLET ORAL EVERY 12 HOURS PRN
Status: DISCONTINUED | OUTPATIENT
Start: 2023-01-01 | End: 2023-01-01 | Stop reason: HOSPADM

## 2023-01-01 RX ORDER — INSULIN ASPART 100 [IU]/ML
0-5 INJECTION, SOLUTION INTRAVENOUS; SUBCUTANEOUS
Status: DISCONTINUED | OUTPATIENT
Start: 2023-01-01 | End: 2023-01-01 | Stop reason: HOSPADM

## 2023-01-01 RX ORDER — IBUPROFEN 200 MG
16 TABLET ORAL
Status: DISCONTINUED | OUTPATIENT
Start: 2023-01-01 | End: 2023-01-01 | Stop reason: HOSPADM

## 2023-01-01 RX ORDER — NAPROXEN SODIUM 220 MG/1
81 TABLET, FILM COATED ORAL DAILY
Status: DISCONTINUED | OUTPATIENT
Start: 2023-01-01 | End: 2023-01-01 | Stop reason: HOSPADM

## 2023-01-01 RX ORDER — ASPIRIN 325 MG
325 TABLET ORAL
Status: COMPLETED | OUTPATIENT
Start: 2023-01-01 | End: 2023-01-01

## 2023-01-01 RX ORDER — MORPHINE SULFATE 4 MG/ML
4 INJECTION, SOLUTION INTRAMUSCULAR; INTRAVENOUS
Status: COMPLETED | OUTPATIENT
Start: 2023-01-01 | End: 2023-01-01

## 2023-01-01 RX ORDER — MAG HYDROX/ALUMINUM HYD/SIMETH 200-200-20
30 SUSPENSION, ORAL (FINAL DOSE FORM) ORAL EVERY 6 HOURS PRN
Status: DISCONTINUED | OUTPATIENT
Start: 2023-01-01 | End: 2023-01-01 | Stop reason: HOSPADM

## 2023-01-01 RX ORDER — FUROSEMIDE 10 MG/ML
40 INJECTION INTRAMUSCULAR; INTRAVENOUS
Status: DISCONTINUED | OUTPATIENT
Start: 2023-01-01 | End: 2023-01-01 | Stop reason: HOSPADM

## 2023-01-01 RX ORDER — FUROSEMIDE 40 MG/1
40 TABLET ORAL 2 TIMES DAILY
Qty: 60 TABLET | Refills: 11 | Status: SHIPPED | OUTPATIENT
Start: 2023-01-01 | End: 2024-01-05

## 2023-01-01 RX ORDER — NITROGLYCERIN 0.4 MG/1
0.4 TABLET SUBLINGUAL EVERY 5 MIN PRN
Status: DISCONTINUED | OUTPATIENT
Start: 2023-01-01 | End: 2023-01-01 | Stop reason: HOSPADM

## 2023-01-01 RX ORDER — FUROSEMIDE 10 MG/ML
80 INJECTION INTRAMUSCULAR; INTRAVENOUS
Status: COMPLETED | OUTPATIENT
Start: 2023-01-01 | End: 2023-01-01

## 2023-01-01 RX ORDER — ACETAMINOPHEN 325 MG/1
650 TABLET ORAL EVERY 6 HOURS PRN
Status: DISCONTINUED | OUTPATIENT
Start: 2023-01-01 | End: 2023-01-01 | Stop reason: HOSPADM

## 2023-01-01 RX ORDER — METHIMAZOLE 5 MG/1
5 TABLET ORAL DAILY
Status: DISCONTINUED | OUTPATIENT
Start: 2023-01-01 | End: 2023-01-01 | Stop reason: HOSPADM

## 2023-01-01 RX ORDER — ACETAMINOPHEN 325 MG/1
650 TABLET ORAL
Status: COMPLETED | OUTPATIENT
Start: 2023-01-01 | End: 2023-01-01

## 2023-01-01 RX ORDER — ATORVASTATIN CALCIUM 40 MG/1
40 TABLET, FILM COATED ORAL DAILY
Status: DISCONTINUED | OUTPATIENT
Start: 2023-01-01 | End: 2023-01-01 | Stop reason: HOSPADM

## 2023-01-01 RX ORDER — GABAPENTIN 300 MG/1
300 CAPSULE ORAL 2 TIMES DAILY
Status: DISCONTINUED | OUTPATIENT
Start: 2023-01-01 | End: 2023-01-01 | Stop reason: HOSPADM

## 2023-01-01 RX ORDER — TALC
6 POWDER (GRAM) TOPICAL NIGHTLY PRN
Status: DISCONTINUED | OUTPATIENT
Start: 2023-01-01 | End: 2023-01-01 | Stop reason: HOSPADM

## 2023-01-01 RX ORDER — IBUPROFEN 200 MG
24 TABLET ORAL
Status: DISCONTINUED | OUTPATIENT
Start: 2023-01-01 | End: 2023-01-01 | Stop reason: HOSPADM

## 2023-01-01 RX ORDER — GLUCAGON 1 MG
1 KIT INJECTION
Status: DISCONTINUED | OUTPATIENT
Start: 2023-01-01 | End: 2023-01-01 | Stop reason: HOSPADM

## 2023-01-01 RX ORDER — ALBUTEROL SULFATE 90 UG/1
2 AEROSOL, METERED RESPIRATORY (INHALATION) EVERY 6 HOURS PRN
Status: DISCONTINUED | OUTPATIENT
Start: 2023-01-01 | End: 2023-01-01 | Stop reason: HOSPADM

## 2023-01-01 RX ADMIN — METHIMAZOLE 5 MG: 5 TABLET ORAL at 09:01

## 2023-01-01 RX ADMIN — ACETAMINOPHEN 650 MG: 325 TABLET ORAL at 09:01

## 2023-01-01 RX ADMIN — MORPHINE SULFATE 4 MG: 4 INJECTION INTRAVENOUS at 08:01

## 2023-01-01 RX ADMIN — NITROGLYCERIN 0.4 MG: 0.4 TABLET, ORALLY DISINTEGRATING SUBLINGUAL at 01:01

## 2023-01-01 RX ADMIN — ACETAMINOPHEN 650 MG: 325 TABLET ORAL at 06:01

## 2023-01-01 RX ADMIN — FUROSEMIDE 40 MG: 10 INJECTION, SOLUTION INTRAMUSCULAR; INTRAVENOUS at 09:01

## 2023-01-01 RX ADMIN — FUROSEMIDE 40 MG: 10 INJECTION, SOLUTION INTRAMUSCULAR; INTRAVENOUS at 08:01

## 2023-01-01 RX ADMIN — ACETAMINOPHEN 650 MG: 325 TABLET ORAL at 01:01

## 2023-01-01 RX ADMIN — ASPIRIN 325 MG ORAL TABLET 325 MG: 325 PILL ORAL at 10:01

## 2023-01-01 RX ADMIN — LOSARTAN POTASSIUM 12.5 MG: 25 TABLET, FILM COATED ORAL at 08:01

## 2023-01-01 RX ADMIN — DEXTROSE MONOHYDRATE 125 ML: 100 INJECTION, SOLUTION INTRAVENOUS at 03:01

## 2023-01-01 RX ADMIN — INSULIN ASPART 2 UNITS: 100 INJECTION, SOLUTION INTRAVENOUS; SUBCUTANEOUS at 12:01

## 2023-01-01 RX ADMIN — METHIMAZOLE 5 MG: 5 TABLET ORAL at 08:01

## 2023-01-01 RX ADMIN — ATORVASTATIN CALCIUM 40 MG: 40 TABLET, FILM COATED ORAL at 09:01

## 2023-01-01 RX ADMIN — ASPIRIN 81 MG CHEWABLE TABLET 81 MG: 81 TABLET CHEWABLE at 09:01

## 2023-01-01 RX ADMIN — AMIODARONE HYDROCHLORIDE 200 MG: 200 TABLET ORAL at 08:01

## 2023-01-01 RX ADMIN — LOSARTAN POTASSIUM 12.5 MG: 25 TABLET, FILM COATED ORAL at 09:01

## 2023-01-01 RX ADMIN — ATORVASTATIN CALCIUM 40 MG: 40 TABLET, FILM COATED ORAL at 08:01

## 2023-01-01 RX ADMIN — AMIODARONE HYDROCHLORIDE 200 MG: 200 TABLET ORAL at 09:01

## 2023-01-01 RX ADMIN — FUROSEMIDE 80 MG: 10 INJECTION, SOLUTION INTRAMUSCULAR; INTRAVENOUS at 10:01

## 2023-01-01 RX ADMIN — ASPIRIN 81 MG CHEWABLE TABLET 81 MG: 81 TABLET CHEWABLE at 08:01

## 2023-01-01 RX ADMIN — GABAPENTIN 300 MG: 300 CAPSULE ORAL at 12:01

## 2023-01-01 NOTE — ASSESSMENT & PLAN NOTE
-- A1c goal 7.5-8%. Allow for higher A1c and glucose given age and comorbidities.  -- Medications discussed:  MFM   GLP1-DPP4   SPAIN   Insulin   -- Reviewed logs/CGM:  A1c stable since stopped SFU, given risks vs benefits I advise not restarting.   Instructed to send glucose logs in 14 days.  Reach out to me sooner for any glucose <70 or consistently >200.  -- If need to start a medication will start DPP4 given low risk of hypoglycemia. We discussed this medication.  -- Medication Changes:   NONE  -- Reviewed goals of therapy are to get the best control we can without hypoglycemia.  -- Reviewed patient's current insulin regimen. Clarified proper insulin dose and timing in relation to meals, etc. Insulin injection sites and proper rotation instructed.    -- Advised frequent self blood glucose monitoring.  Patient encouraged to document glucose results and bring them to every clinic visit.  -- Hypoglycemia precautions discussed. Instructed on precautions before driving.    -- Call for Bg repeatedly < 90 or > 180.   -- Close adherence to lifestyle changes recommended.   -- Periodic follow ups for eye evaluations, foot care and dental care suggested.   Instrucciones de [place] de Maternidad    CITAS:   Debe llamar a lao médico para hacer o cambiar las citas de seguimiento. Edis leighann le indicaron, es muy importante que vaya a pham a lao médico y lleve al bebé al pediatra.   POR FAVOR LLAME PARA HACER DASHA CITAS CUANDO SALGA DEL HOSPITAL.   Llame al pediatra si:     El bebé tiene señales de ictericia (piel amarilla).   No orina alicia 8 horas (después de que le ha venido la leche, si lao bebé joe pecho exclusivamente).   Tiene fiebre, con temperatura de 100.4° F o mas tomada bajo el brazo.   No come nuria, está muy débil o no rohini de llorar.       Servicios del [facility]:   · Unidad de Maternidad: [phone]   · Consultoras de lactancia: [phone]   · Servicios sociales: [phone]   · Cunero de Atención Especial [phone]     Linea de intérprete en español para comunicarse con [facility]   Llame al [phone] para comunicarse con un intérprete.   El intérprete le conectará al departamento correspondiente e interpretará para usted.    EL RECIÉN NACIDO   La ictericia   El color de la piel de lao bebé se podría volver ligeramente amarillo. Cement lo causa la bilirrubina que se acumula en la amada. Si el color amarillo aumenta después de que el bebé fue dado de fox o si lo steiner de los ojos se pone amarillo avísele al pediatra.   Las señales de enfermedad   Llame al pediatra si el bebé tiene fiebre, con temperatura de 100.4° F o mas tomada bajo el brazo, si vomita con fuerza, se niega a comer dos veces seguidas, está más débil o está muy irritable, si lao excremento es cristy y jacoby o tiene amada, o si le sale sarpullido distinto al normal del recién nacido, o tiene estreñimiento, no orina, tiene dificultad para respirar, convulsiones o si le sangra el cordón umbilical.   Sueño seguro/síndrome de muerte súbita   Debe acostar al bebé boca arriba en un colchón firme sobre steven sábana con elásticos nuria templada. No debe usar ropa de  cama suave.  No lo abrigue demasiado.  No coloque cobijas enrolladas ni cuñas para posicionar.  Si lo va a envolver, hágalo con steven alma cobija y colóquela por debajo de los hombros.  Si la temperatura del bebé es normal, no necesita ponerle gorro.  La cama debe estar plana.  Se debe colocar al bebé con los pies cerca de los pies de la cama.  El cuidado del cordón umbilical   Límpiele alrededor del cordón umbilical todos los días con steven toallita limpia y húmeda, un cotonete o un pedazo de algodón húmedo. Llame al pediatra si el área huele mal, está barrie e hinchada o si le sale un líquido amarillo o cristy.   El cuidado después de la circuncisión   Es normal que el pene se le hinche o se ponga stark y produzca steven costra blanquecina (no la retire) en los siguientes 10 días. Llame al pediatra si el bebé no orina alicia 8 horas después de la circuncisión o si el área huele mal o empieza a sangrar. Puede limpiarle el pene con agua tibia cada vez que le cambie el pañal. Aplique vaselina al pene alicia steven semana o hasta que el área esté cicatrizada. Si le pusieron un orly de plástico ([place]) no es necesario ningún cuidado especial. El orly se caerá solo dentro de 8 a 10 días.   Asientos de Seguridad: Si esta interesado en hacer steven beatris para que lao asiento de seguridad sea revisado por un tecnico certificado en asiento de auto o esta buscando informacion sobre el tipo de asiento adecuado para lao hijo, llame a nuestro Equipo de Prevencion de Lesiones al [phone] (Se requiere beatris).  Nos podemos conectar virtualmente cualquier sharyn o en persona algunos pompa en la semana.            [email or web address]/[email or web address]/[email or web address]-[email or web address]-[email or web address]-booster            [email or web address]                Pasos del desarrollo de la audición de lao bebé  DESDE EL NACIMIENTO HASTA LOS SHANNAN AÑOS DE EDAD    Desde el nacimiento hasta los shannan meses   Reacciona a los sonidos omega.   Se  calma al escuchar lao voz.   Voltea en dirección suya cuando habla.   Se despierta con las voces y los sonidos omega.   Parece conocer lao voz y se calma si está llorando.     De shannan a seis meses   Jamee hacia arriba o voltea hacia un holden nuevo.   Reacciona cuando le dicen “no” y a los cambios de tonos de voz.     Imita lao propia voz.   Disfruta jugar con las sonajas y con otros juguetes que pilar sonidos.   Comienza a repetir los sonidos (leighann “ooo, aaa, ba-ba”).   Se asusta cuando alguien levanta la voz.     De seis a 10 meses   Reacciona cuando escucha lao nombre, la voz de alguien, y el holden del teléfono aunque no stevenson omega.   Sabe los nombres de cosas (taza, zapato) y dichos (“adiós”) comunes.   Balbucea, inclusive cuando está solo.   Comienza a hacer wilder a las instrucciones tales leighann \"sukhwinder aquí\".   Jamee las cosas cuando alguien habla de ellas.     De 10 a 15 meses   Juega con lao propia voz, disfrutando del holden y la sensación que le producen.   Señala o jamee los objetos o personas conocidos cuando se le pregunta por ellos.   Imita palabras y sonidos simples; puede utilizar unas cuantas palabras con intención sabiendo lo que significan.   Disfruta de juegos infantiles en los que se usan las jailene, tales leighann \"¿dónde está el bebé? y \"las palmaditas\".     De 15 a 18 meses   Sigue instrucciones simples, tales leighann “dame la pelota”.   Utiliza frecuentemente palabras que ha aprendido.   Utiliza oraciones de dos o shannan palabras al hablar o al pedir algo.   Sabe de 10 a 20 palabras.     De 18 a 24 meses   Entiende preguntas cerradas, leighann \"¿Tienes hambre?”.   Entiende frases simples, leighann “en el vaso”, “siéntate”.   Disfruta que le lean.   Señala los dibujos si se lo piden.     De 24 a 36 meses   Entiende el significado de \"ahora no\" y \"no más\".   Escoge las cosas por lao tamaño (alka/pequeño).   Sigue instrucciones simples, tales leighann “michel tus zapatos”.   Entiende varias palabras que implican steven acción  (olimpia lin correr).   Señala los dibujos si se darrian morris.                                                                              DISCHARGE SUMMARY    Delivery Information:       Gestational Age: 37w4d on 2023 4:53 AM       Birth Measurements:        Weight: 7 lb 6.2 oz (3350 g)        Length: 19\"        Head circumference: 34 cm        Discharge Weight: 3235 g (23)        Weight change since birth:-3%          Screenings/ Immunizations:    - [place] Screen: done, results pending  - Hearing Screen: Pass R, Pass L (23 1100)  - CHD Screening   Screening complete: Done (23)  Right hand reading %: 99 %  Foot reading %: 99 %  CHD: Normal  - Immunizations:   Most Recent Immunizations   Administered Date(s) Administered    Hep B, adolescent or pediatric 2023         Infant Blood Type: O Rh Positive  TCB 4.1 @ 24 hours                 [facility] Discharge Instructions    Amanuel Pelaez discharged to Home, accompanied by mother and father                                                                        Amanuel Pelaez is a 1 day old male   infant delivered at Gestational Age: 37w4d on 2023 4:53 AM  Z[place] is currently being fed Feeding Status: Breast milk and formula( and transitioned well. Parents have no concerns.     Birth Measurements:        Weight: 7 lb 6.2 oz (3350 g)        Length: 19\"        Head circumference: 34 cm        Discharge Weight: 3235 g (23)        Weight change since birth:-3%    Screenings/ Immunizations:    - [place]  Screen: done, results pending    - Hearing Screen: Pass R, Pass L (23 1100)    - CHD Screening   Screening complete: Done (23)  Right hand reading %: 99 %  Foot reading %: 99 %  CHD: Normal    - Immunizations:   Most Recent Immunizations   Administered Date(s) Administered    Hep B, adolescent or pediatric 2023       Mom blood type:   Information for the  patient's mother:  Pennie Davalos [[id number]]   O Rh Positive   Baby type & estella:   Recent Labs   Lab 11/03/23  0510   ABORHDABR O Rh Positive   DIGG Negative     Transcutaneous 4.1 (11/04/23 0446) Hours of age-Transcutaneous Biliribin: 24 Hrs  Serum Bilirubin No results found for: \"BILIRUBIN\"    Procedures:  Renal US    Labs:  Admission on 2023   Component Date Value    ABO/RH(D) 2023 O Rh Positive     DAMARIS, ANTI IGG 2023 Negative        Follow up with Letty Marcelo, [phone] as scheduled     If you have any questions about your baby, please call your baby's doctor.    DO NOT GIVE BABY ANY MEDICATION unless directed by your doctor.         Call the Doctor if:   Fever 100 degrees F or above        Forceful vomiting (not spitting up)   Several feedings when infant does not suck   Watery, runny stools (mucous, blood or foul odor)   Infant injury (fall from bed or table, dropped or severely shaken)   Constant crying   Any unusual rash   Yellow color of the eyes or skin   Has less than 4 wet diapers in a 24 hr period in the first week of life, and less    than 6 wet diapers in a 24 hr period after the baby is 7 days old   No stool for 48 hours   Redness, drainage or foul odor from the umbilical cord    Special Instructions: In case you need to call about any of the above   Take baby's temperature and write it down   Know how much and how many feedings the infant had that day   Note amount, color, consistency of urine and stools        Note any changes in the infant's behavior such as being sleepy, very fussy or  less active    Instructions: Baby's mom has been given discharge instructions, including information regarding feeding, any restrictions, and follow up information.     2023  4:25 PM     independent

## 2023-01-04 NOTE — TELEPHONE ENCOUNTER
Return call to Miss Charlton re her Mother Peri Johansen abd request to move appt. Miss Mariscal is Happy to move the appointment to Thursday at 2:30. Re the weeping left shin, her instructions are as follows. Do not pop the blister, wrap the leg with ace wrap if she has it and elevate her legs above the heart to decrease the swelling. If the blister poops she will need to see someone in wound care. Zoltan says the  nurse is reporting it as well. She conveys her thanks.

## 2023-01-04 NOTE — TELEPHONE ENCOUNTER
Call from Zoltan, Pt's daughter to ask if Miss Whitt's appt. Can be moved to Thursday when she has a Nephrology appt b/c it is hard to get her to the Clinic especially 2 days in a row. Kalindira also states that the  nurse visiting her Mom today stated that her left shin was weeping rather badly. She wanted to know what Miss Mariscal thinks she should do. I tell her I will call her back after speaking with Miss Mariscal.

## 2023-01-05 NOTE — PROGRESS NOTES
LMSW met with pt to discuss barriers towards treatment. Pt has access to transportation, medication, insurance benefits, and housing. Pt could benefit from a shower chair and hearing aids. Pt could also benefit from having an in-home caregiver. Pt would like information about the [place] [employer]ging for Meals on Wheels. Pt family uses portal so information will be sent over portal and given in person at next appt.

## 2023-01-05 NOTE — PROGRESS NOTES
Nephrology Clinic Note   1/5/2023    CC: follow up post hospital discharge   History of present illness:  Patient is a 88 y.o. female.   Presents to the clinic today for medical conditions listed below.  was started on tolvaptan for hypervolemic hyponatremia in the setting of CHF was treated with IV LASIX and was changed to tolvaptan because of hyponatremia and discharged on tolvaptan 15 mg q48 hours on 12/24/22. Lasix was resumed as OPT at 20 mg BID , patient deneis SOB no chest pain. Most recent Na was 140 on 12/29/22 and scr is 0.8. has been doing well since she was discharged from hospital. Patient is also on oxygen 2L since she was discharged from hospital. She last took tolvaptan today.     Patient says that her SOB is better since she was discharged from hospital but is still with SOB and LE Edema and also gained 2 pounds the last few days she is complaint with her lasix 20 mg po BID and last took tolvaptan today          No problems updated.  Review of Systems   Respiratory:  Positive for shortness of breath.    Cardiovascular:  Positive for leg swelling.   All other systems reviewed and are negative.    History:  Past Medical History:   Diagnosis Date    Aortic atherosclerosis 1/7/2016    Arthritis     Chronic diastolic congestive heart failure 9/15/2022    Colon polyp: hyperplastic 2015- repeat 2020 8/6/2015    Diabetes mellitus, type II 8/16/2012    Diverticulosis     Gastric nodule 8/7/2014    GERD (gastroesophageal reflux disease) 8/16/2012    Goiter 8/16/2012    Hyperlipidemia 8/16/2012    Hypertension     Joint pain     Leg pain 8/16/2012    Lymphoma 8/16/2012    Osteopenia 8/16/2012    Osteoporosis     Renal cyst 3/28/2013    Rickets, vitamin D deficiency 8/16/2012    Thyroid nodule 2/24/2014    Vitamin D deficiency disease 8/16/2012      Past Surgical History:   Procedure Laterality Date    CARPAL TUNNEL RELEASE      CATARACT EXTRACTION W/  INTRAOCULAR LENS IMPLANT Bilateral     HYSTERECTOMY       partial    lymphoma surgery      L tibia        Current Outpatient Medications:     acetaminophen (TYLENOL) 500 MG tablet, Take 500 mg by mouth daily as needed for Pain., Disp: , Rfl:     albuterol (PROVENTIL/VENTOLIN HFA) 90 mcg/actuation inhaler, Inhale 2 puffs into the lungs every 6 (six) hours as needed., Disp: , Rfl:     amiodarone (PACERONE) 200 MG Tab, Take 1 tablet (200 mg total) by mouth once daily., Disp: 30 tablet, Rfl: 5    aspirin 81 MG Chew, Take 1 tablet (81 mg total) by mouth once daily., Disp: , Rfl:     atorvastatin (LIPITOR) 40 MG tablet, TAKE 1 TABLET BY MOUTH EVERY DAY, Disp: 90 tablet, Rfl: 0    benzonatate (TESSALON) 200 MG capsule, Take 200 mg by mouth as needed., Disp: , Rfl:     blood sugar diagnostic (TRUE METRIX GLUCOSE TEST STRIP) Strp, USE AS DIRECTED, Disp: 150 strip, Rfl: 12    blood sugar diagnostic Strp, Test 2 times daily, Disp: 200 strip, Rfl: 3    blood-glucose meter kit, Use as instructed.  ACCUCHECK or whatever is preferred by insurance, Disp: 1 each, Rfl: 0    dextran 70-hypromellose (ARTIFICIAL TEARS,[id number],) ophthalmic solution, Place 1 drop into both eyes 4 (four) times daily as needed., Disp: 1 Bottle, Rfl: 5    dextromethorphan-guaiFENesin  mg/5 ml (ROBITUSSIN-DM)  mg/5 mL liquid, Take 10 mLs by mouth every 6 (six) hours as needed for Cough., Disp: , Rfl:     diclofenac sodium (VOLTAREN) 1 % Gel, APPLY 2 GRAMS EXTERNALLY TO THE AFFECTED AREA FOUR TIMES DAILY, Disp: 100 g, Rfl: 1    ergocalciferol (ERGOCALCIFEROL) 50,000 unit Cap, Take 1 capsule (50,000 Units total) by mouth every 7 days., Disp: 12 capsule, Rfl: 3    furosemide (LASIX) 20 MG tablet, Take 1 tablet (20 mg total) by mouth 2 (two) times daily., Disp: 180 tablet, Rfl: 3    gabapentin (NEURONTIN) 300 MG capsule, TAKE 1 CAPSULE BY MOUTH TWICE DAILY, Disp: 180 capsule, Rfl: 1    lancets Misc, Test 2 x daily, Disp: 200 each, Rfl: 3    losartan (COZAAR) 25 MG tablet, Take 0.5 tablets (12.5 mg  total) by mouth once daily., Disp: 45 tablet, Rfl: 3    magnesium oxide (MAG-OX) 400 mg (241.3 mg magnesium) tablet, Take 1 tablet by mouth once daily., Disp: , Rfl:     methIMAzole (TAPAZOLE) 5 MG Tab, TAKE 1/2 TABLET BY MOUTH DAILY, Disp: 45 tablet, Rfl: 0    methyl salicylate/menthol (ICY HOT TOP), Apply topically 2 (two) times daily as needed (Pain)., Disp: , Rfl:     MICRO THIN LANCETS 33 gauge Misc, USE AS DIRECTED, Disp: , Rfl: 0    multivitamin (THERAGRAN) per tablet, Take 1 tablet by mouth once daily., Disp: , Rfl:     nitroGLYCERIN (NITROSTAT) 0.4 MG SL tablet, Place 1 tablet (0.4 mg total) under the tongue every 5 (five) minutes as needed for Chest pain., Disp: 25 tablet, Rfl: 0    omeprazole (PRILOSEC) 40 MG capsule, Take 1 capsule (40 mg total) by mouth every morning., Disp: 90 capsule, Rfl: 1    ondansetron (ZOFRAN-ODT) 4 MG TbDL, Take 1 tablet (4 mg total) by mouth every 8 (eight) hours as needed (Nausea or vomiting)., Disp: 12 tablet, Rfl: 0    potassium chloride (KLOR-CON) 10 MEQ TbSR, Take 2 tablets (20 mEq total) by mouth once daily., Disp: 30 tablet, Rfl: 11    tolvaptan (SAMSCA) 15 mg Tab, Take 1 tablet (15 mg total) by mouth every other day., Disp: 15 tablet, Rfl: 0    traMADoL (ULTRAM) 50 mg tablet, TAKE 1 TABLET(50 MG) BY MOUTH EVERY 12 HOURS AS NEEDED FOR PAIN, Disp: 60 tablet, Rfl: 0  Review of patient's allergies indicates:   Allergen Reactions    Latex, natural rubber Itching    Latex Hives      Social History     Tobacco Use    Smoking status: Never    Smokeless tobacco: Never   Substance Use Topics    Alcohol use: No     Alcohol/week: 0.0 standard drinks      Family History   Problem Relation Age of Onset    Hypertension Mother     Hypertension Father     Heart disease Father     Breast cancer Maternal Aunt     No Known Problems Maternal Uncle     No Known Problems Paternal Aunt     No Known Problems Paternal Uncle     No Known Problems Maternal Grandmother     No Known Problems  Maternal Grandfather     No Known Problems Paternal Grandmother     No Known Problems Paternal Grandfather     Cancer Brother         colon    No Known Problems Daughter     No Known Problems Son     No Known Problems Son     No Known Problems Daughter     No Known Problems Daughter     Diabetes Neg Hx     Glaucoma Neg Hx     Amblyopia Neg Hx     Blindness Neg Hx     Cataracts Neg Hx     Macular degeneration Neg Hx     Retinal detachment Neg Hx     Strabismus Neg Hx     Stroke Neg Hx     Thyroid disease Neg Hx     Ovarian cancer Neg Hx     Liver disease Neg Hx     Liver cancer Neg Hx     Cirrhosis Neg Hx     Colon polyps Neg Hx     Colon cancer Neg Hx     Melanoma Neg Hx         Physical Exam :  There were no vitals filed for this visit.  Physical Exam  Vitals reviewed.   HENT:      Head: Normocephalic and atraumatic.   Eyes:      Conjunctiva/sclera: Conjunctivae normal.      Pupils: Pupils are equal, round, and reactive to light.   Cardiovascular:      Rate and Rhythm: Normal rate and regular rhythm.      Heart sounds: Normal heart sounds.   Pulmonary:      Comments: Decreased air entry at bases   Abdominal:      General: Bowel sounds are normal.      Palpations: Abdomen is soft.      Tenderness: There is no abdominal tenderness. There is no right CVA tenderness, left CVA tenderness or guarding.   Musculoskeletal:      Right lower leg: Edema present.      Left lower leg: Edema present.   Skin:     Coloration: Skin is not jaundiced.      Findings: No rash.   Neurological:      General: No focal deficit present.   Psychiatric:         Mood and Affect: Mood normal.         Behavior: Behavior normal.       Labs reviewed   Images Reviewed    Assessment and plan:     Hyponatremia: resolved   Was thought to be hypervolemic hyponatremia in the setting of CHF exacerbation while she was in hospital.  she was discharged on 12/14/22   Was started on tolvaptan during her hospitalization for hypervolemic hyponatremia discharged  on tolvaptan 15 mg po q48 hours  Lasix was stopped on discharge and resumed as OPT at 20 mg po BID.  Of note she did get IV LASIX during her hospitalization   Most recent Na was 140 on 12/29/22  Will stop tolvaptan and check Na today and one week from now   Will increase lasix to 40mg BID from 20 mg BID patient has gained 2 pounds in the last 2 days and still with significant LE edema and with SOB. Patient also will see cardiology today   Patient also takes KCL po since she is on lasix but has not taking it in a few days. Because her K was elevated recently. Will need to take it prn. Will check RFP today       B/L simple renal cysts on CTA 11/18: also seen on renal US in 2019 likely from CKD. No further intervention     CKD 2: scr 0.8 with GFR more than 60 secondary to nephron loss secondary to age:   Electrolyes stable   No acid/base disorder   UPCR 0.26 down from 1.19   Phos 3.3 off binders   Advised to avoid NSAIDS   Left renal wedge shape lesion possible infarct noted on CTA 11/11/22 and 11/18/22 patient deneis any symptoms. No flank pain. UA bland       Follow up appointment will be determined based on labs

## 2023-01-05 NOTE — PROGRESS NOTES
[facility] Provider Note (Follow-up) Consult Note      HPI:     Patient wearing 2L supplemental O2 to clinic. Presents to clinic in wheelchair. Denies SOB with ADL, although limited mobility/ambulation              Patient sleeps on 3 number of pillows at BL for positioning due to decubitus ulcers              Patient wakes up SOB, has to get out of bed, associated cough- denies              Palpitations - denies              Dizzy, light-headed, pre-syncope or syncope- denies              Since discharge frequency of performing weights, home weight and weight change- not performing daily weights. Reports couple lb weight gain on home scale. 112lbs on Saturday              Other information felt pertinent to HPI: Ms. Peri Johansen is a 87 yo female with a PMHx of new HFrEF (EF reduced 58->35% on most recent TTE with apical sparing pattern concerning for cardiac amyloid), diffuse large B cell lymphoma, T2DM, hyperlipidemia, hyponatremia who presents to second [facility] visit following prolonged hospital stay for ADHF. She was initially started on furosemide IV 40 mg BID with inadequate UOP. Escalated to 80 mg BID. Patient with complaints of epigastric abdominal discomfort. Some relief with BM. X-ray of abdomen unremarkable for obstruction or ileus or constipation. US of abdomen showed adherent gallstone. Abdomen pain resolved during period of NPO. On 11/27, patient noted to be satting in mid to upper 80's on RA. She was placed on supplemental O2 and repeat CXR ordered which demonstrated worsening pulm edema. Metolazone and azithromycin started with improvement in cough. Weaned off supplemental O2. Hospital course complicated by hyponatremia and hypotension as approached euvolemia, requiring pressor support. Nephrology consulted for hyponatremia with recommendations to hold loop diuretic held and start tolvaptan. Discharged on tolvaptan 15mg every other day.     12/20/22: Today she reports continued SOB on minimal exertion  and BLE edema near baseline. Not performing daily weights due to debility. 10lb weight gain on clinic scale. Per chart review, previous work up negative for AL amyloid. No prior PYP.     1/5/23: Last visit we stopped K supplement, and resumed lasix 20mg BID per Nephrology recs with tolvaptan 15mg every other day. Today she presents to clinic with granddaughter. Wearing 2L supplemental O2. Denies subjective SOB. She reports worsening BLE edema starting over the weekend with associated weeping/venous stasis blisters for the past couple days. Wearing compression stockings to clinic.     PHYSICAL:   Vitals:    01/05/23 1445   BP: 138/70   Pulse: 70      Wt Readings from Last 3 Encounters:   01/05/23 52 kg (114 lb 10.2 oz)   01/05/23 52 kg (114 lb 10.2 oz)   12/24/22 54.4 kg (120 lb)     JVD: to level of clavicle sitting   Heart rhythm: regular  Cardiac murmur: No    S3: yes  S4: no  Lungs: clear, diminished breath sounds in posterior lung bases  Hepatojugular reflux: yes  Edema: yes, 2+ BLE edema      Lab Results   Component Value Date     01/05/2023    K 3.7 01/05/2023    MG 1.8 01/05/2023    CL 99 01/05/2023    CO2 32 (H) 01/05/2023    BUN 11 01/05/2023    CREATININE 0.9 01/05/2023     (H) 01/05/2023    CALCIUM 9.5 01/05/2023    AST 32 01/05/2023    ALT 22 01/05/2023    ALBUMIN 2.3 (L) 01/05/2023    PROT 7.0 01/05/2023    BILITOT 1.3 (H) 01/05/2023     Lab Results   Component Value Date    BNP 1,540 (H) 01/05/2023    BNP 1,371 (H) 12/23/2022    BNP 2,351 (H) 12/20/2022       ASSESSMENT: Chronic combined systolic and diastolic HF    PLAN:      Patient Instructions:   Instruct the patient to notify this clinic if HH, a physician or an advanced care provider wants to change medication one of their HF medications   Activity and Diet restrictions:   Recommend 2-3 gram sodium restriction and 1500cc- 2000cc fluid restriction.  Encourage physical activity with graded exercise program.  Requested patient to weigh  themselves daily, and to notify us if their weight increases by more than 3 lbs in 1 day or 5 lbs in 3 days.    Assigned dry weight on home scale: unsure, not performing daily weights  Medication changes (include current dose and changed dose): NYHA Class III-IV symptoms. Volume up on exam. Pt seen in Nephrology clinic prior to [facility] appointment with plan to stop tolvaptan 15mg every other day and increase lasix to 40mg BID. Agree with diuretic adjustment. Plan for phone check in Monday to assess diuresis with increased lasix dose with further adjustment as needed. Consider IVP lasix per HH if no improvement in LE edema with increased lasix. Recommend periodic leg elevation and wearing compression stockings when keeping feet dependent. If venous blisters rupture, plan for wound care referral.    Amyloid labs reviewed. SPE with increased gamma globulin. Heme/Onc referral placed for abnormal FLC and SPE. HTS referral placed for further evaluation of possible AL amyloid.      Upcoming labs and date anticipated: Plan for phone check in next week to assess diuretic response with increased lasix dose. Pending diuretic response, consider IVP lasix per HH.    Gen Cards appointment scheduled next week. HTS referral for further evaluation of possible AL amyloid.    Other diagnostic tests ordered: given multiple hospital admissions in the last year, plan for Palliative Care referral as amenable for further goals of care discussions.     Angelica Mariscal PA-C

## 2023-01-11 NOTE — TELEPHONE ENCOUNTER
"[facility]    Attempted to call pt to complete 1 week "check in" call. RN to check pt s/p changes to diuretics at last visit. Unable to reach pt at listed phone numbers.  Was able to leave message on voicemail encouraging pt to return call with [facility] phone number..     Will continue to try to reach patient.    "

## 2023-01-12 NOTE — PROGRESS NOTES
Subjective:   Patient ID:  Peri Johansen is a 88 y.o. female who presents for follow-up of Follow-up    Peri Johansen is a 87 y.o. female who presents for follow-up of Shortness of Breath, Chest Pain (tightness), Leg Swelling, and Headache     Peri Johansen is a 85 y.o. female who presents for follow-up of Acute congestive heart failure, unspecified heart failure ty     Acute congestive heart failure, unspecified heart failure type      2. Aortic atherosclerosis: see CT scan 3/15    3. Mixed hyperlipidemia    4. 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 Thyroid nodules: several see u/s 2017 FNA 2017 benign, stable 2019    6. Type 2 diabetes mellitus with hyperglycemia, without long-term current use of insulin    7. Frequent urination    8. Hearing loss, unspecified hearing loss type, unspecified laterality    9. Renal cyst: R side see ultrasound 6/16; stable 2018    Echo 6/19  Mldly decreased left ventricular systolic function. The estimated ejection fraction is 45-50%  Grade I (mild) left ventricular diastolic dysfunction consistent with impaired relaxation. Normal left atrial pressure.  Concentric left ventricular remodeling.  Mild mitral regurgitation.  Normal right ventricular systolic function.  The estimated PA systolic pressure is 33 mm Hg  Normal central venous pressure (3 mm Hg).  HPI:   Recent admission for heart failure  No chest pain, Orthopnea, PND of heart failure symptoms.   Denies palpitations or fluttering in the chest  Patient stays with daughter who takes care of her but overall can clean and take a shower herself does not palpitations.   Non smoker   Father had heart disease at the age of 70.   Patient c/o back pain that radiates to the groin.      HPI:   C/o chest pain and is constant and its gets worse with exercise along with SOB.   Weight appears unchanged.   Increase leg swelling.   Denies palpitations or fluttering in the chest  Talked to the daughter she  us taking lasix 20 bid.      HPI:   Recent hospitalization for heart failue  Other information felt pertinent to HPI: Ms. Peri Johansen is a 87 yo female with a PMHx of new HFrEF (EF reduced 58->35% on most recent TTE with apical sparing pattern concerning for cardiac amyloid), diffuse large B cell lymphoma, T2DM, hyperlipidemia, hyponatremia who presents to second [facility] visit following prolonged hospital stay for ADHF. She was initially started on furosemide IV 40 mg BID with inadequate UOP. Escalated to 80 mg BID. Patient with complaints of epigastric abdominal discomfort. Some relief with BM. X-ray of abdomen unremarkable for obstruction or ileus or constipation. US of abdomen showed adherent gallstone. Abdomen pain resolved during period of NPO. On 11/27, patient noted to be satting in mid to upper 80's on RA. She was placed on supplemental O2 and repeat CXR ordered which demonstrated worsening pulm edema. Metolazone and azithromycin started with improvement in cough. Weaned off supplemental O2. Hospital course complicated by hyponatremia and hypotension as approached euvolemia, requiring pressor support. Nephrology consulted for hyponatremia with recommendations to hold loop diuretic held and start tolvaptan. Discharged on tolvaptan 15mg every other day.     12/20/22: Today she reports continued SOB on minimal exertion and BLE edema near baseline. Not performing daily weights due to debility. 10lb weight gain on clinic scale. Per chart review, previous work up negative for AL amyloid. No prior PYP.      1/5/23: Last visit we stopped K supplement, and resumed lasix 20mg BID per Nephrology recs with tolvaptan 15mg every other day. Today she presents to clinic with granddaughter. Wearing 2L supplemental O2. Denies subjective SOB. She reports worsening BLE edema starting over the weekend with associated weeping/venous stasis blisters for the past couple days. Wearing compression stockings to clinic.               "    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    High cholesterol    Renal cyst: R side see ultrasound 6/16; stable 2018 and 2019, also 2022    Stromal cell tumor    Lipoma of back    Thyroid nodules: several see u/s 2017 FNA 2017 benign, stable 2019, also 1/21    Gastric nodule: 2013-m per GI no need for further follow up    Spondylosis without myelopathy    Gastroesophageal reflux disease without esophagitis    Colon polyp: hyperplastic 2015- repeat 2020    Primary insomnia    Aortic atherosclerosis: see CT scan 3/15    Diverticulosis of large intestine without hemorrhage: see CT scan 3/15    Thoracic and lumbosacral neuritis    Chronic bilateral low back pain with right-sided sciatica    Left carpal tunnel syndrome    Hearing loss of left ear    Hyperthyroidism    Cervical spine arthritis    Sacroiliac joint dysfunction of both sides    Diabetes mellitus    Vitamin D deficiency    Osteopenia    Lytic lesion of bone on x-ray: outside CTA 4/21 T10- however PET CT and MRI June 2021 do not show lytic lesion    Other forms of angina pectoris    Epigastric pain    Cholelithiasis: sludge on u/s 2022    Hyponatremia    Hyperbilirubinemia    Physical deconditioning    Acute on chronic diastolic heart failure    Pleural effusion    Irritant contact dermatitis due to fecal, urinary or dual incontinence    Hypophosphatemia    Goals of care, counseling/discussion     BP (!) 100/50   Pulse 80   Ht 4' 11" (1.499 m)   Wt 46.2 kg (101 lb 13.6 oz)   SpO2 97%   BMI 20.57 kg/m²   Body mass index is 20.57 kg/m².  Estimated Creatinine Clearance: 31.5 mL/min (based on SCr of 0.9 mg/dL).    Lab Results   Component Value Date     01/05/2023    K 3.7 01/05/2023    CL 99 01/05/2023    CO2 32 (H) 01/05/2023    BUN 11 01/05/2023    CREATININE 0.9 01/05/2023     (H) 01/05/2023    HGBA1C 7.2 (H) 06/30/2022    MG 1.8 01/05/2023    AST 32 01/05/2023    ALT 22 " 01/05/2023    ALBUMIN 2.3 (L) 01/05/2023    PROT 7.0 01/05/2023    BILITOT 1.3 (H) 01/05/2023    WBC 7.24 12/29/2022    HGB 13.7 12/29/2022    HCT 43.8 12/29/2022    HCT 44 04/19/2022    MCV 91 12/29/2022     12/29/2022    INR 1.1 06/12/2019    TSH 2.019 12/24/2022    CHOL 141 11/02/2022    HDL 44 11/02/2022    LDLCALC 85.4 11/02/2022    TRIG 58 11/02/2022       Current Outpatient Medications   Medication Sig    acetaminophen (TYLENOL) 500 MG tablet Take 500 mg by mouth daily as needed for Pain.    albuterol (PROVENTIL/VENTOLIN HFA) 90 mcg/actuation inhaler Inhale 2 puffs into the lungs every 6 (six) hours as needed.    amiodarone (PACERONE) 200 MG Tab Take 1 tablet (200 mg total) by mouth once daily.    aspirin 81 MG Chew Take 1 tablet (81 mg total) by mouth once daily.    atorvastatin (LIPITOR) 40 MG tablet TAKE 1 TABLET BY MOUTH EVERY DAY    benzonatate (TESSALON) 200 MG capsule Take 200 mg by mouth as needed.    blood sugar diagnostic (TRUE METRIX GLUCOSE TEST STRIP) Strp USE AS DIRECTED    blood sugar diagnostic Strp Test 2 times daily    dextran 70-hypromellose (ARTIFICIAL TEARS,[id number],) ophthalmic solution Place 1 drop into both eyes 4 (four) times daily as needed.    diclofenac sodium (VOLTAREN) 1 % Gel APPLY 2 GRAMS EXTERNALLY TO THE AFFECTED AREA FOUR TIMES DAILY    ergocalciferol (ERGOCALCIFEROL) 50,000 unit Cap Take 1 capsule (50,000 Units total) by mouth every 7 days.    furosemide (LASIX) 40 MG tablet Take 1 tablet (40 mg total) by mouth 2 (two) times daily.    gabapentin (NEURONTIN) 300 MG capsule TAKE 1 CAPSULE BY MOUTH TWICE DAILY    lancets Misc Test 2 x daily    losartan (COZAAR) 25 MG tablet Take 0.5 tablets (12.5 mg total) by mouth once daily.    magnesium oxide (MAG-OX) 400 mg (241.3 mg magnesium) tablet Take 1 tablet by mouth once daily.    methIMAzole (TAPAZOLE) 5 MG Tab TAKE 1/2 TABLET BY MOUTH DAILY    methyl salicylate/menthol (ICY HOT TOP) Apply topically 2 (two) times daily  as needed (Pain).    MICRO THIN LANCETS 33 gauge [facility] USE AS DIRECTED    multivitamin (THERAGRAN) per tablet Take 1 tablet by mouth once daily.    nitroGLYCERIN (NITROSTAT) 0.4 MG SL tablet Place 1 tablet (0.4 mg total) under the tongue every 5 (five) minutes as needed for Chest pain.    omeprazole (PRILOSEC) 40 MG capsule Take 1 capsule (40 mg total) by mouth every morning.    ondansetron (ZOFRAN-ODT) 4 MG TbDL Take 1 tablet (4 mg total) by mouth every 8 (eight) hours as needed (Nausea or vomiting).    potassium chloride (KLOR-CON) 10 MEQ TbSR Take 2 tablets (20 mEq total) by mouth once daily.    traMADoL (ULTRAM) 50 mg tablet TAKE 1 TABLET(50 MG) BY MOUTH EVERY 12 HOURS AS NEEDED FOR PAIN    blood-glucose meter kit Use as instructed.  ACCUCHECK or whatever is preferred by insurance    dextromethorphan-guaiFENesin  mg/5 ml (ROBITUSSIN-DM)  mg/5 mL liquid Take 10 mLs by mouth every 6 (six) hours as needed for Cough.    tolvaptan (SAMSCA) 15 mg Tab Take 1 tablet (15 mg total) by mouth every other day. (Patient not taking: Reported on 1/12/2023)     No current facility-administered medications for this visit.       Review of Systems   Constitutional: Positive for malaise/fatigue. Negative for chills, decreased appetite, night sweats, weight gain and weight loss.   Eyes:  Negative for blurred vision, double vision, visual disturbance and visual halos.   Cardiovascular:  Negative for chest pain, claudication, cyanosis, dyspnea on exertion, irregular heartbeat, leg swelling, near-syncope, orthopnea, palpitations, paroxysmal nocturnal dyspnea and syncope.   Respiratory:  Positive for shortness of breath. Negative for cough, hemoptysis, snoring, sputum production and wheezing.    Endocrine: Negative for cold intolerance, heat intolerance, polydipsia and polyphagia.   Hematologic/Lymphatic: Negative for adenopathy and bleeding problem. Does not bruise/bleed easily.   Skin:  Negative for flushing, itching, poor  wound healing and rash.   Musculoskeletal:  Positive for arthritis and joint pain. Negative for back pain, falls, gout, joint swelling, muscle cramps, muscle weakness, myalgias, neck pain and stiffness.   Gastrointestinal:  Negative for bloating, abdominal pain, anorexia, diarrhea, dysphagia, excessive appetite, flatus, hematemesis, jaundice, melena and nausea.   Genitourinary:  Negative for hesitancy and incomplete emptying.   Neurological:  Negative for aphonia, brief paralysis, difficulty with concentration, disturbances in coordination, excessive daytime sleepiness, dizziness, focal weakness, light-headedness, loss of balance and weakness.   Psychiatric/Behavioral:  Negative for altered mental status, depression, hallucinations, hypervigilance, memory loss, substance abuse and suicidal ideas. The patient does not have insomnia and is not nervous/anxious.      Objective:   Physical Exam  Vitals reviewed: patient in a wheel chair and on oxygebn.   Constitutional:       General: She is not in acute distress.     Appearance: She is well-developed. She is not diaphoretic.   HENT:      Head: Normocephalic and atraumatic.      Nose: Nose normal.      Mouth/Throat:      Pharynx: No oropharyngeal exudate.   Eyes:      General: No scleral icterus.        Right eye: No discharge.         Left eye: No discharge.      Conjunctiva/sclera: Conjunctivae normal.      Pupils: Pupils are equal, round, and reactive to light.   Neck:      Thyroid: No thyromegaly.      Vascular: No JVD.      Trachea: No tracheal deviation.   Cardiovascular:      Rate and Rhythm: Normal rate and regular rhythm.      Pulses: Intact distal pulses.      Heart sounds: Normal heart sounds. No murmur heard.    No friction rub. No gallop.   Pulmonary:      Effort: Pulmonary effort is normal. No respiratory distress.      Breath sounds: Normal breath sounds. No stridor. No wheezing or rales.   Chest:      Chest wall: No tenderness.   Abdominal:      General:  Bowel sounds are normal. There is no distension.      Palpations: Abdomen is soft. There is no mass.      Tenderness: There is no abdominal tenderness. There is no guarding or rebound.   Musculoskeletal:         General: No tenderness. Normal range of motion.      Cervical back: Normal range of motion and neck supple.   Lymphadenopathy:      Cervical: No cervical adenopathy.   Skin:     General: Skin is warm.      Coloration: Skin is not pale.      Findings: No erythema or rash.   Neurological:      Mental Status: She is alert and oriented to person, place, and time.      Cranial Nerves: No cranial nerve deficit.      Motor: No abnormal muscle tone.      Coordination: Coordination normal.      Deep Tendon Reflexes: Reflexes are normal and symmetric. Reflexes normal.   Psychiatric:         Behavior: Behavior normal.         Thought Content: Thought content normal.         Judgment: Judgment normal.       Assessment:     1. Acute on chronic diastolic heart failure    2. Aortic atherosclerosis    3. Other forms of angina pectoris    4. 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 Hyperthyroidism    6. Diabetes mellitus due to underlying condition with hyperosmolarity without coma, without long-term current use of insulin    7. Primary hypertension        Plan:     Peri was seen today for follow-up.    Diagnoses and all orders for this visit:    Acute on chronic diastolic heart failure  -     Basic metabolic panel; Future    Aortic atherosclerosis    Other forms of angina pectoris    Diffuse large B-cell lymphoma of lymph nodes of lower extremity    Hyperthyroidism    Diabetes mellitus due to underlying condition with hyperosmolarity without coma, without long-term current use of insulin    Primary hypertension      HOLD diuretics for 3 days and then lasix 40 mg am and 20 mg pm follow up with Faith jean  Limit sodium intake to less then 2 gram sodium and 1500cc fluid  restriction.  Graded exercise program as tolerated.  Call if  more than 3 lbs in 1 day or 5 lbs in 1 week.

## 2023-01-12 NOTE — TELEPHONE ENCOUNTER
"[facility] DISCHARGE VISIT - PHONE     Called and spoke to pt's granddaughter, Zoltan.    Most Recent Hospital Discharge Date: 12/14/2022    Last admission Diagnosis/chief complaint:SOB          Pt reports the following:  []  Shortness of Breath with activity  []  Shortness of Breath at rest   []  Fatigue  [x]  Edema pt's caregiver reports edema but greatly reduced  [] Chest pain or tightness  [] Weight Increase since discharge  [] None of the above    Pt weight 101 lbs. Down from 112lbs.      Medications:   Medication reconciliation completed today per RN.  Pt reports having all medications available and understands how to take them appropriately. Reminded pt to call prior to making any changes to medications.   Pt granddaughter reports pt has seen [facility] and Lasix was decreased r/t to be "to dry." RN reported to [facility] PA-C.      Education:   [x] Confirmed pt still has  "[facility] Home Care Guide" .   Reviewed key points as listed below.     Recommend 2 gram sodium restriction and 1500cc fluid restriction.  Encourage physical activity with graded exercise program.  Requested patient to weigh themselves daily, and to notify us if their weight increases by more than 3 lbs in 1 day or 5 lbs in 3 days.     [x] Reviewed completed "Daily Weight and Symptom Tracker".  Reviewed with patient when and how to call [facility] according to "Yellow Zone" and "Red Zone".       Watch for these Signs and Symptoms: If any of these occur, contact [facility] immediately:   Increase in shortness of breath with movement   Increase in swelling in your legs and ankles   Weight gain of more than 3 pounds in a night or 5 pounds in 3 days.   Difficulty breathing when you are lying down   Worsening fatigue or tiredness   Stomach bloating, a full feeling or a loss of appetite   Increased coughing--especially when you are lying down    MyChart and Care Companion:   Patient active on " myChart? Yes, patient uses regularly.    HF [facility] Program Plan:  Pt has successfully completed [facility] program.  Pt care to be transferred to [facility] for long term care. Pt to undergo genetic testing to r/o condition.    Pt educated on how to call their offices and how to call [facility] On call in the event of an after hour issue.    PT reminded to continue to follow recommendations made during the [facility] program to include monitoring daily weights, taking medications according to list, following up to appointments per provider recommendations, stop smoking/ start exercising and following a heart friendly low salt, low fluid diet.      Pt was able to verbalize back to RN in their own words correct diet/fluid restrictions, necessity for exercise, warning signs and symptoms, when and how to contact their  Long term care team .      Plan:     Pt to f/u with [facility] for genetic testing. Appt scheduled Janaury 19, 2023.    [x]  Discussed upcoming appointments and/or plan for follow-up care with his/her PCP/Cardiology See above plan.    Electronic hand off completed : To Dr. Garcia  by routing epic note per Angelica Mariscal PA-C.     Please refer to provider note for additional details and assessment.

## 2023-01-17 NOTE — TELEPHONE ENCOUNTER
LMSW called granddaughter to let her know that resources had been sent through pt's portal and to call with any questions.

## 2023-01-18 NOTE — PROGRESS NOTES
Please let patient know that her labs are normal except boderline elevation of sodium please discuss this further with your PCP
[phone]

## 2023-01-19 NOTE — PHYSICIAN QUERY
PT Name: Peri Johansen  MR #: [id number]     DOCUMENTATION CLARIFICATION     CDS/: Myranda Barroso RN CDIS          Contact information:[email or web address]    This form is a permanent document in the medical record.     Query Date: January 19, 2023    By submitting this query, we are merely seeking further clarification of documentation.  Please utilize your independent clinical judgment when addressing the question(s) below.  The Medical Record contains the following:  Indicators Supporting Clinical Findings Location in Medical Record   x Acute Illness (e.g. AMI, Sepsis, etc.) Shock, unspecified  Transferred to ICU  Due to sepsis versus hypovolemia  Cultures negative  Patient was briefly on vasopressors  Not given fluids while in ICU  Infectious work up negative  Continue cefepime empirically for 7 days     12/8 - BP  118/58, stable, on cefepine. Cultures neg so far. Trop 0.6 this am- repeat EKG and trop level    Shock, unspecified  Rapid response called on 12/4 due to hypotension.  Patient had received 1 L IVF, persistently hypotensive.  Mentating with no complaints of lightheadedness, SOB, or chest pain.  Transferred to MICU for shock and started on vasopressors.  CBC with normal WBC, CXR similar to prior with pulmonary edema, no focal infiltrates.  Less concerning for sepsis, although infectious workup repeated and started abx.  Bedside POCUS exam done, no septal bowing, no tachycardia or hypoxia/respiratory distress.  Low suspicion of PE.  Hgb stable on CBC.  Known history of CHF, admitted with exacerbation, possible component of cardiogenic shock.       -- Titrate vasopressors to maintain MAP >65  -- Follow up infectious workup  -- Started cefepime  -- Hold off on additional IVF, BNP elevated and IVC plump on bedside pocus  -- Trending trop peak at 0.283 now down trendign, formal ECHO pending  -- Stopped home antihypertensives, resume when appropriate  -- F/U TSH, cortisol HM note 12/8                          CCM note 12/5    x Vital Signs  Vital Signs (24h Range):  BP: ()/(36-87) 149/87 CCM H&P     Acidosis documented      ABGs/Labs      Hypotension or Low Blood Pressure documented      Altered Mental Status or Confusion      Diaphoresis, Cold Extremities or Cyanosis      Oliguria     x Medication/Treatment  -Vasopressors  -Inotropic Drugs  -IV Fluids   -IV Antibiotics  -Cardiac Assist Devices  -Hemodynamic Monitoring  -Blood/Blood Products NORepinephrine bitartrate-D5W 4 mg/250 mL (16 mcg/mL) PERIPHERAL access infusion  Rate: 0-34.2 mL/hr Dose: 0-0.2 mcg/kg/min  Weight Dosing Info: 45.6 kg  Freq: Continuous Route: IV  Start: 12/04/22 2100 End: 12/05/22 1519 MAR     Other       Provider, please specify the TYPE of Shock:   [   x ] Septic Shock   [   x ] Cardiogenic Shock   [    ] Hypovolemic Shock   [    ] Drug-induced Shock (please specify drug): __________   [    ] Other Shock (please specify): __________   [    ] Shock, Unspecified   [    ] Other Condition (please specify): _________   [ x ] Clinically Undetermined               Please document in your progress notes daily for the duration of treatment until resolved and include in your discharge summary.     Form No. [id number]

## 2023-01-19 NOTE — PHYSICIAN QUERY
PT Name: Peri Johansen  MR #: [id number]    DOCUMENTATION CLARIFICATION     CDS/: Myranda Barroso RN CDIS             Contact information: [email or web address]    This form is a permanent document in the medical record.     Query Date: January 19, 2023    By submitting this query, we are merely seeking further clarification of documentation.. Please utilize your independent clinical judgment when addressing the question(s) below.    The medical record contains the following:   Indicators  Supporting Clinical Findings Location in Medical Record   x Energy Intake Energy Intake: <75% of estimated energy requirement for 1 month RD note 12/12   x Weight Loss Weight Loss: >10% in 6 months  RD note 12/12   x Fat Loss Body Fat Depletion: severe depletion of orbitals and triceps  RD note 12/12   x Muscle Loss Muscle Mass Depletion: severe depletion of temples, clavicle region, and lower extremities  RD note 12/12    Edema/Fluid Accumulation      Reduced  Strength (by dynamometer)     x Weight, BMI, Usual Body Weight Weight Method: Bed Scale  Weight: 47.1 kg (103 lb 13.4 oz)  BMI (Calculated): 21 RD note 12/12    Delayed Wound Healing     x Registered Dietician Diagnosis Severe Protein-Calorie Malnutrition  Malnutrition in the context of Chronic Illness/Injury RD note 12/12    Acute or Chronic Illness      Social or Environmental Circumstances     x Treatment Recommendations  Continue low sodium, high calorie, high protein diet-fluid per MD  2.  Continue Boost Plus TID for optimization of protein and calorie intake   3. RD following     Goals: Meet % of EEN/EPN by RD f/u date  Nutrition Goal Status: goal not met  Communication of RD Recs: other (POC) RD note 12/12    Other       Academy of Nutrition and Dietetics (Academy) and the American Society for Parenteral and Enteral Nutrition (A.S.P.E.N.) Clinical Characteristics to support Malnutrition   Malnutrition in the Context of Acute Illness or Injury  Malnutrition in the Context of Chronic Illness or Injury Malnutrition in the Context of Social or Environmental Circumstances   Malnutrition Level Moderate Severe Moderate Severe   Moderate   Severe   Energy Intake <75%                   >7 days <50%                 >5 days <75%           >1 month <75%                      >1 month   <75% for >3 months   <50% for >1 month   Weight Loss   1-2% in 1 week >2% in 1 week 5% in 1 month >5% in 1 month 5% in 1 month >5% in 1 month    5% in 1 month >5% in 1 month 7.5% in 3 months >7.5% in 3 months 7.5% in 3 months >7.5% in 3 months    7.5% in 3 months >7.5% in 3 months 10% in 6 months >10% in 6 months 10% in 6 months >10% in 6 months        20% in 1 year                    >20% in 1 year                                                                  20% in 1 year                            >20% in 1 year                                                  Subcutaneous Fat Loss Mild  Moderate  Mild  Severe    Mild   Severe   Muscle Loss Mild  Moderate  Mild  Severe    Mild   Severe   Edema/Fluid Accumulation Mild Moderate to severe  Mild  Severe   Mild   Severe   Reduced  Strength         (based on standards supplied by  of dynamometer) N/A Measurably reduced N/A Measurably reduced N/A Measurably reduced     Criteria for mild malnutrition is defined as 1 characteristic outlined above within the established moderate or severe parameters.  A minimum of 2 out of the 6 characteristics noted above are recommended for a diagnosis of moderate or severe malnutrition.  Chronic illness/injury is a disease/condition lasting 3 months or longer.    The noted clinical guidelines are only system guidelines and do not replace the providers clinical judgment.    Provider, please specify diagnosis or diagnoses associated with above clinical findings.    [  x] Severe Malnutrition - a minimum of 2 of the 6 severe malnutrition characteristics noted above    [  ] Malnutrition,  Unspecified degree   [  ] Other Nutritional Diagnosis (please specify): _______       Please document in your progress notes daily for the duration of treatment until resolved and  include in your discharge summary.      References:    KIMMY Álvarez, & ROBERTO Chi (2022, April). Assessment and management of anorexia and cachexia in palliative care. Retrieved May 23, 2022, from https://www.[employer]/contents/assessment-and-management-of-anorexia-and-cachexia-in-palliative-care?pywflPvs=4296&source=see_link     BK Huffman, PhD, RD, Yosef BARKER P., PhD, RN, NATHALIE Nick MD, PhD, Santos NEGRO A., MS, RD, [facility], CHARLENE Mayo, MS, RD, The Academy Malnutrition Work Group, The A.S.P.E.N. Board of Directors. (2012). Consensus Statement: Academy of Nutrition and Dietetics and American Society for Parenteral and Enteral Nutrition: Characteristics Recommended for the Identification and Documentation of Adult Malnutrition (Undernutrition). Journal of Parenteral and Enteral Nutrition, 36(3), 275-283. doi:10.1177/1120576116193119     Form No. 16750

## 2023-01-22 PROBLEM — I50.9 ACUTE HEART FAILURE: Status: ACTIVE | Noted: 2023-01-01

## 2023-01-22 PROBLEM — S91.302A OPEN WOUND OF LEFT HEEL: Status: ACTIVE | Noted: 2023-01-01

## 2023-01-22 PROBLEM — R07.9 CHEST PAIN: Status: ACTIVE | Noted: 2023-01-01

## 2023-01-22 NOTE — ASSESSMENT & PLAN NOTE
Most recent A1c at 7.2, repeat pending  Treat with SSI ac/hs during hospital stay  Resume home regimen at discharge  Hypoglycemia protocol PRN  Continue statin

## 2023-01-22 NOTE — ASSESSMENT & PLAN NOTE
Echo pending, continue diuresis with IV lasix, monitor on tele, Cardiology consult, appreciate recs.    Continuous cardiac monitoring  Elevated troponin, likely demand, trend  Most recent EF at 35%, repeat pending  IV lasix   Continue amiodarone, losartan, aspirin  Daily Weight  Oxygen prn  Consult cardiology

## 2023-01-22 NOTE — HPI
"HPI: 88 y.o. female, with a past medical history of HLD, CHF, DM, GERD, and HTN, who presents to the ED with midsternal chest pain onset 2 days ago. Patient describes this pain as non radiating.  She reported to ED provider that it felt "like someone hit her in the chest with a brick." She notes associated symptoms of nausea. Patient additionally notes symptoms of bilateral foot pain. EMS reports that they administered Nitro SL to the patient en route to the ED without relief. No other exacerbating or alleviating factors. Patient denies attempting treatment for her symptoms prior to EMS arrival. Patient endorses daily medication compliance. She denies a past medical history of GI bleed. Patient denies vomiting, blood in stool, melena, abdominal pain, fevers, cough, congestion, change in bladder habits, change in bowel habits. Work up revealed Elevated troponin, elevated BNP, heart ultrasound done in ED and showed EF less than 35% with pericardial effusion and diminished LV function, EKG showed No STEMI, chest x-ray with concerns for CHF.  Patient admitted to hospital medicine observation unit for further medical management.    Currently denies CP or SOB  EKG NSR low voltage  BNP 1361  Troponin 0.17 flat pattern  Being followed at [facility] heart failure for CHF and possible cardiac amyloid    Echo 1/22/23  The left ventricle is normal in size with moderate concentric hypertrophy and moderately decreased systolic function.  The estimated ejection fraction is 35-40%.  Grade I left ventricular diastolic dysfunction.  Normal right ventricular size with normal right ventricular systolic function.  Severe left atrial enlargement.  Severe right atrial enlargement.  Mild aortic regurgitation.  Mild-to-moderate mitral regurgitation.  Moderate tricuspid regurgitation.  Intermediate central venous pressure (8 mmHg).  The estimated PA systolic pressure is 39 mmHg.  Trivial posterior pericardial effusion.    Echo 12/5/22  The " quantitatively derived ejection fraction is 35%. Apical sparing strain pattern is concerning for cardiac amyloid. Clinical correlation is advised.  Decrease in LV function cpared to the prior reported.  The left ventricle is normal in size with concentric hypertrophy and moderately decreased systolic function.  Grade III left ventricular diastolic dysfunction.  Normal right ventricular size with normal right ventricular systolic function.  Mild left atrial enlargement.  Mild right atrial enlargement.  Severe tricuspid regurgitation.  Mild-to-moderate mitral regurgitation.  The estimated PA systolic pressure is 39 mmHg.  Normal central venous pressure (3 mmHg).  There is a large left pleural effusion.  Trivial circumferential pericardial effusion.       Followed at [facility] by Dr Felipe Whitt AISLINN Johansen is a 85 y.o. female who presents for follow-up of Acute congestive heart failure, unspecified heart failure ty     Acute congestive heart failure, unspecified heart failure type      2. Aortic atherosclerosis: see CT scan 3/15    3. Mixed hyperlipidemia    4. 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 Thyroid nodules: several see u/s 2017 FNA 2017 benign, stable 2019    6. Type 2 diabetes mellitus with hyperglycemia, without long-term current use of insulin    7. Frequent urination    8. Hearing loss, unspecified hearing loss type, unspecified laterality    9. Renal cyst: R side see ultrasound 6/16; stable 2018    Echo 6/19  Mldly decreased left ventricular systolic function. The estimated ejection fraction is 45-50%  Grade I (mild) left ventricular diastolic dysfunction consistent with impaired relaxation. Normal left atrial pressure.  Concentric left ventricular remodeling.  Mild mitral regurgitation.  Normal right ventricular systolic function.  The estimated PA systolic pressure is 33 mm Hg  Normal central venous pressure (3 mm Hg).  HPI:   Recent admission for heart failure  No chest  pain, Orthopnea, PND of heart failure symptoms.   Denies palpitations or fluttering in the chest  Patient stays with daughter who takes care of her but overall can clean and take a shower herself does not palpitations.   Non smoker   Father had heart disease at the age of 70.   Patient c/o back pain that radiates to the groin.      HPI:   C/o chest pain and is constant and its gets worse with exercise along with SOB.   Weight appears unchanged.   Increase leg swelling.   Denies palpitations or fluttering in the chest  Talked to the daughter she us taking lasix 20 bid.      HPI:   Recent hospitalization for heart failue  Other information felt pertinent to HPI: Ms. Peri Johansen is a 87 yo female with a PMHx of new HFrEF (EF reduced 58->35% on most recent TTE with apical sparing pattern concerning for cardiac amyloid), diffuse large B cell lymphoma, T2DM, hyperlipidemia, hyponatremia who presents to second HFTC visit following prolonged hospital stay for ADHF. She was initially started on furosemide IV 40 mg BID with inadequate UOP. Escalated to 80 mg BID. Patient with complaints of epigastric abdominal discomfort. Some relief with BM. X-ray of abdomen unremarkable for obstruction or ileus or constipation. US of abdomen showed adherent gallstone. Abdomen pain resolved during period of NPO. On 11/27, patient noted to be satting in mid to upper 80's on RA. She was placed on supplemental O2 and repeat CXR ordered which demonstrated worsening pulm edema. Metolazone and azithromycin started with improvement in cough. Weaned off supplemental O2. Hospital course complicated by hyponatremia and hypotension as approached euvolemia, requiring pressor support. Nephrology consulted for hyponatremia with recommendations to hold loop diuretic held and start tolvaptan. Discharged on tolvaptan 15mg every other day.     12/20/22: Today she reports continued SOB on minimal exertion and BLE edema near baseline. Not performing daily  weights due to debility. 10lb weight gain on clinic scale. Per chart review, previous work up negative for AL amyloid. No prior PYP.      1/5/23: Last visit we stopped K supplement, and resumed lasix 20mg BID per Nephrology recs with tolvaptan 15mg every other day. Today she presents to clinic with granddaughter. Wearing 2L supplemental O2. Denies subjective SOB. She reports worsening BLE edema starting over the weekend with associated weeping/venous stasis blisters for the past couple days. Wearing compression stockings to clinic.

## 2023-01-22 NOTE — PROGRESS NOTES
"[facility] Medicine  Progress Note    Patient Name: Peri Johansen  MRN: [id number]  Patient Class: OP- Observation   Admission Date: 1/21/2023  Length of Stay: 0 days  Attending Physician: Milagro Chris MD  Primary Care Provider: Annika Garland MD        Subjective:     Principal Problem:Chest pain        HPI:  88 y.o. female, with a past medical history of HLD, CHF, DM, GERD, and HTN, who presents to the ED with midsternal chest pain onset 2 days ago. Patient describes this pain as non radiating.  She reported to ED provider that it felt "like someone hit her in the chest with a brick." She notes associated symptoms of nausea. Patient additionally notes symptoms of bilateral foot pain. EMS reports that they administered Nitro SL to the patient en route to the ED without relief. No other exacerbating or alleviating factors. Patient denies attempting treatment for her symptoms prior to EMS arrival. Patient endorses daily medication compliance. She denies a past medical history of GI bleed. Patient denies vomiting, blood in stool, melena, abdominal pain, fevers, cough, congestion, change in bladder habits, change in bowel habits. Work up revealed Elevated troponin, elevated BNP, heart ultrasound done in ED and showed EF less than 35% with pericardial effusion and diminished LV function, EKG showed No STEMI, chest x-ray with concerns for CHF.  Patient admitted to hospital medicine observation unit for further medical management.      Overview/Hospital Course:  Ms. Johansen was placed in observation for ACS rule out after presenting with chest pain. Troponin noted to be elevated.  BNP elevated as well with evidence of pulmonary edema on chest xray.  EKG without evidence of acute ischemia.  Echocardiogram performed, results pending.  Cardiology consulted.  Monitor on tele and obtain serial troponin.      Interval History: overall stable, complains of mild headache, PRNs available    Review of " Systems   Respiratory:  Positive for shortness of breath.    Cardiovascular:  Positive for chest pain.   Neurological:  Positive for headaches.   Objective:     Vital Signs (Most Recent):  Temp: 97.7 °F (36.5 °C) (01/22/23 0451)  Pulse: (!) 56 (01/22/23 0517)  Resp: 15 (01/22/23 0517)  BP: 120/65 (01/22/23 0517)  SpO2: 97 % (01/22/23 0517)   Vital Signs (24h Range):  Temp:  [97.7 °F (36.5 °C)] 97.7 °F (36.5 °C)  Pulse:  [56-66] 56  Resp:  [14-19] 15  SpO2:  [95 %-98 %] 97 %  BP: (113-139)/(57-76) 120/65     Weight: 46.3 kg (102 lb)  Body mass index is 20.6 kg/m².    Intake/Output Summary (Last 24 hours) at 1/22/2023 1120  Last data filed at 1/22/2023 0452  Gross per 24 hour   Intake --   Output 300 ml   Net -300 ml      Physical Exam  Constitutional:       General: She is not in acute distress.  Cardiovascular:      Rate and Rhythm: Regular rhythm. Bradycardia present.   Pulmonary:      Effort: Pulmonary effort is normal.   Musculoskeletal:      Right lower leg: Edema present.      Left lower leg: Edema present.       Significant Labs: All pertinent labs within the past 24 hours have been reviewed.    Significant Imaging: I have reviewed all pertinent imaging results/findings within the past 24 hours.      Assessment/Plan:      * Chest pain  Elevated troponin, EKG shows no stemi, echo pending, Cardiology consulted, appreciate recs.    Continuous cardiac monitoring  Trend troponin x3  EKG No Stemi  Recent LDL at 85.4  Echo pending   Consult cardiology  NPO  NTG prn      Acute heart failure  Echo pending, continue diuresis with IV lasix, monitor on tele, Cardiology consult, appreciate recs.    Continuous cardiac monitoring  Elevated troponin, likely demand, trend  Most recent EF at 35%, repeat pending  IV lasix   Continue amiodarone, losartan, aspirin  Daily Weight  Oxygen prn  Consult cardiology           Elevated troponin  As above    Open wound of left heel  Consult wound care       Hyponatremia  Monitor        Diabetes mellitus  Most recent A1c at 7.2, repeat pending  Treat with SSI ac/hs during hospital stay  Resume home regimen at discharge  Hypoglycemia protocol PRN  Continue statin    Hyperthyroidism  Chronic  Continue tapazole       High cholesterol  Continue aspirin and statin   Recent LDL at 85.4        VTE Risk Mitigation (From admission, onward)         Ordered     Place sequential compression device  Until discontinued         01/22/23 0100                Discharge Planning   ENDY:      Code Status: Full Code   Is the patient medically ready for discharge?:     Reason for patient still in hospital (select all that apply): Patient trending condition and Consult recommendations      Yuri Dawn Jr., APRN, AGACNP-BC  Hospitalist - Department of Hospital Medicine  [facility] [address] [place] [address]  Office #: [phone]; Pager #: [phone]

## 2023-01-22 NOTE — ASSESSMENT & PLAN NOTE
Elevated troponin     Continuous cardiac monitoring  Trend troponin x3  EKG No Stemi  Recent LDL at 85.4  Echo pending   Consult cardiology  NPO  NTG prn

## 2023-01-22 NOTE — ASSESSMENT & PLAN NOTE
Combined CHF - EF 35% - similar to most recent echo. Followed at [facility] heart failure. Being evaluated for suspected cardiac amyloidosis. Diuresis and afterload reduction as tolerated. Poor prognosis

## 2023-01-22 NOTE — ED NOTES
Report received from JOSE Irene. Care taken over. Pt asleep; resting quietly on stretcher. Pt remains on continuous cardiac and pulse ox monitoring with non-invasive blood pressure to cycle every 30 minutes.  VS stable. Pt on 2L NC. No acute distress or discomfort observed. Bed locked in lowest position; side rails up and locked x 2; call light, bedside table, and personal belongings within reach. Room assessed for safety measures and cleanliness; no action needed at this time.

## 2023-01-22 NOTE — SUBJECTIVE & OBJECTIVE
Past Medical History:   Diagnosis Date    Aortic atherosclerosis 1/7/2016    Arthritis     Chronic diastolic congestive heart failure 9/15/2022    Colon polyp: hyperplastic 2015- repeat 2020 8/6/2015    Diabetes mellitus, type II 8/16/2012    Diverticulosis     Gastric nodule 8/7/2014    GERD (gastroesophageal reflux disease) 8/16/2012    Goiter 8/16/2012    Hyperlipidemia 8/16/2012    Hypertension     Joint pain     Leg pain 8/16/2012    Lymphoma 8/16/2012    Osteopenia 8/16/2012    Osteoporosis     Renal cyst 3/28/2013    Rickets, vitamin D deficiency 8/16/2012    Thyroid nodule 2/24/2014    Vitamin D deficiency disease 8/16/2012       Past Surgical History:   Procedure Laterality Date    CARPAL TUNNEL RELEASE      CATARACT EXTRACTION W/  INTRAOCULAR LENS IMPLANT Bilateral     HYSTERECTOMY      partial    lymphoma surgery      L tibia       Review of patient's allergies indicates:   Allergen Reactions    Latex, natural rubber Itching    Latex Hives       No current facility-administered medications on file prior to encounter.     Current Outpatient Medications on File Prior to Encounter   Medication Sig    acetaminophen (TYLENOL) 500 MG tablet Take 500 mg by mouth daily as needed for Pain.    albuterol (PROVENTIL/VENTOLIN HFA) 90 mcg/actuation inhaler Inhale 2 puffs into the lungs every 6 (six) hours as needed.    amiodarone (PACERONE) 200 MG Tab Take 1 tablet (200 mg total) by mouth once daily.    aspirin 81 MG Chew Take 1 tablet (81 mg total) by mouth once daily.    atorvastatin (LIPITOR) 40 MG tablet TAKE 1 TABLET BY MOUTH EVERY DAY    benzonatate (TESSALON) 200 MG capsule Take 200 mg by mouth as needed.    blood sugar diagnostic (TRUE METRIX GLUCOSE TEST STRIP) Strp USE AS DIRECTED    blood sugar diagnostic Strp Test 2 times daily    blood-glucose meter kit Use as instructed.  ACCUCHECK or whatever is preferred by insurance    dextran 70-hypromellose (ARTIFICIAL TEARS,[id number],) ophthalmic solution Place 1  drop into both eyes 4 (four) times daily as needed.    dextromethorphan-guaiFENesin  mg/5 ml (ROBITUSSIN-DM)  mg/5 mL liquid Take 10 mLs by mouth every 6 (six) hours as needed for Cough.    diclofenac sodium (VOLTAREN) 1 % Gel APPLY 2 GRAMS EXTERNALLY TO THE AFFECTED AREA FOUR TIMES DAILY    ergocalciferol (ERGOCALCIFEROL) 50,000 unit Cap Take 1 capsule (50,000 Units total) by mouth every 7 days.    furosemide (LASIX) 40 MG tablet Take 1 tablet (40 mg total) by mouth 2 (two) times daily.    gabapentin (NEURONTIN) 300 MG capsule TAKE 1 CAPSULE BY MOUTH TWICE DAILY    lancets Misc Test 2 x daily    losartan (COZAAR) 25 MG tablet Take 0.5 tablets (12.5 mg total) by mouth once daily.    magnesium oxide (MAG-OX) 400 mg (241.3 mg magnesium) tablet Take 1 tablet by mouth once daily.    methIMAzole (TAPAZOLE) 5 MG Tab TAKE 1/2 TABLET BY MOUTH DAILY    methyl salicylate/menthol (ICY HOT TOP) Apply topically 2 (two) times daily as needed (Pain).    MICRO THIN LANCETS 33 gauge Misc USE AS DIRECTED    multivitamin (THERAGRAN) per tablet Take 1 tablet by mouth once daily.    nitroGLYCERIN (NITROSTAT) 0.4 MG SL tablet Place 1 tablet (0.4 mg total) under the tongue every 5 (five) minutes as needed for Chest pain.    omeprazole (PRILOSEC) 40 MG capsule Take 1 capsule (40 mg total) by mouth every morning.    ondansetron (ZOFRAN-ODT) 4 MG TbDL Take 1 tablet (4 mg total) by mouth every 8 (eight) hours as needed (Nausea or vomiting).    potassium chloride (KLOR-CON) 10 MEQ TbSR Take 2 tablets (20 mEq total) by mouth once daily.    tolvaptan (SAMSCA) 15 mg Tab Take 1 tablet (15 mg total) by mouth every other day. (Patient not taking: Reported on 1/12/2023)    traMADoL (ULTRAM) 50 mg tablet TAKE 1 TABLET(50 MG) BY MOUTH EVERY 12 HOURS AS NEEDED FOR PAIN     Family History       Problem Relation (Age of Onset)    Breast cancer Maternal Aunt    Cancer Brother    Heart disease Father    Hypertension Mother, Father    No Known  Problems Maternal Uncle, Paternal Aunt, Paternal Uncle, Maternal Grandmother, Maternal Grandfather, Paternal Grandmother, Paternal Grandfather, Daughter, Son, Son, Daughter, Daughter          Tobacco Use    Smoking status: Never    Smokeless tobacco: Never   Substance and Sexual Activity    Alcohol use: No     Alcohol/week: 0.0 standard drinks    Drug use: No    Sexual activity: Not Currently     Review of Systems   Constitutional: Negative for decreased appetite.   HENT:  Negative for ear discharge.    Eyes:  Negative for blurred vision.   Respiratory:  Negative for hemoptysis.    Endocrine: Negative for polyphagia.   Hematologic/Lymphatic: Negative for adenopathy.   Skin:  Negative for color change.   Musculoskeletal:  Negative for joint swelling.   Genitourinary:  Negative for bladder incontinence.   Neurological:  Negative for brief paralysis.   Psychiatric/Behavioral:  Negative for hallucinations.    Allergic/Immunologic: Negative for hives.   Objective:     Vital Signs (Most Recent):  Temp: 97.7 °F (36.5 °C) (01/22/23 0451)  Pulse: (!) 56 (01/22/23 0517)  Resp: 15 (01/22/23 0517)  BP: 120/65 (01/22/23 0517)  SpO2: 97 % (01/22/23 0517)   Vital Signs (24h Range):  Temp:  [97.7 °F (36.5 °C)] 97.7 °F (36.5 °C)  Pulse:  [56-66] 56  Resp:  [14-19] 15  SpO2:  [95 %-98 %] 97 %  BP: (113-139)/(57-76) 120/65     Weight: 46.3 kg (102 lb)  Body mass index is 20.6 kg/m².    SpO2: 97 %         Intake/Output Summary (Last 24 hours) at 1/22/2023 1127  Last data filed at 1/22/2023 0452  Gross per 24 hour   Intake --   Output 300 ml   Net -300 ml       Lines/Drains/Airways       Peripheral Intravenous Line  Duration                  Peripheral IV - Single Lumen 01/21/23 2120 20 G Left Wrist <1 day         Peripheral IV - Single Lumen 01/22/23 0223 20 G Right Antecubital <1 day                    Physical Exam  Constitutional:       Appearance: She is well-developed.   HENT:      Head: Normocephalic and atraumatic.   Eyes:       Conjunctiva/sclera: Conjunctivae normal.      Pupils: Pupils are equal, round, and reactive to light.   Cardiovascular:      Rate and Rhythm: Normal rate.      Pulses: Intact distal pulses.      Heart sounds: Normal heart sounds.   Pulmonary:      Effort: Pulmonary effort is normal.      Breath sounds: Normal breath sounds.   Abdominal:      General: Bowel sounds are normal.      Palpations: Abdomen is soft.   Musculoskeletal:         General: Normal range of motion.      Cervical back: Normal range of motion and neck supple.      Right lower leg: Edema present.      Left lower leg: Edema present.   Skin:     General: Skin is warm and dry.   Neurological:      Mental Status: She is alert and oriented to person, place, and time.       Significant Labs: All pertinent lab results from the last 24 hours have been reviewed.    Significant Imaging: Echocardiogram: 2D echo with color flow doppler: No results found. However, due to the size of the patient record, not all encounters were searched. Please check Results Review for a complete set of results.

## 2023-01-22 NOTE — ED NOTES
Bed: 28B  Expected date: 1/22/23  Expected time: 4:48 AM  Means of arrival:   Comments:  KIMMY Johansen

## 2023-01-22 NOTE — NURSING
Nurses Note -- 4 Eyes      1/22/2023   3:16 PM      Skin assessed during: Admit      [] No Pressure Injuries Present    []Prevention Measures Documented      [x] Yes- Altered Skin Integrity Present or Discovered   [x] LDA Added if Not in Epic (Describe Wound)   [] New Altered Skin Integrity was Present on Admit and Documented in LDA   [] Wound Image Taken    Wound Care Consulted? Yes    Attending Nurse:  Grace Trevino RN     Second RN/Staff Member:  JOSE Monroy

## 2023-01-22 NOTE — ASSESSMENT & PLAN NOTE
Continuous cardiac monitoring  Elevated troponin, likely demand, trend  Most recent EF at 35%, repeat pending  IV lasix   Continue amiodarone, losartan, aspirin  Daily Weight  Oxygen prn  Consult cardiology

## 2023-01-22 NOTE — SUBJECTIVE & OBJECTIVE
Past Medical History:   Diagnosis Date    Aortic atherosclerosis 1/7/2016    Arthritis     Chronic diastolic congestive heart failure 9/15/2022    Colon polyp: hyperplastic 2015- repeat 2020 8/6/2015    Diabetes mellitus, type II 8/16/2012    Diverticulosis     Gastric nodule 8/7/2014    GERD (gastroesophageal reflux disease) 8/16/2012    Goiter 8/16/2012    Hyperlipidemia 8/16/2012    Hypertension     Joint pain     Leg pain 8/16/2012    Lymphoma 8/16/2012    Osteopenia 8/16/2012    Osteoporosis     Renal cyst 3/28/2013    Rickets, vitamin D deficiency 8/16/2012    Thyroid nodule 2/24/2014    Vitamin D deficiency disease 8/16/2012       Past Surgical History:   Procedure Laterality Date    CARPAL TUNNEL RELEASE      CATARACT EXTRACTION W/  INTRAOCULAR LENS IMPLANT Bilateral     HYSTERECTOMY      partial    lymphoma surgery      L tibia       Review of patient's allergies indicates:   Allergen Reactions    Latex, natural rubber Itching    Latex Hives       No current facility-administered medications on file prior to encounter.     Current Outpatient Medications on File Prior to Encounter   Medication Sig    acetaminophen (TYLENOL) 500 MG tablet Take 500 mg by mouth daily as needed for Pain.    albuterol (PROVENTIL/VENTOLIN HFA) 90 mcg/actuation inhaler Inhale 2 puffs into the lungs every 6 (six) hours as needed.    amiodarone (PACERONE) 200 MG Tab Take 1 tablet (200 mg total) by mouth once daily.    aspirin 81 MG Chew Take 1 tablet (81 mg total) by mouth once daily.    atorvastatin (LIPITOR) 40 MG tablet TAKE 1 TABLET BY MOUTH EVERY DAY    benzonatate (TESSALON) 200 MG capsule Take 200 mg by mouth as needed.    blood sugar diagnostic (TRUE METRIX GLUCOSE TEST STRIP) Strp USE AS DIRECTED    blood sugar diagnostic Strp Test 2 times daily    blood-glucose meter kit Use as instructed.  ACCUCHECK or whatever is preferred by insurance    dextran 70-hypromellose (ARTIFICIAL TEARS,[id number],) ophthalmic solution Place 1  drop into both eyes 4 (four) times daily as needed.    dextromethorphan-guaiFENesin  mg/5 ml (ROBITUSSIN-DM)  mg/5 mL liquid Take 10 mLs by mouth every 6 (six) hours as needed for Cough.    diclofenac sodium (VOLTAREN) 1 % Gel APPLY 2 GRAMS EXTERNALLY TO THE AFFECTED AREA FOUR TIMES DAILY    ergocalciferol (ERGOCALCIFEROL) 50,000 unit Cap Take 1 capsule (50,000 Units total) by mouth every 7 days.    furosemide (LASIX) 40 MG tablet Take 1 tablet (40 mg total) by mouth 2 (two) times daily.    gabapentin (NEURONTIN) 300 MG capsule TAKE 1 CAPSULE BY MOUTH TWICE DAILY    lancets Misc Test 2 x daily    losartan (COZAAR) 25 MG tablet Take 0.5 tablets (12.5 mg total) by mouth once daily.    magnesium oxide (MAG-OX) 400 mg (241.3 mg magnesium) tablet Take 1 tablet by mouth once daily.    methIMAzole (TAPAZOLE) 5 MG Tab TAKE 1/2 TABLET BY MOUTH DAILY    methyl salicylate/menthol (ICY HOT TOP) Apply topically 2 (two) times daily as needed (Pain).    MICRO THIN LANCETS 33 gauge Misc USE AS DIRECTED    multivitamin (THERAGRAN) per tablet Take 1 tablet by mouth once daily.    nitroGLYCERIN (NITROSTAT) 0.4 MG SL tablet Place 1 tablet (0.4 mg total) under the tongue every 5 (five) minutes as needed for Chest pain.    omeprazole (PRILOSEC) 40 MG capsule Take 1 capsule (40 mg total) by mouth every morning.    ondansetron (ZOFRAN-ODT) 4 MG TbDL Take 1 tablet (4 mg total) by mouth every 8 (eight) hours as needed (Nausea or vomiting).    potassium chloride (KLOR-CON) 10 MEQ TbSR Take 2 tablets (20 mEq total) by mouth once daily.    tolvaptan (SAMSCA) 15 mg Tab Take 1 tablet (15 mg total) by mouth every other day. (Patient not taking: Reported on 1/12/2023)    traMADoL (ULTRAM) 50 mg tablet TAKE 1 TABLET(50 MG) BY MOUTH EVERY 12 HOURS AS NEEDED FOR PAIN     Family History       Problem Relation (Age of Onset)    Breast cancer Maternal Aunt    Cancer Brother    Heart disease Father    Hypertension Mother, Father    No Known  Problems Maternal Uncle, Paternal Aunt, Paternal Uncle, Maternal Grandmother, Maternal Grandfather, Paternal Grandmother, Paternal Grandfather, Daughter, Son, Son, Daughter, Daughter          Tobacco Use    Smoking status: Never    Smokeless tobacco: Never   Substance and Sexual Activity    Alcohol use: No     Alcohol/week: 0.0 standard drinks    Drug use: No    Sexual activity: Not Currently     Review of Systems   Constitutional:  Negative for chills and fever.   HENT:  Negative for congestion, postnasal drip and rhinorrhea.    Eyes:  Negative for visual disturbance.   Respiratory:  Negative for chest tightness and shortness of breath.    Cardiovascular:  Positive for leg swelling.   Gastrointestinal:  Positive for nausea.   Genitourinary:  Negative for difficulty urinating.   Neurological:  Negative for dizziness and weakness.   Hematological:  Does not bruise/bleed easily.   Psychiatric/Behavioral:  Negative for agitation.    Objective:     Vital Signs (Most Recent):  Temp: 97.7 °F (36.5 °C) (01/21/23 2019)  Pulse: 61 (01/22/23 0024)  Resp: 19 (01/21/23 2054)  BP: 117/62 (01/22/23 0016)  SpO2: 97 % (01/22/23 0024) Vital Signs (24h Range):  Temp:  [97.7 °F (36.5 °C)] 97.7 °F (36.5 °C)  Pulse:  [57-66] 61  Resp:  [18-19] 19  SpO2:  [95 %-98 %] 97 %  BP: (113-131)/(60-76) 117/62     Weight: 46.3 kg (102 lb)  Body mass index is 20.6 kg/m².    Physical Exam  HENT:      Head: Normocephalic and atraumatic.      Nose: No congestion or rhinorrhea.      Mouth/Throat:      Mouth: Mucous membranes are moist.   Cardiovascular:      Rate and Rhythm: Normal rate.   Abdominal:      General: Bowel sounds are normal.      Palpations: Abdomen is soft.   Musculoskeletal:      Cervical back: Neck supple.      Right lower leg: Edema present.      Left lower leg: Edema present.   Skin:     Comments: 1 cm wound to left heel. No other wounds to feet.    Neurological:      Mental Status: She is alert and oriented to person, place, and  time.           Significant Labs: All pertinent labs within the past 24 hours have been reviewed.    Significant Imaging: I have reviewed all pertinent imaging results/findings within the past 24 hours.

## 2023-01-22 NOTE — ED PROVIDER NOTES
"Encounter Date: 1/21/2023    SCRIBE #1 NOTE: I, Basilia Moeller, am scribing for, and in the presence of,  Jono Bhatia MD. I have scribed the following portions of the note - Other sections scribed: HPI, ROS, PE.     History     Chief Complaint   Patient presents with    Chest Pain     Pt arrived via ems, Pt chief complaint is chest pain. Pt states chest pain began earlier today took nitro without change in pain. Pt also states has had non productive cough.      Peri Johansen is a 88 y.o. female, with a past medical history of HLD, CHF, DM, GERD, and HTN, who presents to the ED with midsternal chest pain onset 2 days ago. Patient describes this pain as nonradiating and feeling "like someone hit her in the chest with a brick." She notes associated symptoms of nausea. Patient additionally notes symptoms of bilateral foot pain. EMS reports that they administered Nitro SL to the patient en route to the ED with no alleviated noted. No other exacerbating or alleviating factors. Patient denies attempting treatment for her symptoms prior to EMS arrival. Patient endorses daily medication compliance. She denies a past medical history of GI bleed. Patient denies vomiting, blood in stool, melena, or other associated symptoms.       The history is provided by the patient and the EMS personnel.   Review of patient's allergies indicates:   Allergen Reactions    Latex, natural rubber Itching    Latex Hives     Past Medical History:   Diagnosis Date    Aortic atherosclerosis 1/7/2016    Arthritis     Chronic diastolic congestive heart failure 9/15/2022    Colon polyp: hyperplastic 2015- repeat 2020 8/6/2015    Diabetes mellitus, type II 8/16/2012    Diverticulosis     Gastric nodule 8/7/2014    GERD (gastroesophageal reflux disease) 8/16/2012    Goiter 8/16/2012    Hyperlipidemia 8/16/2012    Hypertension     Joint pain     Leg pain 8/16/2012    Lymphoma 8/16/2012    Osteopenia 8/16/2012    Osteoporosis     Renal cyst " 3/28/2013    Rickets, vitamin D deficiency 8/16/2012    Thyroid nodule 2/24/2014    Vitamin D deficiency disease 8/16/2012     Past Surgical History:   Procedure Laterality Date    CARPAL TUNNEL RELEASE      CATARACT EXTRACTION W/  INTRAOCULAR LENS IMPLANT Bilateral     HYSTERECTOMY      partial    lymphoma surgery      L tibia     Family History   Problem Relation Age of Onset    Hypertension Mother     Hypertension Father     Heart disease Father     Breast cancer Maternal Aunt     No Known Problems Maternal Uncle     No Known Problems Paternal Aunt     No Known Problems Paternal Uncle     No Known Problems Maternal Grandmother     No Known Problems Maternal Grandfather     No Known Problems Paternal Grandmother     No Known Problems Paternal Grandfather     Cancer Brother         colon    No Known Problems Daughter     No Known Problems Son     No Known Problems Son     No Known Problems Daughter     No Known Problems Daughter     Diabetes Neg Hx     Glaucoma Neg Hx     Amblyopia Neg Hx     Blindness Neg Hx     Cataracts Neg Hx     Macular degeneration Neg Hx     Retinal detachment Neg Hx     Strabismus Neg Hx     Stroke Neg Hx     Thyroid disease Neg Hx     Ovarian cancer Neg Hx     Liver disease Neg Hx     Liver cancer Neg Hx     Cirrhosis Neg Hx     Colon polyps Neg Hx     Colon cancer Neg Hx     Melanoma Neg Hx      Social History     Tobacco Use    Smoking status: Never    Smokeless tobacco: Never   Substance Use Topics    Alcohol use: No     Alcohol/week: 0.0 standard drinks    Drug use: No     Review of Systems   Constitutional:  Negative for chills and fever.   HENT:  Negative for sore throat.    Respiratory:  Negative for cough and shortness of breath.    Cardiovascular:  Positive for chest pain (midsternal).   Gastrointestinal:  Positive for nausea. Negative for blood in stool and vomiting.        Negative for melena.   Genitourinary:  Negative for dysuria.   Musculoskeletal:  Negative for back pain.         Positive for foot pain (bilateral).   Skin:  Negative for rash.   Neurological:  Negative for weakness.   Psychiatric/Behavioral:  Negative for confusion.      Physical Exam     Initial Vitals [01/21/23 2019]   BP Pulse Resp Temp SpO2   126/76 62 18 97.7 °F (36.5 °C) 95 %      MAP       --         Physical Exam    Nursing note and vitals reviewed.  Constitutional: She appears well-developed and well-nourished. She does not appear ill. No distress.   HENT:   Head: Normocephalic and atraumatic.   Mouth/Throat: Oropharynx is clear and moist.   Eyes: Conjunctivae and EOM are normal.   Neck:   Normal range of motion.  Cardiovascular:  Normal rate, regular rhythm and normal heart sounds.           No murmur heard.  Difficulty palpating DP and PT pulses bilaterally.   Pulmonary/Chest: Breath sounds normal. No respiratory distress. She has no wheezes.   Abdominal: Abdomen is soft. There is no abdominal tenderness.   Musculoskeletal:         General: Edema (pitting, to bilateral lower extremities) present.      Cervical back: Normal range of motion.     Neurological: She is alert. GCS score is 15.   Skin: Skin is warm.   1 cm wound to left heel. No other wounds to feet. Feet cool to touch bilaterally.   Psychiatric: She has a normal mood and affect.       ED Course   ED US Echo    Date/Time: 1/21/2023 10:05 PM  Performed by: Jono Bhatia MD  Authorized by: Jono Bhatia MD     Indication:  Chest pain and Dyspnea  Identified Structures:     The pericardial sac, myocardium, and 4 chambers were identified with the following echocardiographic windows:  Parasternal long axis and Subxiphoid  Findings:     Pericardial Effusion:  Present    size:  Medium    Left Ventricle Ejection Fraction:  Severely reduced (<35%)  IVC:     Impression:  Pericardial effusion and Diminished LV function    Charge?:  Yes            Labs Reviewed   CBC W/ AUTO DIFFERENTIAL - Abnormal; Notable for the following components:       Result Value     RDW 19.0 (*)     All other components within normal limits   COMPREHENSIVE METABOLIC PANEL - Abnormal; Notable for the following components:    Sodium 135 (*)     CO2 30 (*)     Glucose 139 (*)     Albumin 2.5 (*)     Alkaline Phosphatase 147 (*)     All other components within normal limits   TROPONIN I - Abnormal; Notable for the following components:    Troponin I 0.177 (*)     All other components within normal limits   B-TYPE NATRIURETIC PEPTIDE - Abnormal; Notable for the following components:    BNP 1,361 (*)     All other components within normal limits   CBC W/ AUTO DIFFERENTIAL   COMPREHENSIVE METABOLIC PANEL   MAGNESIUM   PHOSPHORUS   TROPONIN I   HEMOGLOBIN A1C   POCT GLUCOSE   POCT GLUCOSE MONITORING CONTINUOUS          Imaging Results              X-Ray Chest 1 View (Final result)  Result time 01/21/23 22:25:20      Final result by Maria L Trejo MD (01/21/23 22:25:20)                   Impression:      Overall findings are concerning for CHF.  Recommend clinical correlation.      Electronically signed by: Maria L Trejo  Date:    01/21/2023  Time:    22:25               Narrative:    EXAMINATION:  CHEST ONE VIEW    CLINICAL HISTORY:  Dyspnea, unspecified    TECHNIQUE:  One view of the chest.    COMPARISON:  12/04/2022    FINDINGS:  The cardiac silhouette is enlarged.  There is pulmonary vascular congestion or edema.  There are bilateral pleural effusions left much greater than right.  The left pleural effusion appears to be moderate or moderately large.  The right pleural effusion is small.  There is no pneumothorax.  The bones are diffusely osteopenic.  There are degenerative changes of the shoulders and the spine.                                       Medications   furosemide injection 40 mg (has no administration in time range)   acetaminophen tablet 650 mg (has no administration in time range)   melatonin tablet 6 mg (has no administration in time range)   ondansetron injection 4 mg (has  no administration in time range)   aluminum-magnesium hydroxide-simethicone 200-200-20 mg/5 mL suspension 30 mL (has no administration in time range)   insulin aspart U-100 pen 0-5 Units (has no administration in time range)   glucose chewable tablet 16 g (has no administration in time range)   glucose chewable tablet 24 g (has no administration in time range)   glucagon (human recombinant) injection 1 mg (has no administration in time range)   nitroGLYCERIN SL tablet 0.4 mg (0.4 mg Sublingual Given 1/22/23 0158)   dextrose 10% bolus 125 mL 125 mL (has no administration in time range)   dextrose 10% bolus 250 mL 250 mL (has no administration in time range)   amiodarone tablet 200 mg (has no administration in time range)   aspirin chewable tablet 81 mg (has no administration in time range)   atorvastatin tablet 40 mg (has no administration in time range)   losartan split tablet 12.5 mg (has no administration in time range)   methIMAzole tablet 5 mg (has no administration in time range)   morphine injection 4 mg (4 mg Intravenous Given 1/21/23 2054)   aspirin tablet 325 mg (325 mg Oral Given 1/21/23 2245)   furosemide injection 80 mg (80 mg Intravenous Given 1/21/23 2245)   acetaminophen tablet 650 mg (650 mg Oral Given 1/22/23 0145)     Medical Decision Making:   History:   Old Medical Records: I decided to obtain old medical records.  Initial Assessment:     88-year-old female presenting with chest pain.  EF 35% on 12/2022.  Today's bedside ultrasound shows poor cardiac squeeze.  Pericardial effusion present.  Suspicion of worsening heart failure.  Patient diuresed.  Patient will likely need Cardiology consult.  Low voltage on ECG.  Suspect symptoms secondary to fluid retention and worsening heart failure.  Differential Diagnosis:     Heart failure decompensation, lower respiratory tract infection, ACS  Independently Interpreted Test(s):   I have ordered and independently interpreted X-rays - see summary below.  I  have ordered and independently interpreted EKG Reading(s) - see summary below  Clinical Tests:   Lab Tests: Ordered and Reviewed  Radiological Study: Ordered and Reviewed  Medical Tests: Ordered and Reviewed  ED Management:    Please see workup for ECG interpretation.  Imaging independently reviewed.  Agree with radiologist's interpretation.  Vascular congestion present.  All labs reviewed and considered in the patient's differential diagnosis.  Elevated BNP.  Troponin appears near baseline.  Prior records were evaluated and considered a differential diagnosis.  Echocardiogram reviewed.    Plan was discussed with the patient.  This includes plan for admission, labs, imaging.          Scribe Attestation:   Scribe #1: I performed the above scribed service and the documentation accurately describes the services I performed. I attest to the accuracy of the note.      ED Course as of 01/22/23 0222   Sat Jan 21, 2023 2137 Bedside ultrasound shows poor global squeeze.  Pericardial effusion noted. [JM]   2138 Troponin I(!): 0.177 [JM]   2138 BNP(!): 1,361 [JM]   2203 EKG 12-lead  Time 8:31 p.m.     Rate 62, sinus, regular rhythm, normal axis.   QRS 78 .  No ST elevation or depression.  No T-wave inversion.  No Q-waves present     Normal sinus rhythm with low voltage criteria. [JM]      ED Course User Index  [JM] Jono Bhatia MD                 Clinical Impression:   Final diagnoses:  [R07.9] Chest pain  [R06.00] Dyspnea  [I31.39] Pericardial effusion (Primary)  [I50.9] Acute on chronic heart failure, unspecified heart failure type  [I50.9] Acute on chronic heart failure     I, Jono Bhatia,, personally performed the services described in this documentation. All medical record entries made by the scribe were at my direction and in my presence. I have reviewed the chart and agree that the record reflects my personal performance and is accurate and complete.     ED Disposition Condition    Observation Stable                 Jono Bhatia MD  01/22/23 1534

## 2023-01-22 NOTE — SUBJECTIVE & OBJECTIVE
Interval History: overall stable, complains of mild headache, PRNs available    Review of Systems   Respiratory:  Positive for shortness of breath.    Cardiovascular:  Positive for chest pain.   Neurological:  Positive for headaches.   Objective:     Vital Signs (Most Recent):  Temp: 97.7 °F (36.5 °C) (01/22/23 0451)  Pulse: (!) 56 (01/22/23 0517)  Resp: 15 (01/22/23 0517)  BP: 120/65 (01/22/23 0517)  SpO2: 97 % (01/22/23 0517)   Vital Signs (24h Range):  Temp:  [97.7 °F (36.5 °C)] 97.7 °F (36.5 °C)  Pulse:  [56-66] 56  Resp:  [14-19] 15  SpO2:  [95 %-98 %] 97 %  BP: (113-139)/(57-76) 120/65     Weight: 46.3 kg (102 lb)  Body mass index is 20.6 kg/m².    Intake/Output Summary (Last 24 hours) at 1/22/2023 1120  Last data filed at 1/22/2023 0452  Gross per 24 hour   Intake --   Output 300 ml   Net -300 ml      Physical Exam  Constitutional:       General: She is not in acute distress.  Cardiovascular:      Rate and Rhythm: Regular rhythm. Bradycardia present.   Pulmonary:      Effort: Pulmonary effort is normal.   Musculoskeletal:      Right lower leg: Edema present.      Left lower leg: Edema present.       Significant Labs: All pertinent labs within the past 24 hours have been reviewed.    Significant Imaging: I have reviewed all pertinent imaging results/findings within the past 24 hours.

## 2023-01-22 NOTE — ASSESSMENT & PLAN NOTE
Elevated troponin, EKG shows no stemi, echo pending, Cardiology consulted, appreciate recs.    Continuous cardiac monitoring  Trend troponin x3  EKG No Stemi  Recent LDL at 85.4  Echo pending   Consult cardiology  NPO  NTG prn

## 2023-01-22 NOTE — HOSPITAL COURSE
Ms. Johansen was placed in observation for ACS rule out after presenting with chest pain. Troponin noted to be elevated.  BNP elevated as well with evidence of pulmonary edema on chest xray.  EKG without evidence of acute ischemia.  Echocardiogram performed.  Cardiology consulted.  Monitor on tele and obtain serial troponin.  Patient is recommended for pharmacological treatment per Cardiology.  She was initiated on IV Lasix with good diuresis.  She is recommended to resume her home Lasix and fluid restriction.  Family member at bedside states she regularly follows up with the [facility] who manages her volume status with adjustments in home diuretics.  Patient is medically stable for discharge from Cardiology standpoint.  She will be discharged home with home health.

## 2023-01-22 NOTE — H&P
"[facility] Medicine  History & Physical    Patient Name: Peri Johansen  MRN: [id number]  Patient Class: OP- Observation  Admission Date: 1/21/2023  Attending Physician: Milagro Chris MD   Primary Care Provider: Annika Garland MD         Patient information was obtained from patient, past medical records and ER records.     Subjective:     Principal Problem:Chest pain    Chief Complaint:   Chief Complaint   Patient presents with    Chest Pain     Pt arrived via ems, Pt chief complaint is chest pain. Pt states chest pain began earlier today took nitro without change in pain. Pt also states has had non productive cough.         HPI: 88 y.o. female, with a past medical history of HLD, CHF, DM, GERD, and HTN, who presents to the ED with midsternal chest pain onset 2 days ago. Patient describes this pain as non radiating.  She reported to ED provider that it felt "like someone hit her in the chest with a brick." She notes associated symptoms of nausea. Patient additionally notes symptoms of bilateral foot pain. EMS reports that they administered Nitro SL to the patient en route to the ED without relief. No other exacerbating or alleviating factors. Patient denies attempting treatment for her symptoms prior to EMS arrival. Patient endorses daily medication compliance. She denies a past medical history of GI bleed. Patient denies vomiting, blood in stool, melena, abdominal pain, fevers, cough, congestion, change in bladder habits, change in bowel habits. Work up revealed Elevated troponin, elevated BNP, heart ultrasound done in ED and showed EF less than 35% with pericardial effusion and diminished LV function, EKG showed No STEMI, chest x-ray with concerns for CHF.  Patient admitted to hospital medicine observation unit for further medical management.      Past Medical History:   Diagnosis Date    Aortic atherosclerosis 1/7/2016    Arthritis     Chronic diastolic congestive heart failure " 9/15/2022    Colon polyp: hyperplastic 2015- repeat 2020 8/6/2015    Diabetes mellitus, type II 8/16/2012    Diverticulosis     Gastric nodule 8/7/2014    GERD (gastroesophageal reflux disease) 8/16/2012    Goiter 8/16/2012    Hyperlipidemia 8/16/2012    Hypertension     Joint pain     Leg pain 8/16/2012    Lymphoma 8/16/2012    Osteopenia 8/16/2012    Osteoporosis     Renal cyst 3/28/2013    Rickets, vitamin D deficiency 8/16/2012    Thyroid nodule 2/24/2014    Vitamin D deficiency disease 8/16/2012       Past Surgical History:   Procedure Laterality Date    CARPAL TUNNEL RELEASE      CATARACT EXTRACTION W/  INTRAOCULAR LENS IMPLANT Bilateral     HYSTERECTOMY      partial    lymphoma surgery      L tibia       Review of patient's allergies indicates:   Allergen Reactions    Latex, natural rubber Itching    Latex Hives       No current facility-administered medications on file prior to encounter.     Current Outpatient Medications on File Prior to Encounter   Medication Sig    acetaminophen (TYLENOL) 500 MG tablet Take 500 mg by mouth daily as needed for Pain.    albuterol (PROVENTIL/VENTOLIN HFA) 90 mcg/actuation inhaler Inhale 2 puffs into the lungs every 6 (six) hours as needed.    amiodarone (PACERONE) 200 MG Tab Take 1 tablet (200 mg total) by mouth once daily.    aspirin 81 MG Chew Take 1 tablet (81 mg total) by mouth once daily.    atorvastatin (LIPITOR) 40 MG tablet TAKE 1 TABLET BY MOUTH EVERY DAY    benzonatate (TESSALON) 200 MG capsule Take 200 mg by mouth as needed.    blood sugar diagnostic (TRUE METRIX GLUCOSE TEST STRIP) Strp USE AS DIRECTED    blood sugar diagnostic Strp Test 2 times daily    blood-glucose meter kit Use as instructed.  ACCUCHECK or whatever is preferred by insurance    dextran 70-hypromellose (ARTIFICIAL TEARS,ZGYF30-BELMM,) ophthalmic solution Place 1 drop into both eyes 4 (four) times daily as needed.    dextromethorphan-guaiFENesin  mg/5 ml  (ROBITUSSIN-DM)  mg/5 mL liquid Take 10 mLs by mouth every 6 (six) hours as needed for Cough.    diclofenac sodium (VOLTAREN) 1 % Gel APPLY 2 GRAMS EXTERNALLY TO THE AFFECTED AREA FOUR TIMES DAILY    ergocalciferol (ERGOCALCIFEROL) 50,000 unit Cap Take 1 capsule (50,000 Units total) by mouth every 7 days.    furosemide (LASIX) 40 MG tablet Take 1 tablet (40 mg total) by mouth 2 (two) times daily.    gabapentin (NEURONTIN) 300 MG capsule TAKE 1 CAPSULE BY MOUTH TWICE DAILY    lancets Misc Test 2 x daily    losartan (COZAAR) 25 MG tablet Take 0.5 tablets (12.5 mg total) by mouth once daily.    magnesium oxide (MAG-OX) 400 mg (241.3 mg magnesium) tablet Take 1 tablet by mouth once daily.    methIMAzole (TAPAZOLE) 5 MG Tab TAKE 1/2 TABLET BY MOUTH DAILY    methyl salicylate/menthol (ICY HOT TOP) Apply topically 2 (two) times daily as needed (Pain).    MICRO THIN LANCETS 33 gauge Misc USE AS DIRECTED    multivitamin (THERAGRAN) per tablet Take 1 tablet by mouth once daily.    nitroGLYCERIN (NITROSTAT) 0.4 MG SL tablet Place 1 tablet (0.4 mg total) under the tongue every 5 (five) minutes as needed for Chest pain.    omeprazole (PRILOSEC) 40 MG capsule Take 1 capsule (40 mg total) by mouth every morning.    ondansetron (ZOFRAN-ODT) 4 MG TbDL Take 1 tablet (4 mg total) by mouth every 8 (eight) hours as needed (Nausea or vomiting).    potassium chloride (KLOR-CON) 10 MEQ TbSR Take 2 tablets (20 mEq total) by mouth once daily.    tolvaptan (SAMSCA) 15 mg Tab Take 1 tablet (15 mg total) by mouth every other day. (Patient not taking: Reported on 1/12/2023)    traMADoL (ULTRAM) 50 mg tablet TAKE 1 TABLET(50 MG) BY MOUTH EVERY 12 HOURS AS NEEDED FOR PAIN     Family History       Problem Relation (Age of Onset)    Breast cancer Maternal Aunt    Cancer Brother    Heart disease Father    Hypertension Mother, Father    No Known Problems Maternal Uncle, Paternal Aunt, Paternal Uncle, Maternal Grandmother,  Maternal Grandfather, Paternal Grandmother, Paternal Grandfather, Daughter, Son, Son, Daughter, Daughter          Tobacco Use    Smoking status: Never    Smokeless tobacco: Never   Substance and Sexual Activity    Alcohol use: No     Alcohol/week: 0.0 standard drinks    Drug use: No    Sexual activity: Not Currently     Review of Systems   Constitutional:  Negative for chills and fever.   HENT:  Negative for congestion, postnasal drip and rhinorrhea.    Eyes:  Negative for visual disturbance.   Respiratory:  Negative for chest tightness and shortness of breath.    Cardiovascular:  Positive for leg swelling.   Gastrointestinal:  Positive for nausea.   Genitourinary:  Negative for difficulty urinating.   Neurological:  Negative for dizziness and weakness.   Hematological:  Does not bruise/bleed easily.   Psychiatric/Behavioral:  Negative for agitation.    Objective:     Vital Signs (Most Recent):  Temp: 97.7 °F (36.5 °C) (01/21/23 2019)  Pulse: 61 (01/22/23 0024)  Resp: 19 (01/21/23 2054)  BP: 117/62 (01/22/23 0016)  SpO2: 97 % (01/22/23 0024) Vital Signs (24h Range):  Temp:  [97.7 °F (36.5 °C)] 97.7 °F (36.5 °C)  Pulse:  [57-66] 61  Resp:  [18-19] 19  SpO2:  [95 %-98 %] 97 %  BP: (113-131)/(60-76) 117/62     Weight: 46.3 kg (102 lb)  Body mass index is 20.6 kg/m².    Physical Exam  HENT:      Head: Normocephalic and atraumatic.      Nose: No congestion or rhinorrhea.      Mouth/Throat:      Mouth: Mucous membranes are moist.   Cardiovascular:      Rate and Rhythm: Normal rate.   Abdominal:      General: Bowel sounds are normal.      Palpations: Abdomen is soft.   Musculoskeletal:      Cervical back: Neck supple.      Right lower leg: Edema present.      Left lower leg: Edema present.   Skin:     Comments: 1 cm wound to left heel. No other wounds to feet.    Neurological:      Mental Status: She is alert and oriented to person, place, and time.           Significant Labs: All pertinent labs within the past 24  hours have been reviewed.    Significant Imaging: I have reviewed all pertinent imaging results/findings within the past 24 hours.    Assessment/Plan:     * Chest pain  Elevated troponin     Continuous cardiac monitoring  Trend troponin x3  EKG No Stemi  Recent LDL at 85.4  Echo pending   Consult cardiology  NPO  NTG prn      Open wound of left heel  Consult wound care       Acute heart failure  Continuous cardiac monitoring  Elevated troponin, likely demand, trend  Most recent EF at 35%, repeat pending  IV lasix   Continue amiodarone, losartan, aspirin  Daily Weight  Oxygen prn  Consult cardiology           Hyponatremia  Monitor       Diabetes mellitus  Most recent A1c at 7.2, repeat pending  Treat with SSI ac/hs during hospital stay  Resume home regimen at discharge  Hypoglycemia protocol PRN  Continue statin    Hyperthyroidism  Chronic  Continue tapazole       High cholesterol  Continue aspirin and statin   Recent LDL at 85.4        VTE Risk Mitigation (From admission, onward)         Ordered     Place sequential compression device  Until discontinued         01/22/23 0100                   Manuela Roque NP  Department of Hospital Medicine   [facility] - Emergency Dept

## 2023-01-22 NOTE — HPI
"88 y.o. female, with a past medical history of HLD, CHF, DM, GERD, and HTN, who presents to the ED with midsternal chest pain onset 2 days ago. Patient describes this pain as non radiating.  She reported to ED provider that it felt "like someone hit her in the chest with a brick." She notes associated symptoms of nausea. Patient additionally notes symptoms of bilateral foot pain. EMS reports that they administered Nitro SL to the patient en route to the ED without relief. No other exacerbating or alleviating factors. Patient denies attempting treatment for her symptoms prior to EMS arrival. Patient endorses daily medication compliance. She denies a past medical history of GI bleed. Patient denies vomiting, blood in stool, melena, abdominal pain, fevers, cough, congestion, change in bladder habits, change in bowel habits. Work up revealed Elevated troponin, elevated BNP, heart ultrasound done in ED and showed EF less than 35% with pericardial effusion and diminished LV function, EKG showed No STEMI, chest x-ray with concerns for CHF.  Patient admitted to hospital medicine observation unit for further medical management.  "

## 2023-01-22 NOTE — CONSULTS
"[facility]  Cardiology  Consult Note    Patient Name: Peri Johansen  MRN: [id number]  Admission Date: 1/21/2023  Hospital Length of Stay: 0 days  Code Status: Full Code   Attending Provider: Milagro Chris MD   Consulting Provider: Emil Brannon MD  Primary Care Physician: Annika Garland MD  Principal Problem:Chest pain    Patient information was obtained from patient and ER records.     Consults  Subjective:     Chief Complaint:  CHF     HPI: 88 y.o. female, with a past medical history of HLD, CHF, DM, GERD, and HTN, who presents to the ED with midsternal chest pain onset 2 days ago. Patient describes this pain as non radiating.  She reported to ED provider that it felt "like someone hit her in the chest with a brick." She notes associated symptoms of nausea. Patient additionally notes symptoms of bilateral foot pain. EMS reports that they administered Nitro SL to the patient en route to the ED without relief. No other exacerbating or alleviating factors. Patient denies attempting treatment for her symptoms prior to EMS arrival. Patient endorses daily medication compliance. She denies a past medical history of GI bleed. Patient denies vomiting, blood in stool, melena, abdominal pain, fevers, cough, congestion, change in bladder habits, change in bowel habits. Work up revealed Elevated troponin, elevated BNP, heart ultrasound done in ED and showed EF less than 35% with pericardial effusion and diminished LV function, EKG showed No STEMI, chest x-ray with concerns for CHF.  Patient admitted to hospital medicine observation unit for further medical management.    Currently denies CP or SOB  EKG NSR low voltage  BNP 1361  Troponin 0.17 flat pattern  Being followed at [facility] heart failure for CHF and possible cardiac amyloid    Echo 1/22/23   The left ventricle is normal in size with moderate concentric hypertrophy and moderately decreased systolic function.   The estimated ejection fraction is " 35-40%.   Grade I left ventricular diastolic dysfunction.   Normal right ventricular size with normal right ventricular systolic function.   Severe left atrial enlargement.   Severe right atrial enlargement.   Mild aortic regurgitation.   Mild-to-moderate mitral regurgitation.   Moderate tricuspid regurgitation.   Intermediate central venous pressure (8 mmHg).   The estimated PA systolic pressure is 39 mmHg.   Trivial posterior pericardial effusion.    Echo 12/5/22   The quantitatively derived ejection fraction is 35%. Apical sparing strain pattern is concerning for cardiac amyloid. Clinical correlation is advised.   Decrease in LV function cpared to the prior reported.   The left ventricle is normal in size with concentric hypertrophy and moderately decreased systolic function.   Grade III left ventricular diastolic dysfunction.   Normal right ventricular size with normal right ventricular systolic function.   Mild left atrial enlargement.   Mild right atrial enlargement.   Severe tricuspid regurgitation.   Mild-to-moderate mitral regurgitation.   The estimated PA systolic pressure is 39 mmHg.   Normal central venous pressure (3 mmHg).   There is a large left pleural effusion.   Trivial circumferential pericardial effusion.       [place] [facility] [place] Felipe Whitt AISLINN Johansen is a 85 y.o. female who presents for follow-up of Acute congestive heart failure, unspecified heart failure ty     Acute congestive heart failure, unspecified heart failure type      2. Aortic atherosclerosis: see CT scan 3/15    3. Mixed hyperlipidemia    4. 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 Thyroid nodules: several see u/s 2017 FNA 2017 benign, stable 2019    6. Type 2 diabetes mellitus with hyperglycemia, without long-term current use of insulin    7. Frequent urination    8. Hearing loss, unspecified hearing loss type, unspecified laterality    9. Renal cyst: R side see  ultrasound 6/16; stable 2018    Echo 6/19   Mldly decreased left ventricular systolic function. The estimated ejection fraction is 45-50%   Grade I (mild) left ventricular diastolic dysfunction consistent with impaired relaxation. Normal left atrial pressure.   Concentric left ventricular remodeling.   Mild mitral regurgitation.   Normal right ventricular systolic function.   The estimated PA systolic pressure is 33 mm Hg   Normal central venous pressure (3 mm Hg).  HPI:   Recent admission for heart failure  No chest pain, Orthopnea, PND of heart failure symptoms.   Denies palpitations or fluttering in the chest  Patient stays with daughter who takes care of her but overall can clean and take a shower herself does not palpitations.   Non smoker   Father had heart disease at the age of 70.   Patient c/o back pain that radiates to the groin.      HPI:   C/o chest pain and is constant and its gets worse with exercise along with SOB.   Weight appears unchanged.   Increase leg swelling.   Denies palpitations or fluttering in the chest  Talked to the daughter she us taking lasix 20 bid.      HPI:   Recent hospitalization for heart failue  Other information felt pertinent to HPI: Ms. Peri Johansen is a 89 yo female with a PMHx of new HFrEF (EF reduced 58->35% on most recent TTE with apical sparing pattern concerning for cardiac amyloid), diffuse large B cell lymphoma, T2DM, hyperlipidemia, hyponatremia [facility] stay for ADHF. She was initially started on furosemide IV 40 mg BID with inadequate UOP. Escalated to 80 mg BID. Patient with complaints of epigastric abdominal discomfort. Some relief with BM. X-ray of abdomen unremarkable for obstruction or ileus or constipation. US of abdomen showed adherent gallstone. Abdomen pain resolved during period of NPO. On 11/27, patient noted to be satting in mid to upper 80's on RA. She was placed on supplemental O2 and  repeat CXR ordered which demonstrated worsening pulm edema. Metolazone and azithromycin started with improvement in cough. Weaned off supplemental O2. Hospital course complicated by hyponatremia and hypotension as approached euvolemia, requiring pressor support. Nephrology consulted for hyponatremia with recommendations to hold loop diuretic held and start tolvaptan. Discharged on tolvaptan 15mg every other day.     12/20/22: Today she reports continued SOB on minimal exertion and BLE edema near baseline. Not performing daily weights due to debility. 10lb weight gain on clinic scale. Per chart review, previous work up negative for AL amyloid. No prior PYP.      1/5/23: Last visit we stopped K supplement, and resumed lasix 20mg BID per Nephrology recs with tolvaptan 15mg every other day. Today she presents to clinic with granddaughter. Wearing 2L supplemental O2. Denies subjective SOB. She reports worsening BLE edema starting over the weekend with associated weeping/venous stasis blisters for the past couple days. Wearing compression stockings to clinic.                          Past Medical History:   Diagnosis Date    Aortic atherosclerosis 1/7/2016    Arthritis     Chronic diastolic congestive heart failure 9/15/2022    Colon polyp: hyperplastic 2015- repeat 2020 8/6/2015    Diabetes mellitus, type II 8/16/2012    Diverticulosis     Gastric nodule 8/7/2014    GERD (gastroesophageal reflux disease) 8/16/2012    Goiter 8/16/2012    Hyperlipidemia 8/16/2012    Hypertension     Joint pain     Leg pain 8/16/2012    Lymphoma 8/16/2012    Osteopenia 8/16/2012    Osteoporosis     Renal cyst 3/28/2013    Rickets, vitamin D deficiency 8/16/2012    Thyroid nodule 2/24/2014    Vitamin D deficiency disease 8/16/2012       Past Surgical History:   Procedure Laterality Date    CARPAL TUNNEL RELEASE      CATARACT EXTRACTION W/  INTRAOCULAR LENS IMPLANT Bilateral     HYSTERECTOMY      partial    lymphoma  surgery      L tibia       Review of patient's allergies indicates:   Allergen Reactions    Latex, natural rubber Itching    Latex Hives       No current facility-administered medications on file prior to encounter.     Current Outpatient Medications on File Prior to Encounter   Medication Sig    acetaminophen (TYLENOL) 500 MG tablet Take 500 mg by mouth daily as needed for Pain.    albuterol (PROVENTIL/VENTOLIN HFA) 90 mcg/actuation inhaler Inhale 2 puffs into the lungs every 6 (six) hours as needed.    amiodarone (PACERONE) 200 MG Tab Take 1 tablet (200 mg total) by mouth once daily.    aspirin 81 MG Chew Take 1 tablet (81 mg total) by mouth once daily.    atorvastatin (LIPITOR) 40 MG tablet TAKE 1 TABLET BY MOUTH EVERY DAY    benzonatate (TESSALON) 200 MG capsule Take 200 mg by mouth as needed.    blood sugar diagnostic (TRUE METRIX GLUCOSE TEST STRIP) Strp USE AS DIRECTED    blood sugar diagnostic Strp Test 2 times daily    blood-glucose meter kit Use as instructed.  ACCUCHECK or whatever is preferred by insurance    dextran 70-hypromellose (ARTIFICIAL TEARS,CBZB85-VYSCT,) ophthalmic solution Place 1 drop into both eyes 4 (four) times daily as needed.    dextromethorphan-guaiFENesin  mg/5 ml (ROBITUSSIN-DM)  mg/5 mL liquid Take 10 mLs by mouth every 6 (six) hours as needed for Cough.    diclofenac sodium (VOLTAREN) 1 % Gel APPLY 2 GRAMS EXTERNALLY TO THE AFFECTED AREA FOUR TIMES DAILY    ergocalciferol (ERGOCALCIFEROL) 50,000 unit Cap Take 1 capsule (50,000 Units total) by mouth every 7 days.    furosemide (LASIX) 40 MG tablet Take 1 tablet (40 mg total) by mouth 2 (two) times daily.    gabapentin (NEURONTIN) 300 MG capsule TAKE 1 CAPSULE BY MOUTH TWICE DAILY    lancets Misc Test 2 x daily    losartan (COZAAR) 25 MG tablet Take 0.5 tablets (12.5 mg total) by mouth once daily.    magnesium oxide (MAG-OX) 400 mg (241.3 mg magnesium) tablet Take 1 tablet by mouth once daily.     methIMAzole (TAPAZOLE) 5 MG Tab TAKE 1/2 TABLET BY MOUTH DAILY    methyl salicylate/menthol (ICY HOT TOP) Apply topically 2 (two) times daily as needed (Pain).    MICRO THIN LANCETS 33 gauge Misc USE AS DIRECTED    multivitamin (THERAGRAN) per tablet Take 1 tablet by mouth once daily.    nitroGLYCERIN (NITROSTAT) 0.4 MG SL tablet Place 1 tablet (0.4 mg total) under the tongue every 5 (five) minutes as needed for Chest pain.    omeprazole (PRILOSEC) 40 MG capsule Take 1 capsule (40 mg total) by mouth every morning.    ondansetron (ZOFRAN-ODT) 4 MG TbDL Take 1 tablet (4 mg total) by mouth every 8 (eight) hours as needed (Nausea or vomiting).    potassium chloride (KLOR-CON) 10 MEQ TbSR Take 2 tablets (20 mEq total) by mouth once daily.    tolvaptan (SAMSCA) 15 mg Tab Take 1 tablet (15 mg total) by mouth every other day. (Patient not taking: Reported on 1/12/2023)    traMADoL (ULTRAM) 50 mg tablet TAKE 1 TABLET(50 MG) BY MOUTH EVERY 12 HOURS AS NEEDED FOR PAIN     Family History       Problem Relation (Age of Onset)    Breast cancer Maternal Aunt    Cancer Brother    Heart disease Father    Hypertension Mother, Father    No Known Problems Maternal Uncle, Paternal Aunt, Paternal Uncle, Maternal Grandmother, Maternal Grandfather, Paternal Grandmother, Paternal Grandfather, Daughter, Son, Son, Daughter, Daughter          Tobacco Use    Smoking status: Never    Smokeless tobacco: Never   Substance and Sexual Activity    Alcohol use: No     Alcohol/week: 0.0 standard drinks    Drug use: No    Sexual activity: Not Currently     Review of Systems   Constitutional: Negative for decreased appetite.   HENT:  Negative for ear discharge.    Eyes:  Negative for blurred vision.   Respiratory:  Negative for hemoptysis.    Endocrine: Negative for polyphagia.   Hematologic/Lymphatic: Negative for adenopathy.   Skin:  Negative for color change.   Musculoskeletal:  Negative for joint swelling.   Genitourinary:  Negative for  bladder incontinence.   Neurological:  Negative for brief paralysis.   Psychiatric/Behavioral:  Negative for hallucinations.    Allergic/Immunologic: Negative for hives.   Objective:     Vital Signs (Most Recent):  Temp: 97.7 °F (36.5 °C) (01/22/23 0451)  Pulse: (!) 56 (01/22/23 0517)  Resp: 15 (01/22/23 0517)  BP: 120/65 (01/22/23 0517)  SpO2: 97 % (01/22/23 0517)   Vital Signs (24h Range):  Temp:  [97.7 °F (36.5 °C)] 97.7 °F (36.5 °C)  Pulse:  [56-66] 56  Resp:  [14-19] 15  SpO2:  [95 %-98 %] 97 %  BP: (113-139)/(57-76) 120/65     Weight: 46.3 kg (102 lb)  Body mass index is 20.6 kg/m².    SpO2: 97 %         Intake/Output Summary (Last 24 hours) at 1/22/2023 1127  Last data filed at 1/22/2023 0452  Gross per 24 hour   Intake --   Output 300 ml   Net -300 ml       Lines/Drains/Airways       Peripheral Intravenous Line  Duration                  Peripheral IV - Single Lumen 01/21/23 2120 20 G Left Wrist <1 day         Peripheral IV - Single Lumen 01/22/23 0223 20 G Right Antecubital <1 day                    Physical Exam  Constitutional:       Appearance: She is well-developed.   HENT:      Head: Normocephalic and atraumatic.   Eyes:      Conjunctiva/sclera: Conjunctivae normal.      Pupils: Pupils are equal, round, and reactive to light.   Cardiovascular:      Rate and Rhythm: Normal rate.      Pulses: Intact distal pulses.      Heart sounds: Normal heart sounds.   Pulmonary:      Effort: Pulmonary effort is normal.      Breath sounds: Normal breath sounds.   Abdominal:      General: Bowel sounds are normal.      Palpations: Abdomen is soft.   Musculoskeletal:         General: Normal range of motion.      Cervical back: Normal range of motion and neck supple.      Right lower leg: Edema present.      Left lower leg: Edema present.   Skin:     General: Skin is warm and dry.   Neurological:      Mental Status: She is alert and oriented to person, place, and time.       Significant Labs: All pertinent lab results  from the last 24 hours have been reviewed.    Significant Imaging: Echocardiogram: 2D echo with color flow doppler: No results found. However, due to the size of the patient record, not all encounters were searched. Please check Results Review for a complete set of results.    Assessment and Plan:     Acute heart failure  Combined CHF - EF 35% - similar to most recent echo. [facility]ed at [facility] heart failure. Being evaluated for suspected cardiac amyloidosis. Diuresis and afterload reduction as tolerated. Poor prognosis    Elevated troponin  Troponin 0.17 flat pattern. Suspect demand ischemia from CHF and not ACS    Diabetes mellitus  Per primary    High cholesterol  On statin        VTE Risk Mitigation (From admission, onward)         Ordered     Place sequential compression device  Until discontinued         01/22/23 0100                Thank you for your consult. I will sign off. Please contact us if you have any additional questions.    Emil Brannon MD  Cardiology   [facility] - Emergency Dept

## 2023-01-23 PROBLEM — L89.622 PRESSURE ULCER OF LEFT HEEL, STAGE 2: Status: ACTIVE | Noted: 2023-01-01

## 2023-01-23 PROBLEM — J96.01 ACUTE RESPIRATORY FAILURE WITH HYPOXIA: Status: ACTIVE | Noted: 2023-01-01

## 2023-01-23 PROBLEM — I50.43 ACUTE ON CHRONIC COMBINED SYSTOLIC AND DIASTOLIC HEART FAILURE: Status: ACTIVE | Noted: 2023-01-01

## 2023-01-23 NOTE — PT/OT/SLP EVAL
"Physical Therapy Evaluation and Discharge Note    Patient Name:  Peri Johansen   MRN:  [id number]    Recommendations:     Discharge Recommendations: home health PT, home health OT, home with home health  Discharge Equipment Recommendations: none   Barriers to discharge:  None from PT standpoint    Assessment:     Peri Johansen is a 88 y.o. female admitted with a medical diagnosis of Chest pain. .  At this time, patient is functioning at/near their prior level of function and does not require further acute PT services.     Recent Surgery: * No surgery found *      Plan:     During this hospitalization, patient does not require further acute PT services.  Please re-consult if situation changes.      Subjective     Chief Complaint: pain  Patient/Family Comments/goals: Pt agreeable to sit up in chair for lunch  Pain/Comfort:  Pain Rating 1:  (not rated)  Location - Orientation 1:  ("tailbone" and L heel)  Pain Addressed 1: Reposition, Distraction, Cessation of Activity, Nurse notified    Patients cultural, spiritual, Shinto conflicts given the current situation: no    Living Environment:  Pt lives with her daughter in a [facility] with no concerns  Prior to admission, patients level of function was Modified Independent with RW and W/C for mobility.  Equipment used at home: walker, rolling, wheelchair, bedside commode, hospital bed, bath bench.  DME owned (not currently used): none.  Upon discharge, patient will have assistance from Daughter.    Objective:     Communicated with nsg prior to session.  Patient found  R SL  with bed alarm, telemetry, pressure relief boots upon PT entry to room.    General Precautions: Standard, fall    Orthopedic Precautions:N/A   Braces: N/A  Respiratory Status: Nasal cannula, flow 2 L/min    Exams:  Cognitive Exam:  Patient is oriented to Person, Place, Time, and Situation  Gross Motor Coordination:  impaired 2/2 gen weakness and deconditioning  Postural Exam:  Patient presented with the " following abnormalities:    -       Rounded shoulders  -       Forward head  Sensation:    -       Intact  light/touch B LE's  Skin Integrity/Edema:      -       Skin integrity: dressings intact B shins, L heel  -       Edema: None noted   RLE ROM: WFL  RLE Strength: WFL  LLE ROM: WFL  LLE Strength: WFL    Functional Mobility:  Bed Mobility:     Rolling Left:  stand by assistance  Supine to Sit: minimum assistance  Transfers:     Sit to Stand:  minimum assistance with no AD  Gait: 5 steps from bed to chair with Min A/HHA  Balance: Fair+ sit, fair stand    AM-PAC 6 CLICK MOBILITY  Total Score:17       Treatment and Education:  SPO2 on 2L O2 95%    AM-PAC 6 CLICK MOBILITY  Total Score:17     Patient left up in chair with all lines intact, call button in reach, and nsg notified.    GOALS:   Multidisciplinary Problems       Physical Therapy Goals          Problem: Physical Therapy    Goal Priority Disciplines Outcome Goal Variances Interventions   Physical Therapy Goal     PT, PT/OT Adequate for Care Transition                         History:     Past Medical History:   Diagnosis Date    Aortic atherosclerosis 1/7/2016    Arthritis     Chronic diastolic congestive heart failure 9/15/2022    Colon polyp: hyperplastic 2015- repeat 2020 8/6/2015    Diabetes mellitus, type II 8/16/2012    Diverticulosis     Gastric nodule 8/7/2014    GERD (gastroesophageal reflux disease) 8/16/2012    Goiter 8/16/2012    Hyperlipidemia 8/16/2012    Hypertension     Joint pain     Leg pain 8/16/2012    Lymphoma 8/16/2012    Osteopenia 8/16/2012    Osteoporosis     Renal cyst 3/28/2013    Rickets, vitamin D deficiency 8/16/2012    Thyroid nodule 2/24/2014    Vitamin D deficiency disease 8/16/2012       Past Surgical History:   Procedure Laterality Date    CARPAL TUNNEL RELEASE      CATARACT EXTRACTION W/  INTRAOCULAR LENS IMPLANT Bilateral     HYSTERECTOMY      partial    lymphoma surgery      L tibia       Time Tracking:     PT Received On:  01/23/23  PT Start Time: 1153     PT Stop Time: 1213  PT Total Time (min): 20 min     Billable Minutes: Evaluation 20 01/23/2023

## 2023-01-23 NOTE — PLAN OF CARE
Problem: Physical Therapy  Goal: Physical Therapy Goal  Outcome: Adequate for Care Transition   Initial PT evaluation performed today.  Pt is OK for D/C home with family support and HHPT/OT.  DME in place.  PT to sign off.  OK for OOB to chair and BSC with nursing staff.

## 2023-01-23 NOTE — PLAN OF CARE
01/23/23 0908   Discharge Planning   Assessment Type Discharge Planning Brief Assessment   Resource/Environmental Concerns none   Support Systems Family members   Equipment Currently Used at Home walker, rolling   Current Living Arrangements home   Patient/Family Anticipates Transition to home with family   Patient/Family Anticipated Services at Transition home health care   DME Needed Upon Discharge  none   Discharge Plan A Home with family;Home Health  (with follow up)       [id number] - [place], [place] - [address] AT [facility]  [address]  [facility] [address]  Phone: [phone] Fax: [phone]    [id number] - [place], [place] - [address] Pending sale to [facility] [address] [place] [address]  [facility] [address]  Phone: [phone] Fax: [phone]    [facility]ALA MENJIVAR - [address]  [address]  [place] [address]  Phone: [phone] Fax: [phone]    [id number] [place], [place] - [address] AT [facility] JENNI [address]OL[address]  [address]  [facility] [address]  Phone: [phone] Fax: [phone]    [facility] [address] [place] [address]  Phone: [phone] Fax: [phone]

## 2023-01-23 NOTE — NURSING
Discharge instructions explained to the patient and granddaughter. All questions answered. PIVs and telemetry box removed. Pt aware of all follow up appts. Pt stable and cooperative. All belongings in pt's possession. Pt discharge off floor pending arrival of transport with a wheelchair.

## 2023-01-23 NOTE — PLAN OF CARE
01/23/23 0916   Final Note   Assessment Type Final Discharge Note   Anticipated Discharge Disposition Home   Hospital Resources/Appts/Education Provided Appointments scheduled and added to AVS   Post-Acute Status   Post-Acute Authorization Other   Other Status No Post-Acute Service Needs     Pts nurse April notified that the pt can d/c from CM standpoint.       Follow-up Information       Annika Garland MD Follow up in 1 week(s).    Specialty: Internal Medicine  Contact information:  [address]  [place] [address] [phone]               [facility] Follow up.    Why: DME-provider of oxygen for home use call for questions or concerns  Contact information:  [address] [place] [address] [phone]

## 2023-01-23 NOTE — NURSING
Pt in bed resting, respirations even and unlabored, no acute distress, no complaints of pain. Nelson tray given to patient, pt will be Npo after midnight. Safety precautions in place, call light in reach, will continue to monitor.

## 2023-01-23 NOTE — PROGRESS NOTES
Pts nurse April notified that the oxygen tank will be delivered to bedside and once done pt can d/c from CM standpoint

## 2023-01-23 NOTE — PLAN OF CARE
Problem: Impaired Wound Healing  Goal: Optimal Wound Healing  Outcome: Ongoing, Progressing  Intervention: Promote Wound Healing  Flowsheets (Taken 1/23/2023 4819)  Oral Nutrition Promotion: rest periods promoted  Sleep/Rest Enhancement: awakenings minimized  Activity Management: Rolling - L1  Pain Management Interventions:   care clustered   pillow support provided   position adjusted   relaxation techniques promoted

## 2023-01-23 NOTE — PROGRESS NOTES
[facility] ([facility]) Team notified of pt referral via Ambulatory Referral to Heart Failure Transitional Care ([id number]).    Patient screened today, January 23, 2023 by provider and RN for enrollment to program.      Pt was deemed not a candidate for enrollment at this time related to patient is followed by [facility].Pt followed by [facility] for genetic testing. RN sent secure chat to [facility] RN for a follow up appt.     Pt will require additional follow up planning per primary team.     If pt status, diagnosis, or treatment plan changes , please place AMB referral to [facility] ([id number]) for HFTCC enrollment re-evalution.

## 2023-01-23 NOTE — PLAN OF CARE
Problem: Adult Inpatient Plan of Care  Goal: Plan of Care Review  Outcome: Met  Goal: Patient-Specific Goal (Individualized)  Outcome: Met  Goal: Absence of Hospital-Acquired Illness or Injury  Outcome: Met  Goal: Optimal Comfort and Wellbeing  Outcome: Met  Goal: Readiness for Transition of Care  Outcome: Met     Problem: Skin Injury Risk Increased  Goal: Skin Health and Integrity  Outcome: Met     Problem: Diabetes Comorbidity  Goal: Blood Glucose Level Within Targeted Range  Outcome: Met     Problem: Impaired Wound Healing  Goal: Optimal Wound Healing  Outcome: Met     Problem: Fall Injury Risk  Goal: Absence of Fall and Fall-Related Injury  Outcome: Met

## 2023-01-23 NOTE — PROGRESS NOTES
Message received from pts nurse stating that the pt reports that her portable oxygen tank is empty and family attempted to try to call provider to get refill/swap out and was told that they could not deliver till tomorrow to the home.      TN attempted to contact pts daughter Glenda-- no answer at this time VML requesting call back.    Call placed to pts grand daughter Ms Majano to follow up on who oxygen provider is. Unable to confirm at this time for sure but believes it is [facility].  But that she would have to call her mother to confirm    Call placed to [facility] to inquire if they are the provider of oxygen. Confirmed pt does not get oxygen from [facility]    Call back from pts daughter who advised [facility]  Message sent to Lyn with [facility] to inquire if a tank can be pulled from [employer] and given to pt and when replacement tanks delivered on tomorrow it can be picked up.  TN to follow for response.

## 2023-01-24 NOTE — DISCHARGE SUMMARY
"[facility] Medicine  Discharge Summary      Patient Name: Peri Johansen  MRN: [id number]  [facility]: [id number]  Patient Class: OP- Observation  Admission Date: 1/21/2023  Hospital Length of Stay: 0 days  Discharge Date and Time: 1/23/2023  4:00 PM  Attending Physician: No att. providers found   Discharging Provider: Melissa Pineda NP  Primary Care Provider: Annika Garland MD    Primary Care Team: Networked reference to record PCT     HPI:   88 y.o. female, with a past medical history of HLD, CHF, DM, GERD, and HTN, who presents to the ED with midsternal chest pain onset 2 days ago. Patient describes this pain as non radiating.  She reported to ED provider that it felt "like someone hit her in the chest with a brick." She notes associated symptoms of nausea. Patient additionally notes symptoms of bilateral foot pain. EMS reports that they administered Nitro SL to the patient en route to the ED without relief. No other exacerbating or alleviating factors. Patient denies attempting treatment for her symptoms prior to EMS arrival. Patient endorses daily medication compliance. She denies a past medical history of GI bleed. Patient denies vomiting, blood in stool, melena, abdominal pain, fevers, cough, congestion, change in bladder habits, change in bowel habits. Work up revealed Elevated troponin, elevated BNP, heart ultrasound done in ED and showed EF less than 35% with pericardial effusion and diminished LV function, EKG showed No STEMI, chest x-ray with concerns for CHF.  Patient admitted to hospital medicine observation unit for further medical management.      * No surgery found *      Hospital Course:   Ms. Johansen was placed in observation for ACS rule out after presenting with chest pain. Troponin noted to be elevated.  BNP elevated as well with evidence of pulmonary edema on chest xray.  EKG without evidence of acute ischemia.  Echocardiogram performed.  Cardiology consulted.  Monitor on " tele and obtain serial troponin.  Patient is recommended for pharmacological treatment per Cardiology.  She was initiated on IV Lasix with good diuresis.  She is recommended to resume her home Lasix and fluid restriction.  Family member at bedside states she regularly follows up with the Heart failure Clinic who manages her volume status with adjustments in home diuretics.  Patient is medically stable for discharge from Cardiology standpoint.  She will be discharged home with home health.      Attempted to discuss importance of living will and patient's wishes regarding code status in the event of cardiopulmonary arrest.  Patient declines conversation on 2 occasions.  Discussed with family at bedside to discuss living will at home with family members and complete paperwork during primary care follow-up.    Goals of Care Treatment Preferences:  Code Status: Full Code      Consults:     No new Assessment & Plan notes have been filed under this hospital service since the last note was generated.  Service: Hospital Medicine    Final Active Diagnoses:    Diagnosis Date Noted POA    PRINCIPAL PROBLEM:  Chest pain [R07.9] 01/22/2023 Yes    Acute respiratory failure with hypoxia [J96.01] 01/23/2023 Yes    Acute on chronic combined systolic and diastolic heart failure [I50.43] 01/22/2023 Yes    Pressure ulcer of left heel, stage 2 [L89.622] 01/22/2023 Yes    Debility [R53.81] 09/15/2022 Yes    Hyponatremia [E87.1] 09/07/2022 Yes    Elevated troponin [R77.8] 06/12/2019 Yes    Type 2 diabetes mellitus without complication, without long-term current use of insulin [E11.9] 06/12/2019 Yes    Hyperthyroidism [E05.90] 04/15/2019 Yes     Chronic    High cholesterol [E78.00] 08/16/2012 Yes      Problems Resolved During this Admission:       Discharged Condition: stable    Disposition: Home or Self Care    Follow Up:   Follow-up Information     Annika Garland MD Follow up in 1 week(s).    Specialty: Internal  Medicine  Contact information:  [address]  [place] [address]  [phone]             [facility] Follow up.    Why: DME-provider of oxygen for home use call for questions or concerns  Contact information:  [address] [place] [id number]  [phone]                     Patient Instructions:      Ambulatory referral/consult to Heart Failure Transitional Care Clinic   Standing Status: Future   Referral Priority: Routine Referral Type: Consultation   Referral Reason: Specialty Services Required   Requested Specialty: Cardiology   Number of Visits Requested: 1     Diet Cardiac     Notify your health care provider if you experience any of the following:  difficulty breathing or increased cough     Notify your health care provider if you experience any of the following:  persistent nausea and vomiting or diarrhea     Notify your health care provider if you experience any of the following:  temperature >100.4     Activity as tolerated       Significant Diagnostic Studies: Labs:   BMP:   Recent Labs   Lab 01/23/23  0902   GLU 60*      K 4.2   CL 98   CO2 31*   BUN 14   CREATININE 0.8   CALCIUM 9.0    and CMP   Recent Labs   Lab 01/23/23  0902      K 4.2   CL 98   CO2 31*   GLU 60*   BUN 14   CREATININE 0.8   CALCIUM 9.0   ANIONGAP 8     Cardiac Graphics: Echocardiogram:   Transthoracic echo (TTE) complete (Cupid Only):   Results for orders placed or performed during the hospital encounter of 01/21/23   Echo   Result Value Ref Range    BSA 1.39 m2    TDI SEPTAL 0.05 m/s    LV LATERAL E/E' RATIO 13.40 m/s    LV SEPTAL E/E' RATIO 13.40 m/s    IVC diameter 2.12 cm    Left Ventricular Outflow Tract Mean Velocity 0.41 cm/s    Left Ventricular Outflow Tract Mean Gradient 0.87 mmHg    TDI LATERAL 0.05 m/s    PV PEAK VELOCITY 0.77 cm/s    LVIDd 3.39 (A) 3.5 - 6.0 cm    IVS 1.46 (A) 0.6 - 1.1 cm    Posterior Wall 1.30 (A) 0.6 - 1.1 cm    LVIDs 3.09 2.1 - 4.0 cm    FS 9 28  - 44 %    Sinus 2.77 cm    STJ 1.56 cm    Ascending aorta 1.97 cm    LV mass 161.72 g    LA size 3.62 cm    RVDD 3.76 cm    TAPSE 1.32 cm    RV S' 0.01 cm/s    Left Ventricle Relative Wall Thickness 0.77 cm    AV mean gradient 2 mmHg    AV valve area 1.63 cm2    AV Velocity Ratio 0.59     AV index (prosthetic) 0.72     MV valve area p 1/2 method 4.98 cm2    E/A ratio 1.63     Mean e' 0.05 m/s    E wave deceleration time 152.34 msec    IVRT 99.90 msec    LVOT diameter 1.70 cm    LVOT area 2.3 cm2    LVOT peak russ 0.64 m/s    LVOT peak VTI 12.90 cm    Ao peak russ 1.08 m/s    Ao VTI 17.9 cm    LVOT stroke volume 29.27 cm3    AV peak gradient 5 mmHg    E/E' ratio 13.40 m/s    MV Peak E Russ 0.67 m/s    TR Max Russ 2.79 m/s    MV stenosis pressure 1/2 time 44.18 ms    MV Peak A Russ 0.41 m/s    LV Systolic Volume 37.62 mL    LV Systolic Volume Index 27.1 mL/m2    LV Diastolic Volume 46.98 mL    LV Diastolic Volume Index 33.80 mL/m2    LV Mass Index 116 g/m2    RA Major Axis 5.38 cm    Left Atrium Minor Axis 5.25 cm    Left Atrium Major Axis 5.61 cm    Triscuspid Valve Regurgitation Peak Gradient 31 mmHg    LA WIDTH 4.70 cm    LA volume 78.44 cm3    LA Volume Index 56.4 mL/m2    RA Width 4.50 cm    Right Atrial Pressure (from IVC) 8 mmHg    EF 35 %    TV rest pulmonary artery pressure 39 mmHg    Narrative    · The left ventricle is normal in size with moderate concentric   hypertrophy and moderately decreased systolic function.  · The estimated ejection fraction is 35-40%.  · Grade I left ventricular diastolic dysfunction.  · Normal right ventricular size with normal right ventricular systolic   function.  · Severe left atrial enlargement.  · Severe right atrial enlargement.  · Mild aortic regurgitation.  · Mild-to-moderate mitral regurgitation.  · Moderate tricuspid regurgitation.  · Intermediate central venous pressure (8 mmHg).  · The estimated PA systolic pressure is 39 mmHg.  · Trivial posterior pericardial effusion.           Pending Diagnostic Studies:     None         Medications:  Reconciled Home Medications:      Medication List      CONTINUE taking these medications    acetaminophen 500 MG tablet  Commonly known as: TYLENOL  Take 500 mg by mouth daily as needed for Pain.     albuterol 90 mcg/actuation inhaler  Commonly known as: PROVENTIL/VENTOLIN HFA  Inhale 2 puffs into the lungs every 6 (six) hours as needed.     amiodarone 200 MG Tab  Commonly known as: PACERONE  Take 1 tablet (200 mg total) by mouth once daily.     aspirin 81 MG Chew  Take 1 tablet (81 mg total) by mouth once daily.     atorvastatin 40 MG tablet  Commonly known as: LIPITOR  TAKE 1 TABLET BY MOUTH EVERY DAY     benzonatate 200 MG capsule  Commonly known as: TESSALON  Take 200 mg by mouth as needed.     blood-glucose meter kit  Use as instructed.  ACCUCHECK or whatever is preferred by insurance     dextran 70-hypromellose ophthalmic solution  Commonly known as: ARTIFICIAL TEARS([id number])  Place 1 drop into both eyes 4 (four) times daily as needed.     dextromethorphan-guaiFENesin  mg/5 ml  mg/5 mL liquid  Commonly known as: ROBITUSSIN-DM  Take 10 mLs by mouth every 6 (six) hours as needed for Cough.     diclofenac sodium 1 % Gel  Commonly known as: VOLTAREN  APPLY 2 GRAMS EXTERNALLY TO THE AFFECTED AREA FOUR TIMES DAILY     furosemide 40 MG tablet  Commonly known as: LASIX  Take 1 tablet (40 mg total) by mouth 2 (two) times daily.     gabapentin 300 MG capsule  Commonly known as: NEURONTIN  TAKE 1 CAPSULE BY MOUTH TWICE DAILY     ICY HOT TOP  Apply topically 2 (two) times daily as needed (Pain).     losartan 25 MG tablet  Commonly known as: COZAAR  Take 0.5 tablets (12.5 mg total) by mouth once daily.     magnesium oxide 400 mg (241.3 mg magnesium) tablet  Commonly known as: MAG-OX  Take 1 tablet by mouth once daily.     methIMAzole 5 MG Tab  Commonly known as: TAPAZOLE  TAKE 1/2 TABLET BY MOUTH DAILY     * MICRO THIN LANCETS 33 gauge  Misc  Generic drug: lancets  USE AS DIRECTED     * lancets Misc  Test 2 x daily     multivitamin per tablet  Commonly known as: THERAGRAN  Take 1 tablet by mouth once daily.     nitroGLYCERIN 0.4 MG SL tablet  Commonly known as: NITROSTAT  Place 1 tablet (0.4 mg total) under the tongue every 5 (five) minutes as needed for Chest pain.     omeprazole 40 MG capsule  Commonly known as: PRILOSEC  Take 1 capsule (40 mg total) by mouth every morning.     ondansetron 4 MG Tbdl  Commonly known as: ZOFRAN-ODT  Take 1 tablet (4 mg total) by mouth every 8 (eight) hours as needed (Nausea or vomiting).     potassium chloride 10 MEQ Tbsr  Commonly known as: KLOR-CON  Take 2 tablets (20 mEq total) by mouth once daily.     traMADoL 50 mg tablet  Commonly known as: ULTRAM  TAKE 1 TABLET(50 MG) BY MOUTH EVERY 12 HOURS AS NEEDED FOR PAIN     * TRUE METRIX GLUCOSE TEST STRIP Strp  Generic drug: blood sugar diagnostic  USE AS DIRECTED     * blood sugar diagnostic Strp  Test 2 times daily     VITAMIN D2 50,000 unit Cap  Generic drug: ergocalciferol  Take 1 capsule (50,000 Units total) by mouth every 7 days.         * This list has 4 medication(s) that are the same as other medications prescribed for you. Read the directions carefully, and ask your doctor or other care provider to review them with you.            STOP taking these medications    tolvaptan 15 mg Tab  Commonly known as: SAMSCA            Indwelling Lines/Drains at time of discharge:   Lines/Drains/Airways     None                 Time spent on the discharge of patient: 45 minutes         Melissa Pineda NP  Department of Hospital Medicine  [facility]

## 2023-02-07 NOTE — TELEPHONE ENCOUNTER
I called and spoke to the grand daughter, she states she is an RN and she knows her grandmothers baseline. She states the  nurse keeps pushing for Hospice and her mom and her grandmother do not want hospice. I asked her was aware the fluid was getting worse. The  nurse is indicating when she turned the patient all the fluid was on one side- the side she was laying on. I asked could she put me on the phone with the patients daughter since she was the grandauter. She states she in an RN and she will relay the message to her mom and they will get her to the ER. Just an FYI Dr. Garland, I reminded her that she missed an appt today. She states they normally get a reminder and did not this time.   Nasra Sapp Rn [facility]

## 2023-02-07 NOTE — TELEPHONE ENCOUNTER
----- Message from Xenia Masterson sent at 2/7/2023 12:22 PM CST -----  Contact: Amarilys/ [phone]/Alexis Panda with [facility] is calling to report that patient is declining with bp 80/50 pulse 54 fluid is moving up, weeping edema, four new wounds due to edema, please call . [place] @ [phone] Family declined hospice consult.

## 2023-02-22 ENCOUNTER — TELEPHONE (OUTPATIENT)
Dept: PRIMARY CARE CLINIC | Facility: CLINIC | Age: 88
End: 2023-02-22
Payer: MEDICARE

## 2023-02-22 NOTE — TELEPHONE ENCOUNTER
MIRIAN called to schedule an appt with the pt and her Granddaughter informed me that she passed away on last week at [facility].

## 2023-02-23 NOTE — TELEPHONE ENCOUNTER
I called and spoke to her granddaughter, she and family are doing as well as can be expected.  Condolences expressed.

## 2024-07-28 NOTE — PROGRESS NOTES
INTERNAL MEDICINE PROGRESS NOTE    CHIEF COMPLAINT     Chief Complaint   Patient presents with    Medication Refill    Hospital Follow Up       HPI     Peri Johansen is a 88 y.o. female with DM2, GERD, HLD,  HTN, lymphoma, CHF, and hyperthyroidism who presents for a follow up visit today.    Here for mediation refill     Was admitted 6/30/2022 to [facility] for chest pain/epigastric pain. patient mildly hypotensive but otherwise VSSAF. CBC unremarkable. Glucose 147. Tbili 1.3. Alk phos 152. AST/ALT 69/46. Lipase WNL. Troponin 0.074 (at baseline).  (slightly above baseline). UA with 3+ leukocytes, 20 WBCs, and, occasional bacteria. EKG sinus rhythm rate 62, no acute ischemic changes. CXR with mild cardiomegaly with mild interstitial edema. No significant change from prior study.The patient received small fluid bolus, ASA, and GI cocktail in the ED.  US abdomen showed discrete cholelithiasis and mild gall bladder wall thickening/gallbladder wall edema. US also showed right hepatic lobe lesion 1.0 cm and recommends non emergent triple phase CT or MRI exam and bilateral effusion. CXR showed mild interstitial edema.     Chest pain -   improvement with ASA and GI cocktail.  Troponin 0.074, repeat 0.074  1/2022 Cardiac pet stress    The relative PET images show no clinically significant regional resting or stress induced perfusion abnormalities.    The whole heart absolute myocardial perfusion values averaged 0.95 cc/min/g at rest, which is normal; 1.61 cc/min/g at stress, which is mildly reduced; and CFR is 1.70 , which is mildly reduced.  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  Patient is a poor historian   Cardiology consult for 2 D echo finding -amyloidosis, chest pain     Continue ASA    Elevated LFTs -    US abdomen showed discrete cholelithiasis and mild gall bladder wall thickening/gallbladder wall edema.   US also showed right hepatic lobe lesion 1.0 cm and recommends non emergent triple phase CT or MRI exam and bilateral effusion.    Acute hep panel pending    UTI - took Keflex TID x 3 days     HTN- taking metorplol 25mg daily - Hol dif SBP <120 or <55   Asa 81mg, lipitor 40mg     CHF - taking lasix 40mg in am and 20mg in pm   Pt reports minimal swelling in the lower legs.     Pain lower legs and abd- takes tramadol as needed for pain       Past Medical History:  Past Medical History:   Diagnosis Date    Aortic atherosclerosis 1/7/2016    Arthritis     Colon polyp: hyperplastic 2015- repeat 2020 8/6/2015    Diabetes mellitus, type II 8/16/2012    Diverticulosis     Gastric nodule 8/7/2014    GERD (gastroesophageal reflux disease) 8/16/2012    Goiter 8/16/2012    Hyperlipidemia 8/16/2012    Hypertension     Joint pain     Leg pain 8/16/2012    Lymphoma 8/16/2012    Osteopenia 8/16/2012    Osteoporosis     Renal cyst 3/28/2013    Rickets, vitamin D deficiency 8/16/2012    Thyroid nodule 2/24/2014    Vitamin D deficiency disease 8/16/2012       Home Medications:  Prior to Admission medications    Medication Sig Start Date End Date Taking? Authorizing Provider   ammonium lactate 12 % Crea Apply 1 application topically once daily. 3/18/14  Yes Mechelle Oconnor DPM   aspirin 81 MG Chew Take 1 tablet (81 mg total) by mouth once daily. 11/3/20  Yes Annika Garland MD   atorvastatin (LIPITOR) 40 MG tablet TAKE 1 TABLET BY MOUTH EVERY DAY 6/27/22  Yes Annika Garland MD   blood sugar diagnostic (TRUE METRIX GLUCOSE TEST STRIP) Strp USE AS DIRECTED 8/4/20  Yes Annika Garland MD   blood sugar diagnostic Strp Test 2 times daily 8/10/20  Yes Annika Garland  MD   calcium 500 mg Tab Take 1 tablet by mouth Twice daily.   Yes Historical Provider   dextran 70-hypromellose (ARTIFICIAL TEARS,[id number],) ophthalmic solution Place 1 drop into both eyes 4 (four) times daily as needed. 8/21/19  Yes Chitra Wang MD   diclofenac sodium (VOLTAREN) 1 % Gel APPLY 2 GRAMS EXTERNALLY TO THE AFFECTED AREA FOUR TIMES DAILY 11/3/20  Yes Annika Garland MD   fluticasone propionate (FLONASE) 50 mcg/actuation nasal spray 1 spray (50 mcg total) by Each Nostril route once daily. 3/19/21  Yes Annika Garland MD   furosemide (LASIX) 20 MG tablet Take 1 tablet (20 mg total) by mouth every evening. 1/21/22 1/21/23 Yes Dennys Garcia MD   furosemide (LASIX) 40 MG tablet Take 1 tablet (40 mg total) by mouth once daily. 1/21/22 1/21/23 Yes Dennys Garcia MD   gabapentin (NEURONTIN) 300 MG capsule TAKE 1 CAPSULE(300 MG) BY MOUTH TWICE DAILY 5/12/22  Yes Annika Garland MD   lancets Misc Test 2 x daily 8/10/20  Yes Annika Garland MD   methIMAzole (TAPAZOLE) 5 MG Tab TAKE 1/2 TABLET BY MOUTH DAILY 8/10/22  Yes Rosy Brannon NP   metoprolol succinate (TOPROL-XL) 25 MG 24 hr tablet Take 1 tablet (25 mg total) by mouth once daily. Hold if SBP <120 or <55 7/1/22  Yes Uma Morales NP   MICRO THIN LANCETS 33 gauge Misc USE AS DIRECTED 4/8/19  Yes Historical Provider   omeprazole (PRILOSEC) 40 MG capsule Take 1 capsule (40 mg total) by mouth every morning. 6/29/20  Yes Nicole Morales MD   ondansetron (ZOFRAN-ODT) 4 MG TbDL Take 1 tablet (4 mg total) by mouth every 8 (eight) hours as needed (Nausea or vomiting). 6/22/22  Yes Rosalina Childress MD   potassium chloride (KLOR-CON) 10 MEQ TbSR TAKE 1 TABLET(10 MEQ) BY MOUTH EVERY DAY 4/7/22  Yes Annika Garland MD   traMADoL (ULTRAM) 50 mg tablet TAKE 1 TABLET(50 MG) BY MOUTH EVERY 12 HOURS AS NEEDED FOR PAIN 8/22/22  Yes Annika Garland MD   blood-glucose meter kit Use as instructed.  ACCUCHECK or whatever is preferred by insurance 8/10/20 8/10/21  " Annika Garland MD   ergocalciferol (ERGOCALCIFEROL) 50,000 unit Cap Take 1 capsule (50,000 Units total) by mouth every 7 days.  Patient not taking: Reported on 8/25/2022 1/13/21   Rosy Brannon NP       Review of Systems:  Review of Systems   Constitutional: Negative for chills, fever and unexpected weight change.   Respiratory: Positive for shortness of breath. Negative for cough and wheezing.    Cardiovascular: Positive for chest pain (right upper chest wall ) and leg swelling. Negative for palpitations.   Gastrointestinal: Positive for abdominal pain. Negative for constipation, diarrhea, nausea and vomiting.   Genitourinary: Positive for frequency. Negative for dysuria and urgency.        Vaginal itching    Musculoskeletal: Positive for arthralgias.   Neurological: Negative for dizziness, light-headedness and headaches.       Health Maintainence:   Immunizations:  Health Maintenance       Date Due Completion Date    COVID-19 Vaccine (3 - Booster for Pfizer series) 12/08/2021 7/8/2021    Eye Exam 01/13/2022 1/13/2021    Override on 1/13/2021: Done    Foot Exam 02/11/2022 2/11/2021    Override on 2/11/2021: Done    Override on 8/27/2018: Done    Colonoscopy 01/20/2023 (Originally 7/10/2020) 7/10/2015    Influenza Vaccine (1) 09/01/2022 1/20/2022    Override on 1/23/2017: Declined    Override on 3/10/2015: Not Clinically Appropriate    Override on 2/24/2014: Done    Override on 10/9/2012: Done    Lipid Panel 10/21/2022 10/21/2021    Hemoglobin A1c 12/30/2022 6/30/2022    DEXA Scan 01/04/2023 1/4/2021    Override on 6/17/2011: Done    Diabetes Urine Screening 01/20/2023 1/20/2022    TETANUS VACCINE 04/15/2029 4/15/2019           PHYSICAL EXAM     BP (!) 108/50 (BP Location: Right arm, Patient Position: Sitting, BP Method: Medium (Manual))   Pulse 109   Ht 4' 11" (1.499 m)   Wt 52.4 kg (115 lb 8.3 oz)   SpO2 96%   BMI 23.33 kg/m²     Physical Exam  Constitutional:       General: She is not in acute " distress.     Appearance: Normal appearance. She is not ill-appearing, toxic-appearing or diaphoretic.   HENT:      Nose: Nose normal.      Mouth/Throat:      Mouth: Mucous membranes are moist.   Eyes:      Pupils: Pupils are equal, round, and reactive to light.   Cardiovascular:      Rate and Rhythm: Normal rate and regular rhythm.      Pulses: Normal pulses.      Heart sounds: Normal heart sounds.   Pulmonary:      Effort: Pulmonary effort is normal.      Breath sounds: Rales (fine crackles to the lung base ) present.   Abdominal:      General: Bowel sounds are normal. There is no distension.      Palpations: Abdomen is soft. There is no mass.      Tenderness: There is no abdominal tenderness. There is no guarding or rebound.      Hernia: No hernia is present.   Musculoskeletal:      Right lower leg: Edema (2+) present.      Left lower leg: Edema (2+) present.   Skin:     General: Skin is warm and dry.      Capillary Refill: Capillary refill takes less than 2 seconds.   Neurological:      Mental Status: She is alert.         LABS     Lab Results   Component Value Date    HGBA1C 7.2 (H) 06/30/2022     CMP  Sodium   Date Value Ref Range Status   08/25/2022 147 (H) 136 - 145 mmol/L Final     Potassium   Date Value Ref Range Status   08/25/2022 4.8 3.5 - 5.1 mmol/L Final     Chloride   Date Value Ref Range Status   08/25/2022 104 95 - 110 mmol/L Final     CO2   Date Value Ref Range Status   08/25/2022 30 (H) 23 - 29 mmol/L Final     Glucose   Date Value Ref Range Status   08/25/2022 148 (H) 70 - 110 mg/dL Final     BUN   Date Value Ref Range Status   08/25/2022 13 8 - 23 mg/dL Final     Creatinine   Date Value Ref Range Status   08/25/2022 0.8 0.5 - 1.4 mg/dL Final     Calcium   Date Value Ref Range Status   08/25/2022 10.5 8.7 - 10.5 mg/dL Final     Total Protein   Date Value Ref Range Status   07/01/2022 6.5 6.0 - 8.4 g/dL Final     Albumin   Date Value Ref Range Status   07/01/2022 2.5 (L) 3.5 - 5.2 g/dL Final      Total Bilirubin   Date Value Ref Range Status   07/01/2022 1.3 (H) 0.1 - 1.0 mg/dL Final     Comment:     For infants and newborns, interpretation of results should be based  on gestational age, weight and in agreement with clinical  observations.    Premature Infant recommended reference ranges:  Up to 24 hours.............<8.0 mg/dL  Up to 48 hours............<12.0 mg/dL  3-5 days..................<15.0 mg/dL  6-29 days.................<15.0 mg/dL       Alkaline Phosphatase   Date Value Ref Range Status   07/01/2022 112 55 - 135 U/L Final     AST   Date Value Ref Range Status   07/01/2022 46 (H) 10 - 40 U/L Final     ALT   Date Value Ref Range Status   07/01/2022 40 10 - 44 U/L Final     Anion Gap   Date Value Ref Range Status   08/25/2022 13 8 - 16 mmol/L Final     eGFR if    Date Value Ref Range Status   07/01/2022 >60 >60 mL/min/1.73 m^2 Final     eGFR if non    Date Value Ref Range Status   07/01/2022 >60 >60 mL/min/1.73 m^2 Final     Comment:     Calculation used to obtain the estimated glomerular filtration  rate (eGFR) is the CKD-EPI equation.        Lab Results   Component Value Date    WBC 4.34 08/25/2022    HGB 14.0 08/25/2022    HCT 43.6 08/25/2022    MCV 88 08/25/2022     08/25/2022     Lab Results   Component Value Date    CHOL 134 10/21/2021    CHOL 121 04/08/2021    CHOL 167 12/29/2020     Lab Results   Component Value Date    HDL 55 10/21/2021    HDL 52 04/08/2021    HDL 53 12/29/2020     Lab Results   Component Value Date    LDLCALC 61.6 (L) 10/21/2021    LDLCALC 60.6 (L) 04/08/2021    LDLCALC 97.6 12/29/2020     Lab Results   Component Value Date    TRIG 87 10/21/2021    TRIG 42 04/08/2021    TRIG 82 12/29/2020     Lab Results   Component Value Date    CHOLHDL 41.0 10/21/2021    CHOLHDL 43.0 04/08/2021    CHOLHDL 31.7 12/29/2020     Lab Results   Component Value Date    TSH 0.341 (L) 06/30/2022    [id number] 7.7 09/21/2010       ASSESSMENT/PLAN     Peri M  Eleno is a 88 y.o. female     Chronic systolic congestive heart failure- will send for CXR and BNP. Cont lasix 40mg in am and 20mg in pm. Low na diet and daily weights  -     CBC Auto Differential; Future; Expected date: 08/25/2022  -     Basic Metabolic Panel; Future; Expected date: 08/25/2022  -     B-TYPE NATRIURETIC PEPTIDE; Future; Expected date: 08/25/2022  -     X-Ray Chest PA And Lateral; Future; Expected date: 08/25/2022    Chronic combined systolic and diastolic congestive heart failurewill send for CXR and BNP. Cont lasix 40mg in am and 20mg in pm. Low na diet and daily weights    Shortness of breathwill send for CXR and BNP. Cont lasix 40mg in am and 20mg in pm. Low na diet and daily weights    Type 2 diabetes mellitus with hyperglycemia, without long-term current use of insulin    Acute cystitis without hematuria- will check U/a today.   -     Urinalysis, Reflex to Urine Culture Urine, Clean Catch    Abdominal pain, unspecified abdominal location- reviewed imaging from last inpatient stay. Needs additional imaging.   -     CT Abdomen Pelvis W Wo Contrast; Future; Expected date: 08/25/2022    Abnormal findings on diagnostic imaging of abdomen- reviewed imaging from last inpatient stay. Needs additional imaging.   -     CT Abdomen Pelvis W Wo Contrast; Future; Expected date: 08/25/2022    Mixed hyperlipidemia- stable. Will monitor and cont statin     Aortic atherosclerosis: see CT scan 3/15    Diffuse large B-cell lymphoma of lymph nodes of lower extremity    Hyperthyroidism- stable. Will cont current meds.     Follow up with PCP     Patient education provided from [facility]. Patient was counseled on when and how to seek emergent care.       Lyn LEAVITT, APRN, FNP-c   Department of Internal Medicine - [facility]  2:09 PM     29-Jul-2024 00:56

## 2025-04-25 NOTE — CARE UPDATE
----- Message from Bharti Garcia NP sent at 4/24/2025  9:23 PM CDT -----  Notify radiology read CT lung screening as benign. RepeatCT Lung Cancer Screening Low Dose WO Contrast[[id number]] in one year. There is moderate coronary calcification which as been noted previously. Continue medications as directed and can discuss further at her May 2nd appointment.      See HPI for full details assessment and plan.   Transferred [facility] to [facility] for mid sternal chest pain radiating to epigastric region and to sides. Patient was in ED 9 days ago for NVD and abdominal pain.  Elevated LFTs and T bili similar to previous labs. Elevated GGT. Lipase normal. US abdomen showed discrete cholelithiasis and mild gall bladder wall thickening/gallbladder wall edema. US also showed right hepatic lobe lesion 1.0 cm and recommends non emergent triple phase CT or MRI exam and bilateral effusion. CXR showed mild interstitial edema. On exam, lung diminished and BLE edema. Denies chest pain or sob. Mild elevated troponin trending down suspect demand ischemia from HF. 2 d echo EF 40%, Grade II DD, biatrial enlargemetn, and PH 44mmg.   Acute hep panel pending  Lasix 40 mg IV x 1 and reassess  Currently patient denies any pain.   Consult GI and General surgery for concern symptomatic cholelithiasis    Uma Morales NP  Staff: Cristi Delong MD